# Patient Record
Sex: FEMALE | Race: WHITE | Employment: OTHER | URBAN - METROPOLITAN AREA
[De-identification: names, ages, dates, MRNs, and addresses within clinical notes are randomized per-mention and may not be internally consistent; named-entity substitution may affect disease eponyms.]

---

## 2022-05-25 ENCOUNTER — OFFICE VISIT (OUTPATIENT)
Dept: ORTHOPEDIC SURGERY | Age: 72
End: 2022-05-25
Payer: MEDICARE

## 2022-05-25 VITALS — BODY MASS INDEX: 39.37 KG/M2 | WEIGHT: 222.2 LBS | HEIGHT: 63 IN

## 2022-05-25 DIAGNOSIS — M25.551 CHRONIC RIGHT HIP PAIN: ICD-10-CM

## 2022-05-25 DIAGNOSIS — M16.11 PRIMARY OSTEOARTHRITIS OF RIGHT HIP: Primary | ICD-10-CM

## 2022-05-25 DIAGNOSIS — G89.29 CHRONIC RIGHT HIP PAIN: ICD-10-CM

## 2022-05-25 DIAGNOSIS — M70.61 GREATER TROCHANTERIC BURSITIS OF RIGHT HIP: ICD-10-CM

## 2022-05-25 PROCEDURE — 1090F PRES/ABSN URINE INCON ASSESS: CPT | Performed by: ORTHOPAEDIC SURGERY

## 2022-05-25 PROCEDURE — 1123F ACP DISCUSS/DSCN MKR DOCD: CPT | Performed by: ORTHOPAEDIC SURGERY

## 2022-05-25 PROCEDURE — 20610 DRAIN/INJ JOINT/BURSA W/O US: CPT | Performed by: ORTHOPAEDIC SURGERY

## 2022-05-25 PROCEDURE — 1036F TOBACCO NON-USER: CPT | Performed by: ORTHOPAEDIC SURGERY

## 2022-05-25 PROCEDURE — G8400 PT W/DXA NO RESULTS DOC: HCPCS | Performed by: ORTHOPAEDIC SURGERY

## 2022-05-25 PROCEDURE — G8417 CALC BMI ABV UP PARAM F/U: HCPCS | Performed by: ORTHOPAEDIC SURGERY

## 2022-05-25 PROCEDURE — G8427 DOCREV CUR MEDS BY ELIG CLIN: HCPCS | Performed by: ORTHOPAEDIC SURGERY

## 2022-05-25 PROCEDURE — 3017F COLORECTAL CA SCREEN DOC REV: CPT | Performed by: ORTHOPAEDIC SURGERY

## 2022-05-25 PROCEDURE — 99203 OFFICE O/P NEW LOW 30 MIN: CPT | Performed by: ORTHOPAEDIC SURGERY

## 2022-05-25 RX ORDER — AMITRIPTYLINE HYDROCHLORIDE 25 MG/1
25 TABLET, FILM COATED ORAL NIGHTLY
COMMUNITY
Start: 2022-01-06 | End: 2022-07-08 | Stop reason: ALTCHOICE

## 2022-05-25 RX ORDER — ALBUTEROL SULFATE 90 UG/1
2 AEROSOL, METERED RESPIRATORY (INHALATION) EVERY 6 HOURS PRN
COMMUNITY

## 2022-05-25 RX ORDER — METHOCARBAMOL 500 MG/1
500 TABLET, FILM COATED ORAL 3 TIMES DAILY PRN
COMMUNITY
Start: 2016-06-30

## 2022-05-25 RX ORDER — FUROSEMIDE 20 MG/1
20 TABLET ORAL DAILY
COMMUNITY
Start: 2016-07-12 | End: 2023-04-30

## 2022-05-25 RX ORDER — BUPIVACAINE HYDROCHLORIDE 2.5 MG/ML
4 INJECTION, SOLUTION INFILTRATION; PERINEURAL ONCE
Status: DISCONTINUED | OUTPATIENT
Start: 2022-05-25 | End: 2022-07-13 | Stop reason: HOSPADM

## 2022-05-25 RX ORDER — TRAMADOL HYDROCHLORIDE 50 MG/1
50 TABLET ORAL EVERY 6 HOURS PRN
COMMUNITY
Start: 2016-04-29 | End: 2022-05-31

## 2022-05-25 RX ORDER — LIDOCAINE HYDROCHLORIDE 10 MG/ML
2 INJECTION, SOLUTION INFILTRATION; PERINEURAL ONCE
Status: COMPLETED | OUTPATIENT
Start: 2022-05-25 | End: 2022-05-25

## 2022-05-25 RX ORDER — PREGABALIN 50 MG/1
50 CAPSULE ORAL 3 TIMES DAILY
COMMUNITY
Start: 2022-04-20 | End: 2022-10-17

## 2022-05-25 RX ORDER — ALLOPURINOL 100 MG/1
100 TABLET ORAL DAILY
Status: ON HOLD | COMMUNITY
Start: 2016-06-24 | End: 2022-08-11 | Stop reason: CLARIF

## 2022-05-25 RX ORDER — TRIAMCINOLONE ACETONIDE 40 MG/ML
40 INJECTION, SUSPENSION INTRA-ARTICULAR; INTRAMUSCULAR ONCE
Status: COMPLETED | OUTPATIENT
Start: 2022-05-25 | End: 2022-05-25

## 2022-05-25 RX ORDER — HYDROCODONE BITARTRATE AND ACETAMINOPHEN 5; 325 MG/1; MG/1
1 TABLET ORAL 2 TIMES DAILY PRN
COMMUNITY
Start: 2016-07-06 | End: 2022-06-19

## 2022-05-25 RX ORDER — LISINOPRIL 5 MG/1
5 TABLET ORAL 2 TIMES DAILY
COMMUNITY
Start: 2016-06-24 | End: 2022-11-08

## 2022-05-25 RX ORDER — FLUTICASONE PROPIONATE 50 MCG
2 SPRAY, SUSPENSION (ML) NASAL DAILY PRN
COMMUNITY

## 2022-05-25 RX ORDER — MELOXICAM 15 MG/1
15 TABLET ORAL DAILY
Status: ON HOLD | COMMUNITY
Start: 2018-06-22 | End: 2022-08-11 | Stop reason: CLARIF

## 2022-05-25 RX ORDER — LIDOCAINE HYDROCHLORIDE 10 MG/ML
4 INJECTION, SOLUTION INFILTRATION; PERINEURAL ONCE
Status: DISCONTINUED | OUTPATIENT
Start: 2022-05-25 | End: 2022-07-13 | Stop reason: HOSPADM

## 2022-05-25 RX ORDER — BUPIVACAINE HYDROCHLORIDE 2.5 MG/ML
2 INJECTION, SOLUTION INFILTRATION; PERINEURAL ONCE
Status: COMPLETED | OUTPATIENT
Start: 2022-05-25 | End: 2022-05-25

## 2022-05-25 RX ADMIN — TRIAMCINOLONE ACETONIDE 40 MG: 40 INJECTION, SUSPENSION INTRA-ARTICULAR; INTRAMUSCULAR at 14:15

## 2022-05-25 RX ADMIN — BUPIVACAINE HYDROCHLORIDE 5 MG: 2.5 INJECTION, SOLUTION INFILTRATION; PERINEURAL at 14:20

## 2022-05-25 RX ADMIN — LIDOCAINE HYDROCHLORIDE 2 ML: 10 INJECTION, SOLUTION INFILTRATION; PERINEURAL at 14:20

## 2022-05-25 NOTE — PROGRESS NOTES
CHIEF COMPLAINT: Right hip pain. History:   Ирина Marroquin is a 74 y.o. female who presents today for evaluation and treatment of right hip pain / injury. Patient was self-referred. This is evaluated as a personal injury. She has had pain for greater than a year, but had significant worsening about 3 months ago when she had to use stairs due to an elevator being out of order. Patient rates the pain as a 8/10. Patient did not have a history of injury. Pain is located laterally and in her groin. Pain is worse with all activities. Patient does have a history of back pain. Patient has not had PT. The patient cannot take NSAIDs because of GI upset. The patient has not had an intra-articular hip injection. The patient's occupation is retired. Outside reports reviewed: none. No past medical history on file. No past surgical history on file. No family history on file. Social History     Socioeconomic History    Marital status: Unknown     Spouse name: None    Number of children: None    Years of education: None    Highest education level: None   Occupational History    None   Tobacco Use    Smoking status: Never Smoker    Smokeless tobacco: Never Used   Substance and Sexual Activity    Alcohol use: Never    Drug use: Never    Sexual activity: None   Other Topics Concern    None   Social History Narrative    None     Social Determinants of Health     Financial Resource Strain:     Difficulty of Paying Living Expenses: Not on file   Food Insecurity:     Worried About Running Out of Food in the Last Year: Not on file    Bautista of Food in the Last Year: Not on file   Transportation Needs:     Lack of Transportation (Medical): Not on file    Lack of Transportation (Non-Medical):  Not on file   Physical Activity:     Days of Exercise per Week: Not on file    Minutes of Exercise per Session: Not on file   Stress:     Feeling of Stress : Not on file   Social Connections:     Frequency of Communication with Friends and Family: Not on file    Frequency of Social Gatherings with Friends and Family: Not on file    Attends Islam Services: Not on file    Active Member of Clubs or Organizations: Not on file    Attends Club or Organization Meetings: Not on file    Marital Status: Not on file   Intimate Partner Violence:     Fear of Current or Ex-Partner: Not on file    Emotionally Abused: Not on file    Physically Abused: Not on file    Sexually Abused: Not on file   Housing Stability:     Unable to Pay for Housing in the Last Year: Not on file    Number of [place] in the Last Year: Not on file    Unstable Housing in the Last Year: Not on file       No current outpatient medications on file. No current facility-administered medications for this visit. Allergies   Allergen Reactions    Buspirone Hives    Gabapentin Other (See Comments)     Panic attacks  Panic attacks      Doxycycline Nausea And Vomiting and Palpitations    Statins Rash    Sulfa Antibiotics Rash        Review of Systems:  I have reviewed the clinically relevant past medical history, medications, allergies, family history, social history, and 13 point Review of Systems from the patient's recent history form & documented any details relevant to today's presenting complaints in the history above. The patient's self-reported past medical history, medications, allergies, family history, social history, and Review of Systems form from 5/25/22 have been scanned into the chart under the \"Media\" tab. Physical Examination:     Vital signs:   Ht 5' 3\" (1.6 m)   Wt 222 lb 3.2 oz (100.8 kg)   BMI 39.36 kg/m²     General:  alert, appears stated age, cooperative and no distress   Feet:  normal   Gait:  Normal. The patient can bear weight on the injured extremity.   She is using a rolling walker     Right Hip  Passive ROM:  0 degrees extension     degrees flexion   45 degrees external rotation   20 degrees internal rotation   Bilateral hips   HU:  3   Impingement test:  positive   Left hip: negative   Labral stress test:  positive   Left hip: negative   Jose Alberto test:  not tested   Greater trochanter tenderness:  positive   Pirifiormis / Gluteus medius tenderness:  negative   Iliopsoas strength:  5 / 5    Bilateral hips   Logroll:  negative   Straight-leg raise:  negative   Leg length discrepancy:  Equal      Motor exam noted and recorded, quadriceps, hamstrings, foot dorsiflexors and plantar flexors are intact 5/5 equal and symmetric. Sensation is intact grossly to light touch in the tibial, peroneal, sural, and saphenous nerve distributions bilaterally. Both feet are well perfused and sensory is intact to feet equal and symmetric. There is not  any cellulitis, skin lesions, or lymphedema noted bilaterally. Imaging:  Right hip xrays:   AP pelvis xray and AP and lateral views obtained and reviewed. They demonstrate no evidence of acute fracture, subluxation, or dislocation. She has severe right hip osteoarthritis, and moderate on the left      Assessment:     Right hip osteoarthritis  Right hip trochanteric bursitis  HTN  Gout  DISH (diffuse idiopathic skeletal hyperostosis  Chronic bronchitis      Plan:     Natural history and expected course discussed. Questions answered. We discussed that she does have severe right hip osteoarthritis. I think this is the main etiology of her symptoms. However, she also does have some moderate right trochanteric bursitis. The risks and benefits of an injection were discussed with the patient. The patient had full opportunity to ask questions and all were answered. The patient then provided verbal informed consent. The skin was then prepped with betadine solution and alcohol. Under aseptic conditions, the  right trochanteric bursa was injected with 2cc of 1% xylocaine, 2cc of 0.25% marcaine, and 1cc of Kenalog (40mg/ml).   There were no immediate complications following the injection. The patient was advised of the possibility of injection site reaction and instructed to apply ice to the area and take NSAIDs if able. Ice as needed. She cannot take NSAIDs. We will have her see Dr. Nicci Oliveros for evaluation for ultrasound-guided hip injection. We also discussed that Dr. Velvet Laguerre is one of our adult reconstruction specialist, who could perform a total hip replacement on her if needed. Sindy Turpin. Jatin Oquendo MD  Orthopaedic Surgery and Sports Medicine     Disclaimer: This note was generated with use of a verbal recognition program and an attempt was made to check for errors. It is possible that there are still dictated errors within this office note. If so, please bring any significant errors to my attention for an addendum. All efforts were made to ensure that this office note is accurate.

## 2022-06-14 ENCOUNTER — HOSPITAL ENCOUNTER (OUTPATIENT)
Age: 72
Discharge: HOME | End: 2022-06-14
Payer: MEDICARE

## 2022-06-14 ENCOUNTER — OFFICE VISIT (OUTPATIENT)
Dept: ORTHOPEDIC SURGERY | Age: 72
End: 2022-06-14
Payer: MEDICARE

## 2022-06-14 ENCOUNTER — TELEPHONE (OUTPATIENT)
Dept: ORTHOPEDIC SURGERY | Age: 72
End: 2022-06-14

## 2022-06-14 VITALS — BODY MASS INDEX: 38.45 KG/M2 | WEIGHT: 217 LBS | HEIGHT: 63 IN

## 2022-06-14 DIAGNOSIS — M16.11 PRIMARY OSTEOARTHRITIS OF RIGHT HIP: ICD-10-CM

## 2022-06-14 DIAGNOSIS — Z01.818 PREOP TESTING: ICD-10-CM

## 2022-06-14 DIAGNOSIS — E66.09 CLASS 2 OBESITY DUE TO EXCESS CALORIES WITHOUT SERIOUS COMORBIDITY WITH BODY MASS INDEX (BMI) OF 39.0 TO 39.9 IN ADULT: ICD-10-CM

## 2022-06-14 DIAGNOSIS — M16.11 PRIMARY OSTEOARTHRITIS OF RIGHT HIP: Primary | ICD-10-CM

## 2022-06-14 DIAGNOSIS — Z01.818 PREOP TESTING: Primary | ICD-10-CM

## 2022-06-14 DIAGNOSIS — M25.551 RIGHT HIP PAIN: Primary | ICD-10-CM

## 2022-06-14 LAB
APTT: 30.8 SEC (ref 23–34.3)
BILIRUBIN URINE: NEGATIVE
BLOOD, URINE: NEGATIVE
CLARITY: CLEAR
COLOR: YELLOW
EKG ATRIAL RATE: 69 BPM
EKG DIAGNOSIS: NORMAL
EKG P AXIS: 84 DEGREES
EKG P-R INTERVAL: 176 MS
EKG Q-T INTERVAL: 366 MS
EKG QRS DURATION: 92 MS
EKG QTC CALCULATION (BAZETT): 392 MS
EKG R AXIS: 29 DEGREES
EKG T AXIS: 21 DEGREES
EKG VENTRICULAR RATE: 69 BPM
EPITHELIAL CELLS, UA: NORMAL /HPF (ref 0–5)
GLUCOSE URINE: NEGATIVE MG/DL
INR BLD: 1.04 (ref 0.87–1.14)
KETONES, URINE: NEGATIVE MG/DL
LEUKOCYTE ESTERASE, URINE: ABNORMAL
MICROSCOPIC EXAMINATION: YES
NITRITE, URINE: NEGATIVE
PH UA: 6 (ref 5–8)
PROTEIN UA: NEGATIVE MG/DL
PROTHROMBIN TIME: 13.4 SEC (ref 11.7–14.5)
RBC UA: NORMAL /HPF (ref 0–4)
SPECIFIC GRAVITY UA: 1.01 (ref 1–1.03)
URINE TYPE: ABNORMAL
UROBILINOGEN, URINE: 0.2 E.U./DL
WBC UA: NORMAL /HPF (ref 0–5)

## 2022-06-14 PROCEDURE — 3017F COLORECTAL CA SCREEN DOC REV: CPT | Performed by: ORTHOPAEDIC SURGERY

## 2022-06-14 PROCEDURE — 1090F PRES/ABSN URINE INCON ASSESS: CPT | Performed by: ORTHOPAEDIC SURGERY

## 2022-06-14 PROCEDURE — 82040 ASSAY OF SERUM ALBUMIN: CPT

## 2022-06-14 PROCEDURE — 1123F ACP DISCUSS/DSCN MKR DOCD: CPT | Performed by: ORTHOPAEDIC SURGERY

## 2022-06-14 PROCEDURE — 93010 ELECTROCARDIOGRAM REPORT: CPT | Performed by: INTERNAL MEDICINE

## 2022-06-14 PROCEDURE — 1036F TOBACCO NON-USER: CPT | Performed by: ORTHOPAEDIC SURGERY

## 2022-06-14 PROCEDURE — 99215 OFFICE O/P EST HI 40 MIN: CPT | Performed by: ORTHOPAEDIC SURGERY

## 2022-06-14 PROCEDURE — G8417 CALC BMI ABV UP PARAM F/U: HCPCS | Performed by: ORTHOPAEDIC SURGERY

## 2022-06-14 PROCEDURE — 87186 SC STD MICRODIL/AGAR DIL: CPT

## 2022-06-14 PROCEDURE — 87088 URINE BACTERIA CULTURE: CPT

## 2022-06-14 PROCEDURE — 87081 CULTURE SCREEN ONLY: CPT

## 2022-06-14 PROCEDURE — 85025 COMPLETE CBC W/AUTO DIFF WBC: CPT

## 2022-06-14 PROCEDURE — 84466 ASSAY OF TRANSFERRIN: CPT

## 2022-06-14 PROCEDURE — G8400 PT W/DXA NO RESULTS DOC: HCPCS | Performed by: ORTHOPAEDIC SURGERY

## 2022-06-14 PROCEDURE — 83036 HEMOGLOBIN GLYCOSYLATED A1C: CPT

## 2022-06-14 PROCEDURE — 80048 BASIC METABOLIC PNL TOTAL CA: CPT

## 2022-06-14 PROCEDURE — 93005 ELECTROCARDIOGRAM TRACING: CPT

## 2022-06-14 PROCEDURE — G8427 DOCREV CUR MEDS BY ELIG CLIN: HCPCS | Performed by: ORTHOPAEDIC SURGERY

## 2022-06-14 PROCEDURE — 85730 THROMBOPLASTIN TIME PARTIAL: CPT

## 2022-06-14 PROCEDURE — 87086 URINE CULTURE/COLONY COUNT: CPT

## 2022-06-14 PROCEDURE — 36415 COLL VENOUS BLD VENIPUNCTURE: CPT

## 2022-06-14 PROCEDURE — 81001 URINALYSIS AUTO W/SCOPE: CPT

## 2022-06-14 PROCEDURE — 85610 PROTHROMBIN TIME: CPT

## 2022-06-14 NOTE — LETTER
Patient was evaluated today. Patient was referred by Dr. Adonay Vargas for possible total hip arthroplasty. We have discussed conservative measures of intra-articular cortisone injection but patient has decided for anterior total hip arthroplasty. We have decided against the cortisone injection due to her end-stage osteoarthritis and the limited benefit it will provide along with increased risk of postoperative infection. She will have an anterior total hip arthroplasty performed by myself on July 13, 2022 at [facility]  Was hoping you could help us get the patient cleared for surgery.   Thank you again for all your help and feel free to call me with any questions    Torrie Ram MD

## 2022-06-14 NOTE — PROGRESS NOTES
Dr Adrianne Peck      Date /Time 6/14/2022       4:54 PM EDT  Name Linette Snow             1950   Location  [facility]  MRN [id number]                Chief Complaint   Patient presents with    Follow-up     Right Hip refer Dr Shamika Cunningham         History of Present Illness    Linette Snow is a 74 y.o. female who presents with  right hip pain. Sent in consultation by Delbert Thompson. Occupation: Retired  Athletic/exercise activity: no sports. Injury Mechanism:  none. Worker's Comp. & legal issues:   none. Previous Treatments: Ice, Heat and NSAIDs    Patient presents the office today for a new problem. Patient here with a chief complaint of right hip pain. Patient's right hip is painful over the groin and lateral hip. She has had 3 previous bursal injections by Dr. Shamika Cunningham. The last injection did not work. Patient referred to us to consider total hip arthroplasty. No recent injury or trauma. She does take Ultram and Norco.    Past History  Past Medical History:   Diagnosis Date    Tuberculosis 1981     Past Surgical History:   Procedure Laterality Date    APPENDECTOMY  1957    CATARACT REMOVAL Bilateral 2015    CHOLECYSTECTOMY  2002    ECTOPIC PREGNANCY SURGERY  1970    PARTIAL HYSTERECTOMY (CERVIX NOT REMOVED)  [address] [facility] [address]     History reviewed. No pertinent family history.   Social History     Tobacco Use    Smoking status: Never Smoker    Smokeless tobacco: Never Used   Substance Use Topics    Alcohol use: Never      Current Outpatient Medications on File Prior to Visit   Medication Sig Dispense Refill    albuterol sulfate  (90 Base) MCG/ACT inhaler INHALE 2 PUFFS BY MOUTH EVERY 6 HOURS AS NEEDED FOR WHEEZING      allopurinol (ZYLOPRIM) 100 MG tablet Take 100 mg by mouth daily      amitriptyline (ELAVIL) 25 mg tablet Take 25 mg by mouth nightly      furosemide (LASIX) 20 MG tablet Take 20 mg by mouth daily      HYDROcodone-acetaminophen (NORCO) 5-325 MG per tablet Take 1 tablet by mouth 2 times daily as needed.  lisinopril (PRINIVIL;ZESTRIL) 5 MG tablet Take 1 tablet by mouth once daily      methocarbamol (ROBAXIN) 500 MG tablet Take 500 mg by mouth 3 times daily as needed      meloxicam (MOBIC) 15 MG tablet Take 15 mg by mouth daily      pregabalin (LYRICA) 50 MG capsule Take 50 mg by mouth daily.  fluticasone (FLONASE) 50 MCG/ACT nasal spray Use 2 spray(s) in each nostril once daily       Current Facility-Administered Medications on File Prior to Visit   Medication Dose Route Frequency Provider Last Rate Last Admin    lidocaine 1 % injection 4 mL  4 mL Intra-artICUlar Once Jackeline Jiménez MD        bupivacaine (MARCAINE) 0.25 % injection 10 mg  4 mL Intra-artICUlar Once Jackeline Jiménez MD            ASCVD 10-YEAR RISK SCORE  The ASCVD Risk score (Chris Caldera, et al., 2013) failed to calculate for the following reasons: The systolic blood pressure is missing    Cannot find a previous HDL lab    Cannot find a previous total cholesterol lab   . Review of Systems  10-point ROS is negative other than HPI. Physical Exam  Based off 1997 Exam Criteria  Ht 5' 2.5\" (1.588 m)   Wt 217 lb (98.4 kg)   BMI 39.06 kg/m²      Constitutional:       General: He is not in acute distress. Appearance: Normal appearance. Cardiovascular:      Rate and Rhythm: Normal rate and regular rhythm. Pulses: Normal pulses. Pulmonary:      Effort: Pulmonary effort is normal. No respiratory distress. Neurological:      Mental Status: He is alert and oriented to person, place, and time. Mental status is at baseline.      Musculoskeletal:  Gait:  antalgic    Spine / Hip Exam:      RIGHT  LEFT    Lumbar Spine Exam  [x] All Neg    [x] All Neg     Straight leg raise  []  []Not tested   []  []Not tested    Clonus  []  []Not tested   []  []Not tested    Pain with motion  []  []Not tested   []  []Not tested    Radiculopathy  []  []Not tested   [] []Not tested    Paraspinal muscle tenderness  [] Paraspinal  []Midline   [] Paraspinal  []Midline   Sensation RIGHT  LEFT    L3  [x] Normal []Decreased    [x] Normal []Decreased   L4  [x] Normal  []Decreased   [x] Normal []Decreased   L5  [x] Normal []Decreased   [x] Normal []Decreased   S1  [x] Normal  []Decreased   [x] Normal []Decreased   Pelvis       Scoliosis  [x] Nml  [] Present     Leg-length discrepency  [x] Equal  [] Right longer   [] Left longer   Range of Motion Active Passive Active Passive   Hip Flexion 100  120    Abduction 30  50    External Rotation @ 90 flex 45  65    Internal Rotation @ 90 flex 0  20           Hip Impingement / Dysplasia  [] All Neg  [] Not tested   [x] All Neg  [] Not tested    Hip impingement test  [x]  []Not tested   []  []Not tested    C-sign  [x]  []Not tested   []  []Not tested    Anterior instability apprehension  []  []Not tested   []  []Not tested    Posterior instability apprehension  []  []Not tested   []  []Not tested    Uncontained Internal rotation  []  []Not tested  []  []Not tested          Abductors  [x] All Neg  [] Not tested   [x] All Neg  [] Not tested    Medius strength  []  []Not tested   []  []Not tested    Minimum strength  []  []Not tested   []  []Not tested    IT band tendonitis  []  []Not tested   []  []Not tested    Trochanteric tenderness  []  []Not tested  []  []Not tested   Sciatic neuropathic pain  []  []Not tested   []  []Not tested           Post-arthroplasty  [] All Neg  [] Not tested   [] All Neg  [] Not tested    Rectus tendonitis  []  []Not tested   []  []Not tested    Iliopsoas tendonitis       Start-up pain  []  []Not tested   []  []Not tested          Imaging    Right Hip: [address] [facility] [facility]  Radiographs: X-rays were ordered today and viewed of the right hip.  3 views. AP pelvis, lateral, and false profile. They demonstrate end-stage osteoarthritis. No evidence of fractures or dislocations.         Procedure:  Orders Placed This Encounter   Procedures    XR HIP RIGHT (2-3 VIEWS)     Standing Status:   Future     Number of Occurrences:   1     Standing Expiration Date:   6/9/2023     Order Specific Question:   Reason for exam:     Answer:   PAIN       Assessment and [address] was seen today for follow-up. Diagnoses and all orders for this visit:    Right hip pain  -     XR HIP RIGHT (2-3 VIEWS); Future    Primary osteoarthritis of right hip    Class 2 obesity due to excess calories without serious comorbidity with body mass index (BMI) of 39.0 to 39.9 in adult        Patient will proceed with a right anterior total hip arthroplasty. Patient does have increased risk of postoperative complications. Patient has a BMI of 39.06. This does place her at increased risk of postoperative wound infection, dehiscence, and DVT. She expresses understanding and wishes to proceed. I discussed with Mahendra Moisés that her history, symptoms, signs and imaging are most consistent with hip arthritis    We reviewed the natural history of these conditions and treatment options ranging from conservative measures (rest, icing, activity modification, physical therapy, pain meds, cortisone injection)  to surgical options. We had a long discussion with the patient about their hip. We discussed surgical and non surgical options for hip arthritis. The most important thing is to work to maintain their range of motion. Next they can try medications including tylenol and NSAIDs. They can try glucosamine or chondroitin. They should also ice frequently and avoid activities that make their hip hurt. Cortisone injections and Synvisc injections are also options when medicine has failed. We finally discussed surgical options including arthroscopic debridement versus hip replacement. Often the arthritis is too far gone for an arthroscopic debridement and pain relief will be short term.  Their ultimate solution will be a hip replacement when they are ready for it. They should put it off until they can no longer stand the pain and when nothing else has worked. Conservative measures have failed. She is not interested in cortisone injections. I think she is an appropriate candidate for surgery due to her ongoing symptoms and dysfunction despite conservative measures. The procedure would be Right anterior  37069 Total Hip Arthroplasty    Perioperative considerations include: Pre-operative clearance from medical subspecialty. We reviewed the risks, benefits, alternatives of this approach. We discussed risks including, but not limited to, bleeding, pain, infection, scarring, damage to the neurovascular structures, blood clots, pulmonary embolus, stiffness, implant instability or loosening, implant failure, incomplete relief of pain, and incomplete return of function. We also reviewed the surgical details, expected recovery, and rehabilitation (6-9 months). She expressed understanding and will undergo preoperative medical evaluation and optimization. Dependently reviewed Dr. Crawford Bigger documentation and imaging. I also discussed with him personally regarding the care of this patient. I will also discussed with family physician. Electronically signed by Regina Noriega MD on 6/14/2022 at 4:54 PM  This dictation was generated by voice recognition computer software. Although all attempts are made to edit the dictation for accuracy, there may be errors in the transcription that are not intended.

## 2022-06-14 NOTE — LETTER
[facility]  Surgery Scheduling Form:    22     DEMOGRAPHICS    Patient Name:  Pastora Irwin  Patient :  1950   Patient SS#:  [id number]    Patient Phone:  [phone] (home)  Alt. Patient Phone:    [facility] [id number]    PCP:  [address] [place] [address] Road:  Payor: MEDICARE / Plan: MEDICARE PART A AND B / Product Type: *No Product type* /   Insurance ID Number:  Payor/Plan Subscr  Sex Relation Sub. Ins. ID Effective Group Num   1. MEDICARE - ME* MARIO BENITEZ* 1950 Female Self [id number]                                    [address] 1950 Female Self [id number]                                    [address]       DIAGNOSIS & PROCEDURE    Diagnosis: RIGHT  M16.11 Right hip primary osteoarthritis    Operation: RIGHT  43550 Total Hip Arthroplasty Minimally-Invasive Direct Anterior     Provider:  Elias Saeed MD    Location[facility]  [facility]      Requested Date:  2022  Requested Time:  TBD     Patient Arrival Time:  2 hours prior to planned procedure   OR Time Required:  100 Minutes  Admission:  []Outpatient   []23 hour  [x]Same Day Admit:   1-2  days  []Inpatient    Anesthesia:  [x]General  []Spinal  []MAC/Sedation  Regional Anesthesia:  []None  []OrthoMix  []Exparel  []Fascia Iliaca / PENG       EQUIPMENT    Position:  [x]Supine  []Lateral  []Beach-chair  []Prone    OR Bed:  []Regular  [x][place]  []Romain  []Hip meadows  []Beach-chair  []Spyder  Radiology:  [x]Large C-arm  []Small C-arm  []Portable X-ray    Implants:  [employer] Hip:  [x]Primary Set  []Revision Set  Medacta Hip:  []Dual-modular acetabulum (DM)    SUTURE: []#5 Ethibond  [x]#2 Ethibond  [x]#2 Quill  []#1 PDS  [x]#1 Vicryl                   [x]2-0 Vicryl  [x]3-0 Monocryl  []2-0 Nylon  []3-0 Nylon  []3-0 PDS                    []Dermabond  []Steri-strips (in half)  DRESSING:  [x][place] dermabond  []4x4 gauze  [x]ABDs [x]Tegaderm  BRACE: []Pelvic Binder  []Hip X-ACT  []Knee TROM  []Knee immobilizer                 []Shoulder Immob. (w/abd. pillow)  []Sling  []Ice Unit  []Ace-Wrap      [x][employer]:  Marla Booker [phone], [email or web address]  []Medacta: Gretchen Oro [phone], [email or web address]  []Fx Shoulder: Eulalio Figueroa [phone], [email or web address]  []Cirilo: Luca Eaton [phone], [email or web address]    Comments:        Torrie Ram MD  [address] [place] [address] Physicians  6/14/2022       5:02 PM EDT

## 2022-06-15 LAB
ALBUMIN SERPL-MCNC: 4.6 G/DL (ref 3.4–5)
ANION GAP SERPL CALCULATED.3IONS-SCNC: 15 MMOL/L (ref 3–16)
BASOPHILS ABSOLUTE: 0.1 K/UL (ref 0–0.2)
BASOPHILS RELATIVE PERCENT: 1.1 %
BUN BLDV-MCNC: 15 MG/DL (ref 7–20)
CALCIUM SERPL-MCNC: 9.8 MG/DL (ref 8.3–10.6)
CHLORIDE BLD-SCNC: 97 MMOL/L (ref 99–110)
CO2: 23 MMOL/L (ref 21–32)
CREAT SERPL-MCNC: 0.8 MG/DL (ref 0.6–1.2)
EOSINOPHILS ABSOLUTE: 0.3 K/UL (ref 0–0.6)
EOSINOPHILS RELATIVE PERCENT: 3.6 %
ESTIMATED AVERAGE GLUCOSE: 99.7 MG/DL
GFR AFRICAN AMERICAN: >60
GFR NON-AFRICAN AMERICAN: >60
GLUCOSE BLD-MCNC: 77 MG/DL (ref 70–99)
HBA1C MFR BLD: 5.1 %
HCT VFR BLD CALC: 37.3 % (ref 36–48)
HEMOGLOBIN: 12.5 G/DL (ref 12–16)
LYMPHOCYTES ABSOLUTE: 2.1 K/UL (ref 1–5.1)
LYMPHOCYTES RELATIVE PERCENT: 22.3 %
MCH RBC QN AUTO: 32.9 PG (ref 26–34)
MCHC RBC AUTO-ENTMCNC: 33.6 G/DL (ref 31–36)
MCV RBC AUTO: 98.1 FL (ref 80–100)
MONOCYTES ABSOLUTE: 0.6 K/UL (ref 0–1.3)
MONOCYTES RELATIVE PERCENT: 6.2 %
NEUTROPHILS ABSOLUTE: 6.3 K/UL (ref 1.7–7.7)
NEUTROPHILS RELATIVE PERCENT: 66.8 %
PDW BLD-RTO: 13.5 % (ref 12.4–15.4)
PLATELET # BLD: 503 K/UL (ref 135–450)
PMV BLD AUTO: 8.2 FL (ref 5–10.5)
POTASSIUM SERPL-SCNC: 4.7 MMOL/L (ref 3.5–5.1)
RBC # BLD: 3.81 M/UL (ref 4–5.2)
SODIUM BLD-SCNC: 135 MMOL/L (ref 136–145)
TRANSFERRIN: 255 MG/DL (ref 200–360)
WBC # BLD: 9.4 K/UL (ref 4–11)

## 2022-06-16 LAB
MRSA CULTURE ONLY: NORMAL
ORGANISM: ABNORMAL
URINE CULTURE, ROUTINE: ABNORMAL

## 2022-06-22 ENCOUNTER — TELEPHONE (OUTPATIENT)
Dept: ORTHOPEDIC SURGERY | Age: 72
End: 2022-06-22

## 2022-06-22 NOTE — TELEPHONE ENCOUNTER
Auth: NPR  Date: 07/13/22  Type of SX: INPATIENT  Location: [facility]  CPT: 41111   DX: M16.11  SX area: R HIP  Insurance: MEDICARE A&B

## 2022-06-30 ENCOUNTER — TELEPHONE (OUTPATIENT)
Dept: ORTHOPEDIC SURGERY | Age: 72
End: 2022-06-30

## 2022-06-30 NOTE — TELEPHONE ENCOUNTER
Orthopedic Nurse Navigator Summary    COVID:  Vaccinated     Patient Name:  Pancho Ford  Anticipated Date of Surgery:  07/13/22  Attended Pre-op Education Class:  No email  PCP: Pedro Mcgowan  Date of PCP visit for H&P: 06/16/22  Is patient in a Pain Management program: No  Review of Medical history reveals history of: HTN, Hyperlipidemia, Gout, Hypokalemia, TB in [address]  - Hemoglobin (g/dL):  Date: 06/14/22 Value 12.5  - Hematocrit(%): Date: 06/14/22  Value 37.3  - HgbA1C:  Date: 06/14/22 Value 5.1  - Albumin:  Date: 06/14/22  Value 4.6  - BUN:  Date: 06/14/22  Value 15  - Creatinine:  Date: 06/14/22  Value 0.8    MRSA swab 06/14/22- negative    UA culture showed E. Coli- teams message sent to [facility] with Dr. Jeri Gavin will review    Coronary Artery Disease/HTN/CHF history: Yes  Cardiologist: No  Cardiac clearance necessary:  No  Date of cardiac clearance appt:  Final Cardiac recommendations: On any anticoagulation: No    Diabetes History:  No  Most recent HgbA1C:   Pulmonary:  COPD/Emphysema/Use of home oxygen:  Alcohol use:     BMI greater than 40 at time of scheduling: Additional medical concerns:   Additional recommendations for above concerns:  Attended Pre-Hab program:    Anticipated Discharge Disposition:  Home with OPT  Who will be with patient at home following discharge:  Daughter- she will stay with her for a few days  Equipment patient already has:  walker  Bedroom on first or second floor:  First   Bathroom on first or second floor:  First   Weight bearing status:  wbat  Pre-op ambulatory status: painful ambulation, uses walker  Number of entry steps:  Has ramp in the garage to get into home  Caregiver assistance:  Full time    Teresa Streeter RN  Date:  06/30/22

## 2022-07-08 RX ORDER — TRAMADOL HYDROCHLORIDE 50 MG/1
50 TABLET ORAL EVERY 6 HOURS PRN
Status: ON HOLD | COMMUNITY
End: 2022-07-13 | Stop reason: HOSPADM

## 2022-07-08 RX ORDER — HYDROCODONE BITARTRATE AND ACETAMINOPHEN 5; 325 MG/1; MG/1
1 TABLET ORAL EVERY 12 HOURS PRN
Status: ON HOLD | COMMUNITY
End: 2022-07-13 | Stop reason: HOSPADM

## 2022-07-08 RX ORDER — AMLODIPINE BESYLATE 10 MG/1
10 TABLET ORAL DAILY
Status: ON HOLD | COMMUNITY
Start: 2022-06-29 | End: 2022-08-23 | Stop reason: HOSPADM

## 2022-07-08 NOTE — PROGRESS NOTES
[facility] PRE-SURGICAL TESTING INSTRUCTIONS                                  PRIOR TO PROCEDURE DATE:        1. PLEASE FOLLOW ANY  GUIDELINES/ INSTRUCTIONS PRIOR TO YOUR PROCEDURE AS ADVISED BY YOUR SURGEON. 2. Arrange for someone to drive you home and be with you for the first 24 hours after discharge for your safety after your procedure for which you received sedation. Ensure it is someone we can share information with regarding your discharge. 3. You must contact your surgeon for instructions IF:   You are taking any blood thinners, aspirin, anti-inflammatory or vitamin E.   There is a change in your physical condition such as a cold, fever, rash, cuts, sores or any other infection, especially near your surgical site. 4. Do not drink alcohol the day before or day of your procedure. 5. A Pre-op History and Physical for surgery MUST be completed by your Physician or Urgent Care within 30 days of your procedure date. Please bring a copy with you on the day of your procedure and along with any other testing performed. THE DAY OF YOUR PROCEDURE:  1. Follow instructions for ARRIVAL TIME as DIRECTED BY YOUR SURGEON. 2. Enter the MAIN entrance from [employer] and follow the signs to the free [place] or [email or web address] parking (offered free of charge 6am-5pm). 3. Enter the Main Entrance of the hospital (do not enter from the lower level of the parking garage). Upon entrance, check in with the  at the main desk on your left. If no one is available at the desk, proceed into the [facility] Waiting Room and go through the door directly into the [facility]. There is a Check-in desk ACROSS from Room 5 (marked with a sign hanging from the ceiling). The phone number for the surgery center is [phone]. 4. Please call [phone] option #2 option #2 if you have not been preregistered yet.   On the day of your procedure bring your insurance card and credit card, if you wish to pay your co-pay the day of surgery. 12. If you are to stay overnight, you may bring a bag with personal items. Please have any large items you may need brought in by your family after your arrival to your hospital room. 15. If you have a Living Will or Durable Power of , please bring a copy on the day of your procedure. 15. With your permission, one family member may accompany you while you are being prepared for surgery. Once you are ready, additional family members may join you. HOW WE KEEP YOU SAFE and WORK TO PREVENT SURGICAL SITE INFECTIONS:  1. Health care workers should always check your ID bracelet to verify your name and birth date. You will be asked many times to state your name, date of birth, and allergies. 2. Health care workers should always clean their hands with soap or alcohol gel before providing care to you. It is okay to ask anyone if they cleaned their hands before they touch you. 3. You will be actively involved in verifying the type of procedure you are having and ensuring the correct surgical site. This will be confirmed multiple times prior to your procedure. Do NOT ember your surgery site UNLESS instructed to by your surgeon. 4. Do not shave or wax for 72 hours prior to procedure near your operative site. Shaving with a razor can irritate your skin and make it easier to develop an infection. On the day of your procedure, any hair that needs to be removed near the surgical site will be clipped by a healthcare worker using a special clippers designed to avoid skin irritation. 5. When you are in the operating room, your surgical site will be cleansed with a special soap, and in most cases, you will be given an antibiotic before the surgery begins. What to expect AFTER YOUR PROCEDURE:  1. Immediately following your procedure, your will be taken to the PACU for the first phase of your recovery.   Your nurse will help you recover from any potential side effects of anesthesia, such as extreme drowsiness, changes in your vital signs or breathing patterns. Nausea, headache, muscle aches, or sore throat may also occur after anesthesia. Your nurse will help you manage these potential side effects. 2. For comfort and safety, arrange to have someone at home with you for the first 24 hours after discharge. 3. You and your family will be given written instructions about your diet, activity, dressing care, medications, and return visits. 4. Once at home, should issues with nausea, pain, or bleeding occur, or should you notice any signs of infection, you should call your surgeon. 5. Always clean your hands before and after caring for your wound. Do not let your family touch your surgery site without cleaning their hands. 6. Narcotic pain medications can cause significant constipation. You may want to add a stool softener to your postoperative medication schedule or speak to your surgeon on how best to manage this SIDE EFFECT. SPECIAL INSTRUCTIONS     Thank you for allowing us to care for you. We strive to exceed your expectations in the delivery of care and service provided to you and your family. If you need to contact the [place] [address] staff for any reason, please call us at [phone]    Instructions reviewed with patient during preadmission testing phone interview.   Lisa Colindres RN.7/8/2022 .2:34 PM      ADDITIONAL EDUCATIONAL INFORMATION REVIEWED PER PHONE WITH YOU AND/OR YOUR FAMILY:  Yes Hibiclens® Bathing Instructions   No Antibacterial Soap

## 2022-07-08 NOTE — PROGRESS NOTES
Place patient label inside box (if no patient label, complete below)  Name:  :  MR#:   Rashida Reyes / PROCEDURE  1. I (we),  Vinnie Biggs authorize DR Davi Nevarez and/or such assistants as may be selected by him/her, to perform the following operation/procedure(s): RIGHT TOTAL HIP ARTHROPLASTY/ MINIMALLY INVASIVE DIRECT ANTERIOR        Note: If unable to obtain consent prior to an emergent procedure, document the emergent reason in the medical record. This procedure has been explained to my (our) satisfaction and included in the explanation was:  A) The intended benefit, nature, and extent of the procedure to be performed;  B) The significant risks involved and the probability of success;  C) Alternative procedures and methods of treatment;  D) The dangers and probable consequences of such alternatives (including no procedure or treatment); E) The expected consequences of the procedure on my future health;  F) Whether other qualified individuals would be performing important surgical tasks and/or whether  would be present to advise or support the procedure. I (we) understand that there are other risks of infection and other serious complications in the pre-operative/procedural and postoperative/procedural stages of my (our) care. I (we) have asked all of the questions which I (we) thought were important in deciding whether or not to undergo treatment or diagnosis. These questions have been answered to my (our) satisfaction. I (we) understand that no assurance can be given that the procedure will be a success, and no guarantee or warranty of success has been given to me (us). 2. It has been explained to me (us) that during the course of the operation/procedure, unforeseen conditions may be revealed that necessitate extension of the original procedure(s) or different procedure(s) than those set forth in Paragraph 1.  I (we) authorize and request that the above-named physician, his/her assistants or his/her designees, perform procedures as necessary and desirable if deemed to be in my (our) best interest.     Revised 8/2/2021                                                                          Page 1 of 2           3. I acknowledge that health care personnel may be observing this procedure for the purpose of medical education or other specified purposes as may be necessary as requested and/or approved by my (our) physician. 4. I (we) consent to the disposal by the hospital Pathologist of the removed tissue, parts or organs in accordance with hospital policy. 5. I do ____ do not ____ consent to the use of a local infiltration pain blocking agent that will be used by my provider/surgical provider to help alleviate pain during my procedure. 6. I do ____ do not ____ consent to an emergent blood transfusion in the case of a life-threatening situation that requires blood components to be administered. This consent is valid for 24 hours from the beginning of the procedure. 7. This patient does ____ or does not ____ currently have a DNR status/order. If DNR order is in place, obtain Addendum to the Surgical Consent for ALL Patients with a DNR Order to address britany-operative status for limited intervention or DNR suspension.      8. I have read and fully understand the above Consent for Operation/Procedure and that all blanks were completed before I signed the consent.   _____________________________       _____________________      ____/____am/pm  Signature of Patient or legal representative      Printed Name / Relationship            Date / Time   ____________________________       _____________________      ____/____am/pm  Witness to Signature                                    Printed Name                    Date / Time     If patient is unable to sign or is a minor, complete the following)  Patient is a minor, ____ years of age, or unable to sign because:   ______________________________________________________________________________________________    [place] If a phone consent is obtained, consent will be documented by using two health care professionals, each affirming that the consenting party has no questions and gives consent for the procedure discussed with the physician/provider.   _____________________          ____________________       _____/_____am/pm   2nd witness to phone consent        Printed name           Date / Time    Informed Consent:  I have provided the explanation described above in section 1 to the patient and/or legal representative.  I have provided the patient and/or legal representative with an opportunity to ask any questions about the proposed operation/procedure.   ___________________________          ____________________         ____/____am/pm  Provider / Proceduralist                            Printed name            Date / Time  Revised 8/2/2021                                                                      Page 2 of 2

## 2022-07-11 ENCOUNTER — TELEPHONE (OUTPATIENT)
Dept: ORTHOPEDIC SURGERY | Age: 72
End: 2022-07-11

## 2022-07-11 RX ORDER — CIPROFLOXACIN 500 MG/1
500 TABLET, FILM COATED ORAL 2 TIMES DAILY
Qty: 20 TABLET | Refills: 0 | Status: ON HOLD
Start: 2022-07-11 | End: 2022-07-13 | Stop reason: HOSPADM

## 2022-07-11 NOTE — TELEPHONE ENCOUNTER
Surgery 07/13/2022 [facility] 7:15 am   ARRIVAL 5:30 am     Confirmed w/ patient    jm

## 2022-07-12 ENCOUNTER — ANESTHESIA EVENT (OUTPATIENT)
Dept: OPERATING ROOM | Age: 72
DRG: 470 | End: 2022-07-12
Payer: MEDICARE

## 2022-07-13 ENCOUNTER — APPOINTMENT (OUTPATIENT)
Dept: GENERAL RADIOLOGY | Age: 72
DRG: 470 | End: 2022-07-13
Attending: ORTHOPAEDIC SURGERY
Payer: MEDICARE

## 2022-07-13 ENCOUNTER — HOSPITAL ENCOUNTER (INPATIENT)
Age: 72
LOS: 1 days | Discharge: NURSING, REHAB OR LONG-TERM CARE | DRG: 470 | End: 2022-07-15
Attending: ORTHOPAEDIC SURGERY | Admitting: ORTHOPAEDIC SURGERY
Payer: MEDICARE

## 2022-07-13 ENCOUNTER — ANESTHESIA (OUTPATIENT)
Dept: OPERATING ROOM | Age: 72
DRG: 470 | End: 2022-07-13
Payer: MEDICARE

## 2022-07-13 DIAGNOSIS — M16.11 PRIMARY OSTEOARTHRITIS OF RIGHT HIP: Primary | ICD-10-CM

## 2022-07-13 DIAGNOSIS — M16.31 OSTEOARTHRITIS OF RIGHT HIP JOINT DUE TO DYSPLASIA: ICD-10-CM

## 2022-07-13 LAB
ABO/RH: NORMAL
ANTIBODY SCREEN: NORMAL
GLUCOSE BLD-MCNC: 132 MG/DL (ref 70–99)
GLUCOSE BLD-MCNC: 160 MG/DL (ref 70–99)
PERFORMED ON: ABNORMAL
PERFORMED ON: ABNORMAL

## 2022-07-13 PROCEDURE — 2580000003 HC RX 258: Performed by: ORTHOPAEDIC SURGERY

## 2022-07-13 PROCEDURE — 97530 THERAPEUTIC ACTIVITIES: CPT

## 2022-07-13 PROCEDURE — 6360000002 HC RX W HCPCS: Performed by: ORTHOPAEDIC SURGERY

## 2022-07-13 PROCEDURE — 2500000003 HC RX 250 WO HCPCS: Performed by: PHYSICIAN ASSISTANT

## 2022-07-13 PROCEDURE — 6360000002 HC RX W HCPCS: Performed by: ANESTHESIOLOGY

## 2022-07-13 PROCEDURE — 6360000002 HC RX W HCPCS: Performed by: NURSE ANESTHETIST, CERTIFIED REGISTERED

## 2022-07-13 PROCEDURE — 3209999900 FLUORO FOR SURGICAL PROCEDURES

## 2022-07-13 PROCEDURE — 6370000000 HC RX 637 (ALT 250 FOR IP): Performed by: PHYSICIAN ASSISTANT

## 2022-07-13 PROCEDURE — 6370000000 HC RX 637 (ALT 250 FOR IP): Performed by: ANESTHESIOLOGY

## 2022-07-13 PROCEDURE — 86901 BLOOD TYPING SEROLOGIC RH(D): CPT

## 2022-07-13 PROCEDURE — 6360000002 HC RX W HCPCS: Performed by: PHYSICIAN ASSISTANT

## 2022-07-13 PROCEDURE — C1776 JOINT DEVICE (IMPLANTABLE): HCPCS | Performed by: ORTHOPAEDIC SURGERY

## 2022-07-13 PROCEDURE — 2580000003 HC RX 258: Performed by: ANESTHESIOLOGY

## 2022-07-13 PROCEDURE — 3600000014 HC SURGERY LEVEL 4 ADDTL 15MIN: Performed by: ORTHOPAEDIC SURGERY

## 2022-07-13 PROCEDURE — 6370000000 HC RX 637 (ALT 250 FOR IP): Performed by: ORTHOPAEDIC SURGERY

## 2022-07-13 PROCEDURE — C9290 INJ, BUPIVACAINE LIPOSOME: HCPCS | Performed by: ORTHOPAEDIC SURGERY

## 2022-07-13 PROCEDURE — 2709999900 HC NON-CHARGEABLE SUPPLY: Performed by: ORTHOPAEDIC SURGERY

## 2022-07-13 PROCEDURE — 2580000003 HC RX 258: Performed by: PHYSICIAN ASSISTANT

## 2022-07-13 PROCEDURE — 2500000003 HC RX 250 WO HCPCS: Performed by: ANESTHESIOLOGY

## 2022-07-13 PROCEDURE — 3600000004 HC SURGERY LEVEL 4 BASE: Performed by: ORTHOPAEDIC SURGERY

## 2022-07-13 PROCEDURE — A4217 STERILE WATER/SALINE, 500 ML: HCPCS | Performed by: ORTHOPAEDIC SURGERY

## 2022-07-13 PROCEDURE — 72170 X-RAY EXAM OF PELVIS: CPT

## 2022-07-13 PROCEDURE — 27130 TOTAL HIP ARTHROPLASTY: CPT | Performed by: ORTHOPAEDIC SURGERY

## 2022-07-13 PROCEDURE — 94150 VITAL CAPACITY TEST: CPT

## 2022-07-13 PROCEDURE — G0378 HOSPITAL OBSERVATION PER HR: HCPCS

## 2022-07-13 PROCEDURE — 7100000001 HC PACU RECOVERY - ADDTL 15 MIN: Performed by: ORTHOPAEDIC SURGERY

## 2022-07-13 PROCEDURE — 97166 OT EVAL MOD COMPLEX 45 MIN: CPT

## 2022-07-13 PROCEDURE — 2500000003 HC RX 250 WO HCPCS: Performed by: ORTHOPAEDIC SURGERY

## 2022-07-13 PROCEDURE — 97161 PT EVAL LOW COMPLEX 20 MIN: CPT

## 2022-07-13 PROCEDURE — 2500000003 HC RX 250 WO HCPCS: Performed by: NURSE ANESTHETIST, CERTIFIED REGISTERED

## 2022-07-13 PROCEDURE — 86900 BLOOD TYPING SEROLOGIC ABO: CPT

## 2022-07-13 PROCEDURE — 7100000000 HC PACU RECOVERY - FIRST 15 MIN: Performed by: ORTHOPAEDIC SURGERY

## 2022-07-13 PROCEDURE — 94680 O2 UPTK RST&XERS DIR SIMPLE: CPT

## 2022-07-13 PROCEDURE — 3700000001 HC ADD 15 MINUTES (ANESTHESIA): Performed by: ORTHOPAEDIC SURGERY

## 2022-07-13 PROCEDURE — 99024 POSTOP FOLLOW-UP VISIT: CPT | Performed by: PHYSICIAN ASSISTANT

## 2022-07-13 PROCEDURE — 2720000010 HC SURG SUPPLY STERILE: Performed by: ORTHOPAEDIC SURGERY

## 2022-07-13 PROCEDURE — 86850 RBC ANTIBODY SCREEN: CPT

## 2022-07-13 PROCEDURE — 3700000000 HC ANESTHESIA ATTENDED CARE: Performed by: ORTHOPAEDIC SURGERY

## 2022-07-13 PROCEDURE — 73501 X-RAY EXAM HIP UNI 1 VIEW: CPT

## 2022-07-13 DEVICE — CABLE SURG L635MM DIA1.8MM CO CHROM CERCLAGE CBL RDY: Type: IMPLANTABLE DEVICE | Site: HIP | Status: FUNCTIONAL

## 2022-07-13 DEVICE — BEARING TIB 28X38 MM HIP VIVACIT-E: Type: IMPLANTABLE DEVICE | Site: HIP | Status: FUNCTIONAL

## 2022-07-13 DEVICE — HIP H3 ADV DUAL MOBILITY IMPL CAPPED H3: Type: IMPLANTABLE DEVICE | Site: HIP | Status: FUNCTIONAL

## 2022-07-13 DEVICE — G7 DUAL MOBILITY LINER 38MM C: Type: IMPLANTABLE DEVICE | Site: HIP | Status: FUNCTIONAL

## 2022-07-13 DEVICE — BIOLOX® DELTA, CERAMIC FEMORAL HEAD, L, Ø 28/+3.5, TAPER 12/14
Type: IMPLANTABLE DEVICE | Site: HIP | Status: FUNCTIONAL
Brand: BIOLOX® DELTA

## 2022-07-13 DEVICE — IMPLANTABLE DEVICE: Type: IMPLANTABLE DEVICE | Site: HIP | Status: FUNCTIONAL

## 2022-07-13 DEVICE — STEM FEM STD OFFSET 4 HIP HA AVENIR COMPLETE: Type: IMPLANTABLE DEVICE | Site: HIP | Status: FUNCTIONAL

## 2022-07-13 RX ORDER — INSULIN LISPRO 100 [IU]/ML
0.08 INJECTION, SOLUTION INTRAVENOUS; SUBCUTANEOUS
Status: DISCONTINUED | OUTPATIENT
Start: 2022-07-13 | End: 2022-07-15 | Stop reason: HOSPADM

## 2022-07-13 RX ORDER — SODIUM CHLORIDE 0.9 % (FLUSH) 0.9 %
5-40 SYRINGE (ML) INJECTION EVERY 12 HOURS SCHEDULED
Status: DISCONTINUED | OUTPATIENT
Start: 2022-07-13 | End: 2022-07-13 | Stop reason: HOSPADM

## 2022-07-13 RX ORDER — HYDROMORPHONE HYDROCHLORIDE 2 MG/1
4 TABLET ORAL
Status: DISCONTINUED | OUTPATIENT
Start: 2022-07-13 | End: 2022-07-13 | Stop reason: HOSPADM

## 2022-07-13 RX ORDER — PROPOFOL 10 MG/ML
INJECTION, EMULSION INTRAVENOUS PRN
Status: DISCONTINUED | OUTPATIENT
Start: 2022-07-13 | End: 2022-07-13 | Stop reason: SDUPTHER

## 2022-07-13 RX ORDER — MIDAZOLAM HYDROCHLORIDE 1 MG/ML
INJECTION INTRAMUSCULAR; INTRAVENOUS PRN
Status: DISCONTINUED | OUTPATIENT
Start: 2022-07-13 | End: 2022-07-13 | Stop reason: SDUPTHER

## 2022-07-13 RX ORDER — CEPHALEXIN 500 MG/1
500 CAPSULE ORAL 4 TIMES DAILY
Qty: 4 CAPSULE | Refills: 0 | Status: SHIPPED | OUTPATIENT
Start: 2022-07-13 | End: 2022-07-15 | Stop reason: HOSPADM

## 2022-07-13 RX ORDER — LIDOCAINE HYDROCHLORIDE 20 MG/ML
INJECTION, SOLUTION INTRAVENOUS PRN
Status: DISCONTINUED | OUTPATIENT
Start: 2022-07-13 | End: 2022-07-13 | Stop reason: SDUPTHER

## 2022-07-13 RX ORDER — ONDANSETRON 2 MG/ML
4 INJECTION INTRAMUSCULAR; INTRAVENOUS
Status: DISCONTINUED | OUTPATIENT
Start: 2022-07-13 | End: 2022-07-13 | Stop reason: HOSPADM

## 2022-07-13 RX ORDER — AMLODIPINE BESYLATE 10 MG/1
10 TABLET ORAL DAILY
Status: DISCONTINUED | OUTPATIENT
Start: 2022-07-14 | End: 2022-07-15 | Stop reason: HOSPADM

## 2022-07-13 RX ORDER — SODIUM CHLORIDE, SODIUM LACTATE, POTASSIUM CHLORIDE, CALCIUM CHLORIDE 600; 310; 30; 20 MG/100ML; MG/100ML; MG/100ML; MG/100ML
INJECTION, SOLUTION INTRAVENOUS CONTINUOUS
Status: DISCONTINUED | OUTPATIENT
Start: 2022-07-13 | End: 2022-07-13 | Stop reason: HOSPADM

## 2022-07-13 RX ORDER — SENNOSIDES 8.8 MG/5ML
5 LIQUID ORAL 2 TIMES DAILY PRN
Status: DISCONTINUED | OUTPATIENT
Start: 2022-07-13 | End: 2022-07-15 | Stop reason: HOSPADM

## 2022-07-13 RX ORDER — SODIUM CHLORIDE 0.9 % (FLUSH) 0.9 %
5-40 SYRINGE (ML) INJECTION EVERY 12 HOURS SCHEDULED
Status: DISCONTINUED | OUTPATIENT
Start: 2022-07-13 | End: 2022-07-15 | Stop reason: HOSPADM

## 2022-07-13 RX ORDER — ALLOPURINOL 100 MG/1
100 TABLET ORAL DAILY
Status: DISCONTINUED | OUTPATIENT
Start: 2022-07-13 | End: 2022-07-15 | Stop reason: HOSPADM

## 2022-07-13 RX ORDER — METHYLPREDNISOLONE 4 MG/1
TABLET ORAL
Qty: 1 KIT | Refills: 0 | Status: ON HOLD | OUTPATIENT
Start: 2022-07-13 | End: 2022-08-11 | Stop reason: CLARIF

## 2022-07-13 RX ORDER — DEXTROSE MONOHYDRATE 50 MG/ML
100 INJECTION, SOLUTION INTRAVENOUS PRN
Status: DISCONTINUED | OUTPATIENT
Start: 2022-07-13 | End: 2022-07-15 | Stop reason: HOSPADM

## 2022-07-13 RX ORDER — POLYETHYLENE GLYCOL 3350 17 G/17G
17 POWDER, FOR SOLUTION ORAL DAILY
Status: DISCONTINUED | OUTPATIENT
Start: 2022-07-13 | End: 2022-07-15 | Stop reason: HOSPADM

## 2022-07-13 RX ORDER — PREGABALIN 50 MG/1
50 CAPSULE ORAL 3 TIMES DAILY
Status: DISCONTINUED | OUTPATIENT
Start: 2022-07-13 | End: 2022-07-15 | Stop reason: HOSPADM

## 2022-07-13 RX ORDER — FLUTICASONE PROPIONATE 50 MCG
1 SPRAY, SUSPENSION (ML) NASAL DAILY
Status: DISCONTINUED | OUTPATIENT
Start: 2022-07-13 | End: 2022-07-15 | Stop reason: HOSPADM

## 2022-07-13 RX ORDER — MELOXICAM 7.5 MG/1
3.75 TABLET ORAL DAILY
Status: DISCONTINUED | OUTPATIENT
Start: 2022-07-13 | End: 2022-07-13 | Stop reason: SDUPTHER

## 2022-07-13 RX ORDER — ROCURONIUM BROMIDE 10 MG/ML
INJECTION, SOLUTION INTRAVENOUS PRN
Status: DISCONTINUED | OUTPATIENT
Start: 2022-07-13 | End: 2022-07-13 | Stop reason: SDUPTHER

## 2022-07-13 RX ORDER — FUROSEMIDE 20 MG/1
20 TABLET ORAL DAILY
Status: DISCONTINUED | OUTPATIENT
Start: 2022-07-13 | End: 2022-07-15 | Stop reason: HOSPADM

## 2022-07-13 RX ORDER — SODIUM CHLORIDE 9 MG/ML
INJECTION, SOLUTION INTRAVENOUS PRN
Status: DISCONTINUED | OUTPATIENT
Start: 2022-07-13 | End: 2022-07-13 | Stop reason: HOSPADM

## 2022-07-13 RX ORDER — ASPIRIN 81 MG/1
81 TABLET ORAL 2 TIMES DAILY
Qty: 60 TABLET | Refills: 0 | Status: SHIPPED | OUTPATIENT
Start: 2022-07-13

## 2022-07-13 RX ORDER — INSULIN LISPRO 100 [IU]/ML
0-3 INJECTION, SOLUTION INTRAVENOUS; SUBCUTANEOUS NIGHTLY
Status: DISCONTINUED | OUTPATIENT
Start: 2022-07-13 | End: 2022-07-15 | Stop reason: HOSPADM

## 2022-07-13 RX ORDER — INSULIN LISPRO 100 [IU]/ML
0-6 INJECTION, SOLUTION INTRAVENOUS; SUBCUTANEOUS
Status: DISCONTINUED | OUTPATIENT
Start: 2022-07-13 | End: 2022-07-15 | Stop reason: HOSPADM

## 2022-07-13 RX ORDER — ALBUTEROL SULFATE 2.5 MG/3ML
2.5 SOLUTION RESPIRATORY (INHALATION) EVERY 4 HOURS PRN
Status: DISCONTINUED | OUTPATIENT
Start: 2022-07-13 | End: 2022-07-15 | Stop reason: HOSPADM

## 2022-07-13 RX ORDER — SODIUM CHLORIDE 9 MG/ML
INJECTION, SOLUTION INTRAVENOUS PRN
Status: DISCONTINUED | OUTPATIENT
Start: 2022-07-13 | End: 2022-07-15 | Stop reason: HOSPADM

## 2022-07-13 RX ORDER — SODIUM CHLORIDE, SODIUM LACTATE, POTASSIUM CHLORIDE, CALCIUM CHLORIDE 600; 310; 30; 20 MG/100ML; MG/100ML; MG/100ML; MG/100ML
INJECTION, SOLUTION INTRAVENOUS CONTINUOUS
Status: DISCONTINUED | OUTPATIENT
Start: 2022-07-13 | End: 2022-07-15 | Stop reason: HOSPADM

## 2022-07-13 RX ORDER — FENTANYL CITRATE 50 UG/ML
50 INJECTION, SOLUTION INTRAMUSCULAR; INTRAVENOUS EVERY 5 MIN PRN
Status: COMPLETED | OUTPATIENT
Start: 2022-07-13 | End: 2022-07-13

## 2022-07-13 RX ORDER — DEXAMETHASONE SODIUM PHOSPHATE 4 MG/ML
INJECTION, SOLUTION INTRA-ARTICULAR; INTRALESIONAL; INTRAMUSCULAR; INTRAVENOUS; SOFT TISSUE PRN
Status: DISCONTINUED | OUTPATIENT
Start: 2022-07-13 | End: 2022-07-13 | Stop reason: SDUPTHER

## 2022-07-13 RX ORDER — MELOXICAM 15 MG/1
15 TABLET ORAL DAILY
Status: DISCONTINUED | OUTPATIENT
Start: 2022-07-13 | End: 2022-07-15 | Stop reason: HOSPADM

## 2022-07-13 RX ORDER — FENTANYL CITRATE 50 UG/ML
INJECTION, SOLUTION INTRAMUSCULAR; INTRAVENOUS PRN
Status: DISCONTINUED | OUTPATIENT
Start: 2022-07-13 | End: 2022-07-13 | Stop reason: SDUPTHER

## 2022-07-13 RX ORDER — METOCLOPRAMIDE HYDROCHLORIDE 5 MG/ML
10 INJECTION INTRAMUSCULAR; INTRAVENOUS
Status: DISCONTINUED | OUTPATIENT
Start: 2022-07-13 | End: 2022-07-13 | Stop reason: HOSPADM

## 2022-07-13 RX ORDER — OXYCODONE HYDROCHLORIDE 5 MG/1
5 TABLET ORAL EVERY 4 HOURS PRN
Status: DISCONTINUED | OUTPATIENT
Start: 2022-07-13 | End: 2022-07-15 | Stop reason: HOSPADM

## 2022-07-13 RX ORDER — MORPHINE SULFATE 2 MG/ML
2 INJECTION, SOLUTION INTRAMUSCULAR; INTRAVENOUS
Status: DISCONTINUED | OUTPATIENT
Start: 2022-07-13 | End: 2022-07-14

## 2022-07-13 RX ORDER — HYDROMORPHONE HYDROCHLORIDE 2 MG/1
2 TABLET ORAL
Status: DISCONTINUED | OUTPATIENT
Start: 2022-07-13 | End: 2022-07-13 | Stop reason: HOSPADM

## 2022-07-13 RX ORDER — SODIUM CHLORIDE 9 MG/ML
25 INJECTION, SOLUTION INTRAVENOUS PRN
Status: DISCONTINUED | OUTPATIENT
Start: 2022-07-13 | End: 2022-07-13 | Stop reason: HOSPADM

## 2022-07-13 RX ORDER — MORPHINE SULFATE 2 MG/ML
4 INJECTION, SOLUTION INTRAMUSCULAR; INTRAVENOUS
Status: DISCONTINUED | OUTPATIENT
Start: 2022-07-13 | End: 2022-07-14

## 2022-07-13 RX ORDER — METHOCARBAMOL 500 MG/1
500 TABLET, FILM COATED ORAL 3 TIMES DAILY PRN
Status: DISCONTINUED | OUTPATIENT
Start: 2022-07-13 | End: 2022-07-14

## 2022-07-13 RX ORDER — OXYCODONE HYDROCHLORIDE 5 MG/1
10 TABLET ORAL EVERY 4 HOURS PRN
Status: DISCONTINUED | OUTPATIENT
Start: 2022-07-13 | End: 2022-07-15 | Stop reason: HOSPADM

## 2022-07-13 RX ORDER — OXYCODONE HYDROCHLORIDE 5 MG/1
10 TABLET ORAL PRN
Status: DISCONTINUED | OUTPATIENT
Start: 2022-07-13 | End: 2022-07-13

## 2022-07-13 RX ORDER — LABETALOL HYDROCHLORIDE 5 MG/ML
10 INJECTION, SOLUTION INTRAVENOUS
Status: DISCONTINUED | OUTPATIENT
Start: 2022-07-13 | End: 2022-07-13 | Stop reason: HOSPADM

## 2022-07-13 RX ORDER — OXYCODONE HYDROCHLORIDE AND ACETAMINOPHEN 5; 325 MG/1; MG/1
1 TABLET ORAL EVERY 6 HOURS PRN
Qty: 28 TABLET | Refills: 0 | Status: SHIPPED | OUTPATIENT
Start: 2022-07-13 | End: 2022-07-15 | Stop reason: HOSPADM

## 2022-07-13 RX ORDER — ONDANSETRON 4 MG/1
4 TABLET, ORALLY DISINTEGRATING ORAL EVERY 8 HOURS PRN
Status: DISCONTINUED | OUTPATIENT
Start: 2022-07-13 | End: 2022-07-15 | Stop reason: HOSPADM

## 2022-07-13 RX ORDER — ONDANSETRON 2 MG/ML
4 INJECTION INTRAMUSCULAR; INTRAVENOUS EVERY 6 HOURS PRN
Status: DISCONTINUED | OUTPATIENT
Start: 2022-07-13 | End: 2022-07-15 | Stop reason: HOSPADM

## 2022-07-13 RX ORDER — GLYCOPYRROLATE 0.2 MG/ML
INJECTION INTRAMUSCULAR; INTRAVENOUS PRN
Status: DISCONTINUED | OUTPATIENT
Start: 2022-07-13 | End: 2022-07-13 | Stop reason: SDUPTHER

## 2022-07-13 RX ORDER — LISINOPRIL 5 MG/1
5 TABLET ORAL 2 TIMES DAILY
Status: DISCONTINUED | OUTPATIENT
Start: 2022-07-13 | End: 2022-07-15 | Stop reason: HOSPADM

## 2022-07-13 RX ORDER — MAGNESIUM HYDROXIDE 1200 MG/15ML
LIQUID ORAL CONTINUOUS PRN
Status: DISCONTINUED | OUTPATIENT
Start: 2022-07-13 | End: 2022-07-13 | Stop reason: HOSPADM

## 2022-07-13 RX ORDER — SODIUM CHLORIDE 0.9 % (FLUSH) 0.9 %
5-40 SYRINGE (ML) INJECTION PRN
Status: DISCONTINUED | OUTPATIENT
Start: 2022-07-13 | End: 2022-07-13 | Stop reason: HOSPADM

## 2022-07-13 RX ORDER — ONDANSETRON 2 MG/ML
INJECTION INTRAMUSCULAR; INTRAVENOUS PRN
Status: DISCONTINUED | OUTPATIENT
Start: 2022-07-13 | End: 2022-07-13 | Stop reason: SDUPTHER

## 2022-07-13 RX ORDER — OXYCODONE HYDROCHLORIDE 5 MG/1
5 TABLET ORAL PRN
Status: DISCONTINUED | OUTPATIENT
Start: 2022-07-13 | End: 2022-07-13

## 2022-07-13 RX ORDER — IPRATROPIUM BROMIDE AND ALBUTEROL SULFATE 2.5; .5 MG/3ML; MG/3ML
1 SOLUTION RESPIRATORY (INHALATION)
Status: DISCONTINUED | OUTPATIENT
Start: 2022-07-13 | End: 2022-07-13 | Stop reason: HOSPADM

## 2022-07-13 RX ORDER — MIDAZOLAM HYDROCHLORIDE 1 MG/ML
2 INJECTION INTRAMUSCULAR; INTRAVENOUS
Status: DISCONTINUED | OUTPATIENT
Start: 2022-07-13 | End: 2022-07-13 | Stop reason: HOSPADM

## 2022-07-13 RX ORDER — ACETAMINOPHEN 325 MG/1
650 TABLET ORAL EVERY 6 HOURS
Status: DISCONTINUED | OUTPATIENT
Start: 2022-07-13 | End: 2022-07-15 | Stop reason: HOSPADM

## 2022-07-13 RX ORDER — LORAZEPAM 0.5 MG/1
0.5 TABLET ORAL
Status: COMPLETED | OUTPATIENT
Start: 2022-07-13 | End: 2022-07-13

## 2022-07-13 RX ORDER — SODIUM CHLORIDE 0.9 % (FLUSH) 0.9 %
5-40 SYRINGE (ML) INJECTION PRN
Status: DISCONTINUED | OUTPATIENT
Start: 2022-07-13 | End: 2022-07-15 | Stop reason: HOSPADM

## 2022-07-13 RX ORDER — ONDANSETRON 4 MG/1
4 TABLET, FILM COATED ORAL 3 TIMES DAILY PRN
Qty: 15 TABLET | Refills: 0 | Status: SHIPPED | OUTPATIENT
Start: 2022-07-13

## 2022-07-13 RX ORDER — VANCOMYCIN HYDROCHLORIDE 1 G/20ML
INJECTION, POWDER, LYOPHILIZED, FOR SOLUTION INTRAVENOUS PRN
Status: DISCONTINUED | OUTPATIENT
Start: 2022-07-13 | End: 2022-07-13 | Stop reason: HOSPADM

## 2022-07-13 RX ADMIN — SUGAMMADEX 200 MG: 100 INJECTION, SOLUTION INTRAVENOUS at 09:50

## 2022-07-13 RX ADMIN — HYDROMORPHONE HYDROCHLORIDE 0.5 MG: 1 INJECTION, SOLUTION INTRAMUSCULAR; INTRAVENOUS; SUBCUTANEOUS at 12:28

## 2022-07-13 RX ADMIN — FENTANYL CITRATE 50 MCG: 50 INJECTION, SOLUTION INTRAMUSCULAR; INTRAVENOUS at 07:31

## 2022-07-13 RX ADMIN — LABETALOL HYDROCHLORIDE 10 MG: 5 INJECTION, SOLUTION INTRAVENOUS at 14:14

## 2022-07-13 RX ADMIN — SODIUM CHLORIDE, POTASSIUM CHLORIDE, SODIUM LACTATE AND CALCIUM CHLORIDE: 600; 310; 30; 20 INJECTION, SOLUTION INTRAVENOUS at 08:36

## 2022-07-13 RX ADMIN — SODIUM CHLORIDE, POTASSIUM CHLORIDE, SODIUM LACTATE AND CALCIUM CHLORIDE: 600; 310; 30; 20 INJECTION, SOLUTION INTRAVENOUS at 17:58

## 2022-07-13 RX ADMIN — METHOCARBAMOL 500 MG: 100 INJECTION, SOLUTION INTRAMUSCULAR; INTRAVENOUS at 11:10

## 2022-07-13 RX ADMIN — INSULIN LISPRO 1 UNITS: 100 INJECTION, SOLUTION INTRAVENOUS; SUBCUTANEOUS at 20:31

## 2022-07-13 RX ADMIN — ROCURONIUM BROMIDE 10 MG: 10 INJECTION INTRAVENOUS at 09:01

## 2022-07-13 RX ADMIN — PREGABALIN 50 MG: 50 CAPSULE ORAL at 20:30

## 2022-07-13 RX ADMIN — LISINOPRIL 5 MG: 5 TABLET ORAL at 20:30

## 2022-07-13 RX ADMIN — GLYCOPYRROLATE 0.2 MG: 0.2 INJECTION INTRAMUSCULAR; INTRAVENOUS at 07:33

## 2022-07-13 RX ADMIN — OXYCODONE 5 MG: 5 TABLET ORAL at 18:52

## 2022-07-13 RX ADMIN — MELOXICAM 15 MG: 15 TABLET ORAL at 17:44

## 2022-07-13 RX ADMIN — TRANEXAMIC ACID 1000 MG: 1 INJECTION, SOLUTION INTRAVENOUS at 07:42

## 2022-07-13 RX ADMIN — HYDROMORPHONE HYDROCHLORIDE 4 MG: 2 TABLET ORAL at 11:37

## 2022-07-13 RX ADMIN — LORAZEPAM 0.5 MG: 0.5 TABLET ORAL at 11:18

## 2022-07-13 RX ADMIN — PREGABALIN 50 MG: 50 CAPSULE ORAL at 18:51

## 2022-07-13 RX ADMIN — ALLOPURINOL 100 MG: 100 TABLET ORAL at 23:13

## 2022-07-13 RX ADMIN — ROCURONIUM BROMIDE 10 MG: 10 INJECTION INTRAVENOUS at 08:03

## 2022-07-13 RX ADMIN — CEFAZOLIN 2000 MG: 2 INJECTION, POWDER, FOR SOLUTION INTRAMUSCULAR; INTRAVENOUS at 18:56

## 2022-07-13 RX ADMIN — HYDROMORPHONE HYDROCHLORIDE 0.25 MG: 1 INJECTION, SOLUTION INTRAMUSCULAR; INTRAVENOUS; SUBCUTANEOUS at 10:49

## 2022-07-13 RX ADMIN — FENTANYL CITRATE 50 MCG: 50 INJECTION, SOLUTION INTRAMUSCULAR; INTRAVENOUS at 11:11

## 2022-07-13 RX ADMIN — PROPOFOL 150 MG: 10 INJECTION, EMULSION INTRAVENOUS at 07:33

## 2022-07-13 RX ADMIN — ONDANSETRON 4 MG: 2 INJECTION INTRAMUSCULAR; INTRAVENOUS at 07:47

## 2022-07-13 RX ADMIN — ROCURONIUM BROMIDE 50 MG: 10 INJECTION INTRAVENOUS at 07:33

## 2022-07-13 RX ADMIN — HYDROMORPHONE HYDROCHLORIDE 0.25 MG: 1 INJECTION, SOLUTION INTRAMUSCULAR; INTRAVENOUS; SUBCUTANEOUS at 10:54

## 2022-07-13 RX ADMIN — HYDROMORPHONE HYDROCHLORIDE 0.25 MG: 1 INJECTION, SOLUTION INTRAMUSCULAR; INTRAVENOUS; SUBCUTANEOUS at 11:09

## 2022-07-13 RX ADMIN — FENTANYL CITRATE 50 MCG: 50 INJECTION, SOLUTION INTRAMUSCULAR; INTRAVENOUS at 10:35

## 2022-07-13 RX ADMIN — HYDROMORPHONE HYDROCHLORIDE 0.25 MG: 1 INJECTION, SOLUTION INTRAMUSCULAR; INTRAVENOUS; SUBCUTANEOUS at 11:01

## 2022-07-13 RX ADMIN — MIDAZOLAM HYDROCHLORIDE 2 MG: 2 INJECTION, SOLUTION INTRAMUSCULAR; INTRAVENOUS at 07:28

## 2022-07-13 RX ADMIN — FENTANYL CITRATE 50 MCG: 50 INJECTION, SOLUTION INTRAMUSCULAR; INTRAVENOUS at 09:51

## 2022-07-13 RX ADMIN — ACETAMINOPHEN 650 MG: 325 TABLET ORAL at 18:51

## 2022-07-13 RX ADMIN — LIDOCAINE HYDROCHLORIDE 50 MG: 20 INJECTION, SOLUTION INTRAVENOUS at 07:32

## 2022-07-13 RX ADMIN — OXYCODONE 10 MG: 5 TABLET ORAL at 23:12

## 2022-07-13 RX ADMIN — CEFAZOLIN 2000 MG: 2 INJECTION, POWDER, FOR SOLUTION INTRAMUSCULAR; INTRAVENOUS at 07:38

## 2022-07-13 RX ADMIN — FENTANYL CITRATE 50 MCG: 50 INJECTION, SOLUTION INTRAMUSCULAR; INTRAVENOUS at 07:57

## 2022-07-13 RX ADMIN — DEXAMETHASONE SODIUM PHOSPHATE 4 MG: 4 INJECTION, SOLUTION INTRAMUSCULAR; INTRAVENOUS at 07:47

## 2022-07-13 RX ADMIN — SODIUM CHLORIDE, POTASSIUM CHLORIDE, SODIUM LACTATE AND CALCIUM CHLORIDE: 600; 310; 30; 20 INJECTION, SOLUTION INTRAVENOUS at 07:01

## 2022-07-13 RX ADMIN — ROCURONIUM BROMIDE 20 MG: 10 INJECTION INTRAVENOUS at 08:30

## 2022-07-13 RX ADMIN — FUROSEMIDE 20 MG: 20 TABLET ORAL at 18:52

## 2022-07-13 RX ADMIN — FENTANYL CITRATE 50 MCG: 50 INJECTION, SOLUTION INTRAMUSCULAR; INTRAVENOUS at 07:37

## 2022-07-13 RX ADMIN — HYDROMORPHONE HYDROCHLORIDE 0.5 MG: 1 INJECTION, SOLUTION INTRAMUSCULAR; INTRAVENOUS; SUBCUTANEOUS at 14:18

## 2022-07-13 ASSESSMENT — PAIN SCALES - GENERAL
PAINLEVEL_OUTOF10: 6
PAINLEVEL_OUTOF10: 10
PAINLEVEL_OUTOF10: 6
PAINLEVEL_OUTOF10: 10
PAINLEVEL_OUTOF10: 9
PAINLEVEL_OUTOF10: 9
PAINLEVEL_OUTOF10: 10
PAINLEVEL_OUTOF10: 8
PAINLEVEL_OUTOF10: 9
PAINLEVEL_OUTOF10: 10
PAINLEVEL_OUTOF10: 10

## 2022-07-13 ASSESSMENT — PAIN DESCRIPTION - ONSET
ONSET: ON-GOING

## 2022-07-13 ASSESSMENT — PAIN DESCRIPTION - ORIENTATION
ORIENTATION: RIGHT
ORIENTATION: RIGHT
ORIENTATION: LOWER
ORIENTATION: MID;LOWER

## 2022-07-13 ASSESSMENT — PAIN DESCRIPTION - PAIN TYPE
TYPE: ACUTE PAIN
TYPE: SURGICAL PAIN

## 2022-07-13 ASSESSMENT — PAIN DESCRIPTION - DESCRIPTORS
DESCRIPTORS: DISCOMFORT
DESCRIPTORS: ACHING
DESCRIPTORS: ACHING

## 2022-07-13 ASSESSMENT — PAIN DESCRIPTION - LOCATION
LOCATION: BACK
LOCATION: HIP
LOCATION: BACK
LOCATION: HIP
LOCATION: BACK

## 2022-07-13 ASSESSMENT — PAIN - FUNCTIONAL ASSESSMENT
PAIN_FUNCTIONAL_ASSESSMENT: ACTIVITIES ARE NOT PREVENTED
PAIN_FUNCTIONAL_ASSESSMENT: PREVENTS OR INTERFERES SOME ACTIVE ACTIVITIES AND ADLS
PAIN_FUNCTIONAL_ASSESSMENT: PREVENTS OR INTERFERES SOME ACTIVE ACTIVITIES AND ADLS
PAIN_FUNCTIONAL_ASSESSMENT: 0-10

## 2022-07-13 ASSESSMENT — PAIN DESCRIPTION - FREQUENCY
FREQUENCY: CONTINUOUS

## 2022-07-13 ASSESSMENT — LIFESTYLE VARIABLES: SMOKING_STATUS: 0

## 2022-07-13 NOTE — PROGRESS NOTES
Pt had not urinated since admission to PACU. Bladder scan resulted 478ml. One time straight cath performed per orders with success. Awaiting Lasix, etc. From pharmacy. Pt updated on POC - VU. Daughter has been kept informed of pt's status throughout the day.

## 2022-07-13 NOTE — PROGRESS NOTES
PT/OT at bedside performing evaluation. Per discussion with PT/OT, patient will need to be admitted d/t failing evaluation. 1459: Paula Ruiz paged and notified of the above.

## 2022-07-13 NOTE — PROGRESS NOTES
to assist postop. Pt may benefit from further IP OT pending progress. Follow up 7/14. Treatment Diagnosis: impaired ADLs/transfers s/p R THR  Prognosis: Fair  Decision Making: Medium Complexity  REQUIRES OT FOLLOW-UP: Yes  Activity Tolerance  Activity Tolerance: Patient limited by fatigue;Patient limited by pain        Plan   Plan  Times per Week: 7  Times per Day: Daily  Current Treatment Recommendations: Strengthening,Balance training,Functional mobility training,Endurance training,Safety education & training,Self-Care / ADL     Restrictions  Position Activity Restriction  Other position/activity restrictions: 50% WB, ant approach    Subjective   General  Chart Reviewed: Yes  Additional Pertinent Hx: 74 y.o. F to OR 7/13 for R THR (MINIMALLY INVASIVE DIRECT ANTERIOR). PMH: R Rotator Cuff Impingement, R and L knee scopes. Family / Caregiver Present: No  Referring Practitioner: Leila Cazares PA-C  Diagnosis: Primary OA R Hip    Subjective  Subjective: In bed on entry - observed to by shigraciaing. \"I do that when I'm in pain. \" Pt expressing concerns to discharge home today. Social/Functional History  Social/Functional History  Lives With: Spouse (With mild dementia, [place], and uses a walker)  Type of Home: House  Home Layout: One level  Home Access: Ramped entrance  Bathroom Shower/Tub: Walk-in shower  Bathroom Toilet: Handicap height  Bathroom Equipment: Hand-held shower,3-in-1 commode  Home Equipment: Rollator,Walker, rolling  ADL Assistance: Independent (But very difficult)  Homemaking Assistance: Independent  Ambulation Assistance: Independent (With rollator)  Active : Yes  Additional Comments: Daughter plans to stay with pt at d/c. Denies falls. Pt's  falls often. Pt was planning to have R shoulder surgery, but it was cancelled due to [place]. Objective                  Safety Devices  Type of Devices: Nurse notified; Left in bed;Bed alarm in place (No call light in PACU) Balance  Sitting: Impaired (initial Mod A w/ post lean progressing to CGA; x 5 minutes)        AROM: Within functional limits  Strength:  (RUE impaired function - reports nerve impingement and needing shoulder surgery (was put off 2* COVID))  Coordination: Within functional limits           Activity Tolerance  Activity Tolerance: Patient limited by fatigue;Patient limited by pain     Bed mobility  Supine to Sit: 2 Person assistance;Maximum assistance  Sit to Supine: 2 Person assistance;Maximum assistance             Cognition  Overall Cognitive Status: WFL  Orientation  Overall Orientation Status: Within Functional Limits                     Education Given To: Patient  Education Provided: Role of Therapy;Plan of Care;Precautions; ADL Adaptive Strategies;Transfer Training;Energy Conservation  Education Method: Verbal  Barriers to Learning: None (lethargy)  Education Outcome: Continued education needed                     Pt seen by OT for eval and treat.  Treatment included: bed mobility, unsupported sitting, pt education                                                        AM-PAC Score        AM-[facility] Inpatient Daily Activity Raw Score: 14 (07/13/22 1557)  AM-PAC Inpatient ADL T-Scale Score : 33.39 (07/13/22 1557)  ADL Inpatient CMS 0-100% Score: 59.67 (07/13/22 1557)  ADL Inpatient CMS G-Code Modifier : CK (07/13/22 1557)    Goals  Short Term Goals  Time Frame for Short term goals: Discharge  Short Term Goal 1: supine to sit w/ Mod A  Short Term Goal 2: tolerate sit-stand assessment  Short Term Goal 3: tolerate LB dressing assessment  Patient Goals   Patient goals : no goal stated       Therapy Time   Individual Concurrent Group Co-treatment   Time In 1337         Time Out 1406         Minutes 29           Timed Code Treatment Minutes:   14    Total Treatment Minutes:  1860 MIKAL Moe [facility] OTR/L #[id number]

## 2022-07-13 NOTE — PROGRESS NOTES
Spoke to Dr Parish Leroy who requested for PT/OT to come see patient sooner rather than later in order to determine the next steps for discharge/admission. PT/OT paged. PT stated pt needs to be off O2 for eval. NC removed and pt has been sating >92% on RA for 15 minutes. Now awaiting for PT/OT to come evaluate pt at bedside. Pt informed of the next steps and VU. Pt tolerating PO w/o PONV. Pt still reporting pain in lower back and right leg, but is tolerable after PRN pain medications. Pt states she will try her best to work with PT/OT.

## 2022-07-13 NOTE — PROGRESS NOTES
Pt reports being diagnosed with UTI recently prior to surgery. Per pt, Dr Juanpablo Mclean is aware. Pt is taking PO antibiotics for UTI and has them with her. Pt instructed to not take any medicine that was not provided to her directly by staff RN at this hospital. Pt VU. Pt instructed to discuss with her hospitalist and Dr Juanpablo Mclean to determine the next step.

## 2022-07-13 NOTE — PROGRESS NOTES
Patient meets criteria to transfer to floor per Rina Score. There are no available beds at this time. Placed in [place] [address]. Vitals changed to every hour and Head - to - toe assessments changed to every 2 hours. Pt states pain is \"tolerable\". Call light given to patient and demonstrated usage. Family updated.

## 2022-07-13 NOTE — OP NOTE
Orthopaedic Surgery  Operative Report      Patient Name:  Codie Gregory  Patient :  1950  MRN: [id number]    Date: 22     Pre-operative Diagnosis:   M16.11 Right hip primary osteoarthritis   Morbid obesity, BMI 40    Post-operative Diagnosis:    Same    Procedure: RIGHT  31048 Total Hip Arthroplasty, direct anterior  Modifier 22    Surgeon:  Surgeon(s) and Role:     * Winter Santos MD - Primary     * Alethea Muñoz MD - Fellow    Assistant: Circulator: Bridger Lawton RN  Surgical Assistant: Margret Green  Radiology Technologist: Iraida Mathis  Scrub Person First: Saul Garcia  Circulator Assist: Jan Toure RN; Miri Arnold    Anesthesia: General endotracheal anesthesia and Intraoperative local infiltration - Exparel/marcaine solution    Estimated blood loss: 400    Specimens: * No specimens in log *    Complications: None    Drains: None    Condition: Stable    Implants:   Implant Name Type Inv. Item Serial No.  Lot No. LRB No. Used Action   G7 DUAL MOBILITY LINER 38MM C - [id number]  G7 DUAL MOBILITY LINER 38MM C  [employer]- [id number] Right 1 Implanted   SHELL ACET SZ C [id number] HIP OSSEOTI 3 LIMIT H G7 - [id number]  SHELL ACET SZ C [id number] HIP OSSEOTI 3 LIMIT H G7  [employer] ETEX DARIO- [id number] Right 1 Implanted   STEM FEM STD OFFSET 4 HIP HA AVENIR COMPLETE - [id number]  STEM FEM STD OFFSET 4 HIP HA AVENIR COMPLETE  [employer]- [id number] Right 1 Implanted   BEARING TIB 28X38 MM HIP VIVACIT-E - [id number]  BEARING TIB 28X38 MM HIP VIVACIT-E  [employer]- [id number] Right 1 Implanted   HEAD FEM 3.5+ MM 12/14 28 MM HIP TAPR BIOLOX DELT - [id number]  HEAD FEM 3.5+ MM 12/14 28 MM HIP TAPR BIOLOX DELT  [employer]- [id number] Right 1 Implanted   CABLE SURG L635MM DIA1. 8MM CO CHROM CERCLAGE CBL RDY - [id number]  CABLE SURG L635MM DIA1. 8MM CO CHROM CERCLAGE CBL RDY  [employer] TRA[employer]MA- [id number] Right 1 Implanted HIP H3 ADV DUAL MOBILITY IMPL CAPPED H3 - [id number]  HIP H3 ADV DUAL MOBILITY IMPL CAPPED H3  [employer]-WD  Right 1 Implanted       Findings:   1. End stage OA  2. Severe pincer deformity  3. High preoperative pain medication regimen  4. Morbid obese    Indications: The patient has been battling right hip pain for months to years. Pain has gotten worse recently. Patient has failed all preoperative conservative treatment options. The activities of daily living have been affected quite a bit. Patient wanted to regain mobility and be active as possible. Patient understood the risk benefits and alternatives in detail and wanted to proceed with the above operation. Procedure Details:   I marked the surgical site of the right hip for surgery. He was taken back to the operating theater laid supine the table the bony prominences well-padded. General anesthesia was induced. We transferred the patient to the operating table, [place] bed. X-ray was acquired marking the right hip as the preoperative templating hip. Antibiotics 2 g of Ancef were given. MRSA swab testing was performed preop, and no additional antibiotics were required. Tranexamic acid 1 g was given at start. We began with a direct anterior approach of the hip. Mccartney-Cortes interval was taken. We incised the tensor fascia marian. We bluntly developed a plane between that and the rectus. Lateral edge of the rectus fascia was incised the muscle belly was retracted medially. The underlying fascia was also incised. Underlying vessels lateral circumflex were tied off on each end with silk suture. Electro Bovie cautery was then used to incise through the capsule. A femoral neck osteotomy was performed based on preoperative template. Using a corkscrew device we removed the existing head ball. We then placed retractors around the acetabulum. I cleaned out the pelvic tissue as well as the existing labrum.    Sequential reaming was then performed. We stopped at the appropriately sized reamer and impacted the real acetabular shell. X-ray confirmed that the socket was in acceptable position based off of a good AP pelvis x-ray. Dual mobility liner was used due to stiff, arthritic lower lumbar spine. We then turned our attention to the femoral exposure. The [place] table femoral elevating hook was then placed just inside the fascia but lateral to the vastus. This went around the posterior edge of the femur. Leg was then dropped to the floor and abducted. 90 degrees of external rotation was achieved. We then performed small capsulotomy posteriorly to place a 1 #1 retractor. Placed a #3 [place] retractor medially. I use the table hook to elevate the femur for exposure. I externally rotated more to deliver the femoral neck via the [place] table. The femur was prepped using a box chisel, canal finder and sequential broaching only. We are happy with the appropriately sized stem. Trial was then placed with a -3.5 head ball first.  The hip was then reduced using standard technique. X-ray confirmed the implants were in reasonable position. We then redislocated and remove the existing trial and implanted the real stem femoral component. During implantation of the real component, I felt that the femoral component sank down 1 mm lower than the trial.  At this point, I stop impacting and checked orthogonal x-rays. It is difficult to discern but I could not see a displaced fracture on fluoroscopy. Nonetheless, I removed the stem and passed a cable prophylactically around the proximal femur just over the lesser trochanter. I did not see a displaced or nondisplaced crack at the calcar. But I had high suspicion based on feel the bone but there may been a crack somewhere along the proximal femur. The wire was placed carefully since the femoral resection needed to be low based on preoperative templating.   Once secured, I implanted the stem which stopped at the appropriate level.  0 head ball was then inserted and impacted. Hip reduction was then performed. We performed anterior and posterior hip stability checks which were successful. We also performed shuck test which is very acceptable with the existing tension. We performed sequential closure. I irrigated the wound thoroughly with 3 L normal saline. I placed 2 g of vancomycin powder around the wound. I also injected a mixture of Marcaine and Exparel into the perioperative field. Adequate hemostasis was achieved. #2 Ethibond approximated the capsular leaflets. #2 Quill suture approximated the fascial layer as well as the deep subcutaneous layer to remove dead space. 2-0 Vicryl interrupted sutures closed the subcutaneous tissue layer. Monocryl subcuticular suture was then applied. Dermabond Prineo was then used with a nonadhesive dressing. Patient then was reversed in general esthesia transferred back the postoperative care unit without any complications. All instrument counts were correct x2. I was present throughout the entire to the case with the exception of skin closure. Modifier 22: Increased time and complexity  First off, her BMI is 40 and increased body habitus was responsible for increased time and positioning, instrumentation, exposure, and implantation. In addition, she had a severe pincer deformity DORA in the setting of large body habitus. Exposure was more challenging as a result. In addition, we addressed the proximal femur osteoporotic bone, possible calcar crack, with prophylactic proximal femur cable. As a result, 100% more time was spent on this hip replacement compared to standard anterior hip replacement. This, in no way, signifies that the ultimate outcome was compromised in any way.     PLAN:  - 50% WB with assist device  - aspirin 81 mg BID  - ambulate postop with PT  - resume home meds, diet  - Dilaudid 2 mg in addition to baseline narcotic regimen  - f/u scheduled with me in 2-3 weeks  - dispo: She would likely need some sort of nursing facility or rehab placement considering weightbearing limitation for the next month. I discussed with the family my findings intraoperatively and the prophylactic wiring of the proximal femur. They understood the degree of osteoporosis present in the possibility of a nondisplaced calcar fracture that we will protect with 50% weightbearing for some time.       Electronically signed by Neli Jiménez MD on 7/13/2022 at 5:52 PM

## 2022-07-13 NOTE — ANESTHESIA PRE PROCEDURE
Department of Anesthesiology  Preprocedure Note       Name:  Christin Judd   Age:  74 y.o.  :  1950                                          MRN:  [id number]         Date:  2022      Surgeon: Morgan Morales): Jace Thomson MD    Procedure: Procedure(s):  RIGHT TOTAL HIP ARTHROPLASTY/ MINIMALLY INVASIVE DIRECT ANTERIOR    Medications prior to admission:   Prior to Admission medications    Medication Sig Start Date End Date Taking? Authorizing Provider   amLODIPine (NORVASC) 10 MG tablet Take 10 mg by mouth daily 22 Yes Historical Provider, MD   traMADol (ULTRAM) 50 MG tablet Take 50 mg by mouth every 6 hours as needed for Pain. Yes Historical Provider, MD   HYDROcodone-acetaminophen (NORCO) 5-325 MG per tablet Take 1 tablet by mouth every 12 hours as needed for Pain. Yes Historical Provider, MD   ciprofloxacin (CIPRO) 500 MG tablet Take 1 tablet by mouth 2 times daily for 10 days 22  Jace Thomson MD   mupirocin (BACTROBAN) 2 % ointment Apply twice daily to each nare for the 5 days prior to surgical procedure 22   Jace Thomson MD   albuterol sulfate  (90 Base) MCG/ACT inhaler INHALE 2 PUFFS BY MOUTH EVERY 6 HOURS AS NEEDED FOR WHEEZING 22   Historical Provider, MD   allopurinol (ZYLOPRIM) 100 MG tablet Take 100 mg by mouth daily 16   Historical Provider, MD   furosemide (LASIX) 20 MG tablet Take 20 mg by mouth daily 16  Historical Provider, MD   lisinopril (PRINIVIL;ZESTRIL) 5 MG tablet Take 5 mg by mouth in the morning and at bedtime  16  Historical Provider, MD   methocarbamol (ROBAXIN) 500 MG tablet Take 500 mg by mouth 3 times daily as needed 16   Historical Provider, MD   meloxicam (MOBIC) 15 MG tablet Take 15 mg by mouth daily  Patient not taking: Reported on 2022   Historical Provider, MD   pregabalin (LYRICA) 50 MG capsule Take 50 mg by mouth 3 times daily.   4/20/22 10/17/22  Historical Provider, MD use: Never                                Counseling given: Not Answered      Vital Signs (Current):   Vitals:    07/08/22 1430 07/13/22 0558   BP:  (!) 185/56   Pulse:  85   Resp:  16   Temp:  (!) 96.7 °F (35.9 °C)   TempSrc:  Temporal   SpO2:  100%   Weight: 216 lb (98 kg) 218 lb 9.6 oz (99.2 kg)   Height: 5' 2\" (1.575 m) 5' 2\" (1.575 m)                                              BP Readings from Last 3 Encounters:   07/13/22 (!) 185/56       NPO Status: Time of last liquid consumption: 0400 (Water)                        Time of last solid consumption: 1800                        Date of last liquid consumption: 07/13/22                        Date of last solid food consumption: 07/12/22    BMI:   Wt Readings from Last 3 Encounters:   07/13/22 218 lb 9.6 oz (99.2 kg)   06/14/22 217 lb (98.4 kg)   05/25/22 222 lb 3.2 oz (100.8 kg)     Body mass index is 39.98 kg/m². CBC:   Lab Results   Component Value Date/Time    WBC 9.4 06/14/2022 03:59 PM    RBC 3.81 06/14/2022 03:59 PM    HGB 12.5 06/14/2022 03:59 PM    HCT 37.3 06/14/2022 03:59 PM    MCV 98.1 06/14/2022 03:59 PM    RDW 13.5 06/14/2022 03:59 PM     06/14/2022 03:59 PM       CMP:   Lab Results   Component Value Date/Time     06/14/2022 03:59 PM    K 4.7 06/14/2022 03:59 PM    CL 97 06/14/2022 03:59 PM    CO2 23 06/14/2022 03:59 PM    BUN 15 06/14/2022 03:59 PM    CREATININE 0.8 06/14/2022 03:59 PM    GFRAA >60 06/14/2022 03:59 PM    LABGLOM >60 06/14/2022 03:59 PM    GLUCOSE 77 06/14/2022 03:59 PM    CALCIUM 9.8 06/14/2022 03:59 PM       POC Tests: No results for input(s): POCGLU, POCNA, POCK, POCCL, POCBUN, POCHEMO, POCHCT in the last 72 hours.     Coags:   Lab Results   Component Value Date/Time    PROTIME 13.4 06/14/2022 03:59 PM    INR 1.04 06/14/2022 03:59 PM    APTT 30.8 06/14/2022 03:59 PM       HCG (If Applicable): No results found for: PREGTESTUR, PREGSERUM, HCG, HCGQUANT     ABGs: No results found for: PHART, PO2ART, VTM2GAQ, UWN6KTH, BEART, A2RYUYIB     Type & Screen (If Applicable):  No results found for: LABABO, LABRH    Drug/Infectious Status (If Applicable):  No results found for: HIV, HEPCAB    COVID-19 Screening (If Applicable): No results found for: COVID19        Anesthesia Evaluation    Airway: Mallampati: II  TM distance: >3 FB   Neck ROM: full  Mouth opening: > = 3 FB   Dental:    (+) upper dentures, lower dentures and edentulous      Pulmonary: breath sounds clear to auscultation      (-) COPD, asthma, sleep apnea and not a current smoker                           Cardiovascular:    (+) hypertension:,     (-) past MI, CAD, CABG/stent, dysrhythmias,  angina,  CHF and orthopnea    ECG reviewed  Rhythm: regular             Beta Blocker:  Not on Beta Blocker         Neuro/Psych:      (-) seizures, TIA and CVA            ROS comment: Chronic back pain GI/Hepatic/Renal:   (+) morbid obesity     (-) GERD, hepatitis and liver disease       Endo/Other:    (+) : arthritis: OA., no malignancy/cancer. (-) diabetes mellitus, hypothyroidism, hyperthyroidism, no malignancy/cancer                ROS comment: gout Abdominal:             Vascular:     - DVT and PE. Other Findings:           Anesthesia Plan      general     ASA 3       Induction: intravenous. MIPS: Postoperative opioids intended and Prophylactic antiemetics administered. Anesthetic plan and risks discussed with patient. Plan discussed with CRNA.                     Sandi Nichole MD   7/13/2022

## 2022-07-13 NOTE — PROGRESS NOTES
Procedure(s):  RIGHT TOTAL HIP ARTHROPLASTY/ MINIMALLY INVASIVE DIRECT ANTERIOR    Current Allergies: Buspirone, Duloxetine, Fenofibrate, Gabapentin, Venlafaxine, Doxycycline, Statins, and Sulfa antibiotics    No results for input(s): POCGLU in the last 72 hours. Admitted to PACU bed 5 from OR. Arrived on a hospital bed . Attached to PACU monitoring system. Alarms and parameters set. Report received from anesthesia personnel. OR staff did not report skin issues that were observed while in OR  Pt arrived with oxygen per nasal cannula with oxygen at 4 liters. Athrombic wraps in place. Pt c/o severe pain in back rating it a 10/10. Pt denies pain in hip. Per Dr Susi Murillo, pt had severe pain in back prior to surgery. Pt reports taking \"Hydrocodone and Tramadol every day all day long and Vicodin for break through pain twice/day\", however, this is not documented in Epic. PRN pain medications administered frequently per eMAR w/ minimal to no relief. Dr Susi Murillo paged three times since admission d/t need for pain medicine. Dr Ishan Frye came to bedside and changed PRN Oxycodone PO to PRN Dilaudid PO. Robaxin IVPB currently infusing. 0.5mg PO Ativan administered. Pt states she is always anxious d/t her pain in the back. VSS on 4L NC. Will continue to monitor.

## 2022-07-13 NOTE — ANESTHESIA POSTPROCEDURE EVALUATION
Department of Anesthesiology  Postprocedure Note    Patient: Shawn Lui  MRN: [id number]  YOB: 1950  Date of evaluation: 7/13/2022      Procedure Summary     Date: 07/13/22 Room / Location: [address]  / [facility]    Anesthesia Start: 1338 Anesthesia Stop: 8050    Procedure: RIGHT TOTAL HIP ARTHROPLASTY/ MINIMALLY INVASIVE DIRECT ANTERIOR (Right Hip) Diagnosis:       Primary osteoarthritis of right hip      (Primary osteoarthritis of right hip [M16.11])    Surgeons: Leroy Blair MD Responsible Provider: Mary Escamilla MD    Anesthesia Type: General ASA Status: 3          Anesthesia Type: General    Rina Phase I: Rina Score: 9    Rina Phase II:        Anesthesia Post Evaluation    Patient location during evaluation: PACU  Level of consciousness: awake and alert  Airway patency: patent  Nausea & Vomiting: no vomiting  Complications: no  Cardiovascular status: blood pressure returned to baseline  Respiratory status: acceptable  Hydration status: euvolemic  Comments: Patient with severe back pain at baseline.   Multimodal analgesia pain management approach

## 2022-07-13 NOTE — PROGRESS NOTES
Physical Therapy  Facility/Department: [address] [place] [address] GENERAL SURGERY  Physical Therapy Initial Assessment and Treatment   POD # 0    Name: Vidhya Contreras  : 1950  MRN: [id number]  Date of Service: 2022    Discharge Recommendations:    Vidhya Contreras scored a  on the AM-PAC short mobility form. Current research shows that an AM-PAC score of 17 or less is typically not associated with a discharge to the patient's home setting. Based on the patient's AM-PAC score and their current functional mobility deficits, it is recommended that the patient have 3-5 sessions per week of Physical Therapy at d/c to increase the patient's independence. Please see assessment section for further patient specific details. If patient discharges prior to next session this note will serve as a discharge summary. Please see below for the latest assessment towards goals. PT Equipment Recommendations  Equipment Needed: No      Patient Diagnosis(es): The encounter diagnosis was Primary osteoarthritis of right hip. Past Medical History:  has a past medical history of Back pain, Hyperlipidemia, Hypertension, Tuberculosis, and Wears dentures. Past Surgical History:  has a past surgical history that includes Appendectomy (); Tonsillectomy (); Ectopic pregnancy surgery (); Partial hysterectomy (); Cholecystectomy (); and Cataract removal (Bilateral, ). Assessment   Assessment: Pt is 73 yo F admitted on 22 with R hip OA, and underwent R THR. Normally pt lives at home with her  in a 1 story house. Pt ambulates with a rollator. Pt has difficulty with her ADLs and her  is in poor health. Presently pt is able to sit up to EOB with max assist of 2. Pt c/o pain and fatigue in sitting. Pt requests to lie back down. Rec continued inpt PT at d/c prior to return home to improve pt's overall function. Will follow.   Treatment Diagnosis: Decreased functional mobility  Therapy Prognosis: Good  Decision Making: Low Complexity  Requires PT Follow-Up: Yes  Activity Tolerance  Activity Tolerance: Patient limited by fatigue;Patient limited by pain     Plan   Plan  Plan: 2 times a day 7 days a week  Current Treatment Recommendations: Strengthening,Balance training,Functional mobility training,Transfer training,Gait training,Safety education & training,Therapeutic activities  Safety Devices  Type of Devices: Nurse notified,Left in bed,Bed alarm in place (No call light in PACU)     Restrictions  Position Activity Restriction  Other position/activity restrictions: 50% WB, ant approach     Subjective   Pain: 6/10 back and hip pain  General  Chart Reviewed: Yes  Family / Caregiver Present: No  [facility]  Additional Pertinent History: 74 y.o. F to OR 7/13 for R THR (MINIMALLY INVASIVE DIRECT ANTERIOR) due to R hip OA. PMH: R Rotator Cuff Impingement, R and L knee scopes. Diagnosis: Primary OA R hip  Subjective  Subjective: Pt supine in bed and agreeable to PT. Pt states she feels very weak. Social/Functional History  Social/Functional History  Lives With: Spouse (With mild dementia, [place], and uses a walker)  Type of Home: House  Home Layout: One level  Home Access: Ramped entrance  Bathroom Shower/Tub: Walk-in shower  Bathroom Toilet: Handicap height  Bathroom Equipment: Hand-held shower,3-in-1 commode  Home Equipment: Rollator,Walker, rolling  ADL Assistance: Independent (But very difficult)  Homemaking Assistance: Independent  Ambulation Assistance: Independent (With rollator)  Active : Yes  Additional Comments: Daughter plans to stay with pt at d/c. Denies falls. Pt's  falls often. Pt was planning to have R shoulder surgery, but it was cancelled due to [place].      Vision/Hearing  Vision  Vision: Within Functional Limits  Hearing  Hearing: Within functional limits      Cognition   Orientation  Overall Orientation Status: Within Functional Limits Objective   AROM RLE (degrees)  RLE General AROM: Limited due to c/o weakness  AROM LLE (degrees)  LLE AROM : WFL    Strength RLE  Comment: 3-/5 hip and knee, WFL ankle  Strength LLE  Comment: 3/5 hip and knee, WFL ankle    Bed mobility  Supine to Sit: 2 Person assistance;Maximum assistance  Sit to Supine: 2 Person assistance;Maximum assistance     Balance  Sitting - Static: Fair  Comments: Pt sits EOB x 5 mins with c/o back pain. Pt requests to lie back down.      Exercise Treatment: No THR exercises performed due to pt c/o pain    AM-PAC Score  AM-PAC Inpatient Mobility Raw Score : 11 (07/13/22 1435)  AM-PAC Inpatient T-Scale Score : 33.86 (07/13/22 1435)  Mobility Inpatient CMS 0-100% Score: 72.57 (07/13/22 1435)  Mobility Inpatient CMS G-Code Modifier : CL (07/13/22 1435)    Goals  Short Term Goals  Time Frame for Short term goals: by discharge  Short term goal 1: Pt will perform bed mobility with CGA  Short term goal 2: Pt will transfer sit to and from stand with CGA  Short term goal 3: Pt will ambulate 80 feet with wheeled walker and CGA  Short term goal 4: Pt will perform R LE THR HEP x 10 with min assist    Education  Patient Education  Education Given To: Patient  Education Provided: Role of Therapy  Education Outcome: Verbalized understanding;Continued education needed    Therapy Time   Individual Concurrent Group Co-treatment   Time In 1450         Time Out 1517         Minutes 27              Timed Code Treatment Minutes:   12    Total Treatment Minutes:  [address], PT

## 2022-07-13 NOTE — PROGRESS NOTES
Report given to Dinora Conley RN receiving patient on 5 tower in PACU at 1750 with all information provided including medications given while in PACU as well as patient's pre-op blood pressure. Octavio Rogers took up lasix and mobic which patient was prescribed. She also took up patient's belongings and wheeled walker. No further questions.

## 2022-07-13 NOTE — PROGRESS NOTES
Pt admitted to room 5526 from PACU, oriented to room and environment. Vital signs and four eyes assessment complete. Safety precautions maintained, advised pt to utilize call light for assistance.

## 2022-07-13 NOTE — PROGRESS NOTES
Patient status is changed to observation. She did not pass physical therapy or occupational therapy. Patient also desatted to 86% on room air while sleeping. This will require least 24 hours of observation.

## 2022-07-13 NOTE — DISCHARGE INSTRUCTIONS
Total Hip & Bipolar Replacement  Discharge Instructions    To prevent Clot formation, you have been placed on the following medication:  Take aspirin 81 mg twice a day starting day after surgery  Surgical Site Care:  Patient does have a negative pressure dressing applied. This is attached to a suction device with canister. Normally should last 5-7 days. You will know to remove dressing when either the canister is full or the device starts beeping. At that point please turn off suction device, remove dressing, and place new waterproof dressing over incision. Physical Therapy:  Weight Bearing Status:     50% weightbearing  Precautions  Per Physical Therapy handout  No straight leg raise  Pain Medications  You were given oxycodone (Oxycontin, Oxyir)  Wean off pain medications as you deem appropriate as long as pain is under control  Be sure to drink plenty of fluids (recommend water) while taking narcotic pain medications to prevent constipation  You may take an over the counter laxative or stool softener as needed to prevent/treat constipation as well, we recommend Senokot S OTC. We recommend that you consider taking these medications the entire time you are taking pain medication. Cold packs/Ice packs/Machine  May be used as much as necessary to reduce swelling/inflammation/soreness  Be sure to have a barrier (cloth, clothing, towel) between your skin/incision and the ice pack to prevent frostbite  Contact office if  Increased redness, swelling, drainage of any kind, and/or pain to surgery site. As well as new onset fevers and or chills. These could signify an infection. Calf or thigh tenderness to touch as well as increased swelling or redness. This could signify a clot formation. Numbness or tingling to an area around the incision site or below the incision site (toes). Any rash appears, increased  or new onset nausea/vomiting occur. This may indicate a reaction to a medication.    Phone # [phone]  Follow up with Dr. Mercy Zendejas or Maxim Alvarez PA-C at scheduled appointment time. Please continue to use your Incentive Spirometer at home every hour while awake.     Discharge Medications    Narcotic Pain Medications    ____ Hydrocodone/acetaminophen 5/325mg 1 tab every 4 hours as needed for pain  __x__ Oxycodone 5mg 1 tab every 6 hours as needed for pain  ____ Tramadol 50mg 1 tab every 4 hours as needed for pain    Anti-inflammatory/Pain Medication    ____ Meloxicam 15mg 1 tab once a day  ____ Celebrex 200mg 1 tab once a day  __x__ Medrol Dosepak 1 kit as directed (start post-op day 1)    If Medrol Dosepak and either Meloxicam or Celebrex is prescribed complete the Medrol Dosepak before starting Meloxicam or Celebrex    Anti-coagulation Medication  __x__ Aspirin 81mg 1 tab twice per day  ____ Eliquis 2.5mg 1 tab twice per day  ____ Lovenox 40mg once per day  ____ Lovenox 30mg twice per day    Start anti-coagulation medications the day after surgery    Muscle relaxer     ____ Cyclobenzaprine 10mg 1 tab up to 3 times a day as needed for muscle spasms and pain  __x__ Methocarbamol 750mg 1 tab up to 3 times a day as needed for muscle spasms and pain-Home medication          Nausea/Vomiting Medications    __x__ Ondansetron 4 mg 1 tab up to 4 times daily as needed  ____ Promethazine 25mg 1 tab up to 3 times daily as needed    Antibiotics    __x__ Cipro 500mg twice daily for 10 days  ____ Sulfamethoxazole/trimethoprim 800/160mg 1 tab twice per day  ____ Clindamycin 300mg 2 tabs twice per day

## 2022-07-13 NOTE — H&P
Gerda Palomino    [id number]    [facility] Same Day Surgery Update H & P  Department of General Surgery   Surgical Service   Pre-operative History and Physical  Last H & P within the last 30 days. DIAGNOSIS:   Primary osteoarthritis of right hip [M16.11]  Osteoarthritis of right hip, unspecified osteoarthritis type [M16.11]    Procedure(s):  RIGHT TOTAL HIP ARTHROPLASTY/ MINIMALLY INVASIVE DIRECT ANTERIOR    History obtained from: Patient interview and EHR     HISTORY OF PRESENT ILLNESS:   Patient is a 74 y.o. female with chronic right hip pain and limited ROM in the setting of arthrosis. The symptoms have been recalcitrant to conservative treatment and the patient presents today for the above procedure. Illness Screening: Patient denies fever, chills, worsening cough, or close contact with sick individuals. Past Medical History:        Diagnosis Date    Back pain     Hyperlipidemia     Hypertension     Tuberculosis 1981    Wears dentures      Past Surgical History:        Procedure Laterality Date    APPENDECTOMY  1957    CATARACT REMOVAL Bilateral 2015    CHOLECYSTECTOMY  2002    ECTOPIC PREGNANCY SURGERY  1970    PARTIAL HYSTERECTOMY (CERVIX NOT REMOVED)  [address]         Medications Prior to Admission:      Prior to Admission medications    Medication Sig Start Date End Date Taking? Authorizing Provider   amLODIPine (NORVASC) 10 MG tablet Take 10 mg by mouth daily 6/29/22 12/26/22 Yes Historical Provider, MD   traMADol (ULTRAM) 50 MG tablet Take 50 mg by mouth every 6 hours as needed for Pain. Yes Historical Provider, MD   HYDROcodone-acetaminophen (NORCO) 5-325 MG per tablet Take 1 tablet by mouth every 12 hours as needed for Pain.    Yes Historical Provider, MD   ciprofloxacin (CIPRO) 500 MG tablet Take 1 tablet by mouth 2 times daily for 10 days 7/11/22 7/21/22  Arielle Ariza MD   mupirocin (BACTROBAN) 2 % ointment Apply twice daily to each nare for the 5 days prior to surgical procedure 6/14/22   Winter Santos MD   albuterol sulfate  (90 Base) MCG/ACT inhaler INHALE 2 PUFFS BY MOUTH EVERY 6 HOURS AS NEEDED FOR WHEEZING 4/13/22   Historical Provider, MD   allopurinol (ZYLOPRIM) 100 MG tablet Take 100 mg by mouth daily 6/24/16   Historical Provider, MD   furosemide (LASIX) 20 MG tablet Take 20 mg by mouth daily 7/12/16 4/30/23  Historical Provider, MD   lisinopril (PRINIVIL;ZESTRIL) 5 MG tablet Take 5 mg by mouth in the morning and at bedtime  6/24/16 11/8/22  Historical Provider, MD   methocarbamol (ROBAXIN) 500 MG tablet Take 500 mg by mouth 3 times daily as needed 6/30/16   Historical Provider, MD   meloxicam (MOBIC) 15 MG tablet Take 15 mg by mouth daily  Patient not taking: Reported on 7/8/2022 6/22/18   Historical Provider, MD   pregabalin (LYRICA) 50 MG capsule Take 50 mg by mouth 3 times daily. 4/20/22 10/17/22  Historical Provider, MD   fluticasone Zara Barber) 50 MCG/ACT nasal spray Use 2 spray(s) in each nostril once daily 1/5/21   Historical Provider, MD         Allergies:  Buspirone, Duloxetine, Fenofibrate, Gabapentin, Venlafaxine, Doxycycline, Statins, and Sulfa antibiotics    PHYSICAL EXAM:      BP (!) 185/56   Pulse 85   Temp (!) 96.7 °F (35.9 °C) (Temporal)   Resp 16   Ht 5' 2\" (1.575 m)   Wt 218 lb 9.6 oz (99.2 kg)   SpO2 100%   Breastfeeding No   BMI 39.98 kg/m²      Airway:  Airway patent with no audible stridor    Heart:  Regular rate and rhythm, No murmur noted    Lungs:  No increased work of breathing, good air exchange, clear to auscultation bilaterally, no crackles or wheezing    Abdomen:  Soft, non-distended, non-tender, and no masses palpated    ASSESSMENT AND PLAN    Patient is a 74 y.o. female with above specified procedure planned. 1.  The patients history and physical was obtained and signed off by the pre-admission testing department.   Patient seen and focused exam done today- no new changes since last physical exam on 6/16/22    2. Access to ancillary services are available per request of the provider.     GENA Snow - ABDIRAHMAN     7/13/2022

## 2022-07-13 NOTE — PROGRESS NOTES
BP increased following IV Labetalol admin. Lasix, Lyrica, and Meloxicam released from signed and held orders. Pharmacy notified to send medications to PACU for administration.  Will reassess BP following administration of her home medications

## 2022-07-13 NOTE — PROGRESS NOTES
Bedside call button placed in patient's hand at [phone] with operating instructions given to patient with patient verbalizing full understanding.

## 2022-07-13 NOTE — PROGRESS NOTES
4 Eyes Admission Assessment     I agree as the admission nurse that 2 RN's have performed a thorough Head to Toe Skin Assessment on the patient. ALL assessment sites listed below have been assessed on admission.        Areas assessed by both nurses:   [x]   Head, Face, and Ears   [x]   Shoulders, Back, and Chest  [x]   Arms, Elbows, and Hands   [x]   Coccyx, Sacrum, and Ischium  [x]   Legs, Feet, and Heels        Does the Patient have Skin Breakdown?  scattered bruising          Leonard Prevention initiated:  NA   Wound Care Orders initiated:  NA      WOC nurse consulted for Pressure Injury (Stage 3,4, Unstageable, DTI, NWPT, and Complex wounds) or Leonard score 18 or lower:  NA      Nurse 1 eSignature: Electronically signed by Hussein Price RN on 7/13/22 at 7:17 PM EDT    **SHARE this note so that the co-signing nurse is able to place an eSignature**    Nurse 2 eSignature: Electronically signed by Joselo Bella RN on 7/13/22 at 7:17 PM EDT
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
## 2022-07-14 ENCOUNTER — APPOINTMENT (OUTPATIENT)
Dept: VASCULAR LAB | Age: 72
DRG: 470 | End: 2022-07-14
Attending: ORTHOPAEDIC SURGERY
Payer: MEDICARE

## 2022-07-14 LAB
ANION GAP SERPL CALCULATED.3IONS-SCNC: 10 MMOL/L (ref 3–16)
BASOPHILS ABSOLUTE: 0.1 K/UL (ref 0–0.2)
BASOPHILS RELATIVE PERCENT: 0.8 %
BUN BLDV-MCNC: 14 MG/DL (ref 7–20)
CALCIUM SERPL-MCNC: 8.7 MG/DL (ref 8.3–10.6)
CHLORIDE BLD-SCNC: 101 MMOL/L (ref 99–110)
CO2: 23 MMOL/L (ref 21–32)
CREAT SERPL-MCNC: 0.8 MG/DL (ref 0.6–1.2)
EOSINOPHILS ABSOLUTE: 0 K/UL (ref 0–0.6)
EOSINOPHILS RELATIVE PERCENT: 0.2 %
GFR AFRICAN AMERICAN: >60
GFR NON-AFRICAN AMERICAN: >60
GLUCOSE BLD-MCNC: 123 MG/DL (ref 70–99)
GLUCOSE BLD-MCNC: 125 MG/DL (ref 70–99)
GLUCOSE BLD-MCNC: 128 MG/DL (ref 70–99)
GLUCOSE BLD-MCNC: 129 MG/DL (ref 70–99)
GLUCOSE BLD-MCNC: 89 MG/DL (ref 70–99)
HCT VFR BLD CALC: 27 % (ref 36–48)
HEMOGLOBIN: 9.3 G/DL (ref 12–16)
LYMPHOCYTES ABSOLUTE: 1.9 K/UL (ref 1–5.1)
LYMPHOCYTES RELATIVE PERCENT: 16.7 %
MCH RBC QN AUTO: 33.1 PG (ref 26–34)
MCHC RBC AUTO-ENTMCNC: 34.5 G/DL (ref 31–36)
MCV RBC AUTO: 96 FL (ref 80–100)
MONOCYTES ABSOLUTE: 1.2 K/UL (ref 0–1.3)
MONOCYTES RELATIVE PERCENT: 10.2 %
NEUTROPHILS ABSOLUTE: 8.2 K/UL (ref 1.7–7.7)
NEUTROPHILS RELATIVE PERCENT: 72.1 %
PDW BLD-RTO: 13.6 % (ref 12.4–15.4)
PERFORMED ON: ABNORMAL
PERFORMED ON: NORMAL
PLATELET # BLD: 425 K/UL (ref 135–450)
PMV BLD AUTO: 7.8 FL (ref 5–10.5)
POTASSIUM REFLEX MAGNESIUM: 4.3 MMOL/L (ref 3.5–5.1)
RBC # BLD: 2.81 M/UL (ref 4–5.2)
SODIUM BLD-SCNC: 134 MMOL/L (ref 136–145)
WBC # BLD: 11.4 K/UL (ref 4–11)

## 2022-07-14 PROCEDURE — 85025 COMPLETE CBC W/AUTO DIFF WBC: CPT

## 2022-07-14 PROCEDURE — 51702 INSERT TEMP BLADDER CATH: CPT

## 2022-07-14 PROCEDURE — 97110 THERAPEUTIC EXERCISES: CPT

## 2022-07-14 PROCEDURE — 6360000002 HC RX W HCPCS: Performed by: PHYSICIAN ASSISTANT

## 2022-07-14 PROCEDURE — G0378 HOSPITAL OBSERVATION PER HR: HCPCS

## 2022-07-14 PROCEDURE — 97530 THERAPEUTIC ACTIVITIES: CPT

## 2022-07-14 PROCEDURE — 97116 GAIT TRAINING THERAPY: CPT

## 2022-07-14 PROCEDURE — 97535 SELF CARE MNGMENT TRAINING: CPT

## 2022-07-14 PROCEDURE — 80048 BASIC METABOLIC PNL TOTAL CA: CPT

## 2022-07-14 PROCEDURE — 36415 COLL VENOUS BLD VENIPUNCTURE: CPT

## 2022-07-14 PROCEDURE — 6370000000 HC RX 637 (ALT 250 FOR IP)

## 2022-07-14 PROCEDURE — 2580000003 HC RX 258: Performed by: PHYSICIAN ASSISTANT

## 2022-07-14 PROCEDURE — 96376 TX/PRO/DX INJ SAME DRUG ADON: CPT

## 2022-07-14 PROCEDURE — APPNB60 APP NON BILLABLE TIME 46-60 MINS

## 2022-07-14 PROCEDURE — 6370000000 HC RX 637 (ALT 250 FOR IP): Performed by: PHYSICIAN ASSISTANT

## 2022-07-14 PROCEDURE — 96374 THER/PROPH/DIAG INJ IV PUSH: CPT

## 2022-07-14 PROCEDURE — 93971 EXTREMITY STUDY: CPT

## 2022-07-14 PROCEDURE — 99024 POSTOP FOLLOW-UP VISIT: CPT

## 2022-07-14 RX ORDER — ASPIRIN 81 MG/1
81 TABLET ORAL 2 TIMES DAILY
Status: DISCONTINUED | OUTPATIENT
Start: 2022-07-14 | End: 2022-07-15 | Stop reason: HOSPADM

## 2022-07-14 RX ORDER — METHOCARBAMOL 500 MG/1
500 TABLET, FILM COATED ORAL 3 TIMES DAILY
Status: DISCONTINUED | OUTPATIENT
Start: 2022-07-14 | End: 2022-07-15 | Stop reason: HOSPADM

## 2022-07-14 RX ORDER — HYDROMORPHONE HYDROCHLORIDE 2 MG/1
1 TABLET ORAL EVERY 6 HOURS PRN
Status: DISCONTINUED | OUTPATIENT
Start: 2022-07-14 | End: 2022-07-14

## 2022-07-14 RX ORDER — HYDROMORPHONE HYDROCHLORIDE 2 MG/1
1 TABLET ORAL EVERY 6 HOURS PRN
Status: DISCONTINUED | OUTPATIENT
Start: 2022-07-14 | End: 2022-07-15 | Stop reason: HOSPADM

## 2022-07-14 RX ADMIN — HYDROMORPHONE HYDROCHLORIDE 1 MG: 2 TABLET ORAL at 13:00

## 2022-07-14 RX ADMIN — ASPIRIN 81 MG: 81 TABLET, COATED ORAL at 11:16

## 2022-07-14 RX ADMIN — POLYETHYLENE GLYCOL 3350 17 G: 17 POWDER, FOR SOLUTION ORAL at 09:31

## 2022-07-14 RX ADMIN — MORPHINE SULFATE 4 MG: 2 INJECTION, SOLUTION INTRAMUSCULAR; INTRAVENOUS at 07:18

## 2022-07-14 RX ADMIN — OXYCODONE 10 MG: 5 TABLET ORAL at 11:16

## 2022-07-14 RX ADMIN — CEFAZOLIN 2000 MG: 2 INJECTION, POWDER, FOR SOLUTION INTRAMUSCULAR; INTRAVENOUS at 02:15

## 2022-07-14 RX ADMIN — PREGABALIN 50 MG: 50 CAPSULE ORAL at 09:32

## 2022-07-14 RX ADMIN — METHOCARBAMOL 500 MG: 500 TABLET ORAL at 01:10

## 2022-07-14 RX ADMIN — FUROSEMIDE 20 MG: 20 TABLET ORAL at 09:32

## 2022-07-14 RX ADMIN — ACETAMINOPHEN 650 MG: 325 TABLET ORAL at 01:06

## 2022-07-14 RX ADMIN — ACETAMINOPHEN 650 MG: 325 TABLET ORAL at 05:43

## 2022-07-14 RX ADMIN — MORPHINE SULFATE 4 MG: 2 INJECTION, SOLUTION INTRAMUSCULAR; INTRAVENOUS at 01:00

## 2022-07-14 RX ADMIN — ALLOPURINOL 100 MG: 100 TABLET ORAL at 09:33

## 2022-07-14 RX ADMIN — PREGABALIN 50 MG: 50 CAPSULE ORAL at 13:00

## 2022-07-14 RX ADMIN — PREGABALIN 50 MG: 50 CAPSULE ORAL at 21:02

## 2022-07-14 RX ADMIN — METHOCARBAMOL 500 MG: 500 TABLET ORAL at 09:32

## 2022-07-14 RX ADMIN — METHOCARBAMOL 500 MG: 500 TABLET ORAL at 21:02

## 2022-07-14 RX ADMIN — ACETAMINOPHEN 650 MG: 325 TABLET ORAL at 14:52

## 2022-07-14 RX ADMIN — METHOCARBAMOL 500 MG: 500 TABLET ORAL at 13:00

## 2022-07-14 RX ADMIN — LISINOPRIL 5 MG: 5 TABLET ORAL at 09:32

## 2022-07-14 RX ADMIN — OXYCODONE 10 MG: 5 TABLET ORAL at 21:02

## 2022-07-14 RX ADMIN — MELOXICAM 15 MG: 15 TABLET ORAL at 09:32

## 2022-07-14 RX ADMIN — ASPIRIN 81 MG: 81 TABLET, COATED ORAL at 21:02

## 2022-07-14 RX ADMIN — FLUTICASONE PROPIONATE 1 SPRAY: 50 SPRAY, METERED NASAL at 09:35

## 2022-07-14 RX ADMIN — AMLODIPINE BESYLATE 10 MG: 10 TABLET ORAL at 09:33

## 2022-07-14 RX ADMIN — SODIUM CHLORIDE, PRESERVATIVE FREE 10 ML: 5 INJECTION INTRAVENOUS at 09:37

## 2022-07-14 RX ADMIN — LISINOPRIL 5 MG: 5 TABLET ORAL at 21:02

## 2022-07-14 RX ADMIN — OXYCODONE 10 MG: 5 TABLET ORAL at 05:43

## 2022-07-14 ASSESSMENT — PAIN SCALES - GENERAL
PAINLEVEL_OUTOF10: 2
PAINLEVEL_OUTOF10: 7
PAINLEVEL_OUTOF10: 8
PAINLEVEL_OUTOF10: 7
PAINLEVEL_OUTOF10: 8
PAINLEVEL_OUTOF10: 7
PAINLEVEL_OUTOF10: 5
PAINLEVEL_OUTOF10: 5
PAINLEVEL_OUTOF10: 7
PAINLEVEL_OUTOF10: 7
PAINLEVEL_OUTOF10: 8
PAINLEVEL_OUTOF10: 8
PAINLEVEL_OUTOF10: 0
PAINLEVEL_OUTOF10: 9
PAINLEVEL_OUTOF10: 7

## 2022-07-14 ASSESSMENT — PAIN DESCRIPTION - LOCATION
LOCATION: BACK;HIP
LOCATION: BACK
LOCATION: LEG
LOCATION: BACK
LOCATION: HIP
LOCATION: HIP;BACK
LOCATION: HIP
LOCATION: LEG

## 2022-07-14 ASSESSMENT — PAIN DESCRIPTION - DESCRIPTORS
DESCRIPTORS: ACHING
DESCRIPTORS: ACHING
DESCRIPTORS: ACHING;BURNING
DESCRIPTORS: BURNING
DESCRIPTORS: ACHING
DESCRIPTORS: BURNING
DESCRIPTORS: ACHING;BURNING

## 2022-07-14 ASSESSMENT — PAIN - FUNCTIONAL ASSESSMENT
PAIN_FUNCTIONAL_ASSESSMENT: ACTIVITIES ARE NOT PREVENTED
PAIN_FUNCTIONAL_ASSESSMENT: PREVENTS OR INTERFERES SOME ACTIVE ACTIVITIES AND ADLS
PAIN_FUNCTIONAL_ASSESSMENT: ACTIVITIES ARE NOT PREVENTED
PAIN_FUNCTIONAL_ASSESSMENT: PREVENTS OR INTERFERES SOME ACTIVE ACTIVITIES AND ADLS

## 2022-07-14 ASSESSMENT — PAIN DESCRIPTION - ONSET
ONSET: ON-GOING

## 2022-07-14 ASSESSMENT — PAIN DESCRIPTION - PAIN TYPE
TYPE: SURGICAL PAIN
TYPE: ACUTE PAIN
TYPE: CHRONIC PAIN;SURGICAL PAIN
TYPE: CHRONIC PAIN
TYPE: CHRONIC PAIN;SURGICAL PAIN

## 2022-07-14 ASSESSMENT — PAIN DESCRIPTION - FREQUENCY
FREQUENCY: CONTINUOUS

## 2022-07-14 ASSESSMENT — PAIN DESCRIPTION - ORIENTATION
ORIENTATION: RIGHT
ORIENTATION: LOWER
ORIENTATION: RIGHT
ORIENTATION: LOWER;RIGHT
ORIENTATION: LEFT
ORIENTATION: LOWER;RIGHT
ORIENTATION: RIGHT
ORIENTATION: LOWER

## 2022-07-14 NOTE — CARE COORDINATION
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 Current PCP: Jeff Patient: No    Chart Reviewed: Yes  Patient/ Family Interviewed: Yes    Initial assessment completed at bedside with: Patient    Hospitalization in the last 30 days: No    Emergency Contacts:  Extended Emergency Contact Information  Primary Emergency Contact: Sally Rodriguez  [place] Phone: [phone]  Relation: Child    Advance Directives:   Code Status: Prior    [address]: No    Financial  Payor: MEDICARE / Plan: MEDICARE PART A AND B / Product Type: *No Product type* /     Pre-cert required for SNF: No    Pharmacy    [address]  Phone: [phone] Fax: [phone]    [facility] [id number]  [phone] Sebastien Thomson [phone]  [place] [address]  Phone: [phone] Fax: [phone]      Potential assistance Purchasing Medications:    Does Patient want to participate in local refill/ meds to beds program?: Yes    Meds To Beds General Rules:  1. Can ONLY be done Monday- Friday between 8:30am-5pm  2. Prescription(s) must be in pharmacy by 3pm to be filled same day  3. Copy of patient's insurance/ prescription drug card and patient face sheet must be sent along with the prescription(s)  4. Cost of Rx cannot be added to hospital bill. If financial assistance is needed, please contact unit  or ;  or  CANNOT provide pharmacy voucher for patients co-pays  5. Patients can then  the prescription on their way out of the hospital at discharge, or pharmacy can deliver to the bedside if staff is available. (payment due at time of pick-up or delivery - cash, check, or card accepted)     Able to afford home medications/ co-pay costs: Yes    ADLS  Support Systems: Spouse/Significant Other    PT AM-PAC: 11 /24  OT AM-PAC: 14 /24    [place]: one story home with   Steps: ramp entrance    Plans to RETURN to current housing: Yes  Barriers to RETURNING to current housing: medical stability    Home Care Information  Currently ACTIVE with [address]: No  Home Care Agency: Not Applicable    Durable Medical Equipment  DME Provider: n/a  Equipment: walker and rollator    Home Oxygen and [address] [place] prior to admission: No  [address]: Not Applicable  Other Respiratory Equipment: n/a    Dialysis  Active with HD/PD prior to admission: No    DISCHARGE PLAN:  Disposition: [place] (SNF): TBD Phone: TBD Fax: TBD    Transportation PLAN for discharge: EMS transportation     Factors facilitating achievement of predicted outcomes: Cooperative, Pleasant, Has needed Durable Medical Equipment at home and Home is wheelchair accessible    Barriers to discharge: Lower extremity weakness    Additional Case Management Notes: TC met with pt at bedside to discuss discharge plan as has recs for SNF. SW and pt reviewed list, she wants referrals to [facility] and [address] [place] [address] [facility]. TC sent referrals, will follow up. No precert needed. 1430: [facility] can accept. Kayley Guevara is contact in admissions/[phone].     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  Yes    Care Transition patient: No    DANILO Albarran  [facility] [employer]  Case Management Department  Ph: [phone]   Fax: [phone]

## 2022-07-14 NOTE — PROGRESS NOTES
Occupational Therapy  Facility/Department: [facility] 5T ORTHO/NEURO  Daily Treatment Note  NAME: Gerda Palomino  : 1950  MRN: [id number]    Date of Service: 2022    Discharge Recommendations: Gerda Palomino scored a 14/24 on the AM-PAC ADL Inpatient form. Current research shows that an AM-PAC score of 17 or less is typically not associated with a discharge to the patient's home setting. Based on the patient's AM-PAC score and their current ADL deficits, it is recommended that the patient have 3-5 sessions per week of Occupational Therapy at d/c to increase the patient's independence. Please see assessment section for further patient specific details. If patient discharges prior to next session this note will serve as a discharge summary. Please see below for the latest assessment towards goals. OT Equipment Recommendations  Equipment Needed: No  Other: defer to next level of care      Patient Diagnosis(es): The encounter diagnosis was Primary osteoarthritis of right hip. Assessment    Assessment: Pt demo increased ability to participate in session this date - motivated to retn to PLOF, though somewhat anxious abotu process. Pt demo fxl mobilty at Mod/MaxA x2, and DEP for LB dressing tasks. Pt may benefit from cont'd skilled OT while inpt, and after acute d/c prior to retn home, to maximize safety and IND w/ ADL and fxlm obility. Will cont per POC  Activity Tolerance: Patient limited by fatigue;Patient limited by pain  Equipment Needed: No  Other: defer to next level of care      Plan   Plan  Times per Week: 7  Current Treatment Recommendations: Strengthening;Balance training;Functional mobility training; Endurance training; Safety education & training;Self-Care / ADL     Restrictions  Position Activity Restriction  Other position/activity restrictions: 50% WB, ant approach    Subjective   Subjective  Subjective: Pt semi-supine on OT/PT appraoch and agreed to tx.   Pain: Pt reporting 6-7/10 pain, equal in lo-back and R hip. Orientation  Overall Orientation Status: Within Functional Limits  Cognition  Overall Cognitive Status: WFL        Objective    Vitals     Bed Mobility Training  Bed Mobility Training: Yes  Overall Level of Assistance: Assist X2;Maximum assistance  Interventions: Visual cues;Manual cues; Verbal cues; Safety awareness training  Supine to Sit: Maximum assistance;Assist X2 (MaxA x2 to R side - pt initiating)  Scooting: Stand-by assistance (fwd at EOB)  Balance  Sitting: High guard (EOB, feet on floor, UE for support PRN)  Standing: With support (phys A and RW)  Transfer Training  Transfer Training:  (Pt is PWB 50% RLE - attempting t/o as able, pt reporting RLE still numb and difficulty feeling how much weight she was putting through it, as well as diff in moving RLE)  Overall Level of Assistance: Assist X2;Maximum assistance  Sit to Stand: Maximum assistance;Assist X2  Stand to Sit: Maximum assistance;Assist X2  Bed to Chair: Moderate assistance;Maximum assistance;Assist X2 (step to chair at R from EOB. Pt req phys A to advance RLE in RW, A w/ RW mgt, and physA in maintaining uprightstance in RW)     ADL  Grooming: Setup (washing face)  LE Dressing: Dependent/Total (donning footwear)        Safety Devices  Type of Devices: Nurse notified; Left in chair;Chair alarm in place; All fall risk precautions in place;Call light within reach  Restraints  Restraints Initially in Place: No     Patient Education  Education Given To: Patient  Education Provided: Role of Therapy;Plan of Care;Precautions; ADL Adaptive Strategies;Transfer Training;Energy Conservation  Education Method: Verbal  Barriers to Learning: None  Education Outcome: Continued education needed    Goals  Short Term Goals  Time Frame for Short term goals: Discharge  Short Term Goal 1: supine to sit w/ Mod A-cont  Short Term Goal 2: NEW GOAL: pt will complete BSC/toilet transfer Mercedes  Short Term Goal 3: tolerate LB dressing

## 2022-07-14 NOTE — PLAN OF CARE
Problem: Pain  Goal: Verbalizes/displays adequate comfort level or baseline comfort level  Outcome: Progressing   Numeric pain rating scale being used. Patient repositioned for comfort. Ice applied. Patient is tolerating PO pain medicine. Problem: Safety - Adult  Goal: Free from fall injury  Outcome: Progressing  Flowsheets (Taken 7/14/2022 0326)  Free From Fall Injury:   Instruct family/caregiver on patient safety   Based on caregiver fall risk screen, instruct family/caregiver to ask for assistance with transferring infant if caregiver noted to have fall risk factors   Patient has remained free of falls. 2/4 bed rails up, bed locked and in lowest position, call light within reach. Patient instructed on use of call light and uses appropriately. Bed alarm on. Non-skid footwear and fall band on.

## 2022-07-14 NOTE — PROGRESS NOTES
Physical Therapy  Facility/Department: [place] [address]  Physical Therapy Daily Treatment    Name: Silverio Parrish  : 1950  MRN: [id number]  Date of Service: 2022    Discharge Recommendations: Silverio Parrish scored a  on the AM-PAC short mobility form. Current research shows that an AM-PAC score of 17 or less is typically not associated with a discharge to the patient's home setting. Based on the patient's AM-PAC score and their current functional mobility deficits, it is recommended that the patient have 3-5 sessions per week of Physical Therapy at d/c to increase the patient's independence. Please see assessment section for further patient specific details. If patient discharges prior to next session this note will serve as a discharge summary. Please see below for the latest assessment towards goals. PT Equipment Recommendations  Equipment Needed: No      Patient Diagnosis(es): The encounter diagnosis was Primary osteoarthritis of right hip. Past Medical History:  has a past medical history of Back pain, Hyperlipidemia, Hypertension, Tuberculosis, and Wears dentures. Past Surgical History:  has a past surgical history that includes Appendectomy (56); Tonsillectomy (); Ectopic pregnancy surgery (); Partial hysterectomy (); Cholecystectomy (); Cataract removal (Bilateral, ); and Total hip arthroplasty (Right, 2022). Assessment   Assessment: Pt is 75 yo F admitted on 22 with R hip OA and underwent R THR. Normally, pt lives at home with her  in a 1 story house. Pt ambulates with a rollator. Pt has difficulty with her ADLs and her  is poor in health. Presently, pt is able to sit up to EOB with max assist of 2. As well as Sit<>stand with Max x2. During the beginning of the transfer pt was Mod A x2 then advanced to Max due to weakness and pain. When transferring to the chair with RW, pt unable to advance LLE, needing Mod Ax1. Rec continued skilled IP therapy in order to improve her independence, strength and functional mobility in order to return to home safely. Will continue to follow. Treatment Diagnosis: Decreased functional mobility  Requires PT Follow-Up: Yes  Activity Tolerance  Activity Tolerance: Patient limited by fatigue;Patient limited by pain     Plan   Plan  Plan: 2 times a day 7 days a week  Current Treatment Recommendations: Strengthening,Balance training,Functional mobility training,Transfer training,Gait training,Safety education & training,Therapeutic activities  Safety Devices  Type of Devices: Nurse notified,Left in chair,Chair alarm in place,All fall risk precautions in place,Call light within reach     Restrictions  Position Activity Restriction  Other position/activity restrictions: 50% WB, ant approach     Subjective   General  Chart Reviewed: Yes  Additional Pertinent Hx: 74 y.o. F to OR 7/13 for R THR (MINIMALLY INVASIVE DIRECT ANTERIOR) due to R hip OA. PMH: R Rotator Cuff Impingement, R and L knee scopes. Family / Caregiver Present: No  Referring Practitioner: Toshia Adler [facility]  Diagnosis: Primary OA R hip  Subjective  Subjective: Pt supine in bed and agreeable to PT. Pt states she is in 7/10 in back and hip. Social/Functional History  Social/Functional History  Lives With: Spouse (With mild dementia, [place], and uses a walker)  Type of Home: House  Home Layout: One level  Home Access: Ramped entrance  Bathroom Shower/Tub: Walk-in shower  Bathroom Toilet: Handicap height  Bathroom Equipment: Hand-held shower,3-in-1 commode  Home Equipment: Rollator,Walker, rolling  ADL Assistance: Independent (But very difficult)  Homemaking Assistance: Independent  Ambulation Assistance: Independent (With rollator)  Active : Yes  Additional Comments: Daughter plans to stay with pt at d/c. Denies falls. Pt's  falls often. Pt was planning to have R shoulder surgery, but it was cancelled due to Sumit. Vision/Hearing  Vision  Vision: Within Functional Limits  Hearing  Hearing: Within functional limits      Cognition   Orientation  Overall Orientation Status: Within Functional Limits     Objective   Heart Rate: 78  Heart Rate Source: Monitor  BP: (!) 143/75  BP Location: Left upper arm  BP Method: Automatic  Patient Position: Up in chair  MAP (Calculated): 97.67  Resp: 16  SpO2: 93 %  O2 Device: None (Room air)        Gross Assessment  AROM: Generally decreased, functional (LLE signifcantly weaker, unable to lift off ground, R shoulder pain-was supposed to have a replacement)  Strength: Generally decreased, functional (LLE signifcantly weaker, unable to lift off ground, R shoulder pain-was supposed to have a replacement)                    Bed mobility  Supine to Sit: 2 Person assistance;Maximum assistance  Scootin Person assistance;Maximal assistance  Transfers  Sit to Stand: Moderate Assistance;Maximum Assistance;2 Person Assistance (Mod Ax2 at the beginning, toward the end of the transfer Max x2)  Stand to sit: Maximum Assistance;2 Person Assistance  Bed to Chair: Maximum assistance; Moderate assistance (Mod A for LLE advancement, Max x1 for transfer)  Ambulation  WB Status: 50% on the RLE  Ambulation  Surface: level tile  Device: [address]: Moderate assistance;Maximum assistance  Quality of Gait: Unsteady, decreased step length, unable to lift LLE for advancing needing assistance  Gait Deviations: Slow Petty;Decreased step length;Decreased step height  Distance: 5 steps to chair  Comments: Once standing, pt extrememly shaky, unable to advance LLE.      Balance  Sitting - Static: Fair;+  Sitting - Dynamic: Fair  Standing - Static: Fair;+           OutComes Score    AM-PAC Score  AM-PAC Inpatient Mobility Raw Score : 11 (22)  AM-PAC Inpatient T-Scale Score : 33.86 (22)  Mobility Inpatient CMS 0-100% Score: 72.57 (22)  Mobility Inpatient CMS G-Code Modifier : CL (07/14/22 1145)          Goals  Short Term Goals  Time Frame for Short term goals: by discharge- ongoing  Short term goal 1: Pt will perform bed mobility with CGA  Short term goal 2: Pt will transfer sit to and from stand with CGA  Short term goal 3: Pt will ambulate 80 feet with wheeled walker and CGA  Short term goal 4: Pt will perform R LE THR HEP x 10 with min assist       Education  Patient Education  Education Given To: Patient  Education Provided: Role of Therapy; Equipment  Education Outcome: Verbalized understanding;Continued education needed      Therapy Time   Individual Concurrent Group Co-treatment   Time In 0928         Time Out 1008         Minutes 40           Timed Code Treatment Minutes:   40    Total Treatment Minutes:  [address] #[id number]

## 2022-07-14 NOTE — PROGRESS NOTES
Department of Orthopedic Surgery  Physician Assistant   Progress Note    Subjective:     Post-Operative Day: 1 Status Post right Total Hip Arthroplasty, Dr. Genet Amezcua    Systemic or Specific Complaints: Complaining of back and right calf pain. Hip pain is well controlled at this time. Patient does have a history of chronic back pain for which she takes Norco and Ultram outpatient. She has been unable to void on her own postoperatively and had winter placed this morning. She did work meaningfully with PT/OT this morning with some limitations due to fatigue and pain. She required max assist for help into the chair and was unable to ambulate. She denies dizziness and nausea.      Objective:     Patient Vitals for the past 24 hrs:   BP Temp Temp src Pulse Resp SpO2   07/14/22 1045 (!) 143/75 98.1 °F (36.7 °C) Oral 78 16 --   07/14/22 0845 (!) 146/78 98.4 °F (36.9 °C) Oral 93 16 93 %   07/14/22 0715 (!) 144/77 98.9 °F (37.2 °C) Oral 87 16 98 %   07/14/22 0233 (!) 151/73 98.9 °F (37.2 °C) Oral 91 16 94 %   07/13/22 2228 (!) 156/79 97.7 °F (36.5 °C) Oral 94 16 96 %   07/13/22 1826 (!) 161/70 98.4 °F (36.9 °C) Oral 79 16 95 %   07/13/22 1745 (!) 162/71 97.2 °F (36.2 °C) Temporal 82 15 97 %   07/13/22 1730 (!) 157/58 -- -- 73 10 95 %   07/13/22 1715 (!) 158/57 -- -- 72 11 92 %   07/13/22 1700 (!) 153/67 -- -- 70 10 94 %   07/13/22 1650 -- -- -- 73 -- --   07/13/22 1645 (!) 149/68 -- -- 64 13 95 %   07/13/22 1630 (!) 160/62 -- -- 65 10 96 %   07/13/22 1615 (!) 160/63 -- -- 64 15 100 %   07/13/22 1600 (!) 141/67 -- -- 64 13 100 %   07/13/22 1545 (!) 156/70 -- -- 68 25 100 %   07/13/22 1530 (!) 166/76 -- -- 71 19 100 %   07/13/22 1515 (!) 150/73 -- -- 61 10 99 %   07/13/22 1500 (!) 148/68 -- -- 60 10 100 %   07/13/22 1445 (!) 145/67 -- -- 63 13 100 %   07/13/22 1430 136/64 -- -- 58 11 97 %   07/13/22 1427 -- -- -- -- -- 97 %   07/13/22 1426 -- -- -- -- -- 97 %   07/13/22 1425 -- -- -- -- -- 96 %   07/13/22 1423 -- -- -- -- -- Jacque Gusman 87 %   07/13/22 1422 -- -- -- -- -- (!) 88 %   07/13/22 1418 (!) 135/57 -- -- 69 17 94 %   07/13/22 1415 (!) 171/68 -- -- 81 14 93 %   07/13/22 1410 (!) 179/68 -- -- 83 15 95 %   07/13/22 1400 (!) 181/73 -- -- (!) 103 20 --   07/13/22 1330 (!) 159/74 -- -- 66 14 93 %   07/13/22 1315 (!) 165/71 -- -- 76 13 93 %   07/13/22 1300 (!) 166/74 -- -- 70 13 95 %   07/13/22 1255 -- -- -- -- -- 94 %   07/13/22 1250 -- -- -- -- -- 91 %   07/13/22 1245 (!) 152/74 -- -- 82 20 99 %   07/13/22 1240 -- -- -- -- -- 99 %   07/13/22 1230 (!) 166/77 -- -- 82 25 100 %   07/13/22 1215 (!) 172/71 -- -- 81 16 100 %   07/13/22 1200 (!) 158/70 -- -- 76 16 99 %   07/13/22 1145 (!) 164/77 -- -- 74 11 100 %   07/13/22 1130 (!) 154/91 -- -- 73 21 95 %   07/13/22 1115 (!) 152/71 -- -- 70 18 100 %       General: alert, appears stated age, cooperative and no distress   Wound: Traci dressing in place with good suction seal.    Motion: Painful range of Motion   DVT Exam: Posterior calf tenderness present. No swelling noted. Negative homans sign. Patient in chair at time of exam, RN student at bedside. Thigh has mild swelling, all compartments are soft and compressible. Gross motor function in foot and ankle intact. Sensation intact to light touch. Distal pulses 2+, regular. Data Review  CBC:   Lab Results   Component Value Date/Time    WBC 11.4 07/14/2022 07:27 AM    RBC 2.81 07/14/2022 07:27 AM    HGB 9.3 07/14/2022 07:27 AM    HCT 27.0 07/14/2022 07:27 AM     07/14/2022 07:27 AM       Assessment:     Status Post right Total Hip Arthroplasty. Doing well postoperatively. Plan:      1: Continues current post-op course : Venous doppler ordered to assess for DVT due to right calf pain. Okay to resume home medications and diet. Received 3 doses of   Ancef post op. 2:  Continue Deep venous thrombosis prophylaxis- ASA 81 mg, BID.  3:  Continue physical therapy- 50% weight bearing with assistive device. No straight leg raises. 4:  Continue Pain Control- scheduled tylenol, prn Roxicodone, prn dilaudid. Addendum- Scheduled Robaxin. 5: Discharge pending- progress with PT, pain control, US results, voiding trial tomorrow AM, placement. Likely remain until tomorrow pending course.      [employer], PA-C

## 2022-07-14 NOTE — PROGRESS NOTES
Prognosis: Good  Decision Making: Low Complexity  Requires PT Follow-Up: Yes  Activity Tolerance  Activity Tolerance: Patient tolerated treatment well     Plan   Plan  Plan: 2 times a day 7 days a week  Current Treatment Recommendations: Strengthening,Balance training,Functional mobility training,Transfer training,Gait training,Safety education & training,Therapeutic activities  Safety Devices  Type of Devices: Nurse notified,All fall risk precautions in place,Call light within reach,Left in bed,Bed alarm in place  Restraints  Restraints Initially in Place: No     Restrictions  Position Activity Restriction  Other position/activity restrictions: 50% WB, ant approach     Subjective   General  Chart Reviewed: Yes  Additional Pertinent Hx: 74 y.o. F to OR 7/13 for R THR (MINIMALLY INVASIVE DIRECT ANTERIOR) due to R hip OA. PMH: R Rotator Cuff Impingement, R and L knee scopes. Family / Caregiver Present: No  Referring Practitioner: Toshia Adler [facility]  Diagnosis: Primary OA R hip  Subjective  Subjective: Pt found supine bed, agreeable to PT. Pt was accompained by family. Social/Functional History  Social/Functional History  Lives With: Spouse (With mild dementia, [place], and uses a walker)  Type of Home: House  Home Layout: One level  Home Access: Ramped entrance  Bathroom Shower/Tub: Walk-in shower  Bathroom Toilet: Handicap height  Bathroom Equipment: Hand-held shower,3-in-1 commode  Home Equipment: Rollator,Walker, rolling  ADL Assistance: Independent (But very difficult)  Homemaking Assistance: Independent  Ambulation Assistance: Independent (With rollator)  Active : Yes  Additional Comments: Daughter plans to stay with pt at d/c. Denies falls. Pt's  falls often. Pt was planning to have R shoulder surgery, but it was cancelled due to Sumit.   Vision/Hearing  Vision  Vision: Within Functional Limits  Hearing  Hearing: Within functional limits    Cognition   Orientation  Overall Orientation Status: Within Functional Limits     Objective      Gross Assessment  AROM: Generally decreased, functional (LLE signifcantly weaker, unable to lift off ground, R shoulder pain-was supposed to have a replacement)  Strength: Generally decreased, functional (LLE signifcantly weaker, unable to lift off ground, R shoulder pain-was supposed to have a replacement)                 Bed Mobility Training  Bed Mobility Training: Yes  Overall Level of Assistance: Assist X2;Maximum assistance  Interventions: Visual cues;Manual cues; Verbal cues; Safety awareness training  Supine to Sit: Maximum assistance;Assist X2 (MaxA x2 to R side - pt initiating)  Scooting: Stand-by assistance (fwd at EOB)  Balance  Sitting: High guard (EOB, feet on floor, UE for support PRN)  Standing: With support (phys A and RW)  Transfer Training  Transfer Training:  (Pt is PWB 50% RLE - attempting t/o as able, pt reporting RLE still numb and difficulty feeling how much weight she was putting through it, as well as diff in moving RLE)  Overall Level of Assistance: Assist X2;Maximum assistance  Sit to Stand: Maximum assistance;Assist X2  Stand to Sit: Maximum assistance;Assist X2  Bed to Chair: Moderate assistance;Maximum assistance;Assist X2 (step to chair at R from EOB. Pt req phys A to advance RLE in RW, A w/ RW mgt, and physA in maintaining uprightstance in RW)  Bed mobility  Supine to Sit: Moderate assistance  Sit to Supine: Moderate assistance  Scooting: Moderate assistance  Transfers  Sit to Stand: Moderate Assistance;Maximum Assistance;2 Person Assistance (Mod Ax2 at the beginning, toward the end of the transfer Max x2)  Stand to sit: Maximum Assistance;2 Person Assistance  Bed to Chair: Maximum assistance; Moderate assistance (Mod A for LLE advancement, Max x1 for transfer)  Ambulation  WB Status: 50% on the RLE  Ambulation  Surface: level tile  Device: [address]:  Moderate assistance;Maximum assistance  Quality of Gait: Unsteady, decreased step length, unable to lift LLE for advancing needing assistance  Gait Deviations: Slow Petty;Decreased step length;Decreased step height  Distance: 5 steps to chair  Comments: Once standing, pt aditi whitten, unable to advance LLE. Balance  Sitting - Static: Fair;+  Sitting - Dynamic: Fair  Standing - Static: Fair;+  Exercise Treatment: Sitting EOB, Marches (RLE assist), LAQ, ankle pumps, quad sets, gluteal sets (2x10, extra time and effort)        OutComes Score    AM-PAC Score  AM-PAC Inpatient Mobility Raw Score : 11 (07/14/22 1533)  AM-PAC Inpatient T-Scale Score : 33.86 (07/14/22 1533)  Mobility Inpatient CMS 0-100% Score: 72.57 (07/14/22 1533)  Mobility Inpatient CMS G-Code Modifier : CL (07/14/22 1533)          Goals  Short Term Goals  Time Frame for Short term goals: by discharge- ongoing  Short term goal 1: Pt will perform bed mobility with CGA  Short term goal 2: Pt will transfer sit to and from stand with CGA  Short term goal 3: Pt will ambulate 80 feet with wheeled walker and CGA  Short term goal 4: Pt will perform R LE THR HEP x 10 with min assist  Patient Goals   Patient goals : To return home       Education  Patient Education  Education Given To: Patient  Education Provided: Role of Therapy; Equipment  Education Outcome: Verbalized understanding;Continued education needed      Therapy Time   Individual Concurrent Group Co-treatment   Time In 1352         Time Out 1430         Minutes 38           Timed Code Treatment Minutes:   38    Total Treatment Minutes:  [address] , [place], [place] #[address]

## 2022-07-14 NOTE — DISCHARGE INSTR - COC
ventilator support    Rehab Therapies: Physical Therapy and Occupational Therapy  Weight Bearing Status/Restrictions: No weight bearing restrictions  Other Medical Equipment (for information only, NOT a DME order):  walker  Other Treatments: KETTY drain    Patient's personal belongings (please select all that are sent with patient):  Glasses, Dentures upper and lower, Jewelry    RN SIGNATURE:  Electronically signed by Jordan Tejeda RN on 7/15/22 at 3:51 PM EDT    CASE MANAGEMENT/SOCIAL WORK SECTION    Inpatient Status Date: 7/13/22    Readmission Risk Assessment Score:  Readmission Risk              Risk of Unplanned Readmission:  0           Discharging to Facility/ Agency   [facility] Details  FAX          [address], [address] [place] [address]       Phone: [phone]       Fax: [phone]          Dialysis Facility (if applicable)   Name:  Address:  Dialysis Schedule:  Phone:  Fax:    / signature: Electronically signed by DANILO Infante on 7/14/22 at 3:33 PM EDT    PHYSICIAN SECTION    Prognosis: {Prognosis:2934089498}    Condition at Discharge: [address] [place] [address] Patient Condition:[id number]}    Rehab Potential (if transferring to Rehab): Good    Recommended Labs or Other Treatments After Discharge:   -Continue PT/OT with 50% weight bearing. No straight leg raises  -Continue ASA 81 mg BID for DVT ppx  - Roxicodone + Robaxin PRN for pain. -Cipro 500mg 2 times daily for 10 days for UTI  -Call [phone] to schedule follow up with Dr. Scott Kuo in 2 weeks. Physician Certification: I certify the above information and transfer of Christin Judd  is necessary for the continuing treatment of the diagnosis listed and that she requires [facility] for less 30 days.      Update Admission H&P: No change in H&P    PHYSICIAN SIGNATURE:  Electronically signed by BRIAN Rodgers on 7/15/22 at 12:14 PM EDT

## 2022-07-15 VITALS
WEIGHT: 218.6 LBS | SYSTOLIC BLOOD PRESSURE: 111 MMHG | HEART RATE: 78 BPM | OXYGEN SATURATION: 95 % | TEMPERATURE: 97.9 F | HEIGHT: 62 IN | DIASTOLIC BLOOD PRESSURE: 60 MMHG | RESPIRATION RATE: 16 BRPM | BODY MASS INDEX: 40.23 KG/M2

## 2022-07-15 PROBLEM — Z96.642 STATUS POST LEFT HIP REPLACEMENT: Status: ACTIVE | Noted: 2022-07-15

## 2022-07-15 LAB
BASOPHILS ABSOLUTE: 0.1 K/UL (ref 0–0.2)
BASOPHILS RELATIVE PERCENT: 0.8 %
EOSINOPHILS ABSOLUTE: 0.4 K/UL (ref 0–0.6)
EOSINOPHILS RELATIVE PERCENT: 3.3 %
GLUCOSE BLD-MCNC: 104 MG/DL (ref 70–99)
GLUCOSE BLD-MCNC: 108 MG/DL (ref 70–99)
GLUCOSE BLD-MCNC: 121 MG/DL (ref 70–99)
HCT VFR BLD CALC: 25 % (ref 36–48)
HEMOGLOBIN: 8.7 G/DL (ref 12–16)
IRON SATURATION: 9 % (ref 15–50)
IRON: 16 UG/DL (ref 37–145)
LYMPHOCYTES ABSOLUTE: 2.1 K/UL (ref 1–5.1)
LYMPHOCYTES RELATIVE PERCENT: 19.1 %
MCH RBC QN AUTO: 33.5 PG (ref 26–34)
MCHC RBC AUTO-ENTMCNC: 34.7 G/DL (ref 31–36)
MCV RBC AUTO: 96.3 FL (ref 80–100)
MONOCYTES ABSOLUTE: 1 K/UL (ref 0–1.3)
MONOCYTES RELATIVE PERCENT: 9.5 %
NEUTROPHILS ABSOLUTE: 7.3 K/UL (ref 1.7–7.7)
NEUTROPHILS RELATIVE PERCENT: 67.3 %
PDW BLD-RTO: 13.3 % (ref 12.4–15.4)
PERFORMED ON: ABNORMAL
PLATELET # BLD: 355 K/UL (ref 135–450)
PMV BLD AUTO: 8.1 FL (ref 5–10.5)
RBC # BLD: 2.59 M/UL (ref 4–5.2)
SARS-COV-2, NAAT: NOT DETECTED
TOTAL IRON BINDING CAPACITY: 176 UG/DL (ref 260–445)
VITAMIN B-12: 823 PG/ML (ref 211–911)
WBC # BLD: 10.8 K/UL (ref 4–11)

## 2022-07-15 PROCEDURE — 83550 IRON BINDING TEST: CPT

## 2022-07-15 PROCEDURE — 85025 COMPLETE CBC W/AUTO DIFF WBC: CPT

## 2022-07-15 PROCEDURE — 36415 COLL VENOUS BLD VENIPUNCTURE: CPT

## 2022-07-15 PROCEDURE — 97116 GAIT TRAINING THERAPY: CPT

## 2022-07-15 PROCEDURE — 97530 THERAPEUTIC ACTIVITIES: CPT

## 2022-07-15 PROCEDURE — G0378 HOSPITAL OBSERVATION PER HR: HCPCS

## 2022-07-15 PROCEDURE — 97110 THERAPEUTIC EXERCISES: CPT

## 2022-07-15 PROCEDURE — 83540 ASSAY OF IRON: CPT

## 2022-07-15 PROCEDURE — 82607 VITAMIN B-12: CPT

## 2022-07-15 PROCEDURE — 99024 POSTOP FOLLOW-UP VISIT: CPT

## 2022-07-15 PROCEDURE — 1200000000 HC SEMI PRIVATE

## 2022-07-15 PROCEDURE — 87635 SARS-COV-2 COVID-19 AMP PRB: CPT

## 2022-07-15 PROCEDURE — 6360000002 HC RX W HCPCS: Performed by: PHYSICIAN ASSISTANT

## 2022-07-15 PROCEDURE — 2580000003 HC RX 258: Performed by: PHYSICIAN ASSISTANT

## 2022-07-15 PROCEDURE — 6370000000 HC RX 637 (ALT 250 FOR IP)

## 2022-07-15 PROCEDURE — 0SR90JA REPLACEMENT OF RIGHT HIP JOINT WITH SYNTHETIC SUBSTITUTE, UNCEMENTED, OPEN APPROACH: ICD-10-PCS | Performed by: ORTHOPAEDIC SURGERY

## 2022-07-15 PROCEDURE — APPNB60 APP NON BILLABLE TIME 46-60 MINS

## 2022-07-15 PROCEDURE — 6370000000 HC RX 637 (ALT 250 FOR IP): Performed by: PHYSICIAN ASSISTANT

## 2022-07-15 PROCEDURE — 96375 TX/PRO/DX INJ NEW DRUG ADDON: CPT

## 2022-07-15 PROCEDURE — 97535 SELF CARE MNGMENT TRAINING: CPT

## 2022-07-15 RX ORDER — OXYCODONE HYDROCHLORIDE 5 MG/1
5-10 TABLET ORAL EVERY 4 HOURS PRN
Qty: 18 TABLET | Refills: 0 | Status: SHIPPED | OUTPATIENT
Start: 2022-07-15 | End: 2022-07-18

## 2022-07-15 RX ORDER — OXYCODONE HYDROCHLORIDE 5 MG/1
5 TABLET ORAL EVERY 4 HOURS PRN
Qty: 18 TABLET | Refills: 0 | Status: SHIPPED | OUTPATIENT
Start: 2022-07-15 | End: 2022-07-15 | Stop reason: SDUPTHER

## 2022-07-15 RX ORDER — FERROUS SULFATE 325(65) MG
325 TABLET ORAL
Status: DISCONTINUED | OUTPATIENT
Start: 2022-07-16 | End: 2022-07-15 | Stop reason: HOSPADM

## 2022-07-15 RX ORDER — CIPROFLOXACIN 500 MG/1
500 TABLET, FILM COATED ORAL 2 TIMES DAILY
Qty: 20 TABLET | Refills: 0 | Status: SHIPPED | OUTPATIENT
Start: 2022-07-15 | End: 2022-07-25

## 2022-07-15 RX ORDER — NALOXONE HYDROCHLORIDE 4 MG/.1ML
1 SPRAY NASAL PRN
Qty: 1 EACH | Refills: 5 | Status: SHIPPED | OUTPATIENT
Start: 2022-07-15

## 2022-07-15 RX ADMIN — AMLODIPINE BESYLATE 10 MG: 10 TABLET ORAL at 08:01

## 2022-07-15 RX ADMIN — ACETAMINOPHEN 650 MG: 325 TABLET ORAL at 12:13

## 2022-07-15 RX ADMIN — MELOXICAM 15 MG: 15 TABLET ORAL at 08:01

## 2022-07-15 RX ADMIN — ACETAMINOPHEN 650 MG: 325 TABLET ORAL at 19:03

## 2022-07-15 RX ADMIN — LISINOPRIL 5 MG: 5 TABLET ORAL at 08:01

## 2022-07-15 RX ADMIN — PREGABALIN 50 MG: 50 CAPSULE ORAL at 08:00

## 2022-07-15 RX ADMIN — OXYCODONE 10 MG: 5 TABLET ORAL at 12:13

## 2022-07-15 RX ADMIN — SODIUM CHLORIDE, PRESERVATIVE FREE 10 ML: 5 INJECTION INTRAVENOUS at 08:03

## 2022-07-15 RX ADMIN — ALLOPURINOL 100 MG: 100 TABLET ORAL at 08:01

## 2022-07-15 RX ADMIN — METHOCARBAMOL 500 MG: 500 TABLET ORAL at 14:14

## 2022-07-15 RX ADMIN — METHOCARBAMOL 500 MG: 500 TABLET ORAL at 08:01

## 2022-07-15 RX ADMIN — FUROSEMIDE 20 MG: 20 TABLET ORAL at 08:01

## 2022-07-15 RX ADMIN — OXYCODONE 10 MG: 5 TABLET ORAL at 16:42

## 2022-07-15 RX ADMIN — ACETAMINOPHEN 650 MG: 325 TABLET ORAL at 07:17

## 2022-07-15 RX ADMIN — POLYETHYLENE GLYCOL 3350 17 G: 17 POWDER, FOR SOLUTION ORAL at 08:01

## 2022-07-15 RX ADMIN — ONDANSETRON 4 MG: 2 INJECTION INTRAMUSCULAR; INTRAVENOUS at 09:10

## 2022-07-15 RX ADMIN — ASPIRIN 81 MG: 81 TABLET, COATED ORAL at 08:01

## 2022-07-15 RX ADMIN — PREGABALIN 50 MG: 50 CAPSULE ORAL at 14:14

## 2022-07-15 RX ADMIN — OXYCODONE 10 MG: 5 TABLET ORAL at 02:59

## 2022-07-15 RX ADMIN — OXYCODONE 10 MG: 5 TABLET ORAL at 08:00

## 2022-07-15 ASSESSMENT — PAIN DESCRIPTION - ORIENTATION
ORIENTATION: LOWER
ORIENTATION: RIGHT

## 2022-07-15 ASSESSMENT — PAIN SCALES - GENERAL
PAINLEVEL_OUTOF10: 8
PAINLEVEL_OUTOF10: 8
PAINLEVEL_OUTOF10: 7
PAINLEVEL_OUTOF10: 7

## 2022-07-15 ASSESSMENT — PAIN DESCRIPTION - DESCRIPTORS
DESCRIPTORS: TIGHTNESS
DESCRIPTORS: ACHING;TIGHTNESS;BURNING

## 2022-07-15 ASSESSMENT — PAIN DESCRIPTION - LOCATION
LOCATION: BACK;LEG
LOCATION: HIP

## 2022-07-15 ASSESSMENT — PAIN DESCRIPTION - FREQUENCY: FREQUENCY: CONTINUOUS

## 2022-07-15 ASSESSMENT — PAIN - FUNCTIONAL ASSESSMENT
PAIN_FUNCTIONAL_ASSESSMENT: PREVENTS OR INTERFERES SOME ACTIVE ACTIVITIES AND ADLS
PAIN_FUNCTIONAL_ASSESSMENT: ACTIVITIES ARE NOT PREVENTED

## 2022-07-15 ASSESSMENT — PAIN DESCRIPTION - PAIN TYPE: TYPE: SURGICAL PAIN

## 2022-07-15 ASSESSMENT — PAIN DESCRIPTION - ONSET: ONSET: ON-GOING

## 2022-07-15 NOTE — PROGRESS NOTES
Bedside report done with [facility]. DANIELS. Pt is A/O x4. Pt denies pain at this time. Pt has a Iqbal catheter with clear yellow urine in the collection bag. Neurovascular checks WNL. Dressing to the right hip CDI. Bed alarm on, wheels locked, bed in lowest position, side rails up 2/4, nonskid socks on, call light and bedside table in reach. Will continue to monitor.

## 2022-07-15 NOTE — CARE COORDINATION
Social Work: Discussed in Interdisciplinary Rounds:    Discharge order noted. DC to [facility]. Transport scheduled for 7pm with [facility]. Report: [phone]  Fax: [phone]  HENS completed #[id number]  IMM letter signed as pt was admitted from obs. Hard copy in chart.     DANILO Infante  [phone]

## 2022-07-15 NOTE — PLAN OF CARE
Problem: Pain  Goal: Verbalizes/displays adequate comfort level or baseline comfort level  7/15/2022 1109 by Tino Ibrahim RN  Outcome: Progressing   Pt endorsing pain to R hip. Being treated with PRN pain medications, rest, repositioning with some relief. Problem: Safety - Adult  Goal: Free from fall injury  7/15/2022 1109 by Tino Ibrahim RN  Outcome: Progressing   All fall precautions in place. Bed locked and in lowest position with alarm on. Overbed table and personal belonings within reach. Call light within reach and patient instructed to use call light for assistance. Non-skid socks on.

## 2022-07-15 NOTE — PROGRESS NOTES
VSS on room air, temperature is WDL. AOx4. Pt is ambulating assist x1 with rw and gb, tolerating well, denies dizziness, endorsed nausea with therapy, Zofran given, nausea resolved. Tolerating oral diet and PO fluids. Pt voiding via BRP post winter removal. Skin C/D/I with exception to incision, intact, no drainage noted. All fall precautions in place.

## 2022-07-15 NOTE — PROGRESS NOTES
HOSPITAL MEDICINE  - PROGRESS NOTE    Admit Date: 7/13/2022         Interval History:   74 y.o. female  With Hypertension,hyperlipidemia,Osteoarthritis who presents with  Chronic right hip osteoarthritis for elective CASS    Subjective: Low grade fever overnight,no chills  No dysuria-had been on cipro prior to admission-but did not have urinary symptoms and UA with no pyuria    -hip pain uncontrolled currently    Objective: afebrile    Diet: ADULT DIET; Regular       Data:   Scheduled Meds: Reviewed  Continuous Infusions:   lactated ringers 125 mL/hr at 07/13/22 1758    sodium chloride      dextrose         Intake/Output Summary (Last 24 hours) at 7/15/2022 1602  Last data filed at 7/15/2022 1118  Gross per 24 hour   Intake 640 ml   Output 1500 ml   Net -860 ml     CBC:   Recent Labs     07/15/22  0715   WBC 10.8   HGB 8.7*        BMP:  Recent Labs     07/14/22  0727   *   K 4.3      CO2 23   BUN 14   CREATININE 0.8   GLUCOSE 129*          Objective:   Vitals: /60   Pulse 78   Temp 97.9 °F (36.6 °C) (Oral)   Resp 16   Ht 5' 2\" (1.575 m)   Wt 218 lb 9.6 oz (99.2 kg)   SpO2 95%   Breastfeeding No   BMI 39.98 kg/m²   General appearance: alert, appears stated age and cooperative  Skin: Skin color, texture, turgor normal.   HEENT: Head: Normocephalic, no lesions, without obvious abnormality.   Neck: supple  Lungs: clear to auscultation bilaterally  Heart: regular rate and rhythm, S1, S2 normal, no murmur, click, rub or gallop  Abdomen: soft, non-tender; bowel sounds normal; no masses,  no organomegaly  Extremities: no edema  Neurologic: Mental status: Alert, oriented, thought content appropriate      Assessment & Plan:      Hypertension,benign and controlled  -on norvasc and lisinopril as well as lasix  Hold lasix for now as BP low normal to allow for PT/OT      Primary osteoarthritis of right hip  S/P CASS  Pain control  Incentive spirometry    Hyponatremia-mild  Add nutritional supplement for high solute diet  Recheck in am    Low grade temp  -urinalysis  -encourage incentive spirometry    dvt prophy-heparin    Disposition:DC per Primary    Hanh Bhakta MD

## 2022-07-15 NOTE — PROGRESS NOTES
Department of Orthopedic Surgery  Physician Assistant   Progress Note    Subjective:     Post-Operative Day: 2 Status Post right Total Hip Arthroplasty, Dr. Mukesh Kaur    Systemic or Specific Complaints: No complaints. Pain well controlled on current regimen. She has been voiding adequately on her own. Denies nausea and dizziness. She did have an episode of elevated temperature this morning which resolved with incentive spirometry. Patient also states she was being treated for a urinary tract infection as an outpatient and has not been receiving her antibiotics while inpatient. Objective:     Patient Vitals for the past 24 hrs:   BP Temp Temp src Pulse Resp SpO2   07/15/22 1455 111/60 97.9 °F (36.6 °C) Oral 78 16 95 %   07/15/22 1118 105/68 98.2 °F (36.8 °C) Oral 83 18 --   07/15/22 1023 -- 98 °F (36.7 °C) -- -- -- --   07/15/22 0909 122/74 97.8 °F (36.6 °C) -- -- -- --   07/15/22 0758 (!) 119/58 -- -- 91 -- --   07/15/22 0700 (!) 172/86 (!) 100.5 °F (38.1 °C) Oral 97 16 92 %   07/15/22 0259 -- -- -- -- 16 --   07/15/22 0255 (!) 162/82 99.5 °F (37.5 °C) Oral 94 16 96 %   07/14/22 2241 (!) 149/68 98.5 °F (36.9 °C) Oral 86 16 92 %   07/14/22 1845 101/61 98.6 °F (37 °C) Oral 81 16 96 %       General: alert, appears stated age, cooperative and no distress   Wound: Traci dressing in place with good suction seal.    Motion: Painful range of Motion   DVT Exam: Posterior calf tenderness present. No swelling noted. Negative homans sign. Patient in bed at time of exam, no family at bedside. Thigh has mild swelling, all compartments are soft and compressible. Gross motor function in foot and ankle intact. Sensation intact to light touch. Distal pulses 2+, regular.        Data Review  CBC:   Lab Results   Component Value Date/Time    WBC 10.8 07/15/2022 07:15 AM    RBC 2.59 07/15/2022 07:15 AM    HGB 8.7 07/15/2022 07:15 AM    HCT 25.0 07/15/2022 07:15 AM     07/15/2022 07:15 AM       Assessment: Status Post right Total Hip Arthroplasty. Doing well postoperatively. Plan:      1: Continues current post-op course : Venous doppler ordered to assess for DVT due to right calf pain. Negative. Okay to resume home medications and diet. Received 3 doses of   Ancef post op. UTI- Cipro ordered for patient to take upon discharge. Elevated Temp-  encourage continued use of incentive spirometry. 2:  Continue Deep venous thrombosis prophylaxis- ASA 81 mg, BID.  3:  Continue physical therapy- 50% weight bearing with assistive device. No straight leg raises. 4:  Continue Pain Control- scheduled tylenol, prn Roxicodone, Scheduled Robaxin. 5: Discharge to SNF today. Follow up with Dr. Karina Humphrey in 2 weeks.      [employer], GIUSEPPE

## 2022-07-15 NOTE — PLAN OF CARE
Problem: Pain  Goal: Verbalizes/displays adequate comfort level or baseline comfort level  Outcome: Progressing  Note: Pt taking PO pain medication for pain     Problem: Safety - Adult  Goal: Free from fall injury  Outcome: Progressing  Note: Pt will remain free of falls for the duration of the shift. Pt is A/O x4  and uses the call light appropriately for needs. Pt is assist x2 with RW and GB stand and pivot     Problem: Genitourinary - Adult  Goal: Absence of urinary retention  Outcome: Not Progressing  Note: Pt currently has a winter catheter. Plan to pull at 0600  Goal: Urinary catheter remains patent  Outcome: Progressing  Note: Catheter is patent.

## 2022-07-15 NOTE — DISCHARGE SUMMARY
Department of Orthopedic Surgery  Physician Assistant   Discharge Summary      Ollie North is a 74 y.o. female who underwent total hip replacement procedure without complication. Ollie North was admitted to the floor following their recovery in the PACU.      Discharge Diagnosis  right Hip Replacement    Current Inpatient Medications    Current Facility-Administered Medications: [START ON 7/16/2022] ferrous sulfate (IRON 325) tablet 325 mg, 325 mg, Oral, Daily with breakfast  aspirin EC tablet 81 mg, 81 mg, Oral, BID  methocarbamol (ROBAXIN) tablet 500 mg, 500 mg, Oral, TID  HYDROmorphone (DILAUDID) tablet 1 mg, 1 mg, Oral, Q6H PRN  lactated ringers infusion, , IntraVENous, Continuous  sodium chloride flush 0.9 % injection 5-40 mL, 5-40 mL, IntraVENous, 2 times per day  sodium chloride flush 0.9 % injection 5-40 mL, 5-40 mL, IntraVENous, PRN  0.9 % sodium chloride infusion, , IntraVENous, PRN  acetaminophen (TYLENOL) tablet 650 mg, 650 mg, Oral, Q6H  oxyCODONE (ROXICODONE) immediate release tablet 5 mg, 5 mg, Oral, Q4H PRN **OR** oxyCODONE (ROXICODONE) immediate release tablet 10 mg, 10 mg, Oral, Q4H PRN  polyethylene glycol (GLYCOLAX) packet 17 g, 17 g, Oral, Daily  senna (SENOKOT) 8.8 MG/5ML syrup 8.8 mg, 5 mL, Oral, BID PRN  ondansetron (ZOFRAN-ODT) disintegrating tablet 4 mg, 4 mg, Oral, Q8H PRN **OR** ondansetron (ZOFRAN) injection 4 mg, 4 mg, IntraVENous, Q6H PRN  HYDROmorphone (DILAUDID) injection 0.5 mg, 0.5 mg, IntraVENous, Q5 Min PRN  albuterol (PROVENTIL) nebulizer solution 2.5 mg, 2.5 mg, Nebulization, Q4H PRN  allopurinol (ZYLOPRIM) tablet 100 mg, 100 mg, Oral, Daily  amLODIPine (NORVASC) tablet 10 mg, 10 mg, Oral, Daily  fluticasone (FLONASE) 50 MCG/ACT nasal spray 1 spray, 1 spray, Each Nostril, Daily  furosemide (LASIX) tablet 20 mg, 20 mg, Oral, Daily  lisinopril (PRINIVIL;ZESTRIL) tablet 5 mg, 5 mg, Oral, BID  meloxicam (MOBIC) tablet 15 mg, 15 mg, Oral, Daily  pregabalin (LYRICA) capsule 50 mg, 50 mg, Oral, TID  insulin glargine (LANTUS;BASAGLAR) injection pen 25 Units, 0.25 Units/kg, SubCUTAneous, Nightly PRN  insulin lispro (1 Unit Dial) 8 Units, 0.08 Units/kg, SubCUTAneous, TID WC  insulin lispro (1 Unit Dial) 0-6 Units, 0-6 Units, SubCUTAneous, TID WC  insulin lispro (1 Unit Dial) 0-3 Units, 0-3 Units, SubCUTAneous, Nightly  glucose chewable tablet 16 g, 4 tablet, Oral, PRN  dextrose bolus 10% 125 mL, 125 mL, IntraVENous, PRN **OR** dextrose bolus 10% 250 mL, 250 mL, IntraVENous, PRN  glucagon (rDNA) injection 1 mg, 1 mg, IntraMUSCular, PRN  dextrose 5 % solution, 100 mL/hr, IntraVENous, PRN    Post-operatively the patients diet was advanced as tolerated and their incision was checked on POD #1. The incision is dressing in place, clean, dry, and intact with no signs of infection. The patient remained neurovascularly intact in the lower extremity and had intact pulses distally. Patients calf remained soft and showed no evidence of DVT. The patient was able to move their leg and ankle/foot without any problems post-operatively. Physical therapy and occupational therapy were consulted and began working with the patient post-operatively. The patient progressed with PT/OT as would be expected and continued to improve through their stay. The patients pain was initially controlled with IV medications but we were able to transition to oral pain medications soon after arrival to the floor and their pain remained under good control through their hospital stay. From a medical standpoint the patient remained stable and continued to have the medicine team follow throughout their stay. The patients dressing was changed/incision was checked on day of d/c. The patient will be discharged at this time to [address] [place] [address]  with their current diet restrictions and will continue to follow the hip precautions outlined to them by us and PT/OT.      Condition on Discharge: Stable    Plan  Return visit in 2 weeks. .  Patient was instructed on the use of pain medications, the signs and symptoms of infection, and was given our number to call should they have any questions or concerns following discharge. For opioid prescriptions given at discharge the following statement is provided for compliance with [employer] rules. Patient being given increased dosage/quantity of opoid pain medication in excess of [employer] guidelines which noted a 30 MED daily of opioids due to the fact that he/she has undergone major orthopaedic surgery as outlined in rule 4731-11-13. Dosages and further duration of the pain medication will be re-evaluated at her post op visit in 2 weeks. Patient was educated on dosing expectations and limits of prescribing as a result of the new [facility] Board rules enacted August 31, 2017. Please also note that this is not the initial opoid prescription issued to this patient but a continuation of medication utilized during the hospital admission as noted in the medical record. [place]RS report has also been utilized to screen for any abuse history or suspicious activity as outlined in [place]. All efforts have been taken to prevent abuse potential and misuse of opioid medications including education, screening, and close clinical follow up.             [employer] [facility]

## 2022-07-15 NOTE — PROGRESS NOTES
Occupational Therapy  Daily Treatment Note  Patient Name: Vidhya Contreras  MRN: [id number]    Discharge Recommendation  Vidhya Contreras scored a 15/24 on the AM-PAC ADL Inpatient form. Current research shows that an AM-PAC score of 17 or less is typically not associated with a discharge to the patient's home setting. Based on the patient's AM-PAC score and their current ADL deficits, it is recommended that the patient have 3-5 sessions per week of Occupational Therapy at d/c to increase the patient's independence. Please see assessment section for further patient specific details. If patient discharges prior to next session this note will serve as a discharge summary. Please see below for the latest assessment towards goals. Assessment: Pt demo improve transfer and mobility status this date, required assist of 1 for transfers and able to ambulate 5' to [facility]. Pt limited by R hip pain and gross weakness/deconditioning, fatigues quickly during mobility and ADLs and requires significant assist for LB ADLs and clothing management during toileting. Pt limited by 50% WB status of R LE. Recommend ongoing OT tx at d/c prior to pt return home. Continue acute OT follow      Discharge Recommendations:   Equipment Needs:  defer to d/c setting    Chart Reviewed: Yes       Other position/activity restrictions: 50% WB, ant approach     Additional Pertinent Hx: 74 y.o. F to OR 7/13 for R THR (MINIMALLY INVASIVE DIRECT ANTERIOR) due to R hip OA. PMH: R Rotator Cuff Impingement, R and L knee scopes. WB Status: 50% on the RLE    Diagnosis: Primary OA R Hip  Treatment Diagnosis: impaired ADLs/transfers s/p R THR    Subjective: Pt seated in chair, agreeable to therapy. Pain: 4/10 R hip at rest, recently medicated.  Pain increased with mobility, ice pack provided end of session    Social/Functional History  Lives With: Spouse (With mild dementia, [place], and uses a walker)  Type of Home: House  Home Layout: One level  Home Access: Ramped entrance  Bathroom Shower/Tub: Walk-in shower  Bathroom Toilet: Handicap height  Bathroom Equipment: Hand-held shower, 3-in-1 commode  Home Equipment: Rollator, Walker, rolling  ADL Assistance: Independent (But very difficult)  Homemaking Assistance: Independent  Ambulation Assistance: Independent (With rollator)  Active : Yes  Additional Comments: Daughter plans to stay with pt at d/c. Denies falls. Pt's  falls often. Pt was planning to have R shoulder surgery, but it was cancelled due to [place]. Prior Function  ADL Assistance: Independent (But very difficult)  Homemaking Assistance: Independent  Ambulation Assistance: Independent (With rollator)  Additional Comments: Daughter plans to stay with pt at d/c. Denies falls. Pt's  falls often. Pt was planning to have R shoulder surgery, but it was cancelled due to [place]. Objective:      Functional Transfers  Sit to Stand: min A (from chair w/ armrests, VC for hand placement)  Stand to Sit: cga (to chair, VC for hand placement)  BSC Transfer: min A   Other: ambulated 5'  recliner <> BSC using RW, with slow pace due to pain, required VC for walker sequencing and to maintain 50% WB R LE.      ADLs   Grooming: set up (brushed teeth, washed face seated in chair)  LB dressing: total assist (adjust socks- pt limited by pain, decreased ROM hip/knees, edema)  Toileting: CGA (for hygiene only in stance; would require assist clothing mgmt)  Note: decreased endurance for ADLs, requires rest breaks, grooming tasks seated      Patient Education: anterior hip precautions, safe transfers, mobility progression    Safety Devices in Place: Pt seated in recliner, needs in reach, alarm engaged, PCA present     Plan:  Times per Week: 7   Times per Day: Daily    AM-PAC Score  AM-PAC Inpatient Daily Activity Raw Score: 15 (07/15/22 1613)  AM-PAC Inpatient ADL T-Scale Score : 34.69 (07/15/22 1613)  ADL Inpatient CMS 0-100% Score: 56.46 (07/15/22 1613)  ADL Inpatient CMS G-Code Modifier : CK (07/15/22 1613)    Goals  Short Term Goals  Time Frame for Short term goals: Discharge  Short Term Goal 1: supine to sit w/ Mod A-not addressed  Short Term Goal 2: NEW GOAL: pt will complete BSC/toilet transfer Mercedes- goal met 7/15; upgrade to SBA transfer from bathroom toilet w/ riser as needed- not met  Short Term Goal 3: tolerate LB dressing assessment- met.  upgrade to min A LB dressing using AE prn  Patient Goals   Patient goals : no goal stated       Therapy Time   Individual Concurrent Group Co-treatment   Time In 1040         Time Out 1119         Minutes 39         Timed Code Treatment Minutes: [address] [facility], [place],

## 2022-07-15 NOTE — PROGRESS NOTES
Physical Therapy  Facility/Department: [facility] 5T ORTHO/NEURO  Daily Treatment Note  NAME: Lilian Cox  : 1950  MRN: [id number]     Date of Service: 7/15/2022     Discharge Recommendations: Lilian Cox scored a  on the AM-PAC short mobility form. Current research shows that an AM-PAC score of 17 or less is typically not associated with a discharge to the patient's home setting. Based on the patient's AM-PAC score and their current functional mobility deficits, it is recommended that the patient have 3-5 sessions per week of Physical Therapy at d/c to increase the patient's independence. Please see assessment section for further patient specific details. PT Equipment Recommendations  Equipment Needed:  (RW if discharge home)     Patient Diagnosis(es): The encounter diagnosis was Primary osteoarthritis of right hip. Assessment   Pt was limited by severe pain and weakness requiring extra time to perform all activities. Presented with poor trunk and LE control during bed mobility; requiring assistance and sequencing cues throughout. Transfers and gait training were unsteady and unsafe due to pain; gait mechanics are improving with cues. Equipment Needed:  (RW if discharge home)      Plan    Plan  Plan: 2 times a day 7 days a week  Current Treatment Recommendations: Strengthening;Balance training;Functional mobility training;Transfer training;Gait training; Safety education & training; Therapeutic activities      Restrictions  Position Activity Restriction  Other position/activity restrictions: 50% WB, ant approach      Subjective    Subjective  Subjective: Pt was in bed willing to participate  Pain:  10/10 with activity.    Orientation  Overall Orientation Status: Within Normal Limits      Objective   Vitals   BP sitting /82    Bed Mobility Training  Supine to Sit: Maximum assistance Assist     Transfers  Sit to Stand: CGA  Stand to sit: SBA     Gait Training  WB: 50% RLE  Assistance: CGA  Device: RW  Distance: 5ft + 40ft   Quality: very slow antalgic discontinued step with minimal stance time on RLE. Comments: cues to lean to the left to assist with RLE advancement. Pt tends to scoot RLE. Cues to keep LLE extended during stance phase; pt tends to bend LLE to \"assist with LBP. \" Gait mechanics improved with cues. Balance  Sitting: MI (good)  Standing: CGA (fair with a RW)    PT Exercises  Exercise Treatment: (RLE assist), ankle pumps, quad sets, gluteal sets, HS (min rom) (x15, extra time and effort). Sitting EOB for approx 5 mins. Sitting EOC for approx 5 mins to address wound vac    Safety Devices  Type of Devices: Nurse notified; All fall risk precautions in place;Call light within reach; Left in chair;Chair alarm in place      Goals  Short Term Goals  Time Frame for Short term goals: by discharge- ongoing  Short term goal 1: Pt will perform bed mobility with CGA  Short term goal 2: Pt will transfer sit to and from stand with CGA  Short term goal 3: Pt will ambulate 80 feet with wheeled walker and CGA  Short term goal 4: Pt will perform R LE THR HEP x 10 with min assist  Patient Goals  Patient goals : To return home     Education  Patient Education  Education Given To: Patient  Education Provided: Role of Therapy; Equipment  Education Outcome: Verbalized understanding;Continued education needed     Therapy Time    Individual Concurrent Group Co-treatment   Time In 1300      Time Out 1410      Minutes  Sara Camara, [facility]

## 2022-07-15 NOTE — PROGRESS NOTES
Physical Therapy  Facility/Department: [facility] 5T ORTHO/NEURO  Daily Treatment Note  NAME: Greer Turner  : 1950  MRN: [id number]    Date of Service: 7/15/2022    Discharge Recommendations: Greer Turner scored a  on the AM-PAC short mobility form. Current research shows that an AM-PAC score of 17 or less is typically not associated with a discharge to the patient's home setting. Based on the patient's AM-PAC score and their current functional mobility deficits, it is recommended that the patient have 3-5 sessions per week of Physical Therapy at d/c to increase the patient's independence. Please see assessment section for further patient specific details. PT Equipment Recommendations  Equipment Needed:  (RW if discharge home)    Patient Diagnosis(es): The encounter diagnosis was Primary osteoarthritis of right hip. Assessment   Pt was limited by severe pain, weakness, nausea and lightheadedness when up requiring extra time to perform all activities. Presented with poor trunk and LE control during bed mobility; requiring assistance and sequencing cues throughout. Transfers and gait training were unsteady and unsafe due to pain; required increased cues for gait mechanics. BP was stable throughout session. Equipment Needed:  (RW if discharge home)     Plan    Plan  Plan: 2 times a day 7 days a week  Current Treatment Recommendations: Strengthening;Balance training;Functional mobility training;Transfer training;Gait training; Safety education & training; Therapeutic activities     Restrictions  Position Activity Restriction  Other position/activity restrictions: 50% WB, ant approach     Subjective    Subjective  Subjective: Pt was in bed willing to participate; eating breakfast  Pain: 4/10 RLE (whole leg at rest). 10/10 with activity.    Orientation  Overall Orientation Status: Within Normal Limits     Objective   Vitals   BP sitting /82  BP post ambulation 120/72  Bed Mobility Training  Supine to Sit: Maximum assistance Assist    Transfers  Sit to Stand: Min assist  Stand to sit: SBA    Gait Training  WB: 50% RLE  Assistance: CGA  Device: RW  Distance: 2x5ft  Quality: very slow antalgic discontinued step with minimal stance time on RLE. Comments: cues to lean to the left to assist with RLE advancement. Pt tends to scoot RLE. Cues to keep LLE extended during stance phase; pt tends to bend LLE to \"assist with LBP. \" Gait mechanics improved with cues. Balance  Sitting: MI (good)  Standing: CGA (fair with a RW)     PT Exercises  Exercise Treatment: (RLE assist), ankle pumps, quad sets, gluteal sets, HS (min rom) (x15, extra time and effort). Sitting EOB for approx 5 mins. Sitting EOC for approx 5 mins to address nausea. Safety Devices  Type of Devices: Nurse notified; All fall risk precautions in place;Call light within reach; Left in chair;Chair alarm in place     Goals  Short Term Goals  Time Frame for Short term goals: by discharge- ongoing  Short term goal 1: Pt will perform bed mobility with CGA  Short term goal 2: Pt will transfer sit to and from stand with CGA  Short term goal 3: Pt will ambulate 80 feet with wheeled walker and CGA  Short term goal 4: Pt will perform R LE THR HEP x 10 with min assist  Patient Goals   Patient goals : To return home    Education  Patient Education  Education Given To: Patient  Education Provided: Role of Therapy; Equipment  Education Outcome: Verbalized understanding;Continued education needed    Therapy Time   Individual Concurrent Group Co-treatment   Time In 0218         Time Out  0915         Minutes  [address] [facility] Mitch Camara PTA

## 2022-07-15 NOTE — CONSULTS
[address] [place] [address]    H&P        Reason for consult:HTN    Requesting Physician:Dr Stewart    History Obtained From:  patient    HISTORY OF PRESENT ILLNESS:    The patient is a 74 y.o. female  With Hypertension,hyperlipidemia,Osteoarthritis who presents with  Chronic right hip osteoarthritis for elective CASS    Pt  Out for Venous doppler and not seen today*    Past Medical History:        Diagnosis Date    Back pain     Hyperlipidemia     Hypertension     Tuberculosis 1981    Wears dentures        Past Surgical History:        Procedure Laterality Date    APPENDECTOMY  1957    CATARACT REMOVAL Bilateral 2015    CHOLECYSTECTOMY  2002    ECTOPIC PREGNANCY SURGERY  1970    PARTIAL HYSTERECTOMY (CERVIX NOT REMOVED)  [address] Right 7/13/2022    RIGHT TOTAL HIP ARTHROPLASTY/ MINIMALLY INVASIVE DIRECT ANTERIOR performed by Cristobal Valdez MD at [address]       Medications Prior to Admission:    Medications Prior to Admission: amLODIPine (NORVASC) 10 MG tablet, Take 10 mg by mouth daily  [DISCONTINUED] ciprofloxacin (CIPRO) 500 MG tablet, Take 1 tablet by mouth 2 times daily for 10 days  [DISCONTINUED] traMADol (ULTRAM) 50 MG tablet, Take 50 mg by mouth every 6 hours as needed for Pain. [DISCONTINUED] HYDROcodone-acetaminophen (NORCO) 5-325 MG per tablet, Take 1 tablet by mouth every 12 hours as needed for Pain.   [DISCONTINUED] mupirocin (BACTROBAN) 2 % ointment, Apply twice daily to each nare for the 5 days prior to surgical procedure  albuterol sulfate  (90 Base) MCG/ACT inhaler, INHALE 2 PUFFS BY MOUTH EVERY 6 HOURS AS NEEDED FOR WHEEZING  allopurinol (ZYLOPRIM) 100 MG tablet, Take 100 mg by mouth daily  furosemide (LASIX) 20 MG tablet, Take 20 mg by mouth daily  lisinopril (PRINIVIL;ZESTRIL) 5 MG tablet, Take 5 mg by mouth in the morning and at bedtime   methocarbamol (ROBAXIN) 500 MG tablet, Take 500 mg by mouth 3 times daily as needed  meloxicam (MOBIC) 15 MG tablet, Take 15 mg by mouth daily (Patient not taking: Reported on 7/8/2022)  pregabalin (LYRICA) 50 MG capsule, Take 50 mg by mouth 3 times daily. fluticasone (FLONASE) 50 MCG/ACT nasal spray, Use 2 spray(s) in each nostril once daily    Allergies:  Buspirone, Duloxetine, Fenofibrate, Gabapentin, Venlafaxine, Doxycycline, Statins, and Sulfa antibiotics    Social History:   TOBACCO:   reports that she has never smoked. She has never used smokeless tobacco.  ETOH:   reports no history of alcohol use. Patient currently lives with family     Family History:   History reviewed. No pertinent family history. REVIEW OF SYSTEMS:  10 point ROS obtained, as noted in  HPI , otherwise NEG    PHYSICAL EXAM:  /61   Pulse 81   Temp 98.6 °F (37 °C) (Oral)   Resp 16   Ht 5' 2\" (1.575 m)   Wt 218 lb 9.6 oz (99.2 kg)   SpO2 96%   Breastfeeding No   BMI 39.98 kg/m²         CBC with Differential:    Lab Results   Component Value Date/Time    WBC 11.4 07/14/2022 07:27 AM    RBC 2.81 07/14/2022 07:27 AM    HGB 9.3 07/14/2022 07:27 AM    HCT 27.0 07/14/2022 07:27 AM     07/14/2022 07:27 AM    MCV 96.0 07/14/2022 07:27 AM    LYMPHOPCT 16.7 07/14/2022 07:27 AM     BMP:    Lab Results   Component Value Date/Time     07/14/2022 07:27 AM    K 4.3 07/14/2022 07:27 AM     07/14/2022 07:27 AM    CO2 23 07/14/2022 07:27 AM    BUN 14 07/14/2022 07:27 AM    CREATININE 0.8 07/14/2022 07:27 AM    CALCIUM 8.7 07/14/2022 07:27 AM    GFRAA >60 07/14/2022 07:27 AM    LABGLOM >60 07/14/2022 07:27 AM    GLUCOSE 129 07/14/2022 07:27 AM     PT/INR:  No results found for: PTINR      ASSESSMENT:      Hypertension,benign and controlled  -on norvasc and lisinopril as well as lasix  Hold lasix for now as BP low normal to allow for PT/OT     Primary osteoarthritis of right hip  S/P CASS  Pain control  Incentive spirometry    dvt prophy-heparin    Many thanks for the consult. Will follow.     MD Daya

## 2022-07-19 ENCOUNTER — CARE COORDINATION (OUTPATIENT)
Dept: CASE MANAGEMENT | Age: 72
End: 2022-07-19

## 2022-07-19 NOTE — CARE COORDINATION
Spoke with :       Frances Gunn                             @ : Michael Elmore    Incision status: States free from redness or drainage. Pain control: Well controlled. Therapies involved: Continues with therapy. Tolerating:Well    Barriers to being discharged home: Will need assistance at discharge. Projected discharge date: No discharge date set. Discharge needs: Will need assistance at discharge.     Jose Hebert BSN  Care Transition Nurse for ortho bundle  [phone]

## 2022-07-25 ENCOUNTER — TELEPHONE (OUTPATIENT)
Dept: ORTHOPEDIC SURGERY | Age: 72
End: 2022-07-25

## 2022-07-25 NOTE — TELEPHONE ENCOUNTER
I did speak with the patient's physical therapist.  She has home therapy coming to her house. I have instructed her that I needed no occupational or physical therapy to the operative site at this time.

## 2022-07-26 ENCOUNTER — CARE COORDINATION (OUTPATIENT)
Dept: CASE MANAGEMENT | Age: 72
End: 2022-07-26

## 2022-07-26 NOTE — CARE COORDINATION
Called patient for updates. Left message for return call.   Monica Chavez BSN  Care Transition Nurse for ortho bundle  [phone]

## 2022-07-30 ENCOUNTER — HOSPITAL ENCOUNTER (INPATIENT)
Age: 72
LOS: 24 days | Discharge: NURSING, REHAB OR LONG-TERM CARE | DRG: 853 | End: 2022-08-23
Attending: INTERNAL MEDICINE | Admitting: INTERNAL MEDICINE
Payer: MEDICARE

## 2022-07-30 DIAGNOSIS — K92.1 MELENA: ICD-10-CM

## 2022-07-30 DIAGNOSIS — R10.31 RIGHT LOWER QUADRANT ABDOMINAL PAIN: Primary | ICD-10-CM

## 2022-07-30 PROBLEM — R10.9 ABDOMINAL PAIN: Status: ACTIVE | Noted: 2022-07-30

## 2022-07-30 PROCEDURE — 6360000002 HC RX W HCPCS: Performed by: INTERNAL MEDICINE

## 2022-07-30 PROCEDURE — 2580000003 HC RX 258: Performed by: INTERNAL MEDICINE

## 2022-07-30 PROCEDURE — 1200000000 HC SEMI PRIVATE

## 2022-07-30 RX ORDER — SODIUM CHLORIDE 9 MG/ML
INJECTION, SOLUTION INTRAVENOUS PRN
Status: DISCONTINUED | OUTPATIENT
Start: 2022-07-30 | End: 2022-08-23 | Stop reason: HOSPADM

## 2022-07-30 RX ORDER — ALLOPURINOL 100 MG/1
100 TABLET ORAL DAILY
Status: DISCONTINUED | OUTPATIENT
Start: 2022-07-31 | End: 2022-07-31

## 2022-07-30 RX ORDER — ONDANSETRON 4 MG/1
4 TABLET, ORALLY DISINTEGRATING ORAL EVERY 8 HOURS PRN
Status: DISCONTINUED | OUTPATIENT
Start: 2022-07-30 | End: 2022-08-23 | Stop reason: HOSPADM

## 2022-07-30 RX ORDER — AMLODIPINE BESYLATE 10 MG/1
10 TABLET ORAL DAILY
Status: DISCONTINUED | OUTPATIENT
Start: 2022-07-31 | End: 2022-08-02

## 2022-07-30 RX ORDER — FLUTICASONE PROPIONATE 50 MCG
2 SPRAY, SUSPENSION (ML) NASAL DAILY
Status: DISCONTINUED | OUTPATIENT
Start: 2022-07-31 | End: 2022-08-23 | Stop reason: HOSPADM

## 2022-07-30 RX ORDER — SODIUM CHLORIDE 0.9 % (FLUSH) 0.9 %
5-40 SYRINGE (ML) INJECTION PRN
Status: DISCONTINUED | OUTPATIENT
Start: 2022-07-30 | End: 2022-08-23 | Stop reason: HOSPADM

## 2022-07-30 RX ORDER — METHOCARBAMOL 500 MG/1
500 TABLET, FILM COATED ORAL 3 TIMES DAILY PRN
Status: DISCONTINUED | OUTPATIENT
Start: 2022-07-30 | End: 2022-08-23 | Stop reason: HOSPADM

## 2022-07-30 RX ORDER — PREGABALIN 50 MG/1
50 CAPSULE ORAL 3 TIMES DAILY
Status: DISCONTINUED | OUTPATIENT
Start: 2022-07-30 | End: 2022-08-23 | Stop reason: HOSPADM

## 2022-07-30 RX ORDER — POLYETHYLENE GLYCOL 3350 17 G/17G
17 POWDER, FOR SOLUTION ORAL DAILY PRN
Status: DISCONTINUED | OUTPATIENT
Start: 2022-07-30 | End: 2022-07-31

## 2022-07-30 RX ORDER — ACETAMINOPHEN 325 MG/1
650 TABLET ORAL EVERY 6 HOURS PRN
Status: DISCONTINUED | OUTPATIENT
Start: 2022-07-30 | End: 2022-08-10

## 2022-07-30 RX ORDER — ACETAMINOPHEN 650 MG/1
650 SUPPOSITORY RECTAL EVERY 6 HOURS PRN
Status: DISCONTINUED | OUTPATIENT
Start: 2022-07-30 | End: 2022-08-10

## 2022-07-30 RX ORDER — SODIUM CHLORIDE 9 MG/ML
INJECTION, SOLUTION INTRAVENOUS CONTINUOUS
Status: DISCONTINUED | OUTPATIENT
Start: 2022-07-31 | End: 2022-07-31

## 2022-07-30 RX ORDER — POLYETHYLENE GLYCOL 3350 17 G/17G
34 POWDER, FOR SOLUTION ORAL ONCE
Status: DISCONTINUED | OUTPATIENT
Start: 2022-07-31 | End: 2022-07-31

## 2022-07-30 RX ORDER — SODIUM CHLORIDE 0.9 % (FLUSH) 0.9 %
5-40 SYRINGE (ML) INJECTION EVERY 12 HOURS SCHEDULED
Status: DISCONTINUED | OUTPATIENT
Start: 2022-07-30 | End: 2022-08-23 | Stop reason: HOSPADM

## 2022-07-30 RX ORDER — MORPHINE SULFATE 2 MG/ML
2 INJECTION, SOLUTION INTRAMUSCULAR; INTRAVENOUS
Status: DISCONTINUED | OUTPATIENT
Start: 2022-07-30 | End: 2022-08-01

## 2022-07-30 RX ORDER — LISINOPRIL 5 MG/1
5 TABLET ORAL 2 TIMES DAILY
Status: DISCONTINUED | OUTPATIENT
Start: 2022-07-30 | End: 2022-08-03

## 2022-07-30 RX ORDER — MORPHINE SULFATE 2 MG/ML
4 INJECTION, SOLUTION INTRAMUSCULAR; INTRAVENOUS
Status: DISCONTINUED | OUTPATIENT
Start: 2022-07-30 | End: 2022-08-01

## 2022-07-30 RX ORDER — ONDANSETRON 2 MG/ML
4 INJECTION INTRAMUSCULAR; INTRAVENOUS EVERY 6 HOURS PRN
Status: DISCONTINUED | OUTPATIENT
Start: 2022-07-30 | End: 2022-08-23 | Stop reason: HOSPADM

## 2022-07-30 RX ADMIN — ONDANSETRON 4 MG: 2 INJECTION INTRAMUSCULAR; INTRAVENOUS at 23:10

## 2022-07-30 RX ADMIN — SODIUM CHLORIDE: 9 INJECTION, SOLUTION INTRAVENOUS at 23:52

## 2022-07-30 ASSESSMENT — LIFESTYLE VARIABLES: HOW OFTEN DO YOU HAVE A DRINK CONTAINING ALCOHOL: NEVER

## 2022-07-31 ENCOUNTER — APPOINTMENT (OUTPATIENT)
Dept: GENERAL RADIOLOGY | Age: 72
DRG: 853 | End: 2022-07-31
Attending: INTERNAL MEDICINE
Payer: MEDICARE

## 2022-07-31 LAB
ALBUMIN SERPL-MCNC: 2.6 G/DL (ref 3.4–5)
AMORPHOUS: ABNORMAL /HPF
ANION GAP SERPL CALCULATED.3IONS-SCNC: 17 MMOL/L (ref 3–16)
ANION GAP SERPL CALCULATED.3IONS-SCNC: 22 MMOL/L (ref 3–16)
BACTERIA: ABNORMAL /HPF
BANDED NEUTROPHILS RELATIVE PERCENT: 10 % (ref 0–7)
BANDED NEUTROPHILS RELATIVE PERCENT: 11 % (ref 0–7)
BASOPHILS ABSOLUTE: 0 K/UL (ref 0–0.2)
BASOPHILS ABSOLUTE: 0 K/UL (ref 0–0.2)
BASOPHILS RELATIVE PERCENT: 0 %
BASOPHILS RELATIVE PERCENT: 0 %
BILIRUBIN URINE: ABNORMAL
BLOOD, URINE: NEGATIVE
BUN BLDV-MCNC: 42 MG/DL (ref 7–20)
BUN BLDV-MCNC: 46 MG/DL (ref 7–20)
C-REACTIVE PROTEIN: 63 MG/L (ref 0–5.1)
CALCIUM SERPL-MCNC: 9 MG/DL (ref 8.3–10.6)
CALCIUM SERPL-MCNC: 9.1 MG/DL (ref 8.3–10.6)
CHLORIDE BLD-SCNC: 95 MMOL/L (ref 99–110)
CHLORIDE BLD-SCNC: 97 MMOL/L (ref 99–110)
CLARITY: CLEAR
CO2: 12 MMOL/L (ref 21–32)
CO2: 14 MMOL/L (ref 21–32)
COLOR: YELLOW
CREAT SERPL-MCNC: 2.2 MG/DL (ref 0.6–1.2)
CREAT SERPL-MCNC: 2.3 MG/DL (ref 0.6–1.2)
EOSINOPHILS ABSOLUTE: 0 K/UL (ref 0–0.6)
EOSINOPHILS ABSOLUTE: 0 K/UL (ref 0–0.6)
EOSINOPHILS RELATIVE PERCENT: 0 %
EOSINOPHILS RELATIVE PERCENT: 0 %
EPITHELIAL CELLS, UA: ABNORMAL /HPF (ref 0–5)
GFR AFRICAN AMERICAN: 25
GFR AFRICAN AMERICAN: 26
GFR NON-AFRICAN AMERICAN: 21
GFR NON-AFRICAN AMERICAN: 22
GLUCOSE BLD-MCNC: 106 MG/DL (ref 70–99)
GLUCOSE BLD-MCNC: 134 MG/DL (ref 70–99)
GLUCOSE URINE: NEGATIVE MG/DL
HCT VFR BLD CALC: 32.6 % (ref 36–48)
HCT VFR BLD CALC: 33 % (ref 36–48)
HEMOGLOBIN: 11.2 G/DL (ref 12–16)
HEMOGLOBIN: 11.5 G/DL (ref 12–16)
KETONES, URINE: ABNORMAL MG/DL
LACTIC ACID: 3.8 MMOL/L (ref 0.4–2)
LACTIC ACID: 4.5 MMOL/L (ref 0.4–2)
LACTIC ACID: 6.5 MMOL/L (ref 0.4–2)
LEUKOCYTE ESTERASE, URINE: NEGATIVE
LYMPHOCYTES ABSOLUTE: 0.3 K/UL (ref 1–5.1)
LYMPHOCYTES ABSOLUTE: 1.2 K/UL (ref 1–5.1)
LYMPHOCYTES RELATIVE PERCENT: 1 %
LYMPHOCYTES RELATIVE PERCENT: 3 %
MAGNESIUM: 2.9 MG/DL (ref 1.8–2.4)
MAGNESIUM: 3.1 MG/DL (ref 1.8–2.4)
MCH RBC QN AUTO: 32.9 PG (ref 26–34)
MCH RBC QN AUTO: 33.4 PG (ref 26–34)
MCHC RBC AUTO-ENTMCNC: 34.3 G/DL (ref 31–36)
MCHC RBC AUTO-ENTMCNC: 34.8 G/DL (ref 31–36)
MCV RBC AUTO: 96 FL (ref 80–100)
MCV RBC AUTO: 96 FL (ref 80–100)
METAMYELOCYTES RELATIVE PERCENT: 1 %
METAMYELOCYTES RELATIVE PERCENT: 4 %
MICROSCOPIC EXAMINATION: YES
MONOCYTES ABSOLUTE: 1.2 K/UL (ref 0–1.3)
MONOCYTES ABSOLUTE: 1.8 K/UL (ref 0–1.3)
MONOCYTES RELATIVE PERCENT: 3 %
MONOCYTES RELATIVE PERCENT: 7 %
MYELOCYTE PERCENT: 1 %
NEUTROPHILS ABSOLUTE: 24.1 K/UL (ref 1.7–7.7)
NEUTROPHILS ABSOLUTE: 38 K/UL (ref 1.7–7.7)
NEUTROPHILS RELATIVE PERCENT: 76 %
NEUTROPHILS RELATIVE PERCENT: 83 %
NITRITE, URINE: POSITIVE
PDW BLD-RTO: 14 % (ref 12.4–15.4)
PDW BLD-RTO: 14.3 % (ref 12.4–15.4)
PH UA: 5 (ref 5–8)
PHOSPHORUS: 7.6 MG/DL (ref 2.5–4.9)
PHOSPHORUS: 7.7 MG/DL (ref 2.5–4.9)
PLATELET # BLD: 822 K/UL (ref 135–450)
PLATELET # BLD: ABNORMAL K/UL (ref 135–450)
PLATELET SLIDE REVIEW: ABNORMAL
PMV BLD AUTO: 7.5 FL (ref 5–10.5)
PMV BLD AUTO: ABNORMAL FL (ref 5–10.5)
POTASSIUM SERPL-SCNC: 6.4 MMOL/L (ref 3.5–5.1)
POTASSIUM SERPL-SCNC: 6.4 MMOL/L (ref 3.5–5.1)
POTASSIUM SERPL-SCNC: 6.5 MMOL/L (ref 3.5–5.1)
PROCALCITONIN: 10.91 NG/ML (ref 0–0.15)
PROTEIN UA: ABNORMAL MG/DL
RBC # BLD: 3.39 M/UL (ref 4–5.2)
RBC # BLD: 3.44 M/UL (ref 4–5.2)
RBC UA: ABNORMAL /HPF (ref 0–4)
REASON FOR REJECTION: NORMAL
REJECTED TEST: NORMAL
RENAL EPITHELIAL, UA: ABNORMAL /HPF (ref 0–1)
SEDIMENTATION RATE, ERYTHROCYTE: 52 MM/HR (ref 0–30)
SLIDE REVIEW: ABNORMAL
SLIDE REVIEW: ABNORMAL
SODIUM BLD-SCNC: 128 MMOL/L (ref 136–145)
SODIUM BLD-SCNC: 129 MMOL/L (ref 136–145)
SPECIFIC GRAVITY UA: 1.01 (ref 1–1.03)
URINE REFLEX TO CULTURE: ABNORMAL
URINE TYPE: ABNORMAL
UROBILINOGEN, URINE: 2 E.U./DL
WBC # BLD: 26.2 K/UL (ref 4–11)
WBC # BLD: 40.4 K/UL (ref 4–11)
WBC UA: ABNORMAL /HPF (ref 0–5)

## 2022-07-31 PROCEDURE — 87040 BLOOD CULTURE FOR BACTERIA: CPT

## 2022-07-31 PROCEDURE — 2580000003 HC RX 258: Performed by: INTERNAL MEDICINE

## 2022-07-31 PROCEDURE — 6360000002 HC RX W HCPCS: Performed by: INTERNAL MEDICINE

## 2022-07-31 PROCEDURE — 84132 ASSAY OF SERUM POTASSIUM: CPT

## 2022-07-31 PROCEDURE — 83605 ASSAY OF LACTIC ACID: CPT

## 2022-07-31 PROCEDURE — 2500000003 HC RX 250 WO HCPCS: Performed by: INTERNAL MEDICINE

## 2022-07-31 PROCEDURE — 74018 RADEX ABDOMEN 1 VIEW: CPT

## 2022-07-31 PROCEDURE — 81001 URINALYSIS AUTO W/SCOPE: CPT

## 2022-07-31 PROCEDURE — 6370000000 HC RX 637 (ALT 250 FOR IP): Performed by: INTERNAL MEDICINE

## 2022-07-31 PROCEDURE — 87086 URINE CULTURE/COLONY COUNT: CPT

## 2022-07-31 PROCEDURE — 86140 C-REACTIVE PROTEIN: CPT

## 2022-07-31 PROCEDURE — 2060000000 HC ICU INTERMEDIATE R&B

## 2022-07-31 PROCEDURE — 84145 PROCALCITONIN (PCT): CPT

## 2022-07-31 PROCEDURE — 80069 RENAL FUNCTION PANEL: CPT

## 2022-07-31 PROCEDURE — 85652 RBC SED RATE AUTOMATED: CPT

## 2022-07-31 PROCEDURE — 85025 COMPLETE CBC W/AUTO DIFF WBC: CPT

## 2022-07-31 PROCEDURE — 36415 COLL VENOUS BLD VENIPUNCTURE: CPT

## 2022-07-31 PROCEDURE — 83735 ASSAY OF MAGNESIUM: CPT

## 2022-07-31 PROCEDURE — 84100 ASSAY OF PHOSPHORUS: CPT

## 2022-07-31 PROCEDURE — 99223 1ST HOSP IP/OBS HIGH 75: CPT | Performed by: SURGERY

## 2022-07-31 RX ORDER — OXYMETAZOLINE HYDROCHLORIDE 0.05 G/100ML
2 SPRAY NASAL ONCE
Status: DISPENSED | OUTPATIENT
Start: 2022-07-31 | End: 2022-08-03

## 2022-07-31 RX ORDER — METRONIDAZOLE 500 MG/100ML
500 INJECTION, SOLUTION INTRAVENOUS EVERY 8 HOURS
Status: DISCONTINUED | OUTPATIENT
Start: 2022-07-31 | End: 2022-08-01

## 2022-07-31 RX ORDER — POLYETHYLENE GLYCOL 3350 17 G/17G
17 POWDER, FOR SOLUTION ORAL 3 TIMES DAILY
Status: DISCONTINUED | OUTPATIENT
Start: 2022-07-31 | End: 2022-08-01

## 2022-07-31 RX ORDER — SODIUM CHLORIDE, SODIUM LACTATE, POTASSIUM CHLORIDE, CALCIUM CHLORIDE 600; 310; 30; 20 MG/100ML; MG/100ML; MG/100ML; MG/100ML
INJECTION, SOLUTION INTRAVENOUS CONTINUOUS
Status: DISCONTINUED | OUTPATIENT
Start: 2022-07-31 | End: 2022-08-01

## 2022-07-31 RX ORDER — LIDOCAINE HYDROCHLORIDE 20 MG/ML
JELLY TOPICAL ONCE
Status: DISCONTINUED | OUTPATIENT
Start: 2022-07-31 | End: 2022-07-31

## 2022-07-31 RX ORDER — METRONIDAZOLE 500 MG/100ML
500 INJECTION, SOLUTION INTRAVENOUS EVERY 8 HOURS
Status: DISCONTINUED | OUTPATIENT
Start: 2022-07-31 | End: 2022-07-31

## 2022-07-31 RX ORDER — SODIUM CHLORIDE, SODIUM LACTATE, POTASSIUM CHLORIDE, AND CALCIUM CHLORIDE .6; .31; .03; .02 G/100ML; G/100ML; G/100ML; G/100ML
1000 INJECTION, SOLUTION INTRAVENOUS ONCE
Status: COMPLETED | OUTPATIENT
Start: 2022-07-31 | End: 2022-07-31

## 2022-07-31 RX ORDER — LIDOCAINE HYDROCHLORIDE 20 MG/ML
JELLY TOPICAL ONCE
Status: DISCONTINUED | OUTPATIENT
Start: 2022-07-31 | End: 2022-08-16

## 2022-07-31 RX ORDER — BISACODYL 10 MG
10 SUPPOSITORY, RECTAL RECTAL ONCE
Status: DISCONTINUED | OUTPATIENT
Start: 2022-07-31 | End: 2022-08-07

## 2022-07-31 RX ADMIN — SODIUM CHLORIDE, POTASSIUM CHLORIDE, SODIUM LACTATE AND CALCIUM CHLORIDE: 600; 310; 30; 20 INJECTION, SOLUTION INTRAVENOUS at 14:36

## 2022-07-31 RX ADMIN — MEROPENEM 1000 MG: 1 INJECTION, POWDER, FOR SOLUTION INTRAVENOUS at 14:33

## 2022-07-31 RX ADMIN — ALLOPURINOL 100 MG: 100 TABLET ORAL at 09:00

## 2022-07-31 RX ADMIN — SODIUM CHLORIDE, POTASSIUM CHLORIDE, SODIUM LACTATE AND CALCIUM CHLORIDE 1000 ML: 600; 310; 30; 20 INJECTION, SOLUTION INTRAVENOUS at 11:55

## 2022-07-31 RX ADMIN — SODIUM CHLORIDE, PRESERVATIVE FREE 10 ML: 5 INJECTION INTRAVENOUS at 00:43

## 2022-07-31 RX ADMIN — METRONIDAZOLE 500 MG: 500 INJECTION, SOLUTION INTRAVENOUS at 16:38

## 2022-07-31 RX ADMIN — MINERAL OIL 330 ML: 1000 LIQUID ORAL at 00:17

## 2022-07-31 RX ADMIN — PREGABALIN 50 MG: 50 CAPSULE ORAL at 09:00

## 2022-07-31 RX ADMIN — METHYLNALTREXONE BROMIDE 12 MG: 12 INJECTION, SOLUTION SUBCUTANEOUS at 00:42

## 2022-07-31 RX ADMIN — MORPHINE SULFATE 4 MG: 2 INJECTION, SOLUTION INTRAMUSCULAR; INTRAVENOUS at 04:40

## 2022-07-31 RX ADMIN — CEFEPIME 2000 MG: 2 INJECTION, POWDER, FOR SOLUTION INTRAVENOUS at 03:14

## 2022-07-31 RX ADMIN — Medication 125 MG: at 18:35

## 2022-07-31 RX ADMIN — SODIUM CHLORIDE, PRESERVATIVE FREE 10 ML: 5 INJECTION INTRAVENOUS at 09:01

## 2022-07-31 RX ADMIN — ONDANSETRON 4 MG: 2 INJECTION INTRAMUSCULAR; INTRAVENOUS at 09:00

## 2022-07-31 RX ADMIN — CEFEPIME 2000 MG: 2 INJECTION, POWDER, FOR SOLUTION INTRAVENOUS at 08:56

## 2022-07-31 RX ADMIN — ONDANSETRON 4 MG: 2 INJECTION INTRAMUSCULAR; INTRAVENOUS at 17:12

## 2022-07-31 RX ADMIN — VANCOMYCIN HYDROCHLORIDE 2000 MG: 10 INJECTION, POWDER, LYOPHILIZED, FOR SOLUTION INTRAVENOUS at 00:41

## 2022-07-31 RX ADMIN — MORPHINE SULFATE 2 MG: 2 INJECTION, SOLUTION INTRAMUSCULAR; INTRAVENOUS at 10:34

## 2022-07-31 RX ADMIN — MORPHINE SULFATE 2 MG: 2 INJECTION, SOLUTION INTRAMUSCULAR; INTRAVENOUS at 15:48

## 2022-07-31 ASSESSMENT — PAIN SCALES - GENERAL
PAINLEVEL_OUTOF10: 10
PAINLEVEL_OUTOF10: 8

## 2022-07-31 ASSESSMENT — PAIN - FUNCTIONAL ASSESSMENT
PAIN_FUNCTIONAL_ASSESSMENT: PREVENTS OR INTERFERES SOME ACTIVE ACTIVITIES AND ADLS
PAIN_FUNCTIONAL_ASSESSMENT: PREVENTS OR INTERFERES WITH MANY ACTIVE NOT PASSIVE ACTIVITIES

## 2022-07-31 ASSESSMENT — PAIN DESCRIPTION - FREQUENCY: FREQUENCY: CONTINUOUS

## 2022-07-31 ASSESSMENT — PAIN DESCRIPTION - PAIN TYPE: TYPE: ACUTE PAIN

## 2022-07-31 ASSESSMENT — PAIN DESCRIPTION - ORIENTATION
ORIENTATION: MID
ORIENTATION: LOWER;RIGHT

## 2022-07-31 ASSESSMENT — PAIN DESCRIPTION - DESCRIPTORS
DESCRIPTORS: ACHING;DISCOMFORT
DESCRIPTORS: BURNING;ACHING

## 2022-07-31 ASSESSMENT — PAIN DESCRIPTION - LOCATION
LOCATION: ABDOMEN;BACK;HIP
LOCATION: ABDOMEN

## 2022-07-31 ASSESSMENT — PAIN DESCRIPTION - DIRECTION: RADIATING_TOWARDS: LEFT BACK

## 2022-07-31 ASSESSMENT — PAIN DESCRIPTION - ONSET: ONSET: ON-GOING

## 2022-07-31 NOTE — CONSULTS
Gastroenterology Consult Note      Patient: Gina Salmeron  : 1950  Acct#:      Date:  2022    Subjective:       History of Present Illness  Patient is a 74 y.o.  female who is seen in consult for her constipation. The patient has a past medical history for hypertension, hyperlipidemia, gout, osteoarthritis, morbid obesity, with recent right total hip arthroplasty on July 15. She reportedly was diagnosed with a UTI during that hospitalization and was initially treated with Cipro. She developed some diarrhea and was transitioned over to Keflex prior to her discharge. She had also been on a Medrol Dosepak. he had ongoing diarrhea on her antibiotics and the daughter reports that she did take some Imodium for this on Monday. She then had some persistent constipation lasting through Tuesday and into Thursday. She had started taking some MiraLAX but developed some worsening abdominal pain. She also had some nausea and vomiting reported. She denies any worsening hip pain. She denies any productive cough. She presented to the emergency department at [facility] and had a CT scan performed. CT did show large stool burden from the cecum down to the sigmoid colon. There was a transition to normal colon at the sigmoid, but there was no comment of volvulus. She had no mass lesions identified. Her last colonoscopy was in . There was some fluid around her surgical site and it was thought that she should be transferred back to [facility] for her orthopedic surgery was performed. She has been given a smog enema and other laxatives including Relastor. The patient is still not had any bowel movements. We are consulted for her severe constipation. She has had worsening leukocytosis. e amount of stool from the cecum through the sigmoid colon.   There was a transition point in the rectosigmoid colon but no obvious mass was noted there is no bowel wall thickening or pericolonic meloxicam (MOBIC) 15 MG tablet Take 15 mg by mouth daily (Patient not taking: No sig reported)      pregabalin (LYRICA) 50 MG capsule Take 50 mg by mouth 3 times daily. fluticasone (FLONASE) 50 MCG/ACT nasal spray Use 2 spray(s) in each nostril once daily           Allergies  Allergies   Allergen Reactions    Buspirone Hives    Duloxetine Nausea Only    Fenofibrate      Other reaction(s): Myalgias (muscle pain)  Other reaction(s): Myalgias (muscle pain)      Gabapentin Other (See Comments)     Panic attacks  Panic attacks      Venlafaxine      Other reaction(s): Tachycardia  Other reaction(s): Tachycardia      Doxycycline Nausea And Vomiting and Palpitations    Statins Rash    Sulfa Antibiotics Rash        Social history:  Social History     Tobacco Use    Smoking status: Never    Smokeless tobacco: Never   Substance Use Topics    Alcohol use: Never        Family History:   No family history on file. Review of Systems  Pertinent items are noted in HPI. Physical Exam:  Blood pressure 118/74, pulse 94, temperature 98.2 °F (36.8 °C), temperature source Oral, resp. rate 17, height 5' 2\" (1.575 m), weight 224 lb 3.3 oz (101.7 kg), SpO2 93 %, not currently breastfeeding. General appearance: ill appearing. alert, cooperative, no distress, appears stated age  Eyes: Anicteric  Head: Normocephalic, without obvious abnormality  Lungs: clear to auscultation bilaterally, Normal Effort  Heart: regular rate and rhythm, normal S1 and S2, no murmurs or rubs  Abdomen: soft,mild generalized TTP and mild to moderate distension. Bowel sounds normal. No masses,  no organomegaly.    Extremities: right hip incision from recent CASS, atraumatic, no cyanosis or edema  Skin: warm and dry, no jaundice  Neuro: Grossly intact, A&OX3      Data Review:    Recent Labs     07/31/22  0730   WBC 40.4*   HGB 11.2*   HCT 32.6*   MCV 96.0   PLT see below     No results for input(s): NA, K, CL, CO2, PHOS, BUN, CREATININE, CA in the last 72 hours. No results for input(s): AST, ALT, ALB, BILIDIR, BILITOT, ALKPHOS in the last 72 hours. No results for input(s): LIPASE, AMYLASE in the last 72 hours. No results for input(s): PROTIME, INR in the last 72 hours. No results for input(s): PTT in the last 72 hours. No results for input(s): OCCULTBLD in the last 72 hours. Imaging Studies:    CT 7/30/22    T ABDOMEN AND PELVIS WITH CONTRAST: 7/30/2022 3:17 PM EDT   Clinical Data: LLQ pain   Comparison: Unenhanced study 5/5/2021   Contrast-enhanced helically acquired data per standard protocol. The lack of   oral contrast medium to some extent hampers evaluation of the bowel. All CT scans at this facility use dose modulation, iterative reconstruction,   and/or weight based dosing when appropriate to reduce radiation dose to as low   as reasonably achievable. FINDINGS:   LOWER THORAX: Areas of slight peripheral atelectasis or scar   LIVER: No acute findings. SPLEEN:No acute findings. GB/BILIARY: Again, status post cholecystectomy. Minimal prominence intrahepatic   biliary tree. Common duct tapers unremarkably in the pancreatic head   PANCREAS: No acute findings. ADRENALS: No acute findings. KIDNEYS/URETERS: Kidneys are symmetric in size and overall density. Multiple   low density foci both kidneys are relatively well-defined. Many are quite   small. One at the mid pole right kidney extends laterally and measures 35 mm. It was present on the prior exam. Very likely, these represent cysts. There is   no hydronephrosis or hydroureter. VESSELS: No AAA   ABDOMINAL NODES: Thin elongated portacaval node is without change. PELVIC NODES: No distinct evidence of adenopathy. BLADDER: Bladder is distended to 13 cm with fluid. Primarily the bladder is   somewhat obscured by dense streak artifact from interval right THR   REPRODUCTIVE: Uterus is again absent. What are probably the ovaries are   unchanged.  No focal adnexal mass is apparent PERITONEUM: No free air. No free fluid. EXTRAPERITONEUM: No acute findings. BOWEL: Large amount of stool cecum through sigmoid. Mild amount of stool   rectosigmoid region. No focal mass or peribowel stranding in the rectosigmoid   transition area. Stomach contains a mild amount of fluid. Small bowel contains   a mild amount of fluid. It. Distal small bowel loops are slightly patulous. On this study performed without oral contrast medium, no focally thickened loop   of bowel is readily apparent. BODY WALL: Diffusely thin but grossly intact   BONES: Interval right THR. Multilevel degenerative changes thoracolumbar spinal column. OTHER: There is partially organized fluid lateral to the right inguinal region   with some extension into the musculature overlying the right THR. Margins do   not enhance. This is bilobed and measures greater than 7 cm. The entire   inferior extent is not covered on this dataset. IMPRESSION:   1. There is a large amount of stool cecum through the sigmoid colon. Transition   point is rectosigmoid but there is no obvious mass in the rectosigmoid region. No bowel thickening or peribowel stranding in the rectosigmoid region. 2. Likely, multiple renal cysts. 3. Partially organized fluid without obvious rim enhancement anterior to the   THR. This may simply represent postoperative seroma but clinical correlation   needed. The entire inferior extent of this is not covered on this exam.   4. Urinary bladder is distended with fluid    No orders to display                                              Assessment:   75 yo WF with PMH hypertension, hyperlipidemia, gout, osteoarthritis, morbid obesity, with recent right total hip arthroplasty on July 15, UTI s/p 10 days antibiotics, here with     1) Severe constipation s/p THR 7/16. Has been on opioids. Last colonoscopy in 2012. No prodrome. CT with large stool burden with transition in rectosigmoid colon.   No obvious megacolon reported    2) Recent right THR 7/16 with perioperative fluid collection    3) Worsening leukocytosis-patient has a white count of 40,000. She previously had been treated for a UTI but her repeat urinalysis did not look like a persistent UTI. She did have a prodrome of diarrhea, and now constipation. C. difficile is a consideration. She also had received a steroid Dosepak, but this degree of elevation of her white blood cell count would be more than expected from steroids. KUB reportedly with no dilation of colon. Ortho surgery does not feel that the is fluid around her joint is an abscess. 4) Hx of UTI- repeat culture sent    Recommendations:   1) Relistor for OIC. 2) Emperic coverage for possible Cdiff until stool can be obtained (prodrome of abx and steroids and diarrhea). PO Vanco and IV flagyl emperic for now  3) On Meropenem  4) May need a repeat CT vs sigmoidoscopy tomorrow if no better. 4) Low threshold for repeat imaging if clinically worsens  5) Would consider an ID consult for severe leukocytosis  6) Repeat KUB in am   7) Trend lactic acid, exam, and WBC      Thank you for allowing me to participate in the care of your patient. Please feel free to contact me with any questions or concerns.      Ihsan Truong, DO  [address]

## 2022-07-31 NOTE — PROGRESS NOTES
Pt's STAT labs have not been drawn. This RN called lab. Lab informed they only have 1 tech and multiple Pt's with STAT labs. Will continue to monitor situation.

## 2022-07-31 NOTE — PROGRESS NOTES
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 Per [facility] Renal Dose Adjustment Policy and Extended Infusion Beta-Lactam Policy, dosing will be changed to cefepime 2000 mg IV once followd by cefepime 2000 mg IV Q 88 hrs- EI. Estimated Creatinine Clearance: Estimated Creatinine Clearance: 69 mL/min (based on SCr of 0.8 mg/dL). Dialysis Status, ARIANE, CKD: NA  BMI: Body mass index is 41.01 kg/m². Rationale for Adjustment: Agent is renally eliminated and demonstrates time-dependent effect on bacterial eradication. Extended-infusion dosing strategy aims to enhance microbiologic and clinical efficacy. Pharmacy will continue to monitor renal function, cultures and sensitivities (where available) and adjust dose as necessary. Please call with any questions.     Morris Biswas, [facility], PharmD, [address]  7/31/2022 12:34 AM

## 2022-07-31 NOTE — CONSULTS
General Surgery   Resident Consult Note    Reason for Consult: Abdominal pain with bowel obstruction. History of Present Illness:   Gina Salmeron is a 74 y.o. female with Hx of HTN, HLD, Tuberculosis (1981), gout, and OA s/p right CASS on 7/13/22 who was admitted to [facility] on 7/30 due to fluid associated with her right CASS on 7/13 and for abdominal pain. Pt recently underwent a right CASS here at [facility] where she was admitted from 7/13 to 7/15 and then transferred to a SNF until 7/19. Pt was started on a course of Medrol on 7/13 and completed it on 7/22. Pt indicates she developed diarrhea on 7/23 for which she started taking imodium. She then indicated her last BM to be on either 7/27 or 7/28. On the evening of 7/29, she developed severe abdominal pain, had nausea w/ emesis, and felt weak. She then sought tx at [facility] where a CT A&P showed a large amount of stool throughout the colon with a transition point at the rectosigmoid but no obvious mass. The pt was then transferred to [facility] on 7/30 due to her recent CASS here and for her abdominal pain, the latter of which being why the general surgery department has been consulted. Past Medical History:        Diagnosis Date    Back pain     Hyperlipidemia     Hypertension     Tuberculosis 1981    Wears dentures        Past Surgical History:        Procedure Laterality Date    APPENDECTOMY  1957    CATARACT REMOVAL Bilateral 2015    CHOLECYSTECTOMY  2002    ECTOPIC PREGNANCY SURGERY  1970    PARTIAL HYSTERECTOMY (CERVIX NOT REMOVED)  [address]    TOTAL HIP ARTHROPLASTY Right 7/13/2022    RIGHT TOTAL HIP ARTHROPLASTY/ MINIMALLY INVASIVE DIRECT ANTERIOR performed by Amara Marx MD at [address] OR       Allergies:  Buspirone, Duloxetine, Fenofibrate, Gabapentin, Venlafaxine, Doxycycline, Statins, and Sulfa antibiotics    Medications:   Home Meds  No current facility-administered medications on file prior to encounter. Current Outpatient Medications on File Prior to Encounter   Medication Sig Dispense Refill    naloxone 4 MG/0.1ML LIQD nasal spray 1 spray by Nasal route as needed for Opioid Reversal 1 each 5    aspirin EC 81 MG EC tablet Take 1 tablet by mouth 2 times daily 60 tablet 0    methylPREDNISolone (MEDROL, ATA,) 4 MG tablet Take by mouth. (Patient not taking: Reported on 7/30/2022) 1 kit 0    ondansetron (ZOFRAN) 4 MG tablet Take 1 tablet by mouth 3 times daily as needed for Nausea or Vomiting 15 tablet 0    amLODIPine (NORVASC) 10 MG tablet Take 10 mg by mouth daily      albuterol sulfate  (90 Base) MCG/ACT inhaler INHALE 2 PUFFS BY MOUTH EVERY 6 HOURS AS NEEDED FOR WHEEZING      allopurinol (ZYLOPRIM) 100 MG tablet Take 100 mg by mouth daily      furosemide (LASIX) 20 MG tablet Take 20 mg by mouth daily      lisinopril (PRINIVIL;ZESTRIL) 5 MG tablet Take 5 mg by mouth in the morning and at bedtime       methocarbamol (ROBAXIN) 500 MG tablet Take 500 mg by mouth 3 times daily as needed      meloxicam (MOBIC) 15 MG tablet Take 15 mg by mouth daily (Patient not taking: No sig reported)      pregabalin (LYRICA) 50 MG capsule Take 50 mg by mouth 3 times daily.        fluticasone (FLONASE) 50 MCG/ACT nasal spray Use 2 spray(s) in each nostril once daily         Current Meds  [START ON 8/1/2022] vancomycin (VANCOCIN) 1,250 mg in dextrose 5 % 250 mL IVPB, Q24H  lactated ringers infusion, Continuous  meropenem (MERREM) 1,000 mg in sodium chloride 0.9 % 100 mL IVPB (mini-bag), Once   Followed by  meropenem (MERREM) 1,000 mg in sodium chloride 0.9 % 100 mL IVPB (mini-bag), Q8H  sodium chloride flush 0.9 % injection 5-40 mL, 2 times per day  sodium chloride flush 0.9 % injection 5-40 mL, PRN  0.9 % sodium chloride infusion, PRN  ondansetron (ZOFRAN-ODT) disintegrating tablet 4 mg, Q8H PRN   Or  ondansetron (ZOFRAN) injection 4 mg, Q6H PRN  polyethylene glycol (GLYCOLAX) packet 17 g, Daily PRN  acetaminophen (TYLENOL) tablet 650 mg, Q6H PRN   Or  acetaminophen (TYLENOL) suppository 650 mg, Q6H PRN  [Held by provider] amLODIPine (NORVASC) tablet 10 mg, Daily  fluticasone (FLONASE) 50 MCG/ACT nasal spray 2 spray, Daily  [Held by provider] lisinopril (PRINIVIL;ZESTRIL) tablet 5 mg, BID  methocarbamol (ROBAXIN) tablet 500 mg, TID PRN  [Held by provider] pregabalin (LYRICA) capsule 50 mg, TID  morphine (PF) injection 2 mg, Q2H PRN   Or  morphine (PF) injection 4 mg, Q2H PRN        Family History:   No family history on file. Social History:   TOBACCO:   reports that she has never smoked. She has never used smokeless tobacco.  ETOH:   reports no history of alcohol use. DRUGS:   reports no history of drug use. Review of Systems:   A 14 point review of systems was conducted, significant findings as noted in HPI. All other systems negative. Physical exam:    Vitals:    07/31/22 0655 07/31/22 0914 07/31/22 1023 07/31/22 1440   BP: (!) 91/56 128/70 118/74 134/79   Pulse: 99 99 94 92   Resp: 16  17 16   Temp: 98.4 °F (36.9 °C)  98.2 °F (36.8 °C) 97.9 °F (36.6 °C)   TempSrc: Axillary  Oral Oral   SpO2: 92%  93% 96%   Weight:       Height:           General appearance: Mildly uncomfortable. Drowsy, soft-spoken. Neuro: A&Ox3, no focal deficits, sensation intact  Chest/Lungs: Normal effort with no accessory muscle use on 2L NC  Cardiovascular: RRR  Abdomen: Obese, moderate tenderness in the RLQ, no rebound, guarding, or rigidity. A small bulge is felt in the RLQ with no fascial defect, indicating that this is not a ventral hernia. Skin: warm and dry, no rashes  Extremities: non pitting edema of the b/l LE, no cyanosis  Genitourinary: Iqbal in place with clear yellow urine  Anorectal: No stool or masses appreciated on MONAE. Minimal clear thin mucus with slight blood tinge from the anus.       Labs:    CBC:   Recent Labs     07/31/22  0730   WBC 40.4*   HGB 11.2*   HCT 32.6*   MCV 96.0   PLT see below     BMP: No results for input(s): NA, K, CL, CO2, PHOS, BUN, CREATININE, CA in the last 72 hours. PT/INR: No results for input(s): PROTIME, INR in the last 72 hours. APTT: No results for input(s): APTT in the last 72 hours. Liver Profile: No results found for: AST, ALT, ALB, BILIDIR, BILITOT, ALKPHOS, GGT, 5NUCNo results found for: CHOL, HDL, TRIG  UA:   Lab Results   Component Value Date/Time    COLORU Yellow 07/31/2022 05:15 AM    PHUR 5.0 07/31/2022 05:15 AM    WBCUA 3-5 07/31/2022 05:15 AM    RBCUA None seen 07/31/2022 05:15 AM    BACTERIA 1+ 07/31/2022 05:15 AM    CLARITYU Clear 07/31/2022 05:15 AM    SPECGRAV 1.015 07/31/2022 05:15 AM    LEUKOCYTESUR Negative 07/31/2022 05:15 AM    UROBILINOGEN 2.0 07/31/2022 05:15 AM    BILIRUBINUR MODERATE 07/31/2022 05:15 AM    BLOODU Negative 07/31/2022 05:15 AM    GLUCOSEU Negative 07/31/2022 05:15 AM    AMORPHOUS 1+ 07/31/2022 05:15 AM       Imaging:   XR ABDOMEN (KUB) (SINGLE AP VIEW)   Final Result      No acute radiographic abnormality of the abdomen. Status post right hip arthroplasty with mildly displaced greater trochanteric fracture fragment, new or more pronounced compared to the prior study. Assessment/Plan:  Hx of HTN, HLD, Tuberculosis (1981), gout, and OA s/p right CASS on 7/13/22 who was transferred to [facility] on 7/30 due to fluid associated with her right CASS on 7/13 and for abdominal pain.     -Ddx comprised of ileus vs large bowel obstruction vs constipation vs etiology unrelated to abdominal process. Rising leukocytosis is unlikely due to steroid since the last dose of Medrol was 7/22   -Will f/u with CT scan from [facility]. Low threshold in repeating CT if pt clinical status or abdominal pain is worsening.   -Trend lactate and WBC. Patient HDS and afebrile otherwise. -F/u repeat labs  -Continue winter for strict I&O  -Cont NPO with IVF   -GI has been consulted and is tx empirically for C. Diff infection.  Follow up c.diff result  -Serial abdominal exams  -F/u urine culture as pt had recent UT  -Rest of management per primary    Nahomy Porter DO   PGY1, General Surgery  07/31/22  3:22 PM  Pager # [phone]    I saw and independently examined the patient today. I agree with the history of present illness, past medical/surgical histories, family history, social history, medication list and allergies as listed. The review of systems is as noted above. My physical exam confirms the findings listed above. Review of labs, pathology reports, radiology reports and medical records confirm the findings noted above. I edited the note where appropriate.       Ilya Orr MD  Surgery Attending

## 2022-07-31 NOTE — PROGRESS NOTES
VSS on room air. Aox4. Pt alert to voice. Pt has had multiple episodes of nausea, vomited once this shift, IV zofran given, nausea improved. No BM this shift, no bowel sounds heard, abd distended and tender. This RN listened to bowels for >5 minutes, MD made aware, no new orders. 1 winter removed and 1 winter placed for urine culture, Pt tolerated well. Pt R hip is obviously swollen and warm to touch, no redness, ice therapy applied, dressing in place. Pt is not ambulating at this time. Endorsing generalized weakness, pain to lower back, abd, and R hip, being treated with PRN pain medication. MD ordered miralax, Pt NPO, this RN asked MD to advise, no response, will hold until clarification. All all precautions in place.

## 2022-07-31 NOTE — PROGRESS NOTES
Pt arrived to [id number], DA from outside hospital. VSS w/exception to HR low 100s. Pt had emesis as soon as she was transferred, yellow and frothy in color. Pt was cleaned up of old emesis and gown changed. Per transport pt vomited the entire way here. Pt lethargic but answers questions. Pt has an old surgical incision covered with mepilex that is CDI. There is a swollen area over the incision. No redness/oozing or odor. BLE +1 edema. Pt states her legs are always swollen but not this severe. Pedal pulses +1. Skin assessed. Pt has scattered bruising. Pt has a winter catheter that was placed today at the ED she was transferred from. MD notified of pt arrival. He is in route.

## 2022-07-31 NOTE — PROGRESS NOTES
4 Eyes Skin Assessment     The patient is being assess for   Admission    I agree that 2 RN's have performed a thorough Head to Toe Skin Assessment on the patient. ALL assessment sites listed below have been assessed. Areas assessed for pressure by both nurses:   [x]   Head, Face, and Ears   [x]   Shoulders, Back, and Chest, Abdomen  [x]   Arms, Elbows, and Hands   [x]   Coccyx, Sacrum, and Ischium  [x]   Legs, Feet, and Heels        Skin Assessed Under all Medical Devices by both nurses:  winter and securement devices              All Mepilex Borders were peeled back and area peeked at by both nurses:  Yes  Please list where Mepilex Borders are located:  r hip             **SHARE this note so that the co-signing nurse is able to place an eSignature**    Co-signer eSignature: Electronically signed by Felix Dc RN on 7/30/22 at 8:47 PM EDT    Does the Patient have Skin Breakdown related to pressure? No   Redness to coccyx, bilat heels, scattered bruising and abrasions.           Leonard Prevention initiated:  NA   Wound Care Orders initiated:  NA      [facility] nurse consulted for Pressure Injury (Stage 3,4, Unstageable, DTI, NWPT, Complex wounds)and New or Established Ostomies:  NA      Primary Nurse eSignature: Electronically signed by Tiffani Garcia RN on 7/30/22 at 9:18 PM EDT

## 2022-07-31 NOTE — PROGRESS NOTES
This RN was in room when  was drawing blood. Lab called this RN and said a green top had hemolyzed and to ask tech to draw another green top before she was done. This RN went to room, tech had just finished and said she would come back later since she  \"just stuck her\". Shortly after someone from lab called this RN and claimed Pt was refusing labs since \"she has been stuck 4 times this morning\". This RN told person from lab this was not the case but would check with Pt. This RN went to room and Pt and daughter said Pt never refused labs. This RN called lab to let them know.

## 2022-07-31 NOTE — H&P
Hospital Medicine History & Physical      PCP: Kevin Sahu    Date of Admission: 7/30/2022    Date of Service: Pt seen/examined on 07/30/2022 and Admitted to Inpatient with expected LOS greater than two midnights due to medical therapy. Chief Complaint:  abdominal pain, transferred for ortho eval with Rt CASS site with fluid collection    History Of Present Illness:   74 y.o. female Tonya Romero  - Hx of HTN, HLD, Gout, OA, recent Rt CASS, morbid obesity  -Presents with generalized weakness nausea vomiting and abdominal pain, she woke up with the symptoms last night. Her last bowel movement was Tuesday or Wednesday. She was at [facility] (now [facility] where she underwent CT imaging of the abdomen and pelvis which showed:    IMPRESSION:   1. There is a large amount of stool cecum through the sigmoid colon. Transition point is rectosigmoid but there is no obvious mass in the rectosigmoid region. No bowel thickening or peribowel stranding in the rectosigmoid region. 2. Likely, multiple renal cysts. 3. Partially organized fluid without obvious rim enhancement anterior to the   THR. This may simply represent postoperative seroma but clinical correlation needed. The entire inferior extent of this is not covered on this exam.   4. Urinary bladder is distended with fluid    Labs were significant for WBC 22.8k, with elevated ANC, hgb 10.1, na131, k 5.0 BUN of 19 cr 1.1,Alk phos 139. With noted fluid around [facility] she was transferred to [place] [address] for orthopedic surgeon Dr. Richard Osullivan evaluation. On my evaluation when she was transferred, patient says she is exhausted, she is continues to have abdominal pain in the lower abdomen does not complain of anything else. Denies passing gas. Rates it as 9 out of 10 intensity.   Daughter present at bedside, states after the CASS she was transferred to [facility] rehab where she did not get enough physical therapy, she got out after 5 days and has been work with home physical therapy, family is there to support her, someone is there at home all the time. She additionally tells me that patient had a complicated procedure, there was a possible femoral crack during procedure that was treated with a wire. Patient was also sent home on a Medrol Dosepak. Patient is also being treated with Keflex for UTI. For pain she was taking tramadol 4 times a day as well as Norco for breakthrough pain and took last dose yesterday. Review of records, anterior approach CASS done 7/16,  mL, complicated procedure in the setting of patient's morbid obesity and proximal femur osteoporotic bone lesions and possibility of calcified crack which was treated with proximal femur cable. Past Medical History:          Diagnosis Date    Back pain     Hyperlipidemia     Hypertension     Tuberculosis 1981    Wears dentures        Past Surgical History:          Procedure Laterality Date    APPENDECTOMY  1957    CATARACT REMOVAL Bilateral 2015    CHOLECYSTECTOMY  2002    ECTOPIC PREGNANCY SURGERY  1970    PARTIAL HYSTERECTOMY (CERVIX NOT REMOVED)  Shellie 1822    TOTAL HIP ARTHROPLASTY Right 7/13/2022    RIGHT TOTAL HIP ARTHROPLASTY/ MINIMALLY INVASIVE DIRECT ANTERIOR performed by Jace Thomson MD at [facility] OR       Medications Prior to Admission:      Prior to Admission medications    Medication Sig Start Date End Date Taking? Authorizing Provider   naloxone 4 MG/0.1ML LIQD nasal spray 1 spray by Nasal route as needed for Opioid Reversal 7/15/22   BRIAN Monique   aspirin EC 81 MG EC tablet Take 1 tablet by mouth 2 times daily 7/13/22   Letitia Tian PA-C   methylPREDNISolone (MEDROL, ATA,) 4 MG tablet Take by mouth.   Patient not taking: Reported on 7/30/2022 7/13/22   Letitia Tian PA-C   ondansetron [facility]) 4 MG tablet Take 1 tablet by mouth 3 times daily as needed for Nausea or Vomiting 7/13/22   Letitia Tian PA-C amLODIPine (NORVASC) 10 MG tablet Take 10 mg by mouth daily 6/29/22 12/26/22  Historical Provider, MD   albuterol sulfate  (90 Base) MCG/ACT inhaler INHALE 2 PUFFS BY MOUTH EVERY 6 HOURS AS NEEDED FOR WHEEZING 4/13/22   Historical Provider, MD   allopurinol (ZYLOPRIM) 100 MG tablet Take 100 mg by mouth daily 6/24/16   Historical Provider, MD   furosemide (LASIX) 20 MG tablet Take 20 mg by mouth daily 7/12/16 4/30/23  Historical Provider, MD   lisinopril (PRINIVIL;ZESTRIL) 5 MG tablet Take 5 mg by mouth in the morning and at bedtime  6/24/16 11/8/22  Historical Provider, MD   methocarbamol (ROBAXIN) 500 MG tablet Take 500 mg by mouth 3 times daily as needed 6/30/16   Historical Provider, MD   meloxicam (MOBIC) 15 MG tablet Take 15 mg by mouth daily  Patient not taking: No sig reported 6/22/18   Historical Provider, MD   pregabalin (LYRICA) 50 MG capsule Take 50 mg by mouth 3 times daily. 4/20/22 10/17/22  Historical Provider, MD   fluticasone [place] Jennifer) 50 MCG/ACT nasal spray Use 2 spray(s) in each nostril once daily 1/5/21   Historical Provider, MD       Allergies:  Buspirone, Duloxetine, Fenofibrate, Gabapentin, Venlafaxine, Doxycycline, Statins, and Sulfa antibiotics    Social History:      The patient currently lives at home with family    TOBACCO:   reports that she has never smoked. She has never used smokeless tobacco.  ETOH:   reports no history of alcohol use. Family History:    Reviewed chart    No family history on file. REVIEW OF SYSTEMS:   Pertinent positives as noted in the HPI. All other systems reviewed and negative. PHYSICAL EXAM PERFORMED:    BP (!) 153/76   Pulse (!) 102   Temp 99.1 °F (37.3 °C) (Oral)   Resp 16   SpO2 95%     General appearance: Moderate to severe distress due to abdominal pain  HEENT:  Normal cephalic, atraumatic without obvious deformity. Pupils equal, round, and reactive to light. Extra ocular muscles intact. Conjunctivae/corneas clear.   Neck: Supple, with full range of motion. No jugular venous distention. Trachea midline. Respiratory:  Normal respiratory effort. Clear to auscultation, bilaterally without Rales/Wheezes/Rhonchi. Cardiovascular:  Regular rate and rhythm with normal S1/S2 without murmurs, rubs or gallops. Abdomen: Soft, diffuse tenderness without guarding absent bowel sounds  Musculoskeletal: Right hip swelling but no erythema warmth  Skin: Skin color, texture, turgor normal.  No rashes or lesions. Neurologic:  Neurovascularly intact without any focal sensory/motor deficits. Cranial nerves: II-XII intact, grossly non-focal.  Psychiatric:  Alert and oriented, thought content appropriate, normal insight  Capillary Refill: Brisk,< 3 seconds   Peripheral Pulses: +2 palpable, equal bilaterally     Labs:     No results for input(s): WBC, HGB, HCT, PLT in the last 72 hours. No results for input(s): NA, K, CL, CO2, BUN, CREATININE, CALCIUM, PHOS in the last 72 hours. Invalid input(s): MAGNES  No results for input(s): AST, ALT, BILIDIR, BILITOT, ALKPHOS in the last 72 hours. No results for input(s): INR in the last 72 hours. No results for input(s): Elliot Pears in the last 72 hours.     Urinalysis:      Lab Results   Component Value Date/Time    NITRU Negative 06/14/2022 03:58 PM    WBCUA 0-2 06/14/2022 03:58 PM    RBCUA None seen 06/14/2022 03:58 PM    BLOODU Negative 06/14/2022 03:58 PM    SPECGRAV 1.010 06/14/2022 03:58 PM    GLUCOSEU Negative 06/14/2022 03:58 PM       Radiology:     CXR: I have reviewed the CXR with the following interpretation: n/a  EKG:  I have reviewed the EKG with the following interpretation: n/a    No orders to display       ASSESSMENT/PLAN:    Active Hospital Problems    Diagnosis Date Noted    Abdominal pain [R10.9] 07/30/2022     Priority: Medium     Abdominal pain, CT of the [facility] system did not show evidence of bowel wall thickening but showed significant stool from cecum to the sigmoid colon and transition point at the rectosigmoid. Did a rectal exam with no stool in the rectal vault. Significantly ministers absence of bowel sounds on my exam, I suspect she could have opioid-induced constipation as she was taking tramadol as well as Norco at home for pain control  I will give her a smog enema  We will give her a dose of Relistor    Urinary tension, s/p Iqbal catheter placed at [facility]  Anemia, 10.2 better than hgb on dc from hospital 2 weeks back  Hyponatremia, sodium 131, s/p Iqbal catheter, continue IV fluids, monitor renal function daily    Leukocytosis, she has fluid collection at the site of [facility] recently done on July 16, broad-spectrum antibiotics have been ordered. I will check a CRP procalcitonin. Orthopedic has been consulted for evaluation  Continue IV fluids 100 mL/h    History of hypertension, borderline blood pressure on admission, will hold blood pressure medications for now  Hx of Gout - continue allopurinol    Morbid obesity Body mass index is 41.01 kg/m². - Complicating assessment and treatment. Placing patient at risk for multiple co-morbidities as well as early death and contributing to the patient's presentation.  on weight loss when appropriate. DVT Prophylaxis: SCDs  Diet: Diet NPO  Code Status: Full Code    PT/OT Eval Status: order once acute issues resolved  When therapy is ordered she should be 50% weight bearing on the RLE    Dispo - Admit as inpatient. I anticipate hospitalization spanning more than two midnights for investigation and treatment of the above medically necessary diagnoses. Pt has a high probability of imminent or life-threatening deterioration requiring close monitoring, and highly complex decision-making and/or interventions of high intensity to assess, manipulate, and support his critical organ systems to prevent the his inevitable decline which would occur if left untreated.    A total critical care time 38 minutes spent, this includes but not limited to examining patient, collaborating with other physicians, monitoring vital signs, telemetry, and clinical response to IV medications; documentation time, review and interpretation of laboratory and radiological data, review of nursing notes and old record review. This time excludes any time that may have been spent performing procedures. Harinder Kendrick MD    Thank you [address] for the opportunity to be involved in this patient's care. If you have any questions or concerns please feel free to contact me at [phone].

## 2022-07-31 NOTE — CONSULTS
Clinical Pharmacy Progress Note    Vancomycin & Cefepime - Management by Pharmacy    Consult Date(s): 7/30/22  Consulting Provider(s): Dr. Nava Mckeon / Plan    Possible R CASS infection - Vancomycin  Concurrent Antimicrobials:   Cefepime - Day #1  Day of Vanc Therapy / Ordered Duration: #1  Current Dosing Method: Bayesian-Guided AUC Dosing  Therapeutic Goal: 400-600 mg/L*hr  Current Dose / Frequency: 1250mg IV q24h  Plan / Rationale:   Received 2000mg IV x1 loading dose overnight  Will continue 1250mg IV q24h  Based on Bayesian kinetics, estimated AUC is ~478 mg/L*h with steady-state trough of 12.2 mg/L. Vancomycin level ordered for tomorrow AM, Mon 8/1 to confirm kinetic estimates  Will continue to monitor clinical condition and make adjustments to regimen as appropriate. Thank you for consulting Pharmacy! Sarah Baker, PharmD, BCPS  Wireless: [id number]   7/31/2022 9:46 AM        Interval update: Difficulty obtaining labs this AM. GI consulted for constipation. Subjective/Objective: Ms. Maria Guadalupe Davila is a 74 y.o. female with a PMHx significant for HTN, gout, OA, recent R CASS, morbid obesity who presented to OSH with abdominal pain. CT of abdomen/pelvis showed fluid collection at R CASS site. Pt was transferred to [facility] for ortho evaluation. Admitted with adominal pain, urinary retention and leukocytosis w/ concern for R CASS infection. Pharmacy has been consulted to dose Vancomycin.     Ht Readings from Last 1 Encounters:   07/30/22 5' 2\" (1.575 m)     Wt Readings from Last 1 Encounters:   07/30/22 224 lb 3.3 oz (101.7 kg)     Current & Prior Antimicrobial Regimen(s):  Cefepime (7/31-current)  Metronidazole (7/31-current)  Vancomycin - Pharmacy to dose  7/31  1250mg IV q24h (8/1-current)    Level(s) / Doses:  Date Time Dose Level / Type of Level Interpretation                 Note: Serum levels collected for AUC-based dosing may be high if collected in close proximity to the dose administered. This is not necessarily indicative of toxicity. Cultures & Sensitivities:    Date Site Micro Susceptibility / Result    Blood              Labs / Ancillary Data:    Estimated Creatinine Clearance: 69 mL/min (based on SCr of 0.8 mg/dL).     Recent Labs     07/31/22  0730   WBC 40.4*       Additional Lab Values / Findings of Note:    Procalcitonin:   Recent Labs     07/31/22  0012   PROCAL 10.91*

## 2022-07-31 NOTE — PROGRESS NOTES
5:40 AM 07/31/22    Reviewed elevated CRP and procal  Blood Cx have already been done  Will check CMP/CBC/LA stat  Will consult GI for evaluation  May involve surgery for evaluation if LA elevated

## 2022-07-31 NOTE — PROGRESS NOTES
SMOG enema attempted. Pt able to hold a minimal amount of fluid. No BM. Will try again in a few hours.

## 2022-07-31 NOTE — PROGRESS NOTES
Attempted the SMOG enema again. Pt was again not able to retain any of the medication. Pt did have a very small amount of pink tinged jelly like consistency output. Made MD aware.

## 2022-07-31 NOTE — PLAN OF CARE
Problem: Safety - Adult  Goal: Free from fall injury  7/31/2022 1538 by Cuate Michaud RN  Outcome: Progressing   All fall precautions in place. Bed locked and in lowest position with alarm on. Overbed table and personal belonings within reach. Call light within reach and patient instructed to use call light for assistance. Non-skid socks on. Problem: Pain  Goal: Verbalizes/displays adequate comfort level or baseline comfort level  Outcome: Progressing  Pt endorsing pain to abd, lower back, and R hip. Being treated with Ice therapy, Prn pain medication, rest, repositioning with little relief.

## 2022-07-31 NOTE — PROGRESS NOTES
Hospital Medicine Progress Note    PCP: Kennth Shape    Date of Admission: 7/30/2022    Chief Complaint:  abdominal pain, transferred for ortho eval with Rt CASS site with fluid collection    History Of Present Illness:   74 y.o. female Joseph Gamez  - Hx of HTN, HLD, Gout, OA, recent Rt CASS, morbid obesity  -Presents with generalized weakness nausea vomiting and abdominal pain, she woke up with the symptoms night prior to presentation. Her last bowel movement was about 3 days prior. She was seen at [facility] (now [facility] where she underwent CT imaging of the abdomen and pelvis which showed:    IMPRESSION:   1. There is a large amount of stool cecum through the sigmoid colon. Transition point is rectosigmoid but there is no obvious mass in the rectosigmoid region. No bowel thickening or peribowel stranding in the rectosigmoid region. 2. Likely, multiple renal cysts. 3. Partially organized fluid without obvious rim enhancement anterior to the THR. This may simply represent postoperative seroma but clinical correlation needed. The entire inferior extent of this is not covered on this exam.   4. Urinary bladder is distended with fluid    Labs were significant for WBC 22.8k, with elevated ANC, hgb 10.1, na131, k 5.0 BUN of 19 cr 1.1,Alk phos 139. With noted fluid around [facility] she was transferred to [facility]. for orthopedic surgeon Dr. Rayo Durham evaluation. Interval HPI  Continues to have generalized abdominal pain mainly in the  left lower abdomen. No BM. Appears she was given Relistor overnight. SMOG enema also tried. No BM. Denies passing gas. Still nausea, but no vomiting today. Last vomiting overnight. Per RN, not making much urine.      Review of records, anterior approach CASS done 7/16,  mL, complicated procedure in the setting of patient's morbid obesity and proximal femur osteoporotic bone lesions and possibility of calcified crack which was treated with proximal femur cable. Daughter present at bedside. States completed  Medrol Dosepak about a week ago. Patient also just being treated with Keflex for UTI. For pain she was taking tramadol 4 times a day as well as Norco for breakthrough pain and took last dose yesterday. Medications : Reviewed      Allergies:  Buspirone, Duloxetine, Fenofibrate, Gabapentin, Venlafaxine, Doxycycline, Statins, and Sulfa antibiotics      REVIEW OF SYSTEMS:   Pertinent positives as noted in the HPI. All other systems reviewed and negative. PHYSICAL EXAM PERFORMED:    BP (!) 91/56   Pulse 99   Temp 98.4 °F (36.9 °C) (Axillary)   Resp 16   Ht 5' 2\" (1.575 m)   Wt 224 lb 3.3 oz (101.7 kg)   SpO2 92%   BMI 41.01 kg/m²     General appearance: Moderate to severe distress due to abdominal pain, mildly drowsy (? Due to just got morphine)  HEENT:  Normal cephalic, atraumatic without obvious deformity. Neck: Supple, with full range of motion. No jugular venous distention. Respiratory:  Normal respiratory effort. Clear to auscultation, bilaterally without Rales/Wheezes/Rhonchi. Cardiovascular:  Regular rate and rhythm with normal S1/S2 without murmurs, rubs or gallops. Abdomen: Distended with subumbilical incision scar. O/w Soft, diffuse tenderness, worse in the LLQ. No guarding; Minimal- absent bowel sounds  Musculoskeletal: Right hip swelling but no erythema warmth. Incision dressing C/D/I  Skin: Skin color, texture, turgor normal.  No rashes or lesions. Neurologic:  grossly non-focal.  Psychiatric:  Alert and oriented, thought content appropriate, normal insight  Capillary Refill: Brisk,< 3 seconds   Peripheral Pulses: +2 palpable, equal bilaterally       Labs:     Recent Labs     07/31/22  0730   WBC 40.4*   HGB 11.2*   HCT 32.6*   PLT see below     No results for input(s): NA, K, CL, CO2, BUN, CREATININE, CALCIUM, PHOS in the last 72 hours.     Invalid input(s): MAGNES  No results for input(s): AST, ALT, BILIDIR, BILITOT, ALKPHOS in the last 72 hours. No results for input(s): INR in the last 72 hours. No results for input(s): Coleman Pears in the last 72 hours. Urinalysis:      Lab Results   Component Value Date/Time    NITRU POSITIVE 07/31/2022 05:15 AM    WBCUA 3-5 07/31/2022 05:15 AM    BACTERIA 1+ 07/31/2022 05:15 AM    RBCUA None seen 07/31/2022 05:15 AM    BLOODU Negative 07/31/2022 05:15 AM    SPECGRAV 1.015 07/31/2022 05:15 AM    GLUCOSEU Negative 07/31/2022 05:15 AM       Radiology: No imaging here. Reviewed imaging from OSH          ASSESSMENT/PLAN:      Severe sepsis: POA  - Leukocytosis with tachycardia; Probable intra abdominal source  - Other differentials: SSI: she has fluid collection on CT at the site of THR recently done on July 16  - Her leucocytosis unlikely due to steroids, last dose many days ago, and rising.   - Blood cultures x 2 done  - Was started on broad-spectrum antibiotics on admission. Will include anaerobic coverage, pending further eval and progress. Will switch cefepime to Merrem or add flagyl for now. - Monitor inflam markers: CRP, sed rate  - Continue IV fluids   - Monitor lactate  - Monitor vitals and labs as well as UOP      Bowel obstruction: POA  - Pxt has severe abdominal pain with severe constipation; distended bowel with minimal to absent BS  - She has a midline subumbil incision  - CT of the [facility] showed significant stool from cecum to the sigmoid colon and transition point at the rectosigmoid. - Repeat imaging: she had contrast yesterday, so will obtain abd X ray now or NCCT, hydrate, serial abd exam, and consider repeat CT a/P later, pending progress.   - Lactate elevated   - Keep NPO. No exceptions  - IV hydration  - BSA: Start meropenem  - Surgery consult      Acute Urinary retention  Possible partially treated Complicated UTI: POA  - Appears recently had E coli in urine, although CC was less than 100k.  Was treated with cipro. Also appears on Keflex PTA.   - Urine on admission has some bacteria, and nitrite positive, but neg leuc esterase and no signif pyuria  - Iqbal catheter placed at [facility] for retention likely sec to severe constipation      Recent Rt CASS: POA  Possible SSI:POA  - Rt CASS at [facility] 2 weeks PTA  - CT A/P at OSH partially imaged the hips: Partially organized fluid without obvious rim enhancement anterior to the THR.   - May need dedicated hip images: defer to ortho  - Check inflam markers  - BSA   - Ortho consulted on admission      Chronic Anemia  - 10.2 better than hgb on recent DC from hospital 2 weeks back, possible hemoconcentration  - Monitor Hgb      Essential Hypertension- Hold blood pressure medications for now with sepsis    Mild hyponatremia: monitor electrolytes    Gout - stable    Morbid obesity Body mass index is 41.01 kg/m². - Complicating assessment and treatment. Placing patient at risk for multiple co-morbidities as well as early death and contributing to the patient's presentation.  on weight loss when appropriate. DVT Prophylaxis: High risk: SQ heparin  Diet: Diet NPO  Code Status: Full Code    PT/OT Eval Status: order once acute issues resolved      Disposition: - Remains in pxt; Pt is very ill with high probability for transfer to higher level of care. Transfer to PCU for now.           Anil Guido MD

## 2022-07-31 NOTE — PROGRESS NOTES
Charge Rn not successful on IV. Access RN called to try. Attempt failed. This RS asked surgery resident to try with the doppler.

## 2022-07-31 NOTE — PLAN OF CARE
Problem: Safety - Adult  Goal: Free from fall injury  Outcome: Progressing  Note: Pt will remain free of falls for the duration of the shift. Pt is A/O x 4 and uses the call light appropriately for needs. Pt has yet to ambulate. Pt very lethargic and weak. Bed alarm on, wheels locked, bed in lowest position, side rails up 2/4, nonskid socks on, call light and bedside table in reach. Will continue to monitor. Problem: Respiratory - Adult  Goal: Achieves optimal ventilation and oxygenation  Outcome: Not Progressing  Note: Pt has new O2 requirement. Pt on 2 L Nc     Problem: Skin/Tissue Integrity - Adult  Goal: Incisions, wounds, or drain sites healing without S/S of infection  Outcome: Not Progressing  Note: Pt has an old surgical incision to the right hip. The area is swollen and tight, however, there is no redness,heat, or output. Edges are approximated. Problem: Gastrointestinal - Adult  Goal: Minimal or absence of nausea and vomiting  Outcome: Not Progressing  Note: Pt has constant nausea. Emesis has eased. Goal: Maintains adequate nutritional intake  Outcome: Not Progressing  Note: Pt is currently NPO. Pt was vomiting everything she took in prior to NPO status. Problem: Genitourinary - Adult  Goal: Urinary catheter remains patent  Outcome: Progressing  Note: Catheter is patent. Problem: Respiratory - Adult  Goal: Achieves optimal ventilation and oxygenation  Outcome: Not Progressing  Note: Pt has new O2 requirement. Pt on 2 L Nc     Problem: Skin/Tissue Integrity - Adult  Goal: Incisions, wounds, or drain sites healing without S/S of infection  Outcome: Not Progressing  Note: Pt has an old surgical incision to the right hip. The area is swollen and tight, however, there is no redness,heat, or output. Edges are approximated. Problem: Gastrointestinal - Adult  Goal: Minimal or absence of nausea and vomiting  Outcome: Not Progressing  Note: Pt has constant nausea. Emesis has eased. Goal: Maintains adequate nutritional intake  Outcome: Not Progressing  Note: Pt is currently NPO. Pt was vomiting everything she took in prior to NPO status.

## 2022-08-01 ENCOUNTER — CARE COORDINATION (OUTPATIENT)
Dept: CASE MANAGEMENT | Age: 72
End: 2022-08-01

## 2022-08-01 ENCOUNTER — APPOINTMENT (OUTPATIENT)
Dept: CT IMAGING | Age: 72
DRG: 853 | End: 2022-08-01
Attending: INTERNAL MEDICINE
Payer: MEDICARE

## 2022-08-01 ENCOUNTER — APPOINTMENT (OUTPATIENT)
Dept: GENERAL RADIOLOGY | Age: 72
DRG: 853 | End: 2022-08-01
Attending: INTERNAL MEDICINE
Payer: MEDICARE

## 2022-08-01 LAB
ALBUMIN SERPL-MCNC: 2.2 G/DL (ref 3.4–5)
ALBUMIN SERPL-MCNC: 2.6 G/DL (ref 3.4–5)
ALBUMIN SERPL-MCNC: 2.8 G/DL (ref 3.4–5)
ALP BLD-CCNC: 456 U/L (ref 40–129)
ALP BLD-CCNC: 519 U/L (ref 40–129)
ALT SERPL-CCNC: 27 U/L (ref 10–40)
ALT SERPL-CCNC: 39 U/L (ref 10–40)
ANION GAP SERPL CALCULATED.3IONS-SCNC: 17 MMOL/L (ref 3–16)
ANION GAP SERPL CALCULATED.3IONS-SCNC: 27 MMOL/L (ref 3–16)
AST SERPL-CCNC: 33 U/L (ref 15–37)
AST SERPL-CCNC: 51 U/L (ref 15–37)
BASE EXCESS ARTERIAL: -7.8 MMOL/L (ref -3–3)
BASOPHILS ABSOLUTE: 0 K/UL (ref 0–0.2)
BASOPHILS ABSOLUTE: 0 K/UL (ref 0–0.2)
BASOPHILS RELATIVE PERCENT: 0 %
BASOPHILS RELATIVE PERCENT: 0.2 %
BILIRUB SERPL-MCNC: 0.5 MG/DL (ref 0–1)
BILIRUB SERPL-MCNC: 0.5 MG/DL (ref 0–1)
BILIRUBIN DIRECT: 0.3 MG/DL (ref 0–0.3)
BILIRUBIN DIRECT: <0.2 MG/DL (ref 0–0.3)
BILIRUBIN, INDIRECT: 0.2 MG/DL (ref 0–1)
BILIRUBIN, INDIRECT: ABNORMAL MG/DL (ref 0–1)
BUN BLDV-MCNC: 49 MG/DL (ref 7–20)
BUN BLDV-MCNC: 53 MG/DL (ref 7–20)
CALCIUM SERPL-MCNC: 8.2 MG/DL (ref 8.3–10.6)
CALCIUM SERPL-MCNC: 8.3 MG/DL (ref 8.3–10.6)
CARBOXYHEMOGLOBIN ARTERIAL: 1.1 % (ref 0–1.5)
CHLORIDE BLD-SCNC: 95 MMOL/L (ref 99–110)
CHLORIDE BLD-SCNC: 98 MMOL/L (ref 99–110)
CO2: 17 MMOL/L (ref 21–32)
CO2: 8 MMOL/L (ref 21–32)
CREAT SERPL-MCNC: 2.3 MG/DL (ref 0.6–1.2)
CREAT SERPL-MCNC: 2.4 MG/DL (ref 0.6–1.2)
EOSINOPHILS ABSOLUTE: 0 K/UL (ref 0–0.6)
EOSINOPHILS ABSOLUTE: 0 K/UL (ref 0–0.6)
EOSINOPHILS RELATIVE PERCENT: 0 %
EOSINOPHILS RELATIVE PERCENT: 0 %
GFR AFRICAN AMERICAN: 24
GFR AFRICAN AMERICAN: 25
GFR NON-AFRICAN AMERICAN: 20
GFR NON-AFRICAN AMERICAN: 21
GLUCOSE BLD-MCNC: 108 MG/DL (ref 70–99)
GLUCOSE BLD-MCNC: 125 MG/DL (ref 70–99)
HCO3 ARTERIAL: 18 MMOL/L (ref 21–29)
HCT VFR BLD CALC: 27.3 % (ref 36–48)
HCT VFR BLD CALC: 32 % (ref 36–48)
HEMOGLOBIN, ART, EXTENDED: 10.3 G/DL
HEMOGLOBIN: 11.2 G/DL (ref 12–16)
HEMOGLOBIN: 9.1 G/DL (ref 12–16)
INR BLD: 1.27 (ref 0.87–1.14)
LACTIC ACID: 2.3 MMOL/L (ref 0.4–2)
LACTIC ACID: 2.5 MMOL/L (ref 0.4–2)
LYMPHOCYTES ABSOLUTE: 0.7 K/UL (ref 1–5.1)
LYMPHOCYTES ABSOLUTE: 3 K/UL (ref 1–5.1)
LYMPHOCYTES RELATIVE PERCENT: 17.7 %
LYMPHOCYTES RELATIVE PERCENT: 5 %
MAGNESIUM: 2.7 MG/DL (ref 1.8–2.4)
MCH RBC QN AUTO: 32.1 PG (ref 26–34)
MCH RBC QN AUTO: 34.4 PG (ref 26–34)
MCHC RBC AUTO-ENTMCNC: 33.5 G/DL (ref 31–36)
MCHC RBC AUTO-ENTMCNC: 34.9 G/DL (ref 31–36)
MCV RBC AUTO: 96.1 FL (ref 80–100)
MCV RBC AUTO: 98.7 FL (ref 80–100)
METHEMOGLOBIN ARTERIAL: 0.3 % (ref 0–1.4)
MONOCYTES ABSOLUTE: 1.2 K/UL (ref 0–1.3)
MONOCYTES ABSOLUTE: 1.3 K/UL (ref 0–1.3)
MONOCYTES RELATIVE PERCENT: 7.4 %
MONOCYTES RELATIVE PERCENT: 9 %
NEUTROPHILS ABSOLUTE: 11.6 K/UL (ref 1.7–7.7)
NEUTROPHILS ABSOLUTE: 12.6 K/UL (ref 1.7–7.7)
NEUTROPHILS RELATIVE PERCENT: 74.7 %
NEUTROPHILS RELATIVE PERCENT: 86 %
O2 SAT, ARTERIAL: 63 % (ref 93–100)
PCO2 ARTERIAL: 34.1 MMHG (ref 35–45)
PDW BLD-RTO: 14.3 % (ref 12.4–15.4)
PDW BLD-RTO: 14.3 % (ref 12.4–15.4)
PH ARTERIAL: 7.32 (ref 7.35–7.45)
PHOSPHORUS: 7.6 MG/DL (ref 2.5–4.9)
PHOSPHORUS: 7.8 MG/DL (ref 2.5–4.9)
PLATELET # BLD: 711 K/UL (ref 135–450)
PLATELET # BLD: ABNORMAL K/UL (ref 135–450)
PLATELET SLIDE REVIEW: ABNORMAL
PMV BLD AUTO: 7.3 FL (ref 5–10.5)
PMV BLD AUTO: ABNORMAL FL (ref 5–10.5)
PO2 ARTERIAL: 35.8 MMHG (ref 75–108)
POTASSIUM SERPL-SCNC: 5.3 MMOL/L (ref 3.5–5.1)
POTASSIUM SERPL-SCNC: 6 MMOL/L (ref 3.5–5.1)
POTASSIUM SERPL-SCNC: 6.6 MMOL/L (ref 3.5–5.1)
POTASSIUM SERPL-SCNC: 6.6 MMOL/L (ref 3.5–5.1)
POTASSIUM SERPL-SCNC: 6.7 MMOL/L (ref 3.5–5.1)
PROTHROMBIN TIME: 15.8 SEC (ref 11.7–14.5)
RBC # BLD: 2.84 M/UL (ref 4–5.2)
RBC # BLD: 3.24 M/UL (ref 4–5.2)
RBC # BLD: NORMAL 10*6/UL
SODIUM BLD-SCNC: 129 MMOL/L (ref 136–145)
SODIUM BLD-SCNC: 133 MMOL/L (ref 136–145)
TCO2 ARTERIAL: 19 MMOL/L
TOTAL PROTEIN: 4.8 G/DL (ref 6.4–8.2)
TOTAL PROTEIN: 5.3 G/DL (ref 6.4–8.2)
URINE CULTURE, ROUTINE: NORMAL
VANCOMYCIN RANDOM: 22.5 UG/ML
WBC # BLD: 13.5 K/UL (ref 4–11)
WBC # BLD: 16.8 K/UL (ref 4–11)

## 2022-08-01 PROCEDURE — 83735 ASSAY OF MAGNESIUM: CPT

## 2022-08-01 PROCEDURE — 2580000003 HC RX 258: Performed by: INTERNAL MEDICINE

## 2022-08-01 PROCEDURE — 6370000000 HC RX 637 (ALT 250 FOR IP): Performed by: NURSE PRACTITIONER

## 2022-08-01 PROCEDURE — 80202 ASSAY OF VANCOMYCIN: CPT

## 2022-08-01 PROCEDURE — 6360000002 HC RX W HCPCS: Performed by: INTERNAL MEDICINE

## 2022-08-01 PROCEDURE — 99223 1ST HOSP IP/OBS HIGH 75: CPT | Performed by: INTERNAL MEDICINE

## 2022-08-01 PROCEDURE — 99232 SBSQ HOSP IP/OBS MODERATE 35: CPT | Performed by: SURGERY

## 2022-08-01 PROCEDURE — 36556 INSERT NON-TUNNEL CV CATH: CPT

## 2022-08-01 PROCEDURE — 83605 ASSAY OF LACTIC ACID: CPT

## 2022-08-01 PROCEDURE — 2500000003 HC RX 250 WO HCPCS: Performed by: INTERNAL MEDICINE

## 2022-08-01 PROCEDURE — 80069 RENAL FUNCTION PANEL: CPT

## 2022-08-01 PROCEDURE — 6370000000 HC RX 637 (ALT 250 FOR IP): Performed by: INTERNAL MEDICINE

## 2022-08-01 PROCEDURE — 73700 CT LOWER EXTREMITY W/O DYE: CPT

## 2022-08-01 PROCEDURE — 6360000002 HC RX W HCPCS: Performed by: SURGERY

## 2022-08-01 PROCEDURE — 84132 ASSAY OF SERUM POTASSIUM: CPT

## 2022-08-01 PROCEDURE — 02HV33Z INSERTION OF INFUSION DEVICE INTO SUPERIOR VENA CAVA, PERCUTANEOUS APPROACH: ICD-10-PCS

## 2022-08-01 PROCEDURE — 71045 X-RAY EXAM CHEST 1 VIEW: CPT

## 2022-08-01 PROCEDURE — 36415 COLL VENOUS BLD VENIPUNCTURE: CPT

## 2022-08-01 PROCEDURE — 80076 HEPATIC FUNCTION PANEL: CPT

## 2022-08-01 PROCEDURE — 82803 BLOOD GASES ANY COMBINATION: CPT

## 2022-08-01 PROCEDURE — 2060000000 HC ICU INTERMEDIATE R&B

## 2022-08-01 PROCEDURE — 85025 COMPLETE CBC W/AUTO DIFF WBC: CPT

## 2022-08-01 PROCEDURE — 85610 PROTHROMBIN TIME: CPT

## 2022-08-01 PROCEDURE — 74176 CT ABD & PELVIS W/O CONTRAST: CPT

## 2022-08-01 RX ORDER — 0.9 % SODIUM CHLORIDE 0.9 %
10 INTRAVENOUS SOLUTION INTRAVENOUS ONCE
Status: DISCONTINUED | OUTPATIENT
Start: 2022-08-01 | End: 2022-08-01

## 2022-08-01 RX ORDER — LINEZOLID 2 MG/ML
600 INJECTION, SOLUTION INTRAVENOUS EVERY 12 HOURS
Status: DISCONTINUED | OUTPATIENT
Start: 2022-08-01 | End: 2022-08-02 | Stop reason: ALTCHOICE

## 2022-08-01 RX ORDER — METRONIDAZOLE 500 MG/100ML
500 INJECTION, SOLUTION INTRAVENOUS EVERY 8 HOURS
Status: DISCONTINUED | OUTPATIENT
Start: 2022-08-01 | End: 2022-08-02 | Stop reason: ALTCHOICE

## 2022-08-01 RX ORDER — MORPHINE SULFATE 2 MG/ML
1 INJECTION, SOLUTION INTRAMUSCULAR; INTRAVENOUS EVERY 4 HOURS PRN
Status: DISCONTINUED | OUTPATIENT
Start: 2022-08-01 | End: 2022-08-23 | Stop reason: HOSPADM

## 2022-08-01 RX ORDER — POLYETHYLENE GLYCOL 3350 17 G/17G
17 POWDER, FOR SOLUTION ORAL 3 TIMES DAILY
Status: DISCONTINUED | OUTPATIENT
Start: 2022-08-01 | End: 2022-08-13

## 2022-08-01 RX ORDER — THIAMINE HYDROCHLORIDE 100 MG/ML
200 INJECTION, SOLUTION INTRAMUSCULAR; INTRAVENOUS ONCE
Status: COMPLETED | OUTPATIENT
Start: 2022-08-01 | End: 2022-08-01

## 2022-08-01 RX ORDER — MORPHINE SULFATE 2 MG/ML
2 INJECTION, SOLUTION INTRAMUSCULAR; INTRAVENOUS EVERY 4 HOURS PRN
Status: DISCONTINUED | OUTPATIENT
Start: 2022-08-01 | End: 2022-08-23 | Stop reason: HOSPADM

## 2022-08-01 RX ORDER — CALCIUM GLUCONATE 94 MG/ML
1000 INJECTION, SOLUTION INTRAVENOUS ONCE
Status: COMPLETED | OUTPATIENT
Start: 2022-08-01 | End: 2022-08-01

## 2022-08-01 RX ORDER — 0.9 % SODIUM CHLORIDE 0.9 %
20 INTRAVENOUS SOLUTION INTRAVENOUS ONCE
Status: COMPLETED | OUTPATIENT
Start: 2022-08-01 | End: 2022-08-01

## 2022-08-01 RX ADMIN — METHOCARBAMOL 500 MG: 500 TABLET ORAL at 11:51

## 2022-08-01 RX ADMIN — THIAMINE HYDROCHLORIDE 200 MG: 100 INJECTION, SOLUTION INTRAMUSCULAR; INTRAVENOUS at 19:00

## 2022-08-01 RX ADMIN — METRONIDAZOLE 500 MG: 500 INJECTION, SOLUTION INTRAVENOUS at 08:17

## 2022-08-01 RX ADMIN — MEROPENEM 1000 MG: 1 INJECTION, POWDER, FOR SOLUTION INTRAVENOUS at 03:46

## 2022-08-01 RX ADMIN — ONDANSETRON 4 MG: 2 INJECTION INTRAMUSCULAR; INTRAVENOUS at 03:03

## 2022-08-01 RX ADMIN — MORPHINE SULFATE 1 MG: 2 INJECTION, SOLUTION INTRAMUSCULAR; INTRAVENOUS at 23:38

## 2022-08-01 RX ADMIN — SODIUM BICARBONATE: 84 INJECTION, SOLUTION INTRAVENOUS at 02:29

## 2022-08-01 RX ADMIN — SODIUM BICARBONATE: 84 INJECTION, SOLUTION INTRAVENOUS at 15:05

## 2022-08-01 RX ADMIN — MORPHINE SULFATE 4 MG: 2 INJECTION, SOLUTION INTRAMUSCULAR; INTRAVENOUS at 05:14

## 2022-08-01 RX ADMIN — CALCIUM GLUCONATE 1000 MG: 94 INJECTION, SOLUTION INTRAVENOUS at 05:08

## 2022-08-01 RX ADMIN — LINEZOLID 600 MG: 600 INJECTION, SOLUTION INTRAVENOUS at 03:10

## 2022-08-01 RX ADMIN — MEROPENEM 500 MG: 500 INJECTION, POWDER, FOR SOLUTION INTRAVENOUS at 16:10

## 2022-08-01 RX ADMIN — SODIUM CHLORIDE 1000 ML: 9 INJECTION, SOLUTION INTRAVENOUS at 02:45

## 2022-08-01 RX ADMIN — METRONIDAZOLE 500 MG: 500 INJECTION, SOLUTION INTRAVENOUS at 02:39

## 2022-08-01 RX ADMIN — MORPHINE SULFATE 4 MG: 2 INJECTION, SOLUTION INTRAMUSCULAR; INTRAVENOUS at 13:56

## 2022-08-01 RX ADMIN — SODIUM BICARBONATE 50 MEQ: 84 INJECTION, SOLUTION INTRAVENOUS at 05:08

## 2022-08-01 RX ADMIN — METRONIDAZOLE 500 MG: 500 INJECTION, SOLUTION INTRAVENOUS at 18:57

## 2022-08-01 RX ADMIN — LINEZOLID 600 MG: 600 INJECTION, SOLUTION INTRAVENOUS at 13:17

## 2022-08-01 RX ADMIN — MORPHINE SULFATE 4 MG: 2 INJECTION, SOLUTION INTRAMUSCULAR; INTRAVENOUS at 02:34

## 2022-08-01 RX ADMIN — POLYETHYLENE GLYCOL (3350) 17 G: 17 POWDER, FOR SOLUTION ORAL at 13:56

## 2022-08-01 RX ADMIN — MORPHINE SULFATE 4 MG: 2 INJECTION, SOLUTION INTRAMUSCULAR; INTRAVENOUS at 08:38

## 2022-08-01 ASSESSMENT — ENCOUNTER SYMPTOMS
ABDOMINAL DISTENTION: 1
DIARRHEA: 0
NAUSEA: 0
VOMITING: 0
SHORTNESS OF BREATH: 0
ABDOMINAL PAIN: 1
BLOOD IN STOOL: 0
CONSTIPATION: 1

## 2022-08-01 ASSESSMENT — PAIN DESCRIPTION - LOCATION
LOCATION: ABDOMEN
LOCATION: ABDOMEN
LOCATION: ABDOMEN;BACK
LOCATION: ABDOMEN
LOCATION: HIP;ABDOMEN
LOCATION: HIP;ABDOMEN

## 2022-08-01 ASSESSMENT — PAIN DESCRIPTION - DIRECTION: RADIATING_TOWARDS: LEFT BACK

## 2022-08-01 ASSESSMENT — PAIN DESCRIPTION - ORIENTATION
ORIENTATION: LEFT
ORIENTATION: ANTERIOR
ORIENTATION: RIGHT;ANTERIOR
ORIENTATION: LEFT
ORIENTATION: LOWER

## 2022-08-01 ASSESSMENT — PAIN SCALES - GENERAL
PAINLEVEL_OUTOF10: 8
PAINLEVEL_OUTOF10: 7
PAINLEVEL_OUTOF10: 8
PAINLEVEL_OUTOF10: 7
PAINLEVEL_OUTOF10: 8
PAINLEVEL_OUTOF10: 10

## 2022-08-01 ASSESSMENT — PAIN DESCRIPTION - DESCRIPTORS
DESCRIPTORS: ACHING
DESCRIPTORS: ACHING
DESCRIPTORS: ACHING;DISCOMFORT
DESCRIPTORS: ACHING;PRESSURE
DESCRIPTORS: ACHING
DESCRIPTORS: ACHING;PRESSURE

## 2022-08-01 ASSESSMENT — PAIN - FUNCTIONAL ASSESSMENT
PAIN_FUNCTIONAL_ASSESSMENT: ACTIVITIES ARE NOT PREVENTED
PAIN_FUNCTIONAL_ASSESSMENT: PREVENTS OR INTERFERES SOME ACTIVE ACTIVITIES AND ADLS

## 2022-08-01 ASSESSMENT — PAIN DESCRIPTION - PAIN TYPE
TYPE: ACUTE PAIN
TYPE: ACUTE PAIN

## 2022-08-01 ASSESSMENT — PAIN DESCRIPTION - ONSET
ONSET: ON-GOING
ONSET: ON-GOING

## 2022-08-01 ASSESSMENT — PAIN DESCRIPTION - FREQUENCY
FREQUENCY: CONTINUOUS
FREQUENCY: CONTINUOUS

## 2022-08-01 NOTE — PLAN OF CARE
Problem: Discharge Planning  Goal: Discharge to home or other facility with appropriate resources  Outcome: Progressing     Problem: Safety - Adult  Goal: Free from fall injury  8/1/2022 1933 by Radha Bashir, RN  Outcome: Progressing    Problem: ABCDS Injury Assessment  Goal: Absence of physical injury  Outcome: Progressing     Problem: Skin/Tissue Integrity  Goal: Absence of new skin breakdown  Description: 1. Monitor for areas of redness and/or skin breakdown  2. Assess vascular access sites hourly  3. Every 4-6 hours minimum:  Change oxygen saturation probe site  4. Every 4-6 hours:  If on nasal continuous positive airway pressure, respiratory therapy assess nares and determine need for appliance change or resting period.   Outcome: Progressing     Problem: Respiratory - Adult  Goal: Achieves optimal ventilation and oxygenation  8/1/2022 1933 by Radha Bashir, RN  Outcome: Progressing       Problem: Skin/Tissue Integrity - Adult  Goal: Incisions, wounds, or drain sites healing without S/S of infection  Outcome: Progressing  Goal: Oral mucous membranes remain intact  Outcome: Progressing     Problem: Musculoskeletal - Adult  Goal: Return mobility to safest level of function  Outcome: Progressing  Goal: Maintain proper alignment of affected body part  Outcome: Progressing  Goal: Return ADL status to a safe level of function  Outcome: Progressing     Problem: Gastrointestinal - Adult  Goal: Minimal or absence of nausea and vomiting  8/1/2022 1933 by Radha Bashir, RN  Outcome: Progressing    Goal: Maintains adequate nutritional intake  Outcome: Progressing     Problem: Genitourinary - Adult  Goal: Absence of urinary retention  Outcome: Progressing  Goal: Urinary catheter remains patent  8/1/2022 1933 by Radha Bashir, RN  Outcome: Progressing       Problem: Infection - Adult  Goal: Absence of infection at discharge  Outcome: Progressing  Goal: Absence of infection during hospitalization  Outcome: Progressing Problem: Metabolic/Fluid and Electrolytes - Adult  Goal: Electrolytes maintained within normal limits  Outcome: Progressing  Goal: Hemodynamic stability and optimal renal function maintained  Outcome: Progressing     Problem: Pain  Goal: Verbalizes/displays adequate comfort level or baseline comfort level  Outcome: Progressing     Problem: Respiratory - Adult  Goal: Achieves optimal ventilation and oxygenation  8/1/2022 1933 by Stella Alexander RN  Outcome: Progressing

## 2022-08-01 NOTE — PLAN OF CARE
Problem: Safety - Adult  Goal: Free from fall injury  Outcome: Progressing  Note: Pt will remain free of falls for the duration of the shift. Pt is A/O x4  and uses the call light appropriately for needs. Bed alarm on, wheels locked, bed in lowest position, side rails up 2/4, nonskid socks on, call light and bedside table in reach. Will continue to monitor. Problem: Respiratory - Adult  Goal: Achieves optimal ventilation and oxygenation  Outcome: Not Progressing  Note: Pt on 3L NC. O2 per ABG 35.8     Problem: Gastrointestinal - Adult  Goal: Minimal or absence of nausea and vomiting  Outcome: Progressing  Note: NG to low wall suction      Problem: Genitourinary - Adult  Goal: Urinary catheter remains patent  Outcome: Progressing  Note: Catheter patent     Problem: Respiratory - Adult  Goal: Achieves optimal ventilation and oxygenation  Outcome: Not Progressing  Note: Pt on 3L NC.  O2 per ABG 35.8

## 2022-08-01 NOTE — CARE COORDINATION
Case Management Assessment           Initial Evaluation                Date / Time of Evaluation: 8/1/2022 11:12 AM                 Assessment Completed by: DANILO Regan    Patient Name: Mak Sanchez     YOB: 1950  Diagnosis: Abdominal pain [R10.9]     Date / Time: 7/30/2022  8:43 PM    Patient Admission Status: Inpatient    If patient is discharged prior to next notation, then this note serves as note for discharge by case management. Current PCP: Jeff Patient: No    Chart Reviewed: Yes  Patient/ Family Interviewed: Yes    Initial assessment completed at bedside with: patient and daughter    Hospitalization in the last 30 days: Yes    Emergency Contacts:  Extended Emergency Contact Information  Primary Emergency Contact: Karen Mora  [place] Phone: [phone]  Relation: Child    Advance Directives:   Code Status: Full [address]: No  Agent:   Contact Number:     Copy present: No     In paper Chart: No    Scanned into EMR No    Financial  Payor: MEDICARE / Plan: MEDICARE PART A AND B / Product Type: *No Product type* /     Pre-cert required for SNF: No    Pharmacy    [address]  Phone: [phone] Fax: [phone]    [facility] [id number]  [phone] Lashonda Vera [phone]  [place] [address]  Phone: [phone] Fax: [phone]      Potential assistance Purchasing Medications: Potential Assistance Purchasing Medications: No  Does Patient want to participate in local refill/ meds to beds program?: No    Meds To Beds General Rules:  1. Can ONLY be done Monday- Friday between 8:30am-5pm  2. Prescription(s) must be in pharmacy by 3pm to be filled same day  3. Copy of patient's insurance/ prescription drug card and patient face sheet must be sent along with the prescription(s)  4. Cost of Rx cannot be added to hospital bill. If financial assistance is needed, please contact unit  or ;  or  CANNOT provide pharmacy voucher for patients co-pays  5. Patients can then  the prescription on their way out of the hospital at discharge, or pharmacy can deliver to the bedside if staff is available. (payment due at time of pick-up or delivery - cash, check, or card accepted)     Able to afford home medications/ co-pay costs: Yes    ADLS  Support Systems: Children    PT AM-PAC:   /24  OT AM-PAC:   /24    [place]: single story home  Steps: no steps - ramp to enter through garage    Plans to RETURN to current housing: Yes  Barriers to RETURNING to current housing: none    Tiffany Savage  Currently ACTIVE with [address]: Yes  [address]: Patient believes she gets weekly nursing visit through [facility]. Currently ACTIVE with [place] on Aging: No  Passport/ Waiver: No  Passport/ Waiver Services: Not Applicable    :  Phone:       Durable Medical Equipment  DME Provider: unknown  Equipment: walker and rollator    Home Oxygen and Respiratory Equipment  Has HOME OXYGEN prior to admission: No  Oxygen Company: Not Applicable  Other Respiratory Equipment: n/a    Dialysis  Active with HD/PD prior to admission: No  Nephrologist: n/a    HD Center:  Not Applicable    DISCHARGE PLAN:  Disposition: tbd Home w/ HHC vs. SNF    Transportation PLAN for discharge: tbd     Factors facilitating achievement of predicted outcomes: Family support, Cooperative, and Pleasant    Barriers to discharge: GI consult pending, ortho surgery consult pending, CT abd pending, Ur studies, [employer] placed today. Additional Case Management Notes: Patient lives in a ranch home with a ramp to enter from the garage with her .  Patient has a walker and rollator at home for mobility assistance. Patient receives weekly nursing visits at home, she believes from [facility]. CM met with patient and daughter at bedside this AM. Daughter reports that if SNF placement is needed theh top two options would be [facility] or [facility]. Patient does not want to be referred to [facility] or [facility] for SNF services. Transportation needs at discharge will be determined by finalized discharge plan. The Plan for Transition of Care is related to the following treatment goals of Abdominal pain [R10.9]    The Patient and/or patient representative Tavares Villafana and her family were provided with a choice of provider and agrees with the discharge plan Yes    Freedom of choice list was provided with basic dialogue that supports the patient's individualized plan of care/goals and shares the quality data associated with the providers.  Yes    Care Transition patient: No    DANILO Mullen  [facility]  Case Management Department  Ph: [phone]   Fax: [phone]

## 2022-08-01 NOTE — PROGRESS NOTES
POC:     Serial abdominal exam was done. Pt complains of LUQ and RLQ abdominal pain that is increased from prior. Pt is more nauseas. Daughter stated this is due to lack of IV pain meds. Pt lost her IV access this PM. HDS, afebrile. Abdominal exam with slight increase in tenderness in the LLQ, LUQ, mildly distended. Non-peritoneal. KUB from earlier showed gastric bubble and some distended small bowel. Will insert NG tube for decompression in the setting of dilated small bowel and worsening nausea. Pt may need repeat CT with PO contrast via NGT if her pain is worsens with NGT decompression. Will continue serial abdominal exam. Repeat lactate and renal at midnight.      Michelle Medina MD  General Surgery Resident  07/31/22 9:25 PM  [phone]

## 2022-08-01 NOTE — PROGRESS NOTES
Attempted to give SMOG enema to pt but she did not tolerate. About half of the dose was administered but was immediately returned with no stool like colors. This RN met resistance when trying to advance tube and was unable to get around the blockage. Pt endorsed a lot of pain when trying to advance tube. MD notified.

## 2022-08-01 NOTE — PROGRESS NOTES
Office : [phone]     Fax :[phone]         Renal consult Note    Subjective:   Admit Date: 7/30/2022     Interval History: This AM patient was seen resting in bed. She was Aox3. Patient reported headache, dizziness, subjective diplopia, and abdominal pain. YESSENIA Chatman is a 74 y.o. female w/PMH HTN, HLD, gout, OA, and recent right CASS who presented w/generalized weakness, N/V, constipation, and abdominal pain.    Pt is in septic shock   Lactate elevated   K elevated   NG tube in place       DIET Diet NPO  Medications:   Scheduled Meds:   linezolid  600 mg IntraVENous Q12H    meropenem  500 mg IntraVENous Q12H    polyethylene glycol  17 g Oral TID    oxymetazoline  2 spray Each Nostril Once    bisacodyl  10 mg Rectal Once    lidocaine   Topical Once    sodium chloride flush  5-40 mL IntraVENous 2 times per day    [Held by provider] amLODIPine  10 mg Oral Daily    fluticasone  2 spray Each Nostril Daily    [Held by provider] lisinopril  5 mg Oral BID    [Held by provider] pregabalin  50 mg Oral TID     Continuous Infusions:   IV infusion builder 125 mL/hr at 08/01/22 0229    sodium chloride       Past Medical History:   Diagnosis Date    Back pain     Hyperlipidemia     Hypertension     Tuberculosis 1981    Wears dentures       Past Surgical History:   Procedure Laterality Date    APPENDECTOMY  1957    CATARACT REMOVAL Bilateral 2015    CHOLECYSTECTOMY  2002    ECTOPIC PREGNANCY SURGERY  1970    PARTIAL HYSTERECTOMY (CERVIX NOT REMOVED)  Shellie 1822    TOTAL HIP ARTHROPLASTY Right 7/13/2022    RIGHT TOTAL HIP ARTHROPLASTY/ MINIMALLY INVASIVE DIRECT ANTERIOR performed by Feliberto Busby MD at [address]:  CBC:   Recent Labs     07/31/22  1550 08/01/22 0023 08/01/22  0541   WBC 26.2* 16.8* 13.5*   HGB 11.5* 11.2* 9.1*   * 711* see below     BMP:    Recent Labs     07/31/22  2302 07/31/22 2303 08/01/22 0023 08/01/22 0224 08/01/22  0541   * 133*  --   --  129*   K 6.5* 6.6* 6.7*  6.6* 6.0* 5.3*   CL 95* 98*  --   --  95*   CO2 12* 8*  --   --  17*   BUN 46* 49*  --   --  53*   CREATININE 2.3* 2.3*  --   --  2.4*   GLUCOSE 106* 108*  --   --  125*     Ca/Mg/Phos:   Recent Labs     07/31/22  1550 07/31/22 2302 07/31/22 2303 08/01/22  0541   CALCIUM 9.1 9.0 8.3 8.2*   MG 2.90* 3.10*  --  2.70*   PHOS 7.6* 7.7* 7.6* 7.8*     Hepatic:   Recent Labs     08/01/22 0023 08/01/22  0541   AST 51* 33   ALT 39 27   BILITOT 0.5 0.5   ALKPHOS 519* 456*     Troponin: No results for input(s): TROPONINI in the last 72 hours. BNP: No results for input(s): BNP in the last 72 hours. Lipids: No results for input(s): CHOL, TRIG, HDL, LDLCALC, LABVLDL in the last 72 hours. ABGs:   Recent Labs     08/01/22 0124   PHART 7.319*   PO2ART 35.8*   FSM7TLI 34.1*     INR:   Recent Labs     08/01/22 0224   INR 1.27*     UA:  Recent Labs     07/31/22  0515   COLORU Yellow   CLARITYU Clear   GLUCOSEU Negative   BILIRUBINUR MODERATE*   KETUA TRACE*   SPECGRAV 1.015   BLOODU Negative   PHUR 5.0   PROTEINU TRACE*   UROBILINOGEN 2.0*   NITRU POSITIVE*   LEUKOCYTESUR Negative   LABMICR YES   URINETYPE NotGiven      Urine Microscopic:   Recent Labs     07/31/22  0515   BACTERIA 1+*   WBCUA 3-5   RBCUA None seen   EPIU 0-1     Urine Culture: No results for input(s): LABURIN in the last 72 hours. Urine Chemistry: No results for input(s): Ally Cheers, PROTEINUR, NAUR in the last 72 hours.     Objective:   Vitals: /75   Pulse 91   Temp 97.7 °F (36.5 °C) (Oral)   Resp 16   Ht 5' 2\" (1.575 m)   Wt 224 lb 3.3 oz (101.7 kg)   SpO2 94%   BMI 41.01 kg/m²    Wt Readings from Last 3 Encounters:   07/30/22 224 lb 3.3 oz (101.7 kg)   07/13/22 218 lb 9.6 oz (99.2 kg)   06/14/22 217 lb (98.4 kg)      24HR INTAKE/OUTPUT:    Intake/Output Summary (Last 24 hours) at 8/1/2022 0931  Last data filed at 8/1/2022 5193  Gross per 24 hour   Intake 1786.96 ml   Output 1175 ml   Net 611.96 ml     Physical Exam  Constitutional:       Appearance: She is ill-appearing. She is not diaphoretic. Cardiovascular:      Rate and Rhythm: Normal rate. Pulmonary:      Effort: Pulmonary effort is normal.      Breath sounds: No wheezing, rhonchi or rales. Abdominal:      Palpations: Abdomen is soft. Tenderness: There is abdominal tenderness. There is guarding. Skin:     General: Skin is warm and dry. Assessment and Plan:       IMAGING:  CT ABDOMEN PELVIS WO CONTRAST Additional Contrast? Oral and Rectal   Final Result   1. Moderate colonic distention. Correlate for constipation. Fecal content within the ileum which may suggest stasis. There is no small bowel obstruction. 2. Subcutaneous fluid collection overlying the right total hip arthroplasty. CT HIP RIGHT WO CONTRAST   Final Result   1. Moderate colonic distention. Correlate for constipation. Fecal content within the ileum which may suggest stasis. There is no small bowel obstruction. 2. Subcutaneous fluid collection overlying the right total hip arthroplasty. XR CHEST PORTABLE   Final Result      1. Right IJ CVC tip in the cavoatrial junction. 2.  Bibasilar airspace disease suggesting atelectasis. Pneumonia is not excluded. XR ABDOMEN (KUB) (SINGLE AP VIEW)   Final Result      Dilated colonic and small bowel loops may suggest ileus but obstruction is not excluded. XR ABDOMEN (KUB) (SINGLE AP VIEW)   Final Result      No acute radiographic abnormality of the abdomen.       Status post right hip arthroplasty with mildly displaced greater trochanteric fracture fragment, new or more pronounced compared to the prior study. Assessment/Plan     ARIANE secondary to Hypoperfusion  2. Sepsis  3. AGMA  4. Lactic Acidosis  4. Hyperkalemia  5. Hyponatremia  6. Bowel Obstruction  -CT abdomen:   -revealed moderate colonic distension,ileum fecal content suggesting possible stasis, no SBO. Subcutaneous fluid collection overlying right total hip arthroplasty. Plan:     -Saline IV boluses  -on Merrem, linezolid  -lactic acid improving, continue to monitor  -Sodium Maria R Elias MD  PGY-1       I have seen the patient independently from the resident . I discussed the care with the resident. I personally reviewed the HPI, PH, FH, SH, ROS and medications. I repeated pertinent portions of the examination and reviewed the relevant imaging and laboratory data.  I agree with the findings, assessment and plan as documented, with the following addendum:      Edgard Braden MD

## 2022-08-01 NOTE — PROGRESS NOTES
Hospital Medicine Progress Note    PCP: Rajesh Lancaster    Date of Admission: 7/30/2022    Chief Complaint:  Abdominal pain, transferred for ortho eval with Rt CASS site with fluid collection    Subjective:  Was on oral vanc but appears was stopped overnight due to nausea/vomiting. Less abdominal pain but still present and no BM as yet. Miralax attempted however had n/v. She has felt some hip pain since she hd hip surgery but per patient and her daughter felt like she was getting around pretty well. She does not think she has had recent worsening of the hip pain or worsening swelling or leg swelling. History Of Present Illness:   \"  74 y.o. female Shelia Marie  - Hx of HTN, HLD, Gout, OA, recent Rt CASS, morbid obesity  - Presents with generalized weakness nausea vomiting and abdominal pain, she woke up with the symptoms night prior to presentation. Her last bowel movement was about 3 days prior. She was seen at [facility] (now [facility] where she underwent CT imaging of the abdomen and pelvis which showed:    IMPRESSION:   1. There is a large amount of stool cecum through the sigmoid colon. Transition point is rectosigmoid but there is no obvious mass in the rectosigmoid region. No bowel thickening or peribowel stranding in the rectosigmoid region. 2. Likely, multiple renal cysts. 3. Partially organized fluid without obvious rim enhancement anterior to the THR. This may simply represent postoperative seroma but clinical correlation needed. The entire inferior extent of this is not covered on this exam.   4. Urinary bladder is distended with fluid    Labs were significant for WBC 22.8k, with elevated ANC, hgb 10.1, na131, k 5.0 BUN of 19 cr 1.1, Alk phos 139. With noted fluid around [facility] she was transferred to [facility]. for orthopedic surgeon Dr. Destinee Valentine evaluation. \"    Medications : Reviewed      Allergies:  Buspirone, Duloxetine, Fenofibrate, Gabapentin, Venlafaxine, Doxycycline, Statins, and Sulfa antibiotics      REVIEW OF SYSTEMS:   Pertinent positives as noted in the HPI. All other systems reviewed and negative. PHYSICAL EXAM PERFORMED:    /75   Pulse 91   Temp 97.7 °F (36.5 °C) (Oral)   Resp 16   Ht 5' 2\" (1.575 m)   Wt 224 lb 3.3 oz (101.7 kg)   SpO2 94%   BMI 41.01 kg/m²     General appearance: Moderate to severe distress due to abdominal pain, mildly drowsy (? Due to just got morphine)  HEENT:  Normal cephalic, atraumatic without obvious deformity. Neck: Supple, with full range of motion. No jugular venous distention. Respiratory:  Normal respiratory effort. Clear to auscultation, bilaterally without Rales/Wheezes/Rhonchi. Cardiovascular:  Regular rate and rhythm with normal S1/S2 without murmurs, rubs or gallops. Abdomen: Distended with subumbilical incision scar. O/w Soft, diffuse tenderness, worse in the LLQ. No guarding; Minimal- absent bowel sounds  Musculoskeletal: Right hip swelling but no erythema warmth. Incision dressing C/D/I  Skin: Skin color, texture, turgor normal.  No rashes or lesions.   Neurologic:  grossly non-focal.  Psychiatric:  Alert and oriented, thought content appropriate, normal insight  Capillary Refill: Brisk,< 3 seconds   Peripheral Pulses: +2 palpable, equal bilaterally       Labs:     Recent Labs     07/31/22  1550 08/01/22  0023 08/01/22  0541   WBC 26.2* 16.8* 13.5*   HGB 11.5* 11.2* 9.1*   HCT 33.0* 32.0* 27.3*   * 711* see below       Recent Labs     07/31/22  2302 07/31/22  2303 08/01/22  0023 08/01/22  0224 08/01/22  0541   * 133*  --   --  129*   K 6.5* 6.6* 6.7*  6.6* 6.0* 5.3*   CL 95* 98*  --   --  95*   CO2 12* 8*  --   --  17*   BUN 46* 49*  --   --  53*   CREATININE 2.3* 2.3*  --   --  2.4*   CALCIUM 9.0 8.3  --   --  8.2*   PHOS 7.7* 7.6*  --   --  7.8*     Recent Labs     08/01/22  0023 08/01/22  0541   AST 51* 33   ALT 39 27   BILIDIR 0.3 <0.2   BILITOT 0.5 0.5 ALKPHOS 519* 456*     Recent Labs     08/01/22  0224   INR 1.27*     No results for input(s): Rhae Childes in the last 72 hours. Urinalysis:      Lab Results   Component Value Date/Time    NITRU POSITIVE 07/31/2022 05:15 AM    WBCUA 3-5 07/31/2022 05:15 AM    BACTERIA 1+ 07/31/2022 05:15 AM    RBCUA None seen 07/31/2022 05:15 AM    BLOODU Negative 07/31/2022 05:15 AM    SPECGRAV 1.015 07/31/2022 05:15 AM    GLUCOSEU Negative 07/31/2022 05:15 AM       Radiology: No imaging here. Reviewed imaging from OSH    ASSESSMENT/PLAN:    Severe sepsis: POA  - Leukocytosis with tachycardia; Probable intra abdominal source  - Other differentials: SSI: she has fluid collection on CT at the site of THR recently done on July 16  - Her leucocytosis unlikely due to steroids, last dose many days ago, and rising.   - Blood cultures x 2 done  - Was started on broad-spectrum antibiotics on admission  - Monitor inflam markers: CRP, sed rate  - Continue IV fluids   - Monitor lactate, 2.5 now, continue to follow closely    - CXR: PNA not excluded. She did have CTA chest prior to transfer. NO PE. Persistent mosaic pattern in the lungs which could represent pulmonary edema or hypersensitivity pneumonitis which has increased since her CT scan in 2021.   - Leukocytosis 13.1, down from 26.2. Lactic acid is also improving. Currently most likely infection would seem to be from a possible C. Diff vs infection associated with recent hip surgery. She has not been able to tolerate PO vancomycin as yet. She has received flagyl.   - Will follow-up Ortho recs     Bowel obstruction: POA  - Pxt has severe abdominal pain with severe constipation; distended bowel with minimal to absent BS  - She has a midline subumbil incision  - CT of the [facility] system showed significant stool from cecum to the sigmoid colon and transition point at the rectosigmoid.    - IV hydration  - GI, Surgery consulted, appreciate recs  - Miralax unsuccessful, continuation or relistor advised (renal dosing)  - Plan is for repeat attempt enema this evening  - Continue NG to suction  - Try to reduce narcotics    Possible severe C. Diff with ileus. Patient had diarrhea outpatient but then had immodium. Possibly also complicated by narcotics. Oral vanc started, appears stopped as not tolerating during the night? Ordered Vancomycin enemas until tolerating PO  C. Diff testing when possible  Resume IV flagyl for C. Diff  GI, general surgery following, appreciate recs    Acute Urinary retention  Possible partially treated Complicated UTI: POA  - Appears recently had E coli in urine, although CC was less than 100k. Was treated with cipro. Also appears on Keflex PTA.   - Urine on admission has some bacteria, and nitrite positive, but neg leuc esterase and no signif pyuria  - Winter catheter placed at [facility] for retention likely sec to severe constipation  - continue winter for now, ongoing reassessment    Recent Rt CASS: POA  Possible SSI:POA  - Rt CASS at [facility] 2 weeks PTA, transferred to [facility] for further assessment  - CT A/P at OSH partially imaged the hips: Partially organized fluid without obvious rim enhancement anterior to the THR.   - May need dedicated hip images: defer to ortho  - Check inflam markers  - BSA   - Ortho consulted     Chronic Anemia  - 10.2 better than hgb on recent DC from hospital 2 weeks back, possible hemoconcentration  - Monitor Hgb  Essential Hypertension- Hold blood pressure medications for now with sepsis  Mild hyponatremia: monitor electrolytes  Gout - stable    Morbid obesity Body mass index is 41.01 kg/m². - Complicating assessment and treatment. Placing patient at risk for multiple co-morbidities as well as early death and contributing to the patient's presentation.  on weight loss when appropriate.        DVT Prophylaxis: High risk: SQ heparin  Diet: Diet NPO  Code Status: Full Code    PT/OT Eval Status: order once acute issues resolved      Disposition: - Remains in pxt; Pt is very ill with high probability for transfer to higher level of care. Continue PCU status for now. Continue to follow lactic acid levels.         Feliz Gutierrez, DO

## 2022-08-01 NOTE — PROCEDURES
Seb Smith is a 74 y.o. female patient. No diagnosis found. Past Medical History:   Diagnosis Date    Back pain     Hyperlipidemia     Hypertension     Tuberculosis 1981    Wears dentures      Blood pressure 103/63, pulse 99, temperature 98.2 °F (36.8 °C), temperature source Oral, resp. rate 16, height 5' 2\" (1.575 m), weight 224 lb 3.3 oz (101.7 kg), SpO2 94 %, not currently breastfeeding. Central Line    Date/Time: 8/1/2022 1:00 AM  Performed by: Tavia Santillan DO  Authorized by: Diana Luciano MD   Consent: Verbal consent obtained. Written consent obtained.   Risks and benefits: risks, benefits and alternatives were discussed  Consent given by: patient  Patient understanding: patient states understanding of the procedure being performed  Patient consent: the patient's understanding of the procedure matches consent given  Procedure consent: procedure consent matches procedure scheduled  Relevant documents: relevant documents present and verified  Imaging studies: imaging studies available  Required items: required blood products, implants, devices, and special equipment available  Patient identity confirmed: verbally with patient and arm band  Indications: vascular access    Anesthesia:  Local Anesthetic: lidocaine 1% with epinephrine    Sedation:  Patient sedated: no    Preparation: skin prepped with ChloraPrep  Skin prep agent dried: skin prep agent completely dried prior to procedure  Sterile barriers: all five maximum sterile barriers used - cap, mask, sterile gown, sterile gloves, and large sterile sheet  Hand hygiene: hand hygiene performed prior to central venous catheter insertion  Location details: right internal jugular  Site selection rationale: Accessible with U/S  Patient position: Trendelenburg  Catheter size: 7 Fr  Pre-procedure: landmarks identified  Ultrasound guidance: yes  Number of attempts: 1  Successful placement: yes  Post-procedure: line sutured and dressing applied  Assessment: blood return through all ports, free fluid flow, placement verified by x-ray and no pneumothorax on x-ray  Patient tolerance: patient tolerated the procedure well with no immediate complications      Kasey Blackmon DO   PGY1, General Surgery  08/01/22  3:56 AM  Pager # [phone]

## 2022-08-01 NOTE — PROGRESS NOTES
Patient seen and examined for decreased urine output, abnormal labs  Labs reviewed  Urine dark colored in the bag likely ATN  Patient complaining of having abdominal pain  Surgery team assisting in care of this patient  Dicussed with resident to please assist with central line placement  LA with end organ decreased perfusion, and need aggressive volume resuscitation  Nephrology consulted for acute kidney injury and fluid management  Trend lactic acid levels  Plan to repeat CT abdomen pelvis with oral and rectal contrast  Plan to get right hip CT imaging for further evaluation  Hyperkalemia, given an amp of bicarb & calcium gluconate  Unable to give Lokelma/Kayexalate as patient is on NG with low intermittent suction  Continue strict input output    Hospitalist on-call  Available till 7 AM

## 2022-08-01 NOTE — CONSULTS
Orders placed   D/w Primary team       ARIANE sec to Hypoperfusion   Ur studies  IV saline bolus   CT abd - pending       César Elizabeth MD

## 2022-08-01 NOTE — PROGRESS NOTES
Lab called with the following critical results. Lactic acid 6.5 and co2  of 12. Mirtha Drew on the unit and was informed face to face. ABG ordered.    Potassium was re-ordered d/t specimen being hemolyzed

## 2022-08-01 NOTE — PROGRESS NOTES
Pt removed from LIWS after miralax administration. About 45 minutes after pt called RN c/o nausea and stomach upset. Placed back onto wall suction and pt returned 200 ml of diluted brown fluid. Pt endorsed feeling better and no longer a \"bubbling\" sensation in stomach. Surgical NP notified. Will continue to monitor.

## 2022-08-01 NOTE — PROGRESS NOTES
Surgery Daily Progress Note  Gerda Palomino  CC:  Abdominal pain with bowel obstruction. Subjective : overnight events include repeat CT abdomen due to increased abdominal pain. Patient lost IV access requiring central line placement, IVF  resuscitation was started for increasing lactic acidosis. Objective    Infusions:   IV infusion builder 125 mL/hr at 08/01/22 0229    sodium chloride          I/O:I/O last 3 completed shifts: In: 47.3 [IV Piggyback:47.3]  Out: 375 [Urine:375]           Wt Readings from Last 1 Encounters:   07/30/22 224 lb 3.3 oz (101.7 kg)                 LABS:    Recent Labs     07/31/22  1550 08/01/22  0023 08/01/22  0541   WBC 26.2* 16.8*  --    HGB 11.5* 11.2* 9.1*   HCT 33.0* 32.0* 27.3*   MCV 96.0 98.7 96.1   * 711* 739*        Recent Labs     07/31/22  2302 07/31/22  2303 08/01/22 0023 08/01/22 0224   * 133*  --   --    K 6.5* 6.6* 6.7*  6.6* 6.0*   CL 95* 98*  --   --    CO2 12* 8*  --   --    PHOS 7.7* 7.6*  --   --    BUN 46* 49*  --   --    CREATININE 2.3* 2.3*  --   --         Recent Labs     08/01/22 0023   AST 51*   ALT 39   BILIDIR 0.3   BILITOT 0.5   ALKPHOS 519*      No results for input(s): LIPASE, AMYLASE in the last 72 hours. Recent Labs     08/01/22 0023 08/01/22 0224   PROT 5.3*  --    INR  --  1.27*      No results for input(s): CKTOTAL, CKMB, CKMBINDEX, TROPONINI in the last 72 hours.             Exam:/74   Pulse 93   Temp 97.9 °F (36.6 °C) (Oral)   Resp 16   Ht 5' 2\" (1.575 m)   Wt 224 lb 3.3 oz (101.7 kg)   SpO2 95%   BMI 41.01 kg/m²     General appearance: alert, appears stated age and cooperative  Neck: trachea midline  Heart: regular rate and rhythm, R IJ CVC in place   Lungs: clear to auscultation bilaterally, unlabored on RA   Skin: warm and dry, no rashes  Extremities: no edema, no cyanosis  Abdomen: soft, moderately tender in lower abd, mildly distended; NGT with gastric output   : winter in place      ASSESSMENT/PLAN: Hx of HTN, HLD, Tuberculosis (1981), gout, and OA s/p right CASS on 7/13/22 who was transferred to [facility] on 7/30 due to fluid associated with her right CASS on 7/13 and for abdominal pain. - Will upload images from outside facility   - Continue NPO with NGT to WS   - CT scan shows colonic distention with fecal burbeon throughout colon and terminal ilium. No pneumoperitoneum   - Cont winter   - Continue fluid resuscitation per nephrology   - Continue Abd serial exams   - Attempt enema later today   - Will consider miralx per NGT       Santa Brizuela, APRN - CNP 8/1/2022 6:53 AM   Available via [employer] [phone]    I have personally performed the medical history, physical exam and medical decision making and agree with all pertinent clinical information unless otherwise noted.      Josefa Ortega MD  Surgery Attending

## 2022-08-01 NOTE — PROGRESS NOTES
Gastroenterology Progress Note        [facility]  GI Progress Note        Lisa Sanchez is a 74 y.o. female patient. 1. Right lower quadrant abdominal pain        Admit Date: 7/30/2022    Subjective:       Resting comfortably. Daughter at bedside and nursing report no BM overnight or this AM. Pt c/o diffuse abdominal pain. Denies any nausea, vomiting. ROS:  Review of Systems   Constitutional:  Negative for fever. Respiratory:  Negative for shortness of breath. Cardiovascular:  Negative for chest pain. Gastrointestinal:  Positive for abdominal distention, abdominal pain and constipation. Negative for blood in stool, diarrhea, nausea and vomiting. All other systems reviewed and are negative.       Scheduled Meds:   linezolid  600 mg IntraVENous Q12H    meropenem  500 mg IntraVENous Q12H    polyethylene glycol  17 g Per NG tube TID    oxymetazoline  2 spray Each Nostril Once    bisacodyl  10 mg Rectal Once    lidocaine   Topical Once    sodium chloride flush  5-40 mL IntraVENous 2 times per day    [Held by provider] amLODIPine  10 mg Oral Daily    fluticasone  2 spray Each Nostril Daily    [Held by provider] lisinopril  5 mg Oral BID    [Held by provider] pregabalin  50 mg Oral TID       Continuous Infusions:   IV infusion builder 125 mL/hr at 08/01/22 0229    sodium chloride         PRN Meds:  sodium chloride flush, sodium chloride, ondansetron **OR** ondansetron, acetaminophen **OR** acetaminophen, methocarbamol, morphine **OR** morphine      Objective:       Patient Vitals for the past 24 hrs:   BP Temp Temp src Pulse Resp SpO2   08/01/22 1130 119/68 97.6 °F (36.4 °C) Oral 87 16 98 %   08/01/22 0700 122/75 97.7 °F (36.5 °C) Oral 91 16 94 %   08/01/22 0406 115/74 97.9 °F (36.6 °C) Oral 93 16 95 %   08/01/22 0234 -- -- -- -- 16 --   07/31/22 2230 103/63 98.2 °F (36.8 °C) Oral 99 16 --   07/31/22 1836 134/75 99 °F (37.2 °C) Oral 98 16 94 %   07/31/22 1440 134/79 97.9 °F (36.6 °C) Oral 92 16 96 %       Exam:  VITALS:  /68   Pulse 87   Temp 97.6 °F (36.4 °C) (Oral)   Resp 16   Ht 5' 2\" (1.575 m)   Wt 224 lb 3.3 oz (101.7 kg)   SpO2 98%   BMI 41.01 kg/m²   Physical Exam  Vitals and nursing note reviewed. Constitutional:       General: She is not in acute distress. Appearance: Normal appearance. She is not ill-appearing or diaphoretic. Cardiovascular:      Rate and Rhythm: Normal rate and regular rhythm. Pulmonary:      Comments: Mild dyspnea at rest  Abdominal:      General: There is distension. Tenderness: There is abdominal tenderness (diffuse). Comments: Sluggish bowel sounds   Skin:     General: Skin is warm and dry. Coloration: Skin is not pale. Neurological:      Mental Status: Mental status is at baseline. Recent Labs     07/31/22  1550 08/01/22 0023 08/01/22  0541   WBC 26.2* 16.8* 13.5*   HGB 11.5* 11.2* 9.1*   HCT 33.0* 32.0* 27.3*   MCV 96.0 98.7 96.1   * 711* see below     Recent Labs     07/31/22  2302 07/31/22  2303 08/01/22 0023 08/01/22 0224 08/01/22  0541   * 133*  --   --  129*   K 6.5* 6.6* 6.7*  6.6* 6.0* 5.3*   CL 95* 98*  --   --  95*   CO2 12* 8*  --   --  17*   PHOS 7.7* 7.6*  --   --  7.8*   BUN 46* 49*  --   --  53*   CREATININE 2.3* 2.3*  --   --  2.4*     Recent Labs     08/01/22  0023 08/01/22  0541   AST 51* 33   ALT 39 27   BILIDIR 0.3 <0.2   BILITOT 0.5 0.5   ALKPHOS 519* 456*     No results for input(s): LIPASE, AMYLASE in the last 72 hours. Recent Labs     08/01/22  0023 08/01/22 0224 08/01/22  0541   PROT 5.3*  --  4.8*   INR  --  1.27*  --      No results for input(s): PTT in the last 72 hours. No results for input(s): OCCULTBLD in the last 72 hours. Radiology review:   CT Abd w/ oral and rectal contrast. 8/1:  Impression:        1. Moderate colonic distention. Correlate for constipation. Fecal content within the ileum which may suggest stasis. There is no small bowel obstruction.    2. Subcutaneous fluid collection overlying the right total hip arthroplasty. Assessment:       Principal Problem:    Abdominal pain  Resolved Problems:    * No resolved hospital problems. *  75 yo WF with PMH hypertension, hyperlipidemia, gout, osteoarthritis, morbid obesity, with recent right total hip arthroplasty on July 15, UTI s/p 10 days antibiotics, here with      1) Severe constipation s/p THR 7/16. Has been on opioids. Last colonoscopy in 2012. No prodrome. CT with large stool burden with transition in rectosigmoid colon. No obvious megacolon reported     2) Recent right THR 7/16 with perioperative fluid collection     3) Worsening leukocytosis-patient has a white count of 40,000. She previously had been treated for a UTI but her repeat urinalysis did not look like a persistent UTI. She did have a prodrome of diarrhea, and now constipation. C. difficile is a consideration. She also had received a steroid Dosepak, but this degree of elevation of her white blood cell count would be more than expected from steroids. KUB reportedly with no dilation of colon. Ortho surgery does not feel that the is fluid around her joint is an abscess. 4) Hx of UTI- repeat culture sent    Recommendations: -. Relistor for OIC.   - Cont empiric coverage with PO Vanco and IV flagyl for now  - On Meropenem  - Repeat CT 8/1 showing moderate colonic distention  - Low threshold for repeat imaging if clinically worsens  - Consider ID consult for severe leukocytosis  - F/u C diff stool antigen; no sample obtained as of yet  - Trend lactic acid, exam, and WBC    Nathalie Soares MD  Internal Medicine Resident  PGY-1

## 2022-08-01 NOTE — PROGRESS NOTES
Pharmacy Note - Extended Infusion Beta-Lactam Adjustment    Meropenem ordered for treatment of intra-abdominal infection. Per [facility] Extended Infusion Beta-Lactam Policy, Meropenem [id number] IV EI q8h will be changed to 500 mg IV EI q12h. Estimated Creatinine Clearance: Estimated Creatinine Clearance: 23 mL/min (A) (based on SCr of 2.4 mg/dL (H)). Dialysis Status, ARIANE, CKD: ARIANE  BMI: Body mass index is 41.01 kg/m². Rationale for Adjustment: Agent is renally eliminated and demonstrates time-dependent effect on bacterial eradication. Extended-infusion dosing strategy aims to enhance microbiologic and clinical efficacy. Adjusting dose as above based on renal function and indication. Pharmacy will continue to monitor renal function and adjust dose as necessary. Please call with any questions.     Iraida Gonzalez PharmD, BCPS  Wireless: [id number]   8/1/2022 8:40 AM

## 2022-08-01 NOTE — PROGRESS NOTES
NG tube placed in there right nare. Tube measures 45 cm. Pt tolerated well. 425 mL of dark green fluid immediately emptied. Suction changed to intermittent low wall suction per order.

## 2022-08-02 ENCOUNTER — APPOINTMENT (OUTPATIENT)
Dept: GENERAL RADIOLOGY | Age: 72
DRG: 853 | End: 2022-08-02
Attending: INTERNAL MEDICINE
Payer: MEDICARE

## 2022-08-02 ENCOUNTER — APPOINTMENT (OUTPATIENT)
Dept: CT IMAGING | Age: 72
DRG: 853 | End: 2022-08-02
Attending: INTERNAL MEDICINE
Payer: MEDICARE

## 2022-08-02 PROBLEM — E87.20 LACTIC ACID ACIDOSIS: Status: ACTIVE | Noted: 2022-08-02

## 2022-08-02 PROBLEM — E87.8 ELECTROLYTE IMBALANCE: Status: ACTIVE | Noted: 2022-08-02

## 2022-08-02 PROBLEM — N17.9 AKI (ACUTE KIDNEY INJURY) (HCC): Status: ACTIVE | Noted: 2022-08-02

## 2022-08-02 PROBLEM — Z96.641 STATUS POST RIGHT HIP REPLACEMENT: Status: ACTIVE | Noted: 2022-08-02

## 2022-08-02 PROBLEM — K56.7 ILEUS (HCC): Status: ACTIVE | Noted: 2022-08-02

## 2022-08-02 PROBLEM — D72.829 LEUKOCYTOSIS: Status: ACTIVE | Noted: 2022-08-02

## 2022-08-02 LAB
ALBUMIN SERPL-MCNC: 2.3 G/DL (ref 3.4–5)
ALBUMIN SERPL-MCNC: 2.3 G/DL (ref 3.4–5)
ANION GAP SERPL CALCULATED.3IONS-SCNC: 14 MMOL/L (ref 3–16)
ANION GAP SERPL CALCULATED.3IONS-SCNC: 18 MMOL/L (ref 3–16)
APTT: 37 SEC (ref 23–34.3)
BASE EXCESS VENOUS: -5.1 MMOL/L (ref -2–3)
BASOPHILS ABSOLUTE: 0 K/UL (ref 0–0.2)
BASOPHILS RELATIVE PERCENT: 0.5 %
BUN BLDV-MCNC: 63 MG/DL (ref 7–20)
BUN BLDV-MCNC: 68 MG/DL (ref 7–20)
CALCIUM SERPL-MCNC: 7.7 MG/DL (ref 8.3–10.6)
CALCIUM SERPL-MCNC: 7.9 MG/DL (ref 8.3–10.6)
CARBOXYHEMOGLOBIN: 0.9 % (ref 0–1.5)
CHLORIDE BLD-SCNC: 94 MMOL/L (ref 99–110)
CHLORIDE BLD-SCNC: 94 MMOL/L (ref 99–110)
CO2: 19 MMOL/L (ref 21–32)
CO2: 20 MMOL/L (ref 21–32)
CREAT SERPL-MCNC: 2.5 MG/DL (ref 0.6–1.2)
CREAT SERPL-MCNC: 2.7 MG/DL (ref 0.6–1.2)
EKG ATRIAL RATE: 129 BPM
EKG DIAGNOSIS: NORMAL
EKG Q-T INTERVAL: 312 MS
EKG QRS DURATION: 100 MS
EKG QTC CALCULATION (BAZETT): 425 MS
EKG R AXIS: 15 DEGREES
EKG T AXIS: -26 DEGREES
EKG VENTRICULAR RATE: 112 BPM
EOSINOPHILS ABSOLUTE: 0 K/UL (ref 0–0.6)
EOSINOPHILS RELATIVE PERCENT: 0.1 %
GFR AFRICAN AMERICAN: 21
GFR AFRICAN AMERICAN: 23
GFR NON-AFRICAN AMERICAN: 17
GFR NON-AFRICAN AMERICAN: 19
GLUCOSE BLD-MCNC: 82 MG/DL (ref 70–99)
GLUCOSE BLD-MCNC: 96 MG/DL (ref 70–99)
HCO3 VENOUS: 21.4 MMOL/L (ref 24–28)
HCT VFR BLD CALC: 27.5 % (ref 36–48)
HEMOGLOBIN, VEN, REDUCED: 18.2 %
HEMOGLOBIN: 9.2 G/DL (ref 12–16)
INR BLD: 1.18 (ref 0.87–1.14)
LACTIC ACID: 1.9 MMOL/L (ref 0.4–2)
LACTIC ACID: 2 MMOL/L (ref 0.4–2)
LACTIC ACID: 2 MMOL/L (ref 0.4–2)
LYMPHOCYTES ABSOLUTE: 0.2 K/UL (ref 1–5.1)
LYMPHOCYTES RELATIVE PERCENT: 1.6 %
MAGNESIUM: 2.6 MG/DL (ref 1.8–2.4)
MAGNESIUM: 3 MG/DL (ref 1.8–2.4)
MCH RBC QN AUTO: 31.8 PG (ref 26–34)
MCHC RBC AUTO-ENTMCNC: 33.5 G/DL (ref 31–36)
MCV RBC AUTO: 95.1 FL (ref 80–100)
METHEMOGLOBIN VENOUS: 0.6 % (ref 0–1.5)
MONOCYTES ABSOLUTE: 2 K/UL (ref 0–1.3)
MONOCYTES RELATIVE PERCENT: 18.5 %
NEUTROPHILS ABSOLUTE: 8.5 K/UL (ref 1.7–7.7)
NEUTROPHILS RELATIVE PERCENT: 79.3 %
O2 SAT, VEN: 82 %
PCO2, VEN: 45.5 MMHG (ref 41–51)
PDW BLD-RTO: 14.6 % (ref 12.4–15.4)
PH VENOUS: 7.28 (ref 7.35–7.45)
PHOSPHORUS: 8.1 MG/DL (ref 2.5–4.9)
PHOSPHORUS: 8.6 MG/DL (ref 2.5–4.9)
PLATELET # BLD: 614 K/UL (ref 135–450)
PMV BLD AUTO: 7.5 FL (ref 5–10.5)
PO2, VEN: 51 MMHG (ref 25–40)
POTASSIUM SERPL-SCNC: 5.1 MMOL/L (ref 3.5–5.1)
POTASSIUM SERPL-SCNC: 5.7 MMOL/L (ref 3.5–5.1)
PRO-BNP: 3165 PG/ML (ref 0–449)
PROCALCITONIN: 20.38 NG/ML (ref 0–0.15)
PROTHROMBIN TIME: 14.9 SEC (ref 11.7–14.5)
RBC # BLD: 2.9 M/UL (ref 4–5.2)
SODIUM BLD-SCNC: 128 MMOL/L (ref 136–145)
SODIUM BLD-SCNC: 131 MMOL/L (ref 136–145)
T4 FREE: 0.9 NG/DL (ref 0.9–1.8)
TCO2 CALC VENOUS: 23 MMOL/L
TSH REFLEX: 5.32 UIU/ML (ref 0.27–4.2)
WBC # BLD: 10.7 K/UL (ref 4–11)

## 2022-08-02 PROCEDURE — 2500000003 HC RX 250 WO HCPCS: Performed by: INTERNAL MEDICINE

## 2022-08-02 PROCEDURE — 2000000000 HC ICU R&B

## 2022-08-02 PROCEDURE — 2580000003 HC RX 258: Performed by: SURGERY

## 2022-08-02 PROCEDURE — 99223 1ST HOSP IP/OBS HIGH 75: CPT | Performed by: INTERNAL MEDICINE

## 2022-08-02 PROCEDURE — 93010 ELECTROCARDIOGRAM REPORT: CPT | Performed by: INTERNAL MEDICINE

## 2022-08-02 PROCEDURE — 71045 X-RAY EXAM CHEST 1 VIEW: CPT

## 2022-08-02 PROCEDURE — 6370000000 HC RX 637 (ALT 250 FOR IP): Performed by: INTERNAL MEDICINE

## 2022-08-02 PROCEDURE — 2580000003 HC RX 258

## 2022-08-02 PROCEDURE — 99232 SBSQ HOSP IP/OBS MODERATE 35: CPT | Performed by: SURGERY

## 2022-08-02 PROCEDURE — 99233 SBSQ HOSP IP/OBS HIGH 50: CPT | Performed by: INTERNAL MEDICINE

## 2022-08-02 PROCEDURE — 2580000003 HC RX 258: Performed by: INTERNAL MEDICINE

## 2022-08-02 PROCEDURE — 83605 ASSAY OF LACTIC ACID: CPT

## 2022-08-02 PROCEDURE — 6360000002 HC RX W HCPCS: Performed by: INTERNAL MEDICINE

## 2022-08-02 PROCEDURE — 94669 MECHANICAL CHEST WALL OSCILL: CPT

## 2022-08-02 PROCEDURE — 6360000002 HC RX W HCPCS

## 2022-08-02 PROCEDURE — 94640 AIRWAY INHALATION TREATMENT: CPT

## 2022-08-02 PROCEDURE — 85610 PROTHROMBIN TIME: CPT

## 2022-08-02 PROCEDURE — 2700000000 HC OXYGEN THERAPY PER DAY

## 2022-08-02 PROCEDURE — 6360000002 HC RX W HCPCS: Performed by: SURGERY

## 2022-08-02 PROCEDURE — 85025 COMPLETE CBC W/AUTO DIFF WBC: CPT

## 2022-08-02 PROCEDURE — 74018 RADEX ABDOMEN 1 VIEW: CPT

## 2022-08-02 PROCEDURE — 6370000000 HC RX 637 (ALT 250 FOR IP)

## 2022-08-02 PROCEDURE — 82803 BLOOD GASES ANY COMBINATION: CPT

## 2022-08-02 PROCEDURE — APPNB60 APP NON BILLABLE TIME 46-60 MINS

## 2022-08-02 PROCEDURE — 83880 ASSAY OF NATRIURETIC PEPTIDE: CPT

## 2022-08-02 PROCEDURE — 80069 RENAL FUNCTION PANEL: CPT

## 2022-08-02 PROCEDURE — 94761 N-INVAS EAR/PLS OXIMETRY MLT: CPT

## 2022-08-02 PROCEDURE — 83735 ASSAY OF MAGNESIUM: CPT

## 2022-08-02 PROCEDURE — 84439 ASSAY OF FREE THYROXINE: CPT

## 2022-08-02 PROCEDURE — 84145 PROCALCITONIN (PCT): CPT

## 2022-08-02 PROCEDURE — 36415 COLL VENOUS BLD VENIPUNCTURE: CPT

## 2022-08-02 PROCEDURE — 84443 ASSAY THYROID STIM HORMONE: CPT

## 2022-08-02 PROCEDURE — 85730 THROMBOPLASTIN TIME PARTIAL: CPT

## 2022-08-02 PROCEDURE — 71250 CT THORAX DX C-: CPT

## 2022-08-02 PROCEDURE — 93005 ELECTROCARDIOGRAM TRACING: CPT | Performed by: INTERNAL MEDICINE

## 2022-08-02 RX ORDER — SODIUM CHLORIDE FOR INHALATION 3 %
VIAL, NEBULIZER (ML) INHALATION
Status: DISPENSED
Start: 2022-08-02 | End: 2022-08-03

## 2022-08-02 RX ORDER — HEPARIN SODIUM 1000 [USP'U]/ML
80 INJECTION, SOLUTION INTRAVENOUS; SUBCUTANEOUS ONCE
Status: COMPLETED | OUTPATIENT
Start: 2022-08-02 | End: 2022-08-02

## 2022-08-02 RX ORDER — HEPARIN SODIUM 1000 [USP'U]/ML
40 INJECTION, SOLUTION INTRAVENOUS; SUBCUTANEOUS PRN
Status: DISCONTINUED | OUTPATIENT
Start: 2022-08-02 | End: 2022-08-04 | Stop reason: ALTCHOICE

## 2022-08-02 RX ORDER — ALBUTEROL SULFATE 2.5 MG/3ML
2.5 SOLUTION RESPIRATORY (INHALATION) 2 TIMES DAILY
Status: DISCONTINUED | OUTPATIENT
Start: 2022-08-02 | End: 2022-08-02

## 2022-08-02 RX ORDER — HEPARIN SODIUM 5000 [USP'U]/ML
5000 INJECTION, SOLUTION INTRAVENOUS; SUBCUTANEOUS EVERY 8 HOURS SCHEDULED
Status: DISCONTINUED | OUTPATIENT
Start: 2022-08-02 | End: 2022-08-02

## 2022-08-02 RX ORDER — HEPARIN SODIUM 1000 [USP'U]/ML
80 INJECTION, SOLUTION INTRAVENOUS; SUBCUTANEOUS PRN
Status: DISCONTINUED | OUTPATIENT
Start: 2022-08-02 | End: 2022-08-04 | Stop reason: ALTCHOICE

## 2022-08-02 RX ORDER — SODIUM CHLORIDE FOR INHALATION 3 %
4 VIAL, NEBULIZER (ML) INHALATION 2 TIMES DAILY
Status: DISCONTINUED | OUTPATIENT
Start: 2022-08-02 | End: 2022-08-23 | Stop reason: HOSPADM

## 2022-08-02 RX ORDER — ALBUTEROL SULFATE 2.5 MG/3ML
2.5 SOLUTION RESPIRATORY (INHALATION) EVERY 4 HOURS PRN
Status: DISCONTINUED | OUTPATIENT
Start: 2022-08-02 | End: 2022-08-23 | Stop reason: HOSPADM

## 2022-08-02 RX ORDER — METOPROLOL TARTRATE 5 MG/5ML
5 INJECTION INTRAVENOUS
Status: DISCONTINUED | OUTPATIENT
Start: 2022-08-02 | End: 2022-08-08

## 2022-08-02 RX ORDER — ALBUTEROL SULFATE 2.5 MG/3ML
2.5 SOLUTION RESPIRATORY (INHALATION) 3 TIMES DAILY
Status: DISCONTINUED | OUTPATIENT
Start: 2022-08-02 | End: 2022-08-09

## 2022-08-02 RX ORDER — HEPARIN SODIUM 10000 [USP'U]/100ML
5-30 INJECTION, SOLUTION INTRAVENOUS CONTINUOUS
Status: DISCONTINUED | OUTPATIENT
Start: 2022-08-02 | End: 2022-08-04

## 2022-08-02 RX ORDER — MORPHINE SULFATE 2 MG/ML
2 INJECTION, SOLUTION INTRAMUSCULAR; INTRAVENOUS ONCE
Status: COMPLETED | OUTPATIENT
Start: 2022-08-02 | End: 2022-08-02

## 2022-08-02 RX ORDER — FUROSEMIDE 10 MG/ML
40 INJECTION INTRAMUSCULAR; INTRAVENOUS ONCE
Status: COMPLETED | OUTPATIENT
Start: 2022-08-02 | End: 2022-08-02

## 2022-08-02 RX ORDER — DEXTROSE MONOHYDRATE 100 MG/ML
INJECTION, SOLUTION INTRAVENOUS CONTINUOUS PRN
Status: DISCONTINUED | OUTPATIENT
Start: 2022-08-02 | End: 2022-08-23 | Stop reason: HOSPADM

## 2022-08-02 RX ADMIN — SODIUM CHLORIDE 30 MG/ML INHALATION SOLUTION 4 ML: 30 SOLUTION INHALANT at 21:31

## 2022-08-02 RX ADMIN — MORPHINE SULFATE 2 MG: 2 INJECTION, SOLUTION INTRAMUSCULAR; INTRAVENOUS at 01:32

## 2022-08-02 RX ADMIN — DILTIAZEM HYDROCHLORIDE 5 MG/HR: 5 INJECTION, SOLUTION INTRAVENOUS at 15:53

## 2022-08-02 RX ADMIN — METRONIDAZOLE 500 MG: 500 INJECTION, SOLUTION INTRAVENOUS at 12:01

## 2022-08-02 RX ADMIN — HEPARIN SODIUM 8140 UNITS: 1000 INJECTION INTRAVENOUS; SUBCUTANEOUS at 18:32

## 2022-08-02 RX ADMIN — ALBUTEROL SULFATE 2.5 MG: 2.5 SOLUTION RESPIRATORY (INHALATION) at 09:41

## 2022-08-02 RX ADMIN — ALBUTEROL SULFATE 2.5 MG: 2.5 SOLUTION RESPIRATORY (INHALATION) at 14:39

## 2022-08-02 RX ADMIN — LINEZOLID 600 MG: 600 INJECTION, SOLUTION INTRAVENOUS at 00:38

## 2022-08-02 RX ADMIN — METRONIDAZOLE 500 MG: 500 INJECTION, SOLUTION INTRAVENOUS at 02:46

## 2022-08-02 RX ADMIN — LINEZOLID 600 MG: 600 INJECTION, SOLUTION INTRAVENOUS at 13:13

## 2022-08-02 RX ADMIN — FUROSEMIDE 40 MG: 10 INJECTION, SOLUTION INTRAMUSCULAR; INTRAVENOUS at 13:13

## 2022-08-02 RX ADMIN — SODIUM CHLORIDE, PRESERVATIVE FREE 10 ML: 5 INJECTION INTRAVENOUS at 21:46

## 2022-08-02 RX ADMIN — FLUTICASONE PROPIONATE 2 SPRAY: 50 SPRAY, METERED NASAL at 11:58

## 2022-08-02 RX ADMIN — ALBUTEROL SULFATE 2.5 MG: 2.5 SOLUTION RESPIRATORY (INHALATION) at 21:28

## 2022-08-02 RX ADMIN — POLYETHYLENE GLYCOL (3350) 17 G: 17 POWDER, FOR SOLUTION ORAL at 21:46

## 2022-08-02 RX ADMIN — METOPROLOL TARTRATE 5 MG: 5 INJECTION INTRAVENOUS at 05:22

## 2022-08-02 RX ADMIN — METHYLNALTREXONE BROMIDE 6 MG: 12 INJECTION, SOLUTION SUBCUTANEOUS at 00:30

## 2022-08-02 RX ADMIN — MEROPENEM 500 MG: 500 INJECTION, POWDER, FOR SOLUTION INTRAVENOUS at 05:25

## 2022-08-02 RX ADMIN — SODIUM CHLORIDE 30 MG/ML INHALATION SOLUTION 4 ML: 30 SOLUTION INHALANT at 09:44

## 2022-08-02 RX ADMIN — MORPHINE SULFATE 2 MG: 2 INJECTION, SOLUTION INTRAMUSCULAR; INTRAVENOUS at 22:27

## 2022-08-02 RX ADMIN — HEPARIN SODIUM 18 UNITS/KG/HR: 10000 INJECTION, SOLUTION INTRAVENOUS at 18:33

## 2022-08-02 RX ADMIN — HEPARIN SODIUM 5000 UNITS: 5000 INJECTION INTRAVENOUS; SUBCUTANEOUS at 16:36

## 2022-08-02 ASSESSMENT — ENCOUNTER SYMPTOMS
SINUS PAIN: 0
COLOR CHANGE: 0
BACK PAIN: 1
DIARRHEA: 0
NAUSEA: 0
VOMITING: 1
ABDOMINAL PAIN: 1
BLOOD IN STOOL: 0
ABDOMINAL DISTENTION: 1
NAUSEA: 1
VOMITING: 0
CONSTIPATION: 1
RHINORRHEA: 0
CHEST TIGHTNESS: 0
EYE DISCHARGE: 0
PHOTOPHOBIA: 0
COUGH: 1
SHORTNESS OF BREATH: 1
CHOKING: 0
WHEEZING: 0

## 2022-08-02 ASSESSMENT — PAIN DESCRIPTION - ORIENTATION
ORIENTATION: MID
ORIENTATION: MID

## 2022-08-02 ASSESSMENT — PAIN DESCRIPTION - LOCATION
LOCATION: ABDOMEN
LOCATION: ABDOMEN;BACK

## 2022-08-02 ASSESSMENT — PAIN SCALES - GENERAL
PAINLEVEL_OUTOF10: 8
PAINLEVEL_OUTOF10: 8
PAINLEVEL_OUTOF10: 0

## 2022-08-02 ASSESSMENT — PAIN DESCRIPTION - PAIN TYPE: TYPE: ACUTE PAIN;CHRONIC PAIN

## 2022-08-02 ASSESSMENT — PAIN DESCRIPTION - DESCRIPTORS
DESCRIPTORS: ACHING;CRAMPING;DISCOMFORT
DESCRIPTORS: CRAMPING;ACHING

## 2022-08-02 ASSESSMENT — PAIN - FUNCTIONAL ASSESSMENT: PAIN_FUNCTIONAL_ASSESSMENT: ACTIVITIES ARE NOT PREVENTED

## 2022-08-02 NOTE — CONSULTS
[address]   Cardiac Electrophysiology Consultation   Date: 8/2/2022  Admit Date:  7/30/2022  Reason for Consultation: A. fib with suboptimal ventricular rate control  Consult Requesting Physician: Marlene Rubi DO     No chief complaint on file. HPI: Seb Smith is a 74 y.o. female with a past medical history significant for morbid obesity, hypertension, hyperlipidemia, recent total hip arthroplasty was admitted to the hospital secondary to ongoing abdominal pain and vomiting. She is being treated for possible sepsis. Her abdomen showed large stool burden and she had multiple SMOG and continues to have abdominal distention and pain. In the setting, she went into atrial fibrillation with poorly controlled ventricular rate. She is not anticoagulated as she does not have any previous history of atrial fibrillation. EP was consulted for further evaluation management of A. fib with RVR. Her oxygen requirement is also increasing in the setting. Family at the bedside and denies any known history of atrial fibrillation. Her telemetry showed A. fib with RVR. Clinically, she is volume overloaded. Her chest x-ray is consistent with volume overload. She is being diuresed.     Impression:  Atrial fibrillation with rapid ventricular rate  Heart failure with preserved LV ejection fraction -echo pending  Acute kidney injury  Sepsis    Recommendations:  Started on Cardizem infusion for better rate control for atrial fibrillation  Agree with diuresis  Consult Dr. Gera Sahu for heart failure management  Not on anticoagulation secondary to acute abdominal process    Lisbeth Villagran MD   Cardiac Electrophysiology  [address] [place] [address]  Office [phone]          Past Medical History:   Diagnosis Date    Back pain     Hyperlipidemia     Hypertension     Tuberculosis 1981    Wears dentures         Past Surgical History:   Procedure Laterality Date    APPENDECTOMY  1957    CATARACT REMOVAL Bilateral 2015    CHOLECYSTECTOMY  2002    ECTOPIC PREGNANCY SURGERY  1970    PARTIAL HYSTERECTOMY (CERVIX NOT REMOVED)  [address]    TOTAL HIP ARTHROPLASTY Right 7/13/2022    RIGHT TOTAL HIP ARTHROPLASTY/ MINIMALLY INVASIVE DIRECT ANTERIOR performed by Quinten Montero MD at [address]   Allergen Reactions    Buspirone Hives    Duloxetine Nausea Only    Fenofibrate      Other reaction(s): Myalgias (muscle pain)  Other reaction(s): Myalgias (muscle pain)      Gabapentin Other (See Comments)     Panic attacks  Panic attacks      Venlafaxine      Other reaction(s): Tachycardia  Other reaction(s): Tachycardia      Doxycycline Nausea And Vomiting and Palpitations    Statins Rash    Sulfa Antibiotics Rash       Social History:  Reviewed. reports that she has never smoked. She has never used smokeless tobacco. She reports that she does not drink alcohol and does not use drugs. Family History:  Reviewed. family history is not on file. No premature CAD. Review of System:  All other systems reviewed except for that noted above. Pertinent negatives and positives are:     Objective      General: negative for fever, chills   Ophthalmic ROS: negative for - eye pain or loss of vision  ENT ROS: negative for - headaches, sore throat   Respiratory: negative for - cough, sputum  Cardiovascular: Reviewed in HPI  Gastrointestinal: negative for - abdominal pain, diarrhea, N/V  Hematology: negative for - bleeding, blood clots, bruising or jaundice  Genito-Urinary:  negative for - Dysuria or incontinence  Musculoskeletal: negative for - Joint swelling, muscle pain  Neurological: negative for - confusion, dizziness, headaches   Psychiatric: No anxiety, no depression.   Dermatological: negative for - rash    Physical Examination:  Vitals:    08/02/22 1056   BP: 123/79   Pulse: (!) 119   Resp: 18   Temp: 97.7 °F (36.5 °C)   SpO2: 91%        Intake/Output Summary (Last 24 hours) at 8/2/2022 1453  Last data filed at 2022 1213  Gross per 24 hour   Intake 50 ml   Output 1825 ml   Net -1775 ml     In: 50 [NG/GT:50]  Out: 2425    Wt Readings from Last 3 Encounters:   22 224 lb 3.3 oz (101.7 kg)   22 218 lb 9.6 oz (99.2 kg)   22 217 lb (98.4 kg)     Temp  Av.7 °F (36.5 °C)  Min: 97.2 °F (36.2 °C)  Max: 98.2 °F (36.8 °C)  Pulse  Av.9  Min: 87  Max: 120  BP  Min: 106/63  Max: 142/69  SpO2  Av.7 %  Min: 90 %  Max: 95 %    Telemetry: A. fib with RVR  Constitutional: Alert. Oriented to person, place, and time. Head: Normocephalic and atraumatic. Mouth/Throat: Lips appear moist. Oropharynx is clear and moist.  Eyes: Conjunctivae normal. EOM are normal.   Neck: Neck supple. No lymphadenopathy. No rigidity. No JVD present. Cardiovascular: Tachycardia, irregular rhythm. Normal S1&S2. Carotid pulse 2+ bilaterally. Pulmonary/Chest: Bilateral respiratory sounds present. Reduced air entry, scattered rhonchi and wheezes  Abdominal: Soft. Normal bowel sounds present. No distension, No tenderness. No splenomegaly. No hernia. Musculoskeletal: No tenderness. 2+ edema    Lymphadenopathy: Has no cervical adenopathy. Neurological: Alert and oriented. Cranial nerve II-XII grossly intact, No gross deficit to touch. Skin: Skin is warm and dry. No rash, lesions, ulcerations noted. Psychiatric: No anxiety nor agitation. Labs:  Reviewed. Recent Labs     22  2303 22  0023 22  0224 22  0541 22  0531   *  --   --  129* 128*   K 6.6*   < > 6.0* 5.3* 5.7*   CL 98*  --   --  95* 94*   CO2 8*  --   --  17* 20*   PHOS 7.6*  --   --  7.8* 8.6*   BUN 49*  --   --  53* 63*   CREATININE 2.3*  --   --  2.4* 2.7*    < > = values in this interval not displayed.      Recent Labs     22  0023 22  0541 22  0531   WBC 16.8* 13.5* 10.7   HGB 11.2* 9.1* 9.2*   HCT 32.0* 27.3* 27.5*   MCV 98.7 96.1 95.1   * see below 614*     No results found for: CKTOTAL, CKMB, CKMBINDEX, TROPONINI  No results found for: BNP  Lab Results   Component Value Date/Time    PROTIME 15.8 08/01/2022 02:24 AM    PROTIME 13.4 06/14/2022 03:59 PM    INR 1.27 08/01/2022 02:24 AM    INR 1.04 06/14/2022 03:59 PM     No results found for: CHOL, HDL, TRIG    Diagnostic and imaging results reviewed. I independently reviewed the ECG and telemetry.      Scheduled Meds:   sodium chloride (Inhalant)  4 mL Nebulization BID    albuterol  2.5 mg Nebulization TID    heparin (porcine)  5,000 Units SubCUTAneous 3 times per day    polyethylene glycol  17 g Per NG tube TID    SMOG Enema   Rectal Once    oxymetazoline  2 spray Each Nostril Once    bisacodyl  10 mg Rectal Once    lidocaine   Topical Once    sodium chloride flush  5-40 mL IntraVENous 2 times per day    fluticasone  2 spray Each Nostril Daily    [Held by provider] lisinopril  5 mg Oral BID    [Held by provider] pregabalin  50 mg Oral TID     Continuous Infusions:   dextrose      dilTIAZem      sodium chloride       PRN Meds:.metoprolol, perflutren lipid microspheres, glucose, dextrose bolus **OR** dextrose bolus, glucagon (rDNA), dextrose, albuterol, morphine **OR** morphine, sodium chloride flush, sodium chloride, ondansetron **OR** ondansetron, acetaminophen **OR** acetaminophen, methocarbamol     Assessment:   Patient Active Problem List    Diagnosis Date Noted    ARIANE (acute kidney injury) (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 [address] [N17.9] 08/02/2022     Priority: Medium Electrolyte imbalance [E87.8] 08/02/2022     Priority: Medium    Lactic acid acidosis [E87.2] 08/02/2022     Priority: Medium    Leukocytosis [D72.829] 08/02/2022     Priority: Medium    Ileus ([address] [K56.7] 08/02/2022     Priority: Medium    Status post right hip replacement [Z96.641] 08/02/2022     Priority: Medium    Abdominal pain [R10.9] 07/30/2022     Priority: Medium     Due to the high probability of clinically significant life threating deterioration of the patient's condition that required my urgent intervention, a total critical care time 35 minutes was used. This includes but not limited to examining patient, collaborating with other physicians, monitoring vital signs, telemetry, continuous pulse oximety, and clinical response to IV medications; documentation time, review and interpretation of laboratory and radiological data, review of nursing notes and old record review. This time excludes any time that may have been spent performing procedures for life threatening organ failure. Thank you for allowing me to participate in the care of Shawn Lui . If you have any questions/comments, please do not hesitate to contact us. Kim Rios MD   Cardiac Electrophysiology  [address] [facility]    For any EP related issues after 5 PM, contact [facility] on call cardiology through .

## 2022-08-02 NOTE — PROGRESS NOTES
Surgery Daily Progress Note  Shawn Agent  CC:  Abdominal pain with bowel obstruction. Subjective :   Patient did not tolerate SMOG enema last night. At 0430, was noted to be tachypneic and tachycardic, was placed on 5 L O2 NC then up to 10 L HFNC. Patient also noted to be in A fib on telemetry. HR currently in the high 90s. Pain is somewhat better this AM, per patient. Objective    Infusions:   IV infusion builder 125 mL/hr at 08/01/22 1505    sodium chloride          I/O:I/O last 3 completed shifts: In: 8867 [P.O.:500; I.V.:1287]  Out: 1900 [Urine:575; Emesis/NG output:1325]           Wt Readings from Last 1 Encounters:   07/30/22 224 lb 3.3 oz (101.7 kg)                 LABS:    Recent Labs     08/01/22  0541 08/02/22  0531   WBC 13.5* 10.7   HGB 9.1* 9.2*   HCT 27.3* 27.5*   MCV 96.1 95.1   PLT see below 614*          Recent Labs     08/01/22  0541 08/02/22  0531   * 128*   K 5.3* 5.7*   CL 95* 94*   CO2 17* 20*   PHOS 7.8* 8.6*   BUN 53* 63*   CREATININE 2.4* 2.7*          Recent Labs     08/01/22  0023 08/01/22  0541   AST 51* 33   ALT 39 27   BILIDIR 0.3 <0.2   BILITOT 0.5 0.5   ALKPHOS 519* 456*        No results for input(s): LIPASE, AMYLASE in the last 72 hours. Recent Labs     08/01/22  0023 08/01/22  0224 08/01/22  0541   PROT 5.3*  --  4.8*   INR  --  1.27*  --         No results for input(s): CKTOTAL, CKMB, CKMBINDEX, TROPONINI in the last 72 hours.             Exam:/63   Pulse 98   Temp 97.8 °F (36.6 °C) (Axillary)   Resp 20   Ht 5' 2\" (1.575 m)   Wt 224 lb 3.3 oz (101.7 kg)   SpO2 95%   BMI 41.01 kg/m²     General appearance: alert, appears stated age and cooperative  Neck: trachea midline  Heart: regular rate and rhythm, R IJ CVC in place   Lungs: increased work of breathing, rhonchi, on 5L HFNC  Skin: warm and dry, no rashes  Extremities: no edema, no cyanosis  Abdomen: soft, moderately tender in lower abd, mildly distended; NGT with gastric output   : winter in place      ASSESSMENT/PLAN: Hx of HTN, HLD, Tuberculosis (1981), gout, and OA s/p right CASS on 7/13/22 who was transferred to [facility] on 7/30 due to fluid associated with her right CASS on 7/13 and for abdominal pain. - Continue NPO with NGT to LIWS   - CT scan shows colonic distention with fecal burbeon throughout colon and terminal ilium. No pneumoperitoneum   - Cont winter   - Continue fluid resuscitation per nephrology   - Continue abd serial exams   - Did not tolerate enema yesterday. Continue bowel regimen  - Aggressive pulmonary toilet    Yaneli Odell DO, MSMEd  PGY1, General Surgery  08/02/22  7:03 AM  [employer]  [phone]    I have personally performed the medical history, physical exam and medical decision making and agree with all pertinent clinical information unless otherwise noted.      Anna Benavidez MD  Surgery Attending

## 2022-08-02 NOTE — PROGRESS NOTES
Gastroenterology Progress Note        [facility]  GI Progress Note        Vidhya Contreras is a 74 y.o. female patient. 1. Right lower quadrant abdominal pain        Admit Date: 7/30/2022    Subjective:       Resting and appears uncomfortable. Per daughter at bedside, pt had severe pain with enema yesterday and poorly tolerated Miralax with large amount of output when NGT reconnected. Pt currently c/o abdominal pain and dyspnea. Denies any BM or blood from rectum. ROS:  Review of Systems   Constitutional:  Negative for fever. Respiratory:  Positive for shortness of breath. Cardiovascular:  Negative for chest pain. Gastrointestinal:  Positive for abdominal distention, abdominal pain and constipation. Negative for blood in stool, diarrhea, nausea and vomiting. All other systems reviewed and are negative.       Scheduled Meds:   sodium chloride (Inhalant)  4 mL Nebulization BID    albuterol  2.5 mg Nebulization BID    linezolid  600 mg IntraVENous Q12H    meropenem  500 mg IntraVENous Q12H    polyethylene glycol  17 g Per NG tube TID    SMOG Enema   Rectal Once    vancomycin 500 mg in 0.9% sodium chloride 250 mL  500 mg Rectal Q6H    metroNIDAZOLE  500 mg IntraVENous Q8H    oxymetazoline  2 spray Each Nostril Once    bisacodyl  10 mg Rectal Once    lidocaine   Topical Once    sodium chloride flush  5-40 mL IntraVENous 2 times per day    [Held by provider] amLODIPine  10 mg Oral Daily    fluticasone  2 spray Each Nostril Daily    [Held by provider] lisinopril  5 mg Oral BID    [Held by provider] pregabalin  50 mg Oral TID       Continuous Infusions:   sodium chloride         PRN Meds:  metoprolol, perflutren lipid microspheres, morphine **OR** morphine, sodium chloride flush, sodium chloride, ondansetron **OR** ondansetron, acetaminophen **OR** acetaminophen, methocarbamol      Objective:       Patient Vitals for the past 24 hrs:   BP Temp Temp src Pulse Resp SpO2   08/02/22 1056 123/79 97.7 °F (36.5 °C) Oral (!) 119 18 91 %   08/02/22 0945 -- -- -- -- -- 95 %   08/02/22 0630 106/63 97.8 °F (36.6 °C) Axillary 98 20 95 %   08/02/22 0540 118/71 -- -- 88 -- --   08/02/22 0530 132/78 -- -- (!) 120 -- --   08/02/22 0247 (!) 142/69 97.6 °F (36.4 °C) Axillary 96 20 90 %   08/01/22 2300 120/71 98.2 °F (36.8 °C) Oral 95 20 94 %   08/01/22 1830 124/68 97.5 °F (36.4 °C) Oral 87 18 92 %   08/01/22 1500 123/84 97.2 °F (36.2 °C) Oral 88 18 92 %   08/01/22 1130 119/68 97.6 °F (36.4 °C) Oral 87 16 98 %         Exam:  VITALS:  /79   Pulse (!) 119   Temp 97.7 °F (36.5 °C) (Oral)   Resp 18   Ht 5' 2\" (1.575 m)   Wt 224 lb 3.3 oz (101.7 kg)   SpO2 91%   BMI 41.01 kg/m²   Physical Exam  Vitals and nursing note reviewed. Constitutional:       Appearance: Normal appearance. She is not ill-appearing or diaphoretic. Cardiovascular:      Rate and Rhythm: Normal rate and regular rhythm. Pulmonary:      Effort: Respiratory distress present. Abdominal:      General: There is distension. Tenderness: There is abdominal tenderness (diffuse). Comments: Sluggish bowel sounds   Skin:     General: Skin is warm and dry. Coloration: Skin is not pale. Neurological:      Mental Status: Mental status is at baseline. Recent Labs     08/01/22  0023 08/01/22  0541 08/02/22  0531   WBC 16.8* 13.5* 10.7   HGB 11.2* 9.1* 9.2*   HCT 32.0* 27.3* 27.5*   MCV 98.7 96.1 95.1   * see below 614*       Recent Labs     07/31/22  2303 08/01/22  0023 08/01/22  0224 08/01/22  0541 08/02/22  0531   *  --   --  129* 128*   K 6.6*   < > 6.0* 5.3* 5.7*   CL 98*  --   --  95* 94*   CO2 8*  --   --  17* 20*   PHOS 7.6*  --   --  7.8* 8.6*   BUN 49*  --   --  53* 63*   CREATININE 2.3*  --   --  2.4* 2.7*    < > = values in this interval not displayed.        Recent Labs     08/01/22  0023 08/01/22  0541   AST 51* 33   ALT 39 27   BILIDIR 0.3 <0.2   BILITOT 0.5 0.5   ALKPHOS 519* 456*       No results for input(s): LIPASE, AMYLASE in the last 72 hours. Recent Labs     08/01/22  0023 08/01/22  0224 08/01/22  0541   PROT 5.3*  --  4.8*   INR  --  1.27*  --        No results for input(s): PTT in the last 72 hours. No results for input(s): OCCULTBLD in the last 72 hours. Radiology review:   CT Abd w/ oral and rectal contrast. 8/1:  Impression:        1. Moderate colonic distention. Correlate for constipation. Fecal content within the ileum which may suggest stasis. There is no small bowel obstruction. 2. Subcutaneous fluid collection overlying the right total hip arthroplasty. Assessment:       Principal Problem:    Abdominal pain  Resolved Problems:    * No resolved hospital problems. *  73 yo WF with PMH hypertension, hyperlipidemia, gout, osteoarthritis, morbid obesity, with recent right total hip arthroplasty on July 15, UTI s/p 10 days antibiotics. 1) Severe constipation s/p THR 7/16. Has been on opioids. Last colonoscopy in 2012. No prodrome. CT with large stool burden with transition in rectosigmoid colon. No obvious megacolon reported     2) Recent right THR 7/16 with perioperative fluid collection     3) Worsening leukocytosis-patient p/w white count of 40,000. Prev treated for a UTI but repeat UA not consistent w/ UTI. She did have a prodrome of diarrhea, and now constipation. C. difficile is a consideration. She also had received a steroid Dosepak, but WBC more than expected from steroids. KUB reportedly with no dilation of colon. Ortho surgery does not feel that the is fluid around her joint is an abscess. Recommendations: -. Cont Relistor for OIC.   - Cont empiric coverage with PO Vanco and IV flagyl for now  - On Meropenem  - Repeat CT 8/1 showing moderate colonic distention  - Low threshold for repeat imaging if clinically worsens  - F/u C diff stool antigen; no sample obtained as of yet    Bentley Prakash MD  Internal Medicine

## 2022-08-02 NOTE — PROGRESS NOTES
Office : [phone]     Fax :[phone]         Renal Progress Note    Subjective:   Admit Date: 7/30/2022     Interval History:   Reporting SOB this am. Patient with increased O2 requirements overnight. Now on 5L HFNC. YESSENIA Gongora is a 74 y.o. female w/PMH HTN, HLD, gout, OA, and recent right CASS who presented w/generalized weakness, N/V, constipation, and abdominal pain.        DIET Diet NPO  Medications:   Scheduled Meds:   sodium chloride (Inhalant)  4 mL Nebulization BID    albuterol  2.5 mg Nebulization BID    linezolid  600 mg IntraVENous Q12H    meropenem  500 mg IntraVENous Q12H    polyethylene glycol  17 g Per NG tube TID    SMOG Enema   Rectal Once    vancomycin 500 mg in 0.9% sodium chloride 250 mL  500 mg Rectal Q6H    metroNIDAZOLE  500 mg IntraVENous Q8H    oxymetazoline  2 spray Each Nostril Once    bisacodyl  10 mg Rectal Once    lidocaine   Topical Once    sodium chloride flush  5-40 mL IntraVENous 2 times per day    [Held by provider] amLODIPine  10 mg Oral Daily    fluticasone  2 spray Each Nostril Daily    [Held by provider] lisinopril  5 mg Oral BID    [Held by provider] pregabalin  50 mg Oral TID     Continuous Infusions:   IV infusion builder 125 mL/hr at 08/01/22 1505    sodium chloride         Labs:  CBC:   Recent Labs     08/01/22  0023 08/01/22  0541 08/02/22  0531   WBC 16.8* 13.5* 10.7   HGB 11.2* 9.1* 9.2*   * see below 614*       BMP:    Recent Labs     07/31/22  2303 08/01/22  0023 08/01/22  0224 08/01/22  0541 08/02/22  0531   *  --   --  129* 128*   K 6.6*   < > 6.0* 5.3* 5.7*   CL 98*  --   --  95* 94*   CO2 8*  --   --  17* 20*   BUN 49*  --   --  53* 63*   CREATININE 2.3*  --   --  2.4* 2.7*   GLUCOSE 108*  --   --  125* 96    < > = values in this interval not displayed. Ca/Mg/Phos:   Recent Labs     07/31/22  2302 07/31/22  2303 08/01/22  0541 08/02/22  0531   CALCIUM 9.0 8.3 8.2* 7.7*   MG 3.10*  --  2.70* 2.60*   PHOS 7.7* 7.6* 7.8* 8.6*       Hepatic:   Recent Labs     08/01/22  0023 08/01/22  0541   AST 51* 33   ALT 39 27   BILITOT 0.5 0.5   ALKPHOS 519* 456*       Troponin: No results for input(s): TROPONINI in the last 72 hours. BNP: No results for input(s): BNP in the last 72 hours. Lipids: No results for input(s): CHOL, TRIG, HDL, LDLCALC, LABVLDL in the last 72 hours. ABGs:   Recent Labs     08/01/22  0124   PHART 7.319*   PO2ART 35.8*   OCZ4QCK 34.1*       INR:   Recent Labs     08/01/22  0224   INR 1.27*       UA:  Recent Labs     07/31/22  0515   COLORU Yellow   CLARITYU Clear   GLUCOSEU Negative   BILIRUBINUR MODERATE*   KETUA TRACE*   SPECGRAV 1.015   BLOODU Negative   PHUR 5.0   PROTEINU TRACE*   UROBILINOGEN 2.0*   NITRU POSITIVE*   LEUKOCYTESUR Negative   LABMICR YES   URINETYPE NotGiven        Urine Microscopic:   Recent Labs     07/31/22  0515   BACTERIA 1+*   WBCUA 3-5   RBCUA None seen   EPIU 0-1       Urine Culture:   Recent Labs     07/31/22  1721   LABURIN No growth at 18 to 36 hours     Urine Chemistry: No results for input(s): CLUR, LABCREA, PROTEINUR, NAUR in the last 72 hours.     Objective:   Vitals: /63   Pulse 98   Temp 97.8 °F (36.6 °C) (Axillary)   Resp 20   Ht 5' 2\" (1.575 m)   Wt 224 lb 3.3 oz (101.7 kg)   SpO2 95%   BMI 41.01 kg/m²    Wt Readings from Last 3 Encounters:   07/30/22 224 lb 3.3 oz (101.7 kg)   07/13/22 218 lb 9.6 oz (99.2 kg)   06/14/22 217 lb (98.4 kg)      24HR INTAKE/OUTPUT:    Intake/Output Summary (Last 24 hours) at 8/2/2022 0814  Last data filed at 8/2/2022 0650  Gross per 24 hour   Intake 0 ml   Output 1775 ml   Net -1775 ml junction. 2.  Bibasilar airspace disease suggesting atelectasis. Pneumonia is not excluded. XR ABDOMEN (KUB) (SINGLE AP VIEW)   Final Result      Dilated colonic and small bowel loops may suggest ileus but obstruction is not excluded. XR ABDOMEN (KUB) (SINGLE AP VIEW)   Final Result      No acute radiographic abnormality of the abdomen. Status post right hip arthroplasty with mildly displaced greater trochanteric fracture fragment, new or more pronounced compared to the prior study. Assessment/Plan     ARIANE   2. Sepsis  3. AGMA   4. Lactic Acidosis - resolved  4. Hyperkalemia  5. Hyponatremia  6. Bowel Obstruction      Plan:  - Stop fluids  - move to ICU   - ARIANE sec to ATN   - Monitor K   - replace Bicarb   - Monitor I/Os  - Avoid nephrotoxic agents      Ramona Delgado MD  PGY-2    Will discuss this patient with Dr. Jossue Black      I have seen the patient independently from the resident . I discussed the care with the resident. I personally reviewed the HPI, PH, FH, SH, ROS and medications. I repeated pertinent portions of the examination and reviewed the relevant imaging and laboratory data.  I agree with the findings, assessment and plan as documented, with the following addendum:      Chana Sagastume MD

## 2022-08-02 NOTE — RT PROTOCOL NOTE
RT Inhaler-Nebulizer Bronchodilator Protocol Note    There is a bronchodilator order in the chart from a provider indicating to follow the RT Bronchodilator Protocol and there is an Initiate RT Inhaler-Nebulizer Bronchodilator Protocol order as well (see protocol at bottom of note). CXR Findings:  XR CHEST PORTABLE    Result Date: 8/2/2022  Prominence of the perihilar interstitial markings with mild peribronchial cuffing is favored to represent mild pulmonary edema. Low lung volumes bronchovascular crowding. Stable cardiomediastinal silhouette. Lines and tubes in standard position. Cholecystectomy clips are noted. XR CHEST PORTABLE    Result Date: 8/1/2022  1. Right IJ CVC tip in the cavoatrial junction. 2.  Bibasilar airspace disease suggesting atelectasis. Pneumonia is not excluded. The findings from the last RT Protocol Assessment were as follows:   History Pulmonary Disease: None or smoker <15 pack years  Respiratory Pattern: Dyspnea on exertion or RR 21-25 bpm  Breath Sounds: Inspiratory and expiratory or bilateral wheezing and/or rhonchi  Cough: Weak, productive  Indication for Bronchodilator Therapy:    Bronchodilator Assessment Score: 10    Aerosolized bronchodilator medication orders have been revised according to the RT Inhaler-Nebulizer Bronchodilator Protocol below. Respiratory Therapist to perform RT Therapy Protocol Assessment initially then follow the protocol. Repeat RT Therapy Protocol Assessment PRN for score 0-3 or on second treatment, BID, and PRN for scores above 3. No Indications - adjust the frequency to every 6 hours PRN wheezing or bronchospasm, if no treatments needed after 48 hours then discontinue using Per Protocol order mode. If indication present, adjust the RT bronchodilator orders based on the Bronchodilator Assessment Score as indicated below.   Use Inhaler orders unless patient has one or more of the following: on home nebulizer, not able to hold breath for Protocol order as well (see protocol at bottom of note). CXR Findings:  XR CHEST PORTABLE    Result Date: 8/2/2022  Prominence of the perihilar interstitial markings with mild peribronchial cuffing is favored to represent mild pulmonary edema. Low lung volumes bronchovascular crowding. Stable cardiomediastinal silhouette. Lines and tubes in standard position. Cholecystectomy clips are noted. XR CHEST PORTABLE    Result Date: 8/1/2022  1. Right IJ CVC tip in the cavoatrial junction. 2.  Bibasilar airspace disease suggesting atelectasis. Pneumonia is not excluded. The findings from the last RT Protocol Assessment were as follows:   History Pulmonary Disease: None or smoker <15 pack years  Respiratory Pattern: Dyspnea on exertion or RR 21-25 bpm  Breath Sounds: Inspiratory and expiratory or bilateral wheezing and/or rhonchi  Cough: Weak, productive  Indication for Bronchodilator Therapy:    Bronchodilator Assessment Score: 10    Aerosolized bronchodilator medication orders have been revised according to the RT Inhaler-Nebulizer Bronchodilator Protocol below. Respiratory Therapist to perform RT Therapy Protocol Assessment initially then follow the protocol. Repeat RT Therapy Protocol Assessment PRN for score 0-3 or on second treatment, BID, and PRN for scores above 3. No Indications - adjust the frequency to every 6 hours PRN wheezing or bronchospasm, if no treatments needed after 48 hours then discontinue using Per Protocol order mode. If indication present, adjust the RT bronchodilator orders based on the Bronchodilator Assessment Score as indicated below. Use Inhaler orders unless patient has one or more of the following: on home nebulizer, not able to hold breath for 10 seconds, is not alert and oriented, cannot activate and use MDI correctly, or respiratory rate 25 breaths per minute or more, then use the equivalent nebulizer order(s) with same Frequency and PRN reasons based on the score.

## 2022-08-02 NOTE — RT PROTOCOL NOTE
following: on home nebulizer, not able to hold breath for 10 seconds, is not alert and oriented, cannot activate and use MDI correctly, or respiratory rate 25 breaths per minute or more, then use the equivalent nebulizer order(s) with same Frequency and PRN reasons based on the score. If a patient is on this medication at home then do not decrease Frequency below that used at home. 0-3 - enter or revise RT bronchodilator order(s) to equivalent RT Bronchodilator order with Frequency of every 4 hours PRN for wheezing or increased work of breathing using Per Protocol order mode. 4-6 - enter or revise RT Bronchodilator order(s) to two equivalent RT bronchodilator orders with one order with BID Frequency and one order with Frequency of every 4 hours PRN wheezing or increased work of breathing using Per Protocol order mode. 7-10 - enter or revise RT Bronchodilator order(s) to two equivalent RT bronchodilator orders with one order with TID Frequency and one order with Frequency of every 4 hours PRN wheezing or increased work of breathing using Per Protocol order mode. 11-13 - enter or revise RT Bronchodilator order(s) to one equivalent RT bronchodilator order with QID Frequency and an Albuterol order with Frequency of every 4 hours PRN wheezing or increased work of breathing using Per Protocol order mode. Greater than 13 - enter or revise RT Bronchodilator order(s) to one equivalent RT bronchodilator order with every 4 hours Frequency and an Albuterol order with Frequency of every 2 hours PRN wheezing or increased work of breathing using Per Protocol order mode. RT to enter RT Home Evaluation for COPD & MDI Assessment order using Per Protocol order mode.     Electronically signed by Brittany Haley RCP on 8/2/2022 at 12:39 PM

## 2022-08-02 NOTE — PLAN OF CARE
Problem: Safety - Adult  Goal: Free from fall injury  8/2/2022 0656 by Stacey Garcia RN  Outcome: Progressing  Note: No falls occurred this shift; fall precautions maintained. Problem: Pain  Goal: Verbalizes/displays adequate comfort level or baseline comfort level  8/2/2022 0656 by Stacey Garcia RN  Outcome: Not Progressing  Note: Pt with severe pain throughout shift, utilizing Morphine per [facility] for management as appropriate. Problem: Respiratory - Adult  Goal: Achieves optimal ventilation and oxygenation  8/2/2022 0656 by Stacey Garcia RN  Outcome: Progressing  Note: Pt requiring increased O2 support this shift; encouraging cough and deep breathing as tolerated. Problem: Pain  Goal: Verbalizes/displays adequate comfort level or baseline comfort level  8/2/2022 0656 by Stacey Garcia RN  Outcome: Not Progressing  Note: Pt with severe pain throughout shift, utilizing Morphine per [facility] for management as appropriate.

## 2022-08-02 NOTE — PROGRESS NOTES
Pt a/o x4, NG to R nare to low wall suction, winter intact for retention, R sided triple lumen IJ CVC intact and wnl. Abdomen distended, and pt c/o severe pain and discomfort- managed with morphine per MAR.     0430: Pt calling out in pain, noted to be tachypneic and tachycardic. Placed on 5L O2 NC then transitioned to 10L High flow NC, MD notified, to bedside to assess. Pt noted to be in a-fib on telemetry w HR sustaining 105-120 bpm.   Labs, ECG, Echo, CXR, and KUB ordered per MD.     0530: PRN Metoprolol administered per MAR parameters.

## 2022-08-02 NOTE — PROGRESS NOTES
PERFORMED:    /63   Pulse 98   Temp 97.8 °F (36.6 °C) (Axillary)   Resp 20   Ht 5' 2\" (1.575 m)   Wt 224 lb 3.3 oz (101.7 kg)   SpO2 95%   BMI 41.01 kg/m²     General appearance: Moderate to severe distress due to abdominal pain, mildly drowsy (? Due to just got morphine)  HEENT:  Normal cephalic, atraumatic without obvious deformity. Neck: Supple, with full range of motion. No jugular venous distention. Respiratory:  Normal respiratory effort. Clear to auscultation, bilaterally without Rales/Wheezes/Rhonchi. Cardiovascular:  Regular rate and rhythm with normal S1/S2 without murmurs, rubs or gallops. Abdomen: Distended with subumbilical incision scar. O/w Soft, diffuse tenderness, worse in the LLQ. No guarding; Minimal- absent bowel sounds  Musculoskeletal: Right hip swelling but no erythema warmth. Incision dressing C/D/I  Skin: Skin color, texture, turgor normal.  No rashes or lesions. Neurologic:  grossly non-focal.  Psychiatric:  Alert and oriented, thought content appropriate, normal insight  Capillary Refill: Brisk,< 3 seconds   Peripheral Pulses: +2 palpable, equal bilaterally       Labs:     Recent Labs     08/01/22  0023 08/01/22  0541 08/02/22  0531   WBC 16.8* 13.5* 10.7   HGB 11.2* 9.1* 9.2*   HCT 32.0* 27.3* 27.5*   * see below 614*       Recent Labs     07/31/22  2303 08/01/22  0023 08/01/22  0224 08/01/22  0541 08/02/22  0531   *  --   --  129* 128*   K 6.6*   < > 6.0* 5.3* 5.7*   CL 98*  --   --  95* 94*   CO2 8*  --   --  17* 20*   BUN 49*  --   --  53* 63*   CREATININE 2.3*  --   --  2.4* 2.7*   CALCIUM 8.3  --   --  8.2* 7.7*   PHOS 7.6*  --   --  7.8* 8.6*    < > = values in this interval not displayed.        Recent Labs     08/01/22  0023 08/01/22  0541   AST 51* 33   ALT 39 27   BILIDIR 0.3 <0.2   BILITOT 0.5 0.5   ALKPHOS 519* 456*       Recent Labs     08/01/22  0224   INR 1.27*       No results for input(s): Rossy Revels in the last 72 hours.    Urinalysis:      Lab Results   Component Value Date/Time    NITRU POSITIVE 07/31/2022 05:15 AM    WBCUA 3-5 07/31/2022 05:15 AM    BACTERIA 1+ 07/31/2022 05:15 AM    RBCUA None seen 07/31/2022 05:15 AM    BLOODU Negative 07/31/2022 05:15 AM    SPECGRAV 1.015 07/31/2022 05:15 AM    GLUCOSEU Negative 07/31/2022 05:15 AM       Radiology: No imaging here. Reviewed imaging from OSH    ASSESSMENT/PLAN:    Severe sepsis: POA - etiology still unclear  - Leukocytosis with tachycardia; Transferred for evaluation of post-op hip fluid collection/possible infection however may have intra abdominal source, possible C. Diff  - Blood cultures x 2 NGTD  - Was started on broad-spectrum antibiotics on admission  - CXR: PNA not excluded. She did have CTA chest prior to transfer. NO PE. Persistent mosaic pattern in the lungs which could represent pulmonary edema or hypersensitivity pneumonitis which has increased since her CT scan in 2021.   - Procal increased however leukocytosis steadily down-trending although have had difficulty getting enteric vancomycin - but she did get flagyl  - Will follow-up Ortho recs   - Repeat CT chest for worsening hypoxia  - ID consulted    Atrial Fibrillation  Progressive hypoxemic respiratory failure  -Tx with IV lopressor overnight  -Echocardiogram  -BNP  -Cardiology consult  -Repeat CT chest wo contrast as above    Bowel obstruction: POA  - Pxt has severe abdominal pain with severe constipation; distended bowel with minimal to absent BS  - She has a midline subumbil incision  - CT of the [facility] showed significant stool from cecum to the sigmoid colon and transition point at the rectosigmoid.   - GI, Surgery consulted, appreciate recs  - Miralax unsuccessful, continuation of relistor advised (renal dosing)  - Enema attempted x 2  - Continue NG to suction  - Reduced narcotics somewhat    Possible severe C. Diff with ileus. Patient had diarrhea outpatient but then had immodium.

## 2022-08-02 NOTE — CONSULTS
Consultant: Hang Fierro PA-C   From: Dr. Debra Taveras  Reason: Post op fluid collection    No chief complaint on file. History of Present Illness      Luzma Whitman is a 74 y.o. female who presented to [facility] for generalized weakness and abdominal pain SP right total hip arthroplasty on 7/13/22. On CT, she was found to have a subcutaneous fluid collection overlying her her right hip. Patient denies having any pain in her right hip. She denies having any drainage, fevers and chills. She denies falls or any setback. Prior to when her abdominal pain began, she was ambulating around her home with a walker and able to maintain 50% weight bearing restriction without difficulty or discomfort. Past History       Past Medical History:   Diagnosis Date    Back pain     Hyperlipidemia     Hypertension     Tuberculosis 1981    Wears dentures      Past Surgical History:   Procedure Laterality Date    APPENDECTOMY  1957    CATARACT REMOVAL Bilateral 2015    CHOLECYSTECTOMY  2002    ECTOPIC PREGNANCY SURGERY  1970    PARTIAL HYSTERECTOMY (CERVIX NOT REMOVED)  [place] 1822    TOTAL HIP ARTHROPLASTY Right 7/13/2022    RIGHT TOTAL HIP ARTHROPLASTY/ MINIMALLY INVASIVE DIRECT ANTERIOR performed by Hallie Lam MD at [facility] OR     No family history on file. Social History     Tobacco Use    Smoking status: Never    Smokeless tobacco: Never   Substance Use Topics    Alcohol use: Never      No current facility-administered medications on file prior to encounter. Current Outpatient Medications on File Prior to Encounter   Medication Sig Dispense Refill    naloxone 4 MG/0.1ML LIQD nasal spray 1 spray by Nasal route as needed for Opioid Reversal 1 each 5    aspirin EC 81 MG EC tablet Take 1 tablet by mouth 2 times daily 60 tablet 0    methylPREDNISolone (MEDROL, ATA,) 4 MG tablet Take by mouth.  (Patient not taking: Reported on 7/30/2022) 1 kit 0    ondansetron (ZOFRAN) 4 MG tablet Take 1 tablet by mouth 3 times daily as needed for Nausea or Vomiting 15 tablet 0    amLODIPine (NORVASC) 10 MG tablet Take 10 mg by mouth daily      albuterol sulfate  (90 Base) MCG/ACT inhaler INHALE 2 PUFFS BY MOUTH EVERY 6 HOURS AS NEEDED FOR WHEEZING      allopurinol (ZYLOPRIM) 100 MG tablet Take 100 mg by mouth daily      furosemide (LASIX) 20 MG tablet Take 20 mg by mouth daily      lisinopril (PRINIVIL;ZESTRIL) 5 MG tablet Take 5 mg by mouth in the morning and at bedtime       methocarbamol (ROBAXIN) 500 MG tablet Take 500 mg by mouth 3 times daily as needed      meloxicam (MOBIC) 15 MG tablet Take 15 mg by mouth daily (Patient not taking: No sig reported)      pregabalin (LYRICA) 50 MG capsule Take 50 mg by mouth 3 times daily. fluticasone (FLONASE) 50 MCG/ACT nasal spray Use 2 spray(s) in each nostril once daily        Buspirone, Duloxetine, Fenofibrate, Gabapentin, Venlafaxine, Doxycycline, Statins, and Sulfa antibiotics    Review of Systems      10-point ROS is negative other than what's mentioned in HPI. Physical Exam    Based off 1997 Exam Criteria    /79   Pulse (!) 119   Temp 97.7 °F (36.5 °C) (Oral)   Resp 18   Ht 5' 2\" (1.575 m)   Wt 224 lb 3.3 oz (101.7 kg)   SpO2 91%   BMI 41.01 kg/m²      Constitutional:       General: She is not in acute distress. Appearance: Ill. Musculoskeletal:  Right hip- incision healing well with no erythema or drainage. Distal NVI. Sensation intact to light touch. Gross motor function in foot and ankle intact. Dorsalis pedis pulse 1+. Imaging       Right Hip CT scan obtained 8/1/22 demonstrates a 4.9 x 2.9 cm fluid collection over the right total hip arthroplasty. THR hardware is in good alignment with no evidence of failure or loosening. Assessment and Plan      Assessment- likely seroma, normal post operative state sp R THR 7/13/22    Plan- no need for surgical intervention at this time. Patient should remain 50% weight bearing. Continue DVT ppx for 30 days post op. Would recommend continuing antibiotics as a prophylactic measure. Compression shorts could help decompress seroma. I have reached out to [facility] orthopedic products to help manage this. Ortho will follow peripherally. Please contact me with any questions. [facility] [place]     Electronically signed by [facility] [place], PA on 8/2/2022 at 2:17 PM  This dictation was generated by voice recognition computer software. Although all attempts are made to edit the dictation for accuracy, there may be errors in the transcription that are not intended.

## 2022-08-02 NOTE — CONSULTS
Infectious Diseases Inpatient Consult Note    RESIDENT NOTE - reviewed / edited, attending note at bottom    Reason for Consult:   Ileus concerning for C.diff, UTI  Requesting Physician:   Dr. Natalie Carrasco  Primary Care Physician: Annabelle Davis  History Obtained From:   Pt, EPIC    Admit Date: 7/30/2022  Hospital Day: 4    CHIEF COMPLAINT:       Sepsis, Ileus concerning for C.diff, UTI    HISTORY OF PRESENT ILLNESS:      Patient is a 75 y/o female that has a PMHx of obesity (BMI 41.01), HTN, HLD, Gout, and OA. Patient is s/p CASS (DOS 07/16/2022). Patient was additionally diagnosed with a UTI during that hospitalization and was initially treated with Cipro. She was transitioned over to Keflex prior to her discharge but states that she was having multiple bouts of diarrhea with the Keflex. Patient took an Imodium on 7/25 to help her diarrhea but she soon became constipated. Patient was doing well from the operation upon her discharge but said that she had a possible femoral crack during procedure that complicated the CASS. Since the operation the patient was able to do her rehab appropriately. However, starting 07/29/2022-07/30/2022 patient started to develop some generalized weakness, nausea, vomiting and abdominal pain that only got worse throughout the day. As a result, patient decided to present to [facility] for further evaluation. In the ED at [facility] patient had labs which were significant for WBC 22.8k, with elevated ANC, hgb 10.1, na131, k 5.0 BUN of 19 cr 1.1,Alk phos 139. Furthermore, patient got CT Abdomen & Pelvis which showed large amount of stool cecum through the sigmoid colon along with  a partially organized fluid without obvious rim enhancement anterior to the CASS (possible seroma). Patient was then transferred to [facility], [facility]. for further evaluation with Dr. Sanya Skelton.      Once patient was transferred to [facility] patient was noted to have an elevated ESR (52), CRP (63), Pro-robin (10.91), and Lactic Acid (4.5); and was subsequently started on vancomycin, cefepime, and flagyl but was then transitioned to Linezolid and Meropenem. Today at bedside with patients daughter, patient feels like she is doing a lot better than when she came in. Patient is currently on 6L of HFNC. Patients WBC and Lactic Acid has downtrended to WNL 10.7 and 2.0 respectively but patients pro-robin has almost doubled 20.38 along with an elevated pro-BNP 3165. Patient denies any nausea, vomiting, chills, or abdominal pain. Patient says that she only feels the pain in her abdomen when people touch the stomach. Patient denies any acute overnight events.        Past Medical History:    Past Medical History:   Diagnosis Date    Back pain     Hyperlipidemia     Hypertension     Tuberculosis 1981    Wears dentures      Past Surgical History:    Past Surgical History:   Procedure Laterality Date    APPENDECTOMY  1957    CATARACT REMOVAL Bilateral 2015    CHOLECYSTECTOMY  2002    ECTOPIC PREGNANCY SURGERY  1970    PARTIAL HYSTERECTOMY (CERVIX NOT REMOVED)  [place] 1822    TOTAL HIP ARTHROPLASTY Right 7/13/2022    RIGHT TOTAL HIP ARTHROPLASTY/ MINIMALLY INVASIVE DIRECT ANTERIOR performed by Hallie Lam MD at [address]     Current Medications:     sodium chloride (Inhalant)  4 mL Nebulization BID    albuterol  2.5 mg Nebulization BID    linezolid  600 mg IntraVENous Q12H    meropenem  500 mg IntraVENous Q12H    polyethylene glycol  17 g Per NG tube TID    SMOG Enema   Rectal Once    vancomycin 500 mg in 0.9% sodium chloride 250 mL  500 mg Rectal Q6H    metroNIDAZOLE  500 mg IntraVENous Q8H    oxymetazoline  2 spray Each Nostril Once    bisacodyl  10 mg Rectal Once    lidocaine   Topical Once    sodium chloride flush  5-40 mL IntraVENous 2 times per day    [Held by provider] amLODIPine  10 mg Oral Daily    fluticasone  2 spray Each Nostril Daily    [Held by provider] lisinopril  5 mg Oral BID [Held by provider] pregabalin  50 mg Oral TID     Allergies:  Buspirone, Duloxetine, Fenofibrate, Gabapentin, Venlafaxine, Doxycycline, Statins, and Sulfa antibiotics    Social History:    TOBACCO:    Patient reports no history of tobacco use. ETOH:    Patient reports no history of alcohol use. DRUGS:   Patient reports no history of drug use. MARITAL STATUS:   , [place]  OCCUPATION:   Retired    Patient lives in community     Family History:   No immunodeficiency    REVIEW OF SYSTEMS:    No fever / chills / sweats. No weight loss. No visual change, eye pain, eye discharge. No oral lesion, sore throat, dysphagia. Denies cough / sputum. Denies chest pain, palpitations. Denies n / v / abd pain. No diarrhea. Denies dysuria or change in urinary function. Denies joint swelling or pain. No myalgia, arthralgia. Denies skin changes, itching  Denies focal weakness, sensory change or other neurologic symptom    Denies new / worse depression, psychiatric symptoms    PHYSICAL EXAM:    Vitals:    /63   Pulse 98   Temp 97.8 °F (36.6 °C) (Axillary)   Resp 20   Ht 5' 2\" (1.575 m)   Wt 224 lb 3.3 oz (101.7 kg)   SpO2 95% Comment: 91-95  BMI 41.01 kg/m²     GENERAL: Patient is obese  HEENT: Membranes moist, no oral lesion, PERRL. NG Tube placed on 7/31/2022 with less than 350 mL of dark brown fluid. NECK:  Supple, no lymphadenopathy  LUNGS: Respiratory distress present  CARDIAC: RRR, no murmur appreciated  ABD:  Mild abdominal distension along with tenderness noted along LUQ and LMQ with sluggish bowel sounds. Iqbal cathter in place with 300 mL of tea colored urine present. EXT:  No rash, no edema, no lesions. Right CASS incision site appears intact with no acute signs on infection noted. No pain to palpation or tenderness noted.    NEURO: No focal neurologic findings  PSYCH: Orientation, sensorium, mood normal  LINES:  Central line (R IJ)    DATA:    Lab Results   Component Value Date    WBC 10.7 08/02/2022    HGB 9.2 (L) 08/02/2022    HCT 27.5 (L) 08/02/2022    MCV 95.1 08/02/2022     (H) 08/02/2022     Lab Results   Component Value Date    CREATININE 2.7 (H) 08/02/2022    BUN 63 (H) 08/02/2022     (L) 08/02/2022    K 5.7 (H) 08/02/2022    CL 94 (L) 08/02/2022    CO2 20 (L) 08/02/2022     Hepatic Function Panel:   Lab Results   Component Value Date/Time    ALKPHOS 456 08/01/2022 05:41 AM    ALT 27 08/01/2022 05:41 AM    AST 33 08/01/2022 05:41 AM    PROT 4.8 08/01/2022 05:41 AM    BILITOT 0.5 08/01/2022 05:41 AM    BILIDIR <0.2 08/01/2022 05:41 AM    IBILI see below 08/01/2022 05:41 AM    LABALBU 2.3 08/02/2022 05:31 AM     Micro:    07/30 Urine culture (Farnaz Griffiths): E. Coli     07/31 Blood culture #1: NGTD   Blood culture #2: NGTD   Urine culture: NGTD    08/02 C diff toxin/antigen: Pending    Imaging:     CT Abdomen Pelvis w Contrast (07/30/2022) [facility]   IMPRESSION:   1. There is a large amount of stool cecum through the sigmoid colon. Transition   point is rectosigmoid but there is no obvious mass in the rectosigmoid region. No bowel thickening or peribowel stranding in the rectosigmoid region. 2. Likely, multiple renal cysts. 3. Partially organized fluid without obvious rim enhancement anterior to the   THR. This may simply represent postoperative seroma but clinical correlation   needed. The entire inferior extent of this is not covered on this exam.   4. Urinary bladder is distended with fluid    XR Abdomen (07/31/2022)  Impression   Dilated colonic and small bowel loops may suggest ileus but obstruction is not excluded. XR Chest (08/01/2022)  Impression   1. Right IJ CVC tip in the cavoatrial junction. 2.  Bibasilar airspace disease suggesting atelectasis. Pneumonia is not excluded. CT Abdomen Pelvis and Right Hip w/o Contrast (08/01/2022)  Impression   1. Moderate colonic distention. Correlate for constipation.  Fecal content within the ileum a week (took imodium 1 week prior with loose stool with cephalexin). Also, confused. In ED ([facility]) 7/30, afeb, WBC 22.8. CT with fluid collect anterior to R hip / CASS. Transfer / admit to [facility] on 7/30 - UA neg   Started on Vanco / cefepime. NGT placed. CT chest - R>L pleural effusion, illeus    Today, afeb, O2 6L. WBC 10.7, Cr 2.7, LA 2. (4.5 on admit), procalcitonin 20.38  Pt awake, alert, conversant. On exam, lethargic, able to converse  Lungs dull at bases. Abd - tender, lashawn LLQ  LE edema. R hip - no redness / tenderness. Anterior dressing in place. Now on Linezolid, Meropenem, PO Vancomcyin, IV Metronidazole    IMP/  R CASS     Encephalopathy, improved  Ileus - loss of stool of CT / constipated  R hip - fluid collection around prior surg site, unclear if infected  Leukocytosis - WBC down  Elevated Procalcitonin    ARIANE    No diagnosis of a specific infectious process at this time   - no pneumonia, no UTI, unclear regarding periop fluid collection    REC/  D/c antibiotics  Will ask Radiology to 'push over' CT from [facility]  Aspiration fluid collection assoc with R CASS  Ileus - NGT, enemas      Medical Decision Making: The following items were considered in medical decision making:  Discussion of patient care with other providers  Reviewed clinical lab tests  Reviewed radiology tests  Reviewed other diagnostic tests/interventions  Independent review of radiologic images  Microbiology cultures and other micro tests reviewed      Risk of Complications/Morbidity: High   Illness(es)/ Infection present that pose threat to bodily function. There is potential for severe exacerbation of infection/side effects of treatment.   Therapy requires intensive monitoring for antimicrobial agent toxicity    Discussed with pt, family (, daughter, sister)  Agueda Moore MD

## 2022-08-03 ENCOUNTER — ANESTHESIA (OUTPATIENT)
Dept: ENDOSCOPY | Age: 72
DRG: 853 | End: 2022-08-03
Payer: MEDICARE

## 2022-08-03 ENCOUNTER — APPOINTMENT (OUTPATIENT)
Dept: GENERAL RADIOLOGY | Age: 72
DRG: 853 | End: 2022-08-03
Attending: INTERNAL MEDICINE
Payer: MEDICARE

## 2022-08-03 ENCOUNTER — ANESTHESIA EVENT (OUTPATIENT)
Dept: ENDOSCOPY | Age: 72
DRG: 853 | End: 2022-08-03
Payer: MEDICARE

## 2022-08-03 ENCOUNTER — APPOINTMENT (OUTPATIENT)
Dept: NUCLEAR MEDICINE | Age: 72
DRG: 853 | End: 2022-08-03
Attending: INTERNAL MEDICINE
Payer: MEDICARE

## 2022-08-03 ENCOUNTER — APPOINTMENT (OUTPATIENT)
Dept: VASCULAR LAB | Age: 72
DRG: 853 | End: 2022-08-03
Attending: INTERNAL MEDICINE
Payer: MEDICARE

## 2022-08-03 PROBLEM — Z79.01 ON CONTINUOUS HEPARIN INFUSION: Status: ACTIVE | Noted: 2022-08-03

## 2022-08-03 PROBLEM — I10 BENIGN ESSENTIAL HTN: Status: ACTIVE | Noted: 2022-08-03

## 2022-08-03 PROBLEM — Z96.641 STATUS POST RIGHT HIP REPLACEMENT: Status: ACTIVE | Noted: 2022-08-03

## 2022-08-03 PROBLEM — I48.91 ATRIAL FIBRILLATION WITH RVR (HCC): Status: ACTIVE | Noted: 2022-08-03

## 2022-08-03 LAB
ALBUMIN SERPL-MCNC: 2.1 G/DL (ref 3.4–5)
ALBUMIN SERPL-MCNC: 2.4 G/DL (ref 3.4–5)
ANION GAP SERPL CALCULATED.3IONS-SCNC: 15 MMOL/L (ref 3–16)
ANION GAP SERPL CALCULATED.3IONS-SCNC: 17 MMOL/L (ref 3–16)
ANTI-XA UNFRAC HEPARIN: 0.86 IU/ML (ref 0.3–0.7)
ANTI-XA UNFRAC HEPARIN: 0.95 IU/ML (ref 0.3–0.7)
BASOPHILS ABSOLUTE: 0 K/UL (ref 0–0.2)
BASOPHILS RELATIVE PERCENT: 0 %
BUN BLDV-MCNC: 71 MG/DL (ref 7–20)
BUN BLDV-MCNC: 79 MG/DL (ref 7–20)
CALCIUM SERPL-MCNC: 7.7 MG/DL (ref 8.3–10.6)
CALCIUM SERPL-MCNC: 8.2 MG/DL (ref 8.3–10.6)
CHLORIDE BLD-SCNC: 91 MMOL/L (ref 99–110)
CHLORIDE BLD-SCNC: 95 MMOL/L (ref 99–110)
CO2: 19 MMOL/L (ref 21–32)
CO2: 21 MMOL/L (ref 21–32)
CREAT SERPL-MCNC: 2.7 MG/DL (ref 0.6–1.2)
CREAT SERPL-MCNC: 2.8 MG/DL (ref 0.6–1.2)
EOSINOPHILS ABSOLUTE: 0 K/UL (ref 0–0.6)
EOSINOPHILS RELATIVE PERCENT: 0 %
GFR AFRICAN AMERICAN: 20
GFR AFRICAN AMERICAN: 21
GFR NON-AFRICAN AMERICAN: 17
GFR NON-AFRICAN AMERICAN: 17
GLUCOSE BLD-MCNC: 90 MG/DL (ref 70–99)
GLUCOSE BLD-MCNC: 92 MG/DL (ref 70–99)
HCT VFR BLD CALC: 25.4 % (ref 36–48)
HEMOGLOBIN: 8.5 G/DL (ref 12–16)
LV EF: 65 %
LVEF MODALITY: NORMAL
LYMPHOCYTES ABSOLUTE: 0.5 K/UL (ref 1–5.1)
LYMPHOCYTES RELATIVE PERCENT: 5 %
MAGNESIUM: 3 MG/DL (ref 1.8–2.4)
MAGNESIUM: 3.1 MG/DL (ref 1.8–2.4)
MCH RBC QN AUTO: 31.8 PG (ref 26–34)
MCHC RBC AUTO-ENTMCNC: 33.3 G/DL (ref 31–36)
MCV RBC AUTO: 95.5 FL (ref 80–100)
MONOCYTES ABSOLUTE: 0.4 K/UL (ref 0–1.3)
MONOCYTES RELATIVE PERCENT: 4 %
NEUTROPHILS ABSOLUTE: 9.6 K/UL (ref 1.7–7.7)
NEUTROPHILS RELATIVE PERCENT: 91 %
PDW BLD-RTO: 14.4 % (ref 12.4–15.4)
PHOSPHORUS: 8.3 MG/DL (ref 2.5–4.9)
PHOSPHORUS: 8.6 MG/DL (ref 2.5–4.9)
PLATELET # BLD: ABNORMAL K/UL (ref 135–450)
PLATELET SLIDE REVIEW: ABNORMAL
PMV BLD AUTO: ABNORMAL FL (ref 5–10.5)
POTASSIUM SERPL-SCNC: 5.2 MMOL/L (ref 3.5–5.1)
POTASSIUM SERPL-SCNC: 5.3 MMOL/L (ref 3.5–5.1)
RBC # BLD: 2.66 M/UL (ref 4–5.2)
SODIUM BLD-SCNC: 127 MMOL/L (ref 136–145)
SODIUM BLD-SCNC: 131 MMOL/L (ref 136–145)
TARGET CELLS: ABNORMAL
TEAR DROP CELLS: ABNORMAL
WBC # BLD: 10.5 K/UL (ref 4–11)

## 2022-08-03 PROCEDURE — 6360000002 HC RX W HCPCS

## 2022-08-03 PROCEDURE — 2580000003 HC RX 258: Performed by: INTERNAL MEDICINE

## 2022-08-03 PROCEDURE — 6370000000 HC RX 637 (ALT 250 FOR IP): Performed by: INTERNAL MEDICINE

## 2022-08-03 PROCEDURE — 36415 COLL VENOUS BLD VENIPUNCTURE: CPT

## 2022-08-03 PROCEDURE — 85025 COMPLETE CBC W/AUTO DIFF WBC: CPT

## 2022-08-03 PROCEDURE — C9113 INJ PANTOPRAZOLE SODIUM, VIA: HCPCS

## 2022-08-03 PROCEDURE — 74018 RADEX ABDOMEN 1 VIEW: CPT

## 2022-08-03 PROCEDURE — 80069 RENAL FUNCTION PANEL: CPT

## 2022-08-03 PROCEDURE — 6360000002 HC RX W HCPCS: Performed by: NURSE ANESTHETIST, CERTIFIED REGISTERED

## 2022-08-03 PROCEDURE — 94761 N-INVAS EAR/PLS OXIMETRY MLT: CPT

## 2022-08-03 PROCEDURE — 99291 CRITICAL CARE FIRST HOUR: CPT | Performed by: INTERNAL MEDICINE

## 2022-08-03 PROCEDURE — 0DB68ZX EXCISION OF STOMACH, VIA NATURAL OR ARTIFICIAL OPENING ENDOSCOPIC, DIAGNOSTIC: ICD-10-PCS | Performed by: INTERNAL MEDICINE

## 2022-08-03 PROCEDURE — 3700000001 HC ADD 15 MINUTES (ANESTHESIA): Performed by: INTERNAL MEDICINE

## 2022-08-03 PROCEDURE — 99233 SBSQ HOSP IP/OBS HIGH 50: CPT | Performed by: NURSE PRACTITIONER

## 2022-08-03 PROCEDURE — 93970 EXTREMITY STUDY: CPT

## 2022-08-03 PROCEDURE — 94669 MECHANICAL CHEST WALL OSCILL: CPT

## 2022-08-03 PROCEDURE — 2000000000 HC ICU R&B

## 2022-08-03 PROCEDURE — C8929 TTE W OR WO FOL WCON,DOPPLER: HCPCS

## 2022-08-03 PROCEDURE — 99223 1ST HOSP IP/OBS HIGH 75: CPT | Performed by: INTERNAL MEDICINE

## 2022-08-03 PROCEDURE — 3609027000 HC COLONOSCOPY: Performed by: INTERNAL MEDICINE

## 2022-08-03 PROCEDURE — 94640 AIRWAY INHALATION TREATMENT: CPT

## 2022-08-03 PROCEDURE — 85520 HEPARIN ASSAY: CPT

## 2022-08-03 PROCEDURE — 6360000002 HC RX W HCPCS: Performed by: STUDENT IN AN ORGANIZED HEALTH CARE EDUCATION/TRAINING PROGRAM

## 2022-08-03 PROCEDURE — 94664 DEMO&/EVAL PT USE INHALER: CPT

## 2022-08-03 PROCEDURE — 99233 SBSQ HOSP IP/OBS HIGH 50: CPT | Performed by: INTERNAL MEDICINE

## 2022-08-03 PROCEDURE — 3700000000 HC ANESTHESIA ATTENDED CARE: Performed by: INTERNAL MEDICINE

## 2022-08-03 PROCEDURE — 2580000003 HC RX 258

## 2022-08-03 PROCEDURE — 99232 SBSQ HOSP IP/OBS MODERATE 35: CPT | Performed by: INTERNAL MEDICINE

## 2022-08-03 PROCEDURE — 2500000003 HC RX 250 WO HCPCS: Performed by: NURSE ANESTHETIST, CERTIFIED REGISTERED

## 2022-08-03 PROCEDURE — 99232 SBSQ HOSP IP/OBS MODERATE 35: CPT | Performed by: SURGERY

## 2022-08-03 PROCEDURE — 6360000002 HC RX W HCPCS: Performed by: INTERNAL MEDICINE

## 2022-08-03 PROCEDURE — 2700000000 HC OXYGEN THERAPY PER DAY

## 2022-08-03 PROCEDURE — 6360000004 HC RX CONTRAST MEDICATION

## 2022-08-03 PROCEDURE — 2500000003 HC RX 250 WO HCPCS: Performed by: INTERNAL MEDICINE

## 2022-08-03 PROCEDURE — 83735 ASSAY OF MAGNESIUM: CPT

## 2022-08-03 PROCEDURE — 0D9E8ZZ DRAINAGE OF LARGE INTESTINE, VIA NATURAL OR ARTIFICIAL OPENING ENDOSCOPIC: ICD-10-PCS | Performed by: FAMILY MEDICINE

## 2022-08-03 RX ORDER — PANTOPRAZOLE SODIUM 40 MG/10ML
40 INJECTION, POWDER, LYOPHILIZED, FOR SOLUTION INTRAVENOUS 2 TIMES DAILY
Status: DISCONTINUED | OUTPATIENT
Start: 2022-08-03 | End: 2022-08-12

## 2022-08-03 RX ORDER — LIDOCAINE HYDROCHLORIDE 20 MG/ML
INJECTION, SOLUTION INFILTRATION; PERINEURAL PRN
Status: DISCONTINUED | OUTPATIENT
Start: 2022-08-03 | End: 2022-08-03 | Stop reason: SDUPTHER

## 2022-08-03 RX ORDER — ESMOLOL HYDROCHLORIDE 10 MG/ML
INJECTION INTRAVENOUS PRN
Status: DISCONTINUED | OUTPATIENT
Start: 2022-08-03 | End: 2022-08-03 | Stop reason: SDUPTHER

## 2022-08-03 RX ORDER — PROPOFOL 10 MG/ML
INJECTION, EMULSION INTRAVENOUS PRN
Status: DISCONTINUED | OUTPATIENT
Start: 2022-08-03 | End: 2022-08-03 | Stop reason: SDUPTHER

## 2022-08-03 RX ORDER — FUROSEMIDE 10 MG/ML
40 INJECTION INTRAMUSCULAR; INTRAVENOUS 3 TIMES DAILY
Status: DISCONTINUED | OUTPATIENT
Start: 2022-08-03 | End: 2022-08-03

## 2022-08-03 RX ORDER — FUROSEMIDE 10 MG/ML
40 INJECTION INTRAMUSCULAR; INTRAVENOUS ONCE
Status: DISCONTINUED | OUTPATIENT
Start: 2022-08-03 | End: 2022-08-03

## 2022-08-03 RX ORDER — FUROSEMIDE 10 MG/ML
40 INJECTION INTRAMUSCULAR; INTRAVENOUS 2 TIMES DAILY
Status: DISCONTINUED | OUTPATIENT
Start: 2022-08-03 | End: 2022-08-03

## 2022-08-03 RX ORDER — PROCHLORPERAZINE EDISYLATE 5 MG/ML
10 INJECTION INTRAMUSCULAR; INTRAVENOUS EVERY 6 HOURS PRN
Status: DISCONTINUED | OUTPATIENT
Start: 2022-08-03 | End: 2022-08-23 | Stop reason: HOSPADM

## 2022-08-03 RX ORDER — PANTOPRAZOLE SODIUM 40 MG/10ML
40 INJECTION, POWDER, LYOPHILIZED, FOR SOLUTION INTRAVENOUS DAILY
Status: DISCONTINUED | OUTPATIENT
Start: 2022-08-03 | End: 2022-08-03

## 2022-08-03 RX ORDER — SUCCINYLCHOLINE CHLORIDE 20 MG/ML
INJECTION INTRAMUSCULAR; INTRAVENOUS PRN
Status: DISCONTINUED | OUTPATIENT
Start: 2022-08-03 | End: 2022-08-03 | Stop reason: SDUPTHER

## 2022-08-03 RX ORDER — FUROSEMIDE 10 MG/ML
40 INJECTION INTRAMUSCULAR; INTRAVENOUS 2 TIMES DAILY
Status: DISCONTINUED | OUTPATIENT
Start: 2022-08-04 | End: 2022-08-04

## 2022-08-03 RX ORDER — EPINEPHRINE 1 MG/ML
INJECTION, SOLUTION, CONCENTRATE INTRAVENOUS PRN
Status: DISCONTINUED | OUTPATIENT
Start: 2022-08-03 | End: 2022-08-03 | Stop reason: SDUPTHER

## 2022-08-03 RX ADMIN — SODIUM CHLORIDE, PRESERVATIVE FREE 10 ML: 5 INJECTION INTRAVENOUS at 09:04

## 2022-08-03 RX ADMIN — PROPOFOL 80 MG: 10 INJECTION, EMULSION INTRAVENOUS at 12:35

## 2022-08-03 RX ADMIN — PHENYLEPHRINE HYDROCHLORIDE 100 MCG: 10 INJECTION, SOLUTION INTRAMUSCULAR; INTRAVENOUS; SUBCUTANEOUS at 12:40

## 2022-08-03 RX ADMIN — SODIUM CHLORIDE: 9 INJECTION, SOLUTION INTRAVENOUS at 12:30

## 2022-08-03 RX ADMIN — HYDROMORPHONE HYDROCHLORIDE 0.5 MG: 1 INJECTION, SOLUTION INTRAMUSCULAR; INTRAVENOUS; SUBCUTANEOUS at 01:54

## 2022-08-03 RX ADMIN — PHENYLEPHRINE HYDROCHLORIDE 200 MCG: 10 INJECTION, SOLUTION INTRAMUSCULAR; INTRAVENOUS; SUBCUTANEOUS at 12:53

## 2022-08-03 RX ADMIN — ALBUTEROL SULFATE 2.5 MG: 2.5 SOLUTION RESPIRATORY (INHALATION) at 20:01

## 2022-08-03 RX ADMIN — SODIUM CHLORIDE, PRESERVATIVE FREE 10 ML: 5 INJECTION INTRAVENOUS at 20:40

## 2022-08-03 RX ADMIN — SODIUM CHLORIDE 30 MG/ML INHALATION SOLUTION 4 ML: 30 SOLUTION INHALANT at 09:21

## 2022-08-03 RX ADMIN — MINERAL OIL 330 ML: 1000 LIQUID ORAL at 15:38

## 2022-08-03 RX ADMIN — ALBUTEROL SULFATE 2.5 MG: 2.5 SOLUTION RESPIRATORY (INHALATION) at 09:21

## 2022-08-03 RX ADMIN — PANTOPRAZOLE SODIUM 40 MG: 40 INJECTION, POWDER, FOR SOLUTION INTRAVENOUS at 20:40

## 2022-08-03 RX ADMIN — SUCCINYLCHOLINE CHLORIDE 120 MG: 20 INJECTION, SOLUTION INTRAMUSCULAR; INTRAVENOUS; PARENTERAL at 12:35

## 2022-08-03 RX ADMIN — MORPHINE SULFATE 2 MG: 2 INJECTION, SOLUTION INTRAMUSCULAR; INTRAVENOUS at 19:29

## 2022-08-03 RX ADMIN — ESMOLOL HYDROCHLORIDE 30 MG: 10 INJECTION, SOLUTION INTRAVENOUS at 13:04

## 2022-08-03 RX ADMIN — POLYETHYLENE GLYCOL-3350 AND ELECTROLYTES 500 ML: 236; 6.74; 5.86; 2.97; 22.74 POWDER, FOR SOLUTION ORAL at 16:00

## 2022-08-03 RX ADMIN — FUROSEMIDE 40 MG: 10 INJECTION, SOLUTION INTRAMUSCULAR; INTRAVENOUS at 11:30

## 2022-08-03 RX ADMIN — PANTOPRAZOLE SODIUM 40 MG: 40 INJECTION, POWDER, FOR SOLUTION INTRAVENOUS at 10:00

## 2022-08-03 RX ADMIN — LIDOCAINE HYDROCHLORIDE 80 MG: 20 INJECTION, SOLUTION INFILTRATION; PERINEURAL at 12:35

## 2022-08-03 RX ADMIN — PHENYLEPHRINE HYDROCHLORIDE 200 MCG: 10 INJECTION, SOLUTION INTRAMUSCULAR; INTRAVENOUS; SUBCUTANEOUS at 12:43

## 2022-08-03 RX ADMIN — PHENYLEPHRINE HYDROCHLORIDE 200 MCG: 10 INJECTION, SOLUTION INTRAMUSCULAR; INTRAVENOUS; SUBCUTANEOUS at 12:46

## 2022-08-03 RX ADMIN — MORPHINE SULFATE 2 MG: 2 INJECTION, SOLUTION INTRAMUSCULAR; INTRAVENOUS at 05:11

## 2022-08-03 RX ADMIN — MORPHINE SULFATE 2 MG: 2 INJECTION, SOLUTION INTRAMUSCULAR; INTRAVENOUS at 14:00

## 2022-08-03 RX ADMIN — EPINEPHRINE 10 MCG: 1 INJECTION, SOLUTION, CONCENTRATE INTRAVENOUS at 12:46

## 2022-08-03 RX ADMIN — ONDANSETRON 4 MG: 2 INJECTION INTRAMUSCULAR; INTRAVENOUS at 18:02

## 2022-08-03 RX ADMIN — HYDROMORPHONE HYDROCHLORIDE 1 MG: 1 INJECTION, SOLUTION INTRAMUSCULAR; INTRAVENOUS; SUBCUTANEOUS at 15:37

## 2022-08-03 RX ADMIN — PERFLUTREN 1.65 MG: 6.52 INJECTION, SUSPENSION INTRAVENOUS at 09:04

## 2022-08-03 RX ADMIN — DILTIAZEM HYDROCHLORIDE 5 MG/HR: 5 INJECTION, SOLUTION INTRAVENOUS at 06:38

## 2022-08-03 RX ADMIN — HEPARIN SODIUM 16 UNITS/KG/HR: 10000 INJECTION, SOLUTION INTRAVENOUS at 07:44

## 2022-08-03 RX ADMIN — SODIUM CHLORIDE 30 MG/ML INHALATION SOLUTION 4 ML: 30 SOLUTION INHALANT at 20:00

## 2022-08-03 RX ADMIN — MORPHINE SULFATE 2 MG: 2 INJECTION, SOLUTION INTRAMUSCULAR; INTRAVENOUS at 09:27

## 2022-08-03 ASSESSMENT — PAIN SCALES - GENERAL
PAINLEVEL_OUTOF10: 8
PAINLEVEL_OUTOF10: 7
PAINLEVEL_OUTOF10: 0
PAINLEVEL_OUTOF10: 7
PAINLEVEL_OUTOF10: 9
PAINLEVEL_OUTOF10: 0
PAINLEVEL_OUTOF10: 8

## 2022-08-03 ASSESSMENT — ENCOUNTER SYMPTOMS
DIARRHEA: 0
NAUSEA: 0
BLOOD IN STOOL: 0
ABDOMINAL DISTENTION: 1
SHORTNESS OF BREATH: 1
ABDOMINAL PAIN: 1
CONSTIPATION: 1
VOMITING: 0

## 2022-08-03 ASSESSMENT — PAIN DESCRIPTION - LOCATION
LOCATION: ABDOMEN

## 2022-08-03 ASSESSMENT — PAIN DESCRIPTION - PAIN TYPE
TYPE: ACUTE PAIN
TYPE: ACUTE PAIN

## 2022-08-03 ASSESSMENT — PAIN DESCRIPTION - ONSET
ONSET: PROGRESSIVE
ONSET: ON-GOING

## 2022-08-03 ASSESSMENT — PAIN DESCRIPTION - ORIENTATION
ORIENTATION: MID
ORIENTATION: MID

## 2022-08-03 ASSESSMENT — PAIN DESCRIPTION - DESCRIPTORS
DESCRIPTORS: DISCOMFORT

## 2022-08-03 ASSESSMENT — PAIN - FUNCTIONAL ASSESSMENT
PAIN_FUNCTIONAL_ASSESSMENT: PREVENTS OR INTERFERES SOME ACTIVE ACTIVITIES AND ADLS
PAIN_FUNCTIONAL_ASSESSMENT: PREVENTS OR INTERFERES SOME ACTIVE ACTIVITIES AND ADLS

## 2022-08-03 ASSESSMENT — PAIN DESCRIPTION - FREQUENCY
FREQUENCY: CONTINUOUS
FREQUENCY: CONTINUOUS

## 2022-08-03 NOTE — PROGRESS NOTES
Gastroenterology Progress Note        [facility]  GI Progress Note        Codie Gregory is a 74 y.o. female patient. 1. Right lower quadrant abdominal pain        Admit Date: 7/30/2022    Subjective:       Resting and appears uncomfortable. Per daughter at bedside, pt had severe pain with enema yesterday and poorly tolerated Miralax with large amount of output when NGT reconnected. Pt currently c/o abdominal pain and dyspnea. Denies any BM or blood from rectum. ROS:  Review of Systems   Constitutional:  Negative for fever. Respiratory:  Positive for shortness of breath. Cardiovascular:  Negative for chest pain. Gastrointestinal:  Positive for abdominal distention, abdominal pain and constipation. Negative for blood in stool, diarrhea, nausea and vomiting. All other systems reviewed and are negative.       Scheduled Meds:   furosemide  40 mg IntraVENous TID    pantoprazole  40 mg IntraVENous BID    sodium chloride (Inhalant)  4 mL Nebulization BID    albuterol  2.5 mg Nebulization TID    polyethylene glycol  17 g Per NG tube TID    SMOG Enema   Rectal Once    oxymetazoline  2 spray Each Nostril Once    bisacodyl  10 mg Rectal Once    lidocaine   Topical Once    sodium chloride flush  5-40 mL IntraVENous 2 times per day    fluticasone  2 spray Each Nostril Daily    [Held by provider] pregabalin  50 mg Oral TID       Continuous Infusions:   dextrose      dilTIAZem 2.5 mg/hr (08/03/22 0953)    [Held by provider] heparin (PORCINE) Infusion Stopped (08/03/22 0945)    sodium chloride         PRN Meds:  metoprolol, glucose, dextrose bolus **OR** dextrose bolus, glucagon (rDNA), dextrose, albuterol, phenol, heparin (porcine), heparin (porcine), morphine **OR** morphine, sodium chloride flush, sodium chloride, ondansetron **OR** ondansetron, acetaminophen **OR** acetaminophen, methocarbamol      Objective:       Patient Vitals for the past 24 hrs:   BP Temp Temp src Pulse Resp SpO2 Weight   08/03/22 1000 (!) 147/49 -- -- 74 14 -- --   08/03/22 0930 -- -- -- 81 20 97 % --   08/03/22 0920 -- -- -- 78 19 96 % --   08/03/22 0900 (!) 139/55 -- -- 75 16 -- --   08/03/22 0800 (!) 163/62 98 °F (36.7 °C) Oral 80 18 91 % --   08/03/22 0700 135/77 -- -- 74 13 94 % --   08/03/22 0645 131/77 -- -- -- -- -- --   08/03/22 0630 (!) 128/49 -- -- -- -- -- --   08/03/22 0615 (!) 156/60 -- -- -- -- -- --   08/03/22 0600 (!) 137/50 -- -- 72 13 92 % --   08/03/22 0545 118/61 -- -- -- -- -- --   08/03/22 0530 (!) 130/53 -- -- -- -- -- --   08/03/22 0515 (!) 150/50 -- -- -- -- -- --   08/03/22 0500 (!) 141/49 -- -- 73 14 94 % --   08/03/22 0445 139/69 -- -- -- -- -- --   08/03/22 0430 (!) 137/54 -- -- -- -- -- --   08/03/22 0415 (!) 130/56 -- -- -- -- -- --   08/03/22 0400 (!) 136/52 -- Oral 72 14 95 % --   08/03/22 0345 (!) 137/51 -- -- -- -- -- --   08/03/22 0330 (!) 149/50 -- -- -- -- -- --   08/03/22 0315 (!) 145/58 -- -- -- -- -- --   08/03/22 0300 (!) 141/58 -- -- 73 13 94 % --   08/03/22 0245 (!) 134/56 -- -- -- -- -- --   08/03/22 0230 (!) 154/61 -- -- -- -- -- --   08/03/22 0215 (!) 136/50 -- -- -- -- -- 233 lb 11 oz (106 kg)   08/03/22 0200 (!) 124/44 -- -- (!) 104 17 -- --   08/03/22 0145 131/73 -- -- -- -- -- --   08/03/22 0130 (!) 138/56 -- -- -- -- -- --   08/03/22 0115 138/79 -- -- -- -- -- --   08/03/22 0100 (!) 144/52 -- -- (!) 124 22 94 % --   08/03/22 0045 (!) 131/49 -- -- -- -- -- --   08/03/22 0030 133/68 -- -- -- -- -- --   08/03/22 0015 128/68 -- -- -- -- -- --   08/03/22 0007 -- -- -- 97 20 95 % --   08/03/22 0000 108/74 97.7 °F (36.5 °C) Oral 98 18 92 % --   08/02/22 2300 (!) 120/58 -- -- -- -- -- --   08/02/22 2245 122/60 -- -- -- -- -- --   08/02/22 2230 128/60 -- -- -- -- -- --   08/02/22 2215 (!) 142/58 -- -- -- -- -- --   08/02/22 2200 126/61 -- -- 87 22 96 % --   08/02/22 2145 134/62 -- -- -- -- -- --   08/02/22 2130 (!) 148/79 -- -- (!) 115 21 96 % --   08/02/22 2115 139/63 -- -- -- -- -- --   08/02/22 2100 135/70 -- -- (!) 101 19 99 % --   08/02/22 2045 (!) 133/56 -- -- -- -- -- --   08/02/22 2030 (!) 138/98 -- -- -- -- -- --   08/02/22 2015 (!) 127/58 -- -- -- -- -- --   08/02/22 2000 136/60 97.4 °F (36.3 °C) Oral (!) 104 18 95 % --   08/02/22 1845 (!) 85/48 -- -- (!) 105 19 -- --   08/02/22 1830 112/61 -- -- 90 18 -- --   08/02/22 1815 (!) 94/54 -- -- 98 18 -- --   08/02/22 1800 109/67 -- -- (!) 104 17 -- --   08/02/22 1745 (!) 103/51 -- -- (!) 112 19 -- --   08/02/22 1730 (!) 104/53 -- -- (!) 105 20 -- --   08/02/22 1715 (!) 112/58 97.6 °F (36.4 °C) Oral (!) 109 19 -- --   08/02/22 1550 126/60 97.5 °F (36.4 °C) Axillary (!) 111 16 -- --         Exam:  VITALS:  BP (!) 147/49   Pulse 74   Temp 98 °F (36.7 °C) (Oral)   Resp 14   Ht 5' 2\" (1.575 m)   Wt 233 lb 11 oz (106 kg)   SpO2 97%   BMI 42.74 kg/m²   Physical Exam  Vitals and nursing note reviewed. Constitutional:       Appearance: Normal appearance. She is not ill-appearing or diaphoretic. Cardiovascular:      Rate and Rhythm: Normal rate and regular rhythm. Pulmonary:      Effort: Respiratory distress present. Abdominal:      General: There is distension. Tenderness: There is abdominal tenderness (diffuse). Comments: Sluggish bowel sounds   Skin:     General: Skin is warm and dry. Coloration: Skin is not pale. Neurological:      Mental Status: Mental status is at baseline.            Recent Labs     08/01/22  0541 08/02/22  0531 08/03/22  0523   WBC 13.5* 10.7 10.5   HGB 9.1* 9.2* 8.5*   HCT 27.3* 27.5* 25.4*   MCV 96.1 95.1 95.5   PLT see below 614* see below       Recent Labs     08/02/22  0531 08/02/22  1959 08/03/22  0459   * 131* 127*   K 5.7* 5.1 5.2*   CL 94* 94* 91*   CO2 20* 19* 21   PHOS 8.6* 8.1* 8.3*   BUN 63* 68* 71*   CREATININE 2.7* 2.5* 2.7*       Recent Labs     08/01/22  0023 08/01/22  0541   AST 51* 33   ALT 39 27   BILIDIR 0.3 <0.2   BILITOT 0.5 0.5   ALKPHOS 519* 456*       No results for input(s): LIPASE, AMYLASE in the last 72 hours. Recent Labs     08/01/22  0023 08/01/22  0224 08/01/22  0541 08/02/22  1830   PROT 5.3*  --  4.8*  --    INR  --  1.27*  --  1.18*       No results for input(s): PTT in the last 72 hours. No results for input(s): OCCULTBLD in the last 72 hours. Radiology review:   CT Abd w/ oral and rectal contrast. 8/1:  Impression:        1. Moderate colonic distention. Correlate for constipation. Fecal content within the ileum which may suggest stasis. There is no small bowel obstruction. 2. Subcutaneous fluid collection overlying the right total hip arthroplasty. Assessment:       Principal Problem:    Abdominal pain  Active Problems:    ARIANE (acute kidney injury) (HCC)    Electrolyte imbalance    Lactic acid acidosis    Leukocytosis    Ileus (HCC)    Status post total hip replacement, right  Resolved Problems:    * No resolved hospital problems. *  73 yo WF with PMH hypertension, hyperlipidemia, gout, osteoarthritis, morbid obesity, with recent right total hip arthroplasty on July 15, UTI s/p 10 days antibiotics. 1) Severe constipation s/p THR 7/16. Has been on opioids. Last colonoscopy in 2012. No prodrome. CT with large stool burden with transition in rectosigmoid colon. No obvious megacolon reported     2) Recent right THR 7/16 with perioperative fluid collection     3) Leukocytosis-patient p/w white count of 40,000. Prev treated for a UTI but repeat UA not consistent w/ UTI. She did have a prodrome of diarrhea, and now constipation. She also had received a steroid Dosepak, but WBC more than expected from steroids. KUB reportedly with no dilation of colon. Ortho surgery does not feel that the is fluid around her joint is an abscess. Recommendations:        -.Agree with aggressive bowel regimen  - Repeat CT 8/1 showing moderate colonic distention  - Low threshold for repeat imaging if clinically worsens  - Plan for colonic decompression; today if able    Gabino Awan MD  Internal Medicine Resident  PGY-1

## 2022-08-03 NOTE — PROCEDURES
Colonoscopy procedure Note      Patient: Maria Guadalupe Davila  : 1950  Acct#:     Procedure: Colonoscopy with decompression    Date:  8/3/2022    Surgeon:  Randall Garcia MD,     Referring Physician: [facility]      Preoperative Diagnosis:    Patient admitted with abdominal pain and distention with imaging findings concerning for severe constipation/fecal burden with dilated colon. Consent:  The patient or their legal guardian has signed a consent, and is aware of the potential risks, benefits, alternatives, and potential complications of this procedure. These include, but are not limited to hemorrhage, bleeding, post procedural pain, perforation, phlebitis, aspiration, hypotension, hypoxia, cardiovascular events such as arryhthmia, and possibly death. Additionally, the possibility of missed colonic polyps and interval colon cancer was discussed in the consent. Anesthesia: General anesthesia        Procedure description:   An informed consent was obtained from the patient after explanation of indications, benefits, possible risks and complications of the procedure. The patient was then taken to the endoscopy suite, placed in the left lateral decubitus position, and the above IV anesthesia was administered. A digital rectal examination was performed and revealed negative without mass, lesions or tenderness. The Olympus video colonoscope was placed in the patient's rectum under digital direction and advanced to the sigmoid colon. The prep was poor. The patient tolerated the procedure well and was taken to the recovery room in good condition. Complications: none  EBL: None    Findings:  Large amount of solid stool seen extending from proximal rectum to sigmoid colon, up to the extent of exam.  Colon was decompressed and suctioned. The solid stool could not be removed.     Impression:    Large amount of solid stool seen extending from proximal rectum to sigmoid colon, up to the extent of exam.  Colon was decompressed and suctioned.   Findings suggestive of colon obstruction secondary to severe constipation      Recommendations:    Recommend rectal enemas  GoLytely through NG tube, slowly as tolerated  Continue to monitor closely  Serial abdominal imaging  Continue with antibiotics/supportive and symptomatic treatment        Nilay Peñaloza MD  [address]  [phone]    8/3/2022

## 2022-08-03 NOTE — PROGRESS NOTES
Electrophysiology - PROGRESS NOTE    Admit Date: 7/30/2022     Chief Complaint: AF     Interval History:   Patient seen and examined and notes reviewed. Patient is being followed for AF. Pt presented to the hospital with abdominal pain. Found to have bowel obstruction along with sepsis. She went into AF/RVR, placed on dilt gtt yesterday and converted to NSR overnight. Somnolent at bedside, not answering questions.      In: 630.2 [I.V.:289.7; NG/GT:150]  Out: 2260    Wt Readings from Last 2 Encounters:   08/03/22 233 lb 11 oz (106 kg)   07/13/22 218 lb 9.6 oz (99.2 kg)         Data:   Scheduled Meds:   Scheduled Meds:   furosemide  40 mg IntraVENous TID    pantoprazole  40 mg IntraVENous BID    sodium chloride (Inhalant)  4 mL Nebulization BID    albuterol  2.5 mg Nebulization TID    polyethylene glycol  17 g Per NG tube TID    SMOG Enema   Rectal Once    oxymetazoline  2 spray Each Nostril Once    bisacodyl  10 mg Rectal Once    lidocaine   Topical Once    sodium chloride flush  5-40 mL IntraVENous 2 times per day    fluticasone  2 spray Each Nostril Daily    [Held by provider] pregabalin  50 mg Oral TID     Continuous Infusions:   dextrose      dilTIAZem 2.5 mg/hr (08/03/22 0953)    [Held by provider] heparin (PORCINE) Infusion Stopped (08/03/22 0945)    sodium chloride       PRN Meds:.metoprolol, glucose, dextrose bolus **OR** dextrose bolus, glucagon (rDNA), dextrose, albuterol, phenol, heparin (porcine), heparin (porcine), morphine **OR** morphine, sodium chloride flush, sodium chloride, ondansetron **OR** ondansetron, acetaminophen **OR** acetaminophen, methocarbamol  Continuous Infusions:   dextrose      dilTIAZem 2.5 mg/hr (08/03/22 0953)    [Held by provider] heparin (PORCINE) Infusion Stopped (08/03/22 0945)    sodium chloride         Intake/Output Summary (Last 24 hours) at 8/3/2022 1232  Last data filed at 8/3/2022 1000  Gross per 24 hour   Intake 580.2 ml   Output 1610 ml   Net -1029.8 ml       CBC:   Lab Results   Component Value Date/Time    WBC 10.5 08/03/2022 05:23 AM    HGB 8.5 08/03/2022 05:23 AM    PLT see below 08/03/2022 05:23 AM     BMP:  Lab Results   Component Value Date/Time     08/03/2022 04:59 AM    K 5.2 08/03/2022 04:59 AM    K 4.3 07/14/2022 07:27 AM    CL 91 08/03/2022 04:59 AM    CO2 21 08/03/2022 04:59 AM    BUN 71 08/03/2022 04:59 AM    CREATININE 2.7 08/03/2022 04:59 AM    GLUCOSE 92 08/03/2022 04:59 AM     INR:   Lab Results   Component Value Date/Time    INR 1.18 08/02/2022 06:30 PM    INR 1.27 08/01/2022 02:24 AM    INR 1.04 06/14/2022 03:59 PM        CARDIAC LABS  ENZYMES:No results for input(s): CKMB, CKMBINDEX, TROPONINI in the last 72 hours. Invalid input(s): CKTOTAL;3  FASTING LIPID PANEL:No results found for: HDL, LDLDIRECT, LDLCALC, TRIG, TSH  LIVER PROFILE:  Lab Results   Component Value Date/Time    AST 33 08/01/2022 05:41 AM    AST 51 08/01/2022 12:23 AM    ALT 27 08/01/2022 05:41 AM    ALT 39 08/01/2022 12:23 AM       -----------------------------------------------------------------  Telemetry: Personally reviewed  NSR    Objective:   Vitals: BP (!) 140/60   Pulse 73   Temp 98 °F (36.7 °C) (Oral)   Resp 12   Ht 5' 2\" (1.575 m)   Wt 233 lb 11 oz (106 kg)   SpO2 94%   BMI 42.74 kg/m²   General appearance: appears ill  Skin: Skin color, texture, turgor normal. No rashes or ecchymosis.   Neck: no JVD, supple, symmetrical, trachea midline   Lungs: , no accessory muscle use, no respiratory distress  Heart: RRR  Extremities: ++ edema, DP +  Psychiatric: abnormal insight and affect    Patient Active Problem List:     Primary osteoarthritis of right hip     Osteoarthritis of right hip     Osteoarthritis of right hip joint due to dysplasia     Status post left hip replacement     Abdominal pain     ARIANE (acute kidney injury) ([facility] [address]     Electrolyte imbalance     Lactic acid acidosis     Leukocytosis     Ileus (HCC) Status post total hip replacement, right        Assessment & Plan:    AF/RVR  On hep gtt  Sepsis  Bowel obstruction  Obesity     Taina Fagan Is a 74 y. o. woman w/ PMH HTN, HLD, morbid obesity recent R CASS who admitted with abd pain, vomiting, w/u showed sepsis, bowel obstruction, showed found to be in AF/RVR    AF/RVR  - Now in NSR, converted around 2 am, no AF seen since, HR controlled  - CHADSVASc at least 3  - On hep gtt- transition to [facility] when ok with primary team  - On dilt gtt- will DC  - IV metoprolol as needed, will change to oral once pt no longer npo  - Reviewed risk factor modification for AF with patient including HTN/ DM control, STUART management, stress reduction, minimal alcohol intake, tobacco cessation, regular exercise, diet  - D/w pt daughter at bedside      Electronically signed by GENA Christina CNP on 8/3/22 at 12:32 PM T    [facility]

## 2022-08-03 NOTE — PROGRESS NOTES
ID Follow-up NOTE  RESIDENT NOTE - reviewed / edited, attending note at bottom    CC:   Ileus concerning for C.diff, UTI  Antibiotics: None    Admit Date: 7/30/2022  Hospital Day: 5    Subjective:     POD 0: Colonoscopy with decompression    Patient was seen with her daughter at beside this pm. Patient says she feels slightly better than yesterday. She says that her abdomen feels less full. Patient says the pain has decreased slightly and feels slightly better overall. Patient says that she did go into some A-fib last night but was started on a Cardizem drip which was able to convert her back; otherwise, patient denies any acute overnight events. Objective:     Patient Vitals for the past 8 hrs:   BP Temp src Pulse Resp SpO2 Weight   08/03/22 0800 (!) 163/62 -- 80 18 91 % --   08/03/22 0700 135/77 -- 74 13 94 % --   08/03/22 0645 131/77 -- -- -- -- --   08/03/22 0630 (!) 128/49 -- -- -- -- --   08/03/22 0615 (!) 156/60 -- -- -- -- --   08/03/22 0600 (!) 137/50 -- 72 13 92 % --   08/03/22 0545 118/61 -- -- -- -- --   08/03/22 0530 (!) 130/53 -- -- -- -- --   08/03/22 0515 (!) 150/50 -- -- -- -- --   08/03/22 0500 (!) 141/49 -- 73 14 94 % --   08/03/22 0445 139/69 -- -- -- -- --   08/03/22 0430 (!) 137/54 -- -- -- -- --   08/03/22 0415 (!) 130/56 -- -- -- -- --   08/03/22 0400 (!) 136/52 Oral 72 14 95 % --   08/03/22 0345 (!) 137/51 -- -- -- -- --   08/03/22 0330 (!) 149/50 -- -- -- -- --   08/03/22 0315 (!) 145/58 -- -- -- -- --   08/03/22 0300 (!) 141/58 -- 73 13 94 % --   08/03/22 0245 (!) 134/56 -- -- -- -- --   08/03/22 0230 (!) 154/61 -- -- -- -- --   08/03/22 0215 (!) 136/50 -- -- -- -- 233 lb 11 oz (106 kg)   08/03/22 0200 (!) 124/44 -- (!) 104 17 -- --   08/03/22 0145 131/73 -- -- -- -- --   08/03/22 0130 (!) 138/56 -- -- -- -- --     I/O last 3 completed shifts:   In: 630.2 [I.V.:289.7; NG/GT:150; IV Piggyback:190.5]  Out: 0541 [Urine:1070; Emesis/NG output:2000]  No intake/output data recorded. EXAM:  GENERAL: Patient is obese  HEENT: Membranes moist, no oral lesion. NG Tube placed on 7/31/2022 with dark brown fluid noted. NECK:  Supple, no lymphadenopathy  LUNGS: Diminished breath sounds at bases. CARDIAC: RRR, no murmur appreciated  ABD:  Mild abdominal distension along LLQ but decreased. No tenderness or pain to palpation noted. luggish bowel sounds. Iqbal cathter in place with less than 100 mL of tea colored urine present. EXT:  Right CASS incision site appears intact with no acute signs on infection noted. No pain to palpation or tenderness noted. NEURO: No focal neurologic findings  PSYCH: Orientation, sensorium, mood normal  LINES:  Central line (R IJ)     Data Review:  Lab Results   Component Value Date    WBC 10.5 08/03/2022    HGB 8.5 (L) 08/03/2022    HCT 25.4 (L) 08/03/2022    MCV 95.5 08/03/2022    PLT see below 08/03/2022     Lab Results   Component Value Date    CREATININE 2.7 (H) 08/03/2022    BUN 71 (H) 08/03/2022     (L) 08/03/2022    K 5.2 (H) 08/03/2022    CL 91 (L) 08/03/2022    CO2 21 08/03/2022     Hepatic Function Panel:   Lab Results   Component Value Date/Time    ALKPHOS 456 08/01/2022 05:41 AM    ALT 27 08/01/2022 05:41 AM    AST 33 08/01/2022 05:41 AM    PROT 4.8 08/01/2022 05:41 AM    BILITOT 0.5 08/01/2022 05:41 AM    BILIDIR <0.2 08/01/2022 05:41 AM    IBILI see below 08/01/2022 05:41 AM    LABALBU 2.1 08/03/2022 04:59 AM       Micro:  07/31   Blood culture #1: NGTD              Blood culture #2: NGTD              Urine culture: NGTD    07/30   Urine culture (Alan): E. Coli      Imaging:   ECHO (08/03/2022)  Summary  Left ventricular cavity size is normal. Normal left ventricular wall thickness. Overall left ventricular systolic function appears  hyperdynamic with an ejection fraction of >65%. No regional wall motion abnormalities are noted. Normal diastolic function. Normal LVEDP.   Tricuspid valve leaflets are structurally normal. Mild tricuspid regurgitation. CT Chest w/o Contrast (08/02/2022)  Impression   1. Moderate right pleural effusion and small left effusion with basilar airspace opacity most consistent with atelectasis. Superimposed pneumonia be difficult to exclude. 2. Narrowing of the trachea with expiration compatible with treated bronchial malacia. 3. Nasogastric tube tip in the lower esophagus. 4. Persistent colonic distention. 5. Mild coronary artery calcification. CT Abdomen Pelvis and Right Hip w/o Contrast (08/01/2022)  Impression   1. Moderate colonic distention. Correlate for constipation. Fecal content within the ileum which may suggest stasis. There is no small bowel obstruction. 2. Subcutaneous fluid collection overlying the right total hip arthroplasty. XR Chest (08/01/2022)  Impression   1. Right IJ CVC tip in the cavoatrial junction. 2.  Bibasilar airspace disease suggesting atelectasis. Pneumonia is not excluded. XR Abdomen (07/31/2022)  Impression   Dilated colonic and small bowel loops may suggest ileus but obstruction is not excluded. CT Abdomen Pelvis w Contrast (07/30/2022) - [address]   IMPRESSION:   1. There is a large amount of stool cecum through the sigmoid colon. Transition   point is rectosigmoid but there is no obvious mass in the rectosigmoid region. No bowel thickening or peribowel stranding in the rectosigmoid region. 2. Likely, multiple renal cysts. 3. Partially organized fluid without obvious rim enhancement anterior to the   THR. This may simply represent postoperative seroma but clinical correlation   needed.  The entire inferior extent of this is not covered on this exam.   4. Urinary bladder is distended with fluid     Scheduled Meds:   pantoprazole  40 mg IntraVENous Daily    sodium chloride (Inhalant)  4 mL Nebulization BID    albuterol  2.5 mg Nebulization TID    sodium chloride (Inhalant)        polyethylene glycol  17 g Per NG tube TID    SMOG collect anterior to R hip / CASS. Transfer / admit to [facility] on 7/30 - UA neg  Started on Vanco / cefepime. NGT placed. CT chest - R>L pleural effusion, illeus     Today, afeb, O2 6L. WBC 10.7, Cr 2.7, LA 2. (4.5 on admit), procalcitonin 20.38  Pt awake, alert, conversant. On exam, lethargic, able to converse  Lungs dull at bases. Abd - tender, lashawn LLQ  LE edema. R hip - no redness / tenderness. Anterior dressing in place. Linezolid, Meropenem, PO Vancomcyin, IV Metronidazole - stopped on 8/2    Today, afeb, EBC 10.5  Colonoscopy reveals colonic obstruction secondary to constipation, 'decompressed'  Less abd pain after GI procedure     IMP/  R CASS     Encephalopathy, improved  Ileus - loss of stool of CT / constipated  R hip - fluid collection around prior surg site, unclear if infected  Leukocytosis - WBC down  Elevated Procalcitonin     ARIANE     No diagnosis of a specific infectious process at this time   - no pneumonia, no UTI, unclear regarding periop fluid collection     REC/  Cont off antibiotics  Will ask Radiology to 'push over' CT from [facility]  Aspiration fluid collection assoc with R CASS  Ileus - NGT, enemas        Medical Decision Making: The following items were considered in medical decision making:  Discussion of patient care with other providers  Reviewed clinical lab tests  Reviewed radiology tests  Reviewed other diagnostic tests/interventions  Independent review of radiologic images - reviewed CTs 8/2  Microbiology cultures and other micro tests reviewed       Risk of Complications/Morbidity: High   Illness(es)/ Infection present that pose threat to bodily function. There is potential for severe exacerbation of infection/side effects of treatment.   Therapy requires intensive monitoring for antimicrobial agent toxicity     Discussed with pt, family   Shasha Farris MD

## 2022-08-03 NOTE — ANESTHESIA POSTPROCEDURE EVALUATION
Department of Anesthesiology  Postprocedure Note    Patient: Paloma Rolle  MRN: [id number]  YOB: 1950  Date of evaluation: 8/3/2022      Procedure Summary     Date: 08/03/22 Room / Location: [facility]    Anesthesia Start: 8170 Anesthesia Stop: 1330    Procedure: COLONOSCOPY DIAGNOSTIC Diagnosis:       Ileus ([address]      (ileus)    Surgeons: Rashaad Pickens MD Responsible Provider: Preet Morales DO    Anesthesia Type: General ASA Status: 3 - Emergent          Anesthesia Type: General    Rina Phase I: Rina Score: 9    Rina Phase II:        Anesthesia Post Evaluation    Patient location during evaluation: ICU  Patient participation: complete - patient participated  Level of consciousness: responsive to painful stimuli and sleepy but conscious  Pain score: 0  Airway patency: patent  Nausea & Vomiting: no nausea and no vomiting  Complications: no  Cardiovascular status: blood pressure returned to baseline  Respiratory status: face mask  Hydration status: euvolemic  Comments: Patient returning to ICU called ahead for CPAP. Patient arrived to GI suite with rapid shallow breathing but conscious and answering questions. Poor SpO2 waveform ranging from 84-92%. Patient awake and responsive to verbal stimuli at conclusion of the case. Vt-200mL [id number]. We are extubating the patient with goal of starting cpap/bipap to assist with developing atelectasis.   Multimodal analgesia pain management approach

## 2022-08-03 NOTE — PROGRESS NOTES
No meds through ng, per surgery. Surgery to bedside to advance ng to 55 cm. Ng to continuous low wall suction.

## 2022-08-03 NOTE — PROGRESS NOTES
moderately tender in lower abd, mildly distended; NGT with gastric output   : winter in place      ASSESSMENT/PLAN: Hx of HTN, HLD, Tuberculosis (1981), gout, and OA s/p right CASS on 7/13/22 who was transferred to [facility] on 7/30 due to fluid associated with her right CASS on 7/13 and for abdominal pain. - Cardizem was started per cards for A fib  - Patient had diuresis with Lasix 40 mg, continues to be oliguric  - No surgical intervention from Ortho  - GI recommending colonic decompression   ID dc'd all abx due to no source of infection  - Fluid management per nephro  - Will continue to monitor closely  - Continue NPO with NGT to LIWS. NGT was advanced to 55 cm from 45 cm. Star Caban, DO, [address] [facility]  PGY1, General Surgery  08/03/22  6:35 AM  [employer]  [phone]    I have personally performed the medical history, physical exam and medical decision making and agree with all pertinent clinical information unless otherwise noted.      Serafin Garduno MD  Surgery Attending

## 2022-08-03 NOTE — PROGRESS NOTES
Hospital Medicine Progress Note    PCP: Raulito Johnston    Date of Admission: 7/30/2022    Chief Complaint:  Abdominal pain, transferred for ortho eval with Rt CASS site with fluid collection    Subjective:  Appears lethargic. Family at bedside. History Of Present Illness:   \"  74 y.o. female Guerrero Tolentino  - Hx of HTN, HLD, Gout, OA, recent Rt CASS, morbid obesity  - Presents with generalized weakness nausea vomiting and abdominal pain, she woke up with the symptoms night prior to presentation. Her last bowel movement was about 3 days prior. She was seen at [facility] where she underwent CT imaging of the abdomen and pelvis which showed:    IMPRESSION:   1. There is a large amount of stool cecum through the sigmoid colon. Transition point is rectosigmoid but there is no obvious mass in the rectosigmoid region. No bowel thickening or peribowel stranding in the rectosigmoid region. 2. Likely, multiple renal cysts. 3. Partially organized fluid without obvious rim enhancement anterior to the THR. This may simply represent postoperative seroma but clinical correlation needed. The entire inferior extent of this is not covered on this exam.   4. Urinary bladder is distended with fluid    Labs were significant for WBC 22.8k, with elevated ANC, hgb 10.1, na131, k 5.0 BUN of 19 cr 1.1, Alk phos 139. With noted fluid around [facility] she was transferred to [facility]. for orthopedic surgeon Dr. Candelaria Camarillo evaluation. \"    Medications : Reviewed      Allergies:  Buspirone, Duloxetine, Fenofibrate, Gabapentin, Venlafaxine, Doxycycline, Statins, and Sulfa antibiotics      REVIEW OF SYSTEMS:   Pertinent positives as noted in the HPI. All other systems reviewed and negative.       PHYSICAL EXAM PERFORMED:    BP (!) 163/62   Pulse 80   Temp 97.7 °F (36.5 °C) (Oral)   Resp 18   Ht 5' 2\" (1.575 m)   Wt 233 lb 11 oz (106 kg)   SpO2 91%   BMI 42.74 kg/m² Leukocytosis with tachycardia; Transferred for evaluation of post-op hip fluid collection/possible infection (s/p THR 7/13) however concern for intra abdominal source, possible C. Diff?  -Started on broad spectrum abx  -Started on vanc enema, flagyl  - Blood cultures x 2 NGTD  - Ortho c/s - likely seroma, normal post operative finding  - ID consulted, Abx stopped    Atrial Fibrillation with RVR  Diltiazem drip - changing to IV lopressor as needed  Echo normal EF, normal diastolic  Cardiology consult  Continue heparin drip    Progressive hypoxemic respiratory failure - worsening  Unclear cause, on heparin drip for Afib and possible also new PE  8/3 CT chest wo contrast b/l pleural effusions, small left and moderate right  VQ pending  Prelim doppler report no DVT  Heparin drip  IV lasix    Bowel obstruction: POA Secondary to severe constipation  - Pxt has severe abdominal pain with severe constipation; distended bowel with minimal to absent BS  - She has a midline subumbil incision  - CT of the [facility] showed significant stool from cecum to the sigmoid colon and transition point at the rectosigmoid.   - GI, Surgery consulted, appreciate recs  - Miralax and enema unsuccessful  - [place] decompression 8/3, recommendation continue rectal enemas, GoLytely through NG tube, and serial abdominal imaging    Acute Urinary retention  Possible partially treated Complicated UTI: POA  - Appears recently had E coli in urine, although CC was less than 100k. Was treated with cipro.  Also appears on Keflex PTA.   - Urine on admission has some bacteria, and nitrite positive, but neg leuc esterase and no signif pyuria  - Winter catheter placed at [facility] for retention likely sec to severe constipation  - continue winter for now, ongoing reassessment    Chronic Anemia  - 10.2 better than hgb on recent DC from hospital 2 weeks back, possible hemoconcentration  - Monitor Hgb  Essential Hypertension- Hold blood pressure medications for now with sepsis  Mild hyponatremia: monitor electrolytes  Gout - stable    Morbid obesity Body mass index is 41.01 kg/m². - Complicating assessment and treatment. Placing patient at risk for multiple co-morbidities as well as early death and contributing to the patient's presentation.  on weight loss when appropriate. DVT Prophylaxis: High risk: Heparin drip  Diet: Diet NPO  Code Status: Full Code    PT/OT Eval Status: order once acute issues resolved      Disposition: - Remains in pxt; Multi-organ system involvement unfortunately not making improvement. Patient transferred to ICU.      [facility],

## 2022-08-03 NOTE — ANESTHESIA PRE PROCEDURE
Department of Anesthesiology  Preprocedure Note       Name:  Christin Judd   Age:  74 y.o.  :  1950                                          MRN:  [id number]         Date:  8/3/2022      Surgeon: Morgan Morales):  Fanny Estrada MD    Procedure: Procedure(s):  COLONOSCOPY DIAGNOSTIC    Medications prior to admission:   Prior to Admission medications    Medication Sig Start Date End Date Taking? Authorizing Provider   naloxone 4 MG/0.1ML LIQD nasal spray 1 spray by Nasal route as needed for Opioid Reversal 7/15/22   BRIAN Monique   aspirin EC 81 MG EC tablet Take 1 tablet by mouth 2 times daily 22   Letitia Tian PA-C   methylPREDNISolone (MEDROL, ATA,) 4 MG tablet Take by mouth. Patient not taking: Reported on 2022   Letitia Tian PA-C   [facility]ron [facility] 4 MG tablet Take 1 tablet by mouth 3 times daily as needed for Nausea or Vomiting 22   Letitia Tian PA-C   amLODIPine (NORVASC) 10 MG tablet Take 10 mg by mouth daily 22  Historical Provider, MD   albuterol sulfate  (90 Base) MCG/ACT inhaler INHALE 2 PUFFS BY MOUTH EVERY 6 HOURS AS NEEDED FOR WHEEZING 22   Historical Provider, MD   allopurinol (ZYLOPRIM) 100 MG tablet Take 100 mg by mouth daily 16   Historical Provider, MD   furosemide (LASIX) 20 MG tablet Take 20 mg by mouth daily 16  Historical Provider, MD   lisinopril (PRINIVIL;ZESTRIL) 5 MG tablet Take 5 mg by mouth in the morning and at bedtime  16  Historical Provider, MD   methocarbamol (ROBAXIN) 500 MG tablet Take 500 mg by mouth 3 times daily as needed 16   Historical Provider, MD   meloxicam (MOBIC) 15 MG tablet Take 15 mg by mouth daily  Patient not taking: No sig reported 18   Historical Provider, MD   pregabalin (LYRICA) 50 MG capsule Take 50 mg by mouth 3 times daily.   4/20/22 10/17/22  Historical Provider, MD   fluticasone (FLONASE) 50 MCG/ACT nasal spray Use 2 spray(s) in each nostril once daily 1/5/21   Historical Provider, MD       Current medications:    Current Facility-Administered Medications   Medication Dose Route Frequency Provider Last Rate Last Admin    pantoprazole (PROTONIX) injection 40 mg  40 mg IntraVENous BID Ana M Braden MD        Luther Blank ON 8/4/2022] furosemide (LASIX) injection 40 mg  40 mg IntraVENous BID Kalee Alejo MD        metoprolol (LOPRESSOR) injection 5 mg  5 mg IntraVENous Q1H PRN Saul Marti MD   5 mg at 08/02/22 0522    sodium chloride (Inhalant) 3 % nebulizer solution 4 mL  4 mL Nebulization BID Saul Marti MD   4 mL at 08/03/22 2985    glucose chewable tablet 16 g  4 tablet Oral PRN Saul Marti MD        dextrose bolus 10% 125 mL  125 mL IntraVENous PRN Saul Marti MD        Or    dextrose bolus 10% 250 mL  250 mL IntraVENous PRN Saul Marti MD        glucagon (rDNA) injection 1 mg  1 mg SubCUTAneous PRN Saul Marti MD        dextrose 10 % infusion   IntraVENous Continuous PRN Saul Marti MD        albuterol (PROVENTIL) nebulizer solution 2.5 mg  2.5 mg Nebulization Q4H PRN Eulogio Potter DO        albuterol (PROVENTIL) nebulizer solution 2.5 mg  2.5 mg Nebulization TID Eulogio Potter DO   2.5 mg at 08/03/22 8686    dilTIAZem 125 mg in dextrose 5 % 125 mL infusion  2.5-15 mg/hr IntraVENous Continuous La Nena Malik MD 2.5 mL/hr at 08/03/22 1230 2.5 mg/hr at 08/03/22 1230    phenol 1.4 % mouth spray 1 spray  1 spray Mouth/Throat Q2H PRN August Cast DO        heparin (porcine) injection 8,140 Units  80 Units/kg IntraVENous PRN Emir Cameron MD        heparin (porcine) injection 4,070 Units  40 Units/kg IntraVENous PRN Emir Cameron MD        [Held by provider] heparin 25,000 units in dextrose 5% 250 mL (premix) infusion  5-30 Units/kg/hr IntraVENous Continuous Emir Cameron MD   Stopped at 08/03/22 0945    polyethylene glycol (GLYCOLAX) packet 17 g  17 g Per NG tube TID Saul Marti MD   17 g at 08/02/22 2146    SMOG Enema   Rectal Once Palomo Morgan MD        morphine (PF) injection 1 mg  1 mg IntraVENous Q4H PRN Palomo Morgan MD   1 mg at 08/01/22 2338    Or    morphine (PF) injection 2 mg  2 mg IntraVENous Q4H PRN Palomo Morgan MD   2 mg at 08/03/22 7263    oxymetazoline (AFRIN) 0.05 % nasal spray 2 spray  2 spray Each Nostril Once Palomo Mrogan MD        bisacodyl (DULCOLAX) suppository 10 mg  10 mg Rectal Once Palomo Morgan MD        lidocaine (XYLOCAINE) 2 % uro-jet   Topical Once Palomo Morgan MD        sodium chloride flush 0.9 % injection 5-40 mL  5-40 mL IntraVENous 2 times per day Palomo Morgan MD   10 mL at 08/03/22 0904    sodium chloride flush 0.9 % injection 5-40 mL  5-40 mL IntraVENous PRN Palomo Morgan MD        0.9 % sodium chloride infusion   IntraVENous PRN Palomo Morgan MD   New Bag at 08/03/22 1230    ondansetron (ZOFRAN-ODT) disintegrating tablet 4 mg  4 mg Oral Q8H PRN Palomo Morgan MD        Or    ondansetron [facility]) injection 4 mg  4 mg IntraVENous Q6H PRN Palomo Morgan MD   4 mg at 08/01/22 0303    acetaminophen (TYLENOL) tablet 650 mg  650 mg Oral Q6H PRN Palomo Morgan MD        Or    acetaminophen (TYLENOL) suppository 650 mg  650 mg Rectal Q6H PRN Palomo Morgan MD        fluticasone (FLONASE) 50 MCG/ACT nasal spray 2 spray  2 spray Each Nostril Daily Palomo Morgan MD   2 spray at 08/02/22 1158    methocarbamol (ROBAXIN) tablet 500 mg  500 mg Oral TID PRN Palomo oMrgan MD   500 mg at 08/01/22 1151    [Held by provider] pregabalin (LYRICA) capsule 50 mg  50 mg Oral TID Palomo Morgan MD   50 mg at 07/31/22 0900     Facility-Administered Medications Ordered in Other Encounters   Medication Dose Route Frequency Provider Last Rate Last Admin    propofol injection   IntraVENous PRN Daril Rule, APRN - CRNA   80 mg at 08/03/22 1235    lidocaine 2 % injection   IntraVENous PRN Daril Rule, APRN - CRNA   80 mg at 08/03/22 1235    phenylephrine Substance Use Topics    Alcohol use: Never                                Counseling given: Not Answered      Vital Signs (Current):   Vitals:    08/03/22 0920 08/03/22 0930 08/03/22 1000 08/03/22 1100   BP:   (!) 147/49 (!) 140/60   Pulse: 78 81 74 73   Resp: 19 20 14 12   Temp:       TempSrc:       SpO2: 96% 97%  94%   Weight:       Height:                                                  BP Readings from Last 3 Encounters:   08/03/22 (!) 140/60   07/15/22 111/60       NPO Status: Time of last liquid consumption: 0100 (patient does not remember, she thinks yesterday)                        Time of last solid consumption: 0100 (patient is unaware of date of last solid food consumption)                        Date of last liquid consumption: 08/02/22                        Date of last solid food consumption: 09/02/22 (patient is unsure excatly)    BMI:   Wt Readings from Last 3 Encounters:   08/03/22 233 lb 11 oz (106 kg)   07/13/22 218 lb 9.6 oz (99.2 kg)   06/14/22 217 lb (98.4 kg)     Body mass index is 42.74 kg/m².     CBC:   Lab Results   Component Value Date/Time    WBC 10.5 08/03/2022 05:23 AM    RBC 2.66 08/03/2022 05:23 AM    HGB 8.5 08/03/2022 05:23 AM    HCT 25.4 08/03/2022 05:23 AM    MCV 95.5 08/03/2022 05:23 AM    RDW 14.4 08/03/2022 05:23 AM    PLT see below 08/03/2022 05:23 AM       CMP:   Lab Results   Component Value Date/Time     08/03/2022 04:59 AM    K 5.2 08/03/2022 04:59 AM    K 4.3 07/14/2022 07:27 AM    CL 91 08/03/2022 04:59 AM    CO2 21 08/03/2022 04:59 AM    BUN 71 08/03/2022 04:59 AM    CREATININE 2.7 08/03/2022 04:59 AM    GFRAA 21 08/03/2022 04:59 AM    LABGLOM 17 08/03/2022 04:59 AM    GLUCOSE 92 08/03/2022 04:59 AM    PROT 4.8 08/01/2022 05:41 AM    CALCIUM 7.7 08/03/2022 04:59 AM    BILITOT 0.5 08/01/2022 05:41 AM    ALKPHOS 456 08/01/2022 05:41 AM    AST 33 08/01/2022 05:41 AM    ALT 27 08/01/2022 05:41 AM       POC Tests: No results for input(s): POCGLU, POCNA, POCK, POCCL, POCBUN, POCHEMO, POCHCT in the last 72 hours. Coags:   Lab Results   Component Value Date/Time    PROTIME 14.9 08/02/2022 06:30 PM    INR 1.18 08/02/2022 06:30 PM    APTT 37.0 08/02/2022 06:30 PM       HCG (If Applicable): No results found for: PREGTESTUR, PREGSERUM, HCG, HCGQUANT     ABGs:   Lab Results   Component Value Date/Time    PHART 7.319 08/01/2022 01:24 AM    PO2ART 35.8 08/01/2022 01:24 AM    XNF3WKZ 34.1 08/01/2022 01:24 AM    NQG2BQA 18 08/01/2022 01:24 AM    BEART -7.8 08/01/2022 01:24 AM    R9LEQURK 63 08/01/2022 01:24 AM        Type & Screen (If Applicable):  No results found for: LABABO, LABRH    Drug/Infectious Status (If Applicable):  No results found for: HIV, HEPCAB    COVID-19 Screening (If Applicable):   Lab Results   Component Value Date/Time    COVID19 Not Detected 07/15/2022 01:43 PM           Anesthesia Evaluation  Patient summary reviewed and Nursing notes reviewed no history of anesthetic complications:   Airway: Mallampati: III  TM distance: >3 FB   Neck ROM: full     Dental: normal exam         Pulmonary:Negative Pulmonary ROS and normal exam                               Cardiovascular:  Exercise tolerance: good (>4 METS),   (+) hypertension:,     (-)  angina, orthopnea and PND    ECG reviewed  Rhythm: regular  Rate: normal           Beta Blocker:  Not on Beta Blocker         Neuro/Psych:   Negative Neuro/Psych ROS              GI/Hepatic/Renal: Neg GI/Hepatic/Renal ROS            Endo/Other: Negative Endo/Other ROS                    Abdominal:   (+) obese,     Abdomen: soft. Vascular: negative vascular ROS. Other Findings:           Anesthesia Plan      general     ASA 3 - emergent       Induction: intravenous. MIPS: Postoperative opioids intended and Prophylactic antiemetics administered. Anesthetic plan and risks discussed with patient. Plan discussed with CRNA and attending.                     Odell Moore DO   8/3/2022

## 2022-08-03 NOTE — PLAN OF CARE
Problem: Discharge Planning  Goal: Discharge to home or other facility with appropriate resources  Outcome: Progressing     Problem: Safety - Adult  Goal: Free from fall injury  Outcome: Progressing     Problem: ABCDS Injury Assessment  Goal: Absence of physical injury  Outcome: Progressing     Problem: Skin/Tissue Integrity  Goal: Absence of new skin breakdown  Description: 1. Monitor for areas of redness and/or skin breakdown  2. Assess vascular access sites hourly  3. Every 4-6 hours minimum:  Change oxygen saturation probe site  4. Every 4-6 hours:  If on nasal continuous positive airway pressure, respiratory therapy assess nares and determine need for appliance change or resting period.   Outcome: Progressing     Problem: Respiratory - Adult  Goal: Achieves optimal ventilation and oxygenation  Outcome: Progressing     Problem: Skin/Tissue Integrity - Adult  Goal: Incisions, wounds, or drain sites healing without S/S of infection  Outcome: Progressing     Problem: Skin/Tissue Integrity - Adult  Goal: Oral mucous membranes remain intact  Outcome: Progressing     Problem: Musculoskeletal - Adult  Goal: Return mobility to safest level of function  Outcome: Progressing     Problem: Musculoskeletal - Adult  Goal: Maintain proper alignment of affected body part  Outcome: Progressing     Problem: Musculoskeletal - Adult  Goal: Return ADL status to a safe level of function  Outcome: Progressing     Problem: Gastrointestinal - Adult  Goal: Minimal or absence of nausea and vomiting  Outcome: Progressing     Problem: Gastrointestinal - Adult  Goal: Maintains adequate nutritional intake  Outcome: Progressing     Problem: Genitourinary - Adult  Goal: Absence of urinary retention  Outcome: Progressing     Problem: Genitourinary - Adult  Goal: Urinary catheter remains patent  Outcome: Progressing     Problem: Infection - Adult  Goal: Absence of infection at discharge  Outcome: Progressing     Problem: Infection - Adult  Goal: Absence of infection during hospitalization  Outcome: Progressing     Problem: Metabolic/Fluid and Electrolytes - Adult  Goal: Electrolytes maintained within normal limits  Outcome: Progressing     Problem: Metabolic/Fluid and Electrolytes - Adult  Goal: Hemodynamic stability and optimal renal function maintained  Outcome: Progressing     Problem: Pain  Goal: Verbalizes/displays adequate comfort level or baseline comfort level  Outcome: Progressing

## 2022-08-03 NOTE — PROGRESS NOTES
SMOG enema administered per order. Pt not able to retain any of enema. A small amount of liquid stool returned. Glycolax bowel prep started thru NG tube.

## 2022-08-03 NOTE — PROGRESS NOTES
This RN called into pt room d/t pt feeling full and feeling like she is going to vomit. NG re-connected to [facility] and 375 ml returned of bile green output. Pt expressed relief after. ICU residents aware.

## 2022-08-03 NOTE — PLAN OF CARE
Care plan reviewed and implemented during shift. Problem: Discharge Planning  Goal: Discharge to home or other facility with appropriate resources  Outcome: Progressing  Flowsheets (Taken 8/3/2022 1600)  Discharge to home or other facility with appropriate resources:   Identify barriers to discharge with patient and caregiver   Identify discharge learning needs (meds, wound care, etc)     Problem: Safety - Adult  Goal: Free from fall injury  Outcome: Progressing  Flowsheets (Taken 8/3/2022 1729)  Free From Fall Injury:   Instruct family/caregiver on patient safety   Based on caregiver fall risk screen, instruct family/caregiver to ask for assistance with transferring infant if caregiver noted to have fall risk factors     Problem: ABCDS Injury Assessment  Goal: Absence of physical injury  Outcome: Progressing  Flowsheets (Taken 8/3/2022 1729)  Absence of Physical Injury: Implement safety measures based on patient assessment     Problem: Skin/Tissue Integrity  Goal: Absence of new skin breakdown  Description: 1. Monitor for areas of redness and/or skin breakdown  2. Assess vascular access sites hourly  3. Every 4-6 hours minimum:  Change oxygen saturation probe site  4. Every 4-6 hours:  If on nasal continuous positive airway pressure, respiratory therapy assess nares and determine need for appliance change or resting period.   Outcome: Progressing     Problem: Respiratory - Adult  Goal: Achieves optimal ventilation and oxygenation  Outcome: Progressing  Flowsheets  Taken 8/3/2022 1600  Achieves optimal ventilation and oxygenation:   Assess for changes in respiratory status   Assess for changes in mentation and behavior   Position to facilitate oxygenation and minimize respiratory effort  Taken 8/3/2022 0800  Achieves optimal ventilation and oxygenation:   Assess for changes in respiratory status   Position to facilitate oxygenation and minimize respiratory effort   Oxygen supplementation based on oxygen saturation or arterial blood gases   Assess for changes in mentation and behavior     Problem: Skin/Tissue Integrity - Adult  Goal: Incisions, wounds, or drain sites healing without S/S of infection  Outcome: Progressing  Flowsheets (Taken 8/3/2022 1730)  Incisions, Wounds, or Drain Sites Healing Without Sign and Symptoms of Infection:   ADMISSION and DAILY: Assess and document risk factors for pressure ulcer development   TWICE DAILY: Assess and document skin integrity   TWICE DAILY: Assess and document dressing/incision, wound bed, drain sites and surrounding tissue   Implement wound care per orders  Goal: Oral mucous membranes remain intact  Outcome: Progressing  Flowsheets (Taken 8/3/2022 1730)  Oral Mucous Membranes Remain Intact:   Assess oral mucosa and hygiene practices   Implement preventative oral hygiene regimen     Problem: Musculoskeletal - Adult  Goal: Return mobility to safest level of function  Outcome: Progressing  Goal: Maintain proper alignment of affected body part  Outcome: Progressing  Goal: Return ADL status to a safe level of function  Outcome: Progressing     Problem: Gastrointestinal - Adult  Goal: Minimal or absence of nausea and vomiting  Outcome: Progressing  Flowsheets (Taken 8/3/2022 1600)  Minimal or absence of nausea and vomiting:   Administer IV fluids as ordered to ensure adequate hydration   Maintain NPO status until nausea and vomiting are resolved   Nasogastric tube to low intermittent suction as ordered  Goal: Maintains adequate nutritional intake  Outcome: Progressing  Flowsheets (Taken 8/3/2022 1600)  Maintains adequate nutritional intake:   Monitor percentage of each meal consumed   Identify factors contributing to decreased intake, treat as appropriate  Goal: Maintains or returns to baseline bowel function  Outcome: Progressing  Flowsheets (Taken 8/3/2022 1600)  Maintains or returns to baseline bowel function:   Assess bowel function   Encourage oral fluids to ensure monitor pain and request assistance

## 2022-08-03 NOTE — CONSULTS
Cardiology Consultation                                                                    Pt Name: Gina Salmeron  Age: 74 y.o. Sex: female  : 1950  Location: Endo Pool/NONE    Referring Physician: Feliz Gutierrez DO  Primary cardiologist: Dr. Nate Cote      Reason for Consult:     Reason for Consultation/Chief Complaint: AHF    HPI:      Gina Salmeron is a 74 y.o. female with a past medical history of obesity, HTN, HLP, recent CASS 2022. Patient presented to the emergency room on  with nausea, vomiting, abdominal pain. Patient was found to be obstipated due to ileus from recent opioid use for CASS. She was admitted and was given IV fluids and IV antibiotics. Subsequently patient developed AF RVR and EP was consulted. She was started on diltiazem and heparin drips. Today I was consulted to assess patient in view of likely acute heart failure. Apparently on admission patient's oxygen requirements were 2 L, received IV fluids, oxygen requirements increased to 8 L, received Lasix 40 mg IV x1 on , oxygen requirements are now 6.5 L. Creatinine on admission was 2.2, increased to a peak of 2.7 today. ECG consistent with AF  bpm.  There were no ischemic changes. Histories     Past Medical History:   has a past medical history of Back pain, Hyperlipidemia, Hypertension, Tuberculosis, and Wears dentures. Surgical History:   has a past surgical history that includes Appendectomy (); Tonsillectomy (); Ectopic pregnancy surgery (); Partial hysterectomy (); Cholecystectomy (); Cataract removal (Bilateral, ); and Total hip arthroplasty (Right, 2022). Social History:   reports that she has never smoked. She has never used smokeless tobacco. She reports that she does not drink alcohol and does not use drugs.      Family History:  No evidence for sudden cardiac death or premature CAD      Medications:       Home Medications  Were reviewed and are listed in nursing record. and/or listed below  Prior to Admission medications    Medication Sig Start Date End Date Taking? Authorizing Provider   naloxone 4 MG/0.1ML LIQD nasal spray 1 spray by Nasal route as needed for Opioid Reversal 7/15/22   BRIAN Monique   aspirin EC 81 MG EC tablet Take 1 tablet by mouth 2 times daily 7/13/22   Jay Fine PA-C   methylPREDNISolone (MEDROL, ATA,) 4 MG tablet Take by mouth. Patient not taking: Reported on 7/30/2022 7/13/22   Jay Fine PA-C   ondansetron [facility]) 4 MG tablet Take 1 tablet by mouth 3 times daily as needed for Nausea or Vomiting 7/13/22   Jay Fine PA-C   amLODIPine (NORVASC) 10 MG tablet Take 10 mg by mouth daily 6/29/22 12/26/22  Historical Provider, MD   albuterol sulfate  (90 Base) MCG/ACT inhaler INHALE 2 PUFFS BY MOUTH EVERY 6 HOURS AS NEEDED FOR WHEEZING 4/13/22   Historical Provider, MD   allopurinol (ZYLOPRIM) 100 MG tablet Take 100 mg by mouth daily 6/24/16   Historical Provider, MD   furosemide (LASIX) 20 MG tablet Take 20 mg by mouth daily 7/12/16 4/30/23  Historical Provider, MD   lisinopril (PRINIVIL;ZESTRIL) 5 MG tablet Take 5 mg by mouth in the morning and at bedtime  6/24/16 11/8/22  Historical Provider, MD   methocarbamol (ROBAXIN) 500 MG tablet Take 500 mg by mouth 3 times daily as needed 6/30/16   Historical Provider, MD   meloxicam (MOBIC) 15 MG tablet Take 15 mg by mouth daily  Patient not taking: No sig reported 6/22/18   Historical Provider, MD   pregabalin (LYRICA) 50 MG capsule Take 50 mg by mouth 3 times daily.   4/20/22 10/17/22  Historical Provider, MD   fluticasone Vesta Dudley) 50 MCG/ACT nasal spray Use 2 spray(s) in each nostril once daily 1/5/21   Historical Provider, MD          Inpatient Medications:   pantoprazole  40 mg IntraVENous BID    [START ON 8/4/2022] furosemide  40 mg IntraVENous BID    sodium chloride (Inhalant)  4 mL Nebulization BID    albuterol  2.5 mg Nebulization TID    polyethylene glycol  17 Heart:  Regular rhythm, rate is controlled, S1, S2 normal, there is no murmur, there is no rub or gallop, cannot assess jvd, 2+ bilateral lower extremity edema   Abdomen:   Soft, non-tender, bowel sounds active all four quadrants,  no masses, no organomegaly       Extremities: Extremities normal, atraumatic, no cyanosis. Pulses: 2+ and symmetric   Skin: Skin color, texture, turgor normal, no rashes or lesions   Pysch: Normal mood and affect   Neurologic: Normal gross motor and sensory exam.  Cranial nerves intact        Labs:     Recent Labs     07/31/22  2302 07/31/22  2303 08/01/22  0023 08/01/22  0224 08/01/22  0541 08/02/22  0531 08/02/22 1959 08/03/22  0459   * 133*  --   --  129* 128* 131* 127*   K 6.5* 6.6*   < > 6.0* 5.3* 5.7* 5.1 5.2*   BUN 46* 49*  --   --  53* 63* 68* 71*   CREATININE 2.3* 2.3*  --   --  2.4* 2.7* 2.5* 2.7*   CL 95* 98*  --   --  95* 94* 94* 91*   CO2 12* 8*  --   --  17* 20* 19* 21   GLUCOSE 106* 108*  --   --  125* 96 82 92   CALCIUM 9.0 8.3  --   --  8.2* 7.7* 7.9* 7.7*   MG 3.10*  --   --   --  2.70* 2.60* 3.00* 3.00*    < > = values in this interval not displayed. Recent Labs     07/31/22  1550 08/01/22  0023 08/01/22  0541 08/02/22  0531 08/03/22  0523   WBC 26.2* 16.8* 13.5* 10.7 10.5   HGB 11.5* 11.2* 9.1* 9.2* 8.5*   HCT 33.0* 32.0* 27.3* 27.5* 25.4*   * 711* see below 614* see below   MCV 96.0 98.7 96.1 95.1 95.5     No results for input(s): CHOLTOT, TRIG, HDL, CHOLHDL, LDL in the last 72 hours. Invalid input(s): [facility],[address], [address]  Recent Labs     08/01/22 0224 08/02/22  1830   INR 1.27* 1.18*     No results for input(s): CKTOTAL, CKMB, CKMBINDEX, TROPONINI in the last 72 hours. No results for input(s): BNP in the last 72 hours. No results for input(s): TSH in the last 72 hours.   No results for input(s): CHOL, HDL, LDLCALC, TRIG in the last 72 hours.]    No results found for: CKTOTAL, CKMB, CKMBINDEX, TROPONINI      Telemetry:     Telemetry personally reviewed:  NSR    Assessment / Plan:     1. R/o sepsis:   Obstipation due to ileus from recent opioid use for CASS, cannot r/o sepsis.     -On iv days per primary team  -To endoscopy suite today for colonic decompression. 2. PAFRVR:   Patient is now in sinus.    - On diltiazem and heparin drip per EP    3. Rule out acute heart failure. Patient appears volume overloaded. Echocardiogram is pending.    - We will start Lasix 40 mg IV twice daily  - Bilateral lower extremity venous ultrasound is pending  - VQ scan is pending  - Strict I's and O's every shift and standing weights if possible, low-salt diet, daily BMP with reflex to Mg (correct lytes for goals K >4.0 and Mg > 2.0) and wean supplemental oxygen to off (or down to baseline supplemental oxygen requirements) for sats greater than 92-94%. I have personally reviewed the reports and images of labs, radiological studies, cardiac studies including ECG's and telemetry, current and old medical records. The note was completed using EMR and Dragon dictation system. Every effort was made to ensure accuracy; however, inadvertent computerized transcription errors may be present. All questions and concerns were addressed to the patient/family. Alternatives to my treatment were discussed. I would like to thank you for providing me the opportunity to participate in the care of your patient. If you have any questions, please do not hesitate to contact me.      Mariah Hernández MD, [facility], [address]  The [address]  [facility] [place] [address] [place] [address]  Ph: [phone]  Fax: [phone]

## 2022-08-03 NOTE — PROGRESS NOTES
Office : [phone]     Fax :[phone]         Renal Progress Note    Subjective:   Admit Date: 7/30/2022     Interval History:   NAONE. Patient complaining of abdominal pain this am. Reports feeling bloated. Still SOB. On 8L HFNC. HPI   Vidhya Contreras is a 74 y.o. female w/PMH HTN, HLD, gout, OA, and recent right CASS who presented w/generalized weakness, N/V, constipation, and abdominal pain.        DIET Diet NPO  Medications:   Scheduled Meds:   pantoprazole  40 mg IntraVENous Daily    sodium chloride (Inhalant)  4 mL Nebulization BID    albuterol  2.5 mg Nebulization TID    sodium chloride (Inhalant)        polyethylene glycol  17 g Per NG tube TID    SMOG Enema   Rectal Once    oxymetazoline  2 spray Each Nostril Once    bisacodyl  10 mg Rectal Once    lidocaine   Topical Once    sodium chloride flush  5-40 mL IntraVENous 2 times per day    fluticasone  2 spray Each Nostril Daily    [Held by provider] lisinopril  5 mg Oral BID    [Held by provider] pregabalin  50 mg Oral TID     Continuous Infusions:   dextrose      dilTIAZem 5 mg/hr (08/03/22 9684)    heparin (PORCINE) Infusion 16 Units/kg/hr (08/03/22 0744)    sodium chloride         Labs:  CBC:   Recent Labs     08/01/22  0541 08/02/22  0531 08/03/22  0523   WBC 13.5* 10.7 10.5   HGB 9.1* 9.2* 8.5*   PLT see below 614* see below       BMP:    Recent Labs     08/02/22  0531 08/02/22 1959 08/03/22  0459   * 131* 127*   K 5.7* 5.1 5.2*   CL 94* 94* 91*   CO2 20* 19* 21   BUN 63* 68* 71*   CREATININE 2.7* 2.5* 2.7*   GLUCOSE 96 82 92       Ca/Mg/Phos:   Recent Labs     08/02/22  0531 08/02/22 1959 08/03/22  0459   CALCIUM 7.7* 7.9* 7.7*   MG 2.60* 3.00* 3.00*   PHOS 8.6* 8.1* 8.3*       Hepatic:   Recent Labs     08/01/22  0023 08/01/22  0541   AST 51* 33   ALT 39 27   BILITOT 0.5 0.5   ALKPHOS 519* 456*       Troponin: No results for input(s): TROPONINI in the last 72 hours. BNP: No results for input(s): BNP in the last 72 hours. Lipids: No results for input(s): CHOL, TRIG, HDL, LDLCALC, LABVLDL in the last 72 hours. ABGs:   Recent Labs     08/01/22  0124   PHART 7.319*   PO2ART 35.8*   DUJ8TER 34.1*       INR:   Recent Labs     08/01/22  0224 08/02/22  1830   INR 1.27* 1.18*       UA:  No results for input(s): [place], GLUCOSEU, BILIRUBINUR, KETUA, SPECGRAV, BLOODU, PHUR, PROTEINU, UROBILINOGEN, NITRU, LEUKOCYTESUR, [place] in the last 72 hours. Urine Microscopic:   No results for input(s): LABCAST, BACTERIA, COMU, HYALCAST, WBCUA, RBCUA, EPIU in the last 72 hours. Urine Culture:   Recent Labs     07/31/22  1721   LABURIN No growth at 18 to 36 hours       Urine Chemistry: No results for input(s): CLUR, LABCREA, PROTEINUR, NAUR in the last 72 hours. Objective:   Vitals: BP (!) 163/62   Pulse 80   Temp 97.7 °F (36.5 °C) (Oral)   Resp 18   Ht 5' 2\" (1.575 m)   Wt 233 lb 11 oz (106 kg)   SpO2 91%   BMI 42.74 kg/m²    Wt Readings from Last 3 Encounters:   08/03/22 233 lb 11 oz (106 kg)   07/13/22 218 lb 9.6 oz (99.2 kg)   06/14/22 217 lb (98.4 kg)      24HR INTAKE/OUTPUT:    Intake/Output Summary (Last 24 hours) at 8/3/2022 0831  Last data filed at 8/3/2022 0601  Gross per 24 hour   Intake 630.2 ml   Output 2020 ml   Net -1389.8 ml       Physical Exam  Constitutional:       Appearance: She is ill-appearing. She is not diaphoretic. Cardiovascular:      Rate and Rhythm: Normal rate. Pulmonary:      Effort: Pulmonary effort is normal.      Breath sounds: Rhonchi and rales present. No wheezing. Abdominal:      General: There is distension.       Palpations: Abdomen is soft. Tenderness: There is abdominal tenderness. There is no guarding. Musculoskeletal:      Right lower leg: Edema present. Left lower leg: Edema present. Skin:     General: Skin is warm and dry. Neurological:      General: No focal deficit present. Mental Status: She is oriented to person, place, and time. Assessment and Plan:       IMAGING:  XR ABDOMEN (KUB) (SINGLE AP VIEW)   Final Result   Impression: Stable appearance of the abdomen with gas distended loops of central small bowel again noted. CT CHEST WO CONTRAST   Final Result      1. Moderate right pleural effusion and small left effusion with basilar airspace opacity most consistent with atelectasis. Superimposed pneumonia be difficult to exclude. 2. Narrowing of the trachea with expiration compatible with treated bronchial malacia. 3. Nasogastric tube tip in the lower esophagus. 4. Persistent colonic distention. 5. Mild coronary artery calcification. XR ABDOMEN (KUB) (SINGLE AP VIEW)   Final Result      Frontal supine views demonstrate an unremarkable bowel gas pattern. No significant colonic stool is seen. Right hip arthroplasty noted. XR CHEST PORTABLE   Final Result      Prominence of the perihilar interstitial markings with mild peribronchial cuffing is favored to represent mild pulmonary edema. Low lung volumes bronchovascular crowding. Stable cardiomediastinal silhouette. Lines and tubes in standard position. Cholecystectomy clips are noted. CT ABDOMEN PELVIS WO CONTRAST Additional Contrast? Oral and Rectal   Final Result   1. Moderate colonic distention. Correlate for constipation. Fecal content within the ileum which may suggest stasis. There is no small bowel obstruction. 2. Subcutaneous fluid collection overlying the right total hip arthroplasty. CT HIP RIGHT WO CONTRAST   Final Result   1. Moderate colonic distention.  Correlate for constipation. Fecal content within the ileum which may suggest stasis. There is no small bowel obstruction. 2. Subcutaneous fluid collection overlying the right total hip arthroplasty. XR CHEST PORTABLE   Final Result      1. Right IJ CVC tip in the cavoatrial junction. 2.  Bibasilar airspace disease suggesting atelectasis. Pneumonia is not excluded. XR ABDOMEN (KUB) (SINGLE AP VIEW)   Final Result      Dilated colonic and small bowel loops may suggest ileus but obstruction is not excluded. XR ABDOMEN (KUB) (SINGLE AP VIEW)   Final Result      No acute radiographic abnormality of the abdomen. Status post right hip arthroplasty with mildly displaced greater trochanteric fracture fragment, new or more pronounced compared to the prior study. NM LUNG VENT/PERFUSION (VQ)    (Results Pending)   VL Extremity Venous Bilateral    (Results Pending)       Assessment/Plan     ARIANE   2. Sepsis  3. AGMA   4. Lactic Acidosis - resolved  4. Hyperkalemia  5. Hyponatremia  6. Bowel Obstruction  7. Hypertension      Plan:  - No IVF, hold lasix for now  - ARIANE sec to ATN from sepsis  - Monitor K  - Monitor I/Os  - Avoid nephrotoxic agents      Ileana Garcia MD  PGY-2    I have seen the patient independently from the resident . I discussed the care with the resident. I personally reviewed the HPI, PH, FH, SH, ROS and medications. I repeated pertinent portions of the examination and reviewed the relevant imaging and laboratory data.  I agree with the findings, assessment and plan as documented, with the following addendum:      Drew Luciano MD

## 2022-08-03 NOTE — PROGRESS NOTES
Pt came back to room @ 1340 on NRB. O2 sats in 70s-80s. Pt responsive to name. Pt placed on 15 L high flow and NRB. O2 sats recovered to low 90s. RT at bedside. Pt HR 70-90s, in A fib. BP stable.

## 2022-08-03 NOTE — PROGRESS NOTES
ICU Progress Note    Admit Date: 7/30/2022  ICU Day: 2  Vent Day: None  IV Access:Peripheral  IV Fluids:None  Vasopressors:None                Antibiotics: None  Diet: Diet NPO    CC: SOB    Interval history:   Surgery advanced NG tube to 55 cm  Red tinged output from NG tube with clots  Dark urine in winter bag  Awake and conversational, continues to complain of abdominal pain and distension      Medications:     Scheduled Meds:   sodium chloride (Inhalant)  4 mL Nebulization BID    albuterol  2.5 mg Nebulization TID    sodium chloride (Inhalant)        polyethylene glycol  17 g Per NG tube TID    SMOG Enema   Rectal Once    oxymetazoline  2 spray Each Nostril Once    bisacodyl  10 mg Rectal Once    lidocaine   Topical Once    sodium chloride flush  5-40 mL IntraVENous 2 times per day    fluticasone  2 spray Each Nostril Daily    [Held by provider] lisinopril  5 mg Oral BID    [Held by provider] pregabalin  50 mg Oral TID     Continuous Infusions:   dextrose      dilTIAZem 5 mg/hr (08/03/22 8205)    heparin (PORCINE) Infusion 16 Units/kg/hr (08/03/22 0601)    sodium chloride       PRN Meds:metoprolol, perflutren lipid microspheres, glucose, dextrose bolus **OR** dextrose bolus, glucagon (rDNA), dextrose, albuterol, phenol, heparin (porcine), heparin (porcine), morphine **OR** morphine, sodium chloride flush, sodium chloride, ondansetron **OR** ondansetron, acetaminophen **OR** acetaminophen, methocarbamol    Objective:   Vitals:   T-max:  Patient Vitals for the past 8 hrs:   BP Temp Temp src Pulse Resp SpO2 Weight   08/03/22 0645 131/77 -- -- -- -- -- --   08/03/22 0630 (!) 128/49 -- -- -- -- -- --   08/03/22 0615 (!) 156/60 -- -- -- -- -- --   08/03/22 0600 (!) 137/50 -- -- 72 13 92 % --   08/03/22 0545 118/61 -- -- -- -- -- --   08/03/22 0530 (!) 130/53 -- -- -- -- -- --   08/03/22 0515 (!) 150/50 -- -- -- -- -- --   08/03/22 0500 (!) 141/49 -- -- 73 14 94 % --   08/03/22 0445 139/69 -- -- -- -- -- --   08/03/22 hours. Lipids: No results for input(s): CHOL, HDL in the last 72 hours. Invalid input(s): LDLCALCU, TRIGLYCERIDE  ABGs:    Recent Labs     08/01/22  0124   PHART 7.319*   [id number] 34.1*   PO2ART 35.8*   [id number] 18*   BEART -7.8*   [id number] 63*   [id number] 19       INR:   Recent Labs     08/01/22  0224 08/02/22  1830   INR 1.27* 1.18*     Lactate: No results for input(s): LACTATE in the last 72 hours. Cultures:  -----------------------------------------------------------------  RAD:   XR ABDOMEN (KUB) (SINGLE AP VIEW)   Final Result   Impression: Stable appearance of the abdomen with gas distended loops of central small bowel again noted. CT CHEST WO CONTRAST   Final Result      1. Moderate right pleural effusion and small left effusion with basilar airspace opacity most consistent with atelectasis. Superimposed pneumonia be difficult to exclude. 2. Narrowing of the trachea with expiration compatible with treated bronchial malacia. 3. Nasogastric tube tip in the lower esophagus. 4. Persistent colonic distention. 5. Mild coronary artery calcification. XR ABDOMEN (KUB) (SINGLE AP VIEW)   Final Result      Frontal supine views demonstrate an unremarkable bowel gas pattern. No significant colonic stool is seen. Right hip arthroplasty noted. XR CHEST PORTABLE   Final Result      Prominence of the perihilar interstitial markings with mild peribronchial cuffing is favored to represent mild pulmonary edema. Low lung volumes bronchovascular crowding. Stable cardiomediastinal silhouette. Lines and tubes in standard position. Cholecystectomy clips are noted. CT ABDOMEN PELVIS WO CONTRAST Additional Contrast? Oral and Rectal   Final Result   1. Moderate colonic distention. Correlate for constipation. Fecal content within the ileum which may suggest stasis. There is no small bowel obstruction. 2. Subcutaneous fluid collection overlying the right total hip arthroplasty. CT HIP RIGHT WO CONTRAST   Final Result   1. Moderate colonic distention. Correlate for constipation. Fecal content within the ileum which may suggest stasis. There is no small bowel obstruction. 2. Subcutaneous fluid collection overlying the right total hip arthroplasty. XR CHEST PORTABLE   Final Result      1. Right IJ CVC tip in the cavoatrial junction. 2.  Bibasilar airspace disease suggesting atelectasis. Pneumonia is not excluded. XR ABDOMEN (KUB) (SINGLE AP VIEW)   Final Result      Dilated colonic and small bowel loops may suggest ileus but obstruction is not excluded. XR ABDOMEN (KUB) (SINGLE AP VIEW)   Final Result      No acute radiographic abnormality of the abdomen. Status post right hip arthroplasty with mildly displaced greater trochanteric fracture fragment, new or more pronounced compared to the prior study. NM LUNG VENT/PERFUSION (VQ)    (Results Pending)   VL Extremity Venous Bilateral    (Results Pending)         Assessment/Plan:     Respiratory  Worsening O2 requirement initially 2L NC now on 6L HFNC in setting of s/p fluid boluses, broad spectrum abx, downtrending WBC and downtrending lactic acid. Likely component of volume overload given BLE edema however no pulmonary edema or pneumonia on CT chest, only moderate R pleural effusion and small L effusion with basilar atelectasis. - continue albuterol, hypertonic nacl neb, acapella  - V/Q scan and BLE duplex pending, on heparin gtt given moderate risk of DVT     Renal  Uptrending creatinine consistent with ARIANE likely 2/2 to sepsis from apparent baseline cr ~1. Hyponatremia possibly 2/2 to decreased PO intake in setting of nausea, vomiting and constipation.   - Off fluids  - s/p lasix trial  - Nephrology following   - winter in place     Cardiac  Found to be in atrial fibrillation with poorly controlled ventricular rate on telemetry, new diagnosis.  Unclear contribution of possible HF  - cardiology consulted, started on Cardizem infusion for rate control  - Echo pending  - holding BP meds     GI  Likely 2/2 to constipation/stool burden in setting of opioid use from recent CASS and ileus. Last bowel movement 1 week ago, she is s/p smog enema with minimal output x2. No SBO on 8/1 CT abd pelvis  - Continue NG tube decompression  - continue bowel regimen with dulcolax and glycolax  - NPO with bowel rest, continue serial abdominal exams  - general surgery following   - added IV Protonix 40 daily     MSK  S/p complicated R CASS 6/44, now with R CASS fluid collection on CT  - Per orthopedics, unlikely abscess      Code Status: Full code  FEN: NPO  PPX: Heparin gtt  DISPO: pending    Domo Almazan MD, PGY-1  08/03/22  7:19 AM    This patient has been staffed and discussed with Dr. Sulema Chang        Patient was seen, examined and discussed with Dr. Rosey Pro. I agree with the history of present illness, past medical/surgical histories, family history, social history, medication list and allergies as listed. The review of systems is as noted above. My physical exam confirms the findings listed  Chart was reviewed including labs, CXR, CT scan, EKG, and medical records confirm the findings noted     Ileus   Hypoxia, CT scan with small bilateral pleural effusion, possible mild edema, atelectasis. R/o DVT  Hypervolemia. Echo with normal systolic function. IVC with normal size and collapsibility   Paroxysmal A.fib  No definite focus of infection. Currently off ABX. Lactic acidosis is resolved. Since cause of hypoxia is not fully explained we started her on heparin drip to r/o PE (high risk with recent hip surgery), get VQ scan and LE US. Start lasix   Had colonic decompression, she came to ICU with hypoxia requiring non rebreathing, however she is waking up, sats in upper 80s with non rebreather. Will monitor closely, no BiPAP given she has ileus and decreased mentation.     Consider ABG if no improvement in the next 30 minutes. Pt has a high probability of imminent or life-threatening deterioration requiring close monitoring, and highly complex decision-making and/or interventions of high intensity to assess, manipulate, and support his critical organ systems to prevent a likely inevitable decline which could occur if left untreated. A total critical care time 45 minutes was used. This includes but not limited to examining patient, collaborating with other physicians, monitoring vital signs, telemetry, continuous pulse oximetry, and clinical response to IV medications, documentation time, review and interpretation of laboratory and radiological data, review of nursing notes and old record review. This time excludes any time that may have been spent performing procedures for life threatening organ failure.

## 2022-08-04 ENCOUNTER — APPOINTMENT (OUTPATIENT)
Dept: GENERAL RADIOLOGY | Age: 72
DRG: 853 | End: 2022-08-04
Attending: INTERNAL MEDICINE
Payer: MEDICARE

## 2022-08-04 ENCOUNTER — APPOINTMENT (OUTPATIENT)
Dept: NUCLEAR MEDICINE | Age: 72
DRG: 853 | End: 2022-08-04
Attending: INTERNAL MEDICINE
Payer: MEDICARE

## 2022-08-04 LAB
ALBUMIN SERPL-MCNC: 2.3 G/DL (ref 3.4–5)
ALBUMIN SERPL-MCNC: 2.4 G/DL (ref 3.4–5)
ANION GAP SERPL CALCULATED.3IONS-SCNC: 18 MMOL/L (ref 3–16)
ANION GAP SERPL CALCULATED.3IONS-SCNC: 18 MMOL/L (ref 3–16)
ANISOCYTOSIS: ABNORMAL
APTT: >180 SEC (ref 23–34.3)
BASE EXCESS ARTERIAL: -5 (ref -3–3)
BASOPHILS ABSOLUTE: 0 K/UL (ref 0–0.2)
BASOPHILS ABSOLUTE: 0 K/UL (ref 0–0.2)
BASOPHILS RELATIVE PERCENT: 0 %
BASOPHILS RELATIVE PERCENT: 0.1 %
BLOOD CULTURE, ROUTINE: NORMAL
BUN BLDV-MCNC: 83 MG/DL (ref 7–20)
BUN BLDV-MCNC: 87 MG/DL (ref 7–20)
BURR CELLS: ABNORMAL
CALCIUM SERPL-MCNC: 8.3 MG/DL (ref 8.3–10.6)
CALCIUM SERPL-MCNC: 8.5 MG/DL (ref 8.3–10.6)
CHLORIDE BLD-SCNC: 91 MMOL/L (ref 99–110)
CHLORIDE BLD-SCNC: 96 MMOL/L (ref 99–110)
CO2: 18 MMOL/L (ref 21–32)
CO2: 19 MMOL/L (ref 21–32)
CREAT SERPL-MCNC: 2.8 MG/DL (ref 0.6–1.2)
CREAT SERPL-MCNC: 3 MG/DL (ref 0.6–1.2)
CULTURE, BLOOD 2: NORMAL
EOSINOPHILS ABSOLUTE: 0 K/UL (ref 0–0.6)
EOSINOPHILS ABSOLUTE: 0 K/UL (ref 0–0.6)
EOSINOPHILS RELATIVE PERCENT: 0 %
EOSINOPHILS RELATIVE PERCENT: 0.1 %
GFR AFRICAN AMERICAN: 18
GFR AFRICAN AMERICAN: 20
GFR NON-AFRICAN AMERICAN: 15
GFR NON-AFRICAN AMERICAN: 17
GLUCOSE BLD-MCNC: 107 MG/DL (ref 70–99)
GLUCOSE BLD-MCNC: 107 MG/DL (ref 70–99)
GLUCOSE BLD-MCNC: 145 MG/DL (ref 70–99)
GLUCOSE BLD-MCNC: 52 MG/DL (ref 70–99)
GLUCOSE BLD-MCNC: 71 MG/DL (ref 70–99)
GLUCOSE BLD-MCNC: 78 MG/DL (ref 70–99)
GLUCOSE BLD-MCNC: 91 MG/DL (ref 70–99)
GLUCOSE BLD-MCNC: 93 MG/DL (ref 70–99)
HCO3 ARTERIAL: 19.9 MMOL/L (ref 21–29)
HCT VFR BLD CALC: 23.6 % (ref 36–48)
HCT VFR BLD CALC: 24.9 % (ref 36–48)
HEMOGLOBIN: 7.9 G/DL (ref 12–16)
HEMOGLOBIN: 8 G/DL (ref 12–16)
INR BLD: 1.15 (ref 0.87–1.14)
LACTATE: 0.75 MMOL/L (ref 0.4–2)
LYMPHOCYTES ABSOLUTE: 0.6 K/UL (ref 1–5.1)
LYMPHOCYTES ABSOLUTE: 1 K/UL (ref 1–5.1)
LYMPHOCYTES RELATIVE PERCENT: 4 %
LYMPHOCYTES RELATIVE PERCENT: 8 %
MAGNESIUM: 3.1 MG/DL (ref 1.8–2.4)
MAGNESIUM: 3.2 MG/DL (ref 1.8–2.4)
MCH RBC QN AUTO: 30.5 PG (ref 26–34)
MCH RBC QN AUTO: 31.1 PG (ref 26–34)
MCHC RBC AUTO-ENTMCNC: 32.2 G/DL (ref 31–36)
MCHC RBC AUTO-ENTMCNC: 33.3 G/DL (ref 31–36)
MCV RBC AUTO: 93.5 FL (ref 80–100)
MCV RBC AUTO: 94.7 FL (ref 80–100)
METAMYELOCYTES RELATIVE PERCENT: 5 %
MONOCYTES ABSOLUTE: 0.7 K/UL (ref 0–1.3)
MONOCYTES ABSOLUTE: 0.8 K/UL (ref 0–1.3)
MONOCYTES RELATIVE PERCENT: 4.6 %
MONOCYTES RELATIVE PERCENT: 6 %
MYELOCYTE PERCENT: 1 %
NEUTROPHILS ABSOLUTE: 11.3 K/UL (ref 1.7–7.7)
NEUTROPHILS ABSOLUTE: 12.8 K/UL (ref 1.7–7.7)
NEUTROPHILS RELATIVE PERCENT: 80 %
NEUTROPHILS RELATIVE PERCENT: 91.2 %
O2 SAT, ARTERIAL: 96 % (ref 93–100)
OVALOCYTES: ABNORMAL
PCO2 ARTERIAL: 31.9 MM HG (ref 35–45)
PDW BLD-RTO: 14.2 % (ref 12.4–15.4)
PDW BLD-RTO: 14.4 % (ref 12.4–15.4)
PERFORMED ON: ABNORMAL
PERFORMED ON: NORMAL
PH ARTERIAL: 7.41 (ref 7.35–7.45)
PHOSPHORUS: 8.5 MG/DL (ref 2.5–4.9)
PHOSPHORUS: 9 MG/DL (ref 2.5–4.9)
PLATELET # BLD: 222 K/UL (ref 135–450)
PLATELET # BLD: 306 K/UL (ref 135–450)
PLATELET SLIDE REVIEW: ADEQUATE
PMV BLD AUTO: 8.1 FL (ref 5–10.5)
PMV BLD AUTO: 8.7 FL (ref 5–10.5)
PO2 ARTERIAL: 77.7 MM HG (ref 75–108)
POC SAMPLE TYPE: ABNORMAL
POTASSIUM SERPL-SCNC: 5 MMOL/L (ref 3.5–5.1)
POTASSIUM SERPL-SCNC: 5.6 MMOL/L (ref 3.5–5.1)
PROTHROMBIN TIME: 14.6 SEC (ref 11.7–14.5)
RBC # BLD: 2.53 M/UL (ref 4–5.2)
RBC # BLD: 2.63 M/UL (ref 4–5.2)
REASON FOR REJECTION: NORMAL
REJECTED TEST: NORMAL
SLIDE REVIEW: ABNORMAL
SODIUM BLD-SCNC: 128 MMOL/L (ref 136–145)
SODIUM BLD-SCNC: 132 MMOL/L (ref 136–145)
TARGET CELLS: ABNORMAL
TCO2 ARTERIAL: 21 MMOL/L
WBC # BLD: 13.1 K/UL (ref 4–11)
WBC # BLD: 14.1 K/UL (ref 4–11)

## 2022-08-04 PROCEDURE — 2580000003 HC RX 258

## 2022-08-04 PROCEDURE — 6360000002 HC RX W HCPCS

## 2022-08-04 PROCEDURE — 85730 THROMBOPLASTIN TIME PARTIAL: CPT

## 2022-08-04 PROCEDURE — 85520 HEPARIN ASSAY: CPT

## 2022-08-04 PROCEDURE — 99233 SBSQ HOSP IP/OBS HIGH 50: CPT | Performed by: INTERNAL MEDICINE

## 2022-08-04 PROCEDURE — 2000000000 HC ICU R&B

## 2022-08-04 PROCEDURE — 99232 SBSQ HOSP IP/OBS MODERATE 35: CPT | Performed by: INTERNAL MEDICINE

## 2022-08-04 PROCEDURE — 99233 SBSQ HOSP IP/OBS HIGH 50: CPT | Performed by: NURSE PRACTITIONER

## 2022-08-04 PROCEDURE — 6360000002 HC RX W HCPCS: Performed by: INTERNAL MEDICINE

## 2022-08-04 PROCEDURE — 94640 AIRWAY INHALATION TREATMENT: CPT

## 2022-08-04 PROCEDURE — C9113 INJ PANTOPRAZOLE SODIUM, VIA: HCPCS

## 2022-08-04 PROCEDURE — 6370000000 HC RX 637 (ALT 250 FOR IP)

## 2022-08-04 PROCEDURE — 2700000000 HC OXYGEN THERAPY PER DAY

## 2022-08-04 PROCEDURE — 82803 BLOOD GASES ANY COMBINATION: CPT

## 2022-08-04 PROCEDURE — 99291 CRITICAL CARE FIRST HOUR: CPT | Performed by: INTERNAL MEDICINE

## 2022-08-04 PROCEDURE — 99232 SBSQ HOSP IP/OBS MODERATE 35: CPT | Performed by: SURGERY

## 2022-08-04 PROCEDURE — 94761 N-INVAS EAR/PLS OXIMETRY MLT: CPT

## 2022-08-04 PROCEDURE — 71045 X-RAY EXAM CHEST 1 VIEW: CPT

## 2022-08-04 PROCEDURE — 94669 MECHANICAL CHEST WALL OSCILL: CPT

## 2022-08-04 PROCEDURE — 36415 COLL VENOUS BLD VENIPUNCTURE: CPT

## 2022-08-04 PROCEDURE — 36592 COLLECT BLOOD FROM PICC: CPT

## 2022-08-04 PROCEDURE — 2580000003 HC RX 258: Performed by: INTERNAL MEDICINE

## 2022-08-04 PROCEDURE — 85610 PROTHROMBIN TIME: CPT

## 2022-08-04 PROCEDURE — 83735 ASSAY OF MAGNESIUM: CPT

## 2022-08-04 PROCEDURE — 85025 COMPLETE CBC W/AUTO DIFF WBC: CPT

## 2022-08-04 PROCEDURE — 83605 ASSAY OF LACTIC ACID: CPT

## 2022-08-04 PROCEDURE — 82947 ASSAY GLUCOSE BLOOD QUANT: CPT

## 2022-08-04 PROCEDURE — 74018 RADEX ABDOMEN 1 VIEW: CPT

## 2022-08-04 PROCEDURE — 80069 RENAL FUNCTION PANEL: CPT

## 2022-08-04 RX ORDER — HEPARIN SODIUM 1000 [USP'U]/ML
80 INJECTION, SOLUTION INTRAVENOUS; SUBCUTANEOUS PRN
Status: DISCONTINUED | OUTPATIENT
Start: 2022-08-04 | End: 2022-08-09 | Stop reason: SDUPTHER

## 2022-08-04 RX ORDER — DEXTROSE MONOHYDRATE 100 MG/ML
INJECTION, SOLUTION INTRAVENOUS CONTINUOUS
Status: DISCONTINUED | OUTPATIENT
Start: 2022-08-04 | End: 2022-08-08

## 2022-08-04 RX ORDER — METOCLOPRAMIDE HYDROCHLORIDE 5 MG/ML
5 INJECTION INTRAMUSCULAR; INTRAVENOUS EVERY 6 HOURS
Status: DISCONTINUED | OUTPATIENT
Start: 2022-08-04 | End: 2022-08-13

## 2022-08-04 RX ORDER — HEPARIN SODIUM 10000 [USP'U]/100ML
5-30 INJECTION, SOLUTION INTRAVENOUS CONTINUOUS
Status: DISCONTINUED | OUTPATIENT
Start: 2022-08-04 | End: 2022-08-09 | Stop reason: SDUPTHER

## 2022-08-04 RX ORDER — HEPARIN SODIUM 1000 [USP'U]/ML
40 INJECTION, SOLUTION INTRAVENOUS; SUBCUTANEOUS PRN
Status: DISCONTINUED | OUTPATIENT
Start: 2022-08-04 | End: 2022-08-09 | Stop reason: SDUPTHER

## 2022-08-04 RX ORDER — HEPARIN SODIUM 5000 [USP'U]/ML
5000 INJECTION, SOLUTION INTRAVENOUS; SUBCUTANEOUS EVERY 8 HOURS SCHEDULED
Status: DISCONTINUED | OUTPATIENT
Start: 2022-08-04 | End: 2022-08-04

## 2022-08-04 RX ORDER — HEPARIN SODIUM 1000 [USP'U]/ML
80 INJECTION, SOLUTION INTRAVENOUS; SUBCUTANEOUS ONCE
Status: COMPLETED | OUTPATIENT
Start: 2022-08-04 | End: 2022-08-04

## 2022-08-04 RX ADMIN — ALBUTEROL SULFATE 2.5 MG: 2.5 SOLUTION RESPIRATORY (INHALATION) at 15:41

## 2022-08-04 RX ADMIN — POLYETHYLENE GLYCOL (3350) 17 G: 17 POWDER, FOR SOLUTION ORAL at 20:01

## 2022-08-04 RX ADMIN — SODIUM CHLORIDE 30 MG/ML INHALATION SOLUTION 4 ML: 30 SOLUTION INHALANT at 19:42

## 2022-08-04 RX ADMIN — ALBUTEROL SULFATE 2.5 MG: 2.5 SOLUTION RESPIRATORY (INHALATION) at 08:51

## 2022-08-04 RX ADMIN — FUROSEMIDE 10 MG/HR: 100 INJECTION, SOLUTION INTRAMUSCULAR; INTRAVENOUS at 17:59

## 2022-08-04 RX ADMIN — METOCLOPRAMIDE HYDROCHLORIDE 5 MG: 5 INJECTION INTRAMUSCULAR; INTRAVENOUS at 16:40

## 2022-08-04 RX ADMIN — SODIUM CHLORIDE 30 MG/ML INHALATION SOLUTION 4 ML: 30 SOLUTION INHALANT at 08:51

## 2022-08-04 RX ADMIN — METHYLNALTREXONE BROMIDE 8 MG: 12 INJECTION, SOLUTION SUBCUTANEOUS at 15:42

## 2022-08-04 RX ADMIN — MINERAL OIL 330 ML: 1000 LIQUID ORAL at 11:11

## 2022-08-04 RX ADMIN — POLYETHYLENE GLYCOL (3350) 17 G: 17 POWDER, FOR SOLUTION ORAL at 09:12

## 2022-08-04 RX ADMIN — PANTOPRAZOLE SODIUM 40 MG: 40 INJECTION, POWDER, FOR SOLUTION INTRAVENOUS at 09:04

## 2022-08-04 RX ADMIN — DEXTROSE MONOHYDRATE: 100 INJECTION, SOLUTION INTRAVENOUS at 13:08

## 2022-08-04 RX ADMIN — MORPHINE SULFATE 1 MG: 2 INJECTION, SOLUTION INTRAMUSCULAR; INTRAVENOUS at 00:17

## 2022-08-04 RX ADMIN — MORPHINE SULFATE 2 MG: 2 INJECTION, SOLUTION INTRAMUSCULAR; INTRAVENOUS at 06:21

## 2022-08-04 RX ADMIN — ALBUTEROL SULFATE 2.5 MG: 2.5 SOLUTION RESPIRATORY (INHALATION) at 19:42

## 2022-08-04 RX ADMIN — FUROSEMIDE 40 MG: 10 INJECTION, SOLUTION INTRAMUSCULAR; INTRAVENOUS at 09:03

## 2022-08-04 RX ADMIN — HEPARIN SODIUM 5000 UNITS: 5000 INJECTION, SOLUTION INTRAVENOUS; SUBCUTANEOUS at 14:33

## 2022-08-04 RX ADMIN — DEXTROSE MONOHYDRATE 150 MG: 50 INJECTION, SOLUTION INTRAVENOUS at 18:03

## 2022-08-04 RX ADMIN — MORPHINE SULFATE 2 MG: 2 INJECTION, SOLUTION INTRAMUSCULAR; INTRAVENOUS at 09:52

## 2022-08-04 RX ADMIN — METOCLOPRAMIDE HYDROCHLORIDE 5 MG: 5 INJECTION INTRAMUSCULAR; INTRAVENOUS at 10:10

## 2022-08-04 RX ADMIN — HEPARIN SODIUM 8960 UNITS: 1000 INJECTION INTRAVENOUS; SUBCUTANEOUS at 18:38

## 2022-08-04 RX ADMIN — DEXTROSE MONOHYDRATE 250 ML: 100 INJECTION, SOLUTION INTRAVENOUS at 08:13

## 2022-08-04 RX ADMIN — AMIODARONE HYDROCHLORIDE 1 MG/MIN: 50 INJECTION, SOLUTION INTRAVENOUS at 18:19

## 2022-08-04 RX ADMIN — SODIUM CHLORIDE, PRESERVATIVE FREE 10 ML: 5 INJECTION INTRAVENOUS at 20:02

## 2022-08-04 RX ADMIN — MORPHINE SULFATE 2 MG: 2 INJECTION, SOLUTION INTRAMUSCULAR; INTRAVENOUS at 16:40

## 2022-08-04 RX ADMIN — PANTOPRAZOLE SODIUM 40 MG: 40 INJECTION, POWDER, FOR SOLUTION INTRAVENOUS at 20:01

## 2022-08-04 RX ADMIN — SODIUM CHLORIDE, PRESERVATIVE FREE 10 ML: 5 INJECTION INTRAVENOUS at 09:13

## 2022-08-04 RX ADMIN — METOCLOPRAMIDE HYDROCHLORIDE 5 MG: 5 INJECTION INTRAMUSCULAR; INTRAVENOUS at 20:02

## 2022-08-04 RX ADMIN — HEPARIN SODIUM 5000 UNITS: 5000 INJECTION, SOLUTION INTRAVENOUS; SUBCUTANEOUS at 09:50

## 2022-08-04 RX ADMIN — AMIODARONE HYDROCHLORIDE 0.5 MG/MIN: 50 INJECTION, SOLUTION INTRAVENOUS at 23:13

## 2022-08-04 RX ADMIN — HEPARIN SODIUM 18 UNITS/KG/HR: 10000 INJECTION, SOLUTION INTRAVENOUS at 18:08

## 2022-08-04 RX ADMIN — MORPHINE SULFATE 2 MG: 2 INJECTION, SOLUTION INTRAMUSCULAR; INTRAVENOUS at 20:49

## 2022-08-04 RX ADMIN — POLYETHYLENE GLYCOL (3350) 17 G: 17 POWDER, FOR SOLUTION ORAL at 14:34

## 2022-08-04 ASSESSMENT — ENCOUNTER SYMPTOMS
CONSTIPATION: 1
ABDOMINAL PAIN: 1
BLOOD IN STOOL: 0
SHORTNESS OF BREATH: 1
NAUSEA: 0
VOMITING: 0
DIARRHEA: 0

## 2022-08-04 ASSESSMENT — PAIN DESCRIPTION - FREQUENCY
FREQUENCY: CONTINUOUS

## 2022-08-04 ASSESSMENT — PAIN SCALES - GENERAL
PAINLEVEL_OUTOF10: 0
PAINLEVEL_OUTOF10: 7
PAINLEVEL_OUTOF10: 9
PAINLEVEL_OUTOF10: 9
PAINLEVEL_OUTOF10: 5
PAINLEVEL_OUTOF10: 2
PAINLEVEL_OUTOF10: 5
PAINLEVEL_OUTOF10: 10
PAINLEVEL_OUTOF10: 5
PAINLEVEL_OUTOF10: 5
PAINLEVEL_OUTOF10: 0
PAINLEVEL_OUTOF10: 9
PAINLEVEL_OUTOF10: 10

## 2022-08-04 ASSESSMENT — PAIN DESCRIPTION - ORIENTATION
ORIENTATION: MID

## 2022-08-04 ASSESSMENT — PAIN DESCRIPTION - DESCRIPTORS
DESCRIPTORS: DISCOMFORT
DESCRIPTORS: POUNDING
DESCRIPTORS: POUNDING
DESCRIPTORS: DISCOMFORT
DESCRIPTORS: DISCOMFORT

## 2022-08-04 ASSESSMENT — PAIN DESCRIPTION - LOCATION
LOCATION: HEAD
LOCATION: BACK
LOCATION: HEAD
LOCATION: BACK
LOCATION: BACK

## 2022-08-04 ASSESSMENT — PAIN DESCRIPTION - PAIN TYPE
TYPE: CHRONIC PAIN

## 2022-08-04 ASSESSMENT — PAIN DESCRIPTION - ONSET
ONSET: ON-GOING

## 2022-08-04 ASSESSMENT — PAIN SCALES - WONG BAKER: WONGBAKER_NUMERICALRESPONSE: 4

## 2022-08-04 NOTE — PROGRESS NOTES
Surgery Daily Progress Note  Gregg Barefoot  CC:  Abdominal pain with bowel obstruction. Subjective :   Patient was unable to retain any of the SMOG enema last night. Only 500 cc of Glycolax bowel prep via NGT was given last night, which made her feel full and nauseous. She was subsequently hooked up to suction soon after administration of prep. 375 cc of output was immediately suctioned, and patient felt better. Per nurse, she had a small amount of soft-loose stool. Objective    Infusions:   dextrose      [Held by provider] heparin (PORCINE) Infusion Stopped (08/03/22 0945)    sodium chloride          I/O:I/O last 3 completed shifts: In: 1342.2 [I.V.:551.7; NG/GT:600; IV Piggyback:190.5]  Out: 2000 [Urine:525; Emesis/NG BOXMOP:0568]           Wt Readings from Last 1 Encounters:   08/04/22 246 lb 14.6 oz (112 kg)               LABS:    Recent Labs     08/03/22  0523 08/04/22  0338   WBC 10.5 13.1*   HGB 8.5* 8.0*   HCT 25.4* 24.9*   MCV 95.5 94.7   PLT see below 306          Recent Labs     08/03/22  1751 08/04/22  0338   * 132*   K 5.3* 5.6*   CL 95* 96*   CO2 19* 18*   PHOS 8.6* 9.0*   BUN 79* 83*   CREATININE 2.8* 2.8*          No results for input(s): AST, ALT, ALB, BILIDIR, BILITOT, ALKPHOS in the last 72 hours. No results for input(s): LIPASE, AMYLASE in the last 72 hours. Recent Labs     08/02/22  1830   INR 1.18*   APTT 37.0*        No results for input(s): CKTOTAL, CKMB, CKMBINDEX, TROPONINI in the last 72 hours.        Exam:/63   Pulse (!) 102   Temp 98.1 °F (36.7 °C) (Axillary)   Resp 11   Ht 5' 2\" (1.575 m)   Wt 246 lb 14.6 oz (112 kg)   SpO2 97%   BMI 45.16 kg/m²     General appearance: alert, appears stated age and cooperative  Neck: trachea midline  Heart: regular rate and rhythm, R IJ CVC in place   Lungs: increased work of breathing, rhonchi, on 12L HFNC  Skin: warm and dry, no rashes  Extremities: no edema, no cyanosis  Abdomen: soft, moderately tender in

## 2022-08-04 NOTE — PROGRESS NOTES
Cardiology Consult Service  Daily Progress Note        Admit Date:  7/30/2022  Primary cardiologist: Dr. Phyliss Gottron    Reason for Consultation/Chief Complaint: AHF    Subjective:      Vidhya Contreras is a 74 y.o. female with a past medical history of obesity, HTN, HLP, recent CASS 7/16/2022. Patient presented to the emergency room on 7/30 with nausea, vomiting, abdominal pain. Patient was found to be obstipated due to ileus from recent opioid use for CASS. She was admitted and was given IV fluids and IV antibiotics. Subsequently patient developed AF RVR and EP was consulted. She was started on diltiazem and heparin drips. Today I was consulted to assess patient in view of likely acute heart failure. Apparently on admission patient's oxygen requirements were 2 L, received IV fluids, oxygen requirements increased to 8 L, received Lasix 40 mg IV x1 on 8/2, oxygen requirements are now 6.5 L. Creatinine on admission was 2.2, increased to a peak of 2.7 today. ECG consistent with AF  bpm.  There were no ischemic changes. Echo 8/3/2022: Normal LV, EF greater than 31%, normal diastolic function, normal LVEDP, normal RV and valves. Interval history:  Patient is currently on higher oxygen requirements of 15 L. He had incomplete colonic decompression with colonoscopy yesterday. Her rhythm flipped from sinus rhythm to AF controlled ventricular response after colonoscopy. Creatinine remains elevated 2.8, I's and O's even, weight up 13 pounds. He is currently receiving D5W at 50 mill per hour.     Objective:     Medications:   heparin (porcine)  5,000 Units SubCUTAneous 3 times per day    metoclopramide  5 mg IntraVENous Q6H    pantoprazole  40 mg IntraVENous BID    sodium chloride (Inhalant)  4 mL Nebulization BID    albuterol  2.5 mg Nebulization TID    polyethylene glycol  17 g Per NG tube TID    bisacodyl  10 mg Rectal Once    lidocaine   Topical Once    sodium chloride flush  5-40 mL IntraVENous 2 times per day    fluticasone  2 spray Each Nostril Daily    [Held by provider] pregabalin  50 mg Oral TID       IV drips:   dextrose 50 mL/hr at 08/04/22 1308    furosemide (LASIX) 1mg/ml infusion      dextrose      sodium chloride         PRN:  prochlorperazine, metoprolol, glucose, dextrose bolus **OR** dextrose bolus, glucagon (rDNA), dextrose, albuterol, phenol, morphine **OR** morphine, sodium chloride flush, sodium chloride, ondansetron **OR** ondansetron, acetaminophen **OR** acetaminophen, methocarbamol    Vitals:    08/04/22 1500 08/04/22 1551 08/04/22 1556 08/04/22 1600   BP: (!) 111/56   113/69   Pulse: 92 97 99 (!) 109   Resp: 16 18 18 18   Temp:       TempSrc:       SpO2: (!) 88%  (!) 82% (!) 88%   Weight:       Height:           Intake/Output Summary (Last 24 hours) at 8/4/2022 1635  Last data filed at 8/4/2022 1434  Gross per 24 hour   Intake 1051.67 ml   Output 1190 ml   Net -138.33 ml     I/O last 3 completed shifts: In: 1342.2 [I.V.:551.7; NG/GT:600; IV Piggyback:190.5]  Out: 2000 [Urine:525; Emesis/NG output:1475]  Wt Readings from Last 3 Encounters:   08/04/22 246 lb 14.6 oz (112 kg)   07/13/22 218 lb 9.6 oz (99.2 kg)   06/14/22 217 lb (98.4 kg)       Admit Wt: Weight: 224 lb 3.3 oz (101.7 kg)   Todays Wt: Weight: 246 lb 14.6 oz (112 kg)    TELEMETRY personally reviewed: Atrial fibrillation with RVR    Physical Exam:         General Appearance:  Alert, cooperative, no distress, appears stated age Appropriate weight   Head:  Normocephalic, without obvious abnormality, atraumatic   Eyes:  PERRL, conjunctiva/corneas clear EOM intact  Ears normal   Throat no lesions       Nose: Nares normal, no drainage or sinus tenderness   Throat: Lips, mucosa, and tongue normal   Neck: Supple, symmetrical, trachea midline, no adenopathy, thyroid: not enlarged, symmetric, no tenderness/mass/nodules, no carotid bruit.         Lungs:   Normal respiratory rate, poor inspiratory effort, cannot exclude ronchi bilaterally. Chest Wall:  No tenderness or deformity   Heart:  Irregular rhythm, rate is tachy, S1, S2 normal, there is no murmur, there is no rub or gallop, cannot assess jvd, 2-3+ bilateral lower extremity edema   Abdomen:   Soft, non-tender, bowel sounds active all four quadrants,  no masses, no organomegaly       Extremities: Extremities normal, atraumatic, no cyanosis. Pulses: 2+ and symmetric   Skin: Skin color, texture, turgor normal, no rashes or lesions   Pysch: Normal mood and affect   Neurologic: Normal gross motor and sensory exam.  Cranial nerves intact       Labs:   Recent Labs     08/03/22  0459 08/03/22  1751 08/04/22  0338   * 131* 132*   K 5.2* 5.3* 5.6*   BUN 71* 79* 83*   CREATININE 2.7* 2.8* 2.8*   CL 91* 95* 96*   CO2 21 19* 18*   GLUCOSE 92 90 78   CALCIUM 7.7* 8.2* 8.3   MG 3.00* 3.10* 3.10*     Recent Labs     08/02/22  0531 08/03/22  0523 08/04/22  0338   WBC 10.7 10.5 13.1*   HGB 9.2* 8.5* 8.0*   HCT 27.5* 25.4* 24.9*   * see below 306   MCV 95.1 95.5 94.7     No results for input(s): CHOLTOT, TRIG, HDL, CHOLHDL, LDL in the last 72 hours. Invalid input(s): [employer], [id number]  Recent Labs     08/02/22  1830   INR 1.18*     No results for input(s): CKTOTAL, CKMB, CKMBINDEX, TROPONINI in the last 72 hours. No results for input(s): BNP in the last 72 hours. No results for input(s): NTPROBNP in the last 72 hours. No results for input(s): TSH in the last 72 hours. Imaging:       Assessment & Plan:     1. R/o sepsis:  Obstipation due to ileus from recent opioid use for CASS, cannot r/o sepsis. Patient had a colonoscopy for decompression on 8/3/2022.     -On iv days per primary team     2. PAFRVR:  Patient was in sinus, converted into afib with RVR after incomplete colonic decompression. Atrial fibrillation will compromise our efforts for safe diuresis. - Will start amiodarone and heparin drips. - EP following. 3.  Rule out acute heart failure.   Patient appears volume overloaded with worse respiratory status and now AFRVR. Echocardiogram is overall normal, was done during sinus.     - We will start Lasix 10 mg/hr  - Strict I's and O's every shift and standing weights if possible, low-salt diet, daily BMP with reflex to Mg (correct lytes for goals K >4.0 and Mg > 2.0) and wean supplemental oxygen to off (or down to baseline supplemental oxygen requirements) for sats greater than 92-94%. Due to the high probability of clinically significant life threating deterioration of the patient's condition that required my urgent intervention, a total critical care time 35 minutes was used. This time excludes any time that may have been spent performing procedures. This includes but not limited to vital sign monitoring, telemetry monitoring, continuous pulse oximety, IV medication, clinical response to the IV medications, documentation time, consultation time, interpretation of lab data, review of nursing notes and old record review. I have personally reviewed the reports and images of labs, radiological studies, cardiac studies including ECG's and telemetry, current and old medical records. The note was completed using EMR and Dragon dictation system. Every effort was made to ensure accuracy; however, inadvertent computerized transcription errors may be present. All questions and concerns were addressed to the patient/family. Alternatives to my treatment were discussed. Thank you for allowing to us to participate in the care or Shelia Marie. Please call our service with questions.     Chio Cabrera MD, [place] Qui[facility]ones, [address]  The [address]  [facility] [address] [place] [address]  Ph: [phone]  Fax: [phone]

## 2022-08-04 NOTE — PROGRESS NOTES
Electrophysiology - PROGRESS NOTE    Admit Date: 7/30/2022     Chief Complaint: AF     Interval History:   Patient seen and examined and notes reviewed. Patient is being followed for AF. Pt presented to the hospital with abdominal pain. Found to have bowel obstruction along with sepsis. She went into AF/RVR, placed on dilt gtt  and converted to NSR 8/3/22 @ 2 am. She underwent colonoscopy with decompression yesterday and was found to be back in AF afterwards, rates fairly well controlled. More alert this morning.     In: 762 [I.V.:262; NG/GT:500]  Out: 1030    Wt Readings from Last 2 Encounters:   08/04/22 246 lb 14.6 oz (112 kg)   07/13/22 218 lb 9.6 oz (99.2 kg)         Data:   Scheduled Meds:   Scheduled Meds:   heparin (porcine)  5,000 Units SubCUTAneous 3 times per day    metoclopramide  5 mg IntraVENous Q6H    pantoprazole  40 mg IntraVENous BID    [Held by provider] furosemide  40 mg IntraVENous BID    sodium chloride (Inhalant)  4 mL Nebulization BID    albuterol  2.5 mg Nebulization TID    polyethylene glycol  17 g Per NG tube TID    bisacodyl  10 mg Rectal Once    lidocaine   Topical Once    sodium chloride flush  5-40 mL IntraVENous 2 times per day    fluticasone  2 spray Each Nostril Daily    [Held by provider] pregabalin  50 mg Oral TID     Continuous Infusions:   dextrose      sodium chloride       PRN Meds:.prochlorperazine, metoprolol, glucose, dextrose bolus **OR** dextrose bolus, glucagon (rDNA), dextrose, albuterol, phenol, morphine **OR** morphine, sodium chloride flush, sodium chloride, ondansetron **OR** ondansetron, acetaminophen **OR** acetaminophen, methocarbamol  Continuous Infusions:   dextrose      sodium chloride         Intake/Output Summary (Last 24 hours) at 8/4/2022 1147  Last data filed at 8/4/2022 0600  Gross per 24 hour   Intake 762 ml   Output 790 ml   Net -28 ml       CBC:   Lab Results   Component Value Date/Time    WBC 13.1 08/04/2022 03:38 AM    HGB 8.0 08/04/2022 03:38 AM     08/04/2022 03:38 AM     BMP:  Lab Results   Component Value Date/Time     08/04/2022 03:38 AM    K 5.6 08/04/2022 03:38 AM    K 4.3 07/14/2022 07:27 AM    CL 96 08/04/2022 03:38 AM    CO2 18 08/04/2022 03:38 AM    BUN 83 08/04/2022 03:38 AM    CREATININE 2.8 08/04/2022 03:38 AM    GLUCOSE 78 08/04/2022 03:38 AM     INR:   Lab Results   Component Value Date/Time    INR 1.18 08/02/2022 06:30 PM    INR 1.27 08/01/2022 02:24 AM    INR 1.04 06/14/2022 03:59 PM        CARDIAC LABS  ENZYMES:No results for input(s): CKMB, CKMBINDEX, TROPONINI in the last 72 hours. Invalid input(s): CKTOTAL;3  FASTING LIPID PANEL:No results found for: HDL, LDLDIRECT, LDLCALC, TRIG, TSH  LIVER PROFILE:  Lab Results   Component Value Date/Time    AST 33 08/01/2022 05:41 AM    AST 51 08/01/2022 12:23 AM    ALT 27 08/01/2022 05:41 AM    ALT 39 08/01/2022 12:23 AM       -----------------------------------------------------------------  Telemetry: Personally reviewed  AF    Objective:   Vitals: BP (!) 147/109   Pulse (!) 101   Temp 97.7 °F (36.5 °C) (Oral)   Resp 20   Ht 5' 2\" (1.575 m)   Wt 246 lb 14.6 oz (112 kg)   SpO2 95%   BMI 45.16 kg/m²   General appearance: appears ill  Skin: Skin color, texture, turgor normal. No rashes or ecchymosis.   Neck: no JVD, supple, symmetrical, trachea midline   Lungs: , no accessory muscle use, no respiratory distress  Heart: irregularly irregular  Extremities: ++ edema, DP +  Psychiatric: abnormal insight and affect    Patient Active Problem List:     Primary osteoarthritis of right hip     Osteoarthritis of right hip     Osteoarthritis of right hip joint due to dysplasia     Status post left hip replacement     Abdominal pain     ARIANE (acute kidney injury) ([facility] [address]     Electrolyte imbalance     Lactic acid acidosis     Leukocytosis     Ileus (HCC)     Status post total hip replacement, right        Assessment & Plan:    AF/RVR  On hep gtt  Sepsis  Bowel obstruction  Obesity     Tonya Romero Is a 74 y. o. woman w/ PMH HTN, HLD, morbid obesity recent R CASS who admitted with abd pain, vomiting, w/u showed sepsis, bowel obstruction, showed found to be in AF/RVR    AF/RVR  - Back in AF after decompression yesterday, rates fairly well controlled, intermittent RVR- nonsustained  - CHADSVASc at least 3  - On heparin- transition to [address] [place] [address] Road when able  - IV metoprolol as needed, will change to oral once pt no longer npo  - If HR becomes elevated again could consider IV lopressor 2.5 mg q6H or restart dilt gtt  - Reviewed risk factor modification for AF with patient including HTN/ DM control, STUART management, stress reduction, minimal alcohol intake, tobacco cessation, regular exercise, diet  - D/w pt daughter at bedside    Electronically signed by Shellie Wilkinson 1822    I spent a total of 35 minutes and greater than 50% of the time was spent counseling with Tonya Romero and coordinating care regarding her diagnosis, treatments and plan of care.

## 2022-08-04 NOTE — PROGRESS NOTES
Gastroenterology Progress Note        [facility]  GI Progress Note        Mac Connelly is a 74 y.o. female patient. 1. Right lower quadrant abdominal pain        Admit Date: 7/30/2022    Subjective:       S/p colonoscopy w/ decompression. Large amount solid stool from prox rectum to sigmoid colon. Resting and appears uncomfortable. Per daughter at bedside, pt nauseous and full with Golytely yesterday; majority removed with NGT back to suction. Pt currently denies any pain, nausea. No BM yet. ROS:  Review of Systems   Constitutional:  Negative for fever. Respiratory:  Positive for shortness of breath. Cardiovascular:  Negative for chest pain. Gastrointestinal:  Positive for abdominal pain and constipation. Negative for blood in stool, diarrhea, nausea and vomiting. All other systems reviewed and are negative.       Scheduled Meds:   heparin (porcine)  5,000 Units SubCUTAneous 3 times per day    metoclopramide  5 mg IntraVENous Q6H    SMOG Enema  330 mL Rectal Once    pantoprazole  40 mg IntraVENous BID    [Held by provider] furosemide  40 mg IntraVENous BID    sodium chloride (Inhalant)  4 mL Nebulization BID    albuterol  2.5 mg Nebulization TID    polyethylene glycol  17 g Per NG tube TID    bisacodyl  10 mg Rectal Once    lidocaine   Topical Once    sodium chloride flush  5-40 mL IntraVENous 2 times per day    fluticasone  2 spray Each Nostril Daily    [Held by provider] pregabalin  50 mg Oral TID       Continuous Infusions:   dextrose      sodium chloride         PRN Meds:  prochlorperazine, metoprolol, glucose, dextrose bolus **OR** dextrose bolus, glucagon (rDNA), dextrose, albuterol, phenol, morphine **OR** morphine, sodium chloride flush, sodium chloride, ondansetron **OR** ondansetron, acetaminophen **OR** acetaminophen, methocarbamol      Objective:       Patient Vitals for the past 24 hrs:   BP Temp Temp src Pulse Resp SpO2 Weight   08/04/22 1000 (!) 129/51 -- -- (!) 103 20 96 % --   08/04/22 0900 (!) 119/57 97.7 °F (36.5 °C) Oral (!) 101 19 98 % --   08/04/22 0856 -- -- -- 98 19 -- --   08/04/22 0852 -- -- -- (!) 106 20 97 % --   08/04/22 0800 (!) 111/52 -- -- (!) 103 11 -- --   08/04/22 0700 111/63 -- -- (!) 102 11 -- --   08/04/22 0651 -- -- -- -- 17 -- --   08/04/22 0600 (!) 147/86 -- -- 100 17 -- --   08/04/22 0500 (!) 133/57 -- -- (!) 103 11 -- --   08/04/22 0407 -- -- -- -- -- -- 246 lb 14.6 oz (112 kg)   08/04/22 0400 (!) 146/57 98.1 °F (36.7 °C) Axillary (!) 107 18 97 % --   08/04/22 0300 (!) 106/58 -- -- (!) 102 11 -- --   08/04/22 0200 111/72 -- -- 94 11 -- --   08/04/22 0100 104/73 -- -- 95 11 -- --   08/04/22 0047 -- -- -- -- 11 -- --   08/04/22 0000 (!) 129/55 98.4 °F (36.9 °C) Oral 96 11 95 % --   08/03/22 2300 103/81 -- -- (!) 109 12 93 % --   08/03/22 2200 98/75 -- -- (!) 108 11 94 % --   08/03/22 2100 110/66 -- -- 91 11 -- --   08/03/22 2001 -- -- -- -- 18 91 % --   08/03/22 2000 (!) 141/72 97.8 °F (36.6 °C) Oral 100 14 100 % --   08/03/22 1959 -- -- -- -- 12 -- --   08/03/22 1800 118/73 -- -- 96 17 (!) 89 % --   08/03/22 1700 135/64 -- -- 92 14 95 % --   08/03/22 1607 -- -- -- -- 16 -- --   08/03/22 1600 (!) 144/50 97.6 °F (36.4 °C) Oral 98 13 92 % --   08/03/22 1500 (!) 130/54 -- -- (!) 103 21 (!) 88 % --   08/03/22 1430 -- -- -- -- 16 -- --   08/03/22 1400 116/72 -- -- 85 24 92 % --   08/03/22 1345 (!) 166/153 -- -- 74 24 (!) 87 % --   08/03/22 1200 (!) 154/70 -- -- 80 18 92 % --   08/03/22 1100 (!) 140/60 -- -- 73 12 94 % --         Exam:  VITALS:  BP (!) 129/51   Pulse (!) 103   Temp 97.7 °F (36.5 °C) (Oral)   Resp 20   Ht 5' 2\" (1.575 m)   Wt 246 lb 14.6 oz (112 kg)   SpO2 96%   BMI 45.16 kg/m²   Physical Exam  Vitals and nursing note reviewed. Constitutional:       Appearance: Normal appearance. She is not ill-appearing or diaphoretic. Cardiovascular:      Rate and Rhythm: Normal rate and regular rhythm.    Pulmonary:      Effort: Respiratory distress present. Abdominal:      General: There is distension. Tenderness: There is abdominal tenderness (diffuse). Comments: Sluggish bowel sounds   Skin:     General: Skin is warm and dry. Coloration: Skin is not pale. Neurological:      Mental Status: Mental status is at baseline. Recent Labs     08/02/22  0531 08/03/22  0523 08/04/22  0338   WBC 10.7 10.5 13.1*   HGB 9.2* 8.5* 8.0*   HCT 27.5* 25.4* 24.9*   MCV 95.1 95.5 94.7   * see below 306       Recent Labs     08/03/22  0459 08/03/22  1751 08/04/22  0338   * 131* 132*   K 5.2* 5.3* 5.6*   CL 91* 95* 96*   CO2 21 19* 18*   PHOS 8.3* 8.6* 9.0*   BUN 71* 79* 83*   CREATININE 2.7* 2.8* 2.8*       No results for input(s): AST, ALT, ALB, BILIDIR, BILITOT, ALKPHOS in the last 72 hours. No results for input(s): LIPASE, AMYLASE in the last 72 hours. Recent Labs     08/02/22  1830   INR 1.18*       No results for input(s): PTT in the last 72 hours. No results for input(s): OCCULTBLD in the last 72 hours. Radiology review:   CT Abd w/ oral and rectal contrast. 8/1:  Impression:        1. Moderate colonic distention. Correlate for constipation. Fecal content within the ileum which may suggest stasis. There is no small bowel obstruction. 2. Subcutaneous fluid collection overlying the right total hip arthroplasty. Assessment:       Principal Problem:    Abdominal pain  Active Problems:    ARIANE (acute kidney injury) (HCC)    Electrolyte imbalance    Lactic acid acidosis    Leukocytosis    Ileus (HCC)    Status post total hip replacement, right    Atrial fibrillation with RVR (HCC)    On continuous heparin infusion    Benign essential HTN    Status post right hip replacement  Resolved Problems:    * No resolved hospital problems. *  73 yo WF with PMH hypertension, hyperlipidemia, gout, osteoarthritis, morbid obesity, with recent right total hip arthroplasty on July 15, UTI s/p 10 days antibiotics. 1) Severe constipation s/p THR 7/16. Has been on opioids. Last colonoscopy in 2012. No prodrome. CT with large stool burden with transition in rectosigmoid colon. No obvious megacolon reported     2) Recent right THR 7/16 with perioperative fluid collection     3) Leukocytosis-patient p/w white count of 40,000. Prev treated for a UTI but repeat UA not consistent w/ UTI. She did have a prodrome of diarrhea, and now constipation. She also had received a steroid Dosepak, but WBC more than expected from steroids. KUB reportedly with no dilation of colon. Ortho surgery does not feel that the is fluid around her joint is an abscess. Recommendations:        -.Agree with aggressive bowel regimen    - Attempt enema again today    - Cont reglan    - Attempt golytely via NGT again today  - Repeat CT 8/1 showing moderate colonic distention  - Low threshold for repeat imaging if clinically worsens    Sejal Sloan MD  Internal Medicine Resident  PGY-1

## 2022-08-04 NOTE — PLAN OF CARE
Problem: Discharge Planning  Goal: Discharge to home or other facility with appropriate resources  8/4/2022 1015 by Bismark Adkins RN  Outcome: Progressing    Problem: Safety - Adult  Goal: Free from fall injury  8/4/2022 1015 by Bismark Adkins RN  Outcome: Progressing

## 2022-08-04 NOTE — PROGRESS NOTES
ID Follow-up NOTE  RESIDENT NOTE - reviewed / edited, attending note at bottom    CC:   Ileus concerning for C.diff, UTI  Antibiotics: None    Admit Date: 7/30/2022  Hospital Day: 6    Subjective:     POD #1: Colonoscopy with decompression    Patient states that she is not feeling much difference from yesterday compared to today but patient is denying abdominal pain at the moment. Patient denies any acute overnight events. Objective:     Patient Vitals for the past 8 hrs:   BP Temp Temp src Pulse Resp SpO2 Weight   08/04/22 0700 111/63 -- -- (!) 102 11 -- --   08/04/22 0651 -- -- -- -- 17 -- --   08/04/22 0600 (!) 147/86 -- -- 100 17 -- --   08/04/22 0500 (!) 133/57 -- -- (!) 103 11 -- --   08/04/22 0407 -- -- -- -- -- -- 246 lb 14.6 oz (112 kg)   08/04/22 0400 (!) 146/57 98.1 °F (36.7 °C) Axillary (!) 107 18 97 % --   08/04/22 0300 (!) 106/58 -- -- (!) 102 11 -- --   08/04/22 0200 111/72 -- -- 94 11 -- --   08/04/22 0100 104/73 -- -- 95 11 -- --   08/04/22 0047 -- -- -- -- 11 -- --     I/O last 3 completed shifts: In: 1342.2 [I.V.:551.7; NG/GT:600; IV Piggyback:190.5]  Out: 2000 [Urine:525; Emesis/NG XLSBGF:3280]  No intake/output data recorded. EXAM:  GENERAL: Patient is obese  HEENT: Membranes moist, no oral lesion. NG Tube placed on 7/31/2022 with dark brown fluid noted. NECK:  Supple, no lymphadenopathy  LUNGS: Diminished breath sounds at bases. CARDIAC: RRR, no murmur appreciated  ABD:  Mild abdominal distension along LLQ but decreased. No tenderness or pain to palpation noted. luggish bowel sounds. Iqbal cathter in place with tea colored urine present. EXT:  Right CASS incision site appears intact with no acute signs on infection noted. No pain to palpation or tenderness noted.    NEURO: No focal neurologic findings  PSYCH: Orientation, sensorium, mood normal  LINES:  Central line (R IJ)     Data Review:  Lab Results   Component Value Date    WBC 13.1 (H) 08/04/2022    HGB 8.0 (L) 08/04/2022    HCT 24.9 (L) 08/04/2022    MCV 94.7 08/04/2022     08/04/2022     Lab Results   Component Value Date    CREATININE 2.8 (H) 08/04/2022    BUN 83 (HH) 08/04/2022     (L) 08/04/2022    K 5.6 (H) 08/04/2022    CL 96 (L) 08/04/2022    CO2 18 (L) 08/04/2022     Hepatic Function Panel:   Lab Results   Component Value Date/Time    ALKPHOS 456 08/01/2022 05:41 AM    ALT 27 08/01/2022 05:41 AM    AST 33 08/01/2022 05:41 AM    PROT 4.8 08/01/2022 05:41 AM    BILITOT 0.5 08/01/2022 05:41 AM    BILIDIR <0.2 08/01/2022 05:41 AM    IBILI see below 08/01/2022 05:41 AM    LABALBU 2.4 08/04/2022 03:38 AM       Micro:  07/31   Blood culture #1: NGTD              Blood culture #2: NGTD              Urine culture: NGTD    07/30   Urine culture ([facility]): E. Coli      Imaging:   ECHO (08/03/2022)  Summary  Left ventricular cavity size is normal. Normal left ventricular wall thickness. Overall left ventricular systolic function appears  hyperdynamic with an ejection fraction of >65%. No regional wall motion abnormalities are noted. Normal diastolic function. Normal LVEDP. Tricuspid valve leaflets are structurally normal. Mild tricuspid regurgitation. CT Chest w/o Contrast (08/02/2022)  Impression   1. Moderate right pleural effusion and small left effusion with basilar airspace opacity most consistent with atelectasis. Superimposed pneumonia be difficult to exclude. 2. Narrowing of the trachea with expiration compatible with treated bronchial malacia. 3. Nasogastric tube tip in the lower esophagus. 4. Persistent colonic distention. 5. Mild coronary artery calcification. CT Abdomen Pelvis and Right Hip w/o Contrast (08/01/2022)  Impression   1. Moderate colonic distention. Correlate for constipation. Fecal content within the ileum which may suggest stasis. There is no small bowel obstruction. 2. Subcutaneous fluid collection overlying the right total hip arthroplasty. XR Chest (08/01/2022)  Impression   1. Right IJ CVC tip in the cavoatrial junction. 2.  Bibasilar airspace disease suggesting atelectasis. Pneumonia is not excluded. XR Abdomen (07/31/2022)  Impression   Dilated colonic and small bowel loops may suggest ileus but obstruction is not excluded. CT Abdomen Pelvis w Contrast (07/30/2022) - Sheae 26   IMPRESSION:   1. There is a large amount of stool cecum through the sigmoid colon. Transition   point is rectosigmoid but there is no obvious mass in the rectosigmoid region. No bowel thickening or peribowel stranding in the rectosigmoid region. 2. Likely, multiple renal cysts. 3. Partially organized fluid without obvious rim enhancement anterior to the   THR. This may simply represent postoperative seroma but clinical correlation   needed.  The entire inferior extent of this is not covered on this exam.   4. Urinary bladder is distended with fluid     Scheduled Meds:   heparin (porcine)  5,000 Units SubCUTAneous 3 times per day    pantoprazole  40 mg IntraVENous BID    furosemide  40 mg IntraVENous BID    sodium chloride (Inhalant)  4 mL Nebulization BID    albuterol  2.5 mg Nebulization TID    polyethylene glycol  17 g Per NG tube TID    SMOG Enema   Rectal Once    bisacodyl  10 mg Rectal Once    lidocaine   Topical Once    sodium chloride flush  5-40 mL IntraVENous 2 times per day    fluticasone  2 spray Each Nostril Daily    [Held by provider] pregabalin  50 mg Oral TID     Continuous Infusions:   dextrose      [Held by provider] heparin (PORCINE) Infusion Stopped (08/03/22 0945)    sodium chloride       PRN Meds:  prochlorperazine, metoprolol, glucose, dextrose bolus **OR** dextrose bolus, glucagon (rDNA), dextrose, albuterol, phenol, heparin (porcine), heparin (porcine), morphine **OR** morphine, sodium chloride flush, sodium chloride, ondansetron **OR** ondansetron, acetaminophen **OR** acetaminophen, methocarbamol    Assessment:     Sepsis 2/2 possible C.diff w/ illeus  - No leukocytosis noted (WBC 13.1)  - 1+ Target cell, 1+Anisocytosis, 1+ Ovalocytes, 1+ Myra Cells, 5 Metamyelocyte  - s/p Colonoscopy with decompression   - Op note reviewed:  Colon was decompressed and suctioned. Findings suggestive of colon obstruction secondary to severe constipation    Plan:     - No antibiotics necessary as no diagnosis of a specific infectious process was made at the time. - Continue enemas per GI    Discussed with pt family, Dr Rob Long, DPM    Addendum to Resident Consult note:  Pt seen, examined and evaluated. I have reviewed the current history, physical findings, labs and assessment and plan and agree with note as documented by resident (Dr. Jose Salcido). 73 yo obesity (BMI 41), HTN, gout, HL, OA, chronic LBP (chronic narcotics)     S/p R CASS 7/16. Pt dx with UTI during this hosp, rx Cipro, discharged on cephalexin. [facility] to [facility] with weakness, nausea / vomiting / abd pain  Weakness over 2 days, has been constipated for over a week (took imodium 1 week prior with loose stool with cephalexin). Also, confused. In ED ([facility]) 7/30, afeb, WBC 22.8. CT with fluid collect anterior to R hip / CASS. Transfer / admit to [facility] on 7/30 - UA neg  Started on Vanco / cefepime. NGT placed. CT chest - R>L pleural effusion, illeus     Today, afeb, O2 6L. WBC 10.7, Cr 2.7, LA 2. (4.5 on admit), procalcitonin 20.38  Pt awake, alert, conversant. On exam, lethargic, able to converse  Lungs dull at bases. Abd - tender, lashawn LLQ  LE edema. R hip - no redness / tenderness. Anterior dressing in place.      Linezolid, Meropenem, PO Vancomcyin, IV Metronidazole - stopped on 8/2     Today, afeb, EBC 10.5  Colonoscopy reveals colonic obstruction secondary to constipation, 'decompressed'  Less abd pain after GI procedure     IMP/  R CASS     Encephalopathy, improved  Ileus - loss of stool of CT / constipated, s/p colonoscopic decompression 8/3  R hip - fluid collection around prior surg site, unclear if infected  Leukocytosis - WBC down  Elevated Procalcitonin     ARIANE  Leukocytosis     No diagnosis of a specific infectious process   - no pneumonia, no UTI, unclear regarding periop fluid collection     REC/  Cont off antibiotics  Ileus / constipation - NGT, enemas        Medical Decision Making:   The following items were considered in medical decision making:  Discussion of patient care with other providers  Reviewed clinical lab tests  Reviewed radiology tests  Reviewed other diagnostic tests/interventions  Independent review of radiologic images - reviewed CTs 8/2  Microbiology cultures and other micro tests reviewed       Discussed with pt, family   Mick Salguero MD

## 2022-08-04 NOTE — PROGRESS NOTES
SMOG enema given, pt tolerated well, however could not hold fluid in rectum, enema ran back out. No stool at this time.

## 2022-08-04 NOTE — PROGRESS NOTES
Office : [phone]     Fax :[phone]         Renal Progress Note    Subjective:   Admit Date: 7/30/2022     Interval History:   NAONE. Alert but more confused today. Reporting SOB and abdominal pain. On 12L HFNC. HPI   Koko Chatman is a 74 y.o. female w/PMH HTN, HLD, gout, OA, and recent right CASS who presented w/generalized weakness, N/V, constipation, and abdominal pain.        DIET Diet NPO  Medications:   Scheduled Meds:   heparin (porcine)  5,000 Units SubCUTAneous 3 times per day    pantoprazole  40 mg IntraVENous BID    furosemide  40 mg IntraVENous BID    sodium chloride (Inhalant)  4 mL Nebulization BID    albuterol  2.5 mg Nebulization TID    polyethylene glycol  17 g Per NG tube TID    SMOG Enema   Rectal Once    bisacodyl  10 mg Rectal Once    lidocaine   Topical Once    sodium chloride flush  5-40 mL IntraVENous 2 times per day    fluticasone  2 spray Each Nostril Daily    [Held by provider] pregabalin  50 mg Oral TID     Continuous Infusions:   dextrose      [Held by provider] heparin (PORCINE) Infusion Stopped (08/03/22 0945)    sodium chloride         Labs:  CBC:   Recent Labs     08/02/22  0531 08/03/22  0523 08/04/22  0338   WBC 10.7 10.5 13.1*   HGB 9.2* 8.5* 8.0*   * see below 306       BMP:    Recent Labs     08/03/22  0459 08/03/22  1751 08/04/22  0338   * 131* 132*   K 5.2* 5.3* 5.6*   CL 91* 95* 96*   CO2 21 19* 18*   BUN 71* 79* 83*   CREATININE 2.7* 2.8* 2.8*   GLUCOSE 92 90 78       Ca/Mg/Phos:   Recent Labs     08/03/22  0459 08/03/22  1751 08/04/22  0338   CALCIUM 7.7* 8.2* 8.3   MG 3.00* 3.10* 3.10*   PHOS 8. 3* 8.6* 9.0*       Hepatic:   No results for input(s): AST, ALT, ALB, BILITOT, ALKPHOS in the last 72 hours. Troponin: No results for input(s): TROPONINI in the last 72 hours. BNP: No results for input(s): BNP in the last 72 hours. Lipids: No results for input(s): CHOL, TRIG, HDL, LDLCALC, LABVLDL in the last 72 hours. ABGs:   No results for input(s): PHART, PO2ART, [id number] in the last 72 hours. INR:   Recent Labs     08/02/22  1830   INR 1.18*       UA:  No results for input(s): Amber Forte, GLUCOSEU, BILIRUBINUR, KETUA, SPECGRAV, BLOODU, PHUR, PROTEINU, UROBILINOGEN, NITRU, LEUKOCYTESUR, Liddie Cruise in the last 72 hours. Urine Microscopic:   No results for input(s): LABCAST, BACTERIA, COMU, HYALCAST, WBCUA, RBCUA, EPIU in the last 72 hours. Urine Culture: No results for input(s): LABURIN in the last 72 hours. Urine Chemistry: No results for input(s): [employer], PROTEINUR, NAUR in the last 72 hours. Objective:   Vitals: BP (!) 111/52   Pulse 98   Temp 98.1 °F (36.7 °C) (Axillary)   Resp 19   Ht 5' 2\" (1.575 m)   Wt 246 lb 14.6 oz (112 kg)   SpO2 97%   BMI 45.16 kg/m²    Wt Readings from Last 3 Encounters:   08/04/22 246 lb 14.6 oz (112 kg)   07/13/22 218 lb 9.6 oz (99.2 kg)   06/14/22 217 lb (98.4 kg)      24HR INTAKE/OUTPUT:    Intake/Output Summary (Last 24 hours) at 8/4/2022 0902  Last data filed at 8/4/2022 0600  Gross per 24 hour   Intake 762 ml   Output 1030 ml   Net -268 ml       Physical Exam  Constitutional:       Appearance: She is ill-appearing. She is not diaphoretic. Cardiovascular:      Rate and Rhythm: Normal rate. Pulmonary:      Effort: Pulmonary effort is normal.      Breath sounds: Rhonchi present. No wheezing. Abdominal:      General: There is distension. Palpations: Abdomen is soft. Tenderness: There is abdominal tenderness. There is no guarding. Musculoskeletal:      Right lower leg: Edema present.       Left lower leg: Edema present. Skin:     General: Skin is warm and dry. Neurological:      General: No focal deficit present. Mental Status: She is oriented to person, place, and time. Assessment and Plan:       IMAGING:  VL Extremity Venous Bilateral         XR ABDOMEN (KUB) (SINGLE AP VIEW)   Final Result   Impression: Stable appearance of the abdomen with gas distended loops of central small bowel again noted. CT CHEST WO CONTRAST   Final Result      1. Moderate right pleural effusion and small left effusion with basilar airspace opacity most consistent with atelectasis. Superimposed pneumonia be difficult to exclude. 2. Narrowing of the trachea with expiration compatible with treated bronchial malacia. 3. Nasogastric tube tip in the lower esophagus. 4. Persistent colonic distention. 5. Mild coronary artery calcification. XR ABDOMEN (KUB) (SINGLE AP VIEW)   Final Result      Frontal supine views demonstrate an unremarkable bowel gas pattern. No significant colonic stool is seen. Right hip arthroplasty noted. XR CHEST PORTABLE   Final Result      Prominence of the perihilar interstitial markings with mild peribronchial cuffing is favored to represent mild pulmonary edema. Low lung volumes bronchovascular crowding. Stable cardiomediastinal silhouette. Lines and tubes in standard position. Cholecystectomy clips are noted. CT ABDOMEN PELVIS WO CONTRAST Additional Contrast? Oral and Rectal   Final Result   1. Moderate colonic distention. Correlate for constipation. Fecal content within the ileum which may suggest stasis. There is no small bowel obstruction. 2. Subcutaneous fluid collection overlying the right total hip arthroplasty. CT HIP RIGHT WO CONTRAST   Final Result   1. Moderate colonic distention. Correlate for constipation. Fecal content within the ileum which may suggest stasis. There is no small bowel obstruction.    2. Subcutaneous fluid collection overlying the right total hip arthroplasty. XR CHEST PORTABLE   Final Result      1. Right IJ CVC tip in the cavoatrial junction. 2.  Bibasilar airspace disease suggesting atelectasis. Pneumonia is not excluded. XR ABDOMEN (KUB) (SINGLE AP VIEW)   Final Result      Dilated colonic and small bowel loops may suggest ileus but obstruction is not excluded. XR ABDOMEN (KUB) (SINGLE AP VIEW)   Final Result      No acute radiographic abnormality of the abdomen. Status post right hip arthroplasty with mildly displaced greater trochanteric fracture fragment, new or more pronounced compared to the prior study. NM LUNG SCAN PERFUSION ONLY    (Results Pending)       Assessment/Plan     ARIANE   2. Sepsis  3. AGMA   4. Lactic Acidosis - resolved  4. Hyperkalemia  5. Hyponatremia  6. Bowel Obstruction  7. Hypertension      Plan:  - No IVF  - ECHO normnal EF - IVC compressible   - Hypoxia likely sec to Aspiration pneumonitis   - ARIANE sec to ATN   - Monitor I/Os  - Avoid nephrotoxic agents      Recommend to dose adjust all medications  based on renal functions  Maintain SBP> 90 mmHg   Daily weights   AVOID NSAIDs  Avoid Nephrotoxins  Monitor Intake/Output  Call if significant decrease in urine output        Christiane Segura MD  PGY-2      I have seen the patient independently from the resident . I discussed the care with the resident. I personally reviewed the HPI, PH, FH, SH, ROS and medications. I repeated pertinent portions of the examination and reviewed the relevant imaging and laboratory data.  I agree with the findings, assessment and plan as documented, with the following addendum:      Christel Thornton MD

## 2022-08-04 NOTE — PROGRESS NOTES
Hospital Medicine Progress Note    PCP: Madelyn Romo    Date of Admission: 7/30/2022    Chief Complaint:  Abdominal pain, transferred for ortho eval with Rt CASS site with fluid collection. Subjective:  More alert, says she has cramping in her thighs. Abdominal pain is better. History Of Present Illness:   \"  74 y.o. female Gina Salmeron  - Hx of HTN, HLD, Gout, OA, recent Rt CASS, morbid obesity  - Presents with generalized weakness nausea vomiting and abdominal pain, she woke up with the symptoms night prior to presentation. Her last bowel movement was about 3 days prior. She was seen at [facility] where she underwent CT imaging of the abdomen and pelvis which showed:    IMPRESSION:   1. There is a large amount of stool cecum through the sigmoid colon. Transition point is rectosigmoid but there is no obvious mass in the rectosigmoid region. No bowel thickening or peribowel stranding in the rectosigmoid region. 2. Likely, multiple renal cysts. 3. Partially organized fluid without obvious rim enhancement anterior to the THR. This may simply represent postoperative seroma but clinical correlation needed. The entire inferior extent of this is not covered on this exam.   4. Urinary bladder is distended with fluid    Labs were significant for WBC 22.8k, with elevated ANC, hgb 10.1, na131, k 5.0 BUN of 19 cr 1.1, Alk phos 139. With noted fluid around [facility] Dr. Reginaldo Stauffer evaluation. \"    Medications : Reviewed      Allergies:  Buspirone, Duloxetine, Fenofibrate, Gabapentin, Venlafaxine, Doxycycline, Statins, and Sulfa antibiotics      REVIEW OF SYSTEMS:   Pertinent positives as noted in the HPI. All other systems reviewed and negative.       PHYSICAL EXAM PERFORMED:    BP (!) 111/52   Pulse (!) 103   Temp 98.1 °F (36.7 °C) (Axillary)   Resp 11   Ht 5' 2\" (1.575 m)   Wt 246 lb 14.6 oz (112 kg)   SpO2 97%   BMI 45.16 kg/m²     General appearance: Lethargic  HEENT:  Normal cephalic, atraumatic without obvious deformity. Neck: Supple, with full range of motion. No jugular venous distention. Respiratory:  Normal respiratory effort. Clear to auscultation, bilaterally without Rales/Wheezes/Rhonchi. Cardiovascular:  Regular rate and rhythm with normal S1/S2 without murmurs, rubs or gallops. Abdomen: Soft, no guarding  Musculoskeletal: Right hip swelling but no erythema or warmth. Incision dressing C/D/I  Skin: Skin color, texture, turgor normal.  No rashes or lesions. Neurologic:  grossly non-focal.  Capillary Refill: Brisk,< 3 seconds   Peripheral Pulses: +2 palpable, equal bilaterally       Labs:     Recent Labs     08/02/22  0531 08/03/22  0523 08/04/22  0338   WBC 10.7 10.5 13.1*   HGB 9.2* 8.5* 8.0*   HCT 27.5* 25.4* 24.9*   * see below 306       Recent Labs     08/03/22  0459 08/03/22  1751 08/04/22  0338   * 131* 132*   K 5.2* 5.3* 5.6*   CL 91* 95* 96*   CO2 21 19* 18*   BUN 71* 79* 83*   CREATININE 2.7* 2.8* 2.8*   CALCIUM 7.7* 8.2* 8.3   PHOS 8.3* 8.6* 9.0*       No results for input(s): AST, ALT, BILIDIR, BILITOT, ALKPHOS in the last 72 hours. Recent Labs     08/02/22  1830   INR 1.18*       No results for input(s): Hedy Kras in the last 72 hours. Urinalysis:      Lab Results   Component Value Date/Time    NITRU POSITIVE 07/31/2022 05:15 AM    WBCUA 3-5 07/31/2022 05:15 AM    BACTERIA 1+ 07/31/2022 05:15 AM    RBCUA None seen 07/31/2022 05:15 AM    BLOODU Negative 07/31/2022 05:15 AM    SPECGRAV 1.015 07/31/2022 05:15 AM    GLUCOSEU Negative 07/31/2022 05:15 AM       Radiology: No imaging here. Reviewed imaging from OSH    ASSESSMENT/PLAN:    Severe sepsis: POA - etiology still unclear. No clear source.   - Leukocytosis with tachycardia; Transferred for evaluation of post-op hip fluid collection/possible infection (s/p THR 7/13) however concern for intra abdominal source, possible C. Diff?  -Started on broad spectrum abx  -Started on vanc enema, flagyl  - Blood cultures x 2 NGTD  - Ortho c/s - likely seroma, normal post operative finding  - ID consulted, Abx stopped    Atrial Fibrillation with RVR - converted to NSR  Diltiazem drip - changing to IV lopressor as needed  Echo normal EF, normal diastolic  Cardiology consulted    Progressive hypoxemic respiratory failure  Unclear cause, started on heparin drip for Afib and possible also new PE  8/3 CT chest wo contrast b/l pleural effusions, small left and moderate right  VQ pending  Prelim doppler report no DVT    Bowel obstruction: POA Secondary to severe constipation  - CT of the [facility] system showed significant stool from cecum to the sigmoid colon and transition point at the rectosigmoid.   - GI, Surgery consulted, appreciate recs  - Miralax and enema unsuccessful  - [place] decompression 8/3, recommendation continue rectal enemas BID, Relistor, GoLytely through NG tube (slowly, as tolerated), and serial abdominal imaging    Acute Urinary retention  Possible partially treated Complicated UTI: POA  - Appears recently had E coli in urine, although CC was less than 100k. Was treated with cipro. Also appears on Keflex PTA.   - Urine on admission has some bacteria, and nitrite positive, but neg leuc esterase and no signif pyuria  - Winter catheter placed at [facility] for retention likely sec to severe constipation  - continue winter for now, ongoing reassessment    Chronic Anemia  - 10.2 better than hgb on recent DC from hospital 2 weeks back, possible hemoconcentration  - Monitor Hgb  Essential Hypertension- Hold blood pressure medications for now with sepsis  Mild hyponatremia: monitor electrolytes  Gout - stable    Morbid obesity Body mass index is 41.01 kg/m². - Complicating assessment and treatment.  Placing patient at risk for multiple co-morbidities as well as early death and contributing to the

## 2022-08-04 NOTE — RT PROTOCOL NOTE
based on the score. If a patient is on this medication at home then do not decrease Frequency below that used at home. 0-3 - enter or revise RT bronchodilator order(s) to equivalent RT Bronchodilator order with Frequency of every 4 hours PRN for wheezing or increased work of breathing using Per Protocol order mode. 4-6 - enter or revise RT Bronchodilator order(s) to two equivalent RT bronchodilator orders with one order with BID Frequency and one order with Frequency of every 4 hours PRN wheezing or increased work of breathing using Per Protocol order mode. 7-10 - enter or revise RT Bronchodilator order(s) to two equivalent RT bronchodilator orders with one order with TID Frequency and one order with Frequency of every 4 hours PRN wheezing or increased work of breathing using Per Protocol order mode. 11-13 - enter or revise RT Bronchodilator order(s) to one equivalent RT bronchodilator order with QID Frequency and an Albuterol order with Frequency of every 4 hours PRN wheezing or increased work of breathing using Per Protocol order mode. Greater than 13 - enter or revise RT Bronchodilator order(s) to one equivalent RT bronchodilator order with every 4 hours Frequency and an Albuterol order with Frequency of every 2 hours PRN wheezing or increased work of breathing using Per Protocol order mode. RT to enter RT Home Evaluation for COPD & MDI Assessment order using Per Protocol order mode.     Electronically signed by Lynne Purvis RCP on 8/3/2022 at 8:03 PM

## 2022-08-04 NOTE — PROGRESS NOTES
ICU Progress Note    Admit Date: 7/30/2022  ICU Day: 3  Vent Day: None  IV Access:Peripheral  IV Fluids:None  Vasopressors:None                Antibiotics: None  Diet: Diet NPO    CC: SOB    Interval history:   S/p colonoscopy decompression with GI  2x episodes of nausea with NG output   Having small BM per nursing   Somnolent but interactive this AM    Medications:     Scheduled Meds:   pantoprazole  40 mg IntraVENous BID    furosemide  40 mg IntraVENous BID    sodium chloride (Inhalant)  4 mL Nebulization BID    albuterol  2.5 mg Nebulization TID    polyethylene glycol  17 g Per NG tube TID    SMOG Enema   Rectal Once    bisacodyl  10 mg Rectal Once    lidocaine   Topical Once    sodium chloride flush  5-40 mL IntraVENous 2 times per day    fluticasone  2 spray Each Nostril Daily    [Held by provider] pregabalin  50 mg Oral TID     Continuous Infusions:   dextrose      [Held by provider] heparin (PORCINE) Infusion Stopped (08/03/22 0945)    sodium chloride       PRN Meds:prochlorperazine, metoprolol, glucose, dextrose bolus **OR** dextrose bolus, glucagon (rDNA), dextrose, albuterol, phenol, heparin (porcine), heparin (porcine), morphine **OR** morphine, sodium chloride flush, sodium chloride, ondansetron **OR** ondansetron, acetaminophen **OR** acetaminophen, methocarbamol    Objective:   Vitals:   T-max:  Patient Vitals for the past 8 hrs:   BP Temp Temp src Pulse Resp SpO2 Weight   08/04/22 0651 -- -- -- -- 17 -- --   08/04/22 0600 (!) 147/86 -- -- 100 17 -- --   08/04/22 0500 (!) 133/57 -- -- (!) 103 11 -- --   08/04/22 0407 -- -- -- -- -- -- 246 lb 14.6 oz (112 kg)   08/04/22 0400 (!) 146/57 98.1 °F (36.7 °C) Axillary (!) 107 18 97 % --   08/04/22 0300 (!) 106/58 -- -- (!) 102 11 -- --   08/04/22 0200 111/72 -- -- 94 11 -- --   08/04/22 0100 104/73 -- -- 95 11 -- --   08/04/22 0047 -- -- -- -- 11 -- --   08/04/22 0000 (!) 129/55 98.4 °F (36.9 °C) Oral 96 11 95 % --   08/03/22 2300 103/81 -- -- (!) 109 12 93 % --         Intake/Output Summary (Last 24 hours) at 8/4/2022 0654  Last data filed at 8/4/2022 0600  Gross per 24 hour   Intake 762 ml   Output 1030 ml   Net -268 ml         Review of Systems  Per HPI    Physical Exam  Constitutional:       General: She is not in acute distress. Appearance: She is obese. She is ill-appearing. HENT:      Head: Normocephalic and atraumatic. Nose: Nose normal.      Mouth/Throat:      Mouth: Mucous membranes are dry. Pharynx: Oropharynx is clear. Eyes:      Extraocular Movements: Extraocular movements intact. Conjunctiva/sclera: Conjunctivae normal.      Pupils: Pupils are equal, round, and reactive to light. Neck:      Comments: Central line in place  Cardiovascular:      Rate and Rhythm: Normal rate and regular rhythm. Pulses: Normal pulses. Heart sounds: Normal heart sounds. Pulmonary:      Effort: Pulmonary effort is normal. No respiratory distress. Breath sounds: No wheezing or rhonchi. Comments: Diminished breath sounds at bases bilaterally   Abdominal:      General: Abdomen is flat. There is distension. Tenderness: There is abdominal tenderness. There is no guarding or rebound. Comments: Reduced bowel sounds   Musculoskeletal:      Cervical back: Normal range of motion and neck supple. Right lower leg: Edema present. Left lower leg: Edema present. Comments: +Sacral edema. R Hip CASS site dressing present   Skin:     General: Skin is warm and dry. Capillary Refill: Capillary refill takes less than 2 seconds. Findings: Bruising present. Neurological:      General: No focal deficit present. Mental Status: She is alert and oriented to person, place, and time. Mental status is at baseline. Cranial Nerves: No cranial nerve deficit. Sensory: No sensory deficit. Motor: No weakness.    Psychiatric:         Mood and Affect: Mood normal.         Behavior: Behavior normal.         Thought Content: Thought content normal.         Judgment: Judgment normal.       LABS:    CBC:   Recent Labs     08/02/22  0531 08/03/22  0523 08/04/22  0338   WBC 10.7 10.5 13.1*   HGB 9.2* 8.5* 8.0*   HCT 27.5* 25.4* 24.9*   * see below 306   MCV 95.1 95.5 94.7       Renal:    Recent Labs     08/03/22 0459 08/03/22 1751 08/04/22 0338   * 131* 132*   K 5.2* 5.3* 5.6*   CL 91* 95* 96*   CO2 21 19* 18*   BUN 71* 79* 83*   CREATININE 2.7* 2.8* 2.8*   GLUCOSE 92 90 78   CALCIUM 7.7* 8.2* 8.3   MG 3.00* 3.10* 3.10*   PHOS 8.3* 8.6* 9.0*   ANIONGAP 15 17* 18*       Hepatic:   Recent Labs     08/03/22 0459 08/03/22 1751 08/04/22 0338   LABALBU 2.1* 2.4* 2.4*       Troponin: No results for input(s): TROPONINI in the last 72 hours. BNP: No results for input(s): BNP in the last 72 hours. Lipids: No results for input(s): CHOL, HDL in the last 72 hours. Invalid input(s): LDLCALCU, TRIGLYCERIDE  ABGs:    No results for input(s): PHART, ISI2ZOZ, PO2ART, VXP8BWH, BEART, THGBART, U3GKUJHQ, EVR2YVN in the last 72 hours. INR:   Recent Labs     08/02/22  1830   INR 1.18*       Lactate: No results for input(s): LACTATE in the last 72 hours. Cultures:  -----------------------------------------------------------------  RAD:   VL Extremity Venous Bilateral         XR ABDOMEN (KUB) (SINGLE AP VIEW)   Final Result   Impression: Stable appearance of the abdomen with gas distended loops of central small bowel again noted. CT CHEST WO CONTRAST   Final Result      1. Moderate right pleural effusion and small left effusion with basilar airspace opacity most consistent with atelectasis. Superimposed pneumonia be difficult to exclude. 2. Narrowing of the trachea with expiration compatible with treated bronchial malacia. 3. Nasogastric tube tip in the lower esophagus. 4. Persistent colonic distention. 5. Mild coronary artery calcification.          XR ABDOMEN (KUB) (SINGLE AP VIEW)   Final Result      Frontal supine views demonstrate an unremarkable bowel gas pattern. No significant colonic stool is seen. Right hip arthroplasty noted. XR CHEST PORTABLE   Final Result      Prominence of the perihilar interstitial markings with mild peribronchial cuffing is favored to represent mild pulmonary edema. Low lung volumes bronchovascular crowding. Stable cardiomediastinal silhouette. Lines and tubes in standard position. Cholecystectomy clips are noted. CT ABDOMEN PELVIS WO CONTRAST Additional Contrast? Oral and Rectal   Final Result   1. Moderate colonic distention. Correlate for constipation. Fecal content within the ileum which may suggest stasis. There is no small bowel obstruction. 2. Subcutaneous fluid collection overlying the right total hip arthroplasty. CT HIP RIGHT WO CONTRAST   Final Result   1. Moderate colonic distention. Correlate for constipation. Fecal content within the ileum which may suggest stasis. There is no small bowel obstruction. 2. Subcutaneous fluid collection overlying the right total hip arthroplasty. XR CHEST PORTABLE   Final Result      1. Right IJ CVC tip in the cavoatrial junction. 2.  Bibasilar airspace disease suggesting atelectasis. Pneumonia is not excluded. XR ABDOMEN (KUB) (SINGLE AP VIEW)   Final Result      Dilated colonic and small bowel loops may suggest ileus but obstruction is not excluded. XR ABDOMEN (KUB) (SINGLE AP VIEW)   Final Result      No acute radiographic abnormality of the abdomen. Status post right hip arthroplasty with mildly displaced greater trochanteric fracture fragment, new or more pronounced compared to the prior study. NM LUNG SCAN PERFUSION ONLY    (Results Pending)         Assessment/Plan:     Respiratory  Worsening O2 requirement initially 2L NC now on 6L HFNC in setting of s/p fluid boluses, broad spectrum abx, downtrending WBC and downtrending lactic acid.  Likely component of volume overload given BLE edema however no pulmonary edema or pneumonia on CT chest, only moderate R pleural effusion and small L effusion with basilar atelectasis. - continue albuterol, hypertonic nacl neb, acapella  - V/Q scan pending     Renal  Uptrending creatinine consistent with ARIANE likely 2/2 to sepsis from apparent baseline cr ~1. Hyponatremia possibly 2/2 to decreased PO intake in setting of nausea, vomiting and constipation.   - Off fluids  - lasix 40 BID today   - Nephrology following   - winter in place     Cardiac  Found to be in atrial fibrillation with poorly controlled ventricular rate on telemetry, new diagnosis. Unclear contribution of possible HF however echo 8/3 with EF >65%  - cardiology consulted, transitioned off cardizem gtt now on prn metoprolol  - holding BP meds     GI  Likely 2/2 to constipation/stool burden in setting of opioid use from recent CASS and ileus. Last bowel movement 1 week ago, she is s/p smog enema with minimal output x2. No SBO on 8/1 CT abd pelvis  - Continue NG tube decompression  - continue bowel regimen with dulcolax and glycolax   - may consider GoLytely per GI  - NPO with bowel rest, continue serial abdominal exams  - general surgery following   - added IV Protonix 40 daily  - s/p colonoscopy decompression 8/3 but hard stool unable to be evacuated     MSK  S/p complicated R CASS 6/00, now with R CASS fluid collection on CT  - Per orthopedics, unlikely abscess        Code Status: Full code  FEN: NPO  PPX: SubQ Heparin  DISPO: pending    Wiley Rinne, MD, PGY-1  08/04/22  6:54 AM    This patient has been staffed and discussed with Dr. Christiano Phillips     Patient was seen, examined and discussed with Dr. Abdullahi Villagomez. I agree with the interval history. My physical exam confirms the findings listed below  Chart was reviewed including labs and medical records confirm the findings noted    Ileus  Hypoxia, now on 12 liters.   CT scan with small bilateral pleural effusion, possible mild edema, atelectasis. Hypervolemia. Echo with normal systolic function. IVC with normal size and collapsibility  Paroxysmal A.fib  No definite focus of infection. Currently off ABX. Lactic acidosis is resolved.      Recheck CXR  IS, flutter valve  Reglan  Enema   D/c heparin drip  Hold lasix

## 2022-08-04 NOTE — PLAN OF CARE
Problem: Discharge Planning  Goal: Discharge to home or other facility with appropriate resources  8/3/2022 2108 by Veronika Hand RN  Outcome: Progressing  8/3/2022 1732 by Christel Wagner RN  Outcome: Progressing  Flowsheets (Taken 8/3/2022 1600)  Discharge to home or other facility with appropriate resources:   Identify barriers to discharge with patient and caregiver   Identify discharge learning needs (meds, wound care, etc)     Problem: Safety - Adult  Goal: Free from fall injury  8/3/2022 2108 by Veronika Hand RN  Outcome: Progressing  8/3/2022 1732 by Christel Wagner RN  Outcome: Progressing  Flowsheets (Taken 8/3/2022 1729)  Free From Fall Injury:   Instruct family/caregiver on patient safety   Based on caregiver fall risk screen, instruct family/caregiver to ask for assistance with transferring infant if caregiver noted to have fall risk factors     Problem: ABCDS Injury Assessment  Goal: Absence of physical injury  8/3/2022 2108 by Veronika Hand RN  Outcome: Progressing  8/3/2022 1732 by Christel Wagner RN  Outcome: Progressing  Flowsheets (Taken 8/3/2022 1729)  Absence of Physical Injury: Implement safety measures based on patient assessment     Problem: Skin/Tissue Integrity  Goal: Absence of new skin breakdown  Description: 1. Monitor for areas of redness and/or skin breakdown  2. Assess vascular access sites hourly  3. Every 4-6 hours minimum:  Change oxygen saturation probe site  4. Every 4-6 hours:  If on nasal continuous positive airway pressure, respiratory therapy assess nares and determine need for appliance change or resting period.   8/3/2022 2108 by Veronika Hand RN  Outcome: Progressing  8/3/2022 1732 by Christel Wagner RN  Outcome: Progressing     Problem: Respiratory - Adult  Goal: Achieves optimal ventilation and oxygenation  8/3/2022 2108 by Veronika Hand RN  Outcome: Progressing  8/3/2022 1732 by Christel Wagner RN  Outcome: Progressing  Flowsheets  Taken 8/3/2022 1600  Achieves optimal ventilation and oxygenation:   Assess for changes in respiratory status   Assess for changes in mentation and behavior   Position to facilitate oxygenation and minimize respiratory effort  Taken 8/3/2022 0800  Achieves optimal ventilation and oxygenation:   Assess for changes in respiratory status   Position to facilitate oxygenation and minimize respiratory effort   Oxygen supplementation based on oxygen saturation or arterial blood gases   Assess for changes in mentation and behavior     Problem: Skin/Tissue Integrity - Adult  Goal: Incisions, wounds, or drain sites healing without S/S of infection  8/3/2022 2108 by Chaparro Stewart RN  Outcome: Progressing  8/3/2022 1732 by Elyssa Sutton RN  Outcome: Progressing  Flowsheets (Taken 8/3/2022 1730)  Incisions, Wounds, or Drain Sites Healing Without Sign and Symptoms of Infection:   ADMISSION and DAILY: Assess and document risk factors for pressure ulcer development   TWICE DAILY: Assess and document skin integrity   TWICE DAILY: Assess and document dressing/incision, wound bed, drain sites and surrounding tissue   Implement wound care per orders  Goal: Oral mucous membranes remain intact  8/3/2022 2108 by Chaparro Stewart RN  Outcome: Progressing  8/3/2022 1732 by Elyssa Sutton RN  Outcome: Progressing  Flowsheets (Taken 8/3/2022 1730)  Oral Mucous Membranes Remain Intact:   Assess oral mucosa and hygiene practices   Implement preventative oral hygiene regimen     Problem: Musculoskeletal - Adult  Goal: Return mobility to safest level of function  8/3/2022 2108 by Chaparro Stewart RN  Outcome: Progressing  8/3/2022 1732 by Elyssa Sutton RN  Outcome: Progressing  Goal: Maintain proper alignment of affected body part  8/3/2022 2108 by Chaparro Stewart RN  Outcome: Progressing  8/3/2022 1732 by Elyssa Sutton RN  Outcome: Progressing  Goal: Return ADL status to a safe level of function  8/3/2022 2108 by Chaparro Stewart RN  Outcome: Progressing  8/3/2022 1732 by Brie Martinez RN  Outcome: Progressing     Problem: Gastrointestinal - Adult  Goal: Minimal or absence of nausea and vomiting  8/3/2022 2108 by Penny Saravia RN  Outcome: Progressing  8/3/2022 1732 by Brie Martinez RN  Outcome: Progressing  Flowsheets (Taken 8/3/2022 1600)  Minimal or absence of nausea and vomiting:   Administer IV fluids as ordered to ensure adequate hydration   Maintain NPO status until nausea and vomiting are resolved   Nasogastric tube to low intermittent suction as ordered  Goal: Maintains adequate nutritional intake  8/3/2022 2108 by Penny Saravia RN  Outcome: Progressing  8/3/2022 1732 by Brie Martinez RN  Outcome: Progressing  Flowsheets (Taken 8/3/2022 1600)  Maintains adequate nutritional intake:   Monitor percentage of each meal consumed   Identify factors contributing to decreased intake, treat as appropriate  Goal: Maintains or returns to baseline bowel function  8/3/2022 2108 by Penny Saravia RN  Outcome: Progressing  8/3/2022 1732 by Brie Martinez RN  Outcome: Progressing  Flowsheets (Taken 8/3/2022 1600)  Maintains or returns to baseline bowel function:   Assess bowel function   Encourage oral fluids to ensure adequate hydration     Problem: Genitourinary - Adult  Goal: Absence of urinary retention  8/3/2022 2108 by Penny Saravia RN  Outcome: Progressing  8/3/2022 1732 by Brie Martinez RN  Outcome: Progressing  Flowsheets  Taken 8/3/2022 1600  Absence of urinary retention:   Monitor intake/output and perform bladder scan as needed   Assess patients ability to void and empty bladder   Place urinary catheter per Licensed Independent Practitioner order if needed  Taken 8/3/2022 0800  Absence of urinary retention:   Monitor intake/output and perform bladder scan as needed   Assess patients ability to void and empty bladder  Goal: Urinary catheter remains patent  8/3/2022 2108 by Penny Saravia RN  Outcome: Progressing  8/3/2022 1732 by Ernesto Osei RN  Outcome: Progressing  Flowsheets  Taken 8/3/2022 1600  Urinary catheter remains patent:   Assess patency of urinary catheter   Irrigate catheter per Licensed Independent Practitioner order if indicated and notify Licensed Independent Practitioner if unable to irrigate  Taken 8/3/2022 0800  Urinary catheter remains patent: Assess patency of urinary catheter     Problem: Infection - Adult  Goal: Absence of infection at discharge  8/3/2022 2108 by Vicki Cortez RN  Outcome: Progressing  8/3/2022 1732 by Ernesto Osei RN  Outcome: Progressing  Flowsheets (Taken 8/3/2022 1600)  Absence of infection at discharge:   Assess and monitor for signs and symptoms of infection   Monitor lab/diagnostic results   Monitor all insertion sites i.e., indwelling lines, tubes and drains  Goal: Absence of infection during hospitalization  8/3/2022 2108 by Vicki Cortez RN  Outcome: Progressing  8/3/2022 1732 by Ernesto Osei RN  Outcome: Progressing     Problem: Metabolic/Fluid and Electrolytes - Adult  Goal: Electrolytes maintained within normal limits  8/3/2022 2108 by Vicki Cortez RN  Outcome: Progressing  8/3/2022 1732 by Ernesto Osei RN  Outcome: Progressing  Flowsheets (Taken 8/3/2022 1600)  Electrolytes maintained within normal limits:   Monitor labs and assess patient for signs and symptoms of electrolyte imbalances   Administer electrolyte replacement as ordered  Goal: Hemodynamic stability and optimal renal function maintained  8/3/2022 2108 by Vicki Cortez RN  Outcome: Progressing  8/3/2022 1732 by Ernesto Osei RN  Outcome: Progressing  Flowsheets (Taken 8/3/2022 1600)  Hemodynamic stability and optimal renal function maintained:   Monitor labs and assess for signs and symptoms of volume excess or deficit   Monitor intake, output and patient weight     Problem: Pain  Goal: Verbalizes/displays adequate comfort level or baseline comfort level  8/3/2022 2108 by Melita Ricks RN  Outcome: Progressing  8/3/2022 1732 by Deniece Olszewski, RN  Outcome: Progressing  Flowsheets (Taken 8/3/2022 1600)  Verbalizes/displays adequate comfort level or baseline comfort level:   Assess pain using appropriate pain scale   Administer analgesics based on type and severity of pain and evaluate response   Encourage patient to monitor pain and request assistance     Problem: Cardiovascular - Adult  Goal: Maintains optimal cardiac output and hemodynamic stability  8/3/2022 2108 by Melita Ricks RN  Outcome: Progressing  8/3/2022 1732 by Deniece Olszewski, RN  Outcome: Progressing  Flowsheets (Taken 8/3/2022 1600)  Maintains optimal cardiac output and hemodynamic stability:   Assess for signs of decreased cardiac output   Administer fluid and/or volume expanders as ordered   Monitor urine output and notify Licensed Independent Practitioner for values outside of normal range   Monitor blood pressure and heart rate  Goal: Absence of cardiac dysrhythmias or at baseline  8/3/2022 2108 by Melita Ricks RN  Outcome: Progressing  8/3/2022 1732 by Deniece Olszewski, RN  Outcome: Progressing

## 2022-08-05 LAB
ALBUMIN SERPL-MCNC: 2.2 G/DL (ref 3.4–5)
ANION GAP SERPL CALCULATED.3IONS-SCNC: 15 MMOL/L (ref 3–16)
ANTI-XA UNFRAC HEPARIN: 0.51 IU/ML (ref 0.3–0.7)
ANTI-XA UNFRAC HEPARIN: 0.63 IU/ML (ref 0.3–0.7)
ANTI-XA UNFRAC HEPARIN: 1.07 IU/ML (ref 0.3–0.7)
APTT: >180 SEC (ref 23–34.3)
BASE EXCESS ARTERIAL: -4 (ref -3–3)
BASE EXCESS VENOUS: -3.3 MMOL/L (ref -2–3)
BASOPHILS ABSOLUTE: 0 K/UL (ref 0–0.2)
BASOPHILS RELATIVE PERCENT: 0 %
BUN BLDV-MCNC: 90 MG/DL (ref 7–20)
CALCIUM SERPL-MCNC: 7.6 MG/DL (ref 8.3–10.6)
CARBOXYHEMOGLOBIN: 1.1 % (ref 0–1.5)
CHLORIDE BLD-SCNC: 93 MMOL/L (ref 99–110)
CO2: 21 MMOL/L (ref 21–32)
CREAT SERPL-MCNC: 3.2 MG/DL (ref 0.6–1.2)
EOSINOPHILS ABSOLUTE: 0.2 K/UL (ref 0–0.6)
EOSINOPHILS RELATIVE PERCENT: 1 %
GFR AFRICAN AMERICAN: 17
GFR NON-AFRICAN AMERICAN: 14
GLUCOSE BLD-MCNC: 117 MG/DL (ref 70–99)
GLUCOSE BLD-MCNC: 121 MG/DL (ref 70–99)
GLUCOSE BLD-MCNC: 141 MG/DL (ref 70–99)
HCO3 ARTERIAL: 21.5 MMOL/L (ref 21–29)
HCO3 VENOUS: 22.9 MMOL/L (ref 24–28)
HCT VFR BLD CALC: 21.9 % (ref 36–48)
HEMOGLOBIN, VEN, REDUCED: 37.4 %
HEMOGLOBIN: 7.3 G/DL (ref 12–16)
HYPOCHROMIA: ABNORMAL
LYMPHOCYTES ABSOLUTE: 0.9 K/UL (ref 1–5.1)
LYMPHOCYTES RELATIVE PERCENT: 6 %
MAGNESIUM: 2.9 MG/DL (ref 1.8–2.4)
MAGNESIUM: 3 MG/DL (ref 1.8–2.4)
MCH RBC QN AUTO: 31.1 PG (ref 26–34)
MCHC RBC AUTO-ENTMCNC: 33.6 G/DL (ref 31–36)
MCV RBC AUTO: 92.6 FL (ref 80–100)
METHEMOGLOBIN VENOUS: 0.8 % (ref 0–1.5)
MONOCYTES ABSOLUTE: 1.4 K/UL (ref 0–1.3)
MONOCYTES RELATIVE PERCENT: 9 %
NEUTROPHILS ABSOLUTE: 13.2 K/UL (ref 1.7–7.7)
NEUTROPHILS RELATIVE PERCENT: 84 %
O2 SAT, ARTERIAL: 59 % (ref 93–100)
O2 SAT, VEN: 62 %
PCO2 ARTERIAL: 40.7 MM HG (ref 35–45)
PCO2, VEN: 45.4 MMHG (ref 41–51)
PDW BLD-RTO: 14.3 % (ref 12.4–15.4)
PERFORMED ON: ABNORMAL
PH ARTERIAL: 7.33 (ref 7.35–7.45)
PH VENOUS: 7.31 (ref 7.35–7.45)
PHOSPHORUS: 8.1 MG/DL (ref 2.5–4.9)
PLATELET # BLD: 162 K/UL (ref 135–450)
PLATELET SLIDE REVIEW: ADEQUATE
PMV BLD AUTO: 8.5 FL (ref 5–10.5)
PO2 ARTERIAL: 32.7 MM HG (ref 75–108)
PO2, VEN: 37.4 MMHG (ref 25–40)
POC SAMPLE TYPE: ABNORMAL
POLYCHROMASIA: ABNORMAL
POTASSIUM SERPL-SCNC: 4.9 MMOL/L (ref 3.5–5.1)
RBC # BLD: 2.36 M/UL (ref 4–5.2)
SLIDE REVIEW: ABNORMAL
SODIUM BLD-SCNC: 129 MMOL/L (ref 136–145)
TARGET CELLS: ABNORMAL
TCO2 ARTERIAL: 23 MMOL/L
TCO2 CALC VENOUS: 24 MMOL/L
WBC # BLD: 15.7 K/UL (ref 4–11)

## 2022-08-05 PROCEDURE — 94640 AIRWAY INHALATION TREATMENT: CPT

## 2022-08-05 PROCEDURE — 6370000000 HC RX 637 (ALT 250 FOR IP)

## 2022-08-05 PROCEDURE — 99232 SBSQ HOSP IP/OBS MODERATE 35: CPT | Performed by: INTERNAL MEDICINE

## 2022-08-05 PROCEDURE — 6360000002 HC RX W HCPCS

## 2022-08-05 PROCEDURE — 85025 COMPLETE CBC W/AUTO DIFF WBC: CPT

## 2022-08-05 PROCEDURE — 85520 HEPARIN ASSAY: CPT

## 2022-08-05 PROCEDURE — 2580000003 HC RX 258

## 2022-08-05 PROCEDURE — C9113 INJ PANTOPRAZOLE SODIUM, VIA: HCPCS

## 2022-08-05 PROCEDURE — 6360000002 HC RX W HCPCS: Performed by: INTERNAL MEDICINE

## 2022-08-05 PROCEDURE — 2000000000 HC ICU R&B

## 2022-08-05 PROCEDURE — 99233 SBSQ HOSP IP/OBS HIGH 50: CPT | Performed by: NURSE PRACTITIONER

## 2022-08-05 PROCEDURE — 99233 SBSQ HOSP IP/OBS HIGH 50: CPT | Performed by: INTERNAL MEDICINE

## 2022-08-05 PROCEDURE — 36415 COLL VENOUS BLD VENIPUNCTURE: CPT

## 2022-08-05 PROCEDURE — 82803 BLOOD GASES ANY COMBINATION: CPT

## 2022-08-05 PROCEDURE — 2580000003 HC RX 258: Performed by: INTERNAL MEDICINE

## 2022-08-05 PROCEDURE — 6360000002 HC RX W HCPCS: Performed by: STUDENT IN AN ORGANIZED HEALTH CARE EDUCATION/TRAINING PROGRAM

## 2022-08-05 PROCEDURE — 99232 SBSQ HOSP IP/OBS MODERATE 35: CPT | Performed by: SURGERY

## 2022-08-05 PROCEDURE — 99291 CRITICAL CARE FIRST HOUR: CPT | Performed by: INTERNAL MEDICINE

## 2022-08-05 PROCEDURE — 2580000003 HC RX 258: Performed by: STUDENT IN AN ORGANIZED HEALTH CARE EDUCATION/TRAINING PROGRAM

## 2022-08-05 PROCEDURE — 83735 ASSAY OF MAGNESIUM: CPT

## 2022-08-05 PROCEDURE — 94669 MECHANICAL CHEST WALL OSCILL: CPT

## 2022-08-05 PROCEDURE — 6370000000 HC RX 637 (ALT 250 FOR IP): Performed by: SURGERY

## 2022-08-05 PROCEDURE — 80069 RENAL FUNCTION PANEL: CPT

## 2022-08-05 PROCEDURE — 94761 N-INVAS EAR/PLS OXIMETRY MLT: CPT

## 2022-08-05 PROCEDURE — 2700000000 HC OXYGEN THERAPY PER DAY

## 2022-08-05 RX ADMIN — HEPARIN SODIUM 15 UNITS/KG/HR: 10000 INJECTION, SOLUTION INTRAVENOUS at 10:44

## 2022-08-05 RX ADMIN — HEPARIN SODIUM 15 UNITS/KG/HR: 10000 INJECTION, SOLUTION INTRAVENOUS at 01:03

## 2022-08-05 RX ADMIN — METOCLOPRAMIDE HYDROCHLORIDE 5 MG: 5 INJECTION INTRAMUSCULAR; INTRAVENOUS at 10:04

## 2022-08-05 RX ADMIN — METOCLOPRAMIDE HYDROCHLORIDE 5 MG: 5 INJECTION INTRAMUSCULAR; INTRAVENOUS at 21:12

## 2022-08-05 RX ADMIN — SODIUM CHLORIDE 30 MG/ML INHALATION SOLUTION 4 ML: 30 SOLUTION INHALANT at 20:06

## 2022-08-05 RX ADMIN — POLYETHYLENE GLYCOL (3350) 17 G: 17 POWDER, FOR SOLUTION ORAL at 10:05

## 2022-08-05 RX ADMIN — ALBUTEROL SULFATE 2.5 MG: 2.5 SOLUTION RESPIRATORY (INHALATION) at 20:06

## 2022-08-05 RX ADMIN — MINERAL OIL 330 ML: 1000 SOLUTION ORAL at 22:18

## 2022-08-05 RX ADMIN — MORPHINE SULFATE 2 MG: 2 INJECTION, SOLUTION INTRAMUSCULAR; INTRAVENOUS at 05:27

## 2022-08-05 RX ADMIN — ALBUTEROL SULFATE 2.5 MG: 2.5 SOLUTION RESPIRATORY (INHALATION) at 15:58

## 2022-08-05 RX ADMIN — AMIODARONE HYDROCHLORIDE 0.5 MG/MIN: 50 INJECTION, SOLUTION INTRAVENOUS at 19:40

## 2022-08-05 RX ADMIN — ALBUTEROL SULFATE 2.5 MG: 2.5 SOLUTION RESPIRATORY (INHALATION) at 08:41

## 2022-08-05 RX ADMIN — POLYETHYLENE GLYCOL 3350, SODIUM SULFATE ANHYDROUS, SODIUM BICARBONATE, SODIUM CHLORIDE, POTASSIUM CHLORIDE 4000 ML: 236; 22.74; 6.74; 5.86; 2.97 POWDER, FOR SOLUTION ORAL at 10:06

## 2022-08-05 RX ADMIN — AMIODARONE HYDROCHLORIDE 0.5 MG/MIN: 50 INJECTION, SOLUTION INTRAVENOUS at 05:08

## 2022-08-05 RX ADMIN — METOCLOPRAMIDE HYDROCHLORIDE 5 MG: 5 INJECTION INTRAMUSCULAR; INTRAVENOUS at 05:27

## 2022-08-05 RX ADMIN — SODIUM CHLORIDE 30 MG/ML INHALATION SOLUTION 4 ML: 30 SOLUTION INHALANT at 08:41

## 2022-08-05 RX ADMIN — SODIUM CHLORIDE, PRESERVATIVE FREE 10 ML: 5 INJECTION INTRAVENOUS at 21:13

## 2022-08-05 RX ADMIN — MINERAL OIL 330 ML: 1000 SOLUTION ORAL at 10:04

## 2022-08-05 RX ADMIN — MORPHINE SULFATE 2 MG: 2 INJECTION, SOLUTION INTRAMUSCULAR; INTRAVENOUS at 00:44

## 2022-08-05 RX ADMIN — PANTOPRAZOLE SODIUM 40 MG: 40 INJECTION, POWDER, FOR SOLUTION INTRAVENOUS at 21:12

## 2022-08-05 RX ADMIN — PANTOPRAZOLE SODIUM 40 MG: 40 INJECTION, POWDER, FOR SOLUTION INTRAVENOUS at 10:04

## 2022-08-05 RX ADMIN — FUROSEMIDE 10 MG/HR: 100 INJECTION, SOLUTION INTRAMUSCULAR; INTRAVENOUS at 02:10

## 2022-08-05 RX ADMIN — METHOCARBAMOL 250 MG: 100 INJECTION INTRAMUSCULAR; INTRAVENOUS at 16:43

## 2022-08-05 RX ADMIN — ONDANSETRON 4 MG: 2 INJECTION INTRAMUSCULAR; INTRAVENOUS at 19:46

## 2022-08-05 RX ADMIN — METOCLOPRAMIDE HYDROCHLORIDE 5 MG: 5 INJECTION INTRAMUSCULAR; INTRAVENOUS at 17:01

## 2022-08-05 RX ADMIN — SODIUM CHLORIDE, PRESERVATIVE FREE 10 ML: 5 INJECTION INTRAVENOUS at 10:04

## 2022-08-05 RX ADMIN — DEXTROSE MONOHYDRATE: 100 INJECTION, SOLUTION INTRAVENOUS at 10:42

## 2022-08-05 ASSESSMENT — PAIN SCALES - WONG BAKER
WONGBAKER_NUMERICALRESPONSE: 4

## 2022-08-05 ASSESSMENT — ENCOUNTER SYMPTOMS
NAUSEA: 0
SHORTNESS OF BREATH: 1
BLOOD IN STOOL: 0
VOMITING: 0
DIARRHEA: 0
ABDOMINAL PAIN: 1
CONSTIPATION: 1

## 2022-08-05 ASSESSMENT — PAIN DESCRIPTION - ONSET: ONSET: ON-GOING

## 2022-08-05 ASSESSMENT — PAIN SCALES - GENERAL
PAINLEVEL_OUTOF10: 5
PAINLEVEL_OUTOF10: 4
PAINLEVEL_OUTOF10: 5
PAINLEVEL_OUTOF10: 4
PAINLEVEL_OUTOF10: 9
PAINLEVEL_OUTOF10: 4

## 2022-08-05 ASSESSMENT — PAIN DESCRIPTION - PAIN TYPE
TYPE: CHRONIC PAIN

## 2022-08-05 ASSESSMENT — PAIN DESCRIPTION - LOCATION
LOCATION: HEAD
LOCATION: BACK

## 2022-08-05 ASSESSMENT — PAIN DESCRIPTION - ORIENTATION: ORIENTATION: MID

## 2022-08-05 NOTE — PROGRESS NOTES
Cardiology Consult Service  Daily Progress Note        Admit Date:  7/30/2022  Primary cardiologist: Dr. Jayde Vaca    Reason for Consultation/Chief Complaint: AHF    Subjective:      Tres Newman is a 74 y.o. female with a past medical history of obesity, HTN, HLP, recent CASS 7/16/2022. Patient presented to the emergency room on 7/30 with nausea, vomiting, abdominal pain. Patient was found to be obstipated due to ileus from recent opioid use for CASS. She was admitted and was given IV fluids and IV antibiotics. Subsequently patient developed AF RVR and EP was consulted. She was started on diltiazem and heparin drips. Today I was consulted to assess patient in view of likely acute heart failure. Apparently on admission patient's oxygen requirements were 2 L, received IV fluids, oxygen requirements increased to 8 L, received Lasix 40 mg IV x1 on 8/2, oxygen requirements are now 6.5 L. Creatinine on admission was 2.2, increased to a peak of 2.7 today. ECG consistent with AF  bpm.  There were no ischemic changes. Echo 8/3/2022: Normal LV, EF greater than 39%, normal diastolic function, normal filling pressures, normal RV and valves. Interval history:  Patient remains on 15 L of supplemental oxygen. Hours even, weight unchanged, creatinine increased to 3.2 from 2.8, albumin 2.2, hemoglobin 7.3. Currently now in A. fib with controlled ventricular response (was RVR yesterday).     Objective:     Medications:   SMOG Enema  330 mL Rectal BID    metoclopramide  5 mg IntraVENous Q6H    pantoprazole  40 mg IntraVENous BID    sodium chloride (Inhalant)  4 mL Nebulization BID    albuterol  2.5 mg Nebulization TID    polyethylene glycol  17 g Per NG tube TID    bisacodyl  10 mg Rectal Once    lidocaine   Topical Once    sodium chloride flush  5-40 mL IntraVENous 2 times per day    fluticasone  2 spray Each Nostril Daily    [Held by provider] pregabalin  50 mg Oral TID       IV drips:   dextrose 30 mL/hr at 08/05/22 1237    amiodarone 0.5 mg/min (08/05/22 0623)    heparin (PORCINE) Infusion 15 Units/kg/hr (08/05/22 1044)    dextrose      sodium chloride         PRN:  heparin (porcine), heparin (porcine), prochlorperazine, metoprolol, glucose, dextrose bolus **OR** dextrose bolus, glucagon (rDNA), dextrose, albuterol, phenol, morphine **OR** morphine, sodium chloride flush, sodium chloride, ondansetron **OR** ondansetron, acetaminophen **OR** acetaminophen, methocarbamol    Vitals:    08/05/22 0900 08/05/22 1000 08/05/22 1100 08/05/22 1200   BP: (!) 98/56 (!) 114/38 (!) 112/52 (!) 113/91   Pulse: 93 89 82 85   Resp: 15 16 16 13   Temp:       TempSrc:       SpO2:  93%     Weight:       Height:           Intake/Output Summary (Last 24 hours) at 8/5/2022 1242  Last data filed at 8/5/2022 1124  Gross per 24 hour   Intake 942.68 ml   Output 1050 ml   Net -107.32 ml       I/O last 3 completed shifts: In: 1512.7 [I.V.:1182.7; IV Piggyback:330]  Out: 1400 [Urine:700; Emesis/NG output:700]  Wt Readings from Last 3 Encounters:   08/04/22 246 lb 14.6 oz (112 kg)   07/13/22 218 lb 9.6 oz (99.2 kg)   06/14/22 217 lb (98.4 kg)       Admit Wt: Weight: 224 lb 3.3 oz (101.7 kg)   Todays Wt: Weight: 246 lb 14.6 oz (112 kg)    TELEMETRY personally reviewed: Atrial fibrillation with RVR    Physical Exam:         General Appearance:  Alert, cooperative, no distress, appears stated age Appropriate weight   Head:  Normocephalic, without obvious abnormality, atraumatic   Eyes:  PERRL, conjunctiva/corneas clear EOM intact  Ears normal   Throat no lesions       Nose: Nares normal, no drainage or sinus tenderness   Throat: Lips, mucosa, and tongue normal   Neck: Supple, symmetrical, trachea midline, no adenopathy, thyroid: not enlarged, symmetric, no tenderness/mass/nodules, no carotid bruit. Lungs:   Normal respiratory rate, poor inspiratory effort, cannot exclude ronchi bilaterally.     Chest Wall:  No tenderness or deformity Heart:  Irregular rhythm, rate is tachy, S1, S2 normal, there is no murmur, there is no rub or gallop, cannot assess jvd, 2-3+ bilateral lower extremity edema   Abdomen:   Soft, non-tender, bowel sounds active all four quadrants,  no masses, no organomegaly       Extremities: Extremities normal, atraumatic, no cyanosis. Pulses: 2+ and symmetric   Skin: Skin color, texture, turgor normal, no rashes or lesions   Pysch: Normal mood and affect   Neurologic: Normal gross motor and sensory exam.  Cranial nerves intact       Labs:   Recent Labs     08/04/22 0338 08/04/22  1539 08/05/22  0534   * 128* 129*   K 5.6* 5.0 4.9   BUN 83* 87* 90*   CREATININE 2.8* 3.0* 3.2*   CL 96* 91* 93*   CO2 18* 19* 21   GLUCOSE 78 107* 141*   CALCIUM 8.3 8.5 7.6*   MG 3.10* 3.20* 2.90*       Recent Labs     08/04/22 0338 08/04/22  1539 08/05/22  0534   WBC 13.1* 14.1* 15.7*   HGB 8.0* 7.9* 7.3*   HCT 24.9* 23.6* 21.9*    222 162   MCV 94.7 93.5 92.6       No results for input(s): CHOLTOT, TRIG, HDL, CHOLHDL, LDL in the last 72 hours. Invalid input(s): [address] [facility]  Recent Labs     08/02/22  1830 08/04/22  1855   INR 1.18* 1.15*       No results for input(s): CKTOTAL, CKMB, CKMBINDEX, TROPONINI in the last 72 hours. No results for input(s): BNP in the last 72 hours. No results for input(s): NTPROBNP in the last 72 hours. No results for input(s): TSH in the last 72 hours. Imaging:       Assessment & Plan:     1. R/o sepsis:  Obstipation due to ileus from recent opioid use for CASS, cannot r/o sepsis. Patient had a colonoscopy for decompression on 8/3/2022.     -On iv antibiotics per primary team     2. PAFRVR:  Patient was in sinus, converted into afib with RVR after incomplete colonic decompression. Atrial fibrillation will compromise our efforts for safe diuresis. - Started miodarone and heparin drips. - EP following. 3.  Rule out acute heart failure.   Patient appears volume overloaded with worse respiratory status and now AFRVR. However, echocardiogram is overall normal with normal filling pressures and was done during sinus (images personally reviewed). Discussed case with Dr Marcia Luke, he witnessed aspiration, hence respiratory failure could be from aspiration pneumonitis rather than acute heart failure. Albumin and Hb low could explain peripheral edema. IV diuretics with no meaningful response, likely due to ATN and sepsis. - Will stop diuretics; Dr Marcia Luke may elect to give albumin, may consider blood transfusion if Hb < 7.   - Strict I's and O's every shift and standing weights if possible, low-salt diet, daily BMP with reflex to Mg (correct lytes for goals K >4.0 and Mg > 2.0) and wean supplemental oxygen to off (or down to baseline supplemental oxygen requirements) for sats greater than 92-94%. Due to the high probability of clinically significant life threating deterioration of the patient's condition that required my urgent intervention, a total critical care time 35 minutes was used. This time excludes any time that may have been spent performing procedures. This includes but not limited to vital sign monitoring, telemetry monitoring, continuous pulse oximety, IV medication, clinical response to the IV medications, documentation time, consultation time, interpretation of lab data, review of nursing notes and old record review. I have personally reviewed the reports and images of labs, radiological studies, cardiac studies including ECG's and telemetry, current and old medical records. The note was completed using EMR and Dragon dictation system. Every effort was made to ensure accuracy; however, inadvertent computerized transcription errors may be present. All questions and concerns were addressed to the patient/family. Alternatives to my treatment were discussed. Thank you for allowing to us to participate in the care or [facility].  Please call our service with questions.     Nahid Johnson MD, [facility] [place], [address]  The [address]  [place] [address] [place] [address]  Ph: [phone]  Fax: [phone]

## 2022-08-05 NOTE — PROGRESS NOTES
Pt did have small amount of stool in rectum this am with smog enema/ no further stool/ cont giving [id number] to 150mls every1-2 hours as pt tolerates/ 2nd therapeutic AntiXa achieved for heparin gtt/ see labs/ cont AntiXa Q daily/ see notes/ flow sheet/ orders  1600 pt continues making minimal UOP/ ICU Residents aware/ pt did have a small BM/ cont with the go lyte bowel prep / see flow sheet/ notes

## 2022-08-05 NOTE — PROGRESS NOTES
ID Follow-up NOTE  RESIDENT NOTE - reviewed / edited, attending note at bottom    CC:   Ileus concerning for C.diff, UTI  Antibiotics: None    Admit Date: 7/30/2022  Hospital Day: 7    Subjective:     POD #2: Colonoscopy with decompression    Patient denies any change in symptoms from yesterday to today. Patient denies any acute overnight events. Objective:     Patient Vitals for the past 8 hrs:   BP Temp Temp src Pulse Resp SpO2   08/05/22 0900 (!) 98/56 -- -- 93 15 --   08/05/22 0843 -- -- -- 92 12 --   08/05/22 0841 -- -- -- 88 16 92 %   08/05/22 0800 (!) 87/43 97.8 °F (36.6 °C) Oral 92 15 92 %   08/05/22 0700 (!) 89/59 -- -- 95 11 --   08/05/22 0600 94/69 -- -- 83 12 --   08/05/22 0557 -- -- -- -- 12 --   08/05/22 0525 -- -- -- -- -- 96 %   08/05/22 0500 (!) 106/55 -- -- 96 12 --   08/05/22 0400 101/61 99.4 °F (37.4 °C) Axillary 97 14 97 %   08/05/22 0300 (!) 111/53 -- -- (!) 101 15 97 %   08/05/22 0234 -- -- -- -- -- 90 %   08/05/22 0227 -- -- -- -- -- 92 %     I/O last 3 completed shifts: In: 1512.7 [I.V.:1182.7; IV Piggyback:330]  Out: 1400 [Urine:700; Emesis/NG output:700]  I/O this shift:  In: 10 [I.V.:10]  Out: -     EXAM:  GENERAL: Patient is obese  HEENT: Membranes moist, no oral lesion. NG Tube placed on 7/31/2022 with dark brown fluid noted. NECK:  Supple, no lymphadenopathy  LUNGS: Diminished breath sounds at bases. CARDIAC: RRR, no murmur appreciated  ABD:  Mild abdominal distension along LLQ but decreased. No tenderness or pain to palpation noted. luggish bowel sounds. Iqbal cathter in place with tea colored urine present. EXT:  Right CASS incision site appears intact with no acute signs on infection noted. No pain to palpation or tenderness noted.    NEURO: No focal neurologic findings  PSYCH: Orientation, sensorium, mood normal  LINES:  Central line (R IJ)     Data Review:  Lab Results   Component Value Date    WBC 15.7 (H) 08/05/2022    HGB 7.3 (L) 08/05/2022    HCT 21.9 (L) 08/05/2022 MCV 92.6 08/05/2022     08/05/2022     Lab Results   Component Value Date    CREATININE 3.2 (H) 08/05/2022    BUN 90 (HH) 08/05/2022     (L) 08/05/2022    K 4.9 08/05/2022    CL 93 (L) 08/05/2022    CO2 21 08/05/2022     Hepatic Function Panel:   Lab Results   Component Value Date/Time    ALKPHOS 456 08/01/2022 05:41 AM    ALT 27 08/01/2022 05:41 AM    AST 33 08/01/2022 05:41 AM    PROT 4.8 08/01/2022 05:41 AM    BILITOT 0.5 08/01/2022 05:41 AM    BILIDIR <0.2 08/01/2022 05:41 AM    IBILI see below 08/01/2022 05:41 AM    LABALBU 2.2 08/05/2022 05:34 AM       Micro:  07/31   Blood culture #1: NGTD              Blood culture #2: NGTD              Urine culture: NGTD    07/30   Urine culture ([facility]): E. Coli      Imaging:   ECHO (08/03/2022)  Summary  Left ventricular cavity size is normal. Normal left ventricular wall thickness. Overall left ventricular systolic function appears  hyperdynamic with an ejection fraction of >65%. No regional wall motion abnormalities are noted. Normal diastolic function. Normal LVEDP. Tricuspid valve leaflets are structurally normal. Mild tricuspid regurgitation. CT Chest w/o Contrast (08/02/2022)  Impression   1. Moderate right pleural effusion and small left effusion with basilar airspace opacity most consistent with atelectasis. Superimposed pneumonia be difficult to exclude. 2. Narrowing of the trachea with expiration compatible with treated bronchial malacia. 3. Nasogastric tube tip in the lower esophagus. 4. Persistent colonic distention. 5. Mild coronary artery calcification. CT Abdomen Pelvis and Right Hip w/o Contrast (08/01/2022)  Impression   1. Moderate colonic distention. Correlate for constipation. Fecal content within the ileum which may suggest stasis. There is no small bowel obstruction. 2. Subcutaneous fluid collection overlying the right total hip arthroplasty. XR Chest (08/01/2022)  Impression   1.   Right IJ CVC tip in the possible C.diff w/ illeus  - No leukocytosis noted (WBC 15.7)  - 1+ Target cell, 1+Anisocytosis, 1+ Ovalocytes, 1+ Myra Cells, 5 Metamyelocyte  - s/p Colonoscopy with decompression   - Op note reviewed:  Colon was decompressed and suctioned. Findings suggestive of colon obstruction secondary to severe constipation    Plan:     - No antibiotics necessary as no diagnosis of a specific infectious process was made at the time.    - Continue enemas per GI    Discussed with pt family, Dr Shelbie Barrett, DPM

## 2022-08-05 NOTE — PROGRESS NOTES
Surgery Daily Progress Note  Lilian Cox  CC:  Abdominal pain with bowel obstruction. Subjective :   Afebrile and HDS. Oxygen requirements continue at 15. Abdominal pain resolved. No BM. No N/V. Objective    Infusions:   dextrose 50 mL/hr at 08/04/22 1308    furosemide (LASIX) 1mg/ml infusion 10 mg/hr (08/05/22 0210)    amiodarone 0.5 mg/min (08/05/22 0623)    heparin (PORCINE) Infusion 15 Units/kg/hr (08/05/22 4338)    dextrose      sodium chloride          I/O:I/O last 3 completed shifts: In: 1512.7 [I.V.:1182.7; IV Piggyback:330]  Out: 1400 [Urine:700; Emesis/NG output:700]           Wt Readings from Last 1 Encounters:   08/04/22 246 lb 14.6 oz (112 kg)               LABS:    Recent Labs     08/04/22  1539 08/05/22  0534   WBC 14.1* 15.7*   HGB 7.9* 7.3*   HCT 23.6* 21.9*   MCV 93.5 92.6    162          Recent Labs     08/04/22  1539 08/05/22  0534   * 129*   K 5.0 4.9   CL 91* 93*   CO2 19* 21   PHOS 8.5* 8.1*   BUN 87* 90*   CREATININE 3.0* 3.2*          No results for input(s): AST, ALT, ALB, BILIDIR, BILITOT, ALKPHOS in the last 72 hours. No results for input(s): LIPASE, AMYLASE in the last 72 hours. Recent Labs     08/02/22  1830 08/04/22  1855 08/04/22  2038 08/04/22  2348   INR 1.18* 1.15*  --   --    APTT 37.0*  --  >180.0* >180.0*        No results for input(s): CKTOTAL, CKMB, CKMBINDEX, TROPONINI in the last 72 hours.        Exam:BP 94/69   Pulse 83   Temp 99.4 °F (37.4 °C) (Axillary)   Resp 12   Ht 5' 2\" (1.575 m)   Wt 246 lb 14.6 oz (112 kg)   SpO2 96%   BMI 45.16 kg/m²     General appearance: alert, appears stated age and cooperative  Neck: trachea midline  Heart: well perfused  Lungs:  symmetrical chest rise, use of accessory muscles, on 15L HFNC  Skin: warm and dry, no rashes  Extremities: no edema, no cyanosis  Abdomen: soft, non-tender, non distended; NGT with gastric output   : winter in place draining yellow urine  Skin: warm and pale pink      ASSESSMENT/PLAN: Hx of HTN, HLD, Tuberculosis (1981), gout, and OA s/p right CASS on 7/13/22 who was transferred to [facility] on 7/30 due to fluid associated with her right CASS on 7/13 and for abdominal pain. - Started on Lasix drip  - GI attempted colonic decompression on 8/3-large stool burden  GI recommended to continue to try Golytely and enemas-enemas now ordered for twice daily  - No surgical intervention from Ortho   ID dc'd all abx due to no source of infection  - Fluid management per nephro  - Will continue to monitor closely  - Continue NPO with NGT to LIWS. I have personally performed the medical history, physical exam and medical decision making and agree with all pertinent clinical information unless otherwise noted.      Alma Delia Costa MD  Surgery Attending

## 2022-08-05 NOTE — PROGRESS NOTES
ID Follow-up NOTE  RESIDENT NOTE - reviewed / edited, attending note at bottom    CC:   Ileus concerning for C.diff, UTI  Antibiotics: None    Admit Date: 7/30/2022  Hospital Day: 7    Subjective:     POD #2: Colonoscopy with decompression    Patient denies any change in symptoms from yesterday to today. Patient denies any acute overnight events. Objective:     Patient Vitals for the past 8 hrs:   BP Temp Temp src Pulse Resp SpO2   08/05/22 0900 (!) 98/56 -- -- 93 15 --   08/05/22 0843 -- -- -- 92 12 --   08/05/22 0841 -- -- -- 88 16 92 %   08/05/22 0800 (!) 87/43 97.8 °F (36.6 °C) Oral 92 15 92 %   08/05/22 0700 (!) 89/59 -- -- 95 11 --   08/05/22 0600 94/69 -- -- 83 12 --   08/05/22 0557 -- -- -- -- 12 --   08/05/22 0525 -- -- -- -- -- 96 %   08/05/22 0500 (!) 106/55 -- -- 96 12 --   08/05/22 0400 101/61 99.4 °F (37.4 °C) Axillary 97 14 97 %       I/O last 3 completed shifts: In: 1512.7 [I.V.:1182.7; IV Piggyback:330]  Out: 1400 [Urine:700; Emesis/NG output:700]  I/O this shift:  In: 10 [I.V.:10]  Out: -     EXAM:  GENERAL: Patient is obese  HEENT: Membranes moist, no oral lesion. NG Tube placed on 7/31/2022 with dark brown fluid noted. NECK:  Supple, no lymphadenopathy  LUNGS: Diminished breath sounds at bases. CARDIAC: RRR, no murmur appreciated  ABD:  Mild abdominal distension along LLQ but decreased. No tenderness or pain to palpation noted. luggish bowel sounds. Iqbal cathter in place with tea colored urine present. EXT:  Right CASS incision site appears intact with no acute signs on infection noted. No pain to palpation or tenderness noted.    NEURO: No focal neurologic findings  PSYCH: Orientation, sensorium, mood normal  LINES:  Central line (R IJ)     Data Review:  Lab Results   Component Value Date    WBC 15.7 (H) 08/05/2022    HGB 7.3 (L) 08/05/2022    HCT 21.9 (L) 08/05/2022    MCV 92.6 08/05/2022     08/05/2022     Lab Results   Component Value Date    CREATININE 3.2 (H) 08/05/2022 BUN 90 (HH) 08/05/2022     (L) 08/05/2022    K 4.9 08/05/2022    CL 93 (L) 08/05/2022    CO2 21 08/05/2022     Hepatic Function Panel:   Lab Results   Component Value Date/Time    ALKPHOS 456 08/01/2022 05:41 AM    ALT 27 08/01/2022 05:41 AM    AST 33 08/01/2022 05:41 AM    PROT 4.8 08/01/2022 05:41 AM    BILITOT 0.5 08/01/2022 05:41 AM    BILIDIR <0.2 08/01/2022 05:41 AM    IBILI see below 08/01/2022 05:41 AM    LABALBU 2.2 08/05/2022 05:34 AM       Micro:  07/31   Blood culture #1: NGTD              Blood culture #2: NGTD              Urine culture: NGTD    07/30   Urine culture ([facility]): E. Coli      Imaging:   ECHO (08/03/2022)  Summary  Left ventricular cavity size is normal. Normal left ventricular wall thickness. Overall left ventricular systolic function appears  hyperdynamic with an ejection fraction of >65%. No regional wall motion abnormalities are noted. Normal diastolic function. Normal LVEDP. Tricuspid valve leaflets are structurally normal. Mild tricuspid regurgitation. CT Chest w/o Contrast (08/02/2022)  Impression   1. Moderate right pleural effusion and small left effusion with basilar airspace opacity most consistent with atelectasis. Superimposed pneumonia be difficult to exclude. 2. Narrowing of the trachea with expiration compatible with treated bronchial malacia. 3. Nasogastric tube tip in the lower esophagus. 4. Persistent colonic distention. 5. Mild coronary artery calcification. CT Abdomen Pelvis and Right Hip w/o Contrast (08/01/2022)  Impression   1. Moderate colonic distention. Correlate for constipation. Fecal content within the ileum which may suggest stasis. There is no small bowel obstruction. 2. Subcutaneous fluid collection overlying the right total hip arthroplasty. XR Chest (08/01/2022)  Impression   1. Right IJ CVC tip in the cavoatrial junction. 2.  Bibasilar airspace disease suggesting atelectasis. Pneumonia is not excluded.       XR Abdomen (07/31/2022)  Impression   Dilated colonic and small bowel loops may suggest ileus but obstruction is not excluded. CT Abdomen Pelvis w Contrast (07/30/2022) - [facility] [address]   IMPRESSION:   1. There is a large amount of stool cecum through the sigmoid colon. Transition   point is rectosigmoid but there is no obvious mass in the rectosigmoid region. No bowel thickening or peribowel stranding in the rectosigmoid region. 2. Likely, multiple renal cysts. 3. Partially organized fluid without obvious rim enhancement anterior to the   THR. This may simply represent postoperative seroma but clinical correlation   needed.  The entire inferior extent of this is not covered on this exam.   4. Urinary bladder is distended with fluid     Scheduled Meds:   SMOG Enema  330 mL Rectal BID    metoclopramide  5 mg IntraVENous Q6H    pantoprazole  40 mg IntraVENous BID    sodium chloride (Inhalant)  4 mL Nebulization BID    albuterol  2.5 mg Nebulization TID    polyethylene glycol  17 g Per NG tube TID    bisacodyl  10 mg Rectal Once    lidocaine   Topical Once    sodium chloride flush  5-40 mL IntraVENous 2 times per day    fluticasone  2 spray Each Nostril Daily    [Held by provider] pregabalin  50 mg Oral TID     Continuous Infusions:   bumetanide 0.1 mg/mL infusion      dextrose 50 mL/hr at 08/05/22 1042    amiodarone 0.5 mg/min (08/05/22 0623)    heparin (PORCINE) Infusion 15 Units/kg/hr (08/05/22 1044)    dextrose      sodium chloride       PRN Meds:  heparin (porcine), heparin (porcine), prochlorperazine, metoprolol, glucose, dextrose bolus **OR** dextrose bolus, glucagon (rDNA), dextrose, albuterol, phenol, morphine **OR** morphine, sodium chloride flush, sodium chloride, ondansetron **OR** ondansetron, acetaminophen **OR** acetaminophen, methocarbamol    Assessment:     Sepsis 2/2 possible C.diff w/ illeus  - No leukocytosis noted (WBC 15.7)  - 1+ Target cell, 1+Anisocytosis, 1+ Ovalocytes, 1+ Candiss Loupe Cells, 5 Metamyelocyte  - s/p Colonoscopy with decompression   - Op note reviewed:  Colon was decompressed and suctioned. Findings suggestive of colon obstruction secondary to severe constipation    Plan:     - No antibiotics necessary as no diagnosis of a specific infectious process was made at the time. - Continue enemas per GI    Discussed with pt family, Dr Oscar Mabry MD    Addendum to Resident Consult note:  Pt seen, examined and evaluated. I have reviewed the current history, physical findings, labs and assessment and plan and agree with note as documented by resident (Dr. Jaja Sandoval). 73 yo obesity (BMI 41), HTN, gout, HL, OA, chronic LBP (chronic narcotics)     S/p R CASS 7/16. Pt dx with UTI during this hosp, rx Cipro, discharged on cephalexin. Presented on 7/30 to [facility] with weakness, nausea / vomiting / abd pain  Weakness over 2 days, has been constipated for over a week (took imodium 1 week prior with loose stool with cephalexin). Also, confused. In ED ([facility]) 7/30, afeb, WBC 22.8. CT with fluid collect anterior to R hip / CASS. Transfer / admit to [facility] on 7/30 - UA neg  Started on Vanco / cefepime. NGT placed. CT chest - R>L pleural effusion, illeus     Today, afeb, O2 6L. WBC 10.7, Cr 2.7, LA 2. (4.5 on admit), procalcitonin 20.38  Pt awake, alert, conversant. On exam, lethargic, able to converse  Lungs dull at bases. Abd - tender, lashawn LLQ  LE edema. R hip - no redness / tenderness. Anterior dressing in place. Linezolid, Meropenem, PO Vancomcyin, IV Metronidazole - stopped on 8/2     Colonoscopy 8/3 reveals colonic obstruction secondary to constipation, 'decompressed'  Less abd pain after GI procedure    Today 8/5, afeb, WBC 15.7.   Abd soft / NT     IMP/  R CASS     Encephalopathy, improved  Ileus - loss of stool of CT / constipated, s/p colonoscopic decompression 8/3  R hip - fluid collection around prior surg site, unclear if infected  Leukocytosis - WBC down  Elevated Procalcitonin     ARIANE  Leukocytosis - likely reactive, no infectious process identified     No diagnosis of a specific infectious process   - no pneumonia, no UTI, unclear regarding periop fluid collection     REC/  Cont off antibiotics  Ileus / constipation - NGT, enemas  Monitor       Medical Decision Making:   The following items were considered in medical decision making:  Discussion of patient care with other providers  Reviewed clinical lab tests  Reviewed radiology tests  Reviewed other diagnostic tests/interventions  Independent review of radiologic images - reviewed CTs 8/2  Microbiology cultures and other micro tests reviewed       Discussed with pt, family   Call with ID issues over weekend   Yee Chapman MD

## 2022-08-05 NOTE — PROGRESS NOTES
Gastroenterology Progress Note        [facility]  GI Progress Note        Cristina Palumbo is a 74 y.o. female patient. 1. Right lower quadrant abdominal pain        Admit Date: 7/30/2022    Subjective: Interval Hx:  No BM at this time. Pt states she \"feels like things are moving. \" States nausea, abdominal pain are improved. ROS:  Review of Systems   Constitutional:  Negative for fever. Respiratory:  Positive for shortness of breath. Cardiovascular:  Negative for chest pain. Gastrointestinal:  Positive for abdominal pain and constipation. Negative for blood in stool, diarrhea, nausea and vomiting. All other systems reviewed and are negative.       Scheduled Meds:   SMOG Enema  330 mL Rectal BID    metoclopramide  5 mg IntraVENous Q6H    pantoprazole  40 mg IntraVENous BID    sodium chloride (Inhalant)  4 mL Nebulization BID    albuterol  2.5 mg Nebulization TID    polyethylene glycol  17 g Per NG tube TID    bisacodyl  10 mg Rectal Once    lidocaine   Topical Once    sodium chloride flush  5-40 mL IntraVENous 2 times per day    fluticasone  2 spray Each Nostril Daily    [Held by provider] pregabalin  50 mg Oral TID       Continuous Infusions:   bumetanide 0.1 mg/mL infusion      dextrose 50 mL/hr at 08/04/22 1308    amiodarone 0.5 mg/min (08/05/22 0623)    heparin (PORCINE) Infusion 15 Units/kg/hr (08/05/22 0623)    dextrose      sodium chloride         PRN Meds:  heparin (porcine), heparin (porcine), prochlorperazine, metoprolol, glucose, dextrose bolus **OR** dextrose bolus, glucagon (rDNA), dextrose, albuterol, phenol, morphine **OR** morphine, sodium chloride flush, sodium chloride, ondansetron **OR** ondansetron, acetaminophen **OR** acetaminophen, methocarbamol      Objective:       Patient Vitals for the past 24 hrs:   BP Temp Temp src Pulse Resp SpO2   08/05/22 0900 (!) 98/56 -- -- 93 15 --   08/05/22 0843 -- -- -- 92 12 --   08/05/22 0841 -- -- -- 88 16 92 % Comments: Sluggish bowel sounds. Pain improved v prior   Skin:     General: Skin is warm and dry. Coloration: Skin is not pale. Neurological:      Mental Status: Mental status is at baseline. Recent Labs     08/04/22 0338 08/04/22  1539 08/05/22  0534   WBC 13.1* 14.1* 15.7*   HGB 8.0* 7.9* 7.3*   HCT 24.9* 23.6* 21.9*   MCV 94.7 93.5 92.6    222 162       Recent Labs     08/04/22  0338 08/04/22  1539 08/05/22  0534   * 128* 129*   K 5.6* 5.0 4.9   CL 96* 91* 93*   CO2 18* 19* 21   PHOS 9.0* 8.5* 8.1*   BUN 83* 87* 90*   CREATININE 2.8* 3.0* 3.2*       No results for input(s): AST, ALT, ALB, BILIDIR, BILITOT, ALKPHOS in the last 72 hours. No results for input(s): LIPASE, AMYLASE in the last 72 hours. Recent Labs     08/02/22  1830 08/04/22  1855   INR 1.18* 1.15*       No results for input(s): PTT in the last 72 hours. No results for input(s): OCCULTBLD in the last 72 hours. Radiology review:   CT Abd w/ oral and rectal contrast. 8/1:  Impression:        1. Moderate colonic distention. Correlate for constipation. Fecal content within the ileum which may suggest stasis. There is no small bowel obstruction. 2. Subcutaneous fluid collection overlying the right total hip arthroplasty. Assessment:       Principal Problem:    Abdominal pain  Active Problems:    ARIANE (acute kidney injury) (HCC)    Electrolyte imbalance    Lactic acid acidosis    Leukocytosis    Ileus (HCC)    Status post total hip replacement, right    Atrial fibrillation with RVR (HCC)    On continuous heparin infusion    Benign essential HTN    Status post right hip replacement  Resolved Problems:    * No resolved hospital problems. *  73 yo WF with PMH hypertension, hyperlipidemia, gout, osteoarthritis, morbid obesity, with recent right total hip arthroplasty on July 15, UTI s/p 10 days antibiotics. 1) Severe constipation s/p THR 7/16. Has been on opioids. Last colonoscopy in 2012. No prodrome. CT with large stool burden with transition in rectosigmoid colon. No obvious megacolon reported     2) Recent right THR 7/16 with perioperative fluid collection     3) Leukocytosis-patient p/w white count of 40,000. Prev treated for a UTI but repeat UA not consistent w/ UTI. She did have a prodrome of diarrhea, and now constipation. She also had received a steroid Dosepak, but WBC more than expected from steroids. KUB reportedly with no dilation of colon. Ortho surgery does not feel that the is fluid around her joint is an abscess. Recommendations:        -.Agree with aggressive bowel regimen    - Cont reglan, miralax    - Cont attempt at enema, golytely via NGT  - Repeat CT 8/1 showing moderate colonic distention  - Low threshold for repeat imaging if continued lack of improvement    Gabino Awan MD  Internal Medicine Resident  PGY-1

## 2022-08-05 NOTE — PROGRESS NOTES
Electrophysiology - PROGRESS NOTE    Admit Date: 7/30/2022     Chief Complaint: AF     Interval History:   Patient seen and examined and notes reviewed. Patient is being followed for AF. Pt presented to the hospital with abdominal pain. Found to have bowel obstruction along with sepsis. She went into AF/RVR, placed on dilt gtt  and converted to NSR 8/3/22 @ 2 am. She underwent colonoscopy with decompression 8/3/22 and was found to be back in AF afterwards, rates fairly well controlled. Dr. Sandra Elliott started patient on lasix and amio gtts yesterday afternoon. Remains in AF, rates continued to be controlled. More alert this morning.     In: 1512.7 [I.V.:1182.7]  Out: 1150    Wt Readings from Last 2 Encounters:   08/04/22 246 lb 14.6 oz (112 kg)   07/13/22 218 lb 9.6 oz (99.2 kg)         Data:   Scheduled Meds:   Scheduled Meds:   SMOG Enema  330 mL Rectal BID    metoclopramide  5 mg IntraVENous Q6H    pantoprazole  40 mg IntraVENous BID    sodium chloride (Inhalant)  4 mL Nebulization BID    albuterol  2.5 mg Nebulization TID    polyethylene glycol  17 g Per NG tube TID    bisacodyl  10 mg Rectal Once    lidocaine   Topical Once    sodium chloride flush  5-40 mL IntraVENous 2 times per day    fluticasone  2 spray Each Nostril Daily    [Held by provider] pregabalin  50 mg Oral TID     Continuous Infusions:   bumetanide 0.1 mg/mL infusion      dextrose 50 mL/hr at 08/04/22 1308    amiodarone 0.5 mg/min (08/05/22 0623)    heparin (PORCINE) Infusion 15 Units/kg/hr (08/05/22 0623)    dextrose      sodium chloride       PRN Meds:.heparin (porcine), heparin (porcine), prochlorperazine, metoprolol, glucose, dextrose bolus **OR** dextrose bolus, glucagon (rDNA), dextrose, albuterol, phenol, morphine **OR** morphine, sodium chloride flush, sodium chloride, ondansetron **OR** ondansetron, acetaminophen **OR** acetaminophen, methocarbamol  Continuous Infusions:   bumetanide 0.1 mg/mL infusion      dextrose 50 mL/hr at 08/04/22 1308    amiodarone 0.5 mg/min (08/05/22 0623)    heparin (PORCINE) Infusion 15 Units/kg/hr (08/05/22 0675)    dextrose      sodium chloride         Intake/Output Summary (Last 24 hours) at 8/5/2022 0923  Last data filed at 8/5/2022 2933  Gross per 24 hour   Intake 1512.68 ml   Output 1150 ml   Net 362.68 ml       CBC:   Lab Results   Component Value Date/Time    WBC 15.7 08/05/2022 05:34 AM    HGB 7.3 08/05/2022 05:34 AM     08/05/2022 05:34 AM     BMP:  Lab Results   Component Value Date/Time     08/05/2022 05:34 AM    K 4.9 08/05/2022 05:34 AM    K 4.3 07/14/2022 07:27 AM    CL 93 08/05/2022 05:34 AM    CO2 21 08/05/2022 05:34 AM    BUN 90 08/05/2022 05:34 AM    CREATININE 3.2 08/05/2022 05:34 AM    GLUCOSE 141 08/05/2022 05:34 AM     INR:   Lab Results   Component Value Date/Time    INR 1.15 08/04/2022 06:55 PM    INR 1.18 08/02/2022 06:30 PM    INR 1.27 08/01/2022 02:24 AM        CARDIAC LABS  ENZYMES:No results for input(s): CKMB, CKMBINDEX, TROPONINI in the last 72 hours. Invalid input(s): CKTOTAL;3  FASTING LIPID PANEL:No results found for: HDL, LDLDIRECT, LDLCALC, TRIG, TSH  LIVER PROFILE:  Lab Results   Component Value Date/Time    AST 33 08/01/2022 05:41 AM    AST 51 08/01/2022 12:23 AM    ALT 27 08/01/2022 05:41 AM    ALT 39 08/01/2022 12:23 AM       -----------------------------------------------------------------  Telemetry: Personally reviewed  AF, rates controlled    Objective:   Vitals: BP (!) 98/56   Pulse 93   Temp 97.8 °F (36.6 °C) (Oral)   Resp 15   Ht 5' 2\" (1.575 m)   Wt 246 lb 14.6 oz (112 kg)   SpO2 92%   BMI 45.16 kg/m²   General appearance: appears ill  Skin: Skin color, texture, turgor normal. No rashes or ecchymosis.   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 o. woman w/ PMH HTN, HLD, morbid obesity recent R CASS who admitted with abd pain, vomiting, w/u showed sepsis, bowel obstruction, showed found to be in AF/RVR    AF/RVR  - AF, rates controlled  - CHADSVASc at least 3  - On heparin gtt- transition to [address] [place] [address] when able  - On amio gtt  - TSH & LFT's 8/2022  - Reviewed risk factor modification for AF with patient including HTN/ DM control, STUART management, stress reduction, minimal alcohol intake, tobacco cessation, regular exercise, diet  - D/w pt daughter at bedside    Volume overload  - Dr. Alvino Willams following  - On bumex gtt  - Neg 2.635 L since admission    Electronically signed by Shellie Valenzuela 1822

## 2022-08-05 NOTE — PLAN OF CARE
Problem: Discharge Planning  Goal: Discharge to home or other facility with appropriate resources  Outcome: Progressing     Problem: Safety - Adult  Goal: Free from fall injury  Outcome: Progressing     Problem: ABCDS Injury Assessment  Goal: Absence of physical injury  Outcome: Progressing  Flowsheets (Taken 8/5/2022 0304 by Arlyn Smith RN)  Absence of Physical Injury: Implement safety measures based on patient assessment     Problem: Skin/Tissue Integrity  Goal: Absence of new skin breakdown  Description: 1. Monitor for areas of redness and/or skin breakdown  2. Assess vascular access sites hourly  3. Every 4-6 hours minimum:  Change oxygen saturation probe site  4. Every 4-6 hours:  If on nasal continuous positive airway pressure, respiratory therapy assess nares and determine need for appliance change or resting period.   Outcome: Progressing     Problem: Respiratory - Adult  Goal: Achieves optimal ventilation and oxygenation  Outcome: Progressing     Problem: Skin/Tissue Integrity - Adult  Goal: Incisions, wounds, or drain sites healing without S/S of infection  Outcome: Progressing  Flowsheets (Taken 8/5/2022 0800)  Incisions, Wounds, or Drain Sites Healing Without Sign and Symptoms of Infection: ADMISSION and DAILY: Assess and document risk factors for pressure ulcer development  Goal: Oral mucous membranes remain intact  Outcome: Progressing  Flowsheets (Taken 8/5/2022 0800)  Oral Mucous Membranes Remain Intact: Assess oral mucosa and hygiene practices     Problem: Musculoskeletal - Adult  Goal: Return mobility to safest level of function  Outcome: Progressing  Flowsheets (Taken 8/5/2022 0800)  Return Mobility to Safest Level of Function: Assess patient stability and activity tolerance for standing, transferring and ambulating with or without assistive devices  Goal: Maintain proper alignment of affected body part  Outcome: Progressing  Flowsheets (Taken 8/5/2022 0800)  Maintain proper alignment of affected body part: Support and protect limb and body alignment per provider's orders  Goal: Return ADL status to a safe level of function  Outcome: Progressing     Problem: Gastrointestinal - Adult  Goal: Minimal or absence of nausea and vomiting  Outcome: Progressing  Goal: Maintains adequate nutritional intake  Outcome: Progressing  Goal: Maintains or returns to baseline bowel function  Outcome: Progressing     Problem: Genitourinary - Adult  Goal: Absence of urinary retention  Outcome: Progressing  Goal: Urinary catheter remains patent  Outcome: Progressing     Problem: Infection - Adult  Goal: Absence of infection at discharge  Outcome: Progressing  Flowsheets (Taken 8/5/2022 0800)  Absence of infection at discharge: Assess and monitor for signs and symptoms of infection  Goal: Absence of infection during hospitalization  Outcome: Progressing  Flowsheets (Taken 8/5/2022 0800)  Absence of infection during hospitalization: Assess and monitor for signs and symptoms of infection     Problem: Metabolic/Fluid and Electrolytes - Adult  Goal: Electrolytes maintained within normal limits  Outcome: Progressing  Flowsheets (Taken 8/5/2022 0800)  Electrolytes maintained within normal limits: Monitor labs and assess patient for signs and symptoms of electrolyte imbalances  Goal: Hemodynamic stability and optimal renal function maintained  Outcome: Progressing  Flowsheets (Taken 8/5/2022 0800)  Hemodynamic stability and optimal renal function maintained: Monitor labs and assess for signs and symptoms of volume excess or deficit     Problem: Pain  Goal: Verbalizes/displays adequate comfort level or baseline comfort level  Outcome: Progressing  Flowsheets (Taken 8/5/2022 0800)  Verbalizes/displays adequate comfort level or baseline comfort level:   Encourage patient to monitor pain and request assistance   Assess pain using appropriate pain scale   Administer analgesics based on type and severity of pain and evaluate response   Implement non-pharmacological measures as appropriate and evaluate response     Problem: Cardiovascular - Adult  Goal: Maintains optimal cardiac output and hemodynamic stability  Outcome: Progressing  Goal: Absence of cardiac dysrhythmias or at baseline  Outcome: Progressing

## 2022-08-05 NOTE — PROGRESS NOTES
ICU Progress Note    Admit Date: 7/30/2022  ICU Day: 3  Vent Day: None  IV Access:Peripheral  IV Fluids:None  Vasopressors:None                Antibiotics: None  Diet: Diet NPO    CC: SOB    Interval history:   NAEO  No bowel movements  Pt now with sore throat       Medications:     Scheduled Meds:   SMOG Enema  330 mL Rectal BID    metoclopramide  5 mg IntraVENous Q6H    pantoprazole  40 mg IntraVENous BID    sodium chloride (Inhalant)  4 mL Nebulization BID    albuterol  2.5 mg Nebulization TID    polyethylene glycol  17 g Per NG tube TID    bisacodyl  10 mg Rectal Once    lidocaine   Topical Once    sodium chloride flush  5-40 mL IntraVENous 2 times per day    fluticasone  2 spray Each Nostril Daily    [Held by provider] pregabalin  50 mg Oral TID     Continuous Infusions:   dextrose 50 mL/hr at 08/04/22 1308    furosemide (LASIX) 1mg/ml infusion 10 mg/hr (08/05/22 0210)    amiodarone 0.5 mg/min (08/05/22 0623)    heparin (PORCINE) Infusion 15 Units/kg/hr (08/05/22 0623)    dextrose      sodium chloride       PRN Meds:heparin (porcine), heparin (porcine), prochlorperazine, metoprolol, glucose, dextrose bolus **OR** dextrose bolus, glucagon (rDNA), dextrose, albuterol, phenol, morphine **OR** morphine, sodium chloride flush, sodium chloride, ondansetron **OR** ondansetron, acetaminophen **OR** acetaminophen, methocarbamol    Objective:   Vitals:   T-max:  Patient Vitals for the past 8 hrs:   BP Temp Temp src Pulse Resp SpO2   08/05/22 0600 94/69 -- -- 83 12 --   08/05/22 0557 -- -- -- -- 12 --   08/05/22 0525 -- -- -- -- -- 96 %   08/05/22 0500 (!) 106/55 -- -- 96 12 --   08/05/22 0400 101/61 99.4 °F (37.4 °C) Axillary 97 14 97 %   08/05/22 0300 (!) 111/53 -- -- (!) 101 15 97 %   08/05/22 0234 -- -- -- -- -- 90 %   08/05/22 0227 -- -- -- -- -- 92 %   08/05/22 0200 (!) 99/54 -- -- 90 14 --   08/05/22 0114 -- -- -- -- 16 --   08/05/22 0100 (!) 108/52 -- -- 89 12 --   08/05/22 0000 (!) 99/50 99.7 °F (37.6 °C) Axillary 91 14 --         Intake/Output Summary (Last 24 hours) at 8/5/2022 4019  Last data filed at 8/5/2022 2449  Gross per 24 hour   Intake 1512.68 ml   Output 1150 ml   Net 362.68 ml         Review of Systems  Per HPI    Physical Exam  Constitutional:       General: She is not in acute distress. Appearance: She is obese. She is ill-appearing. HENT:      Head: Normocephalic and atraumatic. Nose: Nose normal.      Mouth/Throat:      Mouth: Mucous membranes are dry. Pharynx: Oropharynx is clear. Eyes:      Extraocular Movements: Extraocular movements intact. Conjunctiva/sclera: Conjunctivae normal.      Pupils: Pupils are equal, round, and reactive to light. Neck:      Comments: Central line in place  Cardiovascular:      Rate and Rhythm: Normal rate and regular rhythm. Pulses: Normal pulses. Heart sounds: Normal heart sounds. Pulmonary:      Effort: Pulmonary effort is normal. No respiratory distress. Breath sounds: No wheezing or rhonchi. Comments: Diminished breath sounds at bases bilaterally   Abdominal:      General: Abdomen is flat. There is distension. Tenderness: There is abdominal tenderness. There is no guarding or rebound. Comments: Reduced bowel sounds   Musculoskeletal:      Cervical back: Normal range of motion and neck supple. Right lower leg: Edema present. Left lower leg: Edema present. Comments: +Sacral edema. R Hip CASS site dressing present   Skin:     General: Skin is warm and dry. Capillary Refill: Capillary refill takes less than 2 seconds. Findings: Bruising present. Neurological:      General: No focal deficit present. Mental Status: She is alert and oriented to person, place, and time. Mental status is at baseline. Cranial Nerves: No cranial nerve deficit. Sensory: No sensory deficit. Motor: No weakness.    Psychiatric:         Mood and Affect: Mood normal.         Behavior: Behavior normal.         Thought Content: Thought content normal.         Judgment: Judgment normal.       LABS:    CBC:   Recent Labs     08/04/22 0338 08/04/22 1539 08/05/22  0534   WBC 13.1* 14.1* 15.7*   HGB 8.0* 7.9* 7.3*   HCT 24.9* 23.6* 21.9*    222 162   MCV 94.7 93.5 92.6       Renal:    Recent Labs     08/04/22 0338 08/04/22 1539 08/05/22  0534   * 128* 129*   K 5.6* 5.0 4.9   CL 96* 91* 93*   CO2 18* 19* 21   BUN 83* 87* 90*   CREATININE 2.8* 3.0* 3.2*   GLUCOSE 78 107* 141*   CALCIUM 8.3 8.5 7.6*   MG 3.10* 3.20* 2.90*   PHOS 9.0* 8.5* 8.1*   ANIONGAP 18* 18* 15       Hepatic:   Recent Labs     08/04/22 0338 08/04/22 1539 08/05/22  0534   LABALBU 2.4* 2.3* 2.2*       Troponin: No results for input(s): TROPONINI in the last 72 hours. BNP: No results for input(s): BNP in the last 72 hours. Lipids: No results for input(s): CHOL, HDL in the last 72 hours. Invalid input(s): LDLCALCU, TRIGLYCERIDE  ABGs:    Recent Labs     08/04/22 1839 08/05/22  0250   PHART 7.405 7.330*   KSX9DZP 31.9* 40.7   PO2ART 77.7 32.7*   QUM9ZVP 19.9* 21.5   BEART -5* -4*   X2CMXBKR 96 59*   PQS6MAR 21 23         INR:   Recent Labs     08/02/22 1830 08/04/22  1855   INR 1.18* 1.15*       Lactate:   Recent Labs     08/04/22 1839   LACTATE 0.75     Cultures:  -----------------------------------------------------------------  RAD:   XR ABDOMEN (KUB) (SINGLE AP VIEW)   Final Result   Impression: Stable appearance of the abdomen. XR CHEST PORTABLE   Final Result      Low lung volumes with central bronchovascular crowding. Atelectasis in the left lung base. Normal cardiomediastinal silhouette. Lines and tubes in standard position. VL Extremity Venous Bilateral         XR ABDOMEN (KUB) (SINGLE AP VIEW)   Final Result   Impression: Stable appearance of the abdomen with gas distended loops of central small bowel again noted. CT CHEST WO CONTRAST   Final Result      1.  Moderate right pleural effusion and small left effusion with basilar airspace opacity most consistent with atelectasis. Superimposed pneumonia be difficult to exclude. 2. Narrowing of the trachea with expiration compatible with treated bronchial malacia. 3. Nasogastric tube tip in the lower esophagus. 4. Persistent colonic distention. 5. Mild coronary artery calcification. XR ABDOMEN (KUB) (SINGLE AP VIEW)   Final Result      Frontal supine views demonstrate an unremarkable bowel gas pattern. No significant colonic stool is seen. Right hip arthroplasty noted. XR CHEST PORTABLE   Final Result      Prominence of the perihilar interstitial markings with mild peribronchial cuffing is favored to represent mild pulmonary edema. Low lung volumes bronchovascular crowding. Stable cardiomediastinal silhouette. Lines and tubes in standard position. Cholecystectomy clips are noted. CT ABDOMEN PELVIS WO CONTRAST Additional Contrast? Oral and Rectal   Final Result   1. Moderate colonic distention. Correlate for constipation. Fecal content within the ileum which may suggest stasis. There is no small bowel obstruction. 2. Subcutaneous fluid collection overlying the right total hip arthroplasty. CT HIP RIGHT WO CONTRAST   Final Result   1. Moderate colonic distention. Correlate for constipation. Fecal content within the ileum which may suggest stasis. There is no small bowel obstruction. 2. Subcutaneous fluid collection overlying the right total hip arthroplasty. XR CHEST PORTABLE   Final Result      1. Right IJ CVC tip in the cavoatrial junction. 2.  Bibasilar airspace disease suggesting atelectasis. Pneumonia is not excluded. XR ABDOMEN (KUB) (SINGLE AP VIEW)   Final Result      Dilated colonic and small bowel loops may suggest ileus but obstruction is not excluded. XR ABDOMEN (KUB) (SINGLE AP VIEW)   Final Result      No acute radiographic abnormality of the abdomen. Status post right hip arthroplasty with mildly displaced greater trochanteric fracture fragment, new or more pronounced compared to the prior study. Assessment/Plan:     Respiratory  Worsening O2 requirement initially 2L NC now on 6L HFNC in setting of s/p fluid boluses, broad spectrum abx, downtrending WBC and downtrending lactic acid. Likely component of volume overload given BLE edema however no pulmonary edema or pneumonia on CT chest, only moderate R pleural effusion and small L effusion with basilar atelectasis. - continue albuterol, hypertonic nacl neb, acapella  - CXR 8/4/22 with L lung base atelectasis     Renal  Uptrending creatinine consistent with ARIANE likely 2/2 to sepsis from apparent baseline cr ~1. Hyponatremia possibly 2/2 to decreased PO intake in setting of nausea, vomiting and constipation.   - Off fluids  - Transitioned to lasix gtt  - Nephrology following   - winter in place     Cardiac  Found to be in atrial fibrillation with poorly controlled ventricular rate on telemetry, new diagnosis. Unclear contribution of possible HF however echo 8/3 with EF >65%  - cardiology consulted, transitioned off cardizem gtt now on amiodarone gtt  - holding BP meds     GI  Likely 2/2 to constipation/stool burden in setting of opioid use from recent CASS and ileus. Last bowel movement 1 week ago, she is s/p smog enema with minimal output x2.  No SBO on 8/1 CT abd pelvis  - Continue NG tube decompression  - continue bowel regimen with dulcolax and glycolax   - may consider GoLytely per GI  - NPO with bowel rest, continue serial abdominal exams  - general surgery and GI following   - added IV Protonix 40 daily  - s/p colonoscopy decompression 8/3 but hard stool unable to be evacuated  - continue reglan q6h     MSK  S/p complicated R CASS 1/08, now with R CASS fluid collection on CT  - Per orthopedics, unlikely abscess        Code Status: Full code  FEN: NPO  PPX: Heparin  DISPO: pending    Kenneth EVANGELISTA

## 2022-08-05 NOTE — PROGRESS NOTES
Hospital Medicine Progress Note    PCP: Vivian Ram    Date of Admission: 7/30/2022    Chief Complaint:  Abdominal pain, transferred for ortho eval with Rt CASS site with fluid collection. Subjective:  Appears fatigued. Drowsy, had recent morphine. Is oriented. History Of Present Illness:   \"  74 y.o. female Agatha Malik  - Hx of HTN, HLD, Gout, OA, recent Rt CASS, morbid obesity  - Presents with generalized weakness nausea vomiting and abdominal pain, she woke up with the symptoms night prior to presentation. Her last bowel movement was about 3 days prior. She was [facility] at [facility] where she underwent CT imaging of the abdomen and pelvis which showed:    IMPRESSION:   1. There is a large amount of stool cecum through the sigmoid colon. Transition point is rectosigmoid but there is no obvious mass in the rectosigmoid region. No bowel thickening or peribowel stranding in the rectosigmoid region. 2. Likely, multiple renal cysts. 3. Partially organized fluid without obvious rim enhancement anterior to the THR. This may simply represent postoperative seroma but clinical correlation needed. The entire inferior extent of this is not covered on this exam.   4. Urinary bladder is distended with fluid    Labs were significant for WBC 22.8k, with elevated ANC, hgb 10.1, na131, k 5.0 BUN of 19 cr 1.1, Alk phos 139. With noted fluid around [facility] she was transferred to [facility] for orthopedic surgeon Dr. Chopra Six evaluation. \"    Medications : Reviewed      Allergies:  Buspirone, Duloxetine, Fenofibrate, Gabapentin, Venlafaxine, Doxycycline, Statins, and Sulfa antibiotics      REVIEW OF SYSTEMS:   Pertinent positives as noted in the HPI. All other systems reviewed and negative.       PHYSICAL EXAM PERFORMED:    BP 94/69   Pulse 83   Temp 99.4 °F (37.4 °C) (Axillary)   Resp 12   Ht 5' 2\" (1.575 m)   Wt 246 lb 14.6 oz (112 kg)   SpO2 96% BMI 45.16 kg/m²     General appearance: Lethargic  HEENT:  Normal cephalic, atraumatic without obvious deformity. Neck: Supple, with full range of motion. No jugular venous distention. Respiratory:  Normal respiratory effort. Clear to auscultation, bilaterally without Rales/Wheezes/Rhonchi. Cardiovascular:  Regular rate and rhythm with normal S1/S2 without murmurs, rubs or gallops. Abdomen: Not tender, softer on left side of abdomen  Musculoskeletal: Right hip swelling but no erythema or warmth. Incision dressing C/D/I  Skin: Skin color, texture, turgor normal.  No rashes or lesions. Neurologic:  grossly non-focal.  Capillary Refill: Brisk,< 3 seconds   Peripheral Pulses: +2 palpable, equal bilaterally       Labs:     Recent Labs     08/04/22 0338 08/04/22 1539 08/05/22  0534   WBC 13.1* 14.1* 15.7*   HGB 8.0* 7.9* 7.3*   HCT 24.9* 23.6* 21.9*    222 162       Recent Labs     08/04/22 0338 08/04/22  1539 08/05/22  0534   * 128* 129*   K 5.6* 5.0 4.9   CL 96* 91* 93*   CO2 18* 19* 21   BUN 83* 87* 90*   CREATININE 2.8* 3.0* 3.2*   CALCIUM 8.3 8.5 7.6*   PHOS 9.0* 8.5* 8.1*       No results for input(s): AST, ALT, BILIDIR, BILITOT, ALKPHOS in the last 72 hours. Recent Labs     08/02/22  1830 08/04/22  1855   INR 1.18* 1.15*       No results for input(s): [place] in the last 72 hours. Urinalysis:      Lab Results   Component Value Date/Time    NITRU POSITIVE 07/31/2022 05:15 AM    WBCUA 3-5 07/31/2022 05:15 AM    BACTERIA 1+ 07/31/2022 05:15 AM    RBCUA None seen 07/31/2022 05:15 AM    BLOODU Negative 07/31/2022 05:15 AM    SPECGRAV 1.015 07/31/2022 05:15 AM    GLUCOSEU Negative 07/31/2022 05:15 AM       Radiology: No imaging here. Reviewed imaging from OSH    ASSESSMENT/PLAN:    Severe sepsis: POA - etiology still unclear. No clear source.   - Leukocytosis with tachycardia; Transferred for evaluation of post-op hip fluid collection/possible infection (s/p THR 7/13) however concern for intra abdominal source, possible C. Diff?  -Started on broad spectrum abx  -Started on vanc enema, flagyl  - Blood cultures x 2 NGTD  - Ortho c/s - likely seroma, normal post operative finding  - ID consulted, Abx stopped    Atrial Fibrillation with RVR - converted to NSR  Diltiazem drip - changing to IV lopressor as needed  Echo normal EF, normal diastolic  Cardiology consulted    Progressive hypoxemic respiratory failure  Unclear cause, started on heparin drip for Afib and possible also new PE  8/3 CT chest wo contrast b/l pleural effusions, small left and moderate right  VQ pending  Prelim doppler report no DVT    Bowel obstruction: POA Secondary to severe constipation  - CT of the [facility] system showed significant stool from cecum to the sigmoid colon and transition point at the rectosigmoid.   - GI, Surgery consulted, appreciate recs  - Miralax and enema unsuccessful  - [place] decompression 8/3, recommendation continue rectal enemas BID, Relistor,   - Did not tolerate GoLytely  - May need repeat colonoscopy    Acute Urinary retention  Possible partially treated Complicated UTI: POA  - Appears recently had E coli in urine, although CC was less than 100k. Was treated with cipro. Also appears on Keflex PTA.   - Urine on admission has some bacteria, and nitrite positive, but neg leuc esterase and no signif pyuria  - Winter catheter placed at [facility] for retention likely sec to severe constipation  - continue winter for now, ongoing reassessment    Chronic Anemia  - 10.2 better than hgb on recent DC from hospital 2 weeks back, possible hemoconcentration  - Monitor Hgb  Essential Hypertension- Hold blood pressure medications for now with sepsis  Mild hyponatremia: monitor electrolytes  Gout - stable    Morbid obesity Body mass index is 41.01 kg/m². - Complicating assessment and treatment.  Placing patient at risk for multiple co-morbidities as well as early death and contributing to the patient's presentation.  on weight loss when appropriate. DVT Prophylaxis: High risk: Heparin drip  Diet: Diet NPO  Code Status: Full Code    PT/OT Eval Status: order once acute issues resolved      Disposition: - Remains in pxt; ICU.      Sonia Rucker DO

## 2022-08-05 NOTE — CARE COORDINATION
Case Management Assessment           Daily Note                 Date/ Time of Note: 8/5/2022 9:10 AM         Note completed by: Natalie Wade RN    Patient Name: Mak Sanchez  YOB: 1950    Diagnosis:Abdominal pain [R10.9]  Patient Admission Status: Inpatient    Date of Admission:7/30/2022  8:43 PM Length of Stay: 6 GLOS: GMLOS: 4.8    Current Plan of Care: NPO NGT-LWS. GI rec to continue to try Golytely and enemas BID. General surgery following    Discharge Plan: Home w [facility] or SNF. Pt's preferred skilled nursing facilities are [facility] [address] and [facility]    Case Management Notes: Pt is from home with her spouse. The home setting is a ranch-style house with a ramped entrance. She was relatively independent with self care and functional mobility with family support prior to admission. She was active with [facility] for a SN 3x week. She has a rollator and a rolling walker. [facility] Spoke with her team this morning. Faxed an H&P to [phone]. Provided a status update    Amy Hanna and her family were provided with choice of provider; she and her family are in agreement with the discharge plan.       Natalie Wade RN  [facility]  Case Management Department  [phone]

## 2022-08-06 ENCOUNTER — APPOINTMENT (OUTPATIENT)
Dept: GENERAL RADIOLOGY | Age: 72
DRG: 853 | End: 2022-08-06
Attending: INTERNAL MEDICINE
Payer: MEDICARE

## 2022-08-06 LAB
ABO/RH: NORMAL
ALBUMIN SERPL-MCNC: 2.3 G/DL (ref 3.4–5)
ANION GAP SERPL CALCULATED.3IONS-SCNC: 20 MMOL/L (ref 3–16)
ANTI-XA UNFRAC HEPARIN: 0.68 IU/ML (ref 0.3–0.7)
ANTIBODY SCREEN: NORMAL
BASE EXCESS ARTERIAL: -5 (ref -3–3)
BASOPHILS ABSOLUTE: 0 K/UL (ref 0–0.2)
BASOPHILS RELATIVE PERCENT: 0 %
BUN BLDV-MCNC: 96 MG/DL (ref 7–20)
CALCIUM SERPL-MCNC: 7.3 MG/DL (ref 8.3–10.6)
CHLORIDE BLD-SCNC: 90 MMOL/L (ref 99–110)
CO2: 18 MMOL/L (ref 21–32)
CREAT SERPL-MCNC: 3.5 MG/DL (ref 0.6–1.2)
EOSINOPHILS ABSOLUTE: 0.2 K/UL (ref 0–0.6)
EOSINOPHILS RELATIVE PERCENT: 1 %
GFR AFRICAN AMERICAN: 15
GFR NON-AFRICAN AMERICAN: 13
GLUCOSE BLD-MCNC: 107 MG/DL (ref 70–99)
GLUCOSE BLD-MCNC: 113 MG/DL (ref 70–99)
GLUCOSE BLD-MCNC: 120 MG/DL (ref 70–99)
GLUCOSE BLD-MCNC: 123 MG/DL (ref 70–99)
GLUCOSE BLD-MCNC: 124 MG/DL (ref 70–99)
HCO3 ARTERIAL: 21.2 MMOL/L (ref 21–29)
HCT VFR BLD CALC: 20.3 % (ref 36–48)
HCT VFR BLD CALC: 20.9 % (ref 36–48)
HCT VFR BLD CALC: 22.8 % (ref 36–48)
HEMOGLOBIN: 6.7 G/DL (ref 12–16)
HEMOGLOBIN: 7 G/DL (ref 12–16)
HEMOGLOBIN: 7.7 G/DL (ref 12–16)
LACTIC ACID: 1.1 MMOL/L (ref 0.4–2)
LYMPHOCYTES ABSOLUTE: 1.4 K/UL (ref 1–5.1)
LYMPHOCYTES RELATIVE PERCENT: 8 %
MAGNESIUM: 3 MG/DL (ref 1.8–2.4)
MCH RBC QN AUTO: 30.9 PG (ref 26–34)
MCHC RBC AUTO-ENTMCNC: 33.3 G/DL (ref 31–36)
MCV RBC AUTO: 92.7 FL (ref 80–100)
MONOCYTES ABSOLUTE: 2 K/UL (ref 0–1.3)
MONOCYTES RELATIVE PERCENT: 11 %
NEUTROPHILS ABSOLUTE: 14.2 K/UL (ref 1.7–7.7)
NEUTROPHILS RELATIVE PERCENT: 80 %
O2 SAT, ARTERIAL: 66 % (ref 93–100)
PCO2 ARTERIAL: 40.1 MM HG (ref 35–45)
PDW BLD-RTO: 14.1 % (ref 12.4–15.4)
PERFORMED ON: ABNORMAL
PH ARTERIAL: 7.33 (ref 7.35–7.45)
PHOSPHORUS: 8.1 MG/DL (ref 2.5–4.9)
PLATELET # BLD: 109 K/UL (ref 135–450)
PMV BLD AUTO: 8.9 FL (ref 5–10.5)
PO2 ARTERIAL: 36.5 MM HG (ref 75–108)
POC SAMPLE TYPE: ABNORMAL
POLYCHROMASIA: ABNORMAL
POTASSIUM SERPL-SCNC: 5.3 MMOL/L (ref 3.5–5.1)
RBC # BLD: 2.26 M/UL (ref 4–5.2)
SODIUM BLD-SCNC: 128 MMOL/L (ref 136–145)
TCO2 ARTERIAL: 22 MMOL/L
WBC # BLD: 17.8 K/UL (ref 4–11)

## 2022-08-06 PROCEDURE — 6360000002 HC RX W HCPCS

## 2022-08-06 PROCEDURE — 99233 SBSQ HOSP IP/OBS HIGH 50: CPT | Performed by: INTERNAL MEDICINE

## 2022-08-06 PROCEDURE — 86900 BLOOD TYPING SEROLOGIC ABO: CPT

## 2022-08-06 PROCEDURE — 83735 ASSAY OF MAGNESIUM: CPT

## 2022-08-06 PROCEDURE — 85520 HEPARIN ASSAY: CPT

## 2022-08-06 PROCEDURE — 80069 RENAL FUNCTION PANEL: CPT

## 2022-08-06 PROCEDURE — 94761 N-INVAS EAR/PLS OXIMETRY MLT: CPT

## 2022-08-06 PROCEDURE — 99233 SBSQ HOSP IP/OBS HIGH 50: CPT | Performed by: SURGERY

## 2022-08-06 PROCEDURE — 36430 TRANSFUSION BLD/BLD COMPNT: CPT

## 2022-08-06 PROCEDURE — P9016 RBC LEUKOCYTES REDUCED: HCPCS

## 2022-08-06 PROCEDURE — 86923 COMPATIBILITY TEST ELECTRIC: CPT

## 2022-08-06 PROCEDURE — 85025 COMPLETE CBC W/AUTO DIFF WBC: CPT

## 2022-08-06 PROCEDURE — 83605 ASSAY OF LACTIC ACID: CPT

## 2022-08-06 PROCEDURE — 74018 RADEX ABDOMEN 1 VIEW: CPT

## 2022-08-06 PROCEDURE — 71045 X-RAY EXAM CHEST 1 VIEW: CPT

## 2022-08-06 PROCEDURE — 94640 AIRWAY INHALATION TREATMENT: CPT

## 2022-08-06 PROCEDURE — 36415 COLL VENOUS BLD VENIPUNCTURE: CPT

## 2022-08-06 PROCEDURE — 6360000002 HC RX W HCPCS: Performed by: INTERNAL MEDICINE

## 2022-08-06 PROCEDURE — 94669 MECHANICAL CHEST WALL OSCILL: CPT

## 2022-08-06 PROCEDURE — 86901 BLOOD TYPING SEROLOGIC RH(D): CPT

## 2022-08-06 PROCEDURE — C9113 INJ PANTOPRAZOLE SODIUM, VIA: HCPCS

## 2022-08-06 PROCEDURE — 82803 BLOOD GASES ANY COMBINATION: CPT

## 2022-08-06 PROCEDURE — 2700000000 HC OXYGEN THERAPY PER DAY

## 2022-08-06 PROCEDURE — 6370000000 HC RX 637 (ALT 250 FOR IP): Performed by: SURGERY

## 2022-08-06 PROCEDURE — 2580000003 HC RX 258: Performed by: INTERNAL MEDICINE

## 2022-08-06 PROCEDURE — 2580000003 HC RX 258

## 2022-08-06 PROCEDURE — 85014 HEMATOCRIT: CPT

## 2022-08-06 PROCEDURE — 2000000000 HC ICU R&B

## 2022-08-06 PROCEDURE — 6370000000 HC RX 637 (ALT 250 FOR IP): Performed by: STUDENT IN AN ORGANIZED HEALTH CARE EDUCATION/TRAINING PROGRAM

## 2022-08-06 PROCEDURE — 86850 RBC ANTIBODY SCREEN: CPT

## 2022-08-06 PROCEDURE — 6360000002 HC RX W HCPCS: Performed by: STUDENT IN AN ORGANIZED HEALTH CARE EDUCATION/TRAINING PROGRAM

## 2022-08-06 PROCEDURE — 85018 HEMOGLOBIN: CPT

## 2022-08-06 RX ORDER — SODIUM CHLORIDE 9 MG/ML
INJECTION, SOLUTION INTRAVENOUS PRN
Status: DISCONTINUED | OUTPATIENT
Start: 2022-08-06 | End: 2022-08-23 | Stop reason: HOSPADM

## 2022-08-06 RX ORDER — LIDOCAINE HYDROCHLORIDE 20 MG/ML
JELLY TOPICAL
Status: COMPLETED | OUTPATIENT
Start: 2022-08-06 | End: 2022-08-06

## 2022-08-06 RX ADMIN — SODIUM CHLORIDE, PRESERVATIVE FREE 10 ML: 5 INJECTION INTRAVENOUS at 21:23

## 2022-08-06 RX ADMIN — METOCLOPRAMIDE HYDROCHLORIDE 5 MG: 5 INJECTION INTRAMUSCULAR; INTRAVENOUS at 16:06

## 2022-08-06 RX ADMIN — METOCLOPRAMIDE HYDROCHLORIDE 5 MG: 5 INJECTION INTRAMUSCULAR; INTRAVENOUS at 20:16

## 2022-08-06 RX ADMIN — MINERAL OIL 330 ML: 1000 SOLUTION ORAL at 09:12

## 2022-08-06 RX ADMIN — MORPHINE SULFATE 2 MG: 2 INJECTION, SOLUTION INTRAMUSCULAR; INTRAVENOUS at 22:33

## 2022-08-06 RX ADMIN — HEPARIN SODIUM 15 UNITS/KG/HR: 10000 INJECTION, SOLUTION INTRAVENOUS at 18:54

## 2022-08-06 RX ADMIN — SODIUM CHLORIDE, PRESERVATIVE FREE 10 ML: 5 INJECTION INTRAVENOUS at 10:27

## 2022-08-06 RX ADMIN — METOCLOPRAMIDE HYDROCHLORIDE 5 MG: 5 INJECTION INTRAMUSCULAR; INTRAVENOUS at 03:56

## 2022-08-06 RX ADMIN — HEPARIN SODIUM 15 UNITS/KG/HR: 10000 INJECTION, SOLUTION INTRAVENOUS at 02:15

## 2022-08-06 RX ADMIN — METOCLOPRAMIDE HYDROCHLORIDE 5 MG: 5 INJECTION INTRAMUSCULAR; INTRAVENOUS at 09:11

## 2022-08-06 RX ADMIN — AMIODARONE HYDROCHLORIDE 0.5 MG/MIN: 50 INJECTION, SOLUTION INTRAVENOUS at 10:46

## 2022-08-06 RX ADMIN — MORPHINE SULFATE 2 MG: 2 INJECTION, SOLUTION INTRAMUSCULAR; INTRAVENOUS at 18:50

## 2022-08-06 RX ADMIN — PROCHLORPERAZINE EDISYLATE 10 MG: 5 INJECTION INTRAMUSCULAR; INTRAVENOUS at 00:09

## 2022-08-06 RX ADMIN — LIDOCAINE HYDROCHLORIDE: 20 JELLY TOPICAL at 21:23

## 2022-08-06 RX ADMIN — SODIUM CHLORIDE 30 MG/ML INHALATION SOLUTION 4 ML: 30 SOLUTION INHALANT at 20:21

## 2022-08-06 RX ADMIN — ALBUTEROL SULFATE 2.5 MG: 2.5 SOLUTION RESPIRATORY (INHALATION) at 14:42

## 2022-08-06 RX ADMIN — ALBUTEROL SULFATE 2.5 MG: 2.5 SOLUTION RESPIRATORY (INHALATION) at 08:05

## 2022-08-06 RX ADMIN — MINERAL OIL 330 ML: 1000 SOLUTION ORAL at 21:23

## 2022-08-06 RX ADMIN — MORPHINE SULFATE 1 MG: 2 INJECTION, SOLUTION INTRAMUSCULAR; INTRAVENOUS at 09:11

## 2022-08-06 RX ADMIN — SODIUM CHLORIDE 30 MG/ML INHALATION SOLUTION 4 ML: 30 SOLUTION INHALANT at 08:05

## 2022-08-06 RX ADMIN — PANTOPRAZOLE SODIUM 40 MG: 40 INJECTION, POWDER, FOR SOLUTION INTRAVENOUS at 20:16

## 2022-08-06 RX ADMIN — PANTOPRAZOLE SODIUM 40 MG: 40 INJECTION, POWDER, FOR SOLUTION INTRAVENOUS at 09:11

## 2022-08-06 RX ADMIN — ALBUTEROL SULFATE 2.5 MG: 2.5 SOLUTION RESPIRATORY (INHALATION) at 20:21

## 2022-08-06 RX ADMIN — MORPHINE SULFATE 2 MG: 2 INJECTION, SOLUTION INTRAMUSCULAR; INTRAVENOUS at 13:25

## 2022-08-06 ASSESSMENT — PAIN DESCRIPTION - FREQUENCY
FREQUENCY: CONTINUOUS
FREQUENCY: CONTINUOUS

## 2022-08-06 ASSESSMENT — PAIN DESCRIPTION - LOCATION
LOCATION: BACK
LOCATION: FOOT
LOCATION: LEG
LOCATION: BACK;FOOT
LOCATION: BACK

## 2022-08-06 ASSESSMENT — PAIN DESCRIPTION - ORIENTATION
ORIENTATION: RIGHT;LEFT

## 2022-08-06 ASSESSMENT — PAIN DESCRIPTION - ONSET
ONSET: ON-GOING
ONSET: ON-GOING

## 2022-08-06 ASSESSMENT — PAIN SCALES - GENERAL
PAINLEVEL_OUTOF10: 8
PAINLEVEL_OUTOF10: 3
PAINLEVEL_OUTOF10: 9
PAINLEVEL_OUTOF10: 3
PAINLEVEL_OUTOF10: 3
PAINLEVEL_OUTOF10: 10
PAINLEVEL_OUTOF10: 8
PAINLEVEL_OUTOF10: 7
PAINLEVEL_OUTOF10: 3

## 2022-08-06 ASSESSMENT — PAIN DESCRIPTION - DESCRIPTORS
DESCRIPTORS: ACHING
DESCRIPTORS: TIGHTNESS

## 2022-08-06 ASSESSMENT — PAIN - FUNCTIONAL ASSESSMENT
PAIN_FUNCTIONAL_ASSESSMENT: ACTIVITIES ARE NOT PREVENTED
PAIN_FUNCTIONAL_ASSESSMENT: PREVENTS OR INTERFERES WITH MANY ACTIVE NOT PASSIVE ACTIVITIES
PAIN_FUNCTIONAL_ASSESSMENT: PREVENTS OR INTERFERES SOME ACTIVE ACTIVITIES AND ADLS
PAIN_FUNCTIONAL_ASSESSMENT: PREVENTS OR INTERFERES SOME ACTIVE ACTIVITIES AND ADLS

## 2022-08-06 ASSESSMENT — PAIN SCALES - WONG BAKER
WONGBAKER_NUMERICALRESPONSE: 4

## 2022-08-06 ASSESSMENT — PAIN DESCRIPTION - PAIN TYPE
TYPE: CHRONIC PAIN
TYPE: CHRONIC PAIN

## 2022-08-06 NOTE — PLAN OF CARE
Problem: Safety - Adult  Goal: Free from fall injury  8/6/2022 0447 by Srinivasan Real RN  Outcome: Progressing    Problem: ABCDS Injury Assessment  Goal: Absence of physical injury  8/6/2022 0447 by Srinivasan Real RN  Outcome: Progressing     Problem: Skin/Tissue Integrity  Goal: Absence of new skin breakdown  Description: 1. Monitor for areas of redness and/or skin breakdown  2. Assess vascular access sites hourly  3. Every 4-6 hours minimum:  Change oxygen saturation probe site  4. Every 4-6 hours:  If on nasal continuous positive airway pressure, respiratory therapy assess nares and determine need for appliance change or resting period.   8/6/2022 0447 by Srinivasan Real RN  Outcome: Progressing     Problem: Respiratory - Adult  Goal: Achieves optimal ventilation and oxygenation  8/6/2022 0447 by Srinivasan Real RN  Outcome: Progressing

## 2022-08-06 NOTE — PROGRESS NOTES
Patient is more difficult to awaken. Stopped following commands as much/ answering questions. When you ask her to follow a command she falls right back to sleep. ICU residents made aware. Will continue to monitor.

## 2022-08-06 NOTE — PROGRESS NOTES
[facility]  Cardiology Inpatient Consult Service  Daily Progress Note        Admit Date:  7/30/2022    Referring Physician: Natalie Carrasco DO    Reason for Consultation/Chief Complaint: A. fib with RVR/hypertension    Subjective: Interval history:  Worsening renal function. Heart rate control at the moment  . Medications:   SMOG Enema  330 mL Rectal BID    metoclopramide  5 mg IntraVENous Q6H    pantoprazole  40 mg IntraVENous BID    sodium chloride (Inhalant)  4 mL Nebulization BID    albuterol  2.5 mg Nebulization TID    polyethylene glycol  17 g Per NG tube TID    bisacodyl  10 mg Rectal Once    lidocaine   Topical Once    sodium chloride flush  5-40 mL IntraVENous 2 times per day    fluticasone  2 spray Each Nostril Daily    [Held by provider] pregabalin  50 mg Oral TID       IV drips:   dextrose 30 mL/hr at 08/05/22 1302    amiodarone 0.5 mg/min (08/06/22 0623)    heparin (PORCINE) Infusion 15 Units/kg/hr (08/06/22 0623)    dextrose      sodium chloride         PRN:  heparin (porcine), heparin (porcine), prochlorperazine, metoprolol, glucose, dextrose bolus **OR** dextrose bolus, glucagon (rDNA), dextrose, albuterol, phenol, morphine **OR** morphine, sodium chloride flush, sodium chloride, ondansetron **OR** ondansetron, acetaminophen **OR** acetaminophen, methocarbamol      Objective:     Vitals:    08/06/22 0700 08/06/22 0800 08/06/22 0807 08/06/22 0812   BP: (!) 94/55 (!) 104/54     Pulse: 79 82 84 79   Resp: 13 15 13 12   Temp:  97.7 °F (36.5 °C)     TempSrc:  Axillary     SpO2:  90% 91% 94%   Weight:       Height:           Intake/Output Summary (Last 24 hours) at 8/6/2022 0852  Last data filed at 8/6/2022 9409  Gross per 24 hour   Intake 2061.61 ml   Output 2715 ml   Net -653.39 ml     I/O last 3 completed shifts:   In: 2818.5 [I.V.:2718.5; IV Piggyback:100]  Out: 3165 [Urine:515; Emesis/NG output:2650]  Wt Readings from Last 3 Encounters:   08/06/22 262 lb 12.6 oz (119.2 kg)   07/13/22 218 lb 9.6 oz (99.2 kg)   06/14/22 217 lb (98.4 kg)       Admit Wt: Weight: 224 lb 3.3 oz (101.7 kg)   Todays Wt: Weight: 262 lb 12.6 oz (119.2 kg)    TELEMETRY: Personally interpreted Atrial fibrillation     Physical Exam:     Skin:  Warm and dry  Neck:  -JVP  Chest:  Clear to auscultation, respiration normal  Cardiovascular:  RRR S1S2  Abdomen:  Soft -  Extremities:  - edema      Objective:  Vital signs: (most recent): Blood pressure (!) 104/54, pulse 79, temperature 97.7 °F (36.5 °C), temperature source Axillary, resp. rate 12, height 5' 2\" (1.575 m), weight 262 lb 12.6 oz (119.2 kg), SpO2 94 %, not currently breastfeeding. Labs:   Recent Labs     08/04/22  1539 08/05/22  0534 08/05/22  1438 08/06/22  0345   * 129*  --  128*   K 5.0 4.9  --  5.3*   BUN 87* 90*  --  96*   CREATININE 3.0* 3.2*  --  3.5*   CL 91* 93*  --  90*   CO2 19* 21  --  18*   GLUCOSE 107* 141*  --  124*   CALCIUM 8.5 7.6*  --  7.3*   MG 3.20* 2.90* 3.00* 3.00*     Recent Labs     08/04/22  1539 08/05/22  0534 08/06/22  0345   WBC 14.1* 15.7* 17.8*   HGB 7.9* 7.3* 7.0*   HCT 23.6* 21.9* 20.9*    162 109*   MCV 93.5 92.6 92.7     No results for input(s): CHOLTOT, TRIG, HDL, CHOLHDL, LDL in the last 72 hours. Invalid input(s): [employer], [address]  Recent Labs     08/04/22  1855   INR 1.15*     No results for input(s): CKTOTAL, CKMB, CKMBINDEX, TROPONINI in the last 72 hours. No results for input(s): BNP in the last 72 hours. No results for input(s): NTPROBNP in the last 72 hours. No results for input(s): TSH in the last 72 hours. Imaging:   I personally reviewed imaging studies including CXR, Stress test, TTE/JER. Assessment & Plan:     A. fib with RVR/hypertension  -Hold on diuretics at this point given worsening creatinine  -Heart rate is reasonable, continue amiodarone  -Continue anticoagulation with heparin      Thank you for allowing to us to participate in the care of Olesya Arzola.  Please call our service with questions. All questions and concerns were addressed to the patient/family. Alternatives to my treatment were discussed. The note was completed using EMR. Every effort was made to ensure accuracy; however, inadvertent computerized transcription errors may be present. I have personally reviewed the reports and images of labs, radiological studies, cardiac studies including ECG's and telemetry, current and old medical records. Jacinto Talbert MD, [facility] [address] 67    8/6/2022 8:52 AM

## 2022-08-06 NOTE — PROGRESS NOTES
Progress Note        Patient Mak Sanchez  MRN: [id number]  YOB: 1950 Age: 74 y.o. Sex: female  Room: [address] [place] [address]       Admitting Physician: Marga Deutsch MD   Date of Admission: 7/30/2022  8:43 PM   Primary Care Physician: Tessa Davis     Subjective:  Mak Sanchez was seen and examined. Patient had episode of emesis overnight with some hypotension. Indeterminate bowel gas pattern seen on abdominal x-ray with no evidence of any free intraperitoneal air. Continues with hypoxia requiring 15 L of oxygen. As per the nursing staff she did not have any bowel movements/only smears. She could not tolerate GoLytely given yesterday, now on hold with NG tube suction      Objective:  Vital Signs:   Vitals:    08/06/22 1000   BP: (!) 100/47   Pulse: 79   Resp: 19   Temp:    SpO2: 95%         Physical Exam:  Constitutional: Patient is slightly drowsy, obeys simple verbal commands  Respiratory: On 15 L oxygen   GI: Abdomen distended, mild tenderness, bowel sounds are sluggishNeurological: No focal deficits noted. No asterixis.     Intake/Output:    Intake/Output Summary (Last 24 hours) at 8/6/2022 1236  Last data filed at 8/6/2022 0800  Gross per 24 hour   Intake 2051.61 ml   Output 2615 ml   Net -563.39 ml        Current Medications:  Current Facility-Administered Medications   Medication Dose Route Frequency Provider Last Rate Last Admin    SMOG Enema  330 mL Rectal BID Mayra Schafer MD   330 mL at 08/06/22 0912    metoclopramide (REGLAN) injection 5 mg  5 mg IntraVENous Q6H Norma Bella MD   5 mg at 08/06/22 0911    dextrose 10 % infusion   IntraVENous Continuous Marga Yanes MD 20 mL/hr at 08/06/22 1144 Rate Change at 08/06/22 1144    amiodarone (CORDARONE) 450 mg in dextrose 5 % 250 mL infusion  0.5 mg/min IntraVENous Continuous Nirmal Thompson MD 16.7 mL/hr at 08/06/22 1046 0.5 mg/min at 08/06/22 1046    heparin (porcine) injection 8,960 Units  80 Units/kg sodium chloride flush 0.9 % injection 5-40 mL  5-40 mL IntraVENous 2 times per day Yolanda Lewis MD   10 mL at 08/06/22 1027    sodium chloride flush 0.9 % injection 5-40 mL  5-40 mL IntraVENous PRN Yolanda Lewis MD        0.9 % sodium chloride infusion   IntraVENous PRN Yolanda Lewis MD   New Bag at 08/03/22 1230    ondansetron (ZOFRAN-ODT) disintegrating tablet 4 mg  4 mg Oral Q8H PRN Yolanda Lewis MD        Or    [facility] injection 4 mg  4 mg IntraVENous Q6H PRN Yolanda Lewis MD   4 mg at 08/05/22 1946    acetaminophen (TYLENOL) tablet 650 mg  650 mg Oral Q6H PRN Yolanda Lewis MD        Or    acetaminophen (TYLENOL) suppository 650 mg  650 mg Rectal Q6H PRN Yolanda Lewis MD        fluticasone (FLONASE) 50 MCG/ACT nasal spray 2 spray  2 spray Each Nostril Daily Yolanda Lewis MD   2 spray at 08/02/22 1158    methocarbamol (ROBAXIN) tablet 500 mg  500 mg Oral TID PRN Yolanda Lewis MD   500 mg at 08/01/22 1151    [Held by provider] pregabalin (LYRICA) capsule 50 mg  50 mg Oral TID Yolanda Lewis MD   50 mg at 07/31/22 0900         Recent Imaging:   XR CHEST PORTABLE  Narrative: Portable chest    HISTORY: Hypoxia. COMPARISON: August 4, 2022. FINDINGS:    Cardiac mediastinal contours are stable. Enteric tube into the abdomen, tip in the left upper quadrant. No pneumothorax. Mild ill-defined airspace disease is present. Right IJ central venous catheter unchanged. Impression: Mild bilateral airspace disease. XR ABDOMEN (KUB) (SINGLE AP VIEW)  Narrative: 6 views of the abdomen    HISTORY: Pain. Obstipation. COMPARISON: August 4, 2022. FINDINGS:    Enteric tube is present, tip is in the left upper quadrant. There is an overall paucity of small bowel gas. No evidence of free intraperitoneal air. Impression: 1. No evidence of free intraperitoneal air. 2. Indeterminate bowel gas pattern due to a paucity of small bowel gas.        Labs:   Recent Labs     08/03/22  1751 08/04/22  2148

## 2022-08-06 NOTE — PROGRESS NOTES
ICU Progress Note    Admit Date: 7/30/2022  ICU Day: 3  Vent Day: None  IV Access:Peripheral  IV Fluids:None  Vasopressors:None                Antibiotics: None  Diet: Diet NPO    CC: SOB    Interval history:   Patient had an episode of emesis overnight with some hypotension. Abdomen Xray showed  no evidence of free intraperitoneal air. Indeterminate bowel gas pattern due to a paucity of small bowel gas. Patient seen and examined at been side. Patient denies shortness of breath, chest pain, chills, nausea, vomiting She denies urinary frequency, dysuria. Patient is hemodynamically stable and afebrile She remains on SCDs Patient is hemodynamically stable and afebrile. BP between  100-90 mmHg in the last 24 hours. HPI: Patient is a 75 y/o female that has a PMHx of obesity (BMI 41.01), HTN, HLD, Gout, and OA. Patient is s/p CASS (DOS 07/16/2022). Patient was additionally diagnosed with a UTI during that hospitalization and was initially treated with Cipro. She was transitioned over to Keflex prior to her discharge but states that she was having multiple bouts of diarrhea with the Keflex. Patient took an Imodium on 7/25 to help her diarrhea but she soon became constipated. Patient was doing well from the operation upon her discharge but said that she had a possible femoral crack during procedure that complicated the CASS. Since the operation the patient was able to do her rehab appropriately. However, starting 07/29/2022-07/30/2022 patient started to develop some generalized weakness, nausea, vomiting and abdominal pain that only got worse throughout the day. As a result, patient decided to present to [facility] for further evaluation. In the ED at [facility] patient had labs which were significant for WBC 22.8k, with elevated ANC, hgb 10.1, na131, k 5.0 BUN of 19 cr 1.1,Alk phos 139.  Furthermore, patient got CT Abdomen & Pelvis which showed large amount of stool cecum through the sigmoid colon along with  a partially organized fluid without obvious rim enhancement anterior to the CASS (possible seroma). Patient was then transferred to [facility]. for further evaluation with Dr. Vilma Rosales. Once patient was transferred to [facility] patient was noted to have an elevated ESR (52), CRP (63), Pro-robin (10.91), and Lactic Acid (4.5); and was subsequently started on vancomycin, cefepime, and flagyl but was then transitioned to Linezolid and Meropenem. Today at bedside with patients daughter, patient feels like she is doing a lot better than when she came in. Patient is currently on 6L of HFNC. Patients WBC and Lactic Acid has downtrended to WNL 10.7 and 2.0 respectively but patients pro-robin has almost doubled 20.38 along with an elevated pro-BNP 3165. Patient denies any nausea, vomiting, chills, or abdominal pain. Patient says that she only feels the pain in her abdomen when people touch the stomach. Patient denies any acute overnight events.       Medications:     Scheduled Meds:   SMOG Enema  330 mL Rectal BID    metoclopramide  5 mg IntraVENous Q6H    pantoprazole  40 mg IntraVENous BID    sodium chloride (Inhalant)  4 mL Nebulization BID    albuterol  2.5 mg Nebulization TID    polyethylene glycol  17 g Per NG tube TID    bisacodyl  10 mg Rectal Once    lidocaine   Topical Once    sodium chloride flush  5-40 mL IntraVENous 2 times per day    fluticasone  2 spray Each Nostril Daily    [Held by provider] pregabalin  50 mg Oral TID     Continuous Infusions:   dextrose 30 mL/hr at 08/05/22 1302    amiodarone 0.5 mg/min (08/06/22 0623)    heparin (PORCINE) Infusion 15 Units/kg/hr (08/06/22 0623)    dextrose      sodium chloride       PRN Meds:heparin (porcine), heparin (porcine), prochlorperazine, metoprolol, glucose, dextrose bolus **OR** dextrose bolus, glucagon (rDNA), dextrose, albuterol, phenol, morphine **OR** morphine, sodium chloride flush, sodium chloride, ondansetron **OR** ondansetron, acetaminophen **OR** acetaminophen, methocarbamol    Objective:   Vitals:   T-max:  Patient Vitals for the past 8 hrs:   BP Temp Temp src Pulse Resp SpO2 Weight   08/06/22 0812 -- -- -- 79 12 94 % --   08/06/22 0807 -- -- -- 84 13 91 % --   08/06/22 0800 (!) 104/54 97.7 °F (36.5 °C) Axillary 82 15 90 % --   08/06/22 0700 (!) 94/55 -- -- 79 13 -- --   08/06/22 0600 (!) 94/58 -- -- 90 12 -- 262 lb 12.6 oz (119.2 kg)   08/06/22 0500 (!) 88/54 -- -- 86 12 -- --   08/06/22 0400 106/61 -- -- 91 15 (!) 89 % --   08/06/22 0335 99/60 -- -- 91 17 92 % --   08/06/22 0305 (!) 92/51 -- -- 90 12 97 % --   08/06/22 0300 (!) 80/36 -- -- 85 13 97 % --   08/06/22 0230 (!) 104/55 -- -- 89 13 94 % --   08/06/22 0215 -- -- -- 85 13 92 % --   08/06/22 0205 89/72 -- -- 87 14 90 % --   08/06/22 0200 (!) 81/55 -- -- 90 15 92 % --   08/06/22 0100 (!) 99/51 -- -- 86 22 -- --         Intake/Output Summary (Last 24 hours) at 8/6/2022 0850  Last data filed at 8/6/2022 6882  Gross per 24 hour   Intake 2061.61 ml   Output 2715 ml   Net -653.39 ml         Review of Systems  Per HPI    Physical Exam  Constitutional:       General: She is not in acute distress. Appearance: She is obese. She is ill-appearing. HENT:      Head: Normocephalic and atraumatic. Nose: Nose normal.      Mouth/Throat:      Mouth: Mucous membranes are dry. Pharynx: Oropharynx is clear. Eyes:      Extraocular Movements: Extraocular movements intact. Conjunctiva/sclera: Conjunctivae normal.      Pupils: Pupils are equal, round, and reactive to light. Neck:      Comments: Central line in place  Cardiovascular:      Rate and Rhythm: Normal rate and regular rhythm. Pulses: Normal pulses. Heart sounds: Normal heart sounds. Pulmonary:      Effort: Pulmonary effort is normal. No respiratory distress. Breath sounds: No wheezing or rhonchi.       Comments: Diminished breath sounds at bases bilaterally   Abdominal:      General: Abdomen is flat. There is distension. Tenderness: There is abdominal tenderness. There is no guarding or rebound. Comments: Reduced bowel sounds   Musculoskeletal:      Cervical back: Normal range of motion and neck supple. Right lower leg: Edema present. Left lower leg: Edema present. Comments: +Sacral edema. R Hip CASS site dressing present   Skin:     General: Skin is warm and dry. Capillary Refill: Capillary refill takes less than 2 seconds. Findings: Bruising present. Neurological:      General: No focal deficit present. Mental Status: She is alert. Cranial Nerves: No cranial nerve deficit. Sensory: No sensory deficit. Motor: No weakness. Comments: Somnolent, able to follow commands   Psychiatric:         Mood and Affect: Mood normal.         Behavior: Behavior normal.         Thought Content: Thought content normal.         Judgment: Judgment normal.       LABS:    CBC:   Recent Labs     08/04/22 1539 08/05/22 0534 08/06/22  0345   WBC 14.1* 15.7* 17.8*   HGB 7.9* 7.3* 7.0*   HCT 23.6* 21.9* 20.9*    162 109*   MCV 93.5 92.6 92.7       Renal:    Recent Labs     08/04/22 1539 08/05/22 0534 08/05/22  1438 08/06/22  0345   * 129*  --  128*   K 5.0 4.9  --  5.3*   CL 91* 93*  --  90*   CO2 19* 21  --  18*   BUN 87* 90*  --  96*   CREATININE 3.0* 3.2*  --  3.5*   GLUCOSE 107* 141*  --  124*   CALCIUM 8.5 7.6*  --  7.3*   MG 3.20* 2.90* 3.00* 3.00*   PHOS 8.5* 8.1*  --  8.1*   ANIONGAP 18* 15  --  20*       Hepatic:   Recent Labs     08/04/22 1539 08/05/22  0534 08/06/22  0345   LABALBU 2.3* 2.2* 2.3*       Troponin: No results for input(s): TROPONINI in the last 72 hours. BNP: No results for input(s): BNP in the last 72 hours. Lipids: No results for input(s): CHOL, HDL in the last 72 hours.     Invalid input(s): LDLCALCU, TRIGLYCERIDE  ABGs:    Recent Labs     08/04/22  1839 08/05/22  0250 08/06/22  0331   PHART 7.405 7.330* 7.332* and tubes in standard position. Cholecystectomy clips are noted. CT ABDOMEN PELVIS WO CONTRAST Additional Contrast? Oral and Rectal   Final Result   1. Moderate colonic distention. Correlate for constipation. Fecal content within the ileum which may suggest stasis. There is no small bowel obstruction. 2. Subcutaneous fluid collection overlying the right total hip arthroplasty. CT HIP RIGHT WO CONTRAST   Final Result   1. Moderate colonic distention. Correlate for constipation. Fecal content within the ileum which may suggest stasis. There is no small bowel obstruction. 2. Subcutaneous fluid collection overlying the right total hip arthroplasty. XR CHEST PORTABLE   Final Result      1. Right IJ CVC tip in the cavoatrial junction. 2.  Bibasilar airspace disease suggesting atelectasis. Pneumonia is not excluded. XR ABDOMEN (KUB) (SINGLE AP VIEW)   Final Result      Dilated colonic and small bowel loops may suggest ileus but obstruction is not excluded. XR ABDOMEN (KUB) (SINGLE AP VIEW)   Final Result      No acute radiographic abnormality of the abdomen. Status post right hip arthroplasty with mildly displaced greater trochanteric fracture fragment, new or more pronounced compared to the prior study. Assessment/Plan:     Respiratory  Hypoxia   On 14 L now. CT chest showed moderate R pleural effusion and small L effusion with basilar atelectasis. - Continue albuterol,   -Hypertonic nacl neb,  - Acapella  - Follow up CXR     Renal  ARIANE  UO 2.7 L, -600s/24h. net negative since admission 3.2 L  Cr 3.5,3.2, Baseline 0.8.    Diuretics stopped yesterday per cardiology  -f/u BMP  -monitor lytes replace as needed  -avoid Nephrotoxic meds  -Nephrology following  -monitor I/Os strictly    Hyponatremia  Hyponatremia possibly 2/2 to decreased PO intake in setting of nausea, vomiting and constipation.   -Monitor BMP  - Off Lasix  - Nephrology following   - winter in place     Cardiac  Afib with RVR  Found to be in atrial fibrillation with poorly controlled ventricular rate on telemetry, new diagnosis. Unclear contribution of possible HF however echo 8/3 with EF >65%  -Continue Amiodarone  -Continue heparin drip   - cardiology consulted  - holding BP meds    Rule out acute heart failure  Patient with worse respiratory status and Afib RVR. Echo normal  -Diuresis stopped     GI  Ileus Likely 2/2 to constipation/stool burden in setting of opioid use from recent CASS. Last bowel movement 1 week ago, she is s/p smog enema with minimal output x2. No SBO on 8/1 CT abd pelvis  - Continue NG tube decompression  - continue bowel regimen with dulcolax and glycolax  - NPO with bowel rest, continue serial abdominal exams  - general surgery and GI following   - added IV Protonix 40 daily  - s/p colonoscopy decompression 8/3 but hard stool unable to be evacuated  - continue reglan q6h  - On smog enema BID     MSK  CASS  S/p complicated R CASS 8/63, now with R CASS fluid collection on CT  - No intervention indicated per orthopedic surgery  -Pain control with morphine prn   - Orthopedic surgery following    ID   Leukocytosis  No focus of infections abx stopped by ID    Code Status: Full code  FEN: NPO  PPX: Heparin  DISPO: pending    Fer Roldan MD, PGY-2  08/06/22  8:50 AM    This patient has been staffed and discussed with Dr. Reese Gaitan     Patient was seen, examined and discussed with Dr. Marna Severin. I agree with the interval history. My physical exam confirms the findings listed below  Chart was reviewed including labs, CXR and medical records confirm the findings noted    Ileus  Hypoxia, requiring 15 liters. Low lung volume on CXR. CT scan with small bilateral pleural effusion, possible mild edema, atelectasis. Bilateral leg edema with small pleural effusions however echo with normal systolic function.   IVC with normal size and collapsibility  Paroxysmal A.fib  No definite focus of infection. Currently off ABX. Lactic acidosis is resolved. ARIANE:  creatinine is up to 3.5     Had vomiting with golytely, now on hold. NG tube to suction    She has chronic back pain. She is inactive. She is not tolerating any intervention. She may benefit from disimpaction however she did not tolerate it at the bedside. Discussed with surgery.   Consider sedation and intubation for disimpaction and/or repeat colonoscopy

## 2022-08-06 NOTE — PROGRESS NOTES
Surgery Daily Progress Note  Silverio Parrish  CC:  Abdominal pain with bowel obstruction. Subjective :   Pt had an episode of emesis overnight along with some hypotensive episode. BM x2 reported, but RN staff says they are smears. Objective    Infusions:   dextrose 30 mL/hr at 08/05/22 1302    amiodarone 0.5 mg/min (08/06/22 0623)    heparin (PORCINE) Infusion 15 Units/kg/hr (08/06/22 0623)    dextrose      sodium chloride          I/O:I/O last 3 completed shifts: In: 2818.5 [I.V.:2718.5; IV Piggyback:100]  Out: 3165 [Urine:515; Emesis/NG output:2650]           Wt Readings from Last 1 Encounters:   08/06/22 262 lb 12.6 oz (119.2 kg)               LABS:    Recent Labs     08/05/22  0534 08/06/22  0345   WBC 15.7* 17.8*   HGB 7.3* 7.0*   HCT 21.9* 20.9*   MCV 92.6 92.7    109*          Recent Labs     08/05/22  0534 08/06/22  0345   * 128*   K 4.9 5.3*   CL 93* 90*   CO2 21 18*   PHOS 8.1* 8.1*   BUN 90* 96*   CREATININE 3.2* 3.5*          No results for input(s): AST, ALT, ALB, BILIDIR, BILITOT, ALKPHOS in the last 72 hours. No results for input(s): LIPASE, AMYLASE in the last 72 hours. Recent Labs     08/04/22  1855 08/04/22 2038 08/04/22  2348   INR 1.15*  --   --    APTT  --  >180.0* >180.0*        No results for input(s): CKTOTAL, CKMB, CKMBINDEX, TROPONINI in the last 72 hours. Exam:BP (!) 104/54   Pulse 84   Temp 97.7 °F (36.5 °C) (Axillary)   Resp 13   Ht 5' 2\" (1.575 m)   Wt 262 lb 12.6 oz (119.2 kg)   SpO2 90%   BMI 48.06 kg/m²     General appearance: ill appearing   HEENT:  trachea midline. NGT in place with bilious output.    Heart: Afib rate controlled  Lungs:  symmetrical chest rise, use of accessory muscles, on 15L HFNC  Skin: warm and dry, no rashes  Extremities: edema, no cyanosis  Abdomen: soft, obese, non-tender, mild distended  : winter in place draining yellow urine  Skin: warm and pale pink      ASSESSMENT/PLAN: Hx of HTN, HLD, Tuberculosis (1981), gout, and OA s/p right CASS on 7/13/22 who was transferred to [facility] on 7/30 due to fluid associated with her right CASS on 7/13 and for abdominal pain.    -Lasix drip and fluid management per nephro  - GI attempted colonic decompression on 8/3-large stool burden  GI recommended to continue to try Golytely and enemas-enemas now ordered for twice daily  -Continue NPO with NGT to LIWS. - No surgical intervention from Ortho  - Will continue to monitor closely    Yinka Cameron DO  PGY1, General Surgery  08/06/22  8:24 AM    I am post-call today and will not be on campus. Please contact Dr. Ariane Vilchis at [phone] or Dr. Rex Carlos at [phone] for questions or concerns regarding this patient. I have personally performed the medical history, physical exam and medical decision making and agree with all pertinent clinical information unless otherwise noted. I personally did disimpaction and hard stool evacuated from high rectum.     Stephany Becker MD  Surgery Attending

## 2022-08-06 NOTE — PROGRESS NOTES
A&Ox4 this AM. Pt c/o bilateral leg/foot pain rated 10/10. On assessment legs are edematous and +4 pitting. Legs elevated to improve BP. ICU resident contacted to see if pt could receive PRN morphine since SBP improved.

## 2022-08-06 NOTE — PLAN OF CARE
Problem: Discharge Planning  Goal: Discharge to home or other facility with appropriate resources  Outcome: Progressing     Problem: Safety - Adult  Goal: Free from fall injury  8/6/2022 1841 by Cece Hernandez RN  Outcome: Progressing  8/6/2022 0447 by Carri Fields RN  Outcome: Progressing     Problem: ABCDS Injury Assessment  Goal: Absence of physical injury  8/6/2022 1841 by Cece Hernandez RN  Outcome: Progressing  Flowsheets (Taken 8/6/2022 0800)  Absence of Physical Injury: Implement safety measures based on patient assessment  8/6/2022 0447 by Carri Fields RN  Outcome: Progressing     Problem: Skin/Tissue Integrity  Goal: Absence of new skin breakdown  Description: 1. Monitor for areas of redness and/or skin breakdown  2. Assess vascular access sites hourly  3. Every 4-6 hours minimum:  Change oxygen saturation probe site  4. Every 4-6 hours:  If on nasal continuous positive airway pressure, respiratory therapy assess nares and determine need for appliance change or resting period.   8/6/2022 1841 by Cece Hernandez RN  Outcome: Progressing  8/6/2022 0447 by Carri Fields RN  Outcome: Progressing     Problem: Respiratory - Adult  Goal: Achieves optimal ventilation and oxygenation  8/6/2022 1841 by Cece Hernandez RN  Outcome: Progressing  8/6/2022 0447 by Carri Fields RN  Outcome: Progressing     Problem: Skin/Tissue Integrity - Adult  Goal: Incisions, wounds, or drain sites healing without S/S of infection  Outcome: Progressing  Goal: Oral mucous membranes remain intact  Outcome: Progressing     Problem: Musculoskeletal - Adult  Goal: Return mobility to safest level of function  Outcome: Progressing  Goal: Maintain proper alignment of affected body part  Outcome: Progressing  Goal: Return ADL status to a safe level of function  Outcome: Progressing     Problem: Gastrointestinal - Adult  Goal: Minimal or absence of nausea and vomiting  Outcome: Progressing  Goal: Maintains adequate nutritional intake  Outcome: Progressing  Goal: Maintains or returns to baseline bowel function  Outcome: Progressing     Problem: Genitourinary - Adult  Goal: Absence of urinary retention  Outcome: Progressing  Goal: Urinary catheter remains patent  Outcome: Progressing     Problem: Infection - Adult  Goal: Absence of infection at discharge  Outcome: Progressing  Goal: Absence of infection during hospitalization  Outcome: Progressing     Problem: Metabolic/Fluid and Electrolytes - Adult  Goal: Electrolytes maintained within normal limits  Outcome: Progressing  Goal: Hemodynamic stability and optimal renal function maintained  Outcome: Progressing     Problem: Cardiovascular - Adult  Goal: Maintains optimal cardiac output and hemodynamic stability  Outcome: Progressing  Goal: Absence of cardiac dysrhythmias or at baseline  Outcome: Progressing     Problem: Pain  Goal: Verbalizes/displays adequate comfort level or baseline comfort level  Outcome: Progressing  Flowsheets (Taken 8/6/2022 0800)  Verbalizes/displays adequate comfort level or baseline comfort level:   Encourage patient to monitor pain and request assistance   Assess pain using appropriate pain scale   Administer analgesics based on type and severity of pain and evaluate response   Implement non-pharmacological measures as appropriate and evaluate response   Consider cultural and social influences on pain and pain management   Notify Licensed Independent Practitioner if interventions unsuccessful or patient reports new pain

## 2022-08-06 NOTE — PROGRESS NOTES
After Xray of stomach patient is a lot more alert and following commands again will continue to monitor.

## 2022-08-06 NOTE — PROGRESS NOTES
Patient vomited a a huge amount brown vomit. Hooked patient to low wall suction 900 ml of brown emesis came out. ICU residents made aware. Will continue to monitor.

## 2022-08-07 LAB
ALBUMIN SERPL-MCNC: 2.2 G/DL (ref 3.4–5)
ANION GAP SERPL CALCULATED.3IONS-SCNC: 10 MMOL/L (ref 3–16)
ANION GAP SERPL CALCULATED.3IONS-SCNC: 8 MMOL/L (ref 3–16)
ANISOCYTOSIS: ABNORMAL
ANTI-XA UNFRAC HEPARIN: 0.4 IU/ML (ref 0.3–0.7)
ANTI-XA UNFRAC HEPARIN: 0.51 IU/ML (ref 0.3–0.7)
ANTI-XA UNFRAC HEPARIN: 0.71 IU/ML (ref 0.3–0.7)
BASOPHILS ABSOLUTE: 0 K/UL (ref 0–0.2)
BASOPHILS RELATIVE PERCENT: 0 %
BUN BLDV-MCNC: 100 MG/DL (ref 7–20)
BUN BLDV-MCNC: 98 MG/DL (ref 7–20)
CALCIUM SERPL-MCNC: 6.7 MG/DL (ref 8.3–10.6)
CALCIUM SERPL-MCNC: 7.3 MG/DL (ref 8.3–10.6)
CHLORIDE BLD-SCNC: 90 MMOL/L (ref 99–110)
CHLORIDE BLD-SCNC: 92 MMOL/L (ref 99–110)
CO2: 26 MMOL/L (ref 21–32)
CO2: 30 MMOL/L (ref 21–32)
CREAT SERPL-MCNC: 3.3 MG/DL (ref 0.6–1.2)
CREAT SERPL-MCNC: 3.5 MG/DL (ref 0.6–1.2)
EOSINOPHILS ABSOLUTE: 0.1 K/UL (ref 0–0.6)
EOSINOPHILS RELATIVE PERCENT: 1 %
GFR AFRICAN AMERICAN: 15
GFR AFRICAN AMERICAN: 17
GFR NON-AFRICAN AMERICAN: 13
GFR NON-AFRICAN AMERICAN: 14
GLUCOSE BLD-MCNC: 102 MG/DL (ref 70–99)
GLUCOSE BLD-MCNC: 104 MG/DL (ref 70–99)
GLUCOSE BLD-MCNC: 92 MG/DL (ref 70–99)
HCT VFR BLD CALC: 22.1 % (ref 36–48)
HEMOGLOBIN: 7.4 G/DL (ref 12–16)
HYPOCHROMIA: ABNORMAL
LYMPHOCYTES ABSOLUTE: 0.7 K/UL (ref 1–5.1)
LYMPHOCYTES RELATIVE PERCENT: 5 %
MACROCYTES: ABNORMAL
MAGNESIUM: 2.8 MG/DL (ref 1.8–2.4)
MCH RBC QN AUTO: 30.9 PG (ref 26–34)
MCHC RBC AUTO-ENTMCNC: 33.6 G/DL (ref 31–36)
MCV RBC AUTO: 92 FL (ref 80–100)
MICROCYTES: ABNORMAL
MONOCYTES ABSOLUTE: 0.5 K/UL (ref 0–1.3)
MONOCYTES RELATIVE PERCENT: 4 %
NEUTROPHILS ABSOLUTE: 12 K/UL (ref 1.7–7.7)
NEUTROPHILS RELATIVE PERCENT: 90 %
OVALOCYTES: ABNORMAL
PDW BLD-RTO: 14.5 % (ref 12.4–15.4)
PERFORMED ON: NORMAL
PHOSPHORUS: 8.2 MG/DL (ref 2.5–4.9)
PLATELET # BLD: ABNORMAL K/UL (ref 135–450)
PLATELET SLIDE REVIEW: ABNORMAL
PMV BLD AUTO: ABNORMAL FL (ref 5–10.5)
POLYCHROMASIA: ABNORMAL
POTASSIUM SERPL-SCNC: 5.1 MMOL/L (ref 3.5–5.1)
POTASSIUM SERPL-SCNC: 5.2 MMOL/L (ref 3.5–5.1)
RBC # BLD: 2.41 M/UL (ref 4–5.2)
SCHISTOCYTES: ABNORMAL
SLIDE REVIEW: ABNORMAL
SODIUM BLD-SCNC: 126 MMOL/L (ref 136–145)
SODIUM BLD-SCNC: 130 MMOL/L (ref 136–145)
TARGET CELLS: ABNORMAL
TEAR DROP CELLS: ABNORMAL
WBC # BLD: 13.3 K/UL (ref 4–11)

## 2022-08-07 PROCEDURE — 6360000002 HC RX W HCPCS

## 2022-08-07 PROCEDURE — 2000000000 HC ICU R&B

## 2022-08-07 PROCEDURE — 6370000000 HC RX 637 (ALT 250 FOR IP)

## 2022-08-07 PROCEDURE — 85025 COMPLETE CBC W/AUTO DIFF WBC: CPT

## 2022-08-07 PROCEDURE — 2580000003 HC RX 258

## 2022-08-07 PROCEDURE — 94761 N-INVAS EAR/PLS OXIMETRY MLT: CPT

## 2022-08-07 PROCEDURE — 94640 AIRWAY INHALATION TREATMENT: CPT

## 2022-08-07 PROCEDURE — 36592 COLLECT BLOOD FROM PICC: CPT

## 2022-08-07 PROCEDURE — 36415 COLL VENOUS BLD VENIPUNCTURE: CPT

## 2022-08-07 PROCEDURE — 2580000003 HC RX 258: Performed by: INTERNAL MEDICINE

## 2022-08-07 PROCEDURE — 94669 MECHANICAL CHEST WALL OSCILL: CPT

## 2022-08-07 PROCEDURE — 80069 RENAL FUNCTION PANEL: CPT

## 2022-08-07 PROCEDURE — 99233 SBSQ HOSP IP/OBS HIGH 50: CPT | Performed by: INTERNAL MEDICINE

## 2022-08-07 PROCEDURE — C9113 INJ PANTOPRAZOLE SODIUM, VIA: HCPCS

## 2022-08-07 PROCEDURE — 99232 SBSQ HOSP IP/OBS MODERATE 35: CPT | Performed by: SURGERY

## 2022-08-07 PROCEDURE — 6370000000 HC RX 637 (ALT 250 FOR IP): Performed by: STUDENT IN AN ORGANIZED HEALTH CARE EDUCATION/TRAINING PROGRAM

## 2022-08-07 PROCEDURE — 6360000002 HC RX W HCPCS: Performed by: INTERNAL MEDICINE

## 2022-08-07 PROCEDURE — 2700000000 HC OXYGEN THERAPY PER DAY

## 2022-08-07 PROCEDURE — 6370000000 HC RX 637 (ALT 250 FOR IP): Performed by: SURGERY

## 2022-08-07 PROCEDURE — 85520 HEPARIN ASSAY: CPT

## 2022-08-07 PROCEDURE — 83735 ASSAY OF MAGNESIUM: CPT

## 2022-08-07 RX ORDER — LIDOCAINE HYDROCHLORIDE 20 MG/ML
JELLY TOPICAL PRN
Status: DISCONTINUED | OUTPATIENT
Start: 2022-08-07 | End: 2022-08-23 | Stop reason: HOSPADM

## 2022-08-07 RX ORDER — LIDOCAINE 4 G/G
1 PATCH TOPICAL DAILY
Status: COMPLETED | OUTPATIENT
Start: 2022-08-07 | End: 2022-08-08

## 2022-08-07 RX ADMIN — METOCLOPRAMIDE HYDROCHLORIDE 5 MG: 5 INJECTION INTRAMUSCULAR; INTRAVENOUS at 20:11

## 2022-08-07 RX ADMIN — ALBUTEROL SULFATE 2.5 MG: 2.5 SOLUTION RESPIRATORY (INHALATION) at 08:18

## 2022-08-07 RX ADMIN — PANTOPRAZOLE SODIUM 40 MG: 40 INJECTION, POWDER, FOR SOLUTION INTRAVENOUS at 20:10

## 2022-08-07 RX ADMIN — MORPHINE SULFATE 2 MG: 2 INJECTION, SOLUTION INTRAMUSCULAR; INTRAVENOUS at 17:08

## 2022-08-07 RX ADMIN — SODIUM CHLORIDE, PRESERVATIVE FREE 10 ML: 5 INJECTION INTRAVENOUS at 10:21

## 2022-08-07 RX ADMIN — SODIUM CHLORIDE 30 MG/ML INHALATION SOLUTION 4 ML: 30 SOLUTION INHALANT at 08:18

## 2022-08-07 RX ADMIN — METOCLOPRAMIDE HYDROCHLORIDE 5 MG: 5 INJECTION INTRAMUSCULAR; INTRAVENOUS at 04:03

## 2022-08-07 RX ADMIN — MORPHINE SULFATE 2 MG: 2 INJECTION, SOLUTION INTRAMUSCULAR; INTRAVENOUS at 12:55

## 2022-08-07 RX ADMIN — SALINE NASAL SPRAY 1 SPRAY: 1.5 SOLUTION NASAL at 13:19

## 2022-08-07 RX ADMIN — DEXTROSE MONOHYDRATE: 100 INJECTION, SOLUTION INTRAVENOUS at 01:16

## 2022-08-07 RX ADMIN — ALBUTEROL SULFATE 2.5 MG: 2.5 SOLUTION RESPIRATORY (INHALATION) at 19:20

## 2022-08-07 RX ADMIN — SODIUM CHLORIDE, PRESERVATIVE FREE 10 ML: 5 INJECTION INTRAVENOUS at 20:10

## 2022-08-07 RX ADMIN — AMIODARONE HYDROCHLORIDE 0.5 MG/MIN: 50 INJECTION, SOLUTION INTRAVENOUS at 01:18

## 2022-08-07 RX ADMIN — MORPHINE SULFATE 2 MG: 2 INJECTION, SOLUTION INTRAMUSCULAR; INTRAVENOUS at 08:45

## 2022-08-07 RX ADMIN — METOCLOPRAMIDE HYDROCHLORIDE 5 MG: 5 INJECTION INTRAMUSCULAR; INTRAVENOUS at 08:46

## 2022-08-07 RX ADMIN — NALOXEGOL OXALATE 12.5 MG: 12.5 TABLET, FILM COATED ORAL at 14:52

## 2022-08-07 RX ADMIN — ALBUTEROL SULFATE 2.5 MG: 2.5 SOLUTION RESPIRATORY (INHALATION) at 14:46

## 2022-08-07 RX ADMIN — MINERAL OIL 330 ML: 1000 SOLUTION ORAL at 09:22

## 2022-08-07 RX ADMIN — PANTOPRAZOLE SODIUM 40 MG: 40 INJECTION, POWDER, FOR SOLUTION INTRAVENOUS at 08:45

## 2022-08-07 RX ADMIN — METOCLOPRAMIDE HYDROCHLORIDE 5 MG: 5 INJECTION INTRAMUSCULAR; INTRAVENOUS at 16:36

## 2022-08-07 RX ADMIN — PHENOL 1 SPRAY: 1.5 LIQUID ORAL at 15:02

## 2022-08-07 RX ADMIN — SODIUM CHLORIDE 30 MG/ML INHALATION SOLUTION 4 ML: 30 SOLUTION INHALANT at 19:20

## 2022-08-07 RX ADMIN — MORPHINE SULFATE 2 MG: 2 INJECTION, SOLUTION INTRAMUSCULAR; INTRAVENOUS at 04:07

## 2022-08-07 RX ADMIN — LIDOCAINE HYDROCHLORIDE: 20 JELLY TOPICAL at 09:22

## 2022-08-07 RX ADMIN — HEPARIN SODIUM 14 UNITS/KG/HR: 10000 INJECTION, SOLUTION INTRAVENOUS at 12:21

## 2022-08-07 RX ADMIN — AMIODARONE HYDROCHLORIDE 0.5 MG/MIN: 50 INJECTION, SOLUTION INTRAVENOUS at 17:31

## 2022-08-07 ASSESSMENT — PAIN DESCRIPTION - ONSET
ONSET: ON-GOING
ONSET: ON-GOING

## 2022-08-07 ASSESSMENT — PAIN SCALES - GENERAL
PAINLEVEL_OUTOF10: 8
PAINLEVEL_OUTOF10: 8
PAINLEVEL_OUTOF10: 7
PAINLEVEL_OUTOF10: 6
PAINLEVEL_OUTOF10: 9
PAINLEVEL_OUTOF10: 8
PAINLEVEL_OUTOF10: 9

## 2022-08-07 ASSESSMENT — PAIN DESCRIPTION - FREQUENCY
FREQUENCY: CONTINUOUS
FREQUENCY: CONTINUOUS

## 2022-08-07 ASSESSMENT — PAIN DESCRIPTION - PAIN TYPE
TYPE: CHRONIC PAIN
TYPE: CHRONIC PAIN

## 2022-08-07 ASSESSMENT — PAIN SCALES - WONG BAKER
WONGBAKER_NUMERICALRESPONSE: 4

## 2022-08-07 ASSESSMENT — PAIN DESCRIPTION - LOCATION
LOCATION: BACK
LOCATION: BACK
LOCATION: GENERALIZED

## 2022-08-07 ASSESSMENT — PAIN DESCRIPTION - DESCRIPTORS
DESCRIPTORS: ACHING

## 2022-08-07 NOTE — PROGRESS NOTES
[facility]  Cardiology Inpatient Consult Service  Daily Progress Note        Admit Date:  7/30/2022    Referring Physician: Arden Helms DO    Reason for Consultation/Chief Complaint: A. fib with RVR/hypertension    Subjective: Interval history:  Heart rate remains stable  Now normal sinus rhythm  . Medications:   lidocaine  1 patch TransDERmal Daily    SMOG Enema  330 mL Rectal BID    metoclopramide  5 mg IntraVENous Q6H    pantoprazole  40 mg IntraVENous BID    sodium chloride (Inhalant)  4 mL Nebulization BID    albuterol  2.5 mg Nebulization TID    polyethylene glycol  17 g Per NG tube TID    bisacodyl  10 mg Rectal Once    lidocaine   Topical Once    sodium chloride flush  5-40 mL IntraVENous 2 times per day    fluticasone  2 spray Each Nostril Daily    [Held by provider] pregabalin  50 mg Oral TID       IV drips:   sodium chloride      dextrose 20 mL/hr at 08/07/22 3357    amiodarone 0.5 mg/min (08/07/22 0633)    heparin (PORCINE) Infusion 14 Units/kg/hr (08/07/22 0633)    dextrose      sodium chloride         PRN:  lidocaine, sodium chloride, heparin (porcine), heparin (porcine), prochlorperazine, metoprolol, glucose, dextrose bolus **OR** dextrose bolus, glucagon (rDNA), dextrose, albuterol, phenol, morphine **OR** morphine, sodium chloride flush, sodium chloride, ondansetron **OR** ondansetron, acetaminophen **OR** acetaminophen, methocarbamol      Objective:     Vitals:    08/07/22 0700 08/07/22 0800 08/07/22 0819 08/07/22 0822   BP: (!) 115/51 (!) 131/50     Pulse: 75 80 78 77   Resp: 12 17 15 14   Temp:  98.1 °F (36.7 °C)     TempSrc:  Axillary     SpO2: (!) 89% (!) 89% 92% 92%   Weight:       Height:           Intake/Output Summary (Last 24 hours) at 8/7/2022 0930  Last data filed at 8/7/2022 0800  Gross per 24 hour   Intake 1790.21 ml   Output 945 ml   Net 845.21 ml       I/O last 3 completed shifts: In: 3433.8 [I.V.:2979.7;  Blood:454.2]  Out: 7246 [Urine:645; Emesis/NG output:2000]  Wt Readings from Last 3 Encounters:   08/07/22 268 lb 15.4 oz (122 kg)   07/13/22 218 lb 9.6 oz (99.2 kg)   06/14/22 217 lb (98.4 kg)       Admit Wt: Weight: 224 lb 3.3 oz (101.7 kg)   Todays Wt: Weight: 268 lb 15.4 oz (122 kg)    TELEMETRY: Personally interpreted Atrial fibrillation     Physical Exam:     Skin:  Warm and dry  Neck:  -JVP  Chest:  Clear to auscultation, respiration normal  Cardiovascular:  RRR S1S2  Abdomen:  Soft -  Extremities:  - edema      Objective:  Vital signs: (most recent): Blood pressure (!) 131/50, pulse 77, temperature 98.1 °F (36.7 °C), temperature source Axillary, resp. rate 14, height 5' 2\" (1.575 m), weight 268 lb 15.4 oz (122 kg), SpO2 92 %, not currently breastfeeding. Labs:   Recent Labs     08/05/22  0534 08/05/22  1438 08/06/22  0345 08/07/22  0403   *  --  128* 130*  126*   K 4.9  --  5.3* 5.2*  5.1   BUN 90*  --  96* 100*  98*   CREATININE 3.2*  --  3.5* 3.5*  3.3*   CL 93*  --  90* 92*  90*   CO2 21  --  18* 30  26   GLUCOSE 141*  --  124* 104*  102*   CALCIUM 7.6*  --  7.3* 6.7*  7.3*   MG 2.90* 3.00* 3.00* 2.80*       Recent Labs     08/05/22  0534 08/06/22  0345 08/06/22  1216 08/06/22  1828 08/07/22  0404   WBC 15.7* 17.8*  --   --  13.3*   HGB 7.3* 7.0* 6.7* 7.7* 7.4*   HCT 21.9* 20.9* 20.3* 22.8* 22.1*    109*  --   --  see below   MCV 92.6 92.7  --   --  92.0       No results for input(s): CHOLTOT, TRIG, HDL, CHOLHDL, LDL in the last 72 hours. Invalid input(s): [employer], [id number]  Recent Labs     08/04/22  1855   INR 1.15*       No results for input(s): CKTOTAL, CKMB, CKMBINDEX, TROPONINI in the last 72 hours. No results for input(s): BNP in the last 72 hours. No results for input(s): NTPROBNP in the last 72 hours. No results for input(s): TSH in the last 72 hours. Imaging:   I personally reviewed imaging studies including CXR, Stress test, TTE/JER.       Assessment & Plan:     DHRUV ellington with RVR/hypertension  -Creatinine has peaked around 3.5 so far. No longer trending up. Okay to continue to hold Lasix for now  -Heart rate is controlled so far. Continue amiodarone and heparin. Now normal sinus rhythm    Thank you for allowing to us to participate in the care of Gina Salmeron. Please call our service with questions. All questions and concerns were addressed to the patient/family. Alternatives to my treatment were discussed. The note was completed using EMR. Every effort was made to ensure accuracy; however, inadvertent computerized transcription errors may be present. I have personally reviewed the reports and images of labs, radiological studies, cardiac studies including ECG's and telemetry, current and old medical records. Jacinto Paris MD, [facility] Jesus Manuel 67    8/7/2022 9:30 AM

## 2022-08-07 NOTE — PROGRESS NOTES
Pt had 3 large brown watery bowel movements with some small pieces of formed stool. Pt states her abdomen feels less full. Linens changed and winter care completed with soap and water. ICU and surgical teams notified of BM's.

## 2022-08-07 NOTE — PROGRESS NOTES
Progress Note    Patient Anisha Gongora  MRN: [id number]  YOB: 1950 Age: 74 y.o. Sex: female  Room: [address] [place] [address]       Admitting Physician: Ben Kirkpatrick MD   Date of Admission: 7/30/2022  8:43 PM   Primary Care Physician: Breann Davis     Subjective:  Anisha Gongora was seen and examined. Patient had manual fecal disimpaction done yesterday by surgery team and is planned again today. She continues to complain of having abdominal fullness and shortness of breath. Abdominal x-ray from yesterday showed indeterminate bowel gas pattern due to paucity of small bowel gas  Patient also followed by cardiology for A. fib and nephrology for renal failure      ROS:  Constitutional: Denies fever, no change in appetite  Respiratory: Denies cough or shortness of breath  Cardiovascular: Denies chest pain or edema    Objective:  Vital Signs:   Vitals:    08/07/22 1100   BP: (!) 129/55   Pulse: 83   Resp: 17   Temp:    SpO2: 92%         Physical Exam:  Constitutional: appears more alert today , mild resp distress . Respiratory: mild resp distress/ on O2  GI: distended, BS sluggish  Neurological: No focal deficits noted.      Intake/Output:    Intake/Output Summary (Last 24 hours) at 8/7/2022 1150  Last data filed at 8/7/2022 0800  Gross per 24 hour   Intake 1790.21 ml   Output 945 ml   Net 845.21 ml        Current Medications:  Current Facility-Administered Medications   Medication Dose Route Frequency Provider Last Rate Last Admin    lidocaine 4 % external patch 1 patch  1 patch TransDERmal Daily Devon Little MD   1 patch at 08/07/22 0114    lidocaine (XYLOCAINE) 2 % uro-jet   Topical PRN Christa Church MD   Given at 08/07/22 4332    sodium chloride (OCEAN, BABY AYR) 0.65 % nasal spray 1 spray  1 spray Each Nostril PRN Christa Church MD        0.9 % sodium chloride infusion   IntraVENous PRN Jessica Andrews MD        SMOG Enema  330 mL Rectal BID Elias Best MD   330 mL at 08/07/22 0922    metoclopramide (REGLAN) injection 5 mg  5 mg IntraVENous Q6H Koko Christianson MD   5 mg at 08/07/22 0846    dextrose 10 % infusion   IntraVENous Continuous Erik Fu MD 20 mL/hr at 08/07/22 0633 Rate Verify at 08/07/22 8026    amiodarone (CORDARONE) 450 mg in dextrose 5 % 250 mL infusion  0.5 mg/min IntraVENous Continuous Cynthia Chamberlain MD 16.7 mL/hr at 08/07/22 0633 0.5 mg/min at 08/07/22 9104    heparin (porcine) injection 8,960 Units  80 Units/kg IntraVENous PRN Cynthia Chamberlain MD        heparin (porcine) injection 4,480 Units  40 Units/kg IntraVENous PRN Cynthia Chamberlain MD        heparin 25,000 units in dextrose 5% 250 mL (premix) infusion  5-30 Units/kg/hr IntraVENous Continuous Cynthia Chamberlain MD 15.7 mL/hr at 08/07/22 0633 14 Units/kg/hr at 08/07/22 0633    pantoprazole (PROTONIX) injection 40 mg  40 mg IntraVENous BID Inessa Sellers MD   40 mg at 08/07/22 0845    prochlorperazine (COMPAZINE) injection 10 mg  10 mg IntraVENous Q6H PRN Jorge Coon MD   10 mg at 08/06/22 0009    metoprolol (LOPRESSOR) injection 5 mg  5 mg IntraVENous Q1H PRN Koko Christianson MD   5 mg at 08/02/22 0522    sodium chloride (Inhalant) 3 % nebulizer solution 4 mL  4 mL Nebulization BID Koko Christianson MD   4 mL at 08/07/22 0818    glucose chewable tablet 16 g  4 tablet Oral PRN Koko Christianson MD        dextrose bolus 10% 125 mL  125 mL IntraVENous PRN Koko Christianson MD        Or    dextrose bolus 10% 250 mL  250 mL IntraVENous PRN Koko Christianson MD   Stopped at 08/04/22 0912    glucagon (rDNA) injection 1 mg  1 mg SubCUTAneous PRN Koko Christianson MD        dextrose 10 % infusion   IntraVENous Continuous PRN Koko Christianson MD        albuterol (PROVENTIL) nebulizer solution 2.5 mg  2.5 mg Nebulization Q4H PRN Marcy Black, DO        albuterol (PROVENTIL) nebulizer solution 2.5 mg  2.5 mg Nebulization TID Marcy Black, DO   2.5 mg at 08/07/22 0818    phenol 1.4 % mouth spray 1 spray  1 spray Mouth/Throat Q2H PRN Micheal Fisher DO [place]        polyethylene glycol (GLYCOLAX) packet 17 g  17 g Per NG tube TID Ike Wells MD   17 g at 08/05/22 1005    morphine (PF) injection 1 mg  1 mg IntraVENous Q4H PRN Ike Wells MD   1 mg at 08/06/22 4311    Or    morphine (PF) injection 2 mg  2 mg IntraVENous Q4H PRN Ike Wells MD   2 mg at 08/07/22 0845    bisacodyl (DULCOLAX) suppository 10 mg  10 mg Rectal Once Ike Wells MD        lidocaine (XYLOCAINE) 2 % uro-jet   Topical Once Ike Wells MD        sodium chloride flush 0.9 % injection 5-40 mL  5-40 mL IntraVENous 2 times per day Ike Wells MD   10 mL at 08/07/22 1021    sodium chloride flush 0.9 % injection 5-40 mL  5-40 mL IntraVENous PRN Ike Wells MD        0.9 % sodium chloride infusion   IntraVENous PRN Ike Wells MD   New Bag at 08/03/22 1230    ondansetron (ZOFRAN-ODT) disintegrating tablet 4 mg  4 mg Oral Q8H PRN Ike Wells MD        Or    ondansetron [facility]) injection 4 mg  4 mg IntraVENous Q6H PRN Ike Wells MD   4 mg at 08/05/22 1946    acetaminophen (TYLENOL) tablet 650 mg  650 mg Oral Q6H PRN Ike Wells MD        Or    acetaminophen (TYLENOL) suppository 650 mg  650 mg Rectal Q6H PRN Ike Wells MD        fluticasone (FLONASE) 50 MCG/ACT nasal spray 2 spray  2 spray Each Nostril Daily Ike Wells MD   2 spray at 08/02/22 1158    methocarbamol (ROBAXIN) tablet 500 mg  500 mg Oral TID PRN Ike Wells MD   500 mg at 08/01/22 1151    [Held by provider] pregabalin (LYRICA) capsule 50 mg  50 mg Oral TID Ike Wells MD   50 mg at 07/31/22 0900         Recent Imaging:   XR CHEST PORTABLE  Narrative: Portable chest    HISTORY: Hypoxia. COMPARISON: August 4, 2022. FINDINGS:    Cardiac mediastinal contours are stable. Enteric tube into the abdomen, tip in the left upper quadrant. No pneumothorax. Mild ill-defined airspace disease is present. Right IJ central venous catheter unchanged. Impression: Mild bilateral airspace disease.   XR ABDOMEN (KUB) (SINGLE AP VIEW)  Narrative: 6 views of the abdomen    HISTORY: Pain. Obstipation. COMPARISON: August 4, 2022. FINDINGS:    Enteric tube is present, tip is in the left upper quadrant. There is an overall paucity of small bowel gas. No evidence of free intraperitoneal air. Impression: 1. No evidence of free intraperitoneal air. 2. Indeterminate bowel gas pattern due to a paucity of small bowel gas. Labs:   Recent Labs     08/04/22  1539 08/05/22  0534 08/06/22  0345 08/06/22  1216 08/06/22  1828 08/07/22  0403 08/07/22  0404   HGB 7.9* 7.3* 7.0* 6.7* 7.7*  --  7.4*   WBC 14.1* 15.7* 17.8*  --   --   --  13.3*   LABALBU 2.3* 2.2* 2.3*  --   --  2.2*  --           Assessment/Plan:    26-year-old female with hypertension, dyslipidemia, gout, arthritis, morbid obesity, status post recent right total hip arthroplasty on July 15 with some perioperative fluid collection. Leukocytosis on admission, white count of 40,000 was started on antibiotics but stopped recently as no clear source of infection, followed by ID. Lactic acidosis on admission which has since resolved. CT abdomen 7/30, showed severe constipation with extensive stool burden, extending from cecum through the rectosigmoid area. Flexible sigmoidoscopy done 8/3 showed solid stool and extending proximally from rectum up to the extent of exam/sigmoid colon. Flex Sig 8/3, solid stool extending from proximal rectum upto the extent of exam/ sigmoid colo. Decompression done. Since CT scan and sigmoidoscopy showed solid stool extending from cecum up to rectosigmoid junction so doubt repeating colonoscopy may be of any benefit except for decompression. She had manual fecal disimpaction done by surgery team and is planned again today. Patient followed by cardiology for A. fib and nephrology for her renal failure    Plan  She had manual fecal disimpaction done yesterday and is planned again today.    May attempt at [facility] again after manual disimpaction  Continue with rectal enema , Reglan, Relistor  Follow-up serial abdominal imaging  Continue with symptomatic/supportive care      MD Roberto Roe    [phone].  Also available via [employer]

## 2022-08-07 NOTE — PROGRESS NOTES
Surgery Daily Progress Note  Gerda Palomino  CC:  Abdominal pain with bowel obstruction. Subjective :   Pt had manual disimpaction of multiple stool balls yesterday. Patient did not have any bowel function after disimpaction. Feels full      Objective    Infusions:   sodium chloride      dextrose 20 mL/hr at 08/07/22 7143    amiodarone 0.5 mg/min (08/07/22 8843)    heparin (PORCINE) Infusion 14 Units/kg/hr (08/07/22 5110)    dextrose      sodium chloride          I/O:I/O last 3 completed shifts: In: 3433.8 [I.V.:2979.7; Blood:454.2]  Out: 2645 [Urine:645; Emesis/NG output:2000]           Wt Readings from Last 1 Encounters:   08/07/22 268 lb 15.4 oz (122 kg)               LABS:    Recent Labs     08/06/22  0345 08/06/22  1216 08/06/22  1828 08/07/22  0404   WBC 17.8*  --   --  13.3*   HGB 7.0*   < > 7.7* 7.4*   HCT 20.9*   < > 22.8* 22.1*   MCV 92.7  --   --  92.0   *  --   --  see below    < > = values in this interval not displayed. Recent Labs     08/06/22  0345 08/07/22  0403   * 130*  126*   K 5.3* 5.2*  5.1   CL 90* 92*  90*   CO2 18* 30  26   PHOS 8.1* 8.2*   BUN 96* 100*  98*   CREATININE 3.5* 3.5*  3.3*          No results for input(s): AST, ALT, ALB, BILIDIR, BILITOT, ALKPHOS in the last 72 hours. No results for input(s): LIPASE, AMYLASE in the last 72 hours. Recent Labs     08/04/22  1855 08/04/22  2038 08/04/22  2348   INR 1.15*  --   --    APTT  --  >180.0* >180.0*        No results for input(s): CKTOTAL, CKMB, CKMBINDEX, TROPONINI in the last 72 hours. Exam:BP (!) 115/51   Pulse 75   Temp 98 °F (36.7 °C) (Axillary)   Resp 12   Ht 5' 2\" (1.575 m)   Wt 268 lb 15.4 oz (122 kg)   SpO2 (!) 89%   BMI 49.19 kg/m²     General appearance: ill appearing   HEENT:  trachea midline. NGT in place with bilious output.    Heart: Afib rate controlled  Lungs:  symmetrical chest rise, use of accessory muscles, on 15L HFNC  Skin: warm and dry, no rashes  Extremities: edema, no cyanosis  Abdomen: soft, obese, non-tender, moderately distended  : winter in place draining yellow urine  Skin: warm and pale pink      ASSESSMENT/PLAN: Hx of HTN, HLD, Tuberculosis (1981), gout, and OA s/p right CASS on 7/13/22 who was transferred to [facility] on 7/30 due to fluid associated with her right CASS on 7/13 and for abdominal pain.    -Lasix drip and fluid management per nephro  -Continue BID SMOG enemas   -Manual disimpaction 8/6 with some stool ballls removed, will re-attempt this morning  -Continue NPO with NGT to LIWS. - No surgical intervention from Ortho  - Will continue to monitor closely    Kailee Ann MD  PGY2, General Surgery  08/07/22  7:29 AM  [employer]  [phone]    I have personally performed the medical history, physical exam and medical decision making and agree with all pertinent clinical information unless otherwise noted.      Serafin Garduno MD  Surgery Attending

## 2022-08-07 NOTE — PROGRESS NOTES
Office : [phone]     Fax :[phone]         Renal Progress Note    Subjective:   Admit Date: 7/30/2022     Interval History:   NAONE. Patient lethargic   On  HFNC.    UO low            DIET Diet NPO  Medications:   Scheduled Meds:   lidocaine  1 patch TransDERmal Daily    SMOG Enema  330 mL Rectal BID    metoclopramide  5 mg IntraVENous Q6H    pantoprazole  40 mg IntraVENous BID    sodium chloride (Inhalant)  4 mL Nebulization BID    albuterol  2.5 mg Nebulization TID    polyethylene glycol  17 g Per NG tube TID    bisacodyl  10 mg Rectal Once    lidocaine   Topical Once    sodium chloride flush  5-40 mL IntraVENous 2 times per day    fluticasone  2 spray Each Nostril Daily    [Held by provider] pregabalin  50 mg Oral TID     Continuous Infusions:   sodium chloride      dextrose 20 mL/hr at 08/07/22 3650    amiodarone 0.5 mg/min (08/07/22 0633)    heparin (PORCINE) Infusion 14 Units/kg/hr (08/07/22 1368)    dextrose      sodium chloride         Labs:  CBC:   Recent Labs     08/05/22  0534 08/06/22  0345 08/06/22  1216 08/06/22  1828 08/07/22  0404   WBC 15.7* 17.8*  --   --  13.3*   HGB 7.3* 7.0* 6.7* 7.7* 7.4*    109*  --   --  see below       BMP:    Recent Labs     08/05/22  0534 08/06/22  0345 08/07/22  0403   * 128* 130*  126*   K 4.9 5.3* 5.2*  5.1   CL 93* 90* 92*  90*   CO2 21 18* 30  26   BUN 90* 96* 100*  98*   CREATININE 3.2* 3.5* 3.5*  3.3*   GLUCOSE 141* 124* 104*  102*       Ca/Mg/Phos:   Recent Labs     08/05/22  0534 08/05/22  1438 08/06/22  0345 08/07/22  0403   CALCIUM 7.6*  --  7.3* 6.7*  7.3*   MG 2.90* 3.00* 3.00* 2.80* PHOS 8.1*  --  8.1* 8.2*       Hepatic:   No results for input(s): AST, ALT, ALB, BILITOT, ALKPHOS in the last 72 hours. Troponin: No results for input(s): TROPONINI in the last 72 hours. BNP: No results for input(s): BNP in the last 72 hours. Lipids: No results for input(s): CHOL, TRIG, HDL, LDLCALC, LABVLDL in the last 72 hours. ABGs:   Recent Labs     08/06/22  0331   PHART 7.332*   PO2ART 36.5*   [id number] 40.1       INR:   Recent Labs     08/04/22  1855   INR 1.15*       UA:  No results for input(s): Nolia [place], GLUCOSEU, BILIRUBINUR, KETUA, SPECGRAV, BLOODU, PHUR, PROTEINU, UROBILINOGEN, NITRU, LEUKOCYTESUR, Shelvia Mews in the last 72 hours. Urine Microscopic:   No results for input(s): LABCAST, BACTERIA, COMU, HYALCAST, WBCUA, RBCUA, EPIU in the last 72 hours. Urine Culture: No results for input(s): LABURIN in the last 72 hours. Urine Chemistry: No results for input(s): Sharene Saba, PROTEINUR, NAUR in the last 72 hours. Objective:   Vitals: BP (!) 131/50   Pulse 80   Temp 98.1 °F (36.7 °C) (Axillary)   Resp 17   Ht 5' 2\" (1.575 m)   Wt 268 lb 15.4 oz (122 kg)   SpO2 (!) 89%   BMI 49.19 kg/m²    Wt Readings from Last 3 Encounters:   08/07/22 268 lb 15.4 oz (122 kg)   07/13/22 218 lb 9.6 oz (99.2 kg)   06/14/22 217 lb (98.4 kg)      24HR INTAKE/OUTPUT:    Intake/Output Summary (Last 24 hours) at 8/7/2022 0819  Last data filed at 8/7/2022 7561  Gross per 24 hour   Intake 1790.21 ml   Output 895 ml   Net 895.21 ml       Physical Exam  Constitutional:       Appearance: She is ill-appearing. She is not diaphoretic. Cardiovascular:      Rate and Rhythm: Normal rate. Pulmonary:      Effort: Pulmonary effort is normal.      Breath sounds: Rhonchi present. No wheezing. Abdominal:      General: There is distension. Palpations: Abdomen is soft. Tenderness: There is abdominal tenderness. There is no guarding. Musculoskeletal:      Right lower leg: Edema present. Left lower leg: Edema present. Skin:     General: Skin is warm and dry. Neurological:      General: No focal deficit present. Mental Status: She is oriented to person, place, and time. Assessment and Plan:       IMAGING:  XR CHEST PORTABLE   Final Result      Mild bilateral airspace disease. XR ABDOMEN (KUB) (SINGLE AP VIEW)   Final Result   1. No evidence of free intraperitoneal air. 2. Indeterminate bowel gas pattern due to a paucity of small bowel gas. XR ABDOMEN (KUB) (SINGLE AP VIEW)   Final Result   Impression: Stable appearance of the abdomen. XR CHEST PORTABLE   Final Result      Low lung volumes with central bronchovascular crowding. Atelectasis in the left lung base. Normal cardiomediastinal silhouette. Lines and tubes in standard position. VL Extremity Venous Bilateral         XR ABDOMEN (KUB) (SINGLE AP VIEW)   Final Result   Impression: Stable appearance of the abdomen with gas distended loops of central small bowel again noted. CT CHEST WO CONTRAST   Final Result      1. Moderate right pleural effusion and small left effusion with basilar airspace opacity most consistent with atelectasis. Superimposed pneumonia be difficult to exclude. 2. Narrowing of the trachea with expiration compatible with treated bronchial malacia. 3. Nasogastric tube tip in the lower esophagus. 4. Persistent colonic distention. 5. Mild coronary artery calcification. XR ABDOMEN (KUB) (SINGLE AP VIEW)   Final Result      Frontal supine views demonstrate an unremarkable bowel gas pattern. No significant colonic stool is seen. Right hip arthroplasty noted. XR CHEST PORTABLE   Final Result      Prominence of the perihilar interstitial markings with mild peribronchial cuffing is favored to represent mild pulmonary edema. Low lung volumes bronchovascular crowding. Stable cardiomediastinal silhouette. Lines and tubes in standard position. Cholecystectomy clips are noted. CT ABDOMEN PELVIS WO CONTRAST Additional Contrast? Oral and Rectal   Final Result   1. Moderate colonic distention. Correlate for constipation. Fecal content within the ileum which may suggest stasis. There is no small bowel obstruction. 2. Subcutaneous fluid collection overlying the right total hip arthroplasty. CT HIP RIGHT WO CONTRAST   Final Result   1. Moderate colonic distention. Correlate for constipation. Fecal content within the ileum which may suggest stasis. There is no small bowel obstruction. 2. Subcutaneous fluid collection overlying the right total hip arthroplasty. XR CHEST PORTABLE   Final Result      1. Right IJ CVC tip in the cavoatrial junction. 2.  Bibasilar airspace disease suggesting atelectasis. Pneumonia is not excluded. XR ABDOMEN (KUB) (SINGLE AP VIEW)   Final Result      Dilated colonic and small bowel loops may suggest ileus but obstruction is not excluded. XR ABDOMEN (KUB) (SINGLE AP VIEW)   Final Result      No acute radiographic abnormality of the abdomen. Status post right hip arthroplasty with mildly displaced greater trochanteric fracture fragment, new or more pronounced compared to the prior study. Assessment/Plan     ARIANE   2. Sepsis  3. AGMA   4. Lactic Acidosis - resolved  4. Hyperkalemia  5. Hyponatremia  6. Bowel Obstruction  7.  Hypertension      Plan:  - Diuresis on hold per cardiology team  - ECHO normnal EF - IVC compressible   - Hypoxia likely sec to Aspiration pneumonitis   - ARIANE sec to ATN   - Monitor I/Os  - Avoid nephrotoxic agents  - monitor need for RRT       Recommend to dose adjust all medications  based on renal functions  Maintain SBP> 90 mmHg   Daily weights   AVOID NSAIDs  Avoid Nephrotoxins  Monitor Intake/Output  Call if significant decrease in urine output              Krista Diop MD

## 2022-08-07 NOTE — PROGRESS NOTES
Bedside Fecal Disimpaction     Witnessed verbal consent was obtained for bedside fecal disimpaction. The patient was placed in the right lateral decubitus position. Lidocaine lubrication (Urojet) was used for manual disimpaction in an attempt to provide some analgesia. The patient was also provided with a dose of PRN morphine prior to disimpaction. There was no stool in the rectal vault; though a hard medium-sized stool ball was felt more proximally. This stool ball was able to be disimpacted. Additional hard stool was palpated in an area more proximal to the disimpacted stool ball; however, after multiple attempts, complete disimpaction of this was large stool column was not possible as it was too proximal to maneuver out. Nevertheless, some of the bulk was reduced with partial disimpaction. The patient did have additional spontaneous evacuation of semi-formed stool immediately after disimpaction.     - Continue BID SMOG  - Plan for gastrografin enema tomorrow AM  - Will leave decision regarding CT of the chest, abdomen, pelvis to the discretion of the primary team. If desired, would recommend at least a small amount of PO contrast with five hour delay to assess for progression.      Sameer Mckee MD, MPH  PGY-4 General Surgery  08/07/22  1:56 PM

## 2022-08-07 NOTE — PLAN OF CARE
Problem: Discharge Planning  Goal: Discharge to home or other facility with appropriate resources  8/7/2022 0445 by Dax Sun RN  Outcome: Progressing     Problem: Safety - Adult  Goal: Free from fall injury  8/7/2022 0445 by Dax Sun RN  Outcome: Progressing     Problem: ABCDS Injury Assessment  Goal: Absence of physical injury  8/7/2022 0445 by Dax Sun RN  Outcome: Progressing  Flowsheets (Taken 8/6/2022 2033)  Absence of Physical Injury: Implement safety measures based on patient assessment     Problem: Skin/Tissue Integrity  Goal: Absence of new skin breakdown  Description: 1. Monitor for areas of redness and/or skin breakdown  2. Assess vascular access sites hourly  3. Every 4-6 hours minimum:  Change oxygen saturation probe site  4. Every 4-6 hours:  If on nasal continuous positive airway pressure, respiratory therapy assess nares and determine need for appliance change or resting period.   8/7/2022 0445 by Dax Sun RN  Outcome: Progressing     Problem: Respiratory - Adult  Goal: Achieves optimal ventilation and oxygenation  8/7/2022 0445 by Dax Sun RN  Outcome: Progressing

## 2022-08-07 NOTE — PROGRESS NOTES
Hospital Medicine Progress Note    PCP: Shanon Officer    Date of Admission: 7/30/2022    Chief Complaint:  Abdominal pain, transferred for ortho eval with Rt CASS site with fluid collection. Subjective:  Appears less lethargic today. Still has significant abdominal pain when due for pain meds. Also says like she feels like she hurts everywhere. Oxygen requirement is decreased. She has some shortness of breath that she says is worse when she is more reclined. Not having hip pain. Her lower legs feels sore bilaterally. No improvement in bowel function. NG still to suction. History Of Present Illness:   \"  74 y.o. female Mac Connelly  - Hx of HTN, HLD, Gout, OA, recent Rt CASS, morbid obesity  - Presents with generalized weakness nausea vomiting and abdominal pain, she woke up with the symptoms night prior to presentation. Her last bowel movement was about 3 days prior. She was seen at [facility] (now [facility] where she underwent CT imaging of the abdomen and pelvis which showed:    IMPRESSION:   1. There is a large amount of stool cecum through the sigmoid colon. Transition point is rectosigmoid but there is no obvious mass in the rectosigmoid region. No bowel thickening or peribowel stranding in the rectosigmoid region. 2. Likely, multiple renal cysts. 3. Partially organized fluid without obvious rim enhancement anterior to the THR. This may simply represent postoperative seroma but clinical correlation needed. The entire inferior extent of this is not covered on this exam.   4. Urinary bladder is distended with fluid    Labs were significant for WBC 22.8k, with elevated ANC, hgb 10.1, na131, k 5.0 BUN of 19 cr 1.1, Alk phos 139. With noted fluid around [facility] she was transferred to [facility]. for orthopedic surgeon Dr. Sindi Razo evaluation. \"    Medications : Reviewed      Allergies:  Buspirone, Duloxetine, Fenofibrate, Gabapentin, Venlafaxine, Doxycycline, Statins, and Sulfa antibiotics      REVIEW OF SYSTEMS:   Pertinent positives as noted in the HPI. All other systems reviewed and negative. PHYSICAL EXAM PERFORMED:    BP (!) 115/51   Pulse 75   Temp 98 °F (36.7 °C) (Axillary)   Resp 12   Ht 5' 2\" (1.575 m)   Wt 268 lb 15.4 oz (122 kg)   SpO2 (!) 89%   BMI 49.19 kg/m²     General appearance: Lethargic  HEENT:  Normal cephalic, atraumatic without obvious deformity. Neck: Supple, with full range of motion. No jugular venous distention. Respiratory:  Normal respiratory effort. Clear to auscultation, bilaterally without Rales/Wheezes/Rhonchi. Cardiovascular:  Regular rate and rhythm with normal S1/S2 without murmurs, rubs or gallops. Abdomen: Softer on left, still feels a bit firmer on right side of abdomen  Musculoskeletal: Right hip swelling but no erythema or warmth. Incision dressing C/D/I  Skin: Skin color, texture, turgor normal.  No rashes or lesions. Neurologic:  grossly non-focal.  Capillary Refill: Brisk,< 3 seconds   Peripheral Pulses: +2 palpable, equal bilaterally       Labs:     Recent Labs     08/05/22  0534 08/06/22  0345 08/06/22  1216 08/06/22  1828 08/07/22  0404   WBC 15.7* 17.8*  --   --  13.3*   HGB 7.3* 7.0* 6.7* 7.7* 7.4*   HCT 21.9* 20.9* 20.3* 22.8* 22.1*    109*  --   --  see below       Recent Labs     08/05/22  0534 08/06/22  0345 08/07/22  0403   * 128* 130*  126*   K 4.9 5.3* 5.2*  5.1   CL 93* 90* 92*  90*   CO2 21 18* 30  26   BUN 90* 96* 100*  98*   CREATININE 3.2* 3.5* 3.5*  3.3*   CALCIUM 7.6* 7.3* 6.7*  7.3*   PHOS 8.1* 8.1* 8.2*       No results for input(s): AST, ALT, BILIDIR, BILITOT, ALKPHOS in the last 72 hours. Recent Labs     08/04/22  1855   INR 1.15*       No results for input(s): Cherre Gash in the last 72 hours.     Urinalysis:      Lab Results   Component Value Date/Time    NITRU POSITIVE 07/31/2022 05:15 AM    WBCUA 3-5 07/31/2022 05:15 AM    BACTERIA 1+ 07/31/2022 05:15 AM    RBCUA None seen 07/31/2022 05:15 AM    BLOODU Negative 07/31/2022 05:15 AM    SPECGRAV 1.015 07/31/2022 05:15 AM    GLUCOSEU Negative 07/31/2022 05:15 AM       Radiology: No imaging here. Reviewed imaging from OSH    ASSESSMENT/PLAN:    Admitted for severe sepsis: POA - No clear source of infection. SIRS likely due to problems as noted below. - Leukocytosis with tachycardia; Transferred for evaluation of post-op hip fluid collection/possible infection (s/p THR 7/13) however concern for intra abdominal source, possible C. Diff?  -Started on broad spectrum abx  -Started on vanc enema, flagyl  - Blood cultures x 2 NGTD  - Ortho c/s - likely seroma, normal post operative finding  - ID consulted, Abx stopped    Atrial Fibrillation with RVR - converted to NSR  Diltiazem drip - changing to IV lopressor as needed  Amiodarone drip  Heparin drip  Echo normal EF, normal diastolic  Cardiology consulted    Progressive hypoxemic respiratory failure  Unclear cause, started on heparin drip for Afib and possible also new PE  8/3 CT chest wo contrast b/l pleural effusions, small left and moderate right  Prelim doppler report no DVT  Briefly on IV lasix/lasix drip but was stopped. O2 requirement still high.    8/7 Weaned from 15 L to 5 L O2    ARIANE on CKD  Likely secondary to ATN  Nephrology consulted    Bowel obstruction: POA Secondary to severe constipation  - CT of the [facility] system showed significant stool from cecum to the sigmoid colon and transition point at the rectosigmoid.   - GI, Surgery consulted, appreciate recs  - Miralax and enema unsuccessful  - Underwent Phelps decompression 8/3 with improvement but not resolution, recommendation continue SMOG enemas BID, Relistor, disimpaction  - Did not tolerate GoLytely  - Continues NPO with NG to suction  - GI, General surgery following    Acute Urinary retention  Possible partially treated Complicated UTI: POA  - Appears recently had E coli in urine, although CC was less than 100k. Was treated with cipro. Also appears on Keflex PTA.   - Urine on admission has some bacteria, and nitrite positive, but neg leuc esterase and no signif pyuria  - Winter catheter placed at [facility] for retention likely sec to severe constipation  - continue winter for now, ongoing reassessment    Chronic Anemia  - 10.2 better than hgb on recent DC from hospital 2 weeks back, possible hemoconcentration  - Monitor Hgb  - Blood transfusion 8/6  Essential Hypertension- Hold blood pressure medications for now with sepsis  Mild hyponatremia: monitor electrolytes  Gout - stable    Morbid obesity Body mass index is 41.01 kg/m². - Complicating assessment and treatment. Placing patient at risk for multiple co-morbidities as well as early death and contributing to the patient's presentation.  on weight loss when appropriate. DVT Prophylaxis: High risk: Heparin drip  Diet: Diet NPO  Code Status: Full Code    PT/OT Eval Status: order once acute issues resolved      Disposition: - Remains in pxt; ICU.      Shannan Archer DO

## 2022-08-07 NOTE — PROGRESS NOTES
ICU Progress Note    Admit Date: 7/30/2022  ICU Day: 3  Vent Day: None  IV Access:Peripheral  IV Fluids:None  Vasopressors:None                Antibiotics: None  Diet: Diet NPO    CC: SOB    Interval history:   - Patient had manual disimpaction of fecalith yesterday per general surgery, tolerated the procedure well  - Continue enemas  - Continue GoLytely via NG, if possible. HPI: Patient is a 75 y/o female that has a PMHx of obesity (BMI 41.01), HTN, HLD, Gout, and OA. Patient is s/p CASS (DOS 07/16/2022). Patient was additionally diagnosed with a UTI during that hospitalization and was initially treated with Cipro. She was transitioned over to Keflex prior to her discharge but states that she was having multiple bouts of diarrhea with the Keflex. Patient took an Imodium on 7/25 to help her diarrhea but she soon became constipated. Patient was doing well from the operation upon her discharge but said that she had a possible femoral crack during procedure that complicated the CASS. Since the operation the patient was able to do her rehab appropriately. However, starting 07/29/2022-07/30/2022 patient started to develop some generalized weakness, nausea, vomiting and abdominal pain that only got worse throughout the day. As a result, patient decided to present to [facility] for further evaluation. In the ED at [facility] patient had labs which were significant for WBC 22.8k, with elevated ANC, hgb 10.1, na131, k 5.0 BUN of 19 cr 1.1,Alk phos 139. Furthermore, patient got CT Abdomen & Pelvis which showed large amount of stool cecum through the sigmoid colon along with  a partially organized fluid without obvious rim enhancement anterior to the CASS (possible seroma). Patient was then transferred to [facility]. for further evaluation with Dr. Mac Velazquez.       Once patient was transferred to [facility] patient was noted to have an elevated ESR (52), CRP (63), Pro-robin (10.91), and Lactic Acid (4.5); and was subsequently started on vancomycin, cefepime, and flagyl but was then transitioned to Linezolid and Meropenem. Today at bedside with patients daughter, patient feels like she is doing a lot better than when she came in. Patient is currently on 6L of HFNC. Patients WBC and Lactic Acid has downtrended to WNL 10.7 and 2.0 respectively but patients pro-robin has almost doubled 20.38 along with an elevated pro-BNP 3165. Patient denies any nausea, vomiting, chills, or abdominal pain. Patient says that she only feels the pain in her abdomen when people touch the stomach. Patient denies any acute overnight events.       Medications:     Scheduled Meds:   lidocaine  1 patch TransDERmal Daily    SMOG Enema  330 mL Rectal BID    metoclopramide  5 mg IntraVENous Q6H    pantoprazole  40 mg IntraVENous BID    sodium chloride (Inhalant)  4 mL Nebulization BID    albuterol  2.5 mg Nebulization TID    polyethylene glycol  17 g Per NG tube TID    bisacodyl  10 mg Rectal Once    lidocaine   Topical Once    sodium chloride flush  5-40 mL IntraVENous 2 times per day    fluticasone  2 spray Each Nostril Daily    [Held by provider] pregabalin  50 mg Oral TID     Continuous Infusions:   sodium chloride      dextrose 20 mL/hr at 08/07/22 2495    amiodarone 0.5 mg/min (08/07/22 0633)    heparin (PORCINE) Infusion 14 Units/kg/hr (08/07/22 0633)    dextrose      sodium chloride       PRN Meds:lidocaine, sodium chloride, heparin (porcine), heparin (porcine), prochlorperazine, metoprolol, glucose, dextrose bolus **OR** dextrose bolus, glucagon (rDNA), dextrose, albuterol, phenol, morphine **OR** morphine, sodium chloride flush, sodium chloride, ondansetron **OR** ondansetron, acetaminophen **OR** acetaminophen, methocarbamol    Objective:   Vitals:   T-max:  Patient Vitals for the past 8 hrs:   BP Temp Temp src Pulse Resp SpO2 Weight   08/07/22 0819 -- -- -- 78 15 92 % --   08/07/22 0800 (!) 131/50 98.1 °F (36.7 °C) Axillary 80 17 (!) 89 % --   08/07/22 0700 (!) 115/51 -- -- 75 12 (!) 89 % --   08/07/22 0600 (!) 110/47 -- -- 76 11 93 % 268 lb 15.4 oz (122 kg)   08/07/22 0500 (!) 114/47 -- -- 77 11 94 % --   08/07/22 0437 -- -- -- -- 15 -- --   08/07/22 0400 (!) 125/50 98 °F (36.7 °C) Axillary 81 15 93 % --   08/07/22 0300 (!) 122/48 -- -- 80 12 96 % --   08/07/22 0200 (!) 105/49 -- -- 78 12 94 % --   08/07/22 0100 131/61 -- -- 82 16 94 % --         Intake/Output Summary (Last 24 hours) at 8/7/2022 5131  Last data filed at 8/7/2022 0528  Gross per 24 hour   Intake 1790.21 ml   Output 895 ml   Net 895.21 ml           Physical Exam  Constitutional:       General: She is not in acute distress. Appearance: She is obese. She is ill-appearing. HENT:      Head: Normocephalic and atraumatic. Nose: Nose normal.      Mouth/Throat:      Mouth: Mucous membranes are dry. Pharynx: Oropharynx is clear. Eyes:      Extraocular Movements: Extraocular movements intact. Conjunctiva/sclera: Conjunctivae normal.      Pupils: Pupils are equal, round, and reactive to light. Neck:      Comments: Central line in place  Cardiovascular:      Rate and Rhythm: Normal rate and regular rhythm. Pulses: Normal pulses. Heart sounds: Normal heart sounds. Pulmonary:      Effort: Pulmonary effort is normal. No respiratory distress. Breath sounds: No wheezing or rhonchi. Comments: Diminished breath sounds at bases bilaterally   Abdominal:      General: Abdomen is flat. There is distension. Tenderness: There is no abdominal tenderness. There is no guarding or rebound. Comments: Reduced bowel sounds   Musculoskeletal:      Cervical back: Normal range of motion and neck supple. Right lower leg: Edema present. Left lower leg: Edema present. Comments: +Sacral edema. R Hip CASS site dressing present   Skin:     General: Skin is warm and dry. Capillary Refill: Capillary refill takes less than 2 seconds. Neurological:      General: No focal deficit present. Mental Status: She is alert. Sensory: Sensory deficit present. Comments: Somnolent, able to follow commands       LABS:    CBC:   Recent Labs     08/05/22  0534 08/06/22  0345 08/06/22  1216 08/06/22  1828 08/07/22  0404   WBC 15.7* 17.8*  --   --  13.3*   HGB 7.3* 7.0* 6.7* 7.7* 7.4*   HCT 21.9* 20.9* 20.3* 22.8* 22.1*    109*  --   --  see below   MCV 92.6 92.7  --   --  92.0       Renal:    Recent Labs     08/05/22  0534 08/05/22  1438 08/06/22  0345 08/07/22  0403   *  --  128* 130*  126*   K 4.9  --  5.3* 5.2*  5.1   CL 93*  --  90* 92*  90*   CO2 21  --  18* 30  26   BUN 90*  --  96* 100*  98*   CREATININE 3.2*  --  3.5* 3.5*  3.3*   GLUCOSE 141*  --  124* 104*  102*   CALCIUM 7.6*  --  7.3* 6.7*  7.3*   MG 2.90* 3.00* 3.00* 2.80*   PHOS 8.1*  --  8.1* 8.2*   ANIONGAP 15  --  20* 8  10       Hepatic:   Recent Labs     08/05/22  0534 08/06/22  0345 08/07/22  0403   LABALBU 2.2* 2.3* 2.2*       Troponin: No results for input(s): TROPONINI in the last 72 hours. BNP: No results for input(s): BNP in the last 72 hours. Lipids: No results for input(s): CHOL, HDL in the last 72 hours. Invalid input(s): LDLCALCU, TRIGLYCERIDE  ABGs:    Recent Labs     08/04/22  1839 08/05/22  0250 08/06/22  0331   PHART 7.405 7.330* 7.332*   LEM8WFW 31.9* 40.7 40.1   PO2ART 77.7 32.7* 36.5*   NVA1YDQ 19.9* 21.5 21.2   BEART -5* -4* -5*   G7WDSBVT 96 59* 66*   TIY9WQU 21 23 22         INR:   Recent Labs     08/04/22  1855   INR 1.15*       Lactate:   Recent Labs     08/04/22  1839   LACTATE 0.75       Cultures:  -----------------------------------------------------------------  RAD:   XR CHEST PORTABLE   Final Result      Mild bilateral airspace disease. XR ABDOMEN (KUB) (SINGLE AP VIEW)   Final Result   1. No evidence of free intraperitoneal air. 2. Indeterminate bowel gas pattern due to a paucity of small bowel gas.       XR ABDOMEN (KUB) (SINGLE AP VIEW)   Final Result   Impression: Stable appearance of the abdomen. XR CHEST PORTABLE   Final Result      Low lung volumes with central bronchovascular crowding. Atelectasis in the left lung base. Normal cardiomediastinal silhouette. Lines and tubes in standard position. VL Extremity Venous Bilateral         XR ABDOMEN (KUB) (SINGLE AP VIEW)   Final Result   Impression: Stable appearance of the abdomen with gas distended loops of central small bowel again noted. CT CHEST WO CONTRAST   Final Result      1. Moderate right pleural effusion and small left effusion with basilar airspace opacity most consistent with atelectasis. Superimposed pneumonia be difficult to exclude. 2. Narrowing of the trachea with expiration compatible with treated bronchial malacia. 3. Nasogastric tube tip in the lower esophagus. 4. Persistent colonic distention. 5. Mild coronary artery calcification. XR ABDOMEN (KUB) (SINGLE AP VIEW)   Final Result      Frontal supine views demonstrate an unremarkable bowel gas pattern. No significant colonic stool is seen. Right hip arthroplasty noted. XR CHEST PORTABLE   Final Result      Prominence of the perihilar interstitial markings with mild peribronchial cuffing is favored to represent mild pulmonary edema. Low lung volumes bronchovascular crowding. Stable cardiomediastinal silhouette. Lines and tubes in standard position. Cholecystectomy clips are noted. CT ABDOMEN PELVIS WO CONTRAST Additional Contrast? Oral and Rectal   Final Result   1. Moderate colonic distention. Correlate for constipation. Fecal content within the ileum which may suggest stasis. There is no small bowel obstruction. 2. Subcutaneous fluid collection overlying the right total hip arthroplasty. CT HIP RIGHT WO CONTRAST   Final Result   1. Moderate colonic distention. Correlate for constipation.  Fecal content within the ileum which may suggest stasis. There is no small bowel obstruction. 2. Subcutaneous fluid collection overlying the right total hip arthroplasty. XR CHEST PORTABLE   Final Result      1. Right IJ CVC tip in the cavoatrial junction. 2.  Bibasilar airspace disease suggesting atelectasis. Pneumonia is not excluded. XR ABDOMEN (KUB) (SINGLE AP VIEW)   Final Result      Dilated colonic and small bowel loops may suggest ileus but obstruction is not excluded. XR ABDOMEN (KUB) (SINGLE AP VIEW)   Final Result      No acute radiographic abnormality of the abdomen. Status post right hip arthroplasty with mildly displaced greater trochanteric fracture fragment, new or more pronounced compared to the prior study. Assessment/Plan:     Respiratory  Hypoxia   On 14 L now. CT chest showed moderate R pleural effusion and small L effusion with basilar atelectasis. - Continue albuterol,   -Hypertonic nacl neb,  - Acapella  - Follow up CXR     Renal  ARIANE  UO 2.7 L, -600s/24h. net negative since admission 3.2 L  Cr 3.3,3.2, Baseline 0.8. Diuretics stopped yesterday per cardiology  -f/u BMP  -monitor lytes replace as needed  -avoid Nephrotoxic meds  -Nephrology following  -monitor I/Os strictly    Hyponatremia  Hyponatremia possibly 2/2 to decreased PO intake in setting of nausea, vomiting and constipation.   -Monitor BMP  - Off Lasix  - Nephrology following   - winter in place     Cardiac  Afib with RVR  Found to be in atrial fibrillation with poorly controlled ventricular rate on telemetry, new diagnosis. Unclear contribution of possible HF however echo 8/3 with EF >65%  - Converted yesterday back to normal sinus rhythm. - Continue Amiodarone gtt, likely transition to PO amiodarone  -Continue heparin drip   - cardiology consulted, appreciate recommendations. - holding BP meds    Rule out acute heart failure  Patient with worse respiratory status and Afib RVR.  Echo normal  -Diuresis stopped

## 2022-08-07 NOTE — PROGRESS NOTES
Office : [phone]     Fax :[phone]         Renal Progress Note    Subjective:   Admit Date: 7/30/2022     Interval History:   NAONE. Patient lethargic   On  HFNC.    UO decent              DIET Diet NPO  Medications:   Scheduled Meds:   lidocaine  1 patch TransDERmal Daily    SMOG Enema  330 mL Rectal BID    metoclopramide  5 mg IntraVENous Q6H    pantoprazole  40 mg IntraVENous BID    sodium chloride (Inhalant)  4 mL Nebulization BID    albuterol  2.5 mg Nebulization TID    polyethylene glycol  17 g Per NG tube TID    bisacodyl  10 mg Rectal Once    lidocaine   Topical Once    sodium chloride flush  5-40 mL IntraVENous 2 times per day    fluticasone  2 spray Each Nostril Daily    [Held by provider] pregabalin  50 mg Oral TID     Continuous Infusions:   sodium chloride      dextrose 20 mL/hr at 08/07/22 3838    amiodarone 0.5 mg/min (08/07/22 0633)    heparin (PORCINE) Infusion 14 Units/kg/hr (08/07/22 6742)    dextrose      sodium chloride         Labs:  CBC:   Recent Labs     08/05/22  0534 08/06/22  0345 08/06/22  1216 08/06/22  1828 08/07/22  0404   WBC 15.7* 17.8*  --   --  13.3*   HGB 7.3* 7.0* 6.7* 7.7* 7.4*    109*  --   --  see below       BMP:    Recent Labs     08/05/22  0534 08/06/22  0345 08/07/22  0403   * 128* 130*  126*   K 4.9 5.3* 5.2*  5.1   CL 93* 90* 92*  90*   CO2 21 18* 30  26   BUN 90* 96* 100*  98*   CREATININE 3.2* 3.5* 3.5*  3.3*   GLUCOSE 141* 124* 104*  102*       Ca/Mg/Phos:   Recent Labs     08/05/22  0534 08/05/22  1438 08/06/22  0345 08/07/22  0403   CALCIUM 7.6*  --  7.3* 6.7*  7.3*   MG 2.90* 3.00* 3.00* 2.80*   PHOS 8.1*  --  8.1* 8.2*       Hepatic:   No results for input(s): AST, ALT, ALB, BILITOT, ALKPHOS in the last 72 hours. Troponin: No results for input(s): TROPONINI in the last 72 hours. BNP: No results for input(s): BNP in the last 72 hours. Lipids: No results for input(s): CHOL, TRIG, HDL, LDLCALC, LABVLDL in the last 72 hours. ABGs:   Recent Labs     08/06/22  0331   PHART 7.332*   PO2ART 36.5*   PST1VMW 40.1       INR:   Recent Labs     08/04/22  1855   INR 1.15*       UA:  No results for input(s): Merrily Juniata, GLUCOSEU, BILIRUBINUR, KETUA, SPECGRAV, BLOODU, PHUR, PROTEINU, UROBILINOGEN, NITRU, LEUKOCYTESUR, Kelley Oh in the last 72 hours. Urine Microscopic:   No results for input(s): LABCAST, BACTERIA, COMU, HYALCAST, WBCUA, RBCUA, EPIU in the last 72 hours. Urine Culture: No results for input(s): LABURIN in the last 72 hours. Urine Chemistry: No results for input(s): Luis Armando Ng, PROTEINUR, NAUR in the last 72 hours. Objective:   Vitals: BP (!) 126/56   Pulse 82   Temp 97.9 °F (36.6 °C) (Axillary)   Resp 16   Ht 5' 2\" (1.575 m)   Wt 268 lb 15.4 oz (122 kg)   SpO2 90%   BMI 49.19 kg/m²    Wt Readings from Last 3 Encounters:   08/07/22 268 lb 15.4 oz (122 kg)   07/13/22 218 lb 9.6 oz (99.2 kg)   06/14/22 217 lb (98.4 kg)      24HR INTAKE/OUTPUT:    Intake/Output Summary (Last 24 hours) at 8/7/2022 1049  Last data filed at 8/7/2022 0800  Gross per 24 hour   Intake 1790.21 ml   Output 945 ml   Net 845.21 ml       Physical Exam  Constitutional:       Appearance: She is ill-appearing. She is not diaphoretic. Cardiovascular:      Rate and Rhythm: Normal rate. Pulmonary:      Effort: Pulmonary effort is normal.      Breath sounds: Rhonchi present. No wheezing. Abdominal:      General: There is distension. Palpations: Abdomen is soft. Tenderness: There is abdominal tenderness. There is no guarding. Musculoskeletal:      Right lower leg: Edema present. Left lower leg: Edema present. Skin:     General: Skin is warm and dry. Neurological:      General: No focal deficit present. Mental Status: She is oriented to person, place, and time. Assessment and Plan:       IMAGING:  XR CHEST PORTABLE   Final Result      Mild bilateral airspace disease. XR ABDOMEN (KUB) (SINGLE AP VIEW)   Final Result   1. No evidence of free intraperitoneal air. 2. Indeterminate bowel gas pattern due to a paucity of small bowel gas. XR ABDOMEN (KUB) (SINGLE AP VIEW)   Final Result   Impression: Stable appearance of the abdomen. XR CHEST PORTABLE   Final Result      Low lung volumes with central bronchovascular crowding. Atelectasis in the left lung base. Normal cardiomediastinal silhouette. Lines and tubes in standard position. VL Extremity Venous Bilateral         XR ABDOMEN (KUB) (SINGLE AP VIEW)   Final Result   Impression: Stable appearance of the abdomen with gas distended loops of central small bowel again noted. CT CHEST WO CONTRAST   Final Result      1. Moderate right pleural effusion and small left effusion with basilar airspace opacity most consistent with atelectasis. Superimposed pneumonia be difficult to exclude. 2. Narrowing of the trachea with expiration compatible with treated bronchial malacia. 3. Nasogastric tube tip in the lower esophagus. 4. Persistent colonic distention. 5. Mild coronary artery calcification. XR ABDOMEN (KUB) (SINGLE AP VIEW)   Final Result      Frontal supine views demonstrate an unremarkable bowel gas pattern. No significant colonic stool is seen. Right hip arthroplasty noted. XR CHEST PORTABLE   Final Result      Prominence of the perihilar interstitial markings with mild peribronchial cuffing is favored to represent mild pulmonary edema. Low lung volumes bronchovascular crowding. Stable cardiomediastinal silhouette. Lines and tubes in standard position. Cholecystectomy clips are noted. CT ABDOMEN PELVIS WO CONTRAST Additional Contrast? Oral and Rectal   Final Result   1. Moderate colonic distention. Correlate for constipation. Fecal content within the ileum which may suggest stasis. There is no small bowel obstruction. 2. Subcutaneous fluid collection overlying the right total hip arthroplasty. CT HIP RIGHT WO CONTRAST   Final Result   1. Moderate colonic distention. Correlate for constipation. Fecal content within the ileum which may suggest stasis. There is no small bowel obstruction. 2. Subcutaneous fluid collection overlying the right total hip arthroplasty. XR CHEST PORTABLE   Final Result      1. Right IJ CVC tip in the cavoatrial junction. 2.  Bibasilar airspace disease suggesting atelectasis. Pneumonia is not excluded. XR ABDOMEN (KUB) (SINGLE AP VIEW)   Final Result      Dilated colonic and small bowel loops may suggest ileus but obstruction is not excluded. XR ABDOMEN (KUB) (SINGLE AP VIEW)   Final Result      No acute radiographic abnormality of the abdomen. Status post right hip arthroplasty with mildly displaced greater trochanteric fracture fragment, new or more pronounced compared to the prior study. Assessment/Plan     ARIANE   2. Sepsis  3. AGMA   4. Lactic Acidosis - resolved  4. Hyperkalemia  5. Hyponatremia  6. Bowel Obstruction  7.  Hypertension      Plan:  - Diuresis on hold per cardiology team  - ECHO normnal EF - IVC compressible   - Hypoxia likely sec to Aspiration pneumonitis   - ARIANE sec to ATN   - Monitor I/Os  - Avoid nephrotoxic agents  - monitor need for RRT       Recommend to dose adjust all medications  based on renal functions  Maintain SBP> 90 mmHg   Daily weights   AVOID NSAIDs  Avoid Nephrotoxins  Monitor Intake/Output  Call if significant decrease in urine output              Ren Pacheco MD

## 2022-08-08 PROBLEM — J96.01 ACUTE HYPOXEMIC RESPIRATORY FAILURE (HCC): Status: ACTIVE | Noted: 2022-08-08

## 2022-08-08 LAB
ALBUMIN SERPL-MCNC: 2.1 G/DL (ref 3.4–5)
ANION GAP SERPL CALCULATED.3IONS-SCNC: 17 MMOL/L (ref 3–16)
ANTI-XA UNFRAC HEPARIN: 0.32 IU/ML (ref 0.3–0.7)
BASOPHILS ABSOLUTE: 0 K/UL (ref 0–0.2)
BASOPHILS RELATIVE PERCENT: 0.2 %
BLOOD BANK DISPENSE STATUS: NORMAL
BLOOD BANK PRODUCT CODE: NORMAL
BPU ID: NORMAL
BUN BLDV-MCNC: 97 MG/DL (ref 7–20)
CALCIUM SERPL-MCNC: 7.3 MG/DL (ref 8.3–10.6)
CHLORIDE BLD-SCNC: 89 MMOL/L (ref 99–110)
CO2: 18 MMOL/L (ref 21–32)
CREAT SERPL-MCNC: 2.8 MG/DL (ref 0.6–1.2)
DESCRIPTION BLOOD BANK: NORMAL
EOSINOPHILS ABSOLUTE: 0.2 K/UL (ref 0–0.6)
EOSINOPHILS RELATIVE PERCENT: 1.4 %
GFR AFRICAN AMERICAN: 20
GFR NON-AFRICAN AMERICAN: 17
GLUCOSE BLD-MCNC: 102 MG/DL (ref 70–99)
GLUCOSE BLD-MCNC: 108 MG/DL (ref 70–99)
GLUCOSE BLD-MCNC: 70 MG/DL (ref 70–99)
GLUCOSE BLD-MCNC: 79 MG/DL (ref 70–99)
GLUCOSE BLD-MCNC: 80 MG/DL (ref 70–99)
GLUCOSE BLD-MCNC: 84 MG/DL (ref 70–99)
GLUCOSE BLD-MCNC: 86 MG/DL (ref 70–99)
GLUCOSE BLD-MCNC: 88 MG/DL (ref 70–99)
HCT VFR BLD CALC: 20.4 % (ref 36–48)
HCT VFR BLD CALC: 23.5 % (ref 36–48)
HEMOGLOBIN: 6.7 G/DL (ref 12–16)
HEMOGLOBIN: 7.8 G/DL (ref 12–16)
LYMPHOCYTES ABSOLUTE: 0.9 K/UL (ref 1–5.1)
LYMPHOCYTES RELATIVE PERCENT: 7.7 %
MAGNESIUM: 2.7 MG/DL (ref 1.8–2.4)
MCH RBC QN AUTO: 30.3 PG (ref 26–34)
MCHC RBC AUTO-ENTMCNC: 32.6 G/DL (ref 31–36)
MCV RBC AUTO: 92.8 FL (ref 80–100)
MONOCYTES ABSOLUTE: 0.9 K/UL (ref 0–1.3)
MONOCYTES RELATIVE PERCENT: 7.4 %
NEUTROPHILS ABSOLUTE: 10 K/UL (ref 1.7–7.7)
NEUTROPHILS RELATIVE PERCENT: 83.3 %
PDW BLD-RTO: 14.4 % (ref 12.4–15.4)
PERFORMED ON: ABNORMAL
PERFORMED ON: ABNORMAL
PERFORMED ON: NORMAL
PHOSPHORUS: 8.2 MG/DL (ref 2.5–4.9)
PLATELET # BLD: 95 K/UL (ref 135–450)
PMV BLD AUTO: 8.8 FL (ref 5–10.5)
POTASSIUM SERPL-SCNC: 4.6 MMOL/L (ref 3.5–5.1)
RBC # BLD: 2.2 M/UL (ref 4–5.2)
SODIUM BLD-SCNC: 124 MMOL/L (ref 136–145)
SODIUM BLD-SCNC: 128 MMOL/L (ref 136–145)
WBC # BLD: 12 K/UL (ref 4–11)

## 2022-08-08 PROCEDURE — 36415 COLL VENOUS BLD VENIPUNCTURE: CPT

## 2022-08-08 PROCEDURE — 97530 THERAPEUTIC ACTIVITIES: CPT

## 2022-08-08 PROCEDURE — 97535 SELF CARE MNGMENT TRAINING: CPT

## 2022-08-08 PROCEDURE — C9113 INJ PANTOPRAZOLE SODIUM, VIA: HCPCS

## 2022-08-08 PROCEDURE — 83735 ASSAY OF MAGNESIUM: CPT

## 2022-08-08 PROCEDURE — 6360000002 HC RX W HCPCS

## 2022-08-08 PROCEDURE — 99232 SBSQ HOSP IP/OBS MODERATE 35: CPT | Performed by: INTERNAL MEDICINE

## 2022-08-08 PROCEDURE — 85014 HEMATOCRIT: CPT

## 2022-08-08 PROCEDURE — 94640 AIRWAY INHALATION TREATMENT: CPT

## 2022-08-08 PROCEDURE — 99233 SBSQ HOSP IP/OBS HIGH 50: CPT | Performed by: INTERNAL MEDICINE

## 2022-08-08 PROCEDURE — 6360000002 HC RX W HCPCS: Performed by: INTERNAL MEDICINE

## 2022-08-08 PROCEDURE — 2580000003 HC RX 258

## 2022-08-08 PROCEDURE — 97162 PT EVAL MOD COMPLEX 30 MIN: CPT

## 2022-08-08 PROCEDURE — 84295 ASSAY OF SERUM SODIUM: CPT

## 2022-08-08 PROCEDURE — 2500000003 HC RX 250 WO HCPCS: Performed by: INTERNAL MEDICINE

## 2022-08-08 PROCEDURE — 36430 TRANSFUSION BLD/BLD COMPNT: CPT

## 2022-08-08 PROCEDURE — 94669 MECHANICAL CHEST WALL OSCILL: CPT

## 2022-08-08 PROCEDURE — 99231 SBSQ HOSP IP/OBS SF/LOW 25: CPT | Performed by: SURGERY

## 2022-08-08 PROCEDURE — 85520 HEPARIN ASSAY: CPT

## 2022-08-08 PROCEDURE — 2700000000 HC OXYGEN THERAPY PER DAY

## 2022-08-08 PROCEDURE — 6370000000 HC RX 637 (ALT 250 FOR IP): Performed by: STUDENT IN AN ORGANIZED HEALTH CARE EDUCATION/TRAINING PROGRAM

## 2022-08-08 PROCEDURE — 94761 N-INVAS EAR/PLS OXIMETRY MLT: CPT

## 2022-08-08 PROCEDURE — 1200000000 HC SEMI PRIVATE

## 2022-08-08 PROCEDURE — 99291 CRITICAL CARE FIRST HOUR: CPT | Performed by: INTERNAL MEDICINE

## 2022-08-08 PROCEDURE — 85018 HEMOGLOBIN: CPT

## 2022-08-08 PROCEDURE — 2580000003 HC RX 258: Performed by: INTERNAL MEDICINE

## 2022-08-08 PROCEDURE — 85025 COMPLETE CBC W/AUTO DIFF WBC: CPT

## 2022-08-08 PROCEDURE — 6370000000 HC RX 637 (ALT 250 FOR IP)

## 2022-08-08 PROCEDURE — 97167 OT EVAL HIGH COMPLEX 60 MIN: CPT

## 2022-08-08 PROCEDURE — 80069 RENAL FUNCTION PANEL: CPT

## 2022-08-08 PROCEDURE — 82533 TOTAL CORTISOL: CPT

## 2022-08-08 RX ORDER — METOPROLOL TARTRATE 5 MG/5ML
5 INJECTION INTRAVENOUS EVERY 6 HOURS
Status: DISCONTINUED | OUTPATIENT
Start: 2022-08-08 | End: 2022-08-12

## 2022-08-08 RX ORDER — SODIUM CHLORIDE 9 MG/ML
INJECTION, SOLUTION INTRAVENOUS PRN
Status: DISCONTINUED | OUTPATIENT
Start: 2022-08-08 | End: 2022-08-23 | Stop reason: HOSPADM

## 2022-08-08 RX ORDER — LIDOCAINE 4 G/G
1 PATCH TOPICAL DAILY
Status: DISCONTINUED | OUTPATIENT
Start: 2022-08-08 | End: 2022-08-23 | Stop reason: HOSPADM

## 2022-08-08 RX ADMIN — Medication 125 ML: at 12:55

## 2022-08-08 RX ADMIN — MORPHINE SULFATE 2 MG: 2 INJECTION, SOLUTION INTRAMUSCULAR; INTRAVENOUS at 01:07

## 2022-08-08 RX ADMIN — METOCLOPRAMIDE HYDROCHLORIDE 5 MG: 5 INJECTION INTRAMUSCULAR; INTRAVENOUS at 20:34

## 2022-08-08 RX ADMIN — PANTOPRAZOLE SODIUM 40 MG: 40 INJECTION, POWDER, FOR SOLUTION INTRAVENOUS at 08:53

## 2022-08-08 RX ADMIN — METOCLOPRAMIDE HYDROCHLORIDE 5 MG: 5 INJECTION INTRAMUSCULAR; INTRAVENOUS at 16:47

## 2022-08-08 RX ADMIN — SODIUM CHLORIDE 30 MG/ML INHALATION SOLUTION 4 ML: 30 SOLUTION INHALANT at 21:08

## 2022-08-08 RX ADMIN — SODIUM CHLORIDE, PRESERVATIVE FREE 10 ML: 5 INJECTION INTRAVENOUS at 20:35

## 2022-08-08 RX ADMIN — HEPARIN SODIUM 14 UNITS/KG/HR: 10000 INJECTION, SOLUTION INTRAVENOUS at 04:38

## 2022-08-08 RX ADMIN — AMIODARONE HYDROCHLORIDE 0.5 MG/MIN: 50 INJECTION, SOLUTION INTRAVENOUS at 04:41

## 2022-08-08 RX ADMIN — METOCLOPRAMIDE HYDROCHLORIDE 5 MG: 5 INJECTION INTRAMUSCULAR; INTRAVENOUS at 04:10

## 2022-08-08 RX ADMIN — PHENOL 1 SPRAY: 1.5 LIQUID ORAL at 23:25

## 2022-08-08 RX ADMIN — FLUTICASONE PROPIONATE 2 SPRAY: 50 SPRAY, METERED NASAL at 10:11

## 2022-08-08 RX ADMIN — MORPHINE SULFATE 2 MG: 2 INJECTION, SOLUTION INTRAMUSCULAR; INTRAVENOUS at 08:06

## 2022-08-08 RX ADMIN — METOPROLOL TARTRATE 5 MG: 5 INJECTION INTRAVENOUS at 16:47

## 2022-08-08 RX ADMIN — DEXTROSE MONOHYDRATE: 100 INJECTION, SOLUTION INTRAVENOUS at 05:33

## 2022-08-08 RX ADMIN — METOCLOPRAMIDE HYDROCHLORIDE 5 MG: 5 INJECTION INTRAMUSCULAR; INTRAVENOUS at 10:15

## 2022-08-08 RX ADMIN — ALBUTEROL SULFATE 2.5 MG: 2.5 SOLUTION RESPIRATORY (INHALATION) at 08:13

## 2022-08-08 RX ADMIN — ALBUTEROL SULFATE 2.5 MG: 2.5 SOLUTION RESPIRATORY (INHALATION) at 14:30

## 2022-08-08 RX ADMIN — METOPROLOL TARTRATE 5 MG: 5 INJECTION INTRAVENOUS at 10:15

## 2022-08-08 RX ADMIN — PHENOL 1 SPRAY: 1.5 LIQUID ORAL at 20:57

## 2022-08-08 RX ADMIN — MORPHINE SULFATE 2 MG: 2 INJECTION, SOLUTION INTRAMUSCULAR; INTRAVENOUS at 16:53

## 2022-08-08 RX ADMIN — SODIUM CHLORIDE 30 MG/ML INHALATION SOLUTION 4 ML: 30 SOLUTION INHALANT at 08:14

## 2022-08-08 RX ADMIN — MORPHINE SULFATE 2 MG: 2 INJECTION, SOLUTION INTRAMUSCULAR; INTRAVENOUS at 12:49

## 2022-08-08 RX ADMIN — PANTOPRAZOLE SODIUM 40 MG: 40 INJECTION, POWDER, FOR SOLUTION INTRAVENOUS at 20:34

## 2022-08-08 RX ADMIN — PHENOL 1 SPRAY: 1.5 LIQUID ORAL at 18:11

## 2022-08-08 RX ADMIN — ALBUTEROL SULFATE 2.5 MG: 2.5 SOLUTION RESPIRATORY (INHALATION) at 21:08

## 2022-08-08 RX ADMIN — PHENOL 1 SPRAY: 1.5 LIQUID ORAL at 10:27

## 2022-08-08 RX ADMIN — SODIUM CHLORIDE, PRESERVATIVE FREE 10 ML: 5 INJECTION INTRAVENOUS at 08:12

## 2022-08-08 RX ADMIN — PHENOL 1 SPRAY: 1.5 LIQUID ORAL at 14:50

## 2022-08-08 RX ADMIN — METOPROLOL TARTRATE 5 MG: 5 INJECTION INTRAVENOUS at 20:58

## 2022-08-08 RX ADMIN — HEPARIN SODIUM 14 UNITS/KG/HR: 10000 INJECTION, SOLUTION INTRAVENOUS at 20:14

## 2022-08-08 ASSESSMENT — PAIN DESCRIPTION - ORIENTATION
ORIENTATION: LOWER

## 2022-08-08 ASSESSMENT — PAIN DESCRIPTION - LOCATION
LOCATION: BACK

## 2022-08-08 ASSESSMENT — PAIN DESCRIPTION - DESCRIPTORS
DESCRIPTORS: PRESSURE;DISCOMFORT;ACHING
DESCRIPTORS: ACHING;PRESSURE
DESCRIPTORS: PRESSURE

## 2022-08-08 ASSESSMENT — PAIN SCALES - GENERAL
PAINLEVEL_OUTOF10: 8
PAINLEVEL_OUTOF10: 7
PAINLEVEL_OUTOF10: 8
PAINLEVEL_OUTOF10: 7

## 2022-08-08 NOTE — CARE COORDINATION
Case Management Assessment           Daily Note                 Date/ Time of Note: 8/8/2022 8:41 AM         Note completed by: Jodie Turpin RN    Patient Name: Lisa Sanchez  YOB: 1950    Diagnosis:Abdominal pain [R10.9]  Patient Admission Status: Inpatient    Date of Admission:7/30/2022  8:43 PM Length of Stay: 9 GLOS: GMLOS: 4.8    Current Plan of Care: Continue supplemental O2. Wean as tolerated. Amio gtt, heaparin gtt. NPO. NGT bowel compression. S/P digital disimpation. Rectal tube. Hgb 6.7 this morning. 1 unit PRCs    Discharge Plan: TBD. Anticipate the need for SNF @ discharge. Pt's preferred skilled nursing facilities are [facility] [address] and [facility]    Case Management Notes: Pt is from home with her spouse. The home setting is a ranch-style house with a ramped entrance. She was relatively independent with self care and functional mobility with family support prior to admission. She was active with [facility] for a SN 3x week. She has a rollator and a rolling walker. [employer] provides a HHA via [employer] and her family were provided with choice of provider; she and her family are in agreement with the discharge plan.     Jodie Turpin RN  [facility] [employer]  Case Management Department  [phone]

## 2022-08-08 NOTE — PLAN OF CARE
Problem: Discharge Planning  Goal: Discharge to home or other facility with appropriate resources  Outcome: Progressing     Problem: Safety - Adult  Goal: Free from fall injury  Outcome: Progressing     Problem: ABCDS Injury Assessment  Goal: Absence of physical injury  Outcome: Progressing     Problem: Skin/Tissue Integrity  Goal: Absence of new skin breakdown  Description: 1. Monitor for areas of redness and/or skin breakdown  2. Assess vascular access sites hourly  3. Every 4-6 hours minimum:  Change oxygen saturation probe site  4. Every 4-6 hours:  If on nasal continuous positive airway pressure, respiratory therapy assess nares and determine need for appliance change or resting period.   Outcome: Progressing     Problem: Respiratory - Adult  Goal: Achieves optimal ventilation and oxygenation  Outcome: Progressing     Problem: Skin/Tissue Integrity - Adult  Goal: Incisions, wounds, or drain sites healing without S/S of infection  Outcome: Progressing  Goal: Oral mucous membranes remain intact  Outcome: Progressing

## 2022-08-08 NOTE — PROGRESS NOTES
ICU Progress Note    Admit Date: 7/30/2022  ICU Day: 3  Vent Day: None  IV Access:Peripheral  IV Fluids:None  Vasopressors:None                Antibiotics: None  Diet: Diet NPO    CC: SOB    Interval history:   NAEO, awake and conversational  Is now s/p manual disimpaction x2 with stool ball removal and subsequent bowel evacuation now requiring rectal tube  Received 1U PRBC this AM    Medications:     Scheduled Meds:   naloxegol  12.5 mg Oral QAM AC    metoclopramide  5 mg IntraVENous Q6H    pantoprazole  40 mg IntraVENous BID    sodium chloride (Inhalant)  4 mL Nebulization BID    albuterol  2.5 mg Nebulization TID    polyethylene glycol  17 g Per NG tube TID    lidocaine   Topical Once    sodium chloride flush  5-40 mL IntraVENous 2 times per day    fluticasone  2 spray Each Nostril Daily    [Held by provider] pregabalin  50 mg Oral TID     Continuous Infusions:   sodium chloride      sodium chloride      dextrose 20 mL/hr at 08/08/22 0533    amiodarone 0.5 mg/min (08/08/22 0441)    heparin (PORCINE) Infusion 14 Units/kg/hr (08/08/22 0438)    dextrose      sodium chloride       PRN Meds:sodium chloride, lidocaine, sodium chloride, sodium chloride, heparin (porcine), heparin (porcine), prochlorperazine, metoprolol, glucose, dextrose bolus **OR** dextrose bolus, glucagon (rDNA), dextrose, albuterol, phenol, morphine **OR** morphine, sodium chloride flush, sodium chloride, ondansetron **OR** ondansetron, acetaminophen **OR** acetaminophen, methocarbamol    Objective:   Vitals:   T-max:  Patient Vitals for the past 8 hrs:   BP Temp Pulse Resp SpO2 Weight   08/08/22 0600 (!) 121/45 98 °F (36.7 °C) 83 13 92 % --   08/08/22 0557 -- -- 78 14 94 % --   08/08/22 0546 -- -- -- -- -- 262 lb 5.6 oz (119 kg)   08/08/22 0540 (!) 103/46 98 °F (36.7 °C) 74 (!) 9 93 % --   08/08/22 0500 (!) 103/46 -- 76 13 94 % --   08/08/22 0400 (!) 105/48 -- 75 11 93 % --   08/08/22 0300 (!) 104/43 -- 79 12 94 % --   08/08/22 0200 (!) 106/50 -- 80 13 93 % --   08/08/22 0137 -- -- -- 21 -- --   08/08/22 0100 99/80 -- 90 21 91 % --   08/08/22 0031 -- -- 82 18 93 % --         Intake/Output Summary (Last 24 hours) at 8/8/2022 0801  Last data filed at 8/8/2022 0600  Gross per 24 hour   Intake 1304.96 ml   Output 1325 ml   Net -20.04 ml         Physical Exam  Constitutional:       General: She is not in acute distress. Appearance: She is obese. She is ill-appearing. HENT:      Head: Normocephalic and atraumatic. Nose: Nose normal.      Mouth/Throat:      Mouth: Mucous membranes are dry. Pharynx: Oropharynx is clear. Eyes:      Extraocular Movements: Extraocular movements intact. Conjunctiva/sclera: Conjunctivae normal.      Pupils: Pupils are equal, round, and reactive to light. Neck:      Comments: Central line in place  Cardiovascular:      Rate and Rhythm: Normal rate and regular rhythm. Pulses: Normal pulses. Heart sounds: Normal heart sounds. Pulmonary:      Effort: Pulmonary effort is normal. No respiratory distress. Breath sounds: No wheezing or rhonchi. Comments: Diminished breath sounds at bases bilaterally   Abdominal:      General: Abdomen is flat. Bowel sounds are normal. There is distension. Tenderness: There is no abdominal tenderness. There is no guarding or rebound. Musculoskeletal:      Cervical back: Normal range of motion and neck supple. Right lower leg: Edema present. Left lower leg: Edema present. Comments: +Sacral edema. R Hip CASS site dressing present   Skin:     General: Skin is warm and dry. Capillary Refill: Capillary refill takes less than 2 seconds. Neurological:      General: No focal deficit present. Mental Status: She is alert and oriented to person, place, and time. Mental status is at baseline. Cranial Nerves: No cranial nerve deficit. Sensory: No sensory deficit. Motor: No weakness.        LABS:    CBC:   Recent Labs 08/06/22  0345 08/06/22  1216 08/06/22  1828 08/07/22  0404 08/08/22  0444   WBC 17.8*  --   --  13.3* 12.0*   HGB 7.0*   < > 7.7* 7.4* 6.7*   HCT 20.9*   < > 22.8* 22.1* 20.4*   *  --   --  see below 95*   MCV 92.7  --   --  92.0 92.8    < > = values in this interval not displayed. Renal:    Recent Labs     08/06/22 0345 08/07/22  0403 08/08/22  0445   * 130*  126* 124*   K 5.3* 5.2*  5.1 4.6   CL 90* 92*  90* 89*   CO2 18* 30  26 18*   BUN 96* 100*  98* 97*   CREATININE 3.5* 3.5*  3.3* 2.8*   GLUCOSE 124* 104*  102* 88   CALCIUM 7.3* 6.7*  7.3* 7.3*   MG 3.00* 2.80* 2.70*   PHOS 8.1* 8.2* 8.2*   ANIONGAP 20* 8  10 17*       Hepatic:   Recent Labs     08/06/22 0345 08/07/22  0403 08/08/22  0445   LABALBU 2.3* 2.2* 2.1*       Troponin: No results for input(s): TROPONINI in the last 72 hours. BNP: No results for input(s): BNP in the last 72 hours. Lipids: No results for input(s): CHOL, HDL in the last 72 hours. Invalid input(s): LDLCALCU, TRIGLYCERIDE  ABGs:    Recent Labs     08/06/22  0331   PHART 7.332*   [id number] 40.1   PO2ART 36.5*   [id number] 21.2   BEART -5*   [id number] 66*   [id number] 22         INR:   No results for input(s): INR in the last 72 hours. Lactate: No results for input(s): LACTATE in the last 72 hours. Cultures:  -----------------------------------------------------------------  RAD:   XR CHEST PORTABLE   Final Result      Mild bilateral airspace disease. XR ABDOMEN (KUB) (SINGLE AP VIEW)   Final Result   1. No evidence of free intraperitoneal air. 2. Indeterminate bowel gas pattern due to a paucity of small bowel gas. XR ABDOMEN (KUB) (SINGLE AP VIEW)   Final Result   Impression: Stable appearance of the abdomen. XR CHEST PORTABLE   Final Result      Low lung volumes with central bronchovascular crowding. Atelectasis in the left lung base. Normal cardiomediastinal silhouette. Lines and tubes in standard position.       VL Extremity Venous Bilateral   Final Result      XR ABDOMEN (KUB) (SINGLE AP VIEW)   Final Result   Impression: Stable appearance of the abdomen with gas distended loops of central small bowel again noted. CT CHEST WO CONTRAST   Final Result      1. Moderate right pleural effusion and small left effusion with basilar airspace opacity most consistent with atelectasis. Superimposed pneumonia be difficult to exclude. 2. Narrowing of the trachea with expiration compatible with treated bronchial malacia. 3. Nasogastric tube tip in the lower esophagus. 4. Persistent colonic distention. 5. Mild coronary artery calcification. XR ABDOMEN (KUB) (SINGLE AP VIEW)   Final Result      Frontal supine views demonstrate an unremarkable bowel gas pattern. No significant colonic stool is seen. Right hip arthroplasty noted. XR CHEST PORTABLE   Final Result      Prominence of the perihilar interstitial markings with mild peribronchial cuffing is favored to represent mild pulmonary edema. Low lung volumes bronchovascular crowding. Stable cardiomediastinal silhouette. Lines and tubes in standard position. Cholecystectomy clips are noted. CT ABDOMEN PELVIS WO CONTRAST Additional Contrast? Oral and Rectal   Final Result   1. Moderate colonic distention. Correlate for constipation. Fecal content within the ileum which may suggest stasis. There is no small bowel obstruction. 2. Subcutaneous fluid collection overlying the right total hip arthroplasty. CT HIP RIGHT WO CONTRAST   Final Result   1. Moderate colonic distention. Correlate for constipation. Fecal content within the ileum which may suggest stasis. There is no small bowel obstruction. 2. Subcutaneous fluid collection overlying the right total hip arthroplasty. XR CHEST PORTABLE   Final Result      1. Right IJ CVC tip in the cavoatrial junction. 2.  Bibasilar airspace disease suggesting atelectasis.  Pneumonia is not excluded. XR ABDOMEN (KUB) (SINGLE AP VIEW)   Final Result      Dilated colonic and small bowel loops may suggest ileus but obstruction is not excluded. XR ABDOMEN (KUB) (SINGLE AP VIEW)   Final Result      No acute radiographic abnormality of the abdomen. Status post right hip arthroplasty with mildly displaced greater trochanteric fracture fragment, new or more pronounced compared to the prior study. Assessment/Plan:     Respiratory  Hypoxia likely 2/2 atelectasis given CT chest showed moderate R pleural effusion and small L effusion with basilar atelectasis. - continue albuterol, hypertonic nacl neb, acapella  - O2 requirements improving over the weekend    Renal  ARIANE likely 2/2 hypoperfusion, baseline creatinine 0.8. Hyponatremia possibly 2/2 to decreased PO intake in setting of nausea, vomiting and constipation. - monitor lytes replace as needed  - avoid Nephrotoxic meds  - Nephrology following   - stopped d10, hold all free water 2/2 persistent hyponatremia  - monitor I/Os strictly  - winter in place     Cardiac  Found to be in atrial fibrillation with poorly controlled ventricular rate on telemetry, new diagnosis. Unclear contribution of possible HF however echo 8/3 with EF >65%  - Continue Amiodarone gtt, likely transition to PO amiodarone  - Continue heparin drip   - cardiology consulted, appreciate recommendations. - holding BP meds     GI  Ileus likely 2/2 to constipation/stool burden in setting of opioid use from recent CASS. No SBO on 8/1 CT abd pelvis. S/p colonoscopy decompression 8/3 but hard stool unable to be evacuated.   - Continue NG tube decompression  - Continue NPO w/bowel rest, serial abdominal exams, smog enemas, bowel regimen, D10 to prevent hypoglycemia  - general surgery and GI following   - continue reglan q6h and IV protonix  - adding naloxegol  - S/p manual disimpaction with output, now on rectal tube     Heme  Anemia likely 2/2 NG emesis in setting of heparin gtt for afib w/rvr  - Hgb 6.7, receiving 1U PRBC today   -post transfusion H&H pending    MSK  S/p complicated R CASS 4/15, now with R CASS fluid collection on CT  - No intervention indicated per orthopedic surgery  - Pain control with morphine prn   - Orthopedic surgery following        Code Status: Full code  FEN: NPO  PPX: Heparin  DISPO: pending    Alexi Epstein MD   PGY1, Internal Medicine  08/08/22  8:01 AM    Pulm/CC    Patient seen and examined. I agree with Dr. Swanson Ours history, physical, lab findings, assessment and plan. Ileus -continue Movantik  Hypoxemic Resp Failure - O2 requirement is stable at 6 L. Low lung volume on CXR. CT scan with small bilateral pleural effusion, possible mild edema, atelectasis. Bilateral leg edema with small pleural effusions however echo with normal systolic function. IVC with normal size and collapsibility  Paroxysmal A.fib -on heparin  No definite focus of infection. Currently off ABX. Lactic acidosis is resolved. ARIANE: Gradual improvement with creatinine 2.8 down from 3.3.   Nephrology following and patient off diuretics  Leukocytosis -gradually improving and down to 12  Hyponatremia -recheck this afternoon shows some improvement to 128    Okay to downgrade to PCU    Paulino Reyes MD

## 2022-08-08 NOTE — PROGRESS NOTES
Glucose level continuously dropping. Most recent was 70.   125mL D10 bolus initiated.  Will recheck glucose in 15 minutes

## 2022-08-08 NOTE — PROGRESS NOTES
Cardiology Consult Service  Daily Progress Note        Admit Date:  7/30/2022  Primary cardiologist: Dr. Usama Harley    Reason for Consultation/Chief Complaint: AHF    Subjective:     Joseph Gamez is a 74 y.o. female with a past medical history of obesity, HTN, HLP, recent CASS 7/16/2022. Patient presented to the emergency room on 7/30 with nausea, vomiting, abdominal pain. Patient was found to be obstipated due to ileus from recent opioid use for CASS. She was admitted and was given IV fluids and IV antibiotics. Subsequently patient developed AF RVR and EP was consulted. She was started on diltiazem and heparin drips. Today I was consulted to assess patient in view of likely acute heart failure. Apparently on admission patient's oxygen requirements were 2 L, received IV fluids, oxygen requirements increased to 8 L, received Lasix 40 mg IV x1 on 8/2, oxygen requirements are now 6.5 L. Creatinine on admission was 2.2, increased to a peak of 2.7 today. ECG consistent with AF  bpm.  There were no ischemic changes. Echo 8/3/2022: Normal LV, EF greater than 20%, normal diastolic function, normal filling pressures, normal RV and valves. Interval history:  Patient is currently on 7 L of supplemental oxygen. She remains n.p.o. with an NG tube. Telemetry was personally reviewed, reveals NSR 70s. Hemoglobin 7.8 up from 6.7, WBC 12.  Weight down 6 pounds, I's and O's even, creatinine improving at 2.8 from 3.3 from 3.5 of Lasix drip since 8/5.     Objective:     Medications:   metoprolol  5 mg IntraVENous Q6H    naloxegol  12.5 mg Oral QAM AC    metoclopramide  5 mg IntraVENous Q6H    pantoprazole  40 mg IntraVENous BID    sodium chloride (Inhalant)  4 mL Nebulization BID    albuterol  2.5 mg Nebulization TID    polyethylene glycol  17 g Per NG tube TID    lidocaine   Topical Once    sodium chloride flush  5-40 mL IntraVENous 2 times per day    fluticasone  2 spray Each Nostril Daily    [Held by provider] pregabalin  50 mg Oral TID       IV drips:   sodium chloride      sodium chloride      heparin (PORCINE) Infusion 14 Units/kg/hr (08/08/22 0438)    dextrose      sodium chloride         PRN:  sodium chloride, lidocaine, sodium chloride, sodium chloride, heparin (porcine), heparin (porcine), prochlorperazine, glucose, dextrose bolus **OR** dextrose bolus, glucagon (rDNA), dextrose, albuterol, phenol, morphine **OR** morphine, sodium chloride flush, sodium chloride, ondansetron **OR** ondansetron, acetaminophen **OR** acetaminophen, methocarbamol    Vitals:    08/08/22 0800 08/08/22 0814 08/08/22 0816 08/08/22 1430   BP: (!) 115/51      Pulse: 78 79 78 81   Resp: 13 14 15 15   Temp: 98 °F (36.7 °C)      TempSrc: Axillary      SpO2: 94% 95% 96% 94%   Weight:       Height:           Intake/Output Summary (Last 24 hours) at 8/8/2022 1503  Last data filed at 8/8/2022 1200  Gross per 24 hour   Intake 1528.29 ml   Output 1000 ml   Net 528.29 ml       I/O last 3 completed shifts: In: 2076.5 [I.V.:1636.5; Blood:300; NG/GT:140]  Out: 1925 [Urine:1525; Emesis/NG output:400]  Wt Readings from Last 3 Encounters:   08/08/22 262 lb 5.6 oz (119 kg)   07/13/22 218 lb 9.6 oz (99.2 kg)   06/14/22 217 lb (98.4 kg)       Admit Wt: Weight: 224 lb 3.3 oz (101.7 kg)   Todays Wt: Weight: 262 lb 5.6 oz (119 kg)    TELEMETRY personally reviewed: NSR    Physical Exam:         General Appearance:  Alert, cooperative, no distress, appears stated age Appropriate weight   Head:  Normocephalic, without obvious abnormality, atraumatic   Eyes:  PERRL, conjunctiva/corneas clear EOM intact  Ears normal   Throat no lesions       Nose: Nares normal, no drainage or sinus tenderness   Throat: Lips, mucosa, and tongue normal   Neck: Supple, symmetrical, trachea midline, no adenopathy, thyroid: not enlarged, symmetric, no tenderness/mass/nodules, no carotid bruit. Lungs:   Normal respiratory rate, bilateral ronchi.     Chest Wall:  No tenderness or deformity   Heart:  Regular rhythm, rate is controlled, S1, S2 normal, there is no murmur, there is no rub or gallop, cannot assess jvd, 2-3+ bilateral lower extremity edema   Abdomen:   Soft, non-tender, bowel sounds active all four quadrants,  no masses, no organomegaly       Extremities: Extremities normal, atraumatic, no cyanosis. Pulses: 2+ and symmetric   Skin: Skin color, texture, turgor normal, no rashes or lesions   Pysch: Normal mood and affect   Neurologic: Normal gross motor and sensory exam.  Cranial nerves intact       Labs:   Recent Labs     08/06/22  0345 08/07/22  0403 08/08/22  0445   * 130*  126* 124*   K 5.3* 5.2*  5.1 4.6   BUN 96* 100*  98* 97*   CREATININE 3.5* 3.5*  3.3* 2.8*   CL 90* 92*  90* 89*   CO2 18* 30  26 18*   GLUCOSE 124* 104*  102* 88   CALCIUM 7.3* 6.7*  7.3* 7.3*   MG 3.00* 2.80* 2.70*       Recent Labs     08/06/22  0345 08/06/22  1216 08/07/22  0404 08/08/22  0444 08/08/22  0918   WBC 17.8*  --  13.3* 12.0*  --    HGB 7.0*   < > 7.4* 6.7* 7.8*   HCT 20.9*   < > 22.1* 20.4* 23.5*   *  --  see below 95*  --    MCV 92.7  --  92.0 92.8  --     < > = values in this interval not displayed. No results for input(s): CHOLTOT, TRIG, HDL, CHOLHDL, LDL in the last 72 hours. Invalid input(s): Jerome Walker  No results for input(s): PTT, INR in the last 72 hours. Invalid input(s): PT    No results for input(s): CKTOTAL, CKMB, CKMBINDEX, TROPONINI in the last 72 hours. No results for input(s): BNP in the last 72 hours. No results for input(s): NTPROBNP in the last 72 hours. No results for input(s): TSH in the last 72 hours. Imaging:       Assessment & Plan:     1. R/o sepsis:  Obstipation due to ileus from recent opioid use for CASS, cannot r/o sepsis. Patient had a colonoscopy for decompression on 8/3/2022.     -On iv antibiotics per primary team     2.  PAFRVR:  Patient was in sinus, converted into afib with RVR after incomplete colonic decompression. Atrial fibrillation will compromise our efforts for safe diuresis. - Will stop amiodarone drip and start lopressor 5 mg iv q 6  - Cw heparin drip  - EP following. 3.  Rule out acute heart failure. Patient appeared volume overloaded with worse respiratory status and AFRVR. However, echocardiogram is overall normal with normal filling pressures and was done during sinus (images personally reviewed). Discussed case with Dr Jorge Luis Chacko, he witnessed aspiration, hence respiratory failure could be from aspiration pneumonitis rather than acute heart failure. Albumin and Hb low could explain peripheral edema. IV diuretics with no meaningful response, likely due to ATN and sepsis. - We stopped diuretics; Dr Jorge Luis Chacko may elect to give albumin, may consider blood transfusion if Hb < 7.   - Strict I's and O's every shift and standing weights if possible, low-salt diet, daily BMP with reflex to Mg (correct lytes for goals K >4.0 and Mg > 2.0) and wean supplemental oxygen to off (or down to baseline supplemental oxygen requirements) for sats greater than 92-94%. 4. ARIANE on CKD:   Improving off diuretics. Due to the high probability of clinically significant life threating deterioration of the patient's condition that required my urgent intervention, a total critical care time 35 minutes was used. This time excludes any time that may have been spent performing procedures. This includes but not limited to vital sign monitoring, telemetry monitoring, continuous pulse oximety, IV medication, clinical response to the IV medications, documentation time, consultation time, interpretation of lab data, review of nursing notes and old record review. I have personally reviewed the reports and images of labs, radiological studies, cardiac studies including ECG's and telemetry, current and old medical records. The note was completed using EMR and Dragon dictation system.  Every effort was made to ensure accuracy; however, inadvertent computerized transcription errors may be present. All questions and concerns were addressed to the patient/family. Alternatives to my treatment were discussed. Thank you for allowing to us to participate in the care or Maria Guadalupe Davila. Please call our service with questions.     Denise Kyle MD, [facility], [address]  The [address]  [address] [place] [address]  Ph: [phone]  Fax: [phone]

## 2022-08-08 NOTE — PROGRESS NOTES
Hospital Medicine Progress Note    PCP: Mariaelena Jose    Date of Admission: 7/30/2022    Chief Complaint:  Abdominal pain, transferred for ortho eval with Rt CASS site with fluid collection. History Of Present Illness:     74 y.o. female Anisha Cam  - Hx of HTN, HLD, Gout, OA, recent Rt CASS, morbid obesity  - Presents with generalized weakness nausea vomiting and abdominal pain, she woke up with the symptoms night prior to presentation. Her last bowel movement was about 3 days prior. She was seen at [facility] (now [facility] where she underwent CT imaging of the abdomen and pelvis which showed:    IMPRESSION:   1. There is a large amount of stool cecum through the sigmoid colon. Transition point is rectosigmoid but there is no obvious mass in the rectosigmoid region. No bowel thickening or peribowel stranding in the rectosigmoid region. 2. Likely, multiple renal cysts. 3. Partially organized fluid without obvious rim enhancement anterior to the THR. This may simply represent postoperative seroma but clinical correlation needed. The entire inferior extent of this is not covered on this exam.   4. Urinary bladder is distended with fluid    Labs were significant for WBC 22.8k, with elevated ANC, hgb 10.1, na131, k 5.0 BUN of 19 cr 1.1, Alk phos 139. With noted fluid around [facility] she was transferred to [facility] for orthopedic surgeon Dr. Reji Mixon evaluation. \"        Subjective:  Appears less lethargic today. Abdominal pain improved and she had a BM today    Oxygen requirement is decreased. Not having hip pain. Family at bedside    Medications : Reviewed      Allergies:  Buspirone, Duloxetine, Fenofibrate, Gabapentin, Venlafaxine, Doxycycline, Statins, and Sulfa antibiotics      REVIEW OF SYSTEMS:   Pertinent positives as noted in the HPI. All other systems reviewed and negative.       PHYSICAL EXAM PERFORMED:    BP (!) 115/51   Pulse 78 Temp 98 °F (36.7 °C) (Axillary)   Resp 15   Ht 5' 2\" (1.575 m)   Wt 262 lb 5.6 oz (119 kg)   SpO2 96%   BMI 47.98 kg/m²     General appearance: Awake,alert  HEENT:  Normal cephalic, atraumatic without obvious deformity. Neck: Supple, with full range of motion. No jugular venous distention. Respiratory:  Normal respiratory effort. Clear to auscultation, bilaterally without Rales/Wheezes/Rhonchi. Cardiovascular:  Regular rate and rhythm with normal S1/S2 without murmurs, rubs or gallops. Abdomen: Less distended, less firm, less tender. BS heard  Musculoskeletal: Right hip swelling but no erythema or warmth. Incision dressing C/D/I  Skin: Skin color, texture, turgor normal.  No rashes or lesions. Neurologic:  grossly non-focal.  Capillary Refill: Brisk,< 3 seconds   Peripheral Pulses: +2 palpable, equal bilaterally       Labs:     Recent Labs     08/06/22  0345 08/06/22  1216 08/06/22  1828 08/07/22  0404 08/08/22  0444   WBC 17.8*  --   --  13.3* 12.0*   HGB 7.0*   < > 7.7* 7.4* 6.7*   HCT 20.9*   < > 22.8* 22.1* 20.4*   *  --   --  see below 95*    < > = values in this interval not displayed. Recent Labs     08/06/22  0345 08/07/22  0403 08/08/22  0445   * 130*  126* 124*   K 5.3* 5.2*  5.1 4.6   CL 90* 92*  90* 89*   CO2 18* 30  26 18*   BUN 96* 100*  98* 97*   CREATININE 3.5* 3.5*  3.3* 2.8*   CALCIUM 7.3* 6.7*  7.3* 7.3*   PHOS 8.1* 8.2* 8.2*       No results for input(s): AST, ALT, BILIDIR, BILITOT, ALKPHOS in the last 72 hours. No results for input(s): INR in the last 72 hours. No results for input(s): Colleen Avery in the last 72 hours.     Urinalysis:      Lab Results   Component Value Date/Time    NITRU POSITIVE 07/31/2022 05:15 AM    WBCUA 3-5 07/31/2022 05:15 AM    BACTERIA 1+ 07/31/2022 05:15 AM    RBCUA None seen 07/31/2022 05:15 AM    BLOODU Negative 07/31/2022 05:15 AM    SPECGRAV 1.015 07/31/2022 05:15 AM    GLUCOSEU Negative 07/31/2022 05:15 AM Radiology: No imaging here. Reviewed imaging from OSH        ASSESSMENT/PLAN:    Severe sepsis: POA   - Leukocytosis with tachycardia; on admission; - Possibly from GI source with severe constipation, fecal impaction  -  Transferred for evaluation of post-op hip fluid collection/possible infection (s/p THR 7/13): felt likely seroma,  normal post operative finding by orthopedics  -Started on admission on broad spectrum abx  - Blood cultures x 2 NGTD  - ID consulted  - Not currently on Abx   - Monitor off antibx, hopefully should improve as bowels appear functioning now      Atrial Fibrillation with RVR - converted to NSR  Diltiazem drip - changing to IV lopressor   Heparin drip  Echo normal EF, normal diastolic  Cardiology consulted  Plan transition to oral meds when enteral route re-established      Progressive hypoxemic respiratory failure  Possible fluid overload from recent surgery, and while NPO with intra-abdominal issue this admit; also possible aspiration pneumonitis  Was started on heparin drip for Afib and possible also new PE  8/3 CT chest wo contrast b/l pleural effusions, small left and moderate right  Prelim doppler report no DVT  Briefly on IV lasix/lasix drip but was stopped. O2 requirement still high. 8/7 Weaned from 15 L to 5 L O2  Encourage incentive spirometry  Wean O2 further  - Monitor vitals, labs      ARIAEN   Likely sec to Hypoperfusion  - Cr was 0.8 in 7/14; 2.2 on admission.  Increased while admitted up to 3.5  Nephrology consulted  -Monitor cr  - Avoid nephrotoxins      Bowel obstruction: POA Secondary to severe constipation  - CT of the [facility] showed significant stool from cecum to the sigmoid colon and transition point at the rectosigmoid.   - GI, Surgery consulted, appreciate recs  - Miralax and enema unsuccessful  - Underwent [place] decompression 8/3 with improvement but not resolution, recommendation continue SMOG enemas BID, Relistor, disimpaction  - Did not tolerate GoLytely  - Continues NPO with NG to suction pending further improvement  - GI, General surgery following  - May need to consider alternative methods of nutrition (PPN/TPN) if remaining NPO further      Acute Urinary retention  Possible partially treated Complicated UTI: POA  - Appears recently had E coli in urine, although CC was less than 100k. Was treated with cipro. Also appears on Keflex PTA.   - Urine on admission has some bacteria, and nitrite positive, but neg leuc esterase and no signif pyuria  - Winter catheter placed at [facility] for retention likely sec to severe constipation  - Continue winter for now, ongoing reassessment      Acute on Chronic Anemia  - Hg 11.2 on admission  - Dropped to 6s; - Blood transfusion 8/6  - Monitor Hgb  - Evaluate for hemorrhage lashawn as on heparin gtt  - DC hep gtt if further decline in hgb noted      Essential Hypertension- Holding  blood pressure medications for now with low Bps    Mild hyponatremia: monitor electrolytes    Gout - stable    Morbid obesity Body mass index is 41.01 kg/m². - Complicating assessment and treatment. Placing patient at risk for multiple co-morbidities as well as early death and contributing to the patient's presentation.  on weight loss when appropriate. DVT Prophylaxis: High risk: Heparin drip  Diet: Diet NPO  Code Status: Full Code      Disposition: - Transfer from ICU.   Remains in pxt pending progress  Mobilize  PT/OT alejandra Rodriguez MD

## 2022-08-08 NOTE — PROGRESS NOTES
General Surgery   Daily Progress Note  Patient: Anastasiya Ford      CC: Abdominal pain with bowel obstruction    SUBJECTIVE:   Pt is currently receiving 1u pRBC. Pt also received manual disimpaction yesterday with a medium-sized stool ball removed and subsequent spontaneous evacuation of the bowels now requiring a rectal tube. ROS:   A 14 point review of systems was conducted, significant findings as noted above. All other systems negative. OBJECTIVE:    PHYSICAL EXAM:    Vitals:    08/08/22 0540 08/08/22 0546 08/08/22 0557 08/08/22 0600   BP: (!) 103/46   (!) 121/45   Pulse: 74  78 83   Resp: (!) 9  14 13   Temp: 98 °F (36.7 °C)   98 °F (36.7 °C)   TempSrc:       SpO2: 93%  94% 92%   Weight:  262 lb 5.6 oz (119 kg)     Height:           General appearance: ill appearing  Neuro: A&Ox3, no focal deficits, sensation intact  HEENT: Trachea midline. RIJ CVC in place. NGT in place with bilious output. Chest/Lungs: CTAB, no crackles/rales, wheezes/rhonchi, normal effort with no accessory muscle use on 7 L NC. Cardiovascular: Afib, rate controlled   Abdomen: Obese, non-tender, no rebound, guarding, or rigidity. Skin: warm and dry, no rashes  Extremities: no edema, no cyanosis  Genitourinary: Iqbal in place with clear yellow urine      LABS:   Recent Labs     08/07/22  0404 08/08/22  0444   WBC 13.3* 12.0*   HGB 7.4* 6.7*   HCT 22.1* 20.4*   MCV 92.0 92.8   PLT see below 95*        Recent Labs     08/07/22  0403 08/08/22  0445   *  126* 124*   K 5.2*  5.1 4.6   CL 92*  90* 89*   CO2 30  26 18*   PHOS 8.2* 8.2*   *  98* 97*   CREATININE 3.5*  3.3* 2.8*      No results for input(s): AST, ALT, ALB, BILIDIR, BILITOT, ALKPHOS in the last 72 hours. No results for input(s): LIPASE, AMYLASE in the last 72 hours. No results for input(s): PROT, INR, APTT in the last 72 hours. No results for input(s): CKTOTAL, CKMB, CKMBINDEX, TROPONINI in the last 72 hours.       ASSESSMENT & PLAN:   This is a 74 y.o. female with Hx of HTN, HLD, Tuberculosis (1981), gout, and OA s/p right CASS on 7/13/22 who was transferred to [facility] on 7/30 due to fluid associated with her right CASS on 7/13 and for abdominal pain.     -Lasix drip and fluid management per nephro  -BID SMOG enemas  d/c'd  -Manual disimpaction 8/6 and 8/7 with some stool ballls removed, now with diarrhea.  -Continue NPO with NGT to LIWS. -Will need nutritional support. Will consider TPN today vs enteral feeding tube. -Will attempt gastrografin challenge today  - No surgical intervention from Ortho  - Will continue to monitor closely    Jewels Camacho DO   PGY1, General Surgery  08/08/22  6:58 AM  Pager # [phone]    I have personally performed the medical history, physical exam and medical decision making and agree with all pertinent clinical information unless otherwise noted.      Svitlana Farooq MD  Surgery Attending

## 2022-08-08 NOTE — PROGRESS NOTES
ID Follow-up NOTE    CC:   Leukocytosis   Antibiotics: None    Admit Date: 7/30/2022  Hospital Day: 10    Subjective:     POD #5: Colonoscopy with decompression    Pt had BM on 8/7. Now with rectal tube  Pt c/o 'tired' after sitting up with OT / PT    Objective:     Patient Vitals for the past 8 hrs:   BP Temp Temp src Pulse Resp SpO2 Weight   08/08/22 0816 -- -- -- 78 15 96 % --   08/08/22 0814 -- -- -- 79 14 95 % --   08/08/22 0800 (!) 115/51 98 °F (36.7 °C) Axillary 78 13 94 % --   08/08/22 0600 (!) 121/45 98 °F (36.7 °C) -- 83 13 92 % --   08/08/22 0557 -- -- -- 78 14 94 % --   08/08/22 0546 -- -- -- -- -- -- 262 lb 5.6 oz (119 kg)   08/08/22 0540 (!) 103/46 98 °F (36.7 °C) -- 74 (!) 9 93 % --   08/08/22 0500 (!) 103/46 -- -- 76 13 94 % --   08/08/22 0400 (!) 105/48 -- -- 75 11 93 % --       I/O last 3 completed shifts: In: 2076.5 [I.V.:1636.5; Blood:300; NG/GT:140]  Out: 1925 [Urine:1525; Emesis/NG output:400]  I/O this shift:  In: 363.3 [Blood:333.3; NG/GT:30]  Out: 0     EXAM:  GENERAL: Patient is obese. 7L  HEENT: Membranes moist, no oral lesion. NG Tube placed on 7/31/2022 with dark brown fluid noted. NECK:  Supple, no lymphadenopathy  LUNGS: Diminished breath sounds at bases. CARDIAC: RRR, no murmur appreciated  ABD:  Mild abdominal distension along LLQ but decreased. No tenderness or pain to palpation noted. luggish bowel sounds. Iqbal cathter in place with tea colored urine present. EXT:  Right CASS incision site appears intact with no acute signs on infection noted. No pain to palpation or tenderness noted.    NEURO: No focal neurologic findings  PSYCH: Orientation, sensorium, mood normal  LINES:  Central line (R IJ)     Data Review:  Lab Results   Component Value Date    WBC 12.0 (H) 08/08/2022    HGB 7.8 (L) 08/08/2022    HCT 23.5 (L) 08/08/2022    MCV 92.8 08/08/2022    PLT 95 (L) 08/08/2022     Lab Results   Component Value Date    CREATININE 2.8 (H) 08/08/2022    BUN 97 (HH) 08/08/2022    NA 124 (L) 08/08/2022    K 4.6 08/08/2022    CL 89 (L) 08/08/2022    CO2 18 (L) 08/08/2022     Hepatic Function Panel:   Lab Results   Component Value Date/Time    ALKPHOS 456 08/01/2022 05:41 AM    ALT 27 08/01/2022 05:41 AM    AST 33 08/01/2022 05:41 AM    PROT 4.8 08/01/2022 05:41 AM    BILITOT 0.5 08/01/2022 05:41 AM    BILIDIR <0.2 08/01/2022 05:41 AM    IBILI see below 08/01/2022 05:41 AM    LABALBU 2.1 08/08/2022 04:45 AM       Micro:  07/31   Blood culture #1: NGTD              Blood culture #2: NGTD              Urine culture: NGTD    07/30   Urine culture (Alan): E. Coli      Imaging:   ECHO (08/03/2022)  Summary  Left ventricular cavity size is normal. Normal left ventricular wall thickness. Overall left ventricular systolic function appears  hyperdynamic with an ejection fraction of >65%. No regional wall motion abnormalities are noted. Normal diastolic function. Normal LVEDP. Tricuspid valve leaflets are structurally normal. Mild tricuspid regurgitation. CT Chest w/o Contrast (08/02/2022)  Impression   1. Moderate right pleural effusion and small left effusion with basilar airspace opacity most consistent with atelectasis. Superimposed pneumonia be difficult to exclude. 2. Narrowing of the trachea with expiration compatible with treated bronchial malacia. 3. Nasogastric tube tip in the lower esophagus. 4. Persistent colonic distention. 5. Mild coronary artery calcification. CT Abdomen Pelvis and Right Hip w/o Contrast (08/01/2022)  Impression   1. Moderate colonic distention. Correlate for constipation. Fecal content within the ileum which may suggest stasis. There is no small bowel obstruction. 2. Subcutaneous fluid collection overlying the right total hip arthroplasty. XR Chest (08/01/2022)  Impression   1. Right IJ CVC tip in the cavoatrial junction. 2.  Bibasilar airspace disease suggesting atelectasis. Pneumonia is not excluded.       XR Abdomen (07/31/2022)  Impression

## 2022-08-08 NOTE — PROGRESS NOTES
[facility]              Progress Note      Admit Date 2022  HPI: Asked to see by colleagues for afib in presence apparent post op hip and narcotic causing obstipation. In retrospect she had palpitations before her surgery that would make her sit down. One wonders whether she had pre op afib by hx. The IV Amiodarone was stopped by team today. Patient remains in sinus rhythm for two days. Interval history:     Ms. Frankie Justin denies chest pain, shortness of breath, palpitations      Subjective:       Scheduled Meds:   metoprolol  5 mg IntraVENous Q6H    naloxegol  12.5 mg Oral QAM AC    metoclopramide  5 mg IntraVENous Q6H    pantoprazole  40 mg IntraVENous BID    sodium chloride (Inhalant)  4 mL Nebulization BID    albuterol  2.5 mg Nebulization TID    polyethylene glycol  17 g Per NG tube TID    lidocaine   Topical Once    sodium chloride flush  5-40 mL IntraVENous 2 times per day    fluticasone  2 spray Each Nostril Daily    [Held by provider] pregabalin  50 mg Oral TID     Continuous Infusions:   sodium chloride      sodium chloride      dextrose 20 mL/hr at 22 0533    heparin (PORCINE) Infusion 14 Units/kg/hr (22 0438)    dextrose      sodium chloride       PRN Meds:sodium chloride, lidocaine, sodium chloride, sodium chloride, heparin (porcine), heparin (porcine), prochlorperazine, glucose, dextrose bolus **OR** dextrose bolus, glucagon (rDNA), dextrose, albuterol, phenol, morphine **OR** morphine, sodium chloride flush, sodium chloride, ondansetron **OR** ondansetron, acetaminophen **OR** acetaminophen, methocarbamol       Objective:      Wt Readings from Last 3 Encounters:   22 262 lb 5.6 oz (119 kg)   22 218 lb 9.6 oz (99.2 kg)   22 217 lb (98.4 kg)   Admit weight: Weight: 224 lb 3.3 oz (101.7 kg)      Temperature range over 24hrs:   Temp  Av.9 °F (36.6 °C)  Min: 97.5 °F (36.4 °C)  Max: 98 °F (36.7 °C)  Current Respiratory Rate:  Resp: 15  Current Pulse: Heart Rate: 78  Current Blood Pressure:  BP: (!) 115/51  24hr Blood Pressure Range:  Systolic (09RZH), YOJ:631 , Min:88 , STY:487   ; Diastolic (00VYZ), HPH:79, Min:40, Max:80    Current Pulse Oximetry:  SpO2: 96 %      Intake/Output Summary (Last 24 hours) at 8/8/2022 1023  Last data filed at 8/8/2022 0900  Gross per 24 hour   Intake 1668.29 ml   Output 1325 ml   Net 343.29 ml       Telemetry monitor:     Physical Exam:  General:  Awake, alert, NAD   NG in place  Psych : composed  Skin:  Warm and dry  Chest:  Clear to auscultation, respiration easy  Cardiovascular:  RRR S1S2 no murmur to auscultation  Abdomen:  abd soft, non-tender  Extremities:  No edema        Imaging        Summary echo:   Left ventricular cavity size is normal. Normal left ventricular wall   thickness. Overall left ventricular systolic function appears hyperdynamic   with an ejection fraction of >65%. No regional wall motion abnormalities are   noted. Normal diastolic function. Normal LVEDP. Tricuspid valve leaflets are structurally normal. Mild tricuspid   regurgitation.       Signature      ------------------------------------------------------------------   Electronically signed by Arelis Villegas MD (Interpreting   physician) on 08/03/2022 at 12:41 PM      Lab Review     Renal Profile:   Lab Results   Component Value Date/Time    CREATININE 2.8 08/08/2022 04:45 AM    BUN 97 08/08/2022 04:45 AM     08/08/2022 04:45 AM    K 4.6 08/08/2022 04:45 AM    K 4.3 07/14/2022 07:27 AM    CL 89 08/08/2022 04:45 AM    CO2 18 08/08/2022 04:45 AM     CBC:    Lab Results   Component Value Date/Time    WBC 12.0 08/08/2022 04:44 AM    RBC 2.20 08/08/2022 04:44 AM    HGB 7.8 08/08/2022 09:18 AM    HCT 23.5 08/08/2022 09:18 AM    MCV 92.8 08/08/2022 04:44 AM    RDW 14.4 08/08/2022 04:44 AM    PLT 95 08/08/2022 04:44 AM     BNP:  No results found for: BNP  Fasting Lipid Panel:  No results found for: CHOL, HDL, TRIG  Cardiac Enzymes:  No results found for: CKTOTAL, CKMB, CKMBINDEX, TROPONINI  PT/ INR   Lab Results   Component Value Date/Time    INR 1.15 08/04/2022 06:55 PM    INR 1.18 08/02/2022 06:30 PM    INR 1.27 08/01/2022 02:24 AM    PROTIME 14.6 08/04/2022 06:55 PM    PROTIME 14.9 08/02/2022 06:30 PM    PROTIME 15.8 08/01/2022 02:24 AM     PTT No results found for: PTT   Lab Results   Component Value Date/Time    MG 2.70 08/08/2022 04:45 AM    No results found for: TSH    Assessment/Plan:     Patient Active Problem List   Diagnosis    Primary osteoarthritis of right hip    Osteoarthritis of right hip    Osteoarthritis of right hip joint due to dysplasia    Status post left hip replacement    Abdominal pain    ARIANE (acute kidney injury) ([address]    Electrolyte imbalance    Lactic acid acidosis    Leukocytosis    Ileus (HCC)    Status post total hip replacement, right    Atrial fibrillation with RVR (HCC)    On continuous heparin infusion    Benign essential HTN    Status post right hip replacement      The atrial has ceased post ~ 4 grams of amiodarone iv. The team stopped. In view of Dr. Malu Kennedy following will check in intermittently for afib. Might be logical to monitor for afib at home and [place] [facility] [place] with [id number] vasc score of 3.    Spoke with patient and adrienne

## 2022-08-08 NOTE — PROGRESS NOTES
Occupational Therapy  Facility/Department: [facility] ICU  Occupational Therapy Initial Assessment / Treatment    Name: Rosi Parish  : 1950  MRN: [id number]  Date of Service: 2022    Discharge Recommendations: Rosi Parish scored a  on the AM-PAC ADL Inpatient form. Current research shows that an AM-PAC score of 17 or less is typically not associated with a discharge to the patient's home setting. Based on the patient's AM-PAC score and their current ADL deficits, it is recommended that the patient have 3-5 sessions per week of Occupational Therapy at d/c to increase the patient's independence. Please see assessment section for further patient specific details. If patient discharges prior to next session this note will serve as a discharge summary. Please see below for the latest assessment towards goals. OT Equipment Recommendations  Other: defer       Patient Diagnosis(es): The encounter diagnosis was Right lower quadrant abdominal pain. Past Medical History:  has a past medical history of Back pain, Hyperlipidemia, Hypertension, Tuberculosis, and Wears dentures. Past Surgical History:  has a past surgical history that includes Appendectomy (); Tonsillectomy (); Ectopic pregnancy surgery (); Partial hysterectomy (); Cholecystectomy (); Cataract removal (Bilateral, ); Total hip arthroplasty (Right, 2022); and Colonoscopy (N/A, 8/3/2022). Treatment Diagnosis: impaired ADLs and mobility      Assessment   Performance deficits / Impairments: Decreased functional mobility ; Decreased balance;Decreased ADL status; Decreased endurance;Decreased strength;Decreased ROM; Decreased posture  Assessment: Pt is a 74 y.o. F admitted to [facility] for abdominal pain and is s/p colonoscopy with decompression on 8/3. Pt w/ recent R CASS on  and had d/c'd to SNF before returning home. Pt typically independent w/ ADLs and req some assist from family for IADLs prior to hip sx. garage)  Bathroom Shower/Tub: Walk-in shower (small lip to enter, glass doors)  Bathroom Toilet: Handicap height (+ toilet riser w/ rails)  Bathroom Equipment: Grab bars in shower, Toilet raiser, Hand-held shower  Home Equipment: [place] [employer], [place], Walker, 4 wheeled, [place], [address] (transport chair)  [address] Help From: Family, Other (comment) (skilled RN 1x weekly, daughter assists \"as often as she needs me\" w/ heavy household mgmt)  ADL Assistance: Independent (prior to hip sx)  Homemaking Assistance: Independent (shares w/ )  Ambulation Assistance: Independent (prior to hip sx, w/ AD)  Transfer Assistance: Independent  Active : Yes  Leisure & Hobbies: \"Keeping everything up\"  IADL Comments: Shared w/ , daughter and granddaugher assist PRN w/ household mgmt  Additional Comments: Pt d/c'd to SNF from recent hip sx on 7/16       Objective   Heart Rate: 78  Heart Rate Source: Monitor  BP: (!) 115/51  BP Location: Left Arm  BP Method: Automatic  Patient Position: Semi fowlers  MAP (Calculated): 72.33  Resp: 15  SpO2: 96 %  O2 Device: High flow nasal cannula          Observation/Palpation  Posture: Fair (posterior lean seated EOB)  Observation: rectal tube, skin tear RLE- RN notified  Edema: significant BLE and BUE edema  Safety Devices  Type of Devices:  (daughter present. RN notified of skin tear right lower leg, clear draininage noted - pad placed under/gauze on tear. No bleeding noted)  Balance  Sitting: Impaired (initially Mod A due to posterior lean, progressing to SBA)  Sitting - Static: Poor (constant support); Supported sitting (approx. 10 mins EOB)  Sitting - Dynamic: Poor (constant support)  Toilet Transfers  Toilet Transfers Comments: GHADA, anticipate dependent via lift to [facility]     ADL  Grooming: Setup; Increased time to complete;Maximum assistance  Grooming Skilled Clinical Factors: Washed face semi-supine in bed w/ set up of wash cloth and Min A for thoroughness, Max A to comb hair d/t difficulty reaching  UE Dressing: Dependent/Total  UE Dressing Skilled Clinical Factors: Total A to change gowns seated EOB  LE Dressing: Dependent/Total  LE Dressing Skilled Clinical Factors: Total A to don depends by rolling L and R while supine in bed  Toileting: Dependent/Total  Toileting Skilled Clinical Factors: Pt incontinent of large amounts of stool - RN notified re: possible rectal tube misplacement. Pt required Total A x2 for pericare w/ +++ time and multiple bouts of rolling to remove and replace soiled pads, Total A to don clean depends     Activity Tolerance  Activity Tolerance: Patient limited by endurance; Patient limited by pain  Bed mobility  Rolling to Left: Moderate assistance (multiple bouts w/ use of bedrail)  Rolling to Right: Moderate assistance (multiple bouts w/ use of bedrail)  Supine to Sit: 2 Person assistance (Mod A x2, HOB slightly elevated, use of bedrail, VCs for sequencing)  Sit to Supine: 2 Person assistance (Total A x2)  Scootin Person assistance;Dependent/Total (Total A x2 and use of jazmyn lift to scoot up toward [facility] and to center of bed)  Transfers  Transfer Comments: GHADA  Vision  Vision: Impaired  Vision Exceptions: Wears glasses for reading  Hearing  Hearing: Within functional limits  Cognition  Overall Cognitive Status: Exceptions  Arousal/Alertness: Delayed responses to stimuli  Following Commands: Follows one step commands with increased time; Follows one step commands with repetition  Memory: Decreased recall of recent events  Safety Judgement: Decreased awareness of need for assistance;Decreased awareness of need for safety  Problem Solving: Assistance required to generate solutions  Insights: Decreased awareness of deficits  Initiation: Requires cues for some  Sequencing: Requires cues for all  Cognition Comment: delayed processing/response at times, soft spoken  Orientation  Overall Orientation Status: Within Functional Limits  Orientation Level: Oriented X4 A/AROM Exercises: x3 reps weak grasp/release - educated on performing digit, wrist, and elbow flexion/extension daily to reduce edema and increase ROM/strength  Education Given To: Patient; Family  Education Provided: Role of Therapy;Plan of Care;Precautions; ADL Adaptive Strategies; Home Exercise Program  Education Provided Comments: safety w/ bed mobility, BUE HEP  Education Method: Demonstration;Verbal  Barriers to Learning: Cognition  Education Outcome: Verbalized understanding;Continued education needed  LUE AROM (degrees)  LUE AROM : Exceptions  LUE General AROM: not formally assessed, observed limited shoulder ROM during ADLs and bed mobility  Left Hand AROM (degrees)  Left Hand AROM: WFL  Left Hand General AROM: weak grasp  RUE AROM (degrees)  RUE AROM : Exceptions  RUE General AROM: not formally assessed, observed limited shoulder ROM during ADLs and bed mobility  Right Hand AROM (degrees)  Right Hand AROM: WFL  Right Hand General AROM: weak grasp                     G-Code     OutComes Score                                                  AM-PAC Score             Tinneti Score       Goals  Short Term Goals  Time Frame for Short term goals: by d/c  Short Term Goal 1: pt will complete toileting w/ Mod A  Short Term Goal 2: pt will complete LE dressing w/ Mod A  Short Term Goal 3: pt will perform BUE HEP i'ly x10 reps AROM to all joints to reduce edema and improve strength for ADLs  Short Term Goal 4: pt will perofrm supine to sit w/ Mod A  Patient Goals   Patient goals : not stated       Therapy Time   Individual Concurrent Group Co-treatment   Time In  853         Time Out  955         Minutes  62             62 Minutes Total (15 min eval 47 min tx)    Mary Avitia, OT

## 2022-08-08 NOTE — PROGRESS NOTES
H&H this am was 6.7 and 20.4. Blood products ordered. 1 unit PRBC started by Marquez Barnett RN. Still running at this time. Will redraw labs once completed. Patient tolerating well.

## 2022-08-08 NOTE — CONSULTS
g/pro, and 698 mL FW + FW flushes of 30 mL q4    Anthropometric Measures:  Height: 5' 2.01\" (157.5 cm)  Ideal Body Weight (IBW): 110 lbs (50 kg)       Current Body Weight: 262 lb 5.6 oz (119 kg), 238.5 % IBW. Current BMI (kg/m2): 48                          BMI Categories: Obese Class 3 (BMI 40.0 or greater)    Estimated Daily Nutrient Needs:  Energy Requirements Based On: Kcal/kg (30-35 kcal/kg IBW)  Weight Used for Energy Requirements: Ideal (IBW 50 kg)  Energy (kcal/day): 8016-9803  Weight Used for Protein Requirements: Ideal  Protein (g/day): 60-75  Method Used for Fluid Requirements: 1 ml/kcal  Fluid (ml/day): or per MD    Nutrition Diagnosis:   Inadequate oral intake related to altered GI structure as evidenced by NPO or clear liquid status due to medical condition, nutrition support - enteral nutrition    Nutrition Interventions:   Food and/or Nutrient Delivery: Start Tube Feeding, Continue NPO  Nutrition Education/Counseling: Education not indicated  Coordination of Nutrition Care: Continue to monitor while inpatient       Goals:  Previous Goal Met: Progressing toward Goal(s)  Goals: Initiate nutrition support, within 2 days       Nutrition Monitoring and Evaluation:   Behavioral-Environmental Outcomes: None Identified  Food/Nutrient Intake Outcomes: Enteral Nutrition Intake/Tolerance  Physical Signs/Symptoms Outcomes: Biochemical Data, Nutrition Focused Physical Findings, Weight, GI Status, Fluid Status or Edema    Discharge Planning:     Too soon to determine     Bob Arreguin, [address] [place] [address],   Contact: [phone]

## 2022-08-08 NOTE — PROGRESS NOTES
Office : [phone]     Fax :[phone]         Renal Progress Note    Subjective:   Admit Date: 7/30/2022     Interval History:   NAONE. Patient lethargic   On  HFNC. UO  much better   Na low              DIET Diet NPO  Medications:   Scheduled Meds:   metoprolol  5 mg IntraVENous Q6H    naloxegol  12.5 mg Oral QAM AC    metoclopramide  5 mg IntraVENous Q6H    pantoprazole  40 mg IntraVENous BID    sodium chloride (Inhalant)  4 mL Nebulization BID    albuterol  2.5 mg Nebulization TID    polyethylene glycol  17 g Per NG tube TID    lidocaine   Topical Once    sodium chloride flush  5-40 mL IntraVENous 2 times per day    fluticasone  2 spray Each Nostril Daily    [Held by provider] pregabalin  50 mg Oral TID     Continuous Infusions:   sodium chloride      sodium chloride      dextrose 20 mL/hr at 08/08/22 0533    heparin (PORCINE) Infusion 14 Units/kg/hr (08/08/22 0438)    dextrose      sodium chloride         Labs:  CBC:   Recent Labs     08/06/22  0345 08/06/22  1216 08/07/22  0404 08/08/22  0444 08/08/22  0918   WBC 17.8*  --  13.3* 12.0*  --    HGB 7.0*   < > 7.4* 6.7* 7.8*   *  --  see below 95*  --     < > = values in this interval not displayed.        BMP:    Recent Labs     08/06/22  0345 08/07/22  0403 08/08/22  0445   * 130*  126* 124*   K 5.3* 5.2*  5.1 4.6   CL 90* 92*  90* 89*   CO2 18* 30  26 18*   BUN 96* 100*  98* 97*   CREATININE 3.5* 3.5*  3.3* 2.8*   GLUCOSE 124* 104*  102* 88       Ca/Mg/Phos:   Recent Labs     08/06/22  0345 08/07/22  0403 08/08/22  0445   CALCIUM 7.3* 6.7*  7.3* 7.3*   MG 3.00* 2.80* 2.70*   PHOS 8.1* Edema present. Skin:     General: Skin is warm and dry. Neurological:      General: No focal deficit present. Mental Status: She is oriented to person, place, and time. Assessment and Plan:       IMAGING:  XR CHEST PORTABLE   Final Result      Mild bilateral airspace disease. XR ABDOMEN (KUB) (SINGLE AP VIEW)   Final Result   1. No evidence of free intraperitoneal air. 2. Indeterminate bowel gas pattern due to a paucity of small bowel gas. XR ABDOMEN (KUB) (SINGLE AP VIEW)   Final Result   Impression: Stable appearance of the abdomen. XR CHEST PORTABLE   Final Result      Low lung volumes with central bronchovascular crowding. Atelectasis in the left lung base. Normal cardiomediastinal silhouette. Lines and tubes in standard position. VL Extremity Venous Bilateral   Final Result      XR ABDOMEN (KUB) (SINGLE AP VIEW)   Final Result   Impression: Stable appearance of the abdomen with gas distended loops of central small bowel again noted. CT CHEST WO CONTRAST   Final Result      1. Moderate right pleural effusion and small left effusion with basilar airspace opacity most consistent with atelectasis. Superimposed pneumonia be difficult to exclude. 2. Narrowing of the trachea with expiration compatible with treated bronchial malacia. 3. Nasogastric tube tip in the lower esophagus. 4. Persistent colonic distention. 5. Mild coronary artery calcification. XR ABDOMEN (KUB) (SINGLE AP VIEW)   Final Result      Frontal supine views demonstrate an unremarkable bowel gas pattern. No significant colonic stool is seen. Right hip arthroplasty noted. XR CHEST PORTABLE   Final Result      Prominence of the perihilar interstitial markings with mild peribronchial cuffing is favored to represent mild pulmonary edema. Low lung volumes bronchovascular crowding. Stable cardiomediastinal silhouette. Lines and tubes in standard position. Cholecystectomy clips are noted. CT ABDOMEN PELVIS WO CONTRAST Additional Contrast? Oral and Rectal   Final Result   1. Moderate colonic distention. Correlate for constipation. Fecal content within the ileum which may suggest stasis. There is no small bowel obstruction. 2. Subcutaneous fluid collection overlying the right total hip arthroplasty. CT HIP RIGHT WO CONTRAST   Final Result   1. Moderate colonic distention. Correlate for constipation. Fecal content within the ileum which may suggest stasis. There is no small bowel obstruction. 2. Subcutaneous fluid collection overlying the right total hip arthroplasty. XR CHEST PORTABLE   Final Result      1. Right IJ CVC tip in the cavoatrial junction. 2.  Bibasilar airspace disease suggesting atelectasis. Pneumonia is not excluded. XR ABDOMEN (KUB) (SINGLE AP VIEW)   Final Result      Dilated colonic and small bowel loops may suggest ileus but obstruction is not excluded. XR ABDOMEN (KUB) (SINGLE AP VIEW)   Final Result      No acute radiographic abnormality of the abdomen. Status post right hip arthroplasty with mildly displaced greater trochanteric fracture fragment, new or more pronounced compared to the prior study. Assessment/Plan     ARIANE   2. Sepsis  3. AGMA   4. Lactic Acidosis - resolved  4. Hyperkalemia  5. Hyponatremia  6. Bowel Obstruction  7.  Hypertension      Plan:  - Diuresis as janice   - no free water for now   - ECHO normnal EF - IVC compressible   - Hypoxia likely sec to Aspiration pneumonitis   - ARIANE sec to ATN   - Monitor I/Os  - Avoid nephrotoxic agents    Recommend to dose adjust all medications  based on renal functions  Maintain SBP> 90 mmHg   Daily weights   AVOID NSAIDs  Avoid Nephrotoxins  Monitor Intake/Output  Call if significant decrease in urine output              Alexys Purvis MD

## 2022-08-08 NOTE — PROGRESS NOTES
Physical Therapy  Facility/Department: [facility] ICU  Physical Therapy Initial Assessment    Name: Lilian Cox  : 1950  MRN: [id number]  Date of Service: 2022    Discharge Recommendations:    Lilian Cox scored a  on the AM-PAC short mobility form. Current research shows that an AM-PAC score of 17 or less is typically not associated with a discharge to the patient's home setting. Based on the patient's AM-PAC score and their current functional mobility deficits, it is recommended that the patient have 3-5 sessions per week of Physical Therapy at d/c to increase the patient's independence. Please see assessment section for further patient specific details. If patient discharges prior to next session this note will serve as a discharge summary. Please see below for the latest assessment towards goals. PT Equipment Recommendations  Equipment Needed:  (to be determined by next LOC)      Patient Diagnosis(es): The encounter diagnosis was Right lower quadrant abdominal pain. Past Medical History:  has a past medical history of Back pain, Hyperlipidemia, Hypertension, Tuberculosis, and Wears dentures. Past Surgical History:  has a past surgical history that includes Appendectomy (); Tonsillectomy (); Ectopic pregnancy surgery (); Partial hysterectomy (); Cholecystectomy (); Cataract removal (Bilateral, ); Total hip arthroplasty (Right, 2022); and Colonoscopy (N/A, 8/3/2022). Assessment   Assessment: Pt normally lives with spouse and is independent with functional mobility and ADL/driving. .  Family assists with heavier housecleaning. Currently needs mod A for rolling and Mod to Max x 2 for supine<>sit.   Recommend continued IP therapies to maximize mobility, safety and independence  Treatment Diagnosis: Decreased functional mobility post admission for weakness/nausea  Therapy Prognosis: Good  Barriers to Learning: none noted  Activity Tolerance  Activity Tolerance: Patient limited by endurance; Patient limited by pain     Plan   Plan  Plan:  (2-5)  Current Treatment Recommendations: Strengthening, ROM, Functional mobility training, Gait training, Safety education & training, Transfer training  Safety Devices  Type of Devices:  (daughter present. RN notified of skin tear right lower leg, clear draininage noted - pad placed under/gauze on tear. No bleeding noted)     Restrictions  Position Activity Restriction  Other position/activity restrictions: up wtih assist,  50% WB right hip     Subjective   General  Patient assessed for rehabilitation services?: Yes  Additional Pertinent Hx: Adm 7/30 with weakness, nausea and abdominal pain. Recent. anterior approach CASS done 7/16,  mL, complicated procedure in the setting of patient's morbid obesity and proximal femur osteoporotic bone lesions and possibility of calcified crack which was treated with proximal femur cable. At SNF until 7/19  Discharged home with HHPT and 24 hr A from family. Transferredto ICU 8/2/22.  8/3  colonic decompression.   Pt developed afib with RVR  Referring Practitioner: Ghassan Luciano  Diagnosis: Weakness, nausea, abdominal pain       Social/Functional History  Social/Functional History  Lives With: Spouse  Type of Home: House  Home Layout: One level  Home Access: Ramped entrance (through garage)  Bathroom Shower/Tub: Walk-in shower (small lip to enter, glass doors)  Bathroom Toilet: Handicap height (+ toilet riser w/ rails)  Bathroom Equipment: Grab bars in shower, Toilet raiser, Hand-held shower  Home Equipment: Walker, New Prema, Walker, 4 wheeled, [address] [address], [address] (transport chair)  [place] [address] Help From: Family, Other (comment) (skilled RN 1x weekly, daughter assists \"as often as she needs me\" w/ heavy household mgmt)  ADL Assistance: Independent (prior to hip sx)  Homemaking Assistance: Independent (shares w/ )  Ambulation Assistance: Independent (prior to hip sx, w/ AD)  Transfer Assistance: Independent  Active : Yes  Leisure & Hobbies: \"Keeping everything up\"  IADL Comments: Shared w/ , daughter and granddaugher assist PRN w/ household mgmt  Additional Comments: Pt d/c'd to SNF from recent hip sx on 7/16  Vision/Hearing  Vision  Vision: Impaired  Vision Exceptions: Wears glasses for reading  Hearing  Hearing: Within functional limits    Cognition   Orientation  Overall Orientation Status: Within Functional Limits  Orientation Level: Oriented X4  Cognition  Overall Cognitive Status: Exceptions  Arousal/Alertness: Delayed responses to stimuli  Following Commands: Follows one step commands with increased time; Follows one step commands with repetition  Memory: Decreased recall of recent events  Safety Judgement: Decreased awareness of need for assistance;Decreased awareness of need for safety  Problem Solving: Assistance required to generate solutions  Insights: Decreased awareness of deficits  Initiation: Requires cues for some  Sequencing: Requires cues for all  Cognition Comment: delayed processing/response at times, soft spoken     Objective   Heart Rate: 78  Heart Rate Source: Monitor  BP: (!) 115/51  BP Location: Left Arm  BP Method: Automatic  Patient Position: Semi fowlers  MAP (Calculated): 72.33  Resp: 15  SpO2: 96 %  O2 Device: High flow nasal cannula     Observation/Palpation  Observation: rectal tube, skin tear RLE- RN notified  Edema: significant BLE and BUE edema        ROM/Strength - all four extremities are edematous, limited ROM and strength with difficulty moving in supine and seated position. Notes pain with suma LE movement. Balance  Sitting: Impaired (initially Mod A due to posterior lean, progressing to SBA)  Sitting - Static: Poor (constant support); Supported sitting (approx.  10 mins EOB)  Sitting - Dynamic: Poor (constant support)  Bed mobility  Rolling to Left: Moderate assistance (multiple times, use of side rail)  Rolling to Right: Moderate assistance (multiple times, use of side rail)  Supine to Sit: Moderate assistance;2 Person assistance (use of side rails, cueiing)  Sit to Supine: Maximum assistance;2 Person assistance  Scootin Person assistance;Dependent/Total       Balance  Comments: Pt sat on eob 10 min with assist from initial mod x 1 to SBA     AM-PAC Score  AM-PAC Inpatient Mobility Raw Score : 7 (22)  AM-PAC Inpatient T-Scale Score : 26.42 (22)  Mobility Inpatient CMS 0-100% Score: 92.36 (22)  Mobility Inpatient CMS G-Code Modifier : CM (22)    Goals  Short Term Goals  Time Frame for Short term goals: Discharge  Short term goal 1: roll with CG R/L  Short term goal 2: Supine to sit with CG  Short term goal 3: Sit to stand with mod x 2  Short term goal 4: Bed to chair with mod x 2  Short term goal 5: Ambulate 20 ft with RW, 50% WB on right with mod x 2  Patient Goals   Patient goals : not specifically stated       Education role of PT, safety, bed mobility.          Therapy Time   Individual Concurrent Group Co-treatment   Time In 8156         Time Out 0950         Minutes 55             Timed Code Treatment Minutes:   40    Total Treatment Minutes:  [address] [address]

## 2022-08-09 PROBLEM — I48.0 PAROXYSMAL ATRIAL FIBRILLATION (HCC): Status: ACTIVE | Noted: 2022-08-09

## 2022-08-09 LAB
ALBUMIN SERPL-MCNC: 1.7 G/DL (ref 3.4–5)
ANION GAP SERPL CALCULATED.3IONS-SCNC: 11 MMOL/L (ref 3–16)
ANISOCYTOSIS: ABNORMAL
ANTI-XA UNFRAC HEPARIN: 0.19 IU/ML (ref 0.3–0.7)
ANTI-XA UNFRAC HEPARIN: 0.23 IU/ML (ref 0.3–0.7)
BASOPHILS ABSOLUTE: 0 K/UL (ref 0–0.2)
BASOPHILS RELATIVE PERCENT: 0 %
BUN BLDV-MCNC: 83 MG/DL (ref 7–20)
CALCIUM SERPL-MCNC: 7.6 MG/DL (ref 8.3–10.6)
CHLORIDE BLD-SCNC: 95 MMOL/L (ref 99–110)
CO2: 23 MMOL/L (ref 21–32)
CREAT SERPL-MCNC: 1.9 MG/DL (ref 0.6–1.2)
EOSINOPHILS ABSOLUTE: 0.1 K/UL (ref 0–0.6)
EOSINOPHILS RELATIVE PERCENT: 1 %
GFR AFRICAN AMERICAN: 31
GFR NON-AFRICAN AMERICAN: 26
GLUCOSE BLD-MCNC: 108 MG/DL (ref 70–99)
GLUCOSE BLD-MCNC: 74 MG/DL (ref 70–99)
GLUCOSE BLD-MCNC: 81 MG/DL (ref 70–99)
GLUCOSE BLD-MCNC: 94 MG/DL (ref 70–99)
HCT VFR BLD CALC: 22.8 % (ref 36–48)
HEMOGLOBIN: 7.5 G/DL (ref 12–16)
LYMPHOCYTES ABSOLUTE: 1.1 K/UL (ref 1–5.1)
LYMPHOCYTES RELATIVE PERCENT: 9 %
MACROCYTES: ABNORMAL
MAGNESIUM: 2.4 MG/DL (ref 1.8–2.4)
MCH RBC QN AUTO: 29.7 PG (ref 26–34)
MCHC RBC AUTO-ENTMCNC: 32.7 G/DL (ref 31–36)
MCV RBC AUTO: 90.7 FL (ref 80–100)
MONOCYTES ABSOLUTE: 1.1 K/UL (ref 0–1.3)
MONOCYTES RELATIVE PERCENT: 9 %
NEUTROPHILS ABSOLUTE: 10.1 K/UL (ref 1.7–7.7)
NEUTROPHILS RELATIVE PERCENT: 81 %
PDW BLD-RTO: 15.2 % (ref 12.4–15.4)
PERFORMED ON: ABNORMAL
PERFORMED ON: NORMAL
PERFORMED ON: NORMAL
PHOSPHORUS: 6.6 MG/DL (ref 2.5–4.9)
PLATELET # BLD: 130 K/UL (ref 135–450)
PMV BLD AUTO: 8.8 FL (ref 5–10.5)
POLYCHROMASIA: ABNORMAL
POTASSIUM SERPL-SCNC: 3.9 MMOL/L (ref 3.5–5.1)
RBC # BLD: 2.51 M/UL (ref 4–5.2)
SODIUM BLD-SCNC: 129 MMOL/L (ref 136–145)
TARGET CELLS: ABNORMAL
WBC # BLD: 12.5 K/UL (ref 4–11)

## 2022-08-09 PROCEDURE — 6360000002 HC RX W HCPCS

## 2022-08-09 PROCEDURE — 99291 CRITICAL CARE FIRST HOUR: CPT | Performed by: INTERNAL MEDICINE

## 2022-08-09 PROCEDURE — 6370000000 HC RX 637 (ALT 250 FOR IP): Performed by: INTERNAL MEDICINE

## 2022-08-09 PROCEDURE — 94669 MECHANICAL CHEST WALL OSCILL: CPT

## 2022-08-09 PROCEDURE — 94761 N-INVAS EAR/PLS OXIMETRY MLT: CPT

## 2022-08-09 PROCEDURE — 36592 COLLECT BLOOD FROM PICC: CPT

## 2022-08-09 PROCEDURE — 85520 HEPARIN ASSAY: CPT

## 2022-08-09 PROCEDURE — 6370000000 HC RX 637 (ALT 250 FOR IP)

## 2022-08-09 PROCEDURE — 92526 ORAL FUNCTION THERAPY: CPT

## 2022-08-09 PROCEDURE — 80069 RENAL FUNCTION PANEL: CPT

## 2022-08-09 PROCEDURE — 97535 SELF CARE MNGMENT TRAINING: CPT

## 2022-08-09 PROCEDURE — 2580000003 HC RX 258

## 2022-08-09 PROCEDURE — 92610 EVALUATE SWALLOWING FUNCTION: CPT

## 2022-08-09 PROCEDURE — 85025 COMPLETE CBC W/AUTO DIFF WBC: CPT

## 2022-08-09 PROCEDURE — C9113 INJ PANTOPRAZOLE SODIUM, VIA: HCPCS

## 2022-08-09 PROCEDURE — 97530 THERAPEUTIC ACTIVITIES: CPT

## 2022-08-09 PROCEDURE — 99233 SBSQ HOSP IP/OBS HIGH 50: CPT | Performed by: NURSE PRACTITIONER

## 2022-08-09 PROCEDURE — 36415 COLL VENOUS BLD VENIPUNCTURE: CPT

## 2022-08-09 PROCEDURE — 94640 AIRWAY INHALATION TREATMENT: CPT

## 2022-08-09 PROCEDURE — 1200000000 HC SEMI PRIVATE

## 2022-08-09 PROCEDURE — 2500000003 HC RX 250 WO HCPCS: Performed by: INTERNAL MEDICINE

## 2022-08-09 PROCEDURE — 6360000002 HC RX W HCPCS: Performed by: NURSE PRACTITIONER

## 2022-08-09 PROCEDURE — 83735 ASSAY OF MAGNESIUM: CPT

## 2022-08-09 PROCEDURE — 6360000002 HC RX W HCPCS: Performed by: INTERNAL MEDICINE

## 2022-08-09 PROCEDURE — 2700000000 HC OXYGEN THERAPY PER DAY

## 2022-08-09 PROCEDURE — 99231 SBSQ HOSP IP/OBS SF/LOW 25: CPT | Performed by: SURGERY

## 2022-08-09 PROCEDURE — 99233 SBSQ HOSP IP/OBS HIGH 50: CPT | Performed by: INTERNAL MEDICINE

## 2022-08-09 PROCEDURE — 6370000000 HC RX 637 (ALT 250 FOR IP): Performed by: STUDENT IN AN ORGANIZED HEALTH CARE EDUCATION/TRAINING PROGRAM

## 2022-08-09 PROCEDURE — 97110 THERAPEUTIC EXERCISES: CPT

## 2022-08-09 RX ORDER — ALBUTEROL SULFATE 2.5 MG/3ML
2.5 SOLUTION RESPIRATORY (INHALATION) 2 TIMES DAILY
Status: DISCONTINUED | OUTPATIENT
Start: 2022-08-09 | End: 2022-08-23 | Stop reason: HOSPADM

## 2022-08-09 RX ORDER — HEPARIN SODIUM 1000 [USP'U]/ML
2000 INJECTION, SOLUTION INTRAVENOUS; SUBCUTANEOUS PRN
Status: DISCONTINUED | OUTPATIENT
Start: 2022-08-09 | End: 2022-08-15

## 2022-08-09 RX ORDER — HEPARIN SODIUM 1000 [USP'U]/ML
4000 INJECTION, SOLUTION INTRAVENOUS; SUBCUTANEOUS PRN
Status: DISCONTINUED | OUTPATIENT
Start: 2022-08-09 | End: 2022-08-13

## 2022-08-09 RX ORDER — HEPARIN SODIUM 10000 [USP'U]/100ML
5-30 INJECTION, SOLUTION INTRAVENOUS CONTINUOUS
Status: DISCONTINUED | OUTPATIENT
Start: 2022-08-09 | End: 2022-08-13

## 2022-08-09 RX ORDER — MECOBALAMIN 5000 MCG
10 TABLET,DISINTEGRATING ORAL NIGHTLY
Status: DISCONTINUED | OUTPATIENT
Start: 2022-08-09 | End: 2022-08-23 | Stop reason: HOSPADM

## 2022-08-09 RX ORDER — MECOBALAMIN 5000 MCG
10 TABLET,DISINTEGRATING ORAL ONCE
Status: COMPLETED | OUTPATIENT
Start: 2022-08-10 | End: 2022-08-09

## 2022-08-09 RX ADMIN — METOCLOPRAMIDE HYDROCHLORIDE 5 MG: 5 INJECTION INTRAMUSCULAR; INTRAVENOUS at 16:40

## 2022-08-09 RX ADMIN — PHENOL 1 SPRAY: 1.5 LIQUID ORAL at 16:56

## 2022-08-09 RX ADMIN — METHOCARBAMOL 500 MG: 500 TABLET ORAL at 23:40

## 2022-08-09 RX ADMIN — ALBUTEROL SULFATE 2.5 MG: 2.5 SOLUTION RESPIRATORY (INHALATION) at 07:21

## 2022-08-09 RX ADMIN — METOCLOPRAMIDE HYDROCHLORIDE 5 MG: 5 INJECTION INTRAMUSCULAR; INTRAVENOUS at 04:28

## 2022-08-09 RX ADMIN — SODIUM CHLORIDE, PRESERVATIVE FREE 20 ML: 5 INJECTION INTRAVENOUS at 08:31

## 2022-08-09 RX ADMIN — METOPROLOL TARTRATE 5 MG: 5 INJECTION INTRAVENOUS at 10:12

## 2022-08-09 RX ADMIN — MORPHINE SULFATE 2 MG: 2 INJECTION, SOLUTION INTRAMUSCULAR; INTRAVENOUS at 08:11

## 2022-08-09 RX ADMIN — MORPHINE SULFATE 2 MG: 2 INJECTION, SOLUTION INTRAMUSCULAR; INTRAVENOUS at 16:40

## 2022-08-09 RX ADMIN — ALBUTEROL SULFATE 2.5 MG: 2.5 SOLUTION RESPIRATORY (INHALATION) at 19:45

## 2022-08-09 RX ADMIN — PANTOPRAZOLE SODIUM 40 MG: 40 INJECTION, POWDER, FOR SOLUTION INTRAVENOUS at 20:20

## 2022-08-09 RX ADMIN — MORPHINE SULFATE 2 MG: 2 INJECTION, SOLUTION INTRAMUSCULAR; INTRAVENOUS at 12:09

## 2022-08-09 RX ADMIN — METOPROLOL TARTRATE 5 MG: 5 INJECTION INTRAVENOUS at 04:28

## 2022-08-09 RX ADMIN — FLUTICASONE PROPIONATE 2 SPRAY: 50 SPRAY, METERED NASAL at 08:10

## 2022-08-09 RX ADMIN — METOPROLOL TARTRATE 5 MG: 5 INJECTION INTRAVENOUS at 21:36

## 2022-08-09 RX ADMIN — METOCLOPRAMIDE HYDROCHLORIDE 5 MG: 5 INJECTION INTRAMUSCULAR; INTRAVENOUS at 21:36

## 2022-08-09 RX ADMIN — SODIUM CHLORIDE 30 MG/ML INHALATION SOLUTION 4 ML: 30 SOLUTION INHALANT at 07:22

## 2022-08-09 RX ADMIN — HEPARIN SODIUM 14 UNITS/KG/HR: 10000 INJECTION, SOLUTION INTRAVENOUS at 15:15

## 2022-08-09 RX ADMIN — SODIUM CHLORIDE, PRESERVATIVE FREE 10 ML: 5 INJECTION INTRAVENOUS at 20:21

## 2022-08-09 RX ADMIN — METHOCARBAMOL 500 MG: 500 TABLET ORAL at 12:09

## 2022-08-09 RX ADMIN — PHENOL 1 SPRAY: 1.5 LIQUID ORAL at 08:10

## 2022-08-09 RX ADMIN — MORPHINE SULFATE 2 MG: 2 INJECTION, SOLUTION INTRAMUSCULAR; INTRAVENOUS at 01:09

## 2022-08-09 RX ADMIN — MORPHINE SULFATE 2 MG: 2 INJECTION, SOLUTION INTRAMUSCULAR; INTRAVENOUS at 21:18

## 2022-08-09 RX ADMIN — Medication 10 MG: at 02:00

## 2022-08-09 RX ADMIN — HEPARIN SODIUM 2000 UNITS: 1000 INJECTION INTRAVENOUS; SUBCUTANEOUS at 16:52

## 2022-08-09 RX ADMIN — METOCLOPRAMIDE HYDROCHLORIDE 5 MG: 5 INJECTION INTRAMUSCULAR; INTRAVENOUS at 10:12

## 2022-08-09 RX ADMIN — Medication 10 MG: at 23:38

## 2022-08-09 RX ADMIN — PANTOPRAZOLE SODIUM 40 MG: 40 INJECTION, POWDER, FOR SOLUTION INTRAVENOUS at 08:10

## 2022-08-09 RX ADMIN — METOPROLOL TARTRATE 5 MG: 5 INJECTION INTRAVENOUS at 16:40

## 2022-08-09 RX ADMIN — SODIUM CHLORIDE 30 MG/ML INHALATION SOLUTION 4 ML: 30 SOLUTION INHALANT at 19:45

## 2022-08-09 RX ADMIN — PHENOL 1 SPRAY: 1.5 LIQUID ORAL at 12:09

## 2022-08-09 ASSESSMENT — PAIN DESCRIPTION - DESCRIPTORS
DESCRIPTORS: ACHING;PRESSURE;SPASM
DESCRIPTORS: ACHING
DESCRIPTORS: ACHING;PRESSURE
DESCRIPTORS: ACHING;PRESSURE

## 2022-08-09 ASSESSMENT — PAIN DESCRIPTION - ONSET: ONSET: ON-GOING

## 2022-08-09 ASSESSMENT — PAIN DESCRIPTION - ORIENTATION
ORIENTATION: LOWER

## 2022-08-09 ASSESSMENT — PAIN SCALES - GENERAL
PAINLEVEL_OUTOF10: 6
PAINLEVEL_OUTOF10: 7
PAINLEVEL_OUTOF10: 8
PAINLEVEL_OUTOF10: 6
PAINLEVEL_OUTOF10: 3
PAINLEVEL_OUTOF10: 8

## 2022-08-09 ASSESSMENT — PAIN DESCRIPTION - LOCATION
LOCATION: BUTTOCKS
LOCATION: BACK
LOCATION: BACK
LOCATION: HIP

## 2022-08-09 ASSESSMENT — PAIN DESCRIPTION - PAIN TYPE: TYPE: SURGICAL PAIN

## 2022-08-09 ASSESSMENT — PAIN DESCRIPTION - FREQUENCY: FREQUENCY: CONTINUOUS

## 2022-08-09 ASSESSMENT — PAIN - FUNCTIONAL ASSESSMENT: PAIN_FUNCTIONAL_ASSESSMENT: PREVENTS OR INTERFERES SOME ACTIVE ACTIVITIES AND ADLS

## 2022-08-09 NOTE — PROGRESS NOTES
Speech Language Pathology  Facility/Department: [facility] ICU   CLINICAL BEDSIDE SWALLOW EVALUATION  Treatment     NAME: Gina Salmeron  : 1950  MRN: [id number]    ADMISSION DATE: 2022       Past Medical History:  has a past medical history of Back pain, Hyperlipidemia, Hypertension, Tuberculosis, and Wears dentures. Past Surgical History:  has a past surgical history that includes Appendectomy (); Tonsillectomy (); Ectopic pregnancy surgery (); Partial hysterectomy (); Cholecystectomy (); Cataract removal (Bilateral, ); Total hip arthroplasty (Right, 2022); and Colonoscopy (N/A, 8/3/2022). Recent Chest Xray 22  Mild bilateral airspace disease. Date of Eval: 2022  Evaluating Therapist: JUAN ANTONIO Garland    Current Diet level:  Current Diet : NPO    Primary Complaint:   Patient Complaint: pt is without complaints        Pain:   Denied pain       Reason for Referral  Gina Salmeron was referred for a bedside swallow evaluation to assess the efficiency of her swallow function, identify signs and symptoms of aspiration, and make recommendations regarding safe dietary consistencies, effective compensatory strategies, and safe eating environment. History Of Present Illness:   74 y.o. female Gina Salmeron  - Hx of HTN, HLD, Gout, OA, recent Rt CASS, morbid obesity  -Presents with generalized weakness nausea vomiting and abdominal pain, she woke up with the symptoms last night    Impression  Pt sleeping, sitting up in chair with daughter present. Pt woke fairly easily and was able to participate with only min cues to remain alert at the beginning of the session. Pt had NG removed this date and has not taken in PO nutrition for some time. Pt with upper and lower denture. ROM of oral structures was [facility]. Pt had minimal difficulty closing lips around spoon or drawing liquid up a straw.   Pt had mild difficulty with mastication with solids with prolonged mastication noted. There was no anterior spillage or oral residue noted with any consistency. Pt demonstrated no s/s aspiration with any consistency presented. Pt able to initiate swallow without difficulty with laryngeal movement observed. Vocal quality remained clear throughout. No coughing, throat clearing or choking observed with any consistency. Pt was instructed to consume 3oz of water continuously but stopped to take a breath after 3 successive swallows. No s/s aspiration noted. RR fluctuated between 14-18 through out the session. Recommend full liquid diet at this time due to concern for fatigue as pt is deconditioned. Dysphagia Diagnosis  Dysphagia Diagnosis: Mild to moderate oral stage dysphagia (due to fatigue)  Dysphagia Outcome Severity Scale: Level 4: Mild moderate dysphagia- Intermittent supervision/cueing. One - two diet consistencies restricted    Treatment Plan  Requires SLP Intervention: Yes        D/C Recommendations: To be determined      Recommended Diet and Intervention  Diet recommendation- full liquid diet-Make NPO if s/s aspiration emerge and alert SLP  Medication:Recommended Form of Meds: PO  Therapeutic Interventions: Diet tolerance monitoring, Patient/Family education    Compensatory Swallowing Strategies Attempted  Compensatory Swallowing Strategies : Upright as possible for all oral intake;Remain upright for 30-45 minutes after meals;Eat/Feed slowly; Alternate solids and liquids;Small bites/sips      Treatment/Goals  1-The patient will tolerate recommended diet without observed clinical signs of aspiration    2- The pt/caregiver will demonstrate understanding of swallowing recommendations and concerns.    8/9- The pt and daughter were educated to purpose of the visit, anatomy and physiology of the swallow, concerns for aspiration, role breathing plays in swallowing, concern for fatigue swallowing strategies, diet recommendations and possibility of being made NPO if s/s aspiration emerge. Both stated comprehension and agreement with diet recommendation and had no further questions. con't goal      General  Chart Reviewed: Yes  Behavior/Cognition  Behavior/Cognition: Cooperative;Pleasant mood; Alert  Respiratory Status: O2 via nasual cannula  Communication Observation: Functional  Follows Directions: Simple  Dentition: Dentures bottom; Dentures top  Patient Positioning: Upright in chair  Baseline Vocal Quality: Weak  Volitional Cough: Weak  Prior Dysphagia History: no history of oral or  pharyngeal phase dysphagia         Vision/Hearing  Vision  Vision Exceptions: Wears glasses for reading  Hearing  Hearing: Within functional limits      Prognosis  Individuals consulted  Consulted and agree with results and recommendations: Patient;RN;Family member  Family member consulted: daughter  RN Name: Meme [place]    Education  Patient Education: Role of SLP  Patient Education Response: Verbalizes understanding       Therapy Time  SLP Individual Minutes  Time In: 1120  Time Out: 2728  Minutes: 25            Pt's goal: to eat      Plan:  Continue goals per POC- 2-4x  Recommended diet:Full liquid diet -Make NPO if s/s aspiration emerge and alert SLP  Pt may benefit from nutritional supplement due to concern for fatigue and possible poor intake  Will con't to assess for diet tolerance and possible upgrade   Total treatment time:25  Pt's discharge plan:to home   Discharge Plan: To be determined closer to discharge  Discussed with Meme LOERA [place]   Needs within reach.        Blanquita Glover, [address] [facility], [facility]- [address] [place] [address]  Pg # [phone]  This document will serve as a discharge summary if pt discharge before next treatment   session

## 2022-08-09 NOTE — PROGRESS NOTES
Physical Therapy  Facility/Department: [facility] ICU  Physical Therapy Treatment Note    Name: Silverio Parrish  : 1950  MRN: [id number]  Date of Service: 2022    Discharge Recommendations:    Silverio Parrish scored a  on the AM-PAC short mobility form. Current research shows that an AM-PAC score of 17 or less is typically not associated with a discharge to the patient's home setting. Based on the patient's AM-PAC score and their current functional mobility deficits, it is recommended that the patient have 3-5 sessions per week of Physical Therapy at d/c to increase the patient's independence. Please see assessment section for further patient specific details. If patient discharges prior to next session this note will serve as a discharge summary. Please see below for the latest assessment towards goals. PT Equipment Recommendations  Equipment Needed:  (defer)      Patient Diagnosis(es): The encounter diagnosis was Right lower quadrant abdominal pain. Past Medical History:  has a past medical history of Back pain, Hyperlipidemia, Hypertension, Tuberculosis, and Wears dentures. Past Surgical History:  has a past surgical history that includes Appendectomy (); Tonsillectomy (); Ectopic pregnancy surgery (); Partial hysterectomy (); Cholecystectomy (); Cataract removal (Bilateral, ); Total hip arthroplasty (Right, 2022); and Colonoscopy (N/A, 8/3/2022). Assessment   Body Structures, Functions, Activity Limitations Requiring Skilled Therapeutic Intervention: Decreased functional mobility ; Decreased strength;Decreased endurance;Decreased balance; Increased pain  Assessment: Able to stand this date with mod assist x 2. Continues to be below baseline. Limited by pain in back and R hip.   Rec cont skilled PT to maximize mobility and independence  Treatment Diagnosis: Decreased functional mobility post admission for weakness/nausea        Plan   Plan  Plan: (2-5)  Current Treatment Recommendations: Strengthening, ROM, Functional mobility training, Gait training, Safety education & training, Transfer training  Safety Devices  Type of Devices: Chair alarm in place, Call light within reach, Nurse notified, Left in chair     Restrictions  Position Activity Restriction  Other position/activity restrictions: up wtih assist,  50% WB right hip     Subjective   General  Chart Reviewed: Yes  Additional Pertinent Hx: Adm 7/30 with weakness, nausea and abdominal pain. Recent. anterior approach CASS done 7/16,  mL, complicated procedure in the setting of patient's morbid obesity and proximal femur osteoporotic bone lesions and possibility of calcified crack which was treated with proximal femur cable. At SNF until 7/19  Discharged home with HHPT and 24 hr A from family. Transferredto ICU 8/2/22.  8/3  colonic decompression. Pt developed afib with RVR  Subjective  Subjective: Pt found supine. Agreeable to PT. Reports back and R hip pain - not rated, RN gave pain meds. Social/Functional History  Social/Functional History  Lives With: Spouse  Type of Home: House  Home Layout: One level  Home Access: Ramped entrance (through garage)  Bathroom Shower/Tub: Walk-in shower (small lip to enter, glass doors)  Bathroom Toilet: Handicap height (+ toilet riser w/ rails)  Bathroom Equipment: Grab bars in shower, Toilet raiser, Hand-held shower  Home Equipment: Walker, [employer]ma, Walker, 4 wheeled, [employer], [address] (transport chair)  [employer] Help From: Family, Other (comment) (skilled RN 1x weekly, daughter assists \"as often as she needs me\" w/ heavy household mgmt)  ADL Assistance: Independent (prior to hip sx)  Homemaking Assistance: Independent (shares w/ )  Ambulation Assistance: Independent (prior to hip sx, w/ AD)  Transfer Assistance: Independent  Active : Yes  Leisure & Hobbies:  \"Keeping everything up\"  IADL Comments: Shared w/ , daughter and nito assist PRN w/ household mgmt  Additional Comments: Pt d/c'd to SNF from recent hip sx on 7/16  Vision/Hearing  Vision  Vision: Impaired  Vision Exceptions: Wears glasses for reading  Hearing  Hearing: Within functional limits    Cognition     WFL    Objective                                Bed mobility  Supine to Sit: Dependent/Total (max assist x 2, HOB elevated)  Transfers  Sit to Stand: Dependent/Total (mod assist x 2 from bed and chair with JAZZY steady; mod assist x 1 from JAZZY steady seat)  Stand to sit: Dependent/Total (mod assist x 2)  Bed to Chair: Dependent/Total (via JAZZY steady)        Balance  Standing - Static:  (Stood x 2 trials within JAZZY steady with mod assist x 1, stood for 30 sec x 2. Trunk flexed - cues for upright posture)           OutComes Score                                                  AM-PAC Score  AM-PAC Inpatient Mobility Raw Score : 9 (08/09/22 1047)  AM-PAC Inpatient T-Scale Score : 30.55 (08/09/22 1047)  Mobility Inpatient CMS 0-100% Score: 81.38 (08/09/22 1047)  Mobility Inpatient CMS G-Code Modifier : CM (08/09/22 1047)          Tinneti Score       Goals  Short Term Goals  Time Frame for Short term goals: Discharge  Short term goal 1: roll with CG R/L. Ongoing  Short term goal 2: Supine to sit with CG. Ongoing  Short term goal 3: Sit to stand with mod x 2. MET 8/9 . Revised goal:  Pt will transfer sit to stand with min assist x 1  Short term goal 4: Bed to chair with mod x 2. Ongoing  Short term goal 5: Ambulate 20 ft with RW, 50% WB on right with mod x 2.   Ongoing  Patient Goals   Patient goals : not specifically stated       Education  Patient Education  Education Given To: Patient  Education Provided: Role of Therapy;Plan of Care  Education Method: Verbal  Education Outcome: Verbalized understanding;Continued education needed      Therapy Time   Individual Concurrent Group Co-treatment   Time In 0900         Time Out 0938         Minutes 38 Timed Code Treatment Minutes: 38      Total Treatment Minutes:  [place], [address] [facility]

## 2022-08-09 NOTE — PLAN OF CARE
Problem: Discharge Planning  Goal: Discharge to home or other facility with appropriate resources  8/9/2022 0423 by Antonio Villalobos RN  Outcome: Progressing  8/8/2022 2002 by Earl Malik RN    Problem: Safety - Adult  Goal: Free from fall injury  8/9/2022 0423 by Antonio Villalobos RN  Outcome: Progressing     Problem: ABCDS Injury Assessment  Goal: Absence of physical injury  8/9/2022 0423 by Antonio Villalobos RN  Outcome: Progressing  Flowsheets (Taken 8/8/2022 2202)  Absence of Physical Injury: Implement safety measures based on patient assessment     Problem: Skin/Tissue Integrity  Goal: Absence of new skin breakdown  Description: 1. Monitor for areas of redness and/or skin breakdown  2. Assess vascular access sites hourly  3. Every 4-6 hours minimum:  Change oxygen saturation probe site  4. Every 4-6 hours:  If on nasal continuous positive airway pressure, respiratory therapy assess nares and determine need for appliance change or resting period.   8/9/2022 0423 by Antonio Villalobos RN  Outcome: Progressing

## 2022-08-09 NOTE — PROGRESS NOTES
Occupational Therapy  Facility/Department: [facility] ICU  Daily Treatment    Name: Jessa Bermudez  : 1950  MRN: [id number]  Date of Service: 2022    Discharge Recommendations:  Jessa Bermudez scored a  on the AM-PAC ADL Inpatient form. Current research shows that an AM-PAC score of 17 or less is typically not associated with a discharge to the patient's home setting. Based on the patient's AM-PAC score and their current ADL deficits, it is recommended that the patient have 3-5 sessions per week of Occupational Therapy at d/c to increase the patient's independence. Please see assessment section for further patient specific details. If patient discharges prior to next session this note will serve as a discharge summary. Please see below for the latest assessment towards goals. OT Equipment Recommendations  Other: defer     Patient Diagnosis(es): The encounter diagnosis was Right lower quadrant abdominal pain. Past Medical History:  has a past medical history of Back pain, Hyperlipidemia, Hypertension, Tuberculosis, and Wears dentures. Past Surgical History:  has a past surgical history that includes Appendectomy (581); Tonsillectomy (); Ectopic pregnancy surgery (); Partial hysterectomy (); Cholecystectomy (); Cataract removal (Bilateral, ); Total hip arthroplasty (Right, 2022); and Colonoscopy (N/A, 8/3/2022). Treatment Diagnosis: impaired ADLs and mobility    Assessment   Performance deficits / Impairments: Decreased functional mobility ; Decreased balance;Decreased ADL status; Decreased endurance;Decreased strength;Decreased ROM; Decreased posture  Assessment: Pt is a 74 y.o. F admitted to [facility] for abdominal pain and is s/p colonoscopy with decompression on 8/3. Pt w/ recent R CASS on  and had d/c'd to SNF before returning home. Pt typically independent w/ ADLs and req some assist from family for IADLs prior to hip sx.  Pt required max A x2 for supine >< sit, Total A for LE dressing and toileting. Pt able to stand with mod A x2 with [place] today. Pt presents significantly below baseline and is limited by generalized weakness, pain, and decreased activity tolerance. Pt plans to d/c to SNF. Pt benefits from continued skilled OT while in acute care, cont OTper POC to maximize functional independence. Treatment Diagnosis: impaired ADLs and mobility  Prognosis: Fair  REQUIRES OT FOLLOW-UP: Yes  Activity Tolerance  Activity Tolerance: Patient limited by fatigue;Treatment limited secondary to medical complications (free text); Patient limited by pain  Activity Tolerance Comments: limited by R hip and lower back pain, fatigue and decreased strength/endurance        Plan   Plan  Times per Week: 2-5  Times per Day: Daily  Current Treatment Recommendations: Strengthening, Functional mobility training, Safety education & training, Patient/Caregiver education & training, Self-Care / ADL, Positioning, Endurance training, Pain management, Balance training, ROM     Restrictions  Position Activity Restriction  Other position/activity restrictions: up wtih assist,  50% WB right hip    Subjective   General  Chart Reviewed: Yes  Patient assessed for rehabilitation services?: Yes  Additional Pertinent Hx: Greer Turner is a 74 y.o. female with Hx of HTN, HLD, Tuberculosis (1981), gout, and OA s/p right CASS on 7/13/22 who was admitted to [facility] on 7/30 due to fluid associated with her right CASS on 7/13 and for abdominal pain. CT scan abdomen shows colonic distention with fecal burden throughout colon and terminal ileum. S/p Colonoscopy with decompression on 8/3  Family / Caregiver Present: No  Referring Practitioner: Carroll Gibbs MD  Diagnosis: abdominal pain  Subjective  Subjective: Pt semi-supine in bed upon arrival, pleasant and agreeable to OT tx. Pt c/o unspecified level of pain at R CASS site with movement. RN present and aware.  Pt able to participate, but somewhat limited by pain. Social/Functional History  Social/Functional History  Lives With: Spouse  Type of Home: House  Home Layout: One level  Home Access: Ramped entrance (through garage)  Bathroom Shower/Tub: Walk-in shower (small lip to enter, glass doors)  Bathroom Toilet: Handicap height (+ toilet riser w/ rails)  Bathroom Equipment: Grab bars in shower, Toilet raiser, Hand-held shower  Home Equipment: Walker, [place], Walker, 4 wheeled, [place], [address] (transport chair)  [place] [address] Help From: Family, Other (comment) (skilled RN 1x weekly, daughter assists \"as often as she needs me\" w/ heavy household mgmt)  ADL Assistance: Independent (prior to hip sx)  Homemaking Assistance: Independent (shares w/ )  Ambulation Assistance: Independent (prior to hip sx, w/ AD)  Transfer Assistance: Independent  Active : Yes  Leisure & Hobbies: \"Keeping everything up\"  IADL Comments: Shared w/ , daughter and granddaugher assist PRN w/ household mgmt  Additional Comments: Pt d/c'd to SNF from recent hip sx on 7/16       Objective   BP: 123/52 after t/f to chair. Pt without complaint. RN aware. Safety Devices  Type of Devices: Chair alarm in place;Call light within reach;Nurse notified; Left in chair;Gait belt             ADL  Grooming: Setup; Increased time to complete;Stand by assistance  Toileting: Dependent/Total (incontinent of bowel during session - TA for hygiene while static standing with min A in [place], flexed posture; winter)          Bed mobility  Supine to Sit: Dependent/Total (max A x2, HOB elevated)  Scooting: Dependent/Total (max A x2 to EOB)    Transfers  Sit to stand: Dependent/Total (mod A x2 with [place] Prior from elevated bed height and recliner)  Stand to sit: Dependent/Total (max A x2 in [place] Prior)    Vision  Vision: Impaired  Vision Exceptions: Wears glasses for reading  Hearing  Hearing: Within functional limits    Cognition  Overall Cognitive Status: Exceptions  Arousal/Alertness: Delayed responses to stimuli  Following Commands: Follows one step commands with increased time; Follows one step commands with repetition  Memory: Decreased recall of recent events  Safety Judgement: Decreased awareness of need for assistance;Decreased awareness of need for safety  Problem Solving: Assistance required to generate solutions;Assistance required to implement solutions;Assistance required to identify errors made;Assistance required to correct errors made;Decreased awareness of errors  Insights: Decreased awareness of deficits  Initiation: Requires cues for some  Sequencing: Requires cues for some  Orientation  Overall Orientation Status: Within Functional Limits (alert, appropriate, and cooperative)                                    Pt progressing from mod A to SBA for static sitting balance EOB over 5 min period. Therapeutic Exercise  Pt completed 1 set, 10 reps of B ankle, knee, digit, wrist, elbow, and shoulder flex/ext while seated or in supine after education. Pt educated to complete at least 3x/day, 10 reps increasing resistance and repetitions as able. Pt stated and demonstrated understanding, needs reinforcement. Functional mobility: Pt walked to/from bathroom with gait belt, RW, and SBA.     Education: Role of OT, safe t/f training, safe use of DME, awareness of deficits, discharge planning, ADL as therapeutic exercise, importance of OOB, energy conservation, therex       AM-PAC Score        AM-[facility] Inpatient Daily Activity Raw Score: 8 (08/09/22 1102)  AM-PAC Inpatient ADL T-Scale Score : 22.86 (08/09/22 1102)  ADL Inpatient CMS 0-100% Score: 85.69 (08/09/22 1102)  ADL Inpatient CMS G-Code Modifier : CM (08/09/22 1102)       Goals  Short Term Goals  Time Frame for Short term goals: by d/c  Short Term Goal 1: pt will complete toileting w/ Mod A  Short Term Goal 2: pt will complete LE dressing w/ Mod A  Short Term Goal 3: pt will perform BUE HEP i'ly x10 reps AROM to all joints to reduce edema and improve strength for ADLs  Short Term Goal 4: pt will perofrm supine to sit w/ Mod A  Patient Goals   Patient goals : \"Get stronger. \"       Therapy Time   Individual Concurrent Group Co-treatment   Time In 0900         Time Out 0938         Minutes 38         Timed Code Treatment Minutes: 45 Minutes       If patient is discharged prior to next treatment session, this note will serve as the discharge summary.   [place], OTR/L #[id number]

## 2022-08-09 NOTE — RT PROTOCOL NOTE
Protocol order mode. 4-6 - enter or revise RT Bronchodilator order(s) to two equivalent RT bronchodilator orders with one order with BID Frequency and one order with Frequency of every 4 hours PRN wheezing or increased work of breathing using Per Protocol order mode. 7-10 - enter or revise RT Bronchodilator order(s) to two equivalent RT bronchodilator orders with one order with TID Frequency and one order with Frequency of every 4 hours PRN wheezing or increased work of breathing using Per Protocol order mode. 11-13 - enter or revise RT Bronchodilator order(s) to one equivalent RT bronchodilator order with QID Frequency and an Albuterol order with Frequency of every 4 hours PRN wheezing or increased work of breathing using Per Protocol order mode. Greater than 13 - enter or revise RT Bronchodilator order(s) to one equivalent RT bronchodilator order with every 4 hours Frequency and an Albuterol order with Frequency of every 2 hours PRN wheezing or increased work of breathing using Per Protocol order mode. RT to enter RT Home Evaluation for COPD & MDI Assessment order using Per Protocol order mode.     Electronically signed by Lucius Bates RCP on 8/9/2022 at 8:50 AM

## 2022-08-09 NOTE — PROGRESS NOTES
Electrophysiology - PROGRESS NOTE    Admit Date: 7/30/2022     Chief Complaint: AF     Interval History:   Patient seen and examined and notes reviewed. Patient is being followed for paroxysmal AF. Patient had presented to the hospital with abdominal pain was found to have a bowel obstruction along with sepsis. She had gone into atrial fibrillation with RVR and was placed on a diltiazem drip and converted back to normal sinus rhythm. She had a colonoscopy with a decompression on 8/3/2022 and was found to be back in atrial fibrillation afterwards. She was placed on amiodarone drip and converted to normal sinus rhythm. She is now off amiodarone and has remained in normal sinus rhythm. Heparin was turned off overnight due to a slightly bloody nose and stool with red tinge. She had 5 BMs last night. Still complains of some abdominal pain. Denies heart racing or palpitations.     In: 1530.9 [I.V.:1067.6; Blood:333.3; NG/GT:130]  Out: 3825    Wt Readings from Last 2 Encounters:   08/09/22 250 lb 7.1 oz (113.6 kg)   07/13/22 218 lb 9.6 oz (99.2 kg)       Data:   Scheduled Meds:   Scheduled Meds:   melatonin  10 mg Oral Nightly    metoprolol  5 mg IntraVENous Q6H    lidocaine  1 patch TransDERmal Daily    naloxegol  12.5 mg Oral QAM AC    metoclopramide  5 mg IntraVENous Q6H    pantoprazole  40 mg IntraVENous BID    sodium chloride (Inhalant)  4 mL Nebulization BID    albuterol  2.5 mg Nebulization TID    polyethylene glycol  17 g Per NG tube TID    lidocaine   Topical Once    sodium chloride flush  5-40 mL IntraVENous 2 times per day    fluticasone  2 spray Each Nostril Daily    [Held by provider] pregabalin  50 mg Oral TID     Continuous Infusions:   sodium chloride      sodium chloride      heparin (PORCINE) Infusion Stopped (08/09/22 7642)    dextrose      sodium chloride       PRN Meds:.sodium chloride, lidocaine, sodium chloride, sodium chloride, heparin (porcine), heparin (porcine), prochlorperazine, glucose, dextrose bolus **OR** dextrose bolus, glucagon (rDNA), dextrose, albuterol, phenol, morphine **OR** morphine, sodium chloride flush, sodium chloride, ondansetron **OR** ondansetron, acetaminophen **OR** acetaminophen, methocarbamol  Continuous Infusions:   sodium chloride      sodium chloride      heparin (PORCINE) Infusion Stopped (08/09/22 3903)    dextrose      sodium chloride         Intake/Output Summary (Last 24 hours) at 8/9/2022 0813  Last data filed at 8/9/2022 0648  Gross per 24 hour   Intake 1500.92 ml   Output 3825 ml   Net -2324.08 ml       CBC:   Lab Results   Component Value Date/Time    WBC 12.5 08/09/2022 04:06 AM    HGB 7.5 08/09/2022 04:06 AM     08/09/2022 04:06 AM     BMP:  Lab Results   Component Value Date/Time     08/09/2022 04:06 AM    K 3.9 08/09/2022 04:06 AM    K 4.3 07/14/2022 07:27 AM    CL 95 08/09/2022 04:06 AM    CO2 23 08/09/2022 04:06 AM    BUN 83 08/09/2022 04:06 AM    CREATININE 1.9 08/09/2022 04:06 AM    GLUCOSE 81 08/09/2022 04:06 AM     INR:   Lab Results   Component Value Date/Time    INR 1.15 08/04/2022 06:55 PM    INR 1.18 08/02/2022 06:30 PM    INR 1.27 08/01/2022 02:24 AM        CARDIAC LABS  ENZYMES:No results for input(s): CKMB, CKMBINDEX, TROPONINI in the last 72 hours.     Invalid input(s): CKTOTAL;3  FASTING LIPID PANEL:No results found for: HDL, LDLDIRECT, LDLCALC, TRIG, TSH  LIVER PROFILE:  Lab Results   Component Value Date/Time    AST 33 08/01/2022 05:41 AM    AST 51 08/01/2022 12:23 AM    ALT 27 08/01/2022 05:41 AM    ALT 39 08/01/2022 12:23 AM       -----------------------------------------------------------------  Telemetry: Personally reviewed NSR    Objective:   Vitals: BP (!) 117/55   Pulse 87   Temp 97.8 °F (36.6 °C) (Axillary)   Resp 15   Ht 5' 2.01\" (1.575 m)   Wt 250 lb 7.1 oz (113.6 kg)   SpO2 99%   BMI 45.79 kg/m²   General appearance: alert, appears stated age and 65%, LA 3.7/49.7      Carey Gonzalez CNP  [facility], [place]      I spent a total of 35 minutes in care of the patient and greater than 50% of the time was spent counseling with Kaylen Jane and coordinating care regarding their diagnosis, treatments and plan of care.

## 2022-08-09 NOTE — PROGRESS NOTES
times per day    fluticasone  2 spray Each Nostril Daily    [Held by provider] pregabalin  50 mg Oral TID       IV drips:   sodium chloride      sodium chloride      heparin (PORCINE) Infusion Stopped (08/09/22 2379)    dextrose      sodium chloride         PRN:  sodium chloride, lidocaine, sodium chloride, sodium chloride, heparin (porcine), heparin (porcine), prochlorperazine, glucose, dextrose bolus **OR** dextrose bolus, glucagon (rDNA), dextrose, albuterol, phenol, morphine **OR** morphine, sodium chloride flush, sodium chloride, ondansetron **OR** ondansetron, acetaminophen **OR** acetaminophen, methocarbamol    Vitals:    08/09/22 0600 08/09/22 0722 08/09/22 0730 08/09/22 0800   BP:    (!) 118/59   Pulse:   87 87   Resp:   15 11   Temp:    97.7 °F (36.5 °C)   TempSrc:    Oral   SpO2:  93% 99% 93%   Weight: 250 lb 7.1 oz (113.6 kg)      Height:           Intake/Output Summary (Last 24 hours) at 8/9/2022 1044  Last data filed at 8/9/2022 0800  Gross per 24 hour   Intake 1214.59 ml   Output 3825 ml   Net -2610.41 ml       I/O last 3 completed shifts: In: 2203.3 [I.V.:1439.9; Blood:633.3; NG/GT:130]  Out: 4825 [Urine:4625; Stool:200]  Wt Readings from Last 3 Encounters:   08/09/22 250 lb 7.1 oz (113.6 kg)   07/13/22 218 lb 9.6 oz (99.2 kg)   06/14/22 217 lb (98.4 kg)       Admit Wt: Weight: 224 lb 3.3 oz (101.7 kg)   Todays Wt: Weight: 250 lb 7.1 oz (113.6 kg)    TELEMETRY personally reviewed: NSR    Physical Exam:         General Appearance:  Alert, cooperative, no distress, appears stated age Appropriate weight   Head:  Normocephalic, without obvious abnormality, atraumatic   Eyes:  PERRL, conjunctiva/corneas clear EOM intact  Ears normal   Throat no lesions       Nose: Nares normal, no drainage or sinus tenderness   Throat: Lips, mucosa, and tongue normal   Neck: Supple, symmetrical, trachea midline, no adenopathy, thyroid: not enlarged, symmetric, no tenderness/mass/nodules, no carotid bruit.         Lungs: with RVR after incomplete colonic decompression. Atrial fibrillation will compromise our efforts for safe diuresis. - Cw lopressor 5 mg iv q 6; if able to tolerate diet, may consider switching to po lopressor tomorrow. 3.  Rule out acute heart failure. Patient appeared volume overloaded with worse respiratory status and AFRVR. However, echocardiogram is overall normal with normal filling pressures and was done during sinus (images personally reviewed). Discussed case with Dr Florian Carver, he witnessed aspiration, hence respiratory failure could be from aspiration pneumonitis rather than acute heart failure. Albumin and Hb low could explain peripheral edema. IV diuretics with no meaningful response, likely due to ATN and sepsis. - We stopped diuretics; Dr Florian Carver may elect to give albumin, may consider blood transfusion if Hb < 7.   - Strict I's and O's every shift and standing weights if possible, low-salt diet, daily BMP with reflex to Mg (correct lytes for goals K >4.0 and Mg > 2.0) and wean supplemental oxygen to off (or down to baseline supplemental oxygen requirements) for sats greater than 92-94%. 4. ARIANE on CKD:   Improving off diuretics. Due to the high probability of clinically significant life threating deterioration of the patient's condition that required my urgent intervention, a total critical care time 35 minutes was used. This time excludes any time that may have been spent performing procedures. This includes but not limited to vital sign monitoring, telemetry monitoring, continuous pulse oximety, IV medication, clinical response to the IV medications, documentation time, consultation time, interpretation of lab data, review of nursing notes and old record review. I have personally reviewed the reports and images of labs, radiological studies, cardiac studies including ECG's and telemetry, current and old medical records.  The note was completed using [employer] and Dragon dictation system. Every effort was made to ensure accuracy; however, inadvertent computerized transcription errors may be present. All questions and concerns were addressed to the patient/family. Alternatives to my treatment were discussed. Thank you for allowing to us to participate in the care or Vidhya Contreras. Please call our service with questions.     Milady Gar MD, [facility] [place], [address]  The [address] [place] [address]  Ph: [phone]  Fax: [phone]

## 2022-08-09 NOTE — PLAN OF CARE
Problem: Discharge Planning  Goal: Discharge to home or other facility with appropriate resources  Outcome: Progressing  Flowsheets (Taken 8/3/2022 1600 by Ab Cuenca, RN)  Discharge to home or other facility with appropriate resources:   Identify barriers to discharge with patient and caregiver   Identify discharge learning needs (meds, wound care, etc)     Problem: Safety - Adult  Goal: Free from fall injury  Outcome: Progressing  Flowsheets (Taken 8/4/2022 1013 by Yeyo Delgado RN)  Free From Fall Injury:   Instruct family/caregiver on patient safety   Based on caregiver fall risk screen, instruct family/caregiver to ask for assistance with transferring infant if caregiver noted to have fall risk factors     Problem: ABCDS Injury Assessment  Goal: Absence of physical injury  Outcome: Progressing  Flowsheets (Taken 8/6/2022 2033 by Genie Ortiz RN)  Absence of Physical Injury: Implement safety measures based on patient assessment     Problem: Skin/Tissue Integrity  Goal: Absence of new skin breakdown  Description: 1. Monitor for areas of redness and/or skin breakdown  2. Assess vascular access sites hourly  3. Every 4-6 hours minimum:  Change oxygen saturation probe site  4. Every 4-6 hours:  If on nasal continuous positive airway pressure, respiratory therapy assess nares and determine need for appliance change or resting period.   Outcome: Progressing     Problem: Respiratory - Adult  Goal: Achieves optimal ventilation and oxygenation  Outcome: Progressing  Flowsheets (Taken 8/3/2022 1600 by Ab Cuenca RN)  Achieves optimal ventilation and oxygenation:   Assess for changes in respiratory status   Assess for changes in mentation and behavior   Position to facilitate oxygenation and minimize respiratory effort     Problem: Skin/Tissue Integrity - Adult  Goal: Incisions, wounds, or drain sites healing without S/S of infection  Outcome: Progressing  Goal: Oral mucous membranes remain intact  Outcome: Progressing     Problem: Musculoskeletal - Adult  Goal: Return mobility to safest level of function  Outcome: Progressing  Goal: Maintain proper alignment of affected body part  Outcome: Progressing  Goal: Return ADL status to a safe level of function  Outcome: Progressing     Problem: Gastrointestinal - Adult  Goal: Minimal or absence of nausea and vomiting  Outcome: Progressing  Goal: Maintains adequate nutritional intake  Outcome: Progressing  Goal: Maintains or returns to baseline bowel function  Outcome: Progressing     Problem: Genitourinary - Adult  Goal: Absence of urinary retention  Outcome: Progressing  Goal: Urinary catheter remains patent  Outcome: Progressing     Problem: Infection - Adult  Goal: Absence of infection at discharge  Outcome: Progressing  Goal: Absence of infection during hospitalization  Outcome: Progressing     Problem: Metabolic/Fluid and Electrolytes - Adult  Goal: Electrolytes maintained within normal limits  Outcome: Progressing  Goal: Hemodynamic stability and optimal renal function maintained  Outcome: Progressing     Problem: Pain  Goal: Verbalizes/displays adequate comfort level or baseline comfort level  Outcome: Progressing     Problem: Cardiovascular - Adult  Goal: Maintains optimal cardiac output and hemodynamic stability  Outcome: Progressing  Goal: Absence of cardiac dysrhythmias or at baseline  Outcome: Progressing     Problem: Nutrition Deficit:  Goal: Optimize nutritional status  Outcome: Progressing  Flowsheets (Taken 8/8/2022 2002)  Nutrient intake appropriate for improving, restoring, or maintaining nutritional needs:   Assess nutritional status and recommend course of action   Monitor oral intake, labs, and treatment plans

## 2022-08-09 NOTE — PROGRESS NOTES
Office : [phone]     Fax :[phone]         Renal Progress Note    Subjective:   Admit Date: 7/30/2022     Interval History:   NAONE. Reports back pain. Sitting on chair. On 1L HFNC. UO improving.        DIET Diet NPO  Medications:   Scheduled Meds:   melatonin  10 mg Oral Nightly    metoprolol  5 mg IntraVENous Q6H    lidocaine  1 patch TransDERmal Daily    naloxegol  12.5 mg Oral QAM AC    metoclopramide  5 mg IntraVENous Q6H    pantoprazole  40 mg IntraVENous BID    sodium chloride (Inhalant)  4 mL Nebulization BID    albuterol  2.5 mg Nebulization TID    polyethylene glycol  17 g Per NG tube TID    lidocaine   Topical Once    sodium chloride flush  5-40 mL IntraVENous 2 times per day    fluticasone  2 spray Each Nostril Daily    [Held by provider] pregabalin  50 mg Oral TID     Continuous Infusions:   sodium chloride      sodium chloride      heparin (PORCINE) Infusion Stopped (08/09/22 0985)    dextrose      sodium chloride         Labs:  CBC:   Recent Labs     08/07/22  0404 08/08/22 0444 08/08/22  0918 08/09/22  0406   WBC 13.3* 12.0*  --  12.5*   HGB 7.4* 6.7* 7.8* 7.5*   PLT see below 95*  --  130*       BMP:    Recent Labs     08/07/22  0403 08/08/22 0445 08/08/22  1648 08/09/22  0406   *  126* 124* 128* 129*   K 5.2*  5.1 4.6  --  3.9   CL 92*  90* 89*  --  95*   CO2 30  26 18*  --  23   *  98* 97*  --  83*   CREATININE 3.5*  3.3* 2.8*  --  1.9*   GLUCOSE 104*  102* 88  --  81       Ca/Mg/Phos:   Recent Labs     08/07/22 0403 08/08/22 0445 08/09/22  0406   CALCIUM 6.7*  7.3* 7.3* 7.6*   MG 2.80* 2.70* 2.40   PHOS 8.2* 8.2* 6.6*       Hepatic:   No results for input(s): AST, ALT, ALB, BILITOT, ALKPHOS in the last 72 hours. Troponin: No results for input(s): TROPONINI in the last 72 hours. BNP: No results for input(s): BNP in the last 72 hours. Lipids: No results for input(s): CHOL, TRIG, HDL, LDLCALC, LABVLDL in the last 72 hours. ABGs:   No results for input(s): PHART, PO2ART, OIQ4VPK in the last 72 hours. INR:   No results for input(s): INR in the last 72 hours. UA:  No results for input(s): Shermon Honer, GLUCOSEU, BILIRUBINUR, KETUA, SPECGRAV, BLOODU, PHUR, PROTEINU, UROBILINOGEN, NITRU, LEUKOCYTESUR, Richard Gianotti in the last 72 hours. Urine Microscopic:   No results for input(s): LABCAST, BACTERIA, COMU, HYALCAST, WBCUA, RBCUA, EPIU in the last 72 hours. Urine Culture: No results for input(s): LABURIN in the last 72 hours. Urine Chemistry: No results for input(s): Calhoun Miracle, PROTEINUR, NAUR in the last 72 hours. Objective:   Vitals: BP (!) 118/59   Pulse 87   Temp 97.7 °F (36.5 °C) (Oral)   Resp 11   Ht 5' 2.01\" (1.575 m)   Wt 250 lb 7.1 oz (113.6 kg)   SpO2 93%   BMI 45.79 kg/m²    Wt Readings from Last 3 Encounters:   08/09/22 250 lb 7.1 oz (113.6 kg)   07/13/22 218 lb 9.6 oz (99.2 kg)   06/14/22 217 lb (98.4 kg)      24HR INTAKE/OUTPUT:    Intake/Output Summary (Last 24 hours) at 8/9/2022 0851  Last data filed at 8/9/2022 0800  Gross per 24 hour   Intake 1500.92 ml   Output 3825 ml   Net -2324.08 ml       Physical Exam  Constitutional:       Appearance: She is ill-appearing. She is not diaphoretic. Cardiovascular:      Rate and Rhythm: Normal rate. Pulmonary:      Effort: Pulmonary effort is normal.      Breath sounds: Rhonchi present. No wheezing. Abdominal:      General: There is distension. Palpations: Abdomen is soft. Tenderness: There is abdominal tenderness. There is no guarding. Musculoskeletal:      Right lower leg: Edema present.       Left lower leg: Edema present. Skin:     General: Skin is warm and dry. Neurological:      General: No focal deficit present. Mental Status: She is oriented to person, place, and time. Assessment and Plan:       IMAGING:  XR CHEST PORTABLE   Final Result      Mild bilateral airspace disease. XR ABDOMEN (KUB) (SINGLE AP VIEW)   Final Result   1. No evidence of free intraperitoneal air. 2. Indeterminate bowel gas pattern due to a paucity of small bowel gas. XR ABDOMEN (KUB) (SINGLE AP VIEW)   Final Result   Impression: Stable appearance of the abdomen. XR CHEST PORTABLE   Final Result      Low lung volumes with central bronchovascular crowding. Atelectasis in the left lung base. Normal cardiomediastinal silhouette. Lines and tubes in standard position. VL Extremity Venous Bilateral   Final Result      XR ABDOMEN (KUB) (SINGLE AP VIEW)   Final Result   Impression: Stable appearance of the abdomen with gas distended loops of central small bowel again noted. CT CHEST WO CONTRAST   Final Result      1. Moderate right pleural effusion and small left effusion with basilar airspace opacity most consistent with atelectasis. Superimposed pneumonia be difficult to exclude. 2. Narrowing of the trachea with expiration compatible with treated bronchial malacia. 3. Nasogastric tube tip in the lower esophagus. 4. Persistent colonic distention. 5. Mild coronary artery calcification. XR ABDOMEN (KUB) (SINGLE AP VIEW)   Final Result      Frontal supine views demonstrate an unremarkable bowel gas pattern. No significant colonic stool is seen. Right hip arthroplasty noted. XR CHEST PORTABLE   Final Result      Prominence of the perihilar interstitial markings with mild peribronchial cuffing is favored to represent mild pulmonary edema. Low lung volumes bronchovascular crowding. Stable cardiomediastinal silhouette. Lines and tubes in standard position. Cholecystectomy clips are noted. CT ABDOMEN PELVIS WO CONTRAST Additional Contrast? Oral and Rectal   Final Result   1. Moderate colonic distention. Correlate for constipation. Fecal content within the ileum which may suggest stasis. There is no small bowel obstruction. 2. Subcutaneous fluid collection overlying the right total hip arthroplasty. CT HIP RIGHT WO CONTRAST   Final Result   1. Moderate colonic distention. Correlate for constipation. Fecal content within the ileum which may suggest stasis. There is no small bowel obstruction. 2. Subcutaneous fluid collection overlying the right total hip arthroplasty. XR CHEST PORTABLE   Final Result      1. Right IJ CVC tip in the cavoatrial junction. 2.  Bibasilar airspace disease suggesting atelectasis. Pneumonia is not excluded. XR ABDOMEN (KUB) (SINGLE AP VIEW)   Final Result      Dilated colonic and small bowel loops may suggest ileus but obstruction is not excluded. XR ABDOMEN (KUB) (SINGLE AP VIEW)   Final Result      No acute radiographic abnormality of the abdomen. Status post right hip arthroplasty with mildly displaced greater trochanteric fracture fragment, new or more pronounced compared to the prior study. Assessment/Plan     ARIANE   2. Sepsis  3. AGMA   4. Lactic Acidosis - resolved  4. Hyperkalemia  5. Hyponatremia  6. Bowel Obstruction  7. Hypertension      Plan:  - Diuresis as tolerated  - no free water for now   - ECHO normnal EF - IVC compressible   - Hypoxia likely sec to Aspiration pneumonitis   - ARIANE sec to ATN   - Monitor I/Os  - Avoid nephrotoxic agents    Recommend to dose adjust all medications  based on renal functions  Maintain SBP> 90 mmHg   Daily weights   AVOID NSAIDs  Avoid Nephrotoxins  Monitor Intake/Output  Call if significant decrease in urine output              Naresh Garcia MD        I have seen the patient independently from the resident . I discussed the care with the resident. I personally reviewed the HPI, PH, FH, SH, ROS and medications. I repeated pertinent portions of the examination and reviewed the relevant imaging and laboratory data.  I agree with the findings, assessment and plan as documented, with the following addendum:      Stephanie Chester MD

## 2022-08-09 NOTE — PROGRESS NOTES
Patients rectal tube came out during a turn overnight and BM color had more red tinge per nightshift RN. Nightshift RN messaged overnight attending MD and asked about herpain gtt. Order was placed to stop Heparin for remainder of night and reassess with dayshift team.  Heparin still off at this time. H+H 7.5 and 22.8  NA+ increasing and was 129 this am  2100mL urine output overnight  4-5 small-moderate watery BMs overnight    This RN informed Gen Surgery team of changes this morning.     Patient down to 1L on NC with O2Sat of 93%  NSR on monitor  BP is 117/55(70)  Afebrile

## 2022-08-09 NOTE — CARE COORDINATION
Case Management Assessment           Daily Note                 Date/ Time of Note: 8/9/2022 8:45 AM         Note completed by: Kwame Kelly RN    Patient Name: Cristina Palumbo  YOB: 1950    Diagnosis:Abdominal pain [R10.9]  Patient Admission Status: Inpatient    Date of Admission:7/30/2022  8:43 PM Length of Stay: 10 GLOS: GMLOS: 4.8    Current Plan of Care: O2 1L/NC this am. Weaning. Plan to DC NGT today. Return of bowel function. SLP consult. If unable to swallow, core lc will be placed for feeding. Rectal tube out. Heparin gtt stopped due to BRBPR. H/H 7.5& 22.8  ________________________________________________________________________________________  PT AM-PAC: 7 / 24 per last evaluation on: 8/8    OT AM-PAC: 8 / 24 per last evaluation on: 8/8    Discharge Plan: TBD. Anticipate the need for SNF @ discharge. Pt's preferred skilled nursing facilities are [facility] [address] and [facility]    Case Management Notes:  Pt is from home with her spouse. The home setting is a ranch-style house with a ramped entrance. She was relatively independent with self care and functional mobility with family support prior to admission. She was active with [facility] for a SN 3x week. She has a rollator and a rolling walker. [facility] provides a HHA via [employer] and her family were provided with choice of provider; she and her family are in agreement with the discharge plan.       Kwame Kelly RN  [facility]  Case Management Department  [phone]

## 2022-08-09 NOTE — PROGRESS NOTES
Cont Tf stopped and NG tube flushed and removed per order. Patient tolerated well.     SLP eval and treat ordered and team will come to bedside to complete this AM.

## 2022-08-10 ENCOUNTER — APPOINTMENT (OUTPATIENT)
Dept: GENERAL RADIOLOGY | Age: 72
DRG: 853 | End: 2022-08-10
Attending: INTERNAL MEDICINE
Payer: MEDICARE

## 2022-08-10 ENCOUNTER — APPOINTMENT (OUTPATIENT)
Dept: VASCULAR LAB | Age: 72
DRG: 853 | End: 2022-08-10
Attending: INTERNAL MEDICINE
Payer: MEDICARE

## 2022-08-10 LAB
ABO/RH: NORMAL
ALBUMIN SERPL-MCNC: 1.9 G/DL (ref 3.4–5)
ANION GAP SERPL CALCULATED.3IONS-SCNC: 13 MMOL/L (ref 3–16)
ANISOCYTOSIS: ABNORMAL
ANTI-XA UNFRAC HEPARIN: 0.45 IU/ML (ref 0.3–0.7)
ANTI-XA UNFRAC HEPARIN: 0.48 IU/ML (ref 0.3–0.7)
ANTIBODY SCREEN: NORMAL
BASOPHILS ABSOLUTE: 0 K/UL (ref 0–0.2)
BASOPHILS RELATIVE PERCENT: 0 %
BLOOD BANK DISPENSE STATUS: NORMAL
BLOOD BANK DISPENSE STATUS: NORMAL
BLOOD BANK PRODUCT CODE: NORMAL
BLOOD BANK PRODUCT CODE: NORMAL
BPU ID: NORMAL
BPU ID: NORMAL
BUN BLDV-MCNC: 62 MG/DL (ref 7–20)
CALCIUM SERPL-MCNC: 7.7 MG/DL (ref 8.3–10.6)
CHLORIDE BLD-SCNC: 99 MMOL/L (ref 99–110)
CO2: 22 MMOL/L (ref 21–32)
CORTISOL TOTAL: 8.7 UG/DL
CREAT SERPL-MCNC: 1.2 MG/DL (ref 0.6–1.2)
DESCRIPTION BLOOD BANK: NORMAL
DESCRIPTION BLOOD BANK: NORMAL
EOSINOPHILS ABSOLUTE: 0 K/UL (ref 0–0.6)
EOSINOPHILS RELATIVE PERCENT: 0 %
GFR AFRICAN AMERICAN: 53
GFR NON-AFRICAN AMERICAN: 44
GLUCOSE BLD-MCNC: 85 MG/DL (ref 70–99)
HCT VFR BLD CALC: 20.4 % (ref 36–48)
HCT VFR BLD CALC: 21.7 % (ref 36–48)
HCT VFR BLD CALC: 26.4 % (ref 36–48)
HEMOGLOBIN: 7 G/DL (ref 12–16)
HEMOGLOBIN: 7.1 G/DL (ref 12–16)
HEMOGLOBIN: 8.7 G/DL (ref 12–16)
HYPERSEGMENTED NEUTROPHILS: PRESENT
HYPOCHROMIA: ABNORMAL
LYMPHOCYTES ABSOLUTE: 0.9 K/UL (ref 1–5.1)
LYMPHOCYTES RELATIVE PERCENT: 6 %
MAGNESIUM: 2.2 MG/DL (ref 1.8–2.4)
MCH RBC QN AUTO: 31.1 PG (ref 26–34)
MCHC RBC AUTO-ENTMCNC: 34.6 G/DL (ref 31–36)
MCV RBC AUTO: 89.8 FL (ref 80–100)
MICROCYTES: ABNORMAL
MONOCYTES ABSOLUTE: 1.2 K/UL (ref 0–1.3)
MONOCYTES RELATIVE PERCENT: 8 %
NEUTROPHILS ABSOLUTE: 12.6 K/UL (ref 1.7–7.7)
NEUTROPHILS RELATIVE PERCENT: 86 %
OVALOCYTES: ABNORMAL
PDW BLD-RTO: 15 % (ref 12.4–15.4)
PHOSPHORUS: 4.3 MG/DL (ref 2.5–4.9)
PLATELET # BLD: 168 K/UL (ref 135–450)
PMV BLD AUTO: 8.4 FL (ref 5–10.5)
POIKILOCYTES: ABNORMAL
POLYCHROMASIA: ABNORMAL
POTASSIUM SERPL-SCNC: 3.7 MMOL/L (ref 3.5–5.1)
RBC # BLD: 2.27 M/UL (ref 4–5.2)
SCHISTOCYTES: ABNORMAL
SODIUM BLD-SCNC: 134 MMOL/L (ref 136–145)
SPHEROCYTES: ABNORMAL
STOMATOCYTES: ABNORMAL
TARGET CELLS: ABNORMAL
VACUOLATED NEUTROPHILS: PRESENT
WBC # BLD: 14.6 K/UL (ref 4–11)

## 2022-08-10 PROCEDURE — 94761 N-INVAS EAR/PLS OXIMETRY MLT: CPT

## 2022-08-10 PROCEDURE — 73502 X-RAY EXAM HIP UNI 2-3 VIEWS: CPT

## 2022-08-10 PROCEDURE — 6370000000 HC RX 637 (ALT 250 FOR IP): Performed by: INTERNAL MEDICINE

## 2022-08-10 PROCEDURE — 85520 HEPARIN ASSAY: CPT

## 2022-08-10 PROCEDURE — 2580000003 HC RX 258

## 2022-08-10 PROCEDURE — C9113 INJ PANTOPRAZOLE SODIUM, VIA: HCPCS

## 2022-08-10 PROCEDURE — 86923 COMPATIBILITY TEST ELECTRIC: CPT

## 2022-08-10 PROCEDURE — 6360000002 HC RX W HCPCS: Performed by: INTERNAL MEDICINE

## 2022-08-10 PROCEDURE — 94640 AIRWAY INHALATION TREATMENT: CPT

## 2022-08-10 PROCEDURE — 86900 BLOOD TYPING SEROLOGIC ABO: CPT

## 2022-08-10 PROCEDURE — 99233 SBSQ HOSP IP/OBS HIGH 50: CPT | Performed by: INTERNAL MEDICINE

## 2022-08-10 PROCEDURE — 97535 SELF CARE MNGMENT TRAINING: CPT

## 2022-08-10 PROCEDURE — 99231 SBSQ HOSP IP/OBS SF/LOW 25: CPT | Performed by: SURGERY

## 2022-08-10 PROCEDURE — 36430 TRANSFUSION BLD/BLD COMPNT: CPT

## 2022-08-10 PROCEDURE — 6360000002 HC RX W HCPCS

## 2022-08-10 PROCEDURE — 6370000000 HC RX 637 (ALT 250 FOR IP): Performed by: STUDENT IN AN ORGANIZED HEALTH CARE EDUCATION/TRAINING PROGRAM

## 2022-08-10 PROCEDURE — 6370000000 HC RX 637 (ALT 250 FOR IP)

## 2022-08-10 PROCEDURE — 86901 BLOOD TYPING SEROLOGIC RH(D): CPT

## 2022-08-10 PROCEDURE — 83735 ASSAY OF MAGNESIUM: CPT

## 2022-08-10 PROCEDURE — 85018 HEMOGLOBIN: CPT

## 2022-08-10 PROCEDURE — 85014 HEMATOCRIT: CPT

## 2022-08-10 PROCEDURE — 86850 RBC ANTIBODY SCREEN: CPT

## 2022-08-10 PROCEDURE — P9016 RBC LEUKOCYTES REDUCED: HCPCS

## 2022-08-10 PROCEDURE — 1200000000 HC SEMI PRIVATE

## 2022-08-10 PROCEDURE — 6360000002 HC RX W HCPCS: Performed by: SURGERY

## 2022-08-10 PROCEDURE — 93970 EXTREMITY STUDY: CPT

## 2022-08-10 PROCEDURE — 92526 ORAL FUNCTION THERAPY: CPT

## 2022-08-10 PROCEDURE — 2500000003 HC RX 250 WO HCPCS: Performed by: INTERNAL MEDICINE

## 2022-08-10 PROCEDURE — 6370000000 HC RX 637 (ALT 250 FOR IP): Performed by: NURSE PRACTITIONER

## 2022-08-10 PROCEDURE — 85025 COMPLETE CBC W/AUTO DIFF WBC: CPT

## 2022-08-10 PROCEDURE — 80069 RENAL FUNCTION PANEL: CPT

## 2022-08-10 PROCEDURE — 94669 MECHANICAL CHEST WALL OSCILL: CPT

## 2022-08-10 PROCEDURE — 97530 THERAPEUTIC ACTIVITIES: CPT

## 2022-08-10 PROCEDURE — 99233 SBSQ HOSP IP/OBS HIGH 50: CPT | Performed by: NURSE PRACTITIONER

## 2022-08-10 PROCEDURE — 36415 COLL VENOUS BLD VENIPUNCTURE: CPT

## 2022-08-10 RX ORDER — SODIUM CHLORIDE 9 MG/ML
INJECTION, SOLUTION INTRAVENOUS PRN
Status: DISCONTINUED | OUTPATIENT
Start: 2022-08-10 | End: 2022-08-23 | Stop reason: HOSPADM

## 2022-08-10 RX ORDER — PREGABALIN 50 MG/1
50 CAPSULE ORAL ONCE
Status: COMPLETED | OUTPATIENT
Start: 2022-08-10 | End: 2022-08-10

## 2022-08-10 RX ORDER — TRAMADOL HYDROCHLORIDE 50 MG/1
50 TABLET ORAL EVERY 4 HOURS PRN
Status: DISCONTINUED | OUTPATIENT
Start: 2022-08-10 | End: 2022-08-12

## 2022-08-10 RX ORDER — ACETAMINOPHEN 500 MG
1000 TABLET ORAL EVERY 8 HOURS SCHEDULED
Status: DISPENSED | OUTPATIENT
Start: 2022-08-10 | End: 2022-08-13

## 2022-08-10 RX ORDER — MORPHINE SULFATE 4 MG/ML
4 INJECTION, SOLUTION INTRAMUSCULAR; INTRAVENOUS ONCE
Status: COMPLETED | OUTPATIENT
Start: 2022-08-10 | End: 2022-08-10

## 2022-08-10 RX ADMIN — PANTOPRAZOLE SODIUM 40 MG: 40 INJECTION, POWDER, FOR SOLUTION INTRAVENOUS at 08:25

## 2022-08-10 RX ADMIN — SODIUM CHLORIDE 30 MG/ML INHALATION SOLUTION 4 ML: 30 SOLUTION INHALANT at 09:14

## 2022-08-10 RX ADMIN — PREGABALIN 50 MG: 50 CAPSULE ORAL at 16:58

## 2022-08-10 RX ADMIN — POLYETHYLENE GLYCOL (3350) 17 G: 17 POWDER, FOR SOLUTION ORAL at 08:24

## 2022-08-10 RX ADMIN — ACETAMINOPHEN 1000 MG: 500 TABLET ORAL at 13:07

## 2022-08-10 RX ADMIN — SODIUM CHLORIDE, PRESERVATIVE FREE 10 ML: 5 INJECTION INTRAVENOUS at 19:58

## 2022-08-10 RX ADMIN — SODIUM CHLORIDE, PRESERVATIVE FREE 10 ML: 5 INJECTION INTRAVENOUS at 08:25

## 2022-08-10 RX ADMIN — Medication 10 MG: at 19:57

## 2022-08-10 RX ADMIN — TRAMADOL HYDROCHLORIDE 50 MG: 50 TABLET, COATED ORAL at 12:15

## 2022-08-10 RX ADMIN — MORPHINE SULFATE 2 MG: 2 INJECTION, SOLUTION INTRAMUSCULAR; INTRAVENOUS at 05:53

## 2022-08-10 RX ADMIN — MORPHINE SULFATE 2 MG: 2 INJECTION, SOLUTION INTRAMUSCULAR; INTRAVENOUS at 18:57

## 2022-08-10 RX ADMIN — ONDANSETRON 4 MG: 2 INJECTION INTRAMUSCULAR; INTRAVENOUS at 22:10

## 2022-08-10 RX ADMIN — POLYETHYLENE GLYCOL (3350) 17 G: 17 POWDER, FOR SOLUTION ORAL at 13:07

## 2022-08-10 RX ADMIN — METOCLOPRAMIDE HYDROCHLORIDE 5 MG: 5 INJECTION INTRAMUSCULAR; INTRAVENOUS at 08:25

## 2022-08-10 RX ADMIN — METOPROLOL TARTRATE 5 MG: 5 INJECTION INTRAVENOUS at 09:45

## 2022-08-10 RX ADMIN — METOPROLOL TARTRATE 5 MG: 5 INJECTION INTRAVENOUS at 03:59

## 2022-08-10 RX ADMIN — FLUTICASONE PROPIONATE 2 SPRAY: 50 SPRAY, METERED NASAL at 08:25

## 2022-08-10 RX ADMIN — MORPHINE SULFATE 2 MG: 2 INJECTION, SOLUTION INTRAMUSCULAR; INTRAVENOUS at 09:45

## 2022-08-10 RX ADMIN — METOCLOPRAMIDE HYDROCHLORIDE 5 MG: 5 INJECTION INTRAMUSCULAR; INTRAVENOUS at 22:53

## 2022-08-10 RX ADMIN — MORPHINE SULFATE 4 MG: 4 INJECTION INTRAVENOUS at 02:03

## 2022-08-10 RX ADMIN — ACETAMINOPHEN 1000 MG: 500 TABLET ORAL at 08:24

## 2022-08-10 RX ADMIN — MORPHINE SULFATE 2 MG: 2 INJECTION, SOLUTION INTRAMUSCULAR; INTRAVENOUS at 01:13

## 2022-08-10 RX ADMIN — MORPHINE SULFATE 2 MG: 2 INJECTION, SOLUTION INTRAMUSCULAR; INTRAVENOUS at 13:07

## 2022-08-10 RX ADMIN — PREGABALIN 50 MG: 50 CAPSULE ORAL at 19:57

## 2022-08-10 RX ADMIN — PANTOPRAZOLE SODIUM 40 MG: 40 INJECTION, POWDER, FOR SOLUTION INTRAVENOUS at 19:58

## 2022-08-10 RX ADMIN — METHOCARBAMOL 500 MG: 500 TABLET ORAL at 12:15

## 2022-08-10 RX ADMIN — METOPROLOL TARTRATE 5 MG: 5 INJECTION INTRAVENOUS at 16:58

## 2022-08-10 RX ADMIN — METOPROLOL TARTRATE 5 MG: 5 INJECTION INTRAVENOUS at 22:53

## 2022-08-10 RX ADMIN — ALBUTEROL SULFATE 2.5 MG: 2.5 SOLUTION RESPIRATORY (INHALATION) at 09:09

## 2022-08-10 RX ADMIN — TRAMADOL HYDROCHLORIDE 50 MG: 50 TABLET, COATED ORAL at 16:58

## 2022-08-10 RX ADMIN — ONDANSETRON 4 MG: 2 INJECTION INTRAMUSCULAR; INTRAVENOUS at 12:23

## 2022-08-10 RX ADMIN — METOCLOPRAMIDE HYDROCHLORIDE 5 MG: 5 INJECTION INTRAMUSCULAR; INTRAVENOUS at 03:59

## 2022-08-10 RX ADMIN — METOCLOPRAMIDE HYDROCHLORIDE 5 MG: 5 INJECTION INTRAMUSCULAR; INTRAVENOUS at 16:58

## 2022-08-10 ASSESSMENT — PAIN DESCRIPTION - ONSET: ONSET: ON-GOING

## 2022-08-10 ASSESSMENT — PAIN DESCRIPTION - ORIENTATION: ORIENTATION: RIGHT;LOWER

## 2022-08-10 ASSESSMENT — PAIN SCALES - GENERAL
PAINLEVEL_OUTOF10: 10
PAINLEVEL_OUTOF10: 7
PAINLEVEL_OUTOF10: 8
PAINLEVEL_OUTOF10: 7
PAINLEVEL_OUTOF10: 8

## 2022-08-10 ASSESSMENT — PAIN DESCRIPTION - LOCATION
LOCATION: LEG;HIP;BACK
LOCATION: ABDOMEN;BACK
LOCATION: ABDOMEN;BACK;HIP
LOCATION: ABDOMEN;BACK

## 2022-08-10 ASSESSMENT — PAIN DESCRIPTION - PAIN TYPE: TYPE: SURGICAL PAIN;ACUTE PAIN

## 2022-08-10 ASSESSMENT — PAIN DESCRIPTION - FREQUENCY: FREQUENCY: CONTINUOUS

## 2022-08-10 ASSESSMENT — PAIN DESCRIPTION - DESCRIPTORS
DESCRIPTORS: ACHING;CRAMPING;DISCOMFORT
DESCRIPTORS: BURNING;NUMBNESS

## 2022-08-10 NOTE — PROGRESS NOTES
Office : [phone]     Fax :[phone]         Renal Progress Note    Subjective:   Admit Date: 7/30/2022     Interval History:   NAONE. Complains of back pain. Making good urine. On RA. DIET ADULT DIET;  Full Liquid  Medications:   Scheduled Meds:   acetaminophen  1,000 mg Oral 3 times per day    melatonin  10 mg Oral Nightly    albuterol  2.5 mg Nebulization BID    metoprolol  5 mg IntraVENous Q6H    lidocaine  1 patch TransDERmal Daily    naloxegol  12.5 mg Oral QAM AC    metoclopramide  5 mg IntraVENous Q6H    pantoprazole  40 mg IntraVENous BID    sodium chloride (Inhalant)  4 mL Nebulization BID    polyethylene glycol  17 g Per NG tube TID    lidocaine   Topical Once    sodium chloride flush  5-40 mL IntraVENous 2 times per day    fluticasone  2 spray Each Nostril Daily    [Held by provider] pregabalin  50 mg Oral TID     Continuous Infusions:   [Held by provider] heparin (PORCINE) Infusion 16 Units/kg/hr (08/09/22 1655)    sodium chloride      sodium chloride      dextrose      sodium chloride         Labs:  CBC:   Recent Labs     08/08/22 0444 08/08/22 0918 08/09/22  0406 08/10/22  0437   WBC 12.0*  --  12.5* 14.6*   HGB 6.7* 7.8* 7.5* 7.1*   PLT 95*  --  130* 168       BMP:    Recent Labs     08/08/22 0445 08/08/22  1648 08/09/22 0406 08/10/22  0438   * 128* 129* 134*   K 4.6  --  3.9 3.7   CL 89*  --  95* 99   CO2 18*  --  23 22   BUN 97*  --  83* 62*   CREATININE 2.8*  --  1.9* 1.2   GLUCOSE 88  --  81 85       Ca/Mg/Phos:   Recent Labs     08/08/22  0445 08/09/22  0406 08/10/22  0438   CALCIUM 7.3* 7.6* 7.7*   MG 2.70* 2.40 2.20   PHOS 8. 2* 6.6* 4.3       Hepatic:   No results for input(s): AST, ALT, ALB, BILITOT, ALKPHOS in the last 72 hours. Troponin: No results for input(s): TROPONINI in the last 72 hours. BNP: No results for input(s): BNP in the last 72 hours. Lipids: No results for input(s): CHOL, TRIG, HDL, LDLCALC, LABVLDL in the last 72 hours. ABGs:   No results for input(s): PHART, PO2ART, AFY3ACZ in the last 72 hours. INR:   No results for input(s): INR in the last 72 hours. UA:  No results for input(s): Joy Mick, GLUCOSEU, BILIRUBINUR, KETUA, SPECGRAV, BLOODU, PHUR, PROTEINU, UROBILINOGEN, NITRU, LEUKOCYTESUR, Elnita Sahara in the last 72 hours. Urine Microscopic:   No results for input(s): LABCAST, BACTERIA, COMU, HYALCAST, WBCUA, RBCUA, EPIU in the last 72 hours. Urine Culture: No results for input(s): LABURIN in the last 72 hours. Urine Chemistry: No results for input(s): Janel Nicely, PROTEINUR, NAUR in the last 72 hours. Objective:   Vitals: /73   Pulse 96   Temp 97.6 °F (36.4 °C) (Oral)   Resp 19   Ht 5' 2.01\" (1.575 m)   Wt 249 lb 9 oz (113.2 kg)   SpO2 95%   BMI 45.63 kg/m²    Wt Readings from Last 3 Encounters:   08/10/22 249 lb 9 oz (113.2 kg)   07/13/22 218 lb 9.6 oz (99.2 kg)   06/14/22 217 lb (98.4 kg)      24HR INTAKE/OUTPUT:    Intake/Output Summary (Last 24 hours) at 8/10/2022 4501  Last data filed at 8/10/2022 5463  Gross per 24 hour   Intake 462.89 ml   Output 2125 ml   Net -1662.11 ml       Physical Exam  Constitutional:       Appearance: She is ill-appearing. She is not diaphoretic. Cardiovascular:      Rate and Rhythm: Normal rate. Pulmonary:      Effort: Pulmonary effort is normal.      Breath sounds: Rhonchi present. No wheezing. Abdominal:      General: There is distension. Palpations: Abdomen is soft. Tenderness: There is abdominal tenderness. There is no guarding. Musculoskeletal:      Right lower leg: Edema present.       Left lower leg: Edema present. Skin:     General: Skin is warm and dry. Neurological:      General: No focal deficit present. Mental Status: She is oriented to person, place, and time. Assessment and Plan:       IMAGING:  XR CHEST PORTABLE   Final Result      Mild bilateral airspace disease. XR ABDOMEN (KUB) (SINGLE AP VIEW)   Final Result   1. No evidence of free intraperitoneal air. 2. Indeterminate bowel gas pattern due to a paucity of small bowel gas. XR ABDOMEN (KUB) (SINGLE AP VIEW)   Final Result   Impression: Stable appearance of the abdomen. XR CHEST PORTABLE   Final Result      Low lung volumes with central bronchovascular crowding. Atelectasis in the left lung base. Normal cardiomediastinal silhouette. Lines and tubes in standard position. VL Extremity Venous Bilateral   Final Result      XR ABDOMEN (KUB) (SINGLE AP VIEW)   Final Result   Impression: Stable appearance of the abdomen with gas distended loops of central small bowel again noted. CT CHEST WO CONTRAST   Final Result      1. Moderate right pleural effusion and small left effusion with basilar airspace opacity most consistent with atelectasis. Superimposed pneumonia be difficult to exclude. 2. Narrowing of the trachea with expiration compatible with treated bronchial malacia. 3. Nasogastric tube tip in the lower esophagus. 4. Persistent colonic distention. 5. Mild coronary artery calcification. XR ABDOMEN (KUB) (SINGLE AP VIEW)   Final Result      Frontal supine views demonstrate an unremarkable bowel gas pattern. No significant colonic stool is seen. Right hip arthroplasty noted. XR CHEST PORTABLE   Final Result      Prominence of the perihilar interstitial markings with mild peribronchial cuffing is favored to represent mild pulmonary edema. Low lung volumes bronchovascular crowding. Stable cardiomediastinal silhouette. Lines and tubes in standard position. Cholecystectomy clips are noted. CT ABDOMEN PELVIS WO CONTRAST Additional Contrast? Oral and Rectal   Final Result   1. Moderate colonic distention. Correlate for constipation. Fecal content within the ileum which may suggest stasis. There is no small bowel obstruction. 2. Subcutaneous fluid collection overlying the right total hip arthroplasty. CT HIP RIGHT WO CONTRAST   Final Result   1. Moderate colonic distention. Correlate for constipation. Fecal content within the ileum which may suggest stasis. There is no small bowel obstruction. 2. Subcutaneous fluid collection overlying the right total hip arthroplasty. XR CHEST PORTABLE   Final Result      1. Right IJ CVC tip in the cavoatrial junction. 2.  Bibasilar airspace disease suggesting atelectasis. Pneumonia is not excluded. XR ABDOMEN (KUB) (SINGLE AP VIEW)   Final Result      Dilated colonic and small bowel loops may suggest ileus but obstruction is not excluded. XR ABDOMEN (KUB) (SINGLE AP VIEW)   Final Result      No acute radiographic abnormality of the abdomen. Status post right hip arthroplasty with mildly displaced greater trochanteric fracture fragment, new or more pronounced compared to the prior study. Assessment/Plan     ARIANE   2. Sepsis  3. AGMA   4. Lactic Acidosis - resolved  4. Hyperkalemia  5. Hyponatremia  6. Bowel Obstruction  7. Hypertension      Plan:  Cr improved, making good urine  - No need for diuresis today  - ECHO normnal EF - IVC compressible   - Hypoxia likely sec to Aspiration pneumonitis   - ARIANE sec to ATN   - Monitor I/Os  - Avoid nephrotoxic agents    Recommend to dose adjust all medications  based on renal functions  Maintain SBP> 90 mmHg   Daily weights   AVOID NSAIDs  Avoid Nephrotoxins  Monitor Intake/Output  Call if significant decrease in urine output        Neftaly Prasad MD        I have seen the patient independently from the resident . I discussed the care with the resident. I personally reviewed the HPI, PH, FH, SH, ROS and medications. I repeated pertinent portions of the examination and reviewed the relevant imaging and laboratory data.  I agree with the findings, assessment and plan as documented, with the following addendum:      Brittany Johnson MD

## 2022-08-10 NOTE — PROGRESS NOTES
General Surgery   Daily Progress Note  Patient: Christin Judd      CC: Abdominal pain with bowel obstruction    SUBJECTIVE:   Afebrile and HDS. Patient complaining of hip and back pain overnight, controlled with additional dosing of Morphine. BM x 4 overnight. Passed SLP swallow eval yesterday. Tolerating FLD without n/v.     ROS:   A 14 point review of systems was conducted, significant findings as noted above. All other systems negative. OBJECTIVE:    PHYSICAL EXAM:    Vitals:    08/10/22 0300 08/10/22 0400 08/10/22 0500 08/10/22 0600   BP:  136/73     Pulse: 90 95 84 96   Resp:  19     Temp:  97.6 °F (36.4 °C)     TempSrc:  Oral     SpO2: 96% 96% 95% 95%   Weight:    249 lb 9 oz (113.2 kg)   Height:           General appearance: Resting in bed, in NAD   Neuro: A&Ox3  HEENT: Trachea midline. RIJ CVC in place. Chest/Lungs: normal effort with no accessory muscle use on 1L NC  Cardiovascular: regular rhythm   Abdomen: Obese, non-tender, no rebound, guarding, or rigidity. Skin: warm and dry, no rashes  Extremities: no edema, no cyanosis  Genitourinary: Iqbal in place with clear yellow urine      LABS:   Recent Labs     08/09/22  0406 08/10/22  0437   WBC 12.5* 14.6*   HGB 7.5* 7.1*   HCT 22.8* 20.4*   MCV 90.7 89.8   * 168          Recent Labs     08/09/22  0406 08/10/22  0438   * 134*   K 3.9 3.7   CL 95* 99   CO2 23 22   PHOS 6.6* 4.3   BUN 83* 62*   CREATININE 1.9* 1.2        No results for input(s): AST, ALT, ALB, BILIDIR, BILITOT, ALKPHOS in the last 72 hours. No results for input(s): LIPASE, AMYLASE in the last 72 hours. No results for input(s): PROT, INR, APTT in the last 72 hours. No results for input(s): CKTOTAL, CKMB, CKMBINDEX, TROPONINI in the last 72 hours.       ASSESSMENT & PLAN:   This is a 74 y.o. female with Hx of HTN, HLD, Tuberculosis (1981), gout, and OA s/p right CASS on 7/13/22 who was transferred to [facility] on 7/30 due to fluid associated with her right CASS on 7/13 and for abdominal pain. - SLP completed yesterday; recommends FLD. Continue FLD  - Heparin drip per cardiology for anticoagulation for Afib   - Continue PT/OT  - Dispo: will need SNF at DC due to deconditioning. OT 8/24, PT 9/24    Kailee Pham DO, MSMEd  PGY1, General Surgery  08/10/22  5:08 PM  [employer]  Pager: [phone]    I have personally performed the medical history, physical exam and medical decision making and agree with all pertinent clinical information unless otherwise noted.      Robbi Newman MD  Surgery Attending

## 2022-08-10 NOTE — PROGRESS NOTES
Pt complaining of pain in hip and back. Morphine 2mg and robaxin given, pain not improved. Hospitalist notified, one time 4mg morphine ordered. Will continue to  monitor.

## 2022-08-10 NOTE — PROGRESS NOTES
Occupational Therapy  Facility/Department: [facility] ICU  Occupational Therapy Treatment    Name: Valencia Gann  : 1950  MRN: [id number]  Date of Service: 8/10/2022    Discharge Recommendations:   Valencia Gann scored a 8/24 on the AM-PAC ADL Inpatient form. Current research shows that an AM-PAC score of 17 or less is typically not associated with a discharge to the patient's home setting. Based on the patient's AM-PAC score and their current ADL deficits, it is recommended that the patient have 3-5 sessions per week of Occupational Therapy at d/c to increase the patient's independence. Please see assessment section for further patient specific details. If patient discharges prior to next session this note will serve as a discharge summary. Please see below for the latest assessment towards goals. OT Equipment Recommendations  Equipment Needed: No  Other: defer to next level of care       Patient Diagnosis(es): The encounter diagnosis was Right lower quadrant abdominal pain. Past Medical History:  has a past medical history of Back pain, Hyperlipidemia, Hypertension, Tuberculosis, and Wears dentures. Past Surgical History:  has a past surgical history that includes Appendectomy (); Tonsillectomy (); Ectopic pregnancy surgery (); Partial hysterectomy (); Cholecystectomy (); Cataract removal (Bilateral, ); Total hip arthroplasty (Right, 2022); and Colonoscopy (N/A, 8/3/2022). Treatment Diagnosis: impaired ADLs and mobility      Assessment   Performance deficits / Impairments: Decreased functional mobility ; Decreased balance;Decreased ADL status; Decreased endurance;Decreased strength;Decreased ROM; Decreased posture  Assessment: Pt c/o weakness and back pain. Unable to go sit to stand from bed to [facility] and [employer] was used for transfer to chair. Feel pt would benefit from further IP OT services.   Treatment Diagnosis: impaired ADLs and mobility  Prognosis: Fair  REQUIRES OT FOLLOW-UP: Yes  Activity Tolerance  Activity Tolerance: Patient limited by fatigue;Patient limited by pain        Plan   Plan  Times per Week: 2-5  Times per Day: Daily  Current Treatment Recommendations: Strengthening, Functional mobility training, Safety education & training, Patient/Caregiver education & training, Self-Care / ADL, Positioning, Endurance training, Pain management, Balance training, ROM     Restrictions  Restrictions/Precautions  Restrictions/Precautions: Modified Diet, Up as Tolerated, Fall Risk (High fall risk, full liquid diet)  Position Activity Restriction  Other position/activity restrictions: up wtih assist,  50% WB right hip    Subjective   General  Chart Reviewed: Yes  Patient assessed for rehabilitation services?: Yes  Additional Pertinent Hx: Tres Newman is a 74 y.o. female with Hx of HTN, HLD, Tuberculosis (1981), gout, and OA s/p right CASS on 7/13/22 who was admitted to [facility] on 7/30 due to fluid associated with her right CASS on 7/13 and for abdominal pain. CT scan abdomen shows colonic distention with fecal burden throughout colon and terminal ileum. S/p Colonoscopy with decompression on 8/3  Family / Caregiver Present: No  Referring Practitioner: David Gold MD  Diagnosis: abdominal pain  Subjective  Subjective: Pt in bed upon entry.    Pt c/o back pain (not rated, nurse aware)     Social/Functional History  Social/Functional History  Lives With: Spouse  Type of Home: House  Home Layout: One level  Home Access: Ramped entrance (through garage)  Bathroom Shower/Tub: Walk-in shower (small lip to enter, glass doors)  Bathroom Toilet: Handicap height (+ toilet riser w/ rails)  Bathroom Equipment: Grab bars in shower, Toilet raiser, Hand-held shower  Home Equipment: Alicia Wilson, Carlos Lloyd, Alicia Wilson, 4 wheeled, [employer], [address] (transport chair)  [employer] Help From: Family, Other (comment) (skilled RN 1x weekly, daughter assists \"as often as she needs me\" w/ heavy household mgmt)  ADL Assistance: Independent (prior to hip sx)  Homemaking Assistance: Independent (shares w/ )  Ambulation Assistance: Independent (prior to hip sx, w/ AD)  Transfer Assistance: Independent  Active : Yes  Leisure & Hobbies: \"Keeping everything up\"  IADL Comments: Shared w/ , daughter and granddaugher assist PRN w/ household mgmt  Additional Comments: Pt d/c'd to SNF from recent hip sx on 7/16       Objective   Heart Rate: 94  Heart Rate Source: Monitor  BP: 136/73  BP Location: Right upper arm  BP Method: Automatic  Patient Position: Semi fowlers  MAP (Calculated): 94  Resp: 16  SpO2: 98 %  O2 Device: None (Room air)          Observation/Palpation  Observation: Pt on RA on arrival. R groin bandage clean and intact. Safety Devices  Type of Devices: Left in chair;Call light within reach; Chair alarm in place;Nurse notified (Daughter present)  Balance  Sitting - Static:  (Pt sat edge of bed ~8 min with CG-SBA)        ADL  LE Dressing: Dependent/Total  Toileting: Dependent/Total (catheter. Incont of stool.)     Activity Tolerance  Activity Tolerance Comments: Pt was limited this date by pain and generalized weakness  Bed mobility  Rolling to Left: Dependent/Total;2 Person assistance (Mod A x 2)  Rolling to Right: Dependent/Total;2 Person assistance (Mod A x 2)  Supine to Sit: Dependent/Total;2 Person assistance (Max A x 2, increased time required to complete task, use of bed rail, HOB elevated)  Sit to Supine: Dependent/Total;2 Person assistance (HOB flat)  Scooting: Dependent/Total;2 Person assistance  Transfers  Sit to stand:  (Attempted sit to stand from bed to [employer] w/o success)  Transfer Comments: Transferred bed to chair via [place]  Overall Cognitive Status: Exceptions  Arousal/Alertness: Delayed responses to stimuli  Following Commands: Follows one step commands with increased time; Follows one step commands with repetition  Memory: Decreased recall of recent events  Safety Judgement: Decreased awareness of need for assistance;Decreased awareness of need for safety  Problem Solving: Assistance required to generate solutions;Assistance required to implement solutions;Assistance required to identify errors made;Assistance required to correct errors made;Decreased awareness of errors  Insights: Decreased awareness of deficits  Initiation: Requires cues for some  Sequencing: Requires cues for some  Orientation  Overall Orientation Status: Within Functional Limits (alert, appropriate, and cooperative)                  Education Given To: Patient  Education Provided: Role of Therapy;Plan of Care;Precautions;Transfer Training  Education Method: Verbal  Education Outcome: Verbalized understanding;Continued education needed           AM-PAC Score        AM-PAC Inpatient Daily Activity Raw Score: 8 (08/10/22 1220)  AM-PAC Inpatient ADL T-Scale Score : 22.86 (08/10/22 1220)  ADL Inpatient CMS 0-100% Score: 85.69 (08/10/22 1220)  ADL Inpatient CMS G-Code Modifier : CM (08/10/22 1220)    Tinneti Score       Goals  Short Term Goals  Time Frame for Short term goals: by d/c  Short Term Goal 1: pt will complete toileting w/ Mod A    -not met-  Short Term Goal 2: pt will complete LE dressing w/ Mod A    -not met-  Short Term Goal 3: pt will perform BUE HEP i'ly x10 reps AROM to all joints to reduce edema and improve strength for ADLs     -not met-  Short Term Goal 4: pt will perorfm supine to sit w/ Mod A     -not met-  Patient Goals   Patient goals : \"Get stronger. \"       Therapy Time   Individual Concurrent Group Co-treatment   Time In 1709         Time Out 6457         Minutes 60         Timed Code Treatment Minutes: [address], OTR/L [address]

## 2022-08-10 NOTE — PROGRESS NOTES
Pulse 92   Temp 97.5 °F (36.4 °C)   Resp 18   Ht 5' 2.01\" (1.575 m)   Wt 250 lb 7.1 oz (113.6 kg)   SpO2 95%   BMI 45.79 kg/m²     General appearance: Awake,alert  HEENT:  Normal cephalic, atraumatic without obvious deformity. Neck: Supple, with full range of motion. No jugular venous distention. Respiratory:  Normal respiratory effort. Clear to auscultation, bilaterally without Rales/Wheezes/Rhonchi. Cardiovascular:  Regular rate and rhythm with normal S1/S2 without murmurs, rubs or gallops. Abdomen: Less distended, less firm, less tender. BS heard  Musculoskeletal: Right hip swelling but no erythema or warmth. Incision dressing C/D/I  Skin: Skin color, texture, turgor normal.  No rashes or lesions. Neurologic:  grossly non-focal.  Capillary Refill: Brisk,< 3 seconds   Peripheral Pulses: +2 palpable, equal bilaterally       Labs:     Recent Labs     08/07/22  0404 08/08/22  0444 08/08/22  0918 08/09/22  0406   WBC 13.3* 12.0*  --  12.5*   HGB 7.4* 6.7* 7.8* 7.5*   HCT 22.1* 20.4* 23.5* 22.8*   PLT see below 95*  --  130*       Recent Labs     08/07/22  0403 08/08/22  0445 08/08/22  1648 08/09/22  0406   *  126* 124* 128* 129*   K 5.2*  5.1 4.6  --  3.9   CL 92*  90* 89*  --  95*   CO2 30  26 18*  --  23   *  98* 97*  --  83*   CREATININE 3.5*  3.3* 2.8*  --  1.9*   CALCIUM 6.7*  7.3* 7.3*  --  7.6*   PHOS 8.2* 8.2*  --  6.6*       No results for input(s): AST, ALT, BILIDIR, BILITOT, ALKPHOS in the last 72 hours. No results for input(s): INR in the last 72 hours. No results for input(s): Kisha Parisian in the last 72 hours. Urinalysis:      Lab Results   Component Value Date/Time    NITRU POSITIVE 07/31/2022 05:15 AM    WBCUA 3-5 07/31/2022 05:15 AM    BACTERIA 1+ 07/31/2022 05:15 AM    RBCUA None seen 07/31/2022 05:15 AM    BLOODU Negative 07/31/2022 05:15 AM    SPECGRAV 1.015 07/31/2022 05:15 AM    GLUCOSEU Negative 07/31/2022 05:15 AM       Radiology: No imaging here. Reviewed imaging from OSH        ASSESSMENT/PLAN:    Severe sepsis: POA   - Leukocytosis with tachycardia; on admission; - Possibly from GI source with severe constipation, fecal impaction  -  Transferred for evaluation of post-op hip fluid collection/possible infection (s/p THR 7/13): felt likely seroma,  normal post operative finding by orthopedics  -Started on admission on broad spectrum abx  - Blood cultures x 2 NGTD  - ID consulted  - Not currently on Abx   - Monitor off antibx, hopefully should improve as bowels appear functioning now      Atrial Fibrillation with RVR - converted to NSR  Diltiazem drip - changing to IV lopressor   Heparin drip  Echo normal EF, normal diastolic  Cardiology consulted  Plan transition to oral meds when enteral route re-established; possibly 8/9      Acute  hypoxemic respiratory failure  Possible fluid overload from recent surgery, and while NPO with intra-abdominal issue this admit; also possible aspiration pneumonitis  Was started on heparin drip for Afib and possible also new PE  8/3 CT chest wo contrast b/l pleural effusions, small left and moderate right  Prelim doppler report no DVT  Briefly on IV lasix/lasix drip but was stopped. O2 requirement still high. Resp failure resolving  Encourage incentive spirometry  Wean O2 further      ARIANE   Likely sec to Hypoperfusion  - Cr was 0.8 in 7/14; 2.2 on admission. Increased while admitted up to 3.5  Nephrology consulted  -Monitor Cr: ARIANE resolving  - Avoid nephrotoxins      Bowel obstruction: POA Secondary to severe constipation  - CT of the [facility] system showed significant stool from cecum to the sigmoid colon and transition point at the rectosigmoid.   - GI, Surgery consulted, appreciate recs  - Miralax and enema unsuccessful  - Underwent [place] decompression 8/3 with improvement but not resolution, recommendation continue SMOG enemas BID, Relistor, disimpaction  - GI, General surgery following  - NG removed.  Started on liquid diet: monitor for tolerance      Acute Urinary retention  Possible partially treated Complicated UTI: POA  - Appears recently had E coli in urine, although CC was less than 100k. Was treated with cipro. Also appears on Keflex PTA.   - Urine on admission has some bacteria, and nitrite positive, but neg leuc esterase and no signif pyuria  - Winter catheter placed at [facility] for retention likely sec to severe constipation  - Continue winter for now, ongoing reassessment; possible TOV soon      Acute on Chronic Anemia  - Hg 11.2 on admission  - Dropped to 6s; - Blood transfusion 8/6  - Monitor Hgb  - Evaluate for hemorrhage lashawn as on heparin gtt      Essential Hypertension- Monitor Bps    Mild hyponatremia: monitor electrolytes    Gout - stable    Morbid obesity Body mass index is 41.01 kg/m². - Complicating assessment and treatment. Placing patient at risk for multiple co-morbidities as well as early death and contributing to the patient's presentation.  on weight loss when appropriate. DVT Prophylaxis: High risk: Heparin drip  Diet: ADULT DIET; Full Liquid  Code Status: Full Code      Disposition: - Transfer from ICU. Remains in pxt pending progress  Mobilize  Hopefully DC in about 2 days pending progress  Recently at SNF after hip ortho surgery:  Will likely need SNF  PT/OT alejandra Dickey MD

## 2022-08-10 NOTE — RT PROTOCOL NOTE
orders with one order with TID Frequency and one order with Frequency of every 4 hours PRN wheezing or increased work of breathing using Per Protocol order mode. 11-13 - enter or revise RT Bronchodilator order(s) to one equivalent RT bronchodilator order with QID Frequency and an Albuterol order with Frequency of every 4 hours PRN wheezing or increased work of breathing using Per Protocol order mode. Greater than 13 - enter or revise RT Bronchodilator order(s) to one equivalent RT bronchodilator order with every 4 hours Frequency and an Albuterol order with Frequency of every 2 hours PRN wheezing or increased work of breathing using Per Protocol order mode. RT to enter RT Home Evaluation for COPD & MDI Assessment order using Per Protocol order mode.     Electronically signed by Jeri Hampton RCP on 8/10/2022 at 9:16 AM

## 2022-08-10 NOTE — PROGRESS NOTES
Notified Kaya TORRES with ortho of patient's persistent pain despite giving PRN medications. Home dose of tramadol ordered.

## 2022-08-10 NOTE — PROGRESS NOTES
Electrophysiology - PROGRESS NOTE    Admit Date: 7/30/2022     Chief Complaint: AF     Interval History:   Patient seen and examined and notes reviewed. Patient is being followed for paroxysmal AF. Patient had presented to the hospital with abdominal pain was found to have a bowel obstruction/ileus along with sepsis. She had gone into atrial fibrillation with RVR and was placed on a diltiazem drip and converted back to normal sinus rhythm. She had a colonoscopy with a decompression on 8/3/2022 and was found to be back in atrial fibrillation afterwards. She was placed on amiodarone drip and converted to normal sinus rhythm. She is now off amiodarone and has remained in normal sinus rhythm since 8/6/22. Heparin was turned off overnight 8/9 due to a slightly bloody nose and stool with red tinge. H&H trending down. Remains off heparin. Complains of leg and back pain. Patient with a h/o chronic pain. Denies heart racing or palpitations.     In: 509.9 [I.V.:462.9; NG/GT:47]  Out: 2125    Wt Readings from Last 2 Encounters:   08/10/22 249 lb 9 oz (113.2 kg)   07/13/22 218 lb 9.6 oz (99.2 kg)       Data:   Scheduled Meds:   Scheduled Meds:   acetaminophen  1,000 mg Oral 3 times per day    melatonin  10 mg Oral Nightly    albuterol  2.5 mg Nebulization BID    metoprolol  5 mg IntraVENous Q6H    lidocaine  1 patch TransDERmal Daily    naloxegol  12.5 mg Oral QAM AC    metoclopramide  5 mg IntraVENous Q6H    pantoprazole  40 mg IntraVENous BID    sodium chloride (Inhalant)  4 mL Nebulization BID    polyethylene glycol  17 g Per NG tube TID    lidocaine   Topical Once    sodium chloride flush  5-40 mL IntraVENous 2 times per day    fluticasone  2 spray Each Nostril Daily    [Held by provider] pregabalin  50 mg Oral TID     Continuous Infusions:   [Held by provider] heparin (PORCINE) Infusion 16 Units/kg/hr (08/09/22 1040)    sodium chloride      sodium chloride dextrose      sodium chloride       PRN Meds:.heparin (porcine), heparin (porcine), sodium chloride, lidocaine, sodium chloride, sodium chloride, prochlorperazine, glucose, dextrose bolus **OR** dextrose bolus, glucagon (rDNA), dextrose, albuterol, phenol, morphine **OR** morphine, sodium chloride flush, sodium chloride, ondansetron **OR** ondansetron, methocarbamol  Continuous Infusions:   [Held by provider] heparin (PORCINE) Infusion 16 Units/kg/hr (08/09/22 1655)    sodium chloride      sodium chloride      dextrose      sodium chloride         Intake/Output Summary (Last 24 hours) at 8/10/2022 1016  Last data filed at 8/10/2022 2494  Gross per 24 hour   Intake 462.89 ml   Output 2125 ml   Net -1662.11 ml       CBC:   Lab Results   Component Value Date/Time    WBC 14.6 08/10/2022 04:37 AM    HGB 7.1 08/10/2022 04:37 AM     08/10/2022 04:37 AM     BMP:  Lab Results   Component Value Date/Time     08/10/2022 04:38 AM    K 3.7 08/10/2022 04:38 AM    K 4.3 07/14/2022 07:27 AM    CL 99 08/10/2022 04:38 AM    CO2 22 08/10/2022 04:38 AM    BUN 62 08/10/2022 04:38 AM    CREATININE 1.2 08/10/2022 04:38 AM    GLUCOSE 85 08/10/2022 04:38 AM     INR:   Lab Results   Component Value Date/Time    INR 1.15 08/04/2022 06:55 PM    INR 1.18 08/02/2022 06:30 PM    INR 1.27 08/01/2022 02:24 AM        CARDIAC LABS  ENZYMES:No results for input(s): CKMB, CKMBINDEX, TROPONINI in the last 72 hours.     Invalid input(s): CKTOTAL;3  FASTING LIPID PANEL:No results found for: HDL, LDLDIRECT, LDLCALC, TRIG, TSH  LIVER PROFILE:  Lab Results   Component Value Date/Time    AST 33 08/01/2022 05:41 AM    AST 51 08/01/2022 12:23 AM    ALT 27 08/01/2022 05:41 AM    ALT 39 08/01/2022 12:23 AM       -----------------------------------------------------------------  Telemetry: Personally reviewed NSR    Objective:   Vitals: /73   Pulse 94   Temp 97.9 °F (36.6 °C) (Axillary)   Resp 19   Ht 5' 2.01\" (1.575 m)   Wt 249 lb 9 oz exercise, manage stress sleep apnea evaluation and symptoms of a stroke. - Keep K+ > 4.0 and Mg > 2.0  - Reviewed recent labs  - Echo - EF > 65%, LA 3.7/49.7  - 2 week CAM on d/c    HTN  - BP labile  - On no meds    Kimberly Hay CNP  [address]      I spent a total of 35 minutes in care of the patient and greater than 50% of the time was spent counseling with Mac Connelly and coordinating care regarding their diagnosis, treatments and plan of care.

## 2022-08-10 NOTE — CARE COORDINATION
Case Management Assessment           Daily Note                 Date/ Time of Note: 8/10/2022 1:55 PM         Note completed by: Alecia Owen RN    Patient Name: Tonya Romero  YOB: 1950    Diagnosis:Abdominal pain [R10.9]  Patient Admission Status: Inpatient    Date of Admission:7/30/2022  8:43 PM Length of Stay: 11 GLOS: GMLOS: 4.8    PT AM-PAC: 6 / 24 per last evaluation on: 8/10    OT AM-PAC: 8 / 24 per last evaluation on: 8/10    Discharge Plan:  Skilled nursing facility. [facility]: [facility]. [facility] [address]  Phone: [phone] with Lisa in admissions. Faxed a packet to [phone]. Case Management Notes: Met with Ms. Teersa Burt and her family at the bedside this afternoon to discuss discharge disposition. The family is interested in the above referral.     Pt is from home with her spouse. The home setting is a ranch-style house with a ramped entrance. She was relatively independent with self care and functional mobility with family support prior to admission. She was active with [facility] for a SN 3x week. She has a rollator and a rolling walker. [facility] provides a HHA via [employer]. Maximo Mandujano and her family were provided with choice of provider; she and her family are in agreement with the discharge plan.       Alecia Owen RN  [facility]  Case Management Department  [phone]

## 2022-08-10 NOTE — PROGRESS NOTES
Pt transferred from ICU, c/o pain and family upset there is no explanation for drop in Hgb, doppler done on BLEs, ortho ordered XR of R hip d/t new complaints of R leg numbness and will see in AM. One unit of PRBCs given, pt resting comfortably.

## 2022-08-10 NOTE — PROGRESS NOTES
Speech Language Pathology  Facility/Department: [facility] ICU  Dysphagia Daily Treatment Note    NAME: Tonya Romero  : 1950  MRN: [id number]    Patient Diagnosis(es):   Patient Active Problem List    Diagnosis Date Noted    Paroxysmal atrial fibrillation ([facility] [address] 2022    Acute hypoxemic respiratory failure ([address] 2022    Atrial fibrillation with RVR ([facility] [address] 2022    On continuous heparin infusion 2022    Benign essential HTN 2022    Status post right hip replacement 2022    ARIANE (acute kidney injury) ([facility] [address] 2022    Electrolyte imbalance 2022    Lactic acid acidosis 2022    Leukocytosis 2022    Ileus ([facility] [address] 2022    Status post total hip replacement, right 2022    Abdominal pain 2022    Status post left hip replacement 07/15/2022    Osteoarthritis of right hip 2022    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 Previous MBS - n/a  Chart reviewed. Medical Diagnosis: Abdominal pain [R10.9]   Treatment Diagnosis: dysphagia    BSE Impression 22  Pt sleeping, sitting up in chair with daughter present. Pt woke fairly easily and was able to participate with only min cues to remain alert at the beginning of the session. Pt had NG removed this date and has not taken in PO nutrition for some time. Pt with upper and lower denture.  ROM of oral structures was WFL.  Pt had minimal difficulty closing lips around spoon or drawing liquid up a straw. Pt had mild difficulty with mastication with solids with prolonged mastication noted. There was no anterior spillage or oral residue noted with any consistency. Pt demonstrated no s/s aspiration with any consistency presented. Pt able to initiate swallow without difficulty with laryngeal movement observed. Vocal quality remained clear throughout. No coughing, throat clearing or choking observed with any consistency. Pt was instructed to consume 3oz of water continuously but stopped to take a breath after 3 successive swallows. No s/s aspiration noted. RR fluctuated between 14-18 through out the session. Recommend full liquid diet at this time due to concern for fatigue as pt is deconditioned. Dysphagia Diagnosis: Mild to moderate oral stage dysphagia (due to fatigue)    MBS results   Not indicated at this time    Pain: yes, RN giving pain medication at this time    Current Diet : ADULT DIET; Full Liquid   Recommended Form of Meds: PO     Treatment:  Pt seen bedside to address the following goals:  1-The patient will tolerate recommended diet without observed clinical signs of aspiration  8/10: Rn stated keep pt on full today, as still some GIB. pt sitting up in chair, family present. Pt has full liquid tray at bedside. Pt consumed puree and thin liquids with no overt signs of aspiration. Pt cleared oral cavity fully. Pt reported some difficulty earlier when taking medication whole with water and this was a large pill. Pt family and RN report no problem with taking medications crushed in puree. Recommend cont Full liquids, pt remains very weak. Cont goal    2- The pt/caregiver will demonstrate understanding of swallowing recommendations and concerns.   8/9- The pt and daughter were educated to purpose of the visit, anatomy and physiology of the swallow, concerns for aspiration, role breathing plays in swallowing, concern for fatigue swallowing strategies, diet recommendations and possibility of being made NPO if s/s aspiration emerge. Both stated comprehension and agreement with diet recommendation and had no further questions. con't goal  8/10: pt and family educated to purpose of visit, reviewed s/s of aspiration and concern if aspiration occurs. Instructed to notify staff if any issues emerge. Discussed cont of current texture at this time. Pt and family stated comprehension  Cont goal     Patient/Family/Caregiver Education:  As above    Compensatory Strategies:  Upright as possible for all oral intake, Remain upright for 30-45 minutes after meals, Eat/Feed slowly, Alternate solids and liquids, Small bites/sips      Plan:  Continue dysphagia treatment with goals per plan of care. Diet recommendations: cont Full liquids  DC recommendation: TBD  Treatment: 21  D/W nursing Sophia Keating  Needs met prior to leaving room, call button in reach. Dk Kuo M.S./[facility]-SLP #[id number]  Pg.  # [id number]  If patient is discharged prior to next treatment, this note will serve as the discharge summary

## 2022-08-10 NOTE — PROGRESS NOTES
Physical Therapy  Facility/Department: [address] [place] [address] ICU  Physical Therapy Daily Treatment Note    Name: Olesya Arzola  : 1950  MRN: [id number]  Date of Service: 8/10/2022    Discharge Recommendations: Olesya Arzola scored a 6/24 on the AM-PAC short mobility form. Current research shows that an AM-PAC score of 17 or less is typically not associated with a discharge to the patient's home setting. Based on the patient's AM-PAC score and their current functional mobility deficits, it is recommended that the patient have 3-5 sessions per week of Physical Therapy at d/c to increase the patient's independence. Please see assessment section for further patient specific details. If patient discharges prior to next session this note will serve as a discharge summary. Please see below for the latest assessment towards goals. Patient would benefit from continued therapy after discharge, Therapy recommended at discharge   PT Equipment Recommendations  Equipment Needed: No (Defer to next level of care)      Patient Diagnosis(es): The encounter diagnosis was Right lower quadrant abdominal pain. Past Medical History:  has a past medical history of Back pain, Hyperlipidemia, Hypertension, Tuberculosis, and Wears dentures. Past Surgical History:  has a past surgical history that includes Appendectomy (); Tonsillectomy (); Ectopic pregnancy surgery (); Partial hysterectomy (); Cholecystectomy (); Cataract removal (Bilateral, ); Total hip arthroplasty (Right, 2022); and Colonoscopy (N/A, 8/3/2022). Assessment   Body Structures, Functions, Activity Limitations Requiring Skilled Therapeutic Intervention: Decreased functional mobility ; Decreased strength;Decreased endurance;Decreased balance; Increased pain  Assessment: Pt presents today with a decline in functional mobility.  Pt requiring the assistance of two people for all functional mobility and was unable to stand from the raised EOB due to pain and generalized weakness. Pt continues to present below baseline. Pt will continue to benefit from skilled PT to facilitate return to PLOF and to promote independence. Continued IP PT recommended at discharge. Treatment Diagnosis: Decreased functional mobility post admission for weakness/nausea  Requires PT Follow-Up: Yes  Activity Tolerance  Activity Tolerance Comments: Pt was limited this date by pain and generalized weakness     Plan   Plan  Plan:  (2-5)  Current Treatment Recommendations: Strengthening, ROM, Functional mobility training, Gait training, Safety education & training, Transfer training  Safety Devices  Type of Devices: All fall risk precautions in place, Call light within reach, Gait belt, Chair alarm in place, Nurse notified, Left in chair  Restraints  Restraints Initially in Place: No     Restrictions  Restrictions/Precautions  Restrictions/Precautions: Modified Diet, Up as Tolerated, Fall Risk (High fall risk, full liquid diet)  Position Activity Restriction  Other position/activity restrictions: up wtih assist,  50% WB right hip     Subjective   General  Chart Reviewed: Yes  Patient assessed for rehabilitation services?: Yes  Additional Pertinent Hx: Adm 7/30 with weakness, nausea and abdominal pain. Recent. anterior approach CASS done 7/16,  mL, complicated procedure in the setting of patient's morbid obesity and proximal femur osteoporotic bone lesions and possibility of calcified crack which was treated with proximal femur cable. At SNF until 7/19  Discharged home with HHPT and 24 hr A from family. Transferredto ICU 8/2/22.  8/3  colonic decompression. Pt developed afib with RVR  Response To Previous Treatment: Patient with no complaints from previous session.   Family / Caregiver Present: No  Follows Commands: Impaired (Follows one step commands with increased time)  General Comment  Comments: Pt supine in bed on arrival, agreeable to participate in PT/OT treatment session. Subjective  Subjective: Pt reports low back pain, not rated. Pain medication in place prior to arrival, RN notified. Social/Functional History  Social/Functional History  Lives With: Spouse  Type of Home: House  Home Layout: One level  Home Access: Ramped entrance (through garage)  Bathroom Shower/Tub: Walk-in shower (small lip to enter, glass doors)  Bathroom Toilet: Handicap height (+ toilet riser w/ rails)  Bathroom Equipment: Grab bars in shower, Toilet raiser, Hand-held shower  Home Equipment: Walker, [place], Walker, 4 wheeled, [employer], [address] (transport chair)  [address] Help From: Family, Other (comment) (skilled RN 1x weekly, daughter assists \"as often as she needs me\" w/ heavy household mgmt)  ADL Assistance: Independent (prior to hip sx)  Homemaking Assistance: Independent (shares w/ )  Ambulation Assistance: Independent (prior to hip sx, w/ AD)  Transfer Assistance: Independent  Active : Yes  Leisure & Hobbies: \"Keeping everything up\"  IADL Comments: Shared w/ , daughter and granddaugher assist PRN w/ household mgmt  Additional Comments: Pt d/c'd to SNF from recent hip sx on 7/16    Cognition   Orientation  Overall Orientation Status:  (alert, appropriate, and cooperative)  Cognition  Overall Cognitive Status: Exceptions  Arousal/Alertness: Delayed responses to stimuli  Following Commands: Follows one step commands with increased time; Follows one step commands with repetition  Safety Judgement: Decreased awareness of need for assistance;Decreased awareness of need for safety  Problem Solving: Assistance required to generate solutions;Assistance required to implement solutions;Assistance required to identify errors made;Assistance required to correct errors made;Decreased awareness of errors  Insights: Decreased awareness of deficits  Initiation: Requires cues for some  Sequencing: Requires cues for some  Cognition Comment: Pt able to verbalize and abide by 50% WB precautions on R LE. Objective   Observation/Palpation  Observation: Pt on RA on arrival. R groin bandage clean and intact. Balance  Sitting - Static:  (Pt sat edge of bed ~8 min with CG-SBA)  Bed mobility  Rolling to Left: Dependent/Total;2 Person assistance (Mod A x 2)  Rolling to Right: Dependent/Total;2 Person assistance (Mod A x 2)  Supine to Sit: Dependent/Total;2 Person assistance (Max A x 2, increased time required to complete task, use of bed rail, HOB elevated)  Sit to Supine: Dependent/Total;2 Person assistance (HOB flat)  Scooting: Dependent/Total;2 Person assistance  Transfers  Sit to Stand: Dependent/Total;2 Person Assistance (Attempted two STS transfers from the raised EOB. Pt demonstrating poor initiation and unable to complete transfer. Attempted transfers with danica dumont.)  Bed to Chair: Dependent/Total;2 Person Assistance (Via maxi jazmyn)     Balance  Sitting - Static:  (Pt tolerated sitting EOB for ~ 8 minutes with CGA to SBA)      AM-PAC Score  -PAC Inpatient Mobility Raw Score : 6 (08/10/22 1219)  AM-PAC Inpatient T-Scale Score : 23.55 (08/10/22 1219)  Mobility Inpatient CMS 0-100% Score: 100 (08/10/22 1219)  Mobility Inpatient CMS G-Code Modifier : CN (08/10/22 1219)     Goals  Short Term Goals  Time Frame for Short term goals: Discharge  Short term goal 1: roll with CG R/L. Ongoing  Short term goal 2: Supine to sit with CG. Ongoing  Short term goal 3: Sit to stand with mod x 2. MET 8/9 . Revised goal:  Pt will transfer sit to stand with min assist x 1  Short term goal 4: Bed to chair with mod x 2. Ongoing  Short term goal 5: Ambulate 20 ft with RW, 50% WB on right with mod x 2.   Ongoing  Patient Goals   Patient goals : not specifically stated  *No goals met 8/10/22    Education  Patient Education  Education Given To: Patient  Education Provided: Role of Therapy;Plan of Care;Equipment  Education Method: Verbal  Barriers to Learning: Cognition  Education Outcome: Verbalized understanding;Continued education needed    Therapy Time   Individual Concurrent Group Co-treatment   Time In       1112   Time Out       1214   Minutes       62   Timed Code Treatment Minutes: One Roni Mueller PT, DPT [id number]

## 2022-08-10 NOTE — PROGRESS NOTES
Cardiology Consult Service  Daily Progress Note        Admit Date:  7/30/2022  Primary cardiologist: Dr. Shari Klinefelter    Reason for Consultation/Chief Complaint: AHF    Subjective:     Guerrero Tolentino is a 74 y.o. female with a past medical history of obesity, HTN, HLP, recent CASS 7/16/2022. Patient presented to the emergency room on 7/30 with nausea, vomiting, abdominal pain. Patient was found to be obstipated due to ileus from recent opioid use for CASS. She was admitted and was given IV fluids and IV antibiotics. Subsequently patient developed AF RVR and EP was consulted. She was started on diltiazem and heparin drips. Today I was consulted to assess patient in view of likely acute heart failure. Apparently on admission patient's oxygen requirements were 2 L, received IV fluids, oxygen requirements increased to 8 L, received Lasix 40 mg IV x1 on 8/2, oxygen requirements are now 6.5 L. Creatinine on admission was 2.2, increased to a peak of 2.7 today. ECG consistent with AF  bpm.  There were no ischemic changes. Echo 8/3/2022: Normal LV, EF greater than 83%, normal diastolic function, normal filling pressures, normal RV and valves. Interval history:  Patient is now off supplemental oxygen she reports significant right lower extremity pain and numbness. Heparin drip was held due to blood in stool and naris. Hemoglobin lower at 7.1 from 7.5. Albumin 1.9. Patient is currently on full liquid diet. Creatinine improving at 1.2 from 1.9. I's and O's -1.9 L.     Objective:     Medications:   acetaminophen  1,000 mg Oral 3 times per day    melatonin  10 mg Oral Nightly    albuterol  2.5 mg Nebulization BID    metoprolol  5 mg IntraVENous Q6H    lidocaine  1 patch TransDERmal Daily    naloxegol  12.5 mg Oral QAM AC    metoclopramide  5 mg IntraVENous Q6H    pantoprazole  40 mg IntraVENous BID    sodium chloride (Inhalant)  4 mL Nebulization BID    polyethylene glycol  17 g Per NG tube TID lidocaine   Topical Once    sodium chloride flush  5-40 mL IntraVENous 2 times per day    fluticasone  2 spray Each Nostril Daily    [Held by provider] pregabalin  50 mg Oral TID       IV drips:   sodium chloride      sodium chloride      [Held by provider] heparin (PORCINE) Infusion 16 Units/kg/hr (08/09/22 1655)    sodium chloride      sodium chloride      dextrose      sodium chloride         PRN:  traMADol, sodium chloride, sodium chloride, [Held by provider] heparin (porcine), [Held by provider] heparin (porcine), sodium chloride, lidocaine, sodium chloride, sodium chloride, prochlorperazine, glucose, dextrose bolus **OR** dextrose bolus, glucagon (rDNA), dextrose, albuterol, phenol, morphine **OR** morphine, sodium chloride flush, sodium chloride, ondansetron **OR** ondansetron, methocarbamol    Vitals:    08/10/22 1200 08/10/22 1245 08/10/22 1337 08/10/22 1524   BP: (!) 123/54   115/60   Pulse: 90   87   Resp:  16 16 16   Temp: 97.7 °F (36.5 °C)   97.5 °F (36.4 °C)   TempSrc: Axillary   Oral   SpO2:    91%   Weight:       Height:           Intake/Output Summary (Last 24 hours) at 8/10/2022 1604  Last data filed at 8/10/2022 1400  Gross per 24 hour   Intake 462.89 ml   Output 1950 ml   Net -1487.11 ml       I/O last 3 completed shifts:   In: 1677.5 [I.V.:1530.5; NG/GT:147]  Out: 4225 [Urine:4225]  Wt Readings from Last 3 Encounters:   08/10/22 249 lb 9 oz (113.2 kg)   07/13/22 218 lb 9.6 oz (99.2 kg)   06/14/22 217 lb (98.4 kg)       Admit Wt: Weight: 224 lb 3.3 oz (101.7 kg)   Todays Wt: Weight: 249 lb 9 oz (113.2 kg)    TELEMETRY personally reviewed: NSR    Physical Exam:         General Appearance:  Alert, cooperative, no distress, appears stated age Appropriate weight   Head:  Normocephalic, without obvious abnormality, atraumatic   Eyes:  PERRL, conjunctiva/corneas clear EOM intact  Ears normal   Throat no lesions       Nose: Nares normal, no drainage or sinus tenderness   Throat: Lips, mucosa, and tongue normal   Neck: Supple, symmetrical, trachea midline, no adenopathy, thyroid: not enlarged, symmetric, no tenderness/mass/nodules, no carotid bruit. Lungs:   Normal respiratory rate, coarse bilateral breath sounds. Chest Wall:  No tenderness or deformity   Heart:  Regular rhythm, rate is controlled, S1, S2 normal, there is no murmur, there is no rub or gallop, cannot assess jvd, 1+ bilateral lower extremity edema   Abdomen:   Soft, non-tender, bowel sounds active all four quadrants,  no masses, no organomegaly       Extremities: Extremities normal, atraumatic, no cyanosis. Pulses: 2+ and symmetric   Skin: Skin color, texture, turgor normal, no rashes or lesions   Pysch: Normal mood and affect   Neurologic: Normal gross motor and sensory exam.  Cranial nerves intact       Labs:   Recent Labs     08/08/22  0445 08/08/22  1648 08/09/22  0406 08/10/22  0438   * 128* 129* 134*   K 4.6  --  3.9 3.7   BUN 97*  --  83* 62*   CREATININE 2.8*  --  1.9* 1.2   CL 89*  --  95* 99   CO2 18*  --  23 22   GLUCOSE 88  --  81 85   CALCIUM 7.3*  --  7.6* 7.7*   MG 2.70*  --  2.40 2.20       Recent Labs     08/08/22  0444 08/08/22  0918 08/09/22  0406 08/10/22  0437 08/10/22  0858   WBC 12.0*  --  12.5* 14.6*  --    HGB 6.7*   < > 7.5* 7.1* 7.0*   HCT 20.4*   < > 22.8* 20.4* 21.7*   PLT 95*  --  130* 168  --    MCV 92.8  --  90.7 89.8  --     < > = values in this interval not displayed. No results for input(s): CHOLTOT, TRIG, HDL, CHOLHDL, LDL in the last 72 hours. Invalid input(s): Regina Tay  No results for input(s): PTT, INR in the last 72 hours. Invalid input(s): PT    No results for input(s): CKTOTAL, CKMB, CKMBINDEX, TROPONINI in the last 72 hours. No results for input(s): BNP in the last 72 hours. No results for input(s): NTPROBNP in the last 72 hours. No results for input(s): TSH in the last 72 hours. Imaging:       Assessment & Plan:     1.   R/o sepsis:  Obstipation due to ileus from recent opioid use for CASS, cannot r/o sepsis. Patient had a colonoscopy for decompression on 8/3/2022.     -On iv antibiotics per primary team     2. PAFRVR:  Patient was in sinus, converted into afib with RVR after incomplete colonic decompression. Atrial fibrillation will compromise our efforts for safe diuresis. - Cw lopressor 5 mg iv q 6; will not switch to p.o. in case patient needs to be NPO to have GI work-up for anemia and other possible procedures/surgeries (patient with recent right hip arthroplasty, now with pain and anemia). -We will need to investigate cause of anemia prior to reinitiating anticoagulation    3. Rule out acute heart failure. Patient appeared volume overloaded with worse respiratory status and AFRVR. However, echocardiogram is overall normal with normal filling pressures and was done during sinus (images personally reviewed). Discussed case with Dr Radny Ma, he witnessed aspiration, hence respiratory failure could be from aspiration pneumonitis rather than acute heart failure. Albumin and Hb low could explain peripheral edema. IV diuretics with no meaningful response, likely due to ATN and sepsis. - We stopped diuretics; Dr Randy Ma may elect to give albumin, may consider blood transfusion if Hb < 7.   - Strict I's and O's every shift and standing weights if possible, low-salt diet, daily BMP with reflex to Mg (correct lytes for goals K >4.0 and Mg > 2.0) and wean supplemental oxygen to off (or down to baseline supplemental oxygen requirements) for sats greater than 92-94%. 4. ARIANE on CKD:   Improving off diuretics. I have spent 35 minutes of face to face time with the patient with more than 50% spent counseling and coordinating care. I have personally reviewed the reports and images of labs, radiological studies, cardiac studies including ECG's and telemetry, current and old medical records. The note was completed using EMR and Dragon dictation system. Every effort was made to ensure accuracy; however, inadvertent computerized transcription errors may be present. All questions and concerns were addressed to the patient/family. Alternatives to my treatment were discussed. Thank you for allowing to us to participate in the care or Jessa Bermudez. Please call our service with questions.     Cruz Santacruz MD, [facility], [address]  The [address]  [address] [address]  Ph: [phone]  Fax: [phone]

## 2022-08-10 NOTE — PROGRESS NOTES
Hospital Medicine Progress Note    PCP: Madelyn Romo    Date of Admission: 7/30/2022    Chief Complaint:  Abdominal pain, transferred for ortho eval with Rt CASS site with fluid collection. History Of Present Illness:     74 y.o. female Gina Salmeron  - Hx of HTN, HLD, Gout, OA, recent Rt CASS, morbid obesity  - Presents with generalized weakness nausea vomiting and abdominal pain, she woke up with the symptoms night prior to presentation. Her last bowel movement was about 3 days prior. She was seen at [facility] (now [facility] where she underwent CT imaging of the abdomen and pelvis which showed:    IMPRESSION:   1. There is a large amount of stool cecum through the sigmoid colon. Transition point is rectosigmoid but there is no obvious mass in the rectosigmoid region. No bowel thickening or peribowel stranding in the rectosigmoid region. 2. Likely, multiple renal cysts. 3. Partially organized fluid without obvious rim enhancement anterior to the THR. This may simply represent postoperative seroma but clinical correlation needed. The entire inferior extent of this is not covered on this exam.   4. Urinary bladder is distended with fluid    Labs were significant for WBC 22.8k, with elevated ANC, hgb 10.1, na131, k 5.0 BUN of 19 cr 1.1, Alk phos 139. With noted fluid around [facility] she was transferred to [facility] for orthopedic surgeon Dr. Reginaldo Stauffer evaluation. \"        Subjective:  Appears less lethargic today. Abdominal pain improved and she having Bms. NGT removed    Oxygen requirement is decreased. Not having hip pain. Family at bedside    Medications : Reviewed      Allergies:  Buspirone, Duloxetine, Fenofibrate, Gabapentin, Venlafaxine, Doxycycline, Statins, and Sulfa antibiotics      REVIEW OF SYSTEMS:   Pertinent positives as noted in the HPI. All other systems reviewed and negative.       PHYSICAL EXAM PERFORMED:    /60 Pulse 87   Temp 97.5 °F (36.4 °C) (Oral)   Resp 16   Ht 5' 2.01\" (1.575 m)   Wt 249 lb 9 oz (113.2 kg)   SpO2 91%   BMI 45.63 kg/m²     General appearance: Awake,alert  HEENT:  Normal cephalic, atraumatic without obvious deformity. Neck: Supple, with full range of motion. No jugular venous distention. Respiratory:  Normal respiratory effort. Clear to auscultation, bilaterally without Rales/Wheezes/Rhonchi. Cardiovascular:  Regular rate and rhythm with normal S1/S2 without murmurs, rubs or gallops. Abdomen: Less distended, less firm, less tender. BS heard  Musculoskeletal: Right hip swelling but no erythema or warmth. Incision dressing C/D/I  Skin: Skin color, texture, turgor normal.  No rashes or lesions. Neurologic:  grossly non-focal.  Capillary Refill: Brisk,< 3 seconds   Peripheral Pulses: +2 palpable, equal bilaterally       Labs:     Recent Labs     08/08/22  0444 08/08/22  0918 08/09/22  0406 08/10/22  0437 08/10/22  0858   WBC 12.0*  --  12.5* 14.6*  --    HGB 6.7*   < > 7.5* 7.1* 7.0*   HCT 20.4*   < > 22.8* 20.4* 21.7*   PLT 95*  --  130* 168  --     < > = values in this interval not displayed. Recent Labs     08/08/22  0445 08/08/22  1648 08/09/22  0406 08/10/22  0438   * 128* 129* 134*   K 4.6  --  3.9 3.7   CL 89*  --  95* 99   CO2 18*  --  23 22   BUN 97*  --  83* 62*   CREATININE 2.8*  --  1.9* 1.2   CALCIUM 7.3*  --  7.6* 7.7*   PHOS 8.2*  --  6.6* 4.3       No results for input(s): AST, ALT, BILIDIR, BILITOT, ALKPHOS in the last 72 hours. No results for input(s): INR in the last 72 hours. No results for input(s): Alia Horn in the last 72 hours.     Urinalysis:      Lab Results   Component Value Date/Time    NITRU POSITIVE 07/31/2022 05:15 AM    WBCUA 3-5 07/31/2022 05:15 AM    BACTERIA 1+ 07/31/2022 05:15 AM    RBCUA None seen 07/31/2022 05:15 AM    BLOODU Negative 07/31/2022 05:15 AM    SPECGRAV 1.015 07/31/2022 05:15 AM    GLUCOSEU Negative 07/31/2022 05:15 AM       Radiology: No imaging here. Reviewed imaging from OSH        ASSESSMENT/PLAN:    Severe sepsis: POA   - Leukocytosis with tachycardia; on admission; - Possibly from GI source with severe constipation, fecal impaction  -  Transferred for evaluation of post-op hip fluid collection/possible infection (s/p THR 7/13): felt likely seroma,  normal post operative finding by orthopedics  -Started on admission on broad spectrum abx  - Blood cultures x 2 NGTD  - ID consulted: - Not currently on Abx   - Sepsis appears resolved  - Monitor off antibx, hopefully should continue to improve as bowels appear functioning now      Parox Atrial Fibrillation   Afib with RVR - converted to NSR  Echo normal EF, normal diastolic  Continue AVN agents  Heparin drip: held with drop in hgb, and rectal bleed. Cardiology consulted  Transition to oral meds when enteral route re-established      Acute  hypoxemic respiratory failure  Possible fluid overload from recent surgery, and while NPO with intra-abdominal issue this admit; also possible aspiration pneumonitis  Was started on heparin drip for Afib and possible also new PE  8/3 CT chest wo contrast b/l pleural effusions, small left and moderate right  Prelim doppler report no DVT  Briefly on IV lasix/lasix drip but was stopped. O2 requirement still high. Resp failure resolving/resolved  Encourage incentive spirometry      ARIANE   Likely sec to Hypoperfusion  - Cr was 0.8 in 7/14; 2.2 on admission.  Increased while admitted up to 3.5  Nephrology consulted  -Monitor Cr: ARIANE resolving  - Avoid nephrotoxins      Bowel obstruction: POA Secondary to severe constipation  - CT of the [facility] system showed significant stool from cecum to the sigmoid colon and transition point at the rectosigmoid.   - GI, Surgery consulted, appreciate recs  - Miralax and enema unsuccessful  - Underwent [place] decompression 8/3 with improvement but not resolution, recommendation continue SMOG enemas BID, Relistor, disimpaction: appears bowel functioning now  - GI, General surgery following  - NG removed. Started on liquid diet:  tolerating, but noted with rectal bleed 8/09/22. ? If due to heparin. ? Acute on Chronic Anemia  - Hg 11.2 on admission  - Dropped to 6s; - Blood transfusion 8/6; dropping again to 7 8/10/2022; rectal bleed noted: Heparin gtt held. - Will transfuse more PRBC  - Monitor Hgb  - Notified GI of rectal bleed. Will keep NPO/clears pending re-eval or until hgb stabilizes. Acute Urinary retention  Possible partially treated Complicated UTI: POA  - Appears recently had E coli in urine, although CC was less than 100k. Was treated with cipro. Also appears on Keflex PTA.   - Urine on admission has some bacteria, and nitrite positive, but neg leuc esterase and no signif pyuria  - Winter catheter placed at [facility] for retention likely sec to severe constipation  - Continue winter for now, ongoing reassessment; possible TOV soon      Essential Hypertension- Monitor Bps    Mild hyponatremia: monitor electrolytes    Gout - stable    Morbid obesity Body mass index is 41.01 kg/m². - Complicating assessment and treatment. Placing patient at risk for multiple co-morbidities as well as early death and contributing to the patient's presentation.  on weight loss when appropriate. DVT Prophylaxis: High risk: Heparin drip  Diet: Diet NPO Exceptions are: Sips of Water with Meds  Code Status: Full Code      Disposition: - Transfer from ICU. Remains in pxt pending progress  Recently at SNF after hip ortho surgery: Will likely need SNF at discharge.        Quan Ty MD

## 2022-08-10 NOTE — PLAN OF CARE
Problem: Discharge Planning  Goal: Discharge to home or other facility with appropriate resources  Outcome: Progressing  Flowsheets (Taken 8/9/2022 2000)  Discharge to home or other facility with appropriate resources: Identify barriers to discharge with patient and caregiver     Problem: Safety - Adult  Goal: Free from fall injury  Outcome: Progressing  Flowsheets (Taken 8/9/2022 2225)  Free From Fall Injury: Instruct family/caregiver on patient safety     Problem: ABCDS Injury Assessment  Goal: Absence of physical injury  Outcome: Progressing  Flowsheets (Taken 8/9/2022 2225)  Absence of Physical Injury: Implement safety measures based on patient assessment     Problem: Skin/Tissue Integrity  Goal: Absence of new skin breakdown  Description: 1. Monitor for areas of redness and/or skin breakdown  2. Assess vascular access sites hourly  3. Every 4-6 hours minimum:  Change oxygen saturation probe site  4. Every 4-6 hours:  If on nasal continuous positive airway pressure, respiratory therapy assess nares and determine need for appliance change or resting period.   Outcome: Progressing     Problem: Respiratory - Adult  Goal: Achieves optimal ventilation and oxygenation  Outcome: Progressing     Problem: Skin/Tissue Integrity - Adult  Goal: Incisions, wounds, or drain sites healing without S/S of infection  Outcome: Progressing  Flowsheets  Taken 8/9/2022 2225  Incisions, Wounds, or Drain Sites Healing Without Sign and Symptoms of Infection: ADMISSION and DAILY: Assess and document risk factors for pressure ulcer development  Taken 8/9/2022 2000  Incisions, Wounds, or Drain Sites Healing Without Sign and Symptoms of Infection: ADMISSION and DAILY: Assess and document risk factors for pressure ulcer development  Goal: Oral mucous membranes remain intact  Outcome: Progressing  Flowsheets  Taken 8/9/2022 2225  Oral Mucous Membranes Remain Intact: Assess oral mucosa and hygiene practices  Taken 8/9/2022 2000  Oral Mucous Membranes Remain Intact: Assess oral mucosa and hygiene practices     Problem: Musculoskeletal - Adult  Goal: Return mobility to safest level of function  Outcome: Progressing  Flowsheets (Taken 8/9/2022 2000)  Return Mobility to Safest Level of Function: Assess patient stability and activity tolerance for standing, transferring and ambulating with or without assistive devices  Goal: Maintain proper alignment of affected body part  Outcome: Progressing  Flowsheets (Taken 8/9/2022 2000)  Maintain proper alignment of affected body part: Support and protect limb and body alignment per provider's orders  Goal: Return ADL status to a safe level of function  Outcome: Progressing  Flowsheets (Taken 8/9/2022 2000)  Return ADL Status to a Safe Level of Function: Administer medication as ordered     Problem: Gastrointestinal - Adult  Goal: Minimal or absence of nausea and vomiting  Outcome: Progressing  Flowsheets (Taken 8/9/2022 2000)  Minimal or absence of nausea and vomiting: Administer IV fluids as ordered to ensure adequate hydration

## 2022-08-11 ENCOUNTER — ANESTHESIA EVENT (OUTPATIENT)
Dept: ENDOSCOPY | Age: 72
DRG: 853 | End: 2022-08-11
Payer: MEDICARE

## 2022-08-11 ENCOUNTER — APPOINTMENT (OUTPATIENT)
Dept: MRI IMAGING | Age: 72
DRG: 853 | End: 2022-08-11
Attending: INTERNAL MEDICINE
Payer: MEDICARE

## 2022-08-11 ENCOUNTER — ANESTHESIA (OUTPATIENT)
Dept: ENDOSCOPY | Age: 72
DRG: 853 | End: 2022-08-11
Payer: MEDICARE

## 2022-08-11 LAB
ALBUMIN SERPL-MCNC: 2.1 G/DL (ref 3.4–5)
ANION GAP SERPL CALCULATED.3IONS-SCNC: 14 MMOL/L (ref 3–16)
BASOPHILS ABSOLUTE: 0.1 K/UL (ref 0–0.2)
BASOPHILS RELATIVE PERCENT: 0.5 %
BUN BLDV-MCNC: 55 MG/DL (ref 7–20)
CALCIUM SERPL-MCNC: 8.1 MG/DL (ref 8.3–10.6)
CHLORIDE BLD-SCNC: 101 MMOL/L (ref 99–110)
CO2: 22 MMOL/L (ref 21–32)
CREAT SERPL-MCNC: 1.3 MG/DL (ref 0.6–1.2)
EOSINOPHILS ABSOLUTE: 0.1 K/UL (ref 0–0.6)
EOSINOPHILS RELATIVE PERCENT: 0.4 %
GFR AFRICAN AMERICAN: 48
GFR NON-AFRICAN AMERICAN: 40
GLUCOSE BLD-MCNC: 77 MG/DL (ref 70–99)
HCT VFR BLD CALC: 21.3 % (ref 36–48)
HCT VFR BLD CALC: 22.1 % (ref 36–48)
HCT VFR BLD CALC: 22.8 % (ref 36–48)
HCT VFR BLD CALC: 22.9 % (ref 36–48)
HCT VFR BLD CALC: 23.2 % (ref 36–48)
HEMOGLOBIN: 7.1 G/DL (ref 12–16)
HEMOGLOBIN: 7.3 G/DL (ref 12–16)
HEMOGLOBIN: 7.4 G/DL (ref 12–16)
HEMOGLOBIN: 7.5 G/DL (ref 12–16)
HEMOGLOBIN: 7.6 G/DL (ref 12–16)
LYMPHOCYTES ABSOLUTE: 0.8 K/UL (ref 1–5.1)
LYMPHOCYTES RELATIVE PERCENT: 5.3 %
MAGNESIUM: 2.1 MG/DL (ref 1.8–2.4)
MCH RBC QN AUTO: 30.6 PG (ref 26–34)
MCHC RBC AUTO-ENTMCNC: 33.7 G/DL (ref 31–36)
MCV RBC AUTO: 90.9 FL (ref 80–100)
MONOCYTES ABSOLUTE: 0.6 K/UL (ref 0–1.3)
MONOCYTES RELATIVE PERCENT: 3.9 %
NEUTROPHILS ABSOLUTE: 13 K/UL (ref 1.7–7.7)
NEUTROPHILS RELATIVE PERCENT: 89.9 %
PDW BLD-RTO: 15 % (ref 12.4–15.4)
PHOSPHORUS: 4.1 MG/DL (ref 2.5–4.9)
PLATELET # BLD: 212 K/UL (ref 135–450)
PMV BLD AUTO: 8.6 FL (ref 5–10.5)
POTASSIUM SERPL-SCNC: 3.6 MMOL/L (ref 3.5–5.1)
RBC # BLD: 2.43 M/UL (ref 4–5.2)
SODIUM BLD-SCNC: 137 MMOL/L (ref 136–145)
WBC # BLD: 14.5 K/UL (ref 4–11)

## 2022-08-11 PROCEDURE — C9113 INJ PANTOPRAZOLE SODIUM, VIA: HCPCS

## 2022-08-11 PROCEDURE — 80069 RENAL FUNCTION PANEL: CPT

## 2022-08-11 PROCEDURE — 99233 SBSQ HOSP IP/OBS HIGH 50: CPT | Performed by: NURSE PRACTITIONER

## 2022-08-11 PROCEDURE — 2500000003 HC RX 250 WO HCPCS: Performed by: INTERNAL MEDICINE

## 2022-08-11 PROCEDURE — 94669 MECHANICAL CHEST WALL OSCILL: CPT

## 2022-08-11 PROCEDURE — 99452 NTRPROF PH1/NTRNET/EHR RFRL: CPT | Performed by: INTERNAL MEDICINE

## 2022-08-11 PROCEDURE — 7100000011 HC PHASE II RECOVERY - ADDTL 15 MIN: Performed by: INTERNAL MEDICINE

## 2022-08-11 PROCEDURE — 6360000002 HC RX W HCPCS

## 2022-08-11 PROCEDURE — 72148 MRI LUMBAR SPINE W/O DYE: CPT

## 2022-08-11 PROCEDURE — 2580000003 HC RX 258: Performed by: NURSE ANESTHETIST, CERTIFIED REGISTERED

## 2022-08-11 PROCEDURE — 3609012400 HC EGD TRANSORAL BIOPSY SINGLE/MULTIPLE: Performed by: INTERNAL MEDICINE

## 2022-08-11 PROCEDURE — 1200000000 HC SEMI PRIVATE

## 2022-08-11 PROCEDURE — 2709999900 HC NON-CHARGEABLE SUPPLY: Performed by: INTERNAL MEDICINE

## 2022-08-11 PROCEDURE — 2580000003 HC RX 258

## 2022-08-11 PROCEDURE — 99233 SBSQ HOSP IP/OBS HIGH 50: CPT | Performed by: INTERNAL MEDICINE

## 2022-08-11 PROCEDURE — 83735 ASSAY OF MAGNESIUM: CPT

## 2022-08-11 PROCEDURE — 7100000010 HC PHASE II RECOVERY - FIRST 15 MIN: Performed by: INTERNAL MEDICINE

## 2022-08-11 PROCEDURE — 6370000000 HC RX 637 (ALT 250 FOR IP): Performed by: INTERNAL MEDICINE

## 2022-08-11 PROCEDURE — 94761 N-INVAS EAR/PLS OXIMETRY MLT: CPT

## 2022-08-11 PROCEDURE — 6370000000 HC RX 637 (ALT 250 FOR IP): Performed by: STUDENT IN AN ORGANIZED HEALTH CARE EDUCATION/TRAINING PROGRAM

## 2022-08-11 PROCEDURE — 3700000001 HC ADD 15 MINUTES (ANESTHESIA): Performed by: INTERNAL MEDICINE

## 2022-08-11 PROCEDURE — 6360000002 HC RX W HCPCS: Performed by: NURSE ANESTHETIST, CERTIFIED REGISTERED

## 2022-08-11 PROCEDURE — 85025 COMPLETE CBC W/AUTO DIFF WBC: CPT

## 2022-08-11 PROCEDURE — 94640 AIRWAY INHALATION TREATMENT: CPT

## 2022-08-11 PROCEDURE — 2700000000 HC OXYGEN THERAPY PER DAY

## 2022-08-11 PROCEDURE — 3700000000 HC ANESTHESIA ATTENDED CARE: Performed by: INTERNAL MEDICINE

## 2022-08-11 PROCEDURE — 85018 HEMOGLOBIN: CPT

## 2022-08-11 PROCEDURE — 6370000000 HC RX 637 (ALT 250 FOR IP)

## 2022-08-11 PROCEDURE — 6360000002 HC RX W HCPCS: Performed by: SURGERY

## 2022-08-11 PROCEDURE — 88305 TISSUE EXAM BY PATHOLOGIST: CPT

## 2022-08-11 PROCEDURE — 2500000003 HC RX 250 WO HCPCS

## 2022-08-11 PROCEDURE — 88342 IMHCHEM/IMCYTCHM 1ST ANTB: CPT

## 2022-08-11 PROCEDURE — 85014 HEMATOCRIT: CPT

## 2022-08-11 PROCEDURE — 2500000003 HC RX 250 WO HCPCS: Performed by: NURSE ANESTHETIST, CERTIFIED REGISTERED

## 2022-08-11 PROCEDURE — 6360000002 HC RX W HCPCS: Performed by: INTERNAL MEDICINE

## 2022-08-11 PROCEDURE — 99231 SBSQ HOSP IP/OBS SF/LOW 25: CPT | Performed by: SURGERY

## 2022-08-11 RX ORDER — ESMOLOL HYDROCHLORIDE 10 MG/ML
INJECTION INTRAVENOUS PRN
Status: DISCONTINUED | OUTPATIENT
Start: 2022-08-11 | End: 2022-08-11 | Stop reason: SDUPTHER

## 2022-08-11 RX ORDER — PROPOFOL 10 MG/ML
INJECTION, EMULSION INTRAVENOUS PRN
Status: DISCONTINUED | OUTPATIENT
Start: 2022-08-11 | End: 2022-08-11 | Stop reason: SDUPTHER

## 2022-08-11 RX ORDER — SODIUM CHLORIDE 9 MG/ML
INJECTION, SOLUTION INTRAVENOUS CONTINUOUS PRN
Status: DISCONTINUED | OUTPATIENT
Start: 2022-08-11 | End: 2022-08-11 | Stop reason: SDUPTHER

## 2022-08-11 RX ORDER — POTASSIUM CHLORIDE 7.45 MG/ML
10 INJECTION INTRAVENOUS
Status: COMPLETED | OUTPATIENT
Start: 2022-08-11 | End: 2022-08-11

## 2022-08-11 RX ORDER — TRAMADOL HYDROCHLORIDE 50 MG/1
50 TABLET ORAL EVERY 6 HOURS PRN
COMMUNITY

## 2022-08-11 RX ORDER — ALLOPURINOL 100 MG/1
100 TABLET ORAL DAILY
COMMUNITY

## 2022-08-11 RX ADMIN — ESMOLOL HYDROCHLORIDE 30 MG: 10 INJECTION, SOLUTION INTRAVENOUS at 14:47

## 2022-08-11 RX ADMIN — METOPROLOL TARTRATE 5 MG: 5 INJECTION INTRAVENOUS at 04:49

## 2022-08-11 RX ADMIN — MORPHINE SULFATE 2 MG: 2 INJECTION, SOLUTION INTRAMUSCULAR; INTRAVENOUS at 20:48

## 2022-08-11 RX ADMIN — METOCLOPRAMIDE HYDROCHLORIDE 5 MG: 5 INJECTION INTRAMUSCULAR; INTRAVENOUS at 20:48

## 2022-08-11 RX ADMIN — ACETAMINOPHEN 1000 MG: 500 TABLET ORAL at 20:49

## 2022-08-11 RX ADMIN — PANTOPRAZOLE SODIUM 40 MG: 40 INJECTION, POWDER, FOR SOLUTION INTRAVENOUS at 20:49

## 2022-08-11 RX ADMIN — METOPROLOL TARTRATE 5 MG: 5 INJECTION INTRAVENOUS at 18:23

## 2022-08-11 RX ADMIN — SODIUM CHLORIDE 30 MG/ML INHALATION SOLUTION 4 ML: 30 SOLUTION INHALANT at 21:22

## 2022-08-11 RX ADMIN — SODIUM CHLORIDE, PRESERVATIVE FREE 10 ML: 5 INJECTION INTRAVENOUS at 10:25

## 2022-08-11 RX ADMIN — PANTOPRAZOLE SODIUM 40 MG: 40 INJECTION, POWDER, FOR SOLUTION INTRAVENOUS at 09:49

## 2022-08-11 RX ADMIN — POTASSIUM CHLORIDE 10 MEQ: 10 INJECTION, SOLUTION INTRAVENOUS at 13:15

## 2022-08-11 RX ADMIN — PROPOFOL 50 MG: 10 INJECTION, EMULSION INTRAVENOUS at 14:43

## 2022-08-11 RX ADMIN — METOCLOPRAMIDE HYDROCHLORIDE 5 MG: 5 INJECTION INTRAMUSCULAR; INTRAVENOUS at 04:49

## 2022-08-11 RX ADMIN — ALBUTEROL SULFATE 2.5 MG: 2.5 SOLUTION RESPIRATORY (INHALATION) at 21:22

## 2022-08-11 RX ADMIN — Medication 10 MG: at 20:49

## 2022-08-11 RX ADMIN — SODIUM CHLORIDE, PRESERVATIVE FREE 10 ML: 5 INJECTION INTRAVENOUS at 20:50

## 2022-08-11 RX ADMIN — METOPROLOL TARTRATE 5 MG: 5 INJECTION INTRAVENOUS at 20:48

## 2022-08-11 RX ADMIN — METOPROLOL TARTRATE 5 MG: 5 INJECTION INTRAVENOUS at 09:50

## 2022-08-11 RX ADMIN — POTASSIUM CHLORIDE 10 MEQ: 10 INJECTION, SOLUTION INTRAVENOUS at 09:21

## 2022-08-11 RX ADMIN — FLUTICASONE PROPIONATE 2 SPRAY: 50 SPRAY, METERED NASAL at 10:24

## 2022-08-11 RX ADMIN — ALBUTEROL SULFATE 2.5 MG: 2.5 SOLUTION RESPIRATORY (INHALATION) at 07:46

## 2022-08-11 RX ADMIN — SODIUM CHLORIDE: 9 INJECTION, SOLUTION INTRAVENOUS at 09:19

## 2022-08-11 RX ADMIN — POTASSIUM CHLORIDE 10 MEQ: 10 INJECTION, SOLUTION INTRAVENOUS at 12:18

## 2022-08-11 RX ADMIN — NALOXEGOL OXALATE 12.5 MG: 12.5 TABLET, FILM COATED ORAL at 10:15

## 2022-08-11 RX ADMIN — PREGABALIN 50 MG: 50 CAPSULE ORAL at 09:49

## 2022-08-11 RX ADMIN — PREGABALIN 50 MG: 50 CAPSULE ORAL at 20:49

## 2022-08-11 RX ADMIN — METOCLOPRAMIDE HYDROCHLORIDE 5 MG: 5 INJECTION INTRAMUSCULAR; INTRAVENOUS at 09:51

## 2022-08-11 RX ADMIN — PROPOFOL 25 MG: 10 INJECTION, EMULSION INTRAVENOUS at 14:46

## 2022-08-11 RX ADMIN — PHENYLEPHRINE HYDROCHLORIDE 200 MCG: 10 INJECTION, SOLUTION INTRAMUSCULAR; INTRAVENOUS; SUBCUTANEOUS at 14:48

## 2022-08-11 RX ADMIN — SODIUM CHLORIDE 30 MG/ML INHALATION SOLUTION 4 ML: 30 SOLUTION INHALANT at 07:49

## 2022-08-11 RX ADMIN — POTASSIUM CHLORIDE 10 MEQ: 10 INJECTION, SOLUTION INTRAVENOUS at 10:23

## 2022-08-11 RX ADMIN — SODIUM CHLORIDE: 9 INJECTION, SOLUTION INTRAVENOUS at 14:39

## 2022-08-11 ASSESSMENT — PAIN SCALES - WONG BAKER: WONGBAKER_NUMERICALRESPONSE: 0

## 2022-08-11 ASSESSMENT — PAIN SCALES - GENERAL
PAINLEVEL_OUTOF10: 0
PAINLEVEL_OUTOF10: 0
PAINLEVEL_OUTOF10: 7
PAINLEVEL_OUTOF10: 0

## 2022-08-11 ASSESSMENT — PAIN DESCRIPTION - ORIENTATION: ORIENTATION: RIGHT;LOWER

## 2022-08-11 ASSESSMENT — PAIN - FUNCTIONAL ASSESSMENT: PAIN_FUNCTIONAL_ASSESSMENT: NONE - DENIES PAIN

## 2022-08-11 ASSESSMENT — PAIN DESCRIPTION - LOCATION: LOCATION: ANKLE

## 2022-08-11 ASSESSMENT — PAIN DESCRIPTION - DESCRIPTORS: DESCRIPTORS: BURNING

## 2022-08-11 NOTE — PROGRESS NOTES
Patient denies any pain, nausea, or vomiting. Patient is resting supine with the HOB at 45 vitals are stable. Called and gave report to unit nurse in care of this patient. Will call for transport take patient back to the unit from endo via bed  All care needs are addressed at this time.

## 2022-08-11 NOTE — RT PROTOCOL NOTE
RT Inhaler-Nebulizer Bronchodilator Protocol Note    There is a bronchodilator order in the chart from a provider indicating to follow the RT Bronchodilator Protocol and there is an Initiate RT Inhaler-Nebulizer Bronchodilator Protocol order as well (see protocol at bottom of note). CXR Findings:  No results found. The findings from the last RT Protocol Assessment were as follows:   History Pulmonary Disease: None or smoker <15 pack years  Respiratory Pattern: Regular pattern and RR 12-20 bpm  Breath Sounds: Slightly diminished and/or crackles  Cough: Strong, spontaneous, non-productive  Indication for Bronchodilator Therapy: Decreased or absent breath sounds  Bronchodilator Assessment Score: 2    Aerosolized bronchodilator medication orders have not been revised according to the RT Inhaler-Nebulizer Bronchodilator Protocol below. Pt on 3% sodium chloride so Albuterol will stay BID. Respiratory Therapist to perform RT Therapy Protocol Assessment initially then follow the protocol. Repeat RT Therapy Protocol Assessment PRN for score 0-3 or on second treatment, BID, and PRN for scores above 3. No Indications - adjust the frequency to every 6 hours PRN wheezing or bronchospasm, if no treatments needed after 48 hours then discontinue using Per Protocol order mode. If indication present, adjust the RT bronchodilator orders based on the Bronchodilator Assessment Score as indicated below. Use Inhaler orders unless patient has one or more of the following: on home nebulizer, not able to hold breath for 10 seconds, is not alert and oriented, cannot activate and use MDI correctly, or respiratory rate 25 breaths per minute or more, then use the equivalent nebulizer order(s) with same Frequency and PRN reasons based on the score. If a patient is on this medication at home then do not decrease Frequency below that used at home.     0-3 - enter or revise RT bronchodilator order(s) to equivalent RT Bronchodilator order with Frequency of every 4 hours PRN for wheezing or increased work of breathing using Per Protocol order mode. 4-6 - enter or revise RT Bronchodilator order(s) to two equivalent RT bronchodilator orders with one order with BID Frequency and one order with Frequency of every 4 hours PRN wheezing or increased work of breathing using Per Protocol order mode. 7-10 - enter or revise RT Bronchodilator order(s) to two equivalent RT bronchodilator orders with one order with TID Frequency and one order with Frequency of every 4 hours PRN wheezing or increased work of breathing using Per Protocol order mode. 11-13 - enter or revise RT Bronchodilator order(s) to one equivalent RT bronchodilator order with QID Frequency and an Albuterol order with Frequency of every 4 hours PRN wheezing or increased work of breathing using Per Protocol order mode. Greater than 13 - enter or revise RT Bronchodilator order(s) to one equivalent RT bronchodilator order with every 4 hours Frequency and an Albuterol order with Frequency of every 2 hours PRN wheezing or increased work of breathing using Per Protocol order mode. RT to enter RT Home Evaluation for COPD & MDI Assessment order using Per Protocol order mode.     Electronically signed by Linda Hall RCP on 8/11/2022 at 7:53 AM

## 2022-08-11 NOTE — PROGRESS NOTES
GI Progress Note      Joseph Gamez is a 74 y.o. female patient. 1. Right lower quadrant abdominal pain        Admit Date: 7/30/2022    Subjective:     Asked to reevaluate patient previously with colon decompression and manual disimpaction with Dr. Steffi Richard on 8/3. Overnight had melena x 2. Was on heparin. Received blood products. We are asked to do an EGD.        ROS:  As per above    Scheduled Meds:   potassium chloride  10 mEq IntraVENous Q1H    acetaminophen  1,000 mg Oral 3 times per day    melatonin  10 mg Oral Nightly    albuterol  2.5 mg Nebulization BID    metoprolol  5 mg IntraVENous Q6H    lidocaine  1 patch TransDERmal Daily    naloxegol  12.5 mg Oral QAM AC    metoclopramide  5 mg IntraVENous Q6H    pantoprazole  40 mg IntraVENous BID    sodium chloride (Inhalant)  4 mL Nebulization BID    polyethylene glycol  17 g Per NG tube TID    lidocaine   Topical Once    sodium chloride flush  5-40 mL IntraVENous 2 times per day    fluticasone  2 spray Each Nostril Daily    pregabalin  50 mg Oral TID       Continuous Infusions:   sodium chloride      sodium chloride      [Held by provider] heparin (PORCINE) Infusion 16 Units/kg/hr (08/09/22 1655)    sodium chloride      sodium chloride      dextrose      sodium chloride 5 mL/hr at 08/11/22 0919       PRN Meds:  traMADol, sodium chloride, sodium chloride, [Held by provider] heparin (porcine), [Held by provider] heparin (porcine), sodium chloride, lidocaine, sodium chloride, sodium chloride, prochlorperazine, glucose, dextrose bolus **OR** dextrose bolus, glucagon (rDNA), dextrose, albuterol, phenol, morphine **OR** morphine, sodium chloride flush, sodium chloride, ondansetron **OR** ondansetron, methocarbamol      Objective:       Patient Vitals for the past 24 hrs:   BP Temp Temp src Pulse Resp SpO2 Weight   08/11/22 0817 -- -- -- -- -- 94 % --   08/11/22 0800 127/78 98.1 °F (36.7 °C) Oral 99 18 90 % --   08/11/22 0740 -- -- -- -- -- 94 % --   08/11/22 0600 -- -- -- -- -- -- 251 lb 5.2 oz (114 kg)   22 0409 (!) 156/88 98.8 °F (37.1 °C) Oral 67 20 95 % --   08/10/22 2313 (!) 149/83 97.4 °F (36.3 °C) Oral 67 22 93 % --   08/10/22 1946 133/72 97.9 °F (36.6 °C) Oral 87 14 92 % --   08/10/22 1849 124/68 97.4 °F (36.3 °C) Oral 88 16 -- --   08/10/22 1640 125/72 97.6 °F (36.4 °C) Oral 90 16 95 % --   08/10/22 1623 132/70 97.5 °F (36.4 °C) -- 89 14 93 % --   08/10/22 1524 115/60 97.5 °F (36.4 °C) Oral 87 16 91 % --   08/10/22 1337 -- -- -- -- 16 -- --   08/10/22 1245 -- -- -- -- 16 -- --   08/10/22 1200 (!) 123/54 97.7 °F (36.5 °C) Axillary 90 -- -- --   08/10/22 1100 (!) 114/50 -- -- 84 -- 93 % --   08/10/22 1015 -- -- -- -- 16 -- --   08/10/22 1000 (!) 114/51 -- -- 83 -- 93 % --       Exam:  VITALS:  /78   Pulse 99   Temp 98.1 °F (36.7 °C) (Oral)   Resp 18   Ht 5' 2.01\" (1.575 m)   Wt 251 lb 5.2 oz (114 kg)   SpO2 94%   BMI 45.96 kg/m²   TEMPERATURE:  Current - Temp: 98.1 °F (36.7 °C); Max - Temp  Av.8 °F (36.6 °C)  Min: 97.4 °F (36.3 °C)  Max: 98.8 °F (37.1 °C)      General appearance: alert, appears stated age, cooperative and no distress  Head: Normocephalic, without obvious abnormality, atraumatic  Neck: supple, symmetrical, trachea midline and thyroid not enlarged, symmetric, no tenderness/mass/nodules  CVS:  RRR, Nl s1s2  Lungs CTA Bilaterally, normal effort  Abdomen: soft, non-tender; bowel sounds normal; no masses,  no organomegaly  AAOx3, No asterixis or encephalopathy  Extremities: No edema. Recent Labs     22  0406 08/10/22  0437 08/10/22  0858 08/10/22  1940 22  0138 22  0506   WBC 12.5* 14.6*  --   --   --  14.5*   HGB 7.5* 7.1*   < > 8.7* 7.6* 7.4*   HCT 22.8* 20.4*   < > 26.4* 23.2* 22.1*   MCV 90.7 89.8  --   --   --  90.9   * 168  --   --   --  212    < > = values in this interval not displayed.      Recent Labs     22  0406 08/10/22  0438 22  0506   * 134* 137   K 3.9 3.7 3.6   CL 95* 99

## 2022-08-11 NOTE — CARE COORDINATION
Received a call from [address] at the [email or web address]. Ms. Frankie Justin has been accepted. She is aware the pt has not been vaccinated for COVID. Contact Lisa when the pt is ready for DC. She has Medicare A&B. No pre-cert needed. [phone].     Electronically signed by DAVON Santiago RN-[facility]  Case Management  [phone]

## 2022-08-11 NOTE — PROGRESS NOTES
Rosi Parish is a 74 y.o. female with a past medical history of obesity, HTN, HLP, recent CASS 7/16/2022. Patient presented to the emergency room on 7/30 with nausea, vomiting, abdominal pain. Patient was found to be obstipated due to ileus from recent opioid use for CASS. She was admitted and was given IV fluids and IV antibiotics. Subsequently patient developed AF RVR and EP was consulted. She was started on diltiazem and heparin drips. Today I was consulted to assess patient in view of likely acute heart failure. Apparently on admission patient's oxygen requirements were 2 L, received IV fluids, oxygen requirements increased to 8 L, received Lasix 40 mg IV x1 on 8/2, oxygen requirements are now 6.5 L. Creatinine on admission was 2.2, increased to a peak of 2.7 today. ECG consistent with AF  bpm.  There were no ischemic changes. Echo 8/3/2022: Normal LV, EF greater than 39%, normal diastolic function, normal filling pressures, normal RV and valves. Interval history:  EMR was reviewed. Patient is now on 2 L of supplemental oxygen from 9 yesterday. She remains on a full liquid diet. Venous ultrasound negative for bilateral DVTs. Creatinine stable at 1.3, I's and O's -1.1 L. Albumin 2, hemoglobin 7.4 from 8.7. Heparin drip is held due to possible GI bleeding. Assessment & Plan:      1. R/o sepsis:  Obstipation due to ileus from recent opioid use for CASS, cannot r/o sepsis. Patient had a colonoscopy for decompression on 8/3/2022.     -On iv antibiotics per primary team     2. PAFRVR:  Patient was in sinus, converted into afib with RVR after incomplete colonic decompression. Atrial fibrillation will compromise our efforts for safe diuresis. - Cw lopressor 5 mg iv q 6; will not switch to p.o. in case patient needs to be NPO to have GI work-up for anemia and other possible procedures/surgeries (patient with recent right hip arthroplasty, now with pain and anemia).   -We will need to investigate cause of anemia prior to reinitiating anticoagulation    3. Rule out acute heart failure. Patient appeared volume overloaded with worse respiratory status and AFRVR. However, echocardiogram is overall normal with normal filling pressures and was done during sinus (images personally reviewed). Discussed case with Dr Raffi Lucas, he witnessed aspiration, hence respiratory failure could be from aspiration pneumonitis rather than acute heart failure. Albumin and Hb low could explain peripheral edema. IV diuretics with no meaningful response, likely due to ATN and sepsis. - We stopped diuretics; Dr Raffi Lucas may elect to give albumin, may consider blood transfusion if Hb < 7.  - Strict I's and O's every shift and standing weights if possible, low-salt diet, daily BMP with reflex to Mg (correct lytes for goals K >4.0 and Mg > 2.0) and wean supplemental oxygen to off (or down to baseline supplemental oxygen requirements) for sats greater than 92-94%. 4. ARIANE on CKD:  Improved off diuretics.     -Will defer diuretics to nephrology. I would like to thank you for providing me the opportunity to participate in the care of your patient. If you have any questions, please do not hesitate to contact me.      Lizzy Eugene MD, [address], [address]  The [address]  [address] [facility]  [address] [facility] [address]  Ph: [phone]  Fax: [phone]

## 2022-08-11 NOTE — PLAN OF CARE
Orthopedic Plan of Care    -Patient seen and chart reviewed. - 1 month sp right total hip arthroplasty, doing well post operatively  - Incision well healed with no erythema or drainage. -Xrays ordered. CASS in place with no signs of loosening or hardware malfunction.   - She is complaining of right leg burning pain with radiation of pain and numbness to the toes. She has history of chronic lumbar spine radicular pain for which she sees Melissa Quintana MD at [facility].   - No intervention needed from Orthopedics in regard to the hip. Stable post op course SP right CASS. -Could consider Neuro consult for help with management of back pain while inpatient? Sandra Cabrera, [id number]  Ortho will sign off. Call with further questions.

## 2022-08-11 NOTE — PROCEDURES
Retroflexion did show a small hiatal hernia. Esophagus: GE junction at 40cms. No evidence of Laguna's or esophagitis.     Estimated blood loss none    Plan:  Ulcers are clean based so ok to restart anticoagulation  BID PPI x 8 weeks  Repeat EGD in 8 weeks  If bleeding persists will attempt prep at a colonoscopy  Call back with questions/concerns  Discussed with daughter

## 2022-08-11 NOTE — ANESTHESIA POSTPROCEDURE EVALUATION
Department of Anesthesiology  Postprocedure Note    Patient: Kaylen Jane  MRN: [id number]  YOB: 1950  Date of evaluation: 8/11/2022      Procedure Summary     Date: 08/11/22 Room / Location: [address] [place] [address] April Cardona  / [facility]    Anesthesia Start: 0782 Anesthesia Stop: 1500    Procedure: EGD BIOPSY Diagnosis:       Melena      (Melena [K92.1])    Surgeons: Dorian Pérez MD Responsible Provider: Wolf Wright MD    Anesthesia Type: MAC ASA Status: 3          Anesthesia Type: No value filed.     Rina Phase I: Rina Score: 9    Rina Phase II:        Anesthesia Post Evaluation    Patient location during evaluation: PACU  Patient participation: complete - patient participated  Level of consciousness: awake and alert  Airway patency: patent  Nausea & Vomiting: no nausea and no vomiting  Complications: no  Cardiovascular status: hemodynamically stable  Respiratory status: acceptable  Hydration status: euvolemic  Multimodal analgesia pain management approach

## 2022-08-11 NOTE — PROGRESS NOTES
Office : [phone]     Fax :[phone]         Renal Progress Note    Subjective:   Admit Date: 7/30/2022     Interval History:   NAONE. Complains of back pain. Had bloody BM yesterday. DIET Diet NPO Exceptions are: Sips of Water with Meds  Medications:   Scheduled Meds:   potassium chloride  10 mEq IntraVENous Q1H    acetaminophen  1,000 mg Oral 3 times per day    melatonin  10 mg Oral Nightly    albuterol  2.5 mg Nebulization BID    metoprolol  5 mg IntraVENous Q6H    lidocaine  1 patch TransDERmal Daily    naloxegol  12.5 mg Oral QAM AC    metoclopramide  5 mg IntraVENous Q6H    pantoprazole  40 mg IntraVENous BID    sodium chloride (Inhalant)  4 mL Nebulization BID    polyethylene glycol  17 g Per NG tube TID    lidocaine   Topical Once    sodium chloride flush  5-40 mL IntraVENous 2 times per day    fluticasone  2 spray Each Nostril Daily    pregabalin  50 mg Oral TID     Continuous Infusions:   sodium chloride      sodium chloride      [Held by provider] heparin (PORCINE) Infusion 16 Units/kg/hr (08/09/22 1655)    sodium chloride      sodium chloride      dextrose      sodium chloride         Labs:  CBC:   Recent Labs     08/09/22  0406 08/10/22  0437 08/10/22  0858 08/10/22  1940 08/11/22  0138 08/11/22  0506   WBC 12.5* 14.6*  --   --   --  14.5*   HGB 7.5* 7.1*   < > 8.7* 7.6* 7.4*   * 168  --   --   --  212    < > = values in this interval not displayed.        BMP:    Recent Labs     08/09/22  0406 08/10/22  0438 08/11/22  0506   * 134* 137   K 3.9 3.7 3.6   CL 95* 99 101   CO2 23 22 22   BUN 83* 62* 55*   CREATININE 1.9* 1.2 1.3* GLUCOSE 81 85 77       Ca/Mg/Phos:   Recent Labs     08/09/22  0406 08/10/22  0438 08/11/22  0506   CALCIUM 7.6* 7.7* 8.1*   MG 2.40 2.20 2.10   PHOS 6.6* 4.3 4.1       Hepatic:   No results for input(s): AST, ALT, ALB, BILITOT, ALKPHOS in the last 72 hours. Troponin: No results for input(s): TROPONINI in the last 72 hours. BNP: No results for input(s): BNP in the last 72 hours. Lipids: No results for input(s): CHOL, TRIG, HDL, LDLCALC, LABVLDL in the last 72 hours. ABGs:   No results for input(s): PHART, PO2ART, BHJ1JFO in the last 72 hours. INR:   No results for input(s): INR in the last 72 hours. UA:  No results for input(s): Ivett Solum, GLUCOSEU, BILIRUBINUR, KETUA, SPECGRAV, BLOODU, PHUR, PROTEINU, UROBILINOGEN, NITRU, LEUKOCYTESUR, Lorrane [place] in the last 72 hours. Urine Microscopic:   No results for input(s): LABCAST, BACTERIA, COMU, HYALCAST, WBCUA, RBCUA, EPIU in the last 72 hours. Urine Culture: No results for input(s): LABURIN in the last 72 hours. Urine Chemistry: No results for input(s): Velna Overlie, PROTEINUR, NAUR in the last 72 hours. Objective:   Vitals: /78   Pulse 99   Temp 98.1 °F (36.7 °C) (Oral)   Resp 18   Ht 5' 2.01\" (1.575 m)   Wt 251 lb 5.2 oz (114 kg)   SpO2 94%   BMI 45.96 kg/m²    Wt Readings from Last 3 Encounters:   08/11/22 251 lb 5.2 oz (114 kg)   07/13/22 218 lb 9.6 oz (99.2 kg)   06/14/22 217 lb (98.4 kg)      24HR INTAKE/OUTPUT:    Intake/Output Summary (Last 24 hours) at 8/11/2022 0845  Last data filed at 8/11/2022 0422  Gross per 24 hour   Intake --   Output 1100 ml   Net -1100 ml       Physical Exam  Constitutional:       Appearance: She is ill-appearing. She is not diaphoretic. Cardiovascular:      Rate and Rhythm: Normal rate. Pulmonary:      Effort: Pulmonary effort is normal.      Breath sounds: Rhonchi present. No wheezing. Abdominal:      General: There is distension. Palpations: Abdomen is soft. Tenderness: There is abdominal tenderness. There is no guarding. Musculoskeletal:      Right lower leg: Edema present. Left lower leg: Edema present. Skin:     General: Skin is warm and dry. Neurological:      General: No focal deficit present. Mental Status: She is oriented to person, place, and time. Assessment and Plan:       IMAGING:  XR HIP 2-3 VW W PELVIS RIGHT   Final Result   Impression:    1. Right total hip arthroplasty. The orthopedic hardware is intact without evidence of loosening. No significant change from prior CT examination. VL Extremity Venous Bilateral   Final Result      XR CHEST PORTABLE   Final Result      Mild bilateral airspace disease. XR ABDOMEN (KUB) (SINGLE AP VIEW)   Final Result   1. No evidence of free intraperitoneal air. 2. Indeterminate bowel gas pattern due to a paucity of small bowel gas. XR ABDOMEN (KUB) (SINGLE AP VIEW)   Final Result   Impression: Stable appearance of the abdomen. XR CHEST PORTABLE   Final Result      Low lung volumes with central bronchovascular crowding. Atelectasis in the left lung base. Normal cardiomediastinal silhouette. Lines and tubes in standard position. VL Extremity Venous Bilateral   Final Result      XR ABDOMEN (KUB) (SINGLE AP VIEW)   Final Result   Impression: Stable appearance of the abdomen with gas distended loops of central small bowel again noted. CT CHEST WO CONTRAST   Final Result      1. Moderate right pleural effusion and small left effusion with basilar airspace opacity most consistent with atelectasis. Superimposed pneumonia be difficult to exclude. 2. Narrowing of the trachea with expiration compatible with treated bronchial malacia. 3. Nasogastric tube tip in the lower esophagus. 4. Persistent colonic distention. 5. Mild coronary artery calcification.          XR ABDOMEN (KUB) (SINGLE AP VIEW)   Final Result      Frontal supine views demonstrate an unremarkable bowel gas pattern. No significant colonic stool is seen. Right hip arthroplasty noted. XR CHEST PORTABLE   Final Result      Prominence of the perihilar interstitial markings with mild peribronchial cuffing is favored to represent mild pulmonary edema. Low lung volumes bronchovascular crowding. Stable cardiomediastinal silhouette. Lines and tubes in standard position. Cholecystectomy clips are noted. CT ABDOMEN PELVIS WO CONTRAST Additional Contrast? Oral and Rectal   Final Result   1. Moderate colonic distention. Correlate for constipation. Fecal content within the ileum which may suggest stasis. There is no small bowel obstruction. 2. Subcutaneous fluid collection overlying the right total hip arthroplasty. CT HIP RIGHT WO CONTRAST   Final Result   1. Moderate colonic distention. Correlate for constipation. Fecal content within the ileum which may suggest stasis. There is no small bowel obstruction. 2. Subcutaneous fluid collection overlying the right total hip arthroplasty. XR CHEST PORTABLE   Final Result      1. Right IJ CVC tip in the cavoatrial junction. 2.  Bibasilar airspace disease suggesting atelectasis. Pneumonia is not excluded. XR ABDOMEN (KUB) (SINGLE AP VIEW)   Final Result      Dilated colonic and small bowel loops may suggest ileus but obstruction is not excluded. XR ABDOMEN (KUB) (SINGLE AP VIEW)   Final Result      No acute radiographic abnormality of the abdomen. Status post right hip arthroplasty with mildly displaced greater trochanteric fracture fragment, new or more pronounced compared to the prior study. Assessment/Plan     ARIANE   2. Sepsis  3. AGMA   4. Lactic Acidosis - resolved  4. Hyperkalemia  5. Hyponatremia  6. Bowel Obstruction  7. Hypertension  8. GI bleed  9.  Anemia      Plan:  - Cr stable  - No need for diuresis   - ECHO normnal EF - IVC compressible   - Hypoxia likely sec to Aspiration pneumonitis   - ARIANE sec to ATN   - Monitor I/Os  - Avoid nephrotoxic agents    Recommend to dose adjust all medications  based on renal functions  Maintain SBP> 90 mmHg   Daily weights   AVOID NSAIDs  Avoid Nephrotoxins  Monitor Intake/Output  Call if significant decrease in urine output        Salma Vital MD        I have seen the patient independently from the resident . I discussed the care with the resident. I personally reviewed the HPI, PH, FH, SH, ROS and medications. I repeated pertinent portions of the examination and reviewed the relevant imaging and laboratory data.  I agree with the findings, assessment and plan as documented, with the following addendum:      Linette Mast MD

## 2022-08-11 NOTE — CONSULTS
Clinical Pharmacy Progress Note  Medication History     Admit Date: 7/30/2022    Pharmacy consulted to verify home medication list by Dr Glenys Porter. List of of current medications patient is taking is complete. Home Medication list in EPIC updated to reflect changes noted below. Source of information: [facility] documentation, RN review with patient, and pharmacy fills    Patient's home pharmacy: [facility]), [facility] (old), [facility]     Changes made to medication list:   Medications removed:   Meloxican 15mg daily  Medrol dose pack -- finished  Medications added:   Tramadol   Medication doses adjusted:   Flonase - changed to prn  Other notes:   Pain regimen changing often recently due to acute pain. Tramadol 50mg q6h prn -- filled routinely for the last year, last filled for 30 days supply 7/5 (may be out of supply)  Percocet 5/325mg - filled 7/13 for a 7 day supply; pt may be out so did not add to med list  Norco 5/325mg BID prn -- filled last on 6/27 for a 30 day suppy, did not add to med list as may be out of supply    Current Outpatient Medications   Medication Instructions    albuterol sulfate  (90 Base) MCG/ACT inhaler 2 puffs, Inhalation, EVERY 6 HOURS PRN    allopurinol (ZYLOPRIM) 100 mg, Oral, DAILY    amLODIPine (NORVASC) 10 mg, Oral, DAILY    aspirin EC 81 mg, Oral, 2 TIMES DAILY    fluticasone (FLONASE) 50 MCG/ACT nasal spray 2 sprays, Nasal, DAILY PRN    furosemide (LASIX) 20 mg, Oral, DAILY    lisinopril (PRINIVIL;ZESTRIL) 5 mg, Oral, 2 times daily    methocarbamol (ROBAXIN) 500 mg, Oral, 3 TIMES DAILY PRN    naloxone 4 MG/0.1ML LIQD nasal spray 1 spray, Nasal, PRN    ondansetron (ZOFRAN) 4 mg, Oral, 3 TIMES DAILY PRN    pregabalin (LYRICA) 50 mg, Oral, 3 TIMES DAILY    traMADol (ULTRAM) 50 mg, Oral, EVERY 6 HOURS PRN       Please call with any questions.   Judson Peters PharmD., BCPS   8/11/2022 12:44 PM  Wireless: [phone]

## 2022-08-11 NOTE — PROGRESS NOTES
Occupational Therapy/Physical Therapy  Unavailable    Pt off the floor having an EGD procedure. Unavailable for PT and OT. Continue per POC.     Niharika Hackett OTR/L #[id number]  Liz [address], DPT, NCS, CSRS

## 2022-08-11 NOTE — PROGRESS NOTES
Hospital Medicine Progress Note    PCP: Maria D Nunez    Date of Admission: 7/30/2022    Chief Complaint:  Abdominal pain, transferred for ortho eval with Rt CASS site with fluid collection. History Of Present Illness:     74 y.o. female Paloma Dunlapner  - Hx of HTN, HLD, Gout, OA, recent Rt CASS, morbid obesity  - Presents with generalized weakness nausea vomiting and abdominal pain, she woke up with the symptoms night prior to presentation. Her last bowel movement was about 3 days prior. She was seen at [facility] ([facility] where she underwent CT imaging of the abdomen and pelvis which showed:    IMPRESSION:   1. There is a large amount of stool cecum through the sigmoid colon. Transition point is rectosigmoid but there is no obvious mass in the rectosigmoid region. No bowel thickening or peribowel stranding in the rectosigmoid region. 2. Likely, multiple renal cysts. 3. Partially organized fluid without obvious rim enhancement anterior to the THR. This may simply represent postoperative seroma but clinical correlation needed. The entire inferior extent of this is not covered on this exam.   4. Urinary bladder is distended with fluid    Labs were significant for WBC 22.8k, with elevated ANC, hgb 10.1, na131, k 5.0 BUN of 19 cr 1.1, Alk phos 139. With noted fluid around [facility] she was transferred to [facility] for orthopedic surgeon Dr. Erick Garcia evaluation. \"        Subjective:  Remains more awake   Abdominal pain improved and she having Bms. NGT removed days ago    Oxygen requirement is decreased. Now on RA     Had recurrent rectal bleed (bright red, per RN) over past 1-2 days; appears minimal 2 nights ago, was felt sec to irritation from rectal tube, but more yesterday PM)    Heparin has been held. Last bleed was last  night. Had 1 u of pRBCs yesterday with good response, however H/H is trending down again.  Denies abdominal pain, hours. No results for input(s): INR in the last 72 hours. No results for input(s): Delisa Mould in the last 72 hours. Urinalysis:      Lab Results   Component Value Date/Time    NITRU POSITIVE 07/31/2022 05:15 AM    WBCUA 3-5 07/31/2022 05:15 AM    BACTERIA 1+ 07/31/2022 05:15 AM    RBCUA None seen 07/31/2022 05:15 AM    BLOODU Negative 07/31/2022 05:15 AM    SPECGRAV 1.015 07/31/2022 05:15 AM    GLUCOSEU Negative 07/31/2022 05:15 AM       Radiology: No imaging here. Reviewed imaging from OSH        ASSESSMENT/PLAN:    Severe sepsis: POA   - Leukocytosis with tachycardia; on admission; - Possibly from GI source with severe constipation, fecal impaction  -  Transferred for evaluation of post-op hip fluid collection/possible infection (s/p THR 7/13): felt likely seroma,  normal post operative finding by orthopedics  -Started on admission on broad spectrum abx  - Blood cultures x 2 NGTD  - ID consulted: - Not currently on Abx   - Sepsis appears resolved  - Monitor off antibx, hopefully should continue to improve as bowels appear functioning now      Parox Atrial Fibrillation   Afib with RVR  Echo normal EF, normal diastolic  Continue AVN agents  Heparin drip: held with drop in hgb, and rectal bleed. Cardiology following  Transition to oral meds when enteral route re-established  - Keep Mg> 2; K 4 or above      Acute  hypoxemic respiratory failure  Possible fluid overload from recent surgery, and while NPO with intra-abdominal issue this admit; also possible aspiration pneumonitis  Was started on heparin drip for Afib and possible also new PE  8/3 CT chest wo contrast b/l pleural effusions, small left and moderate right  Prelim doppler report no DVT  Briefly on IV lasix/lasix drip but was stopped. O2 requirement still high.    Resp failure resolving/resolved  Encourage incentive spirometry      Fluid overload  - Patient appears developed volume overload with worse respiratory status and AFRVR while but neg leuc esterase and no signif pyuria  - Winter catheter placed at [facility] for retention likely sec to severe constipation  - Continue winter for now, ongoing reassessment; possible TOV soon      Chronic back pain, narcotic dependent: POA  - She is chronically on narcotics for chronic bck pain (tramadol and norco; and also in setting of recent hip surgery)  - Also on Lyrica  - Appears to have acute component to her pain, likely sec to not being on as much narcs as she was on PTA.   - Pain appears radicular, appears sciatica: may benefit from MRI/BRONSON. Will discuss with NSGY. Recent right hip surgery: [facility]er surgeon Dr. Mercy Zendejas, consulted  - Dopplers done for LE edema: No DVT  - To notify re right LE numbness: although pxt states is old, and appears better  - PT/OT      Essential Hypertension- Monitor Bps    Mild hyponatremia: monitor electrolytes    Gout - stable    Morbid obesity Body mass index is 41.01 kg/m². - Complicating assessment and treatment. Placing patient at risk for multiple co-morbidities as well as early death and contributing to the patient's presentation.  on weight loss when appropriate. DVT Prophylaxis: High risk: Heparin drip  Diet: Diet NPO Exceptions are: Sips of Water with Meds  Code Status: Full Code      Disposition: - Transferred from ICU. Remains in pxt pending progress  GI to eval for GI bleed  Recently at SNF after hip ortho surgery: Will likely need SNF at discharge.        Dalene Pallas, MD

## 2022-08-11 NOTE — PROGRESS NOTES
Speech Language Pathology  HOLD    Chart reviewed. Spoke with RN who confirmed pt is NPO for procedure. Will attempt again tomorrow as able.     Damion Tiwari, [address]  Speech-Language Pathologist  Pager [phone]

## 2022-08-11 NOTE — ANESTHESIA PRE PROCEDURE
Department of Anesthesiology  Preprocedure Note       Name:  Kaylen Jane   Age:  74 y.o.  :  1950                                          MRN:  [id number]         Date:  2022      Surgeon: Alia Bhatia):  Dorian Pérez MD    Procedure: Procedure(s):  EGD ESOPHAGOGASTRODUODENOSCOPY    Medications prior to admission:   Prior to Admission medications    Medication Sig Start Date End Date Taking? Authorizing Provider   allopurinol (ZYLOPRIM) 100 MG tablet Take 100 mg by mouth in the morning. Yes Historical Provider, MD   traMADol (ULTRAM) 50 MG tablet Take 50 mg by mouth every 6 hours as needed for Pain. Yes Historical Provider, MD   naloxone 4 MG/0.1ML LIQD nasal spray 1 spray by Nasal route as needed for Opioid Reversal 7/15/22   BRIAN Monique   aspirin EC 81 MG EC tablet Take 1 tablet by mouth 2 times daily 22   Cade Lira PA-C   ondansetron (ZOFRAN) 4 MG tablet Take 1 tablet by mouth 3 times daily as needed for Nausea or Vomiting 22   Cade Lira PA-C   amLODIPine (NORVASC) 10 MG tablet Take 10 mg by mouth daily 22  Historical Provider, MD   albuterol sulfate  (90 Base) MCG/ACT inhaler Inhale 2 puffs into the lungs every 6 hours as needed for Wheezing or Shortness of Breath    Historical Provider, MD   furosemide (LASIX) 20 MG tablet Take 20 mg by mouth daily 16  Historical Provider, MD   lisinopril (PRINIVIL;ZESTRIL) 5 MG tablet Take 5 mg by mouth in the morning and at bedtime  16  Historical Provider, MD   methocarbamol (ROBAXIN) 500 MG tablet Take 500 mg by mouth 3 times daily as needed 16   Historical Provider, MD   pregabalin (LYRICA) 50 MG capsule Take 50 mg by mouth 3 times daily.   4/20/22 10/17/22  Historical Provider, MD   fluticasone (FLONASE) 50 MCG/ACT nasal spray 2 sprays by Nasal route daily as needed for Allergies or Rhinitis    Historical Provider, MD       Current medications:    Current Facility-Administered Medications   Medication Dose Route Frequency Provider Last Rate Last Admin    acetaminophen (TYLENOL) tablet 1,000 mg  1,000 mg Oral 3 times per day GENA Jovel CNP   1,000 mg at 08/10/22 1307    traMADol (ULTRAM) tablet 50 mg  50 mg Oral Q4H PRN Felicita Buckley MD   50 mg at 08/10/22 1658    0.9 % sodium chloride infusion   IntraVENous PRN Emma Maki MD        0.9 % sodium chloride infusion   IntraVENous PRN Emma Maki MD        melatonin disintegrating tablet 10 mg  10 mg Oral Nightly Bradyd TONJA Cline, DO   10 mg at 08/10/22 1957    albuterol (PROVENTIL) nebulizer solution 2.5 mg  2.5 mg Nebulization BID Nancy London MD   2.5 mg at 08/11/22 0746    [Held by provider] heparin (porcine) injection 4,000 Units  4,000 Units IntraVENous PRN Michelle GENA Rosario - CNP        [Held by provider] heparin (porcine) injection 2,000 Units  2,000 Units IntraVENous PRN GENA Herrera - CNP   2,000 Units at 08/09/22 1652    [Held by provider] heparin 25,000 units in dextrose 5% 250 mL (premix) infusion  5-30 Units/kg/hr (Order-Specific) IntraVENous Continuous MichelleGENA Bunch CNP 17.9 mL/hr at 08/09/22 1655 16 Units/kg/hr at 08/09/22 1655    0.9 % sodium chloride infusion   IntraVENous PRN Yessi Abraham MD        metoprolol (LOPRESSOR) injection 5 mg  5 mg IntraVENous Q6H Warner Anderson MD   5 mg at 08/11/22 0950    lidocaine 4 % external patch 1 patch  1 patch TransDERmal Daily Brittanie Torres MD   1 patch at 08/11/22 0951    lidocaine (XYLOCAINE) 2 % uro-jet   Topical PRN Rukhsana Forrester MD   Given at 08/07/22 6519    sodium chloride (OCEAN, BABY AYR) 0.65 % nasal spray 1 spray  1 spray Each Nostril PRN Rukhsana Forrester MD   1 spray at 08/07/22 1319    naloxegol (MOVANTIK) tablet 12.5 mg  12.5 mg Oral QAM AC Bud Javier RiderTerrence, MD   12.5 mg at 08/11/22 1015    0.9 % sodium chloride infusion   IntraVENous PRN Hugo Ramon MD        metoclopramide (REGLAN) injection 5 mg  5 mg IntraVENous Q6H Heraclio Grey MD   5 mg at 08/11/22 0951    pantoprazole (PROTONIX) injection 40 mg  40 mg IntraVENous BID Rommel Forbes MD   40 mg at 08/11/22 0949    prochlorperazine (COMPAZINE) injection 10 mg  10 mg IntraVENous Q6H PRN Manjinder Jean MD   10 mg at 08/06/22 0009    sodium chloride (Inhalant) 3 % nebulizer solution 4 mL  4 mL Nebulization BID Heraclio Grey MD   4 mL at 08/11/22 0749    glucose chewable tablet 16 g  4 tablet Oral PRN Heraclio Grey MD        dextrose bolus 10% 125 mL  125 mL IntraVENous PRN Heraclio Grey MD   Stopped at 08/08/22 1305    Or    dextrose bolus 10% 250 mL  250 mL IntraVENous PRN Heraclio Grey MD   Stopped at 08/04/22 0912    glucagon (rDNA) injection 1 mg  1 mg SubCUTAneous PRN Heraclio Grey MD        dextrose 10 % infusion   IntraVENous Continuous PRN Heraclio Grey MD        albuterol (PROVENTIL) nebulizer solution 2.5 mg  2.5 mg Nebulization Q4H PRN Jose Rossi DO   2.5 mg at 08/09/22 1945    phenol 1.4 % mouth spray 1 spray  1 spray Mouth/Throat Q2H PRN Naveed Cast DO   1 spray at 08/09/22 1656    polyethylene glycol (GLYCOLAX) packet 17 g  17 g Per NG tube TID Heraclio Grey MD   17 g at 08/10/22 1307    morphine (PF) injection 1 mg  1 mg IntraVENous Q4H PRN Heraclio Grey MD   1 mg at 08/06/22 0911    Or    morphine (PF) injection 2 mg  2 mg IntraVENous Q4H PRN Heraclio Grey MD   2 mg at 08/10/22 1857    lidocaine (XYLOCAINE) 2 % uro-jet   Topical Once Heraclio Grey MD        sodium chloride flush 0.9 % injection 5-40 mL  5-40 mL IntraVENous 2 times per day Heraclio Grey MD   10 mL at 08/11/22 1025    sodium chloride flush 0.9 % injection 5-40 mL  5-40 mL IntraVENous PRN Heraclio Grey MD        0.9 % sodium chloride infusion   IntraVENous PRN Heraclio Grey MD 5 mL/hr at 08/11/22 0919 New Bag at 08/11/22 0919    ondansetron (ZOFRAN-ODT) disintegrating tablet 4 mg  4 mg Oral Q8H PRN Heraclio Grey MD        Or   Yahaira Rodriguez ondansetron (ZOFRAN) injection 4 mg  4 mg IntraVENous Q6H PRN Heraclio Grey MD   4 mg at 08/10/22 2210    fluticasone (FLONASE) 50 MCG/ACT nasal spray 2 spray  2 spray Each Nostril Daily Heraclio Grey MD   2 spray at 08/11/22 1024    methocarbamol (ROBAXIN) tablet 500 mg  500 mg Oral TID PRN Heraclio Grey MD   500 mg at 08/10/22 1215    pregabalin (LYRICA) capsule 50 mg  50 mg Oral TID Heraclio Grey MD   50 mg at 08/11/22 0949       Allergies:     Allergies   Allergen Reactions    Buspirone Hives    Duloxetine Nausea Only    Fenofibrate      Other reaction(s): Myalgias (muscle pain)  Other reaction(s): Myalgias (muscle pain)      Gabapentin Other (See Comments)     Panic attacks  Panic attacks      Venlafaxine      Other reaction(s): Tachycardia  Other reaction(s): Tachycardia      Doxycycline Nausea And Vomiting and Palpitations    Statins Rash    Sulfa Antibiotics Rash       Problem List:    Patient Active Problem List   Diagnosis Code    Primary osteoarthritis of right hip M16.11    Osteoarthritis of right hip M16.11    Osteoarthritis of right hip joint due to dysplasia M16.31    Status post left hip replacement Z96.642    Abdominal pain R10.9    ARIANE (acute kidney injury) ([facility] [address] N17.9    Electrolyte imbalance E87.8    Lactic acid acidosis E87.2    Leukocytosis D72.829    Ileus (HCC) K56.7    Status post total hip replacement, right Z96.641    Atrial fibrillation with RVR (HCC) I48.91    On continuous heparin infusion Z79.01    Benign essential HTN I10    Status post right hip replacement Z96.641    Acute hypoxemic respiratory failure (HCC) J96.01    Paroxysmal atrial fibrillation (HCC) I48.0       Past Medical History:        Diagnosis Date    Back pain     Hyperlipidemia     Hypertension     Tuberculosis 1981    Wears dentures        Past Surgical History:        Procedure Laterality Date    APPENDECTOMY  1957    CATARACT REMOVAL Bilateral 2015    CHOLECYSTECTOMY  2002    COLONOSCOPY N/A 8/3/2022    COLONOSCOPY DIAGNOSTIC performed by Fiorella Romero MD at [address] (CERVIX NOT REMOVED)  [address]    TOTAL HIP ARTHROPLASTY Right 7/13/2022    RIGHT TOTAL HIP ARTHROPLASTY/ MINIMALLY INVASIVE DIRECT ANTERIOR performed by Darby Roa MD at [address] History:    Social History     Tobacco Use    Smoking status: Never    Smokeless tobacco: Never   Substance Use Topics    Alcohol use: Never                                Counseling given: Not Answered      Vital Signs (Current):   Vitals:    08/11/22 0600 08/11/22 0740 08/11/22 0800 08/11/22 0817   BP:   127/78    Pulse:   99    Resp:   18    Temp:   98.1 °F (36.7 °C)    TempSrc:   Oral    SpO2:  94% 90% 94%   Weight: 251 lb 5.2 oz (114 kg)      Height:                                                  BP Readings from Last 3 Encounters:   08/11/22 127/78   07/15/22 111/60       NPO Status: Time of last liquid consumption: 0100 (patient does not remember, she thinks yesterday)                        Time of last solid consumption: 0100 (patient is unaware of date of last solid food consumption)                        Date of last liquid consumption: 08/02/22                        Date of last solid food consumption: 09/02/22 (patient is unsure excatly)    BMI:   Wt Readings from Last 3 Encounters:   08/11/22 251 lb 5.2 oz (114 kg)   07/13/22 218 lb 9.6 oz (99.2 kg)   06/14/22 217 lb (98.4 kg)     Body mass index is 45.96 kg/m².     CBC:   Lab Results   Component Value Date/Time    WBC 14.5 08/11/2022 05:06 AM    RBC 2.43 08/11/2022 05:06 AM    HGB 7.5 08/11/2022 01:35 PM    HCT 22.9 08/11/2022 01:35 PM    MCV 90.9 08/11/2022 05:06 AM    RDW 15.0 08/11/2022 05:06 AM     08/11/2022 05:06 AM       CMP:   Lab Results   Component Value Date/Time     08/11/2022 05:06 AM    K 3.6 08/11/2022 05:06 AM    K 4.3 07/14/2022 07:27 AM  08/11/2022 05:06 AM    CO2 22 08/11/2022 05:06 AM    BUN 55 08/11/2022 05:06 AM    CREATININE 1.3 08/11/2022 05:06 AM    GFRAA 48 08/11/2022 05:06 AM    LABGLOM 40 08/11/2022 05:06 AM    GLUCOSE 77 08/11/2022 05:06 AM    PROT 4.8 08/01/2022 05:41 AM    CALCIUM 8.1 08/11/2022 05:06 AM    BILITOT 0.5 08/01/2022 05:41 AM    ALKPHOS 456 08/01/2022 05:41 AM    AST 33 08/01/2022 05:41 AM    ALT 27 08/01/2022 05:41 AM       POC Tests:   Recent Labs     08/09/22  1639   POCGLU 108*       Coags:   Lab Results   Component Value Date/Time    PROTIME 14.6 08/04/2022 06:55 PM    INR 1.15 08/04/2022 06:55 PM    APTT >180.0 08/04/2022 11:48 PM       HCG (If Applicable): No results found for: PREGTESTUR, PREGSERUM, HCG, HCGQUANT     ABGs:   Lab Results   Component Value Date/Time    PHART 7.332 08/06/2022 03:31 AM    PO2ART 36.5 08/06/2022 03:31 AM    GDL2CFE 40.1 08/06/2022 03:31 AM    LRB1YKS 21.2 08/06/2022 03:31 AM    BEART -5 08/06/2022 03:31 AM    S9ZGVGYV 66 08/06/2022 03:31 AM        Type & Screen (If Applicable):  No results found for: LABABO, LABRH    Drug/Infectious Status (If Applicable):  No results found for: HIV, HEPCAB    COVID-19 Screening (If Applicable):   Lab Results   Component Value Date/Time    COVID19 Not Detected 07/15/2022 01:43 PM           Anesthesia Evaluation  Patient summary reviewed and Nursing notes reviewed no history of anesthetic complications:   Airway: Mallampati: II  TM distance: >3 FB   Neck ROM: full  Mouth opening: > = 3 FB   Dental:    (+) upper dentures and lower dentures      Pulmonary:   (+) COPD:  asthma:                            Cardiovascular:    (+) hypertension:, dysrhythmias: atrial fibrillation,         Rhythm: irregular                      Neuro/Psych:   Negative Neuro/Psych ROS              GI/Hepatic/Renal:   (+) morbid obesity          Endo/Other: Negative Endo/Other ROS                    Abdominal:   (+) obese,           Vascular: negative vascular ROS. Other Findings:           Anesthesia Plan      MAC     ASA 3       Induction: intravenous. Anesthetic plan and risks discussed with patient. Plan discussed with CRNA.     Attending anesthesiologist reviewed and agrees with Preprocedure content                Randy Patricia MD   8/11/2022

## 2022-08-11 NOTE — PROGRESS NOTES
Pt had two episodes of bilious emesis over night, 1 medium sized bloody bowel movement and one large bloody bowel movement.    Eileen Call RN

## 2022-08-11 NOTE — PROGRESS NOTES
General Surgery   Daily Progress Note  Patient: Damon Caicedo      CC: Abdominal pain with bowel obstruction    SUBJECTIVE:   Afebrile and HDS. BM x 2 overnight with dark blood. Had another melena BM this AM. Was made NPO last night and heparin was held. Patient had 1 u of pRBCs yesterday with good response however H/H is trending down again. Denies abdominal pain, N/V.    ROS:   A 14 point review of systems was conducted, significant findings as noted above. All other systems negative. OBJECTIVE:    PHYSICAL EXAM:    Vitals:    08/10/22 1849 08/10/22 1946 08/10/22 2313 08/11/22 0409   BP: 124/68 133/72 (!) 149/83 (!) 156/88   Pulse: 88 87 67 67   Resp: 16 14 22 20   Temp: 97.4 °F (36.3 °C) 97.9 °F (36.6 °C) 97.4 °F (36.3 °C) 98.8 °F (37.1 °C)   TempSrc: Oral Oral Oral Oral   SpO2:  92% 93% 95%   Weight:       Height:           General appearance: Resting in bed, in NAD   Neuro: A&Ox3  HEENT: Trachea midline. RIJ CVC in place. Chest/Lungs: normal effort with no accessory muscle use on RA  Cardiovascular: regular rhythm   Abdomen: Obese, non-tender, no rebound, guarding, or rigidity. Skin: warm and dry, no rashes  Extremities: no edema, no cyanosis  Genitourinary: Iqbal in place with clear yellow urine      LABS:   Recent Labs     08/10/22  0437 08/10/22  0858 08/11/22  0138 08/11/22  0506   WBC 14.6*  --   --  14.5*   HGB 7.1*   < > 7.6* 7.4*   HCT 20.4*   < > 23.2* 22.1*   MCV 89.8  --   --  90.9     --   --  212    < > = values in this interval not displayed. Recent Labs     08/10/22  0438 08/11/22  0506   * 137   K 3.7 3.6   CL 99 101   CO2 22 22   PHOS 4.3 4.1   BUN 62* 55*   CREATININE 1.2 1.3*        No results for input(s): AST, ALT, ALB, BILIDIR, BILITOT, ALKPHOS in the last 72 hours. No results for input(s): LIPASE, AMYLASE in the last 72 hours. No results for input(s): PROT, INR, APTT in the last 72 hours.    No results for input(s): CKTOTAL, CKMB, CKMBINDEX, TROPONINI in the last 72 hours. ASSESSMENT & PLAN:   This is a 74 y.o. female with Hx of HTN, HLD, Tuberculosis (1981), gout, and OA s/p right CASS on 7/13/22 who was transferred to [facility] on 7/30 due to fluid associated with her right CASS on 7/13 and for abdominal pain. - SLP completed yesterday; recommends FLD. Currently NPO  - Heparin drip per cardiology for anticoagulation for Afib but is currently held due to GI bleed  - F/u GI recs  - Recommend H/H q6h  - Continue PT/OT  - Dispo: will need SNF at DC due to deconditioning. OT 8/24, PT 9/24    Mike Espana DO, MSMEd  PGY1, General Surgery  08/11/22  6:07 AM  [employer]  Pager: [phone]    I have personally performed the medical history, physical exam and medical decision making and agree with all pertinent clinical information unless otherwise noted.      Lester Alvarez MD  Surgery Attending

## 2022-08-11 NOTE — PROGRESS NOTES
Electrophysiology - PROGRESS NOTE    Admit Date: 7/30/2022     Chief Complaint: AF     Interval History:   Patient seen and examined and notes reviewed. Patient is being followed for paroxysmal AF. Patient had presented to the hospital with abdominal pain was found to have a bowel obstruction/ileus along with sepsis. She had gone into atrial fibrillation with RVR and was placed on a diltiazem drip and converted back to normal sinus rhythm. She had a colonoscopy with a decompression on 8/3/2022 and was found to be back in atrial fibrillation afterwards. She was placed on amiodarone drip and converted to normal sinus rhythm. She is now off amiodarone and has remained in normal sinus rhythm since 8/6/22. Heparin was turned off overnight 8/9 due to a slightly bloody nose and stool with red tinge. H&H trending down. Remains off heparin. Episode of AF this am, now appears to be in [facility]  Was s/s with palps this am. Overall feels better. Edema in legs sl improved.      In: -   Out: 1100    Wt Readings from Last 2 Encounters:   08/11/22 251 lb 5.2 oz (114 kg)   07/13/22 218 lb 9.6 oz (99.2 kg)       Data:   Scheduled Meds:   Scheduled Meds:   potassium chloride  10 mEq IntraVENous Q1H    acetaminophen  1,000 mg Oral 3 times per day    melatonin  10 mg Oral Nightly    albuterol  2.5 mg Nebulization BID    metoprolol  5 mg IntraVENous Q6H    lidocaine  1 patch TransDERmal Daily    naloxegol  12.5 mg Oral QAM AC    metoclopramide  5 mg IntraVENous Q6H    pantoprazole  40 mg IntraVENous BID    sodium chloride (Inhalant)  4 mL Nebulization BID    polyethylene glycol  17 g Per NG tube TID    lidocaine   Topical Once    sodium chloride flush  5-40 mL IntraVENous 2 times per day    fluticasone  2 spray Each Nostril Daily    pregabalin  50 mg Oral TID     Continuous Infusions:   sodium chloride      sodium chloride      [Held by provider] heparin (PORCINE) Infusion 16 Units/kg/hr (08/09/22 1655)    sodium chloride      sodium chloride      dextrose      sodium chloride       PRN Meds:.traMADol, sodium chloride, sodium chloride, [Held by provider] heparin (porcine), [Held by provider] heparin (porcine), sodium chloride, lidocaine, sodium chloride, sodium chloride, prochlorperazine, glucose, dextrose bolus **OR** dextrose bolus, glucagon (rDNA), dextrose, albuterol, phenol, morphine **OR** morphine, sodium chloride flush, sodium chloride, ondansetron **OR** ondansetron, methocarbamol  Continuous Infusions:   sodium chloride      sodium chloride      [Held by provider] heparin (PORCINE) Infusion 16 Units/kg/hr (08/09/22 1655)    sodium chloride      sodium chloride      dextrose      sodium chloride         Intake/Output Summary (Last 24 hours) at 8/11/2022 0814  Last data filed at 8/11/2022 0422  Gross per 24 hour   Intake --   Output 1100 ml   Net -1100 ml       CBC:   Lab Results   Component Value Date/Time    WBC 14.5 08/11/2022 05:06 AM    HGB 7.4 08/11/2022 05:06 AM     08/11/2022 05:06 AM     BMP:  Lab Results   Component Value Date/Time     08/11/2022 05:06 AM    K 3.6 08/11/2022 05:06 AM    K 4.3 07/14/2022 07:27 AM     08/11/2022 05:06 AM    CO2 22 08/11/2022 05:06 AM    BUN 55 08/11/2022 05:06 AM    CREATININE 1.3 08/11/2022 05:06 AM    GLUCOSE 77 08/11/2022 05:06 AM     INR:   Lab Results   Component Value Date/Time    INR 1.15 08/04/2022 06:55 PM    INR 1.18 08/02/2022 06:30 PM    INR 1.27 08/01/2022 02:24 AM        CARDIAC LABS  ENZYMES:No results for input(s): CKMB, CKMBINDEX, TROPONINI in the last 72 hours.     Invalid input(s): CKTOTAL;3  FASTING LIPID PANEL:No results found for: HDL, LDLDIRECT, LDLCALC, TRIG, TSH  LIVER PROFILE:  Lab Results   Component Value Date/Time    AST 33 08/01/2022 05:41 AM    AST 51 08/01/2022 12:23 AM    ALT 27 08/01/2022 05:41 AM    ALT 39 08/01/2022 12:23 AM On metoprolol 5 mg every 6 w/ hold parameters - remains NPO  - Reviewed risk factors, pathophysiology, treatment options and lifestyle modification for atrial fibrillation: Blood pressure control, blood sugar control, healthy diet, minimal alcohol intake, no smoking, activity and exercise, manage stress sleep apnea evaluation and symptoms of a stroke. - Keep K+ > 4.0 and Mg > 2.0  - Reviewed recent labs  - Echo - EF > 65%, LA 3.7/49.7  - 2 week CAM on d/c    HTN  - BP labile  - On no meds    Kimberly Guido [facility]    I spent a total of 35 minutes in care of the patient and greater than 50% of the time was spent counseling with Mac Connelly and coordinating care regarding their diagnosis, treatments and plan of care.

## 2022-08-12 LAB
ALBUMIN SERPL-MCNC: 1.9 G/DL (ref 3.4–5)
ANION GAP SERPL CALCULATED.3IONS-SCNC: 11 MMOL/L (ref 3–16)
BASOPHILS ABSOLUTE: 0.1 K/UL (ref 0–0.2)
BASOPHILS RELATIVE PERCENT: 0.8 %
BUN BLDV-MCNC: 45 MG/DL (ref 7–20)
CALCIUM SERPL-MCNC: 7.7 MG/DL (ref 8.3–10.6)
CHLORIDE BLD-SCNC: 101 MMOL/L (ref 99–110)
CO2: 21 MMOL/L (ref 21–32)
CREAT SERPL-MCNC: 1.1 MG/DL (ref 0.6–1.2)
EOSINOPHILS ABSOLUTE: 0.1 K/UL (ref 0–0.6)
EOSINOPHILS RELATIVE PERCENT: 1.1 %
GFR AFRICAN AMERICAN: 59
GFR NON-AFRICAN AMERICAN: 49
GLUCOSE BLD-MCNC: 110 MG/DL (ref 70–99)
GLUCOSE BLD-MCNC: 152 MG/DL (ref 70–99)
GLUCOSE BLD-MCNC: 50 MG/DL (ref 70–99)
GLUCOSE BLD-MCNC: 58 MG/DL (ref 70–99)
GLUCOSE BLD-MCNC: 66 MG/DL (ref 70–99)
GLUCOSE BLD-MCNC: 84 MG/DL (ref 70–99)
HCT VFR BLD CALC: 21.7 % (ref 36–48)
HCT VFR BLD CALC: 22.1 % (ref 36–48)
HCT VFR BLD CALC: 22.5 % (ref 36–48)
HEMOGLOBIN: 7.3 G/DL (ref 12–16)
HEMOGLOBIN: 7.4 G/DL (ref 12–16)
HEMOGLOBIN: 7.6 G/DL (ref 12–16)
LYMPHOCYTES ABSOLUTE: 0.8 K/UL (ref 1–5.1)
LYMPHOCYTES RELATIVE PERCENT: 8.2 %
MAGNESIUM: 1.6 MG/DL (ref 1.8–2.4)
MCH RBC QN AUTO: 30.8 PG (ref 26–34)
MCHC RBC AUTO-ENTMCNC: 33.3 G/DL (ref 31–36)
MCV RBC AUTO: 92.5 FL (ref 80–100)
MONOCYTES ABSOLUTE: 0.8 K/UL (ref 0–1.3)
MONOCYTES RELATIVE PERCENT: 7.9 %
NEUTROPHILS ABSOLUTE: 8.3 K/UL (ref 1.7–7.7)
NEUTROPHILS RELATIVE PERCENT: 82 %
PDW BLD-RTO: 15.6 % (ref 12.4–15.4)
PERFORMED ON: ABNORMAL
PERFORMED ON: NORMAL
PHOSPHORUS: 3.8 MG/DL (ref 2.5–4.9)
PLATELET # BLD: 257 K/UL (ref 135–450)
PMV BLD AUTO: 8.4 FL (ref 5–10.5)
POTASSIUM SERPL-SCNC: 3.9 MMOL/L (ref 3.5–5.1)
RBC # BLD: 2.38 M/UL (ref 4–5.2)
SODIUM BLD-SCNC: 133 MMOL/L (ref 136–145)
WBC # BLD: 10.1 K/UL (ref 4–11)

## 2022-08-12 PROCEDURE — 6370000000 HC RX 637 (ALT 250 FOR IP): Performed by: INTERNAL MEDICINE

## 2022-08-12 PROCEDURE — 92526 ORAL FUNCTION THERAPY: CPT

## 2022-08-12 PROCEDURE — 2580000003 HC RX 258

## 2022-08-12 PROCEDURE — 2500000003 HC RX 250 WO HCPCS: Performed by: INTERNAL MEDICINE

## 2022-08-12 PROCEDURE — 94761 N-INVAS EAR/PLS OXIMETRY MLT: CPT

## 2022-08-12 PROCEDURE — 6360000002 HC RX W HCPCS

## 2022-08-12 PROCEDURE — 99231 SBSQ HOSP IP/OBS SF/LOW 25: CPT | Performed by: SURGERY

## 2022-08-12 PROCEDURE — 1200000000 HC SEMI PRIVATE

## 2022-08-12 PROCEDURE — 94640 AIRWAY INHALATION TREATMENT: CPT

## 2022-08-12 PROCEDURE — 2500000003 HC RX 250 WO HCPCS

## 2022-08-12 PROCEDURE — 80069 RENAL FUNCTION PANEL: CPT

## 2022-08-12 PROCEDURE — 6370000000 HC RX 637 (ALT 250 FOR IP)

## 2022-08-12 PROCEDURE — 6360000002 HC RX W HCPCS: Performed by: INTERNAL MEDICINE

## 2022-08-12 PROCEDURE — 99233 SBSQ HOSP IP/OBS HIGH 50: CPT | Performed by: INTERNAL MEDICINE

## 2022-08-12 PROCEDURE — 83735 ASSAY OF MAGNESIUM: CPT

## 2022-08-12 PROCEDURE — 94669 MECHANICAL CHEST WALL OSCILL: CPT

## 2022-08-12 PROCEDURE — 85014 HEMATOCRIT: CPT

## 2022-08-12 PROCEDURE — 99233 SBSQ HOSP IP/OBS HIGH 50: CPT | Performed by: NURSE PRACTITIONER

## 2022-08-12 PROCEDURE — C9113 INJ PANTOPRAZOLE SODIUM, VIA: HCPCS

## 2022-08-12 PROCEDURE — 99452 NTRPROF PH1/NTRNET/EHR RFRL: CPT | Performed by: INTERNAL MEDICINE

## 2022-08-12 PROCEDURE — 6370000000 HC RX 637 (ALT 250 FOR IP): Performed by: NURSE PRACTITIONER

## 2022-08-12 PROCEDURE — 85025 COMPLETE CBC W/AUTO DIFF WBC: CPT

## 2022-08-12 PROCEDURE — 2700000000 HC OXYGEN THERAPY PER DAY

## 2022-08-12 PROCEDURE — 85018 HEMOGLOBIN: CPT

## 2022-08-12 RX ORDER — HYDROCODONE BITARTRATE AND ACETAMINOPHEN 5; 325 MG/1; MG/1
1 TABLET ORAL EVERY 6 HOURS PRN
COMMUNITY

## 2022-08-12 RX ORDER — PANTOPRAZOLE SODIUM 40 MG/1
40 TABLET, DELAYED RELEASE ORAL
Status: DISCONTINUED | OUTPATIENT
Start: 2022-08-12 | End: 2022-08-23 | Stop reason: HOSPADM

## 2022-08-12 RX ORDER — TRAMADOL HYDROCHLORIDE 50 MG/1
50 TABLET ORAL EVERY 6 HOURS PRN
Status: DISCONTINUED | OUTPATIENT
Start: 2022-08-12 | End: 2022-08-23 | Stop reason: HOSPADM

## 2022-08-12 RX ORDER — POTASSIUM CHLORIDE 20 MEQ/1
20 TABLET, EXTENDED RELEASE ORAL ONCE
Status: COMPLETED | OUTPATIENT
Start: 2022-08-12 | End: 2022-08-12

## 2022-08-12 RX ORDER — MAGNESIUM SULFATE IN WATER 40 MG/ML
2000 INJECTION, SOLUTION INTRAVENOUS ONCE
Status: DISCONTINUED | OUTPATIENT
Start: 2022-08-12 | End: 2022-08-12

## 2022-08-12 RX ORDER — MAGNESIUM SULFATE IN WATER 40 MG/ML
4000 INJECTION, SOLUTION INTRAVENOUS ONCE
Status: COMPLETED | OUTPATIENT
Start: 2022-08-12 | End: 2022-08-12

## 2022-08-12 RX ADMIN — PANTOPRAZOLE SODIUM 40 MG: 40 TABLET, DELAYED RELEASE ORAL at 18:34

## 2022-08-12 RX ADMIN — SODIUM CHLORIDE: 9 INJECTION, SOLUTION INTRAVENOUS at 11:54

## 2022-08-12 RX ADMIN — MAGNESIUM SULFATE HEPTAHYDRATE 4000 MG: 40 INJECTION, SOLUTION INTRAVENOUS at 11:59

## 2022-08-12 RX ADMIN — METOCLOPRAMIDE HYDROCHLORIDE 5 MG: 5 INJECTION INTRAMUSCULAR; INTRAVENOUS at 04:10

## 2022-08-12 RX ADMIN — METOPROLOL TARTRATE 5 MG: 5 INJECTION INTRAVENOUS at 04:09

## 2022-08-12 RX ADMIN — PREGABALIN 50 MG: 50 CAPSULE ORAL at 14:00

## 2022-08-12 RX ADMIN — ACETAMINOPHEN 1000 MG: 500 TABLET ORAL at 06:13

## 2022-08-12 RX ADMIN — METOPROLOL TARTRATE 12.5 MG: 25 TABLET, FILM COATED ORAL at 21:54

## 2022-08-12 RX ADMIN — ALBUTEROL SULFATE 2.5 MG: 2.5 SOLUTION RESPIRATORY (INHALATION) at 11:53

## 2022-08-12 RX ADMIN — MORPHINE SULFATE 2 MG: 2 INJECTION, SOLUTION INTRAMUSCULAR; INTRAVENOUS at 04:10

## 2022-08-12 RX ADMIN — SODIUM CHLORIDE, PRESERVATIVE FREE 10 ML: 5 INJECTION INTRAVENOUS at 10:46

## 2022-08-12 RX ADMIN — Medication 10 MG: at 21:51

## 2022-08-12 RX ADMIN — SODIUM CHLORIDE 30 MG/ML INHALATION SOLUTION 4 ML: 30 SOLUTION INHALANT at 06:50

## 2022-08-12 RX ADMIN — METHOCARBAMOL 500 MG: 500 TABLET ORAL at 10:19

## 2022-08-12 RX ADMIN — ACETAMINOPHEN 1000 MG: 500 TABLET ORAL at 21:51

## 2022-08-12 RX ADMIN — Medication 16 G: at 08:59

## 2022-08-12 RX ADMIN — PREGABALIN 50 MG: 50 CAPSULE ORAL at 21:51

## 2022-08-12 RX ADMIN — PREGABALIN 50 MG: 50 CAPSULE ORAL at 10:43

## 2022-08-12 RX ADMIN — SODIUM CHLORIDE 30 MG/ML INHALATION SOLUTION 4 ML: 30 SOLUTION INHALANT at 19:54

## 2022-08-12 RX ADMIN — METOPROLOL TARTRATE 5 MG: 5 INJECTION INTRAVENOUS at 10:44

## 2022-08-12 RX ADMIN — FLUTICASONE PROPIONATE 2 SPRAY: 50 SPRAY, METERED NASAL at 10:47

## 2022-08-12 RX ADMIN — ALBUTEROL SULFATE 2.5 MG: 2.5 SOLUTION RESPIRATORY (INHALATION) at 19:53

## 2022-08-12 RX ADMIN — PANTOPRAZOLE SODIUM 40 MG: 40 INJECTION, POWDER, FOR SOLUTION INTRAVENOUS at 10:43

## 2022-08-12 RX ADMIN — ALBUTEROL SULFATE 2.5 MG: 2.5 SOLUTION RESPIRATORY (INHALATION) at 06:50

## 2022-08-12 RX ADMIN — SODIUM CHLORIDE, PRESERVATIVE FREE 10 ML: 5 INJECTION INTRAVENOUS at 21:53

## 2022-08-12 RX ADMIN — POTASSIUM CHLORIDE 20 MEQ: 1500 TABLET, EXTENDED RELEASE ORAL at 14:08

## 2022-08-12 RX ADMIN — MICONAZOLE NITRATE: 2 POWDER TOPICAL at 23:00

## 2022-08-12 RX ADMIN — ACETAMINOPHEN 1000 MG: 500 TABLET ORAL at 14:00

## 2022-08-12 RX ADMIN — METOCLOPRAMIDE HYDROCHLORIDE 5 MG: 5 INJECTION INTRAMUSCULAR; INTRAVENOUS at 10:43

## 2022-08-12 RX ADMIN — NALOXEGOL OXALATE 12.5 MG: 12.5 TABLET, FILM COATED ORAL at 06:13

## 2022-08-12 RX ADMIN — SODIUM CHLORIDE 30 MG/ML INHALATION SOLUTION 4 ML: 30 SOLUTION INHALANT at 11:54

## 2022-08-12 ASSESSMENT — PAIN SCALES - GENERAL
PAINLEVEL_OUTOF10: 0
PAINLEVEL_OUTOF10: 8
PAINLEVEL_OUTOF10: 3
PAINLEVEL_OUTOF10: 8
PAINLEVEL_OUTOF10: 0
PAINLEVEL_OUTOF10: 5
PAINLEVEL_OUTOF10: 0
PAINLEVEL_OUTOF10: 0

## 2022-08-12 ASSESSMENT — PAIN DESCRIPTION - ORIENTATION
ORIENTATION: LOWER
ORIENTATION: RIGHT;LEFT

## 2022-08-12 ASSESSMENT — PAIN DESCRIPTION - LOCATION
LOCATION: BACK

## 2022-08-12 ASSESSMENT — PAIN DESCRIPTION - DESCRIPTORS
DESCRIPTORS: BURNING
DESCRIPTORS: ACHING
DESCRIPTORS: ACHING

## 2022-08-12 NOTE — PROGRESS NOTES
Office : [phone]     Fax :[phone]         Renal Progress Note    Subjective:   Admit Date: 7/30/2022     Interval History:   NAONE. Complains of back pain. DIET ADULT DIET; Full Liquid  Medications:   Scheduled Meds:   acetaminophen  1,000 mg Oral 3 times per day    melatonin  10 mg Oral Nightly    albuterol  2.5 mg Nebulization BID    metoprolol  5 mg IntraVENous Q6H    lidocaine  1 patch TransDERmal Daily    naloxegol  12.5 mg Oral QAM AC    metoclopramide  5 mg IntraVENous Q6H    pantoprazole  40 mg IntraVENous BID    sodium chloride (Inhalant)  4 mL Nebulization BID    polyethylene glycol  17 g Per NG tube TID    lidocaine   Topical Once    sodium chloride flush  5-40 mL IntraVENous 2 times per day    fluticasone  2 spray Each Nostril Daily    pregabalin  50 mg Oral TID     Continuous Infusions:   sodium chloride      sodium chloride      [Held by provider] heparin (PORCINE) Infusion 16 Units/kg/hr (08/09/22 1655)    sodium chloride      sodium chloride      dextrose      sodium chloride 5 mL/hr at 08/11/22 0919       Labs:  CBC:   Recent Labs     08/10/22  0437 08/10/22  0858 08/11/22  0506 08/11/22  1335 08/11/22  2331 08/12/22  0630   WBC 14.6*  --  14.5*  --   --  10.1   HGB 7.1*   < > 7.4* 7.5* 7.1* 7.4*     --  212  --   --  257    < > = values in this interval not displayed.        BMP:    Recent Labs     08/10/22  0438 08/11/22  0506 08/12/22  0630   * 137 133*   K 3.7 3.6 3.9   CL 99 101 101   CO2 22 22 21   BUN 62* 55* 45*   CREATININE 1.2 1.3* 1.1   GLUCOSE 85 77 58*       Ca/Mg/Phos:   Recent Labs     08/10/22  0438 08/11/22  0506 08/12/22  0630   CALCIUM 7.7* 8.1* 7.7*   MG 2.20 2.10 1.60*   PHOS 4.3 4.1 3.8       Hepatic:   No results for input(s): AST, ALT, ALB, BILITOT, ALKPHOS in the last 72 hours. Troponin: No results for input(s): TROPONINI in the last 72 hours. BNP: No results for input(s): BNP in the last 72 hours. Lipids: No results for input(s): CHOL, TRIG, HDL, LDLCALC, LABVLDL in the last 72 hours. ABGs:   No results for input(s): PHART, PO2ART, [id number] in the last 72 hours. INR:   No results for input(s): INR in the last 72 hours. UA:  No results for input(s): Rahul [place], GLUCOSEU, BILIRUBINUR, KETUA, SPECGRAV, BLOODU, PHUR, PROTEINU, UROBILINOGEN, NITRU, LEUKOCYTESUR, Dottie Solum in the last 72 hours. Urine Microscopic:   No results for input(s): LABCAST, BACTERIA, COMU, HYALCAST, WBCUA, RBCUA, EPIU in the last 72 hours. Urine Culture: No results for input(s): LABURIN in the last 72 hours. Urine Chemistry: No results for input(s): Angely Emily, PROTEINUR, NAUR in the last 72 hours. Objective:   Vitals: /69   Pulse 94   Temp 97.7 °F (36.5 °C) (Oral)   Resp 18   Ht 5' 2.01\" (1.575 m)   Wt 234 lb 12.6 oz (106.5 kg)   SpO2 93%   BMI 42.93 kg/m²    Wt Readings from Last 3 Encounters:   08/12/22 234 lb 12.6 oz (106.5 kg)   07/13/22 218 lb 9.6 oz (99.2 kg)   06/14/22 217 lb (98.4 kg)      24HR INTAKE/OUTPUT:    Intake/Output Summary (Last 24 hours) at 8/12/2022 0850  Last data filed at 8/12/2022 0301  Gross per 24 hour   Intake 600 ml   Output 850 ml   Net -250 ml       Physical Exam  Constitutional:       Appearance: She is ill-appearing. She is not diaphoretic. Cardiovascular:      Rate and Rhythm: Normal rate. Pulmonary:      Effort: Pulmonary effort is normal.      Breath sounds: Rhonchi present. No wheezing. Abdominal:      General: There is distension. Palpations: Abdomen is soft. Tenderness: There is abdominal tenderness. There is no guarding. Musculoskeletal:      Right lower leg: Edema present. Left lower leg: Edema present. Skin:     General: Skin is warm and dry. Neurological:      General: No focal deficit present. Mental Status: She is oriented to person, place, and time. Assessment and Plan:       IMAGING:  MRI LUMBAR SPINE WO CONTRAST   Final Result   1. Moderate size intramuscular hematoma along the right iliopsoas muscle. This is new in comparison to abdominal CT 8/1/2022.   2. No central canal stenosis. 3. Moderate multilevel foraminal narrowing as described. 4. No acute osseous abnormality. XR HIP 2-3 VW W PELVIS RIGHT   Final Result   Impression:    1. Right total hip arthroplasty. The orthopedic hardware is intact without evidence of loosening. No significant change from prior CT examination. VL Extremity Venous Bilateral   Final Result      XR CHEST PORTABLE   Final Result      Mild bilateral airspace disease. XR ABDOMEN (KUB) (SINGLE AP VIEW)   Final Result   1. No evidence of free intraperitoneal air. 2. Indeterminate bowel gas pattern due to a paucity of small bowel gas. XR ABDOMEN (KUB) (SINGLE AP VIEW)   Final Result   Impression: Stable appearance of the abdomen. XR CHEST PORTABLE   Final Result      Low lung volumes with central bronchovascular crowding. Atelectasis in the left lung base. Normal cardiomediastinal silhouette. Lines and tubes in standard position. VL Extremity Venous Bilateral   Final Result      XR ABDOMEN (KUB) (SINGLE AP VIEW)   Final Result   Impression: Stable appearance of the abdomen with gas distended loops of central small bowel again noted. CT CHEST WO CONTRAST   Final Result      1. Moderate right pleural effusion and small left effusion with basilar airspace opacity most consistent with atelectasis. Superimposed pneumonia be difficult to exclude.    2. Narrowing of the trachea with expiration compatible with treated bronchial malacia. 3. Nasogastric tube tip in the lower esophagus. 4. Persistent colonic distention. 5. Mild coronary artery calcification. XR ABDOMEN (KUB) (SINGLE AP VIEW)   Final Result      Frontal supine views demonstrate an unremarkable bowel gas pattern. No significant colonic stool is seen. Right hip arthroplasty noted. XR CHEST PORTABLE   Final Result      Prominence of the perihilar interstitial markings with mild peribronchial cuffing is favored to represent mild pulmonary edema. Low lung volumes bronchovascular crowding. Stable cardiomediastinal silhouette. Lines and tubes in standard position. Cholecystectomy clips are noted. CT ABDOMEN PELVIS WO CONTRAST Additional Contrast? Oral and Rectal   Final Result   1. Moderate colonic distention. Correlate for constipation. Fecal content within the ileum which may suggest stasis. There is no small bowel obstruction. 2. Subcutaneous fluid collection overlying the right total hip arthroplasty. CT HIP RIGHT WO CONTRAST   Final Result   1. Moderate colonic distention. Correlate for constipation. Fecal content within the ileum which may suggest stasis. There is no small bowel obstruction. 2. Subcutaneous fluid collection overlying the right total hip arthroplasty. XR CHEST PORTABLE   Final Result      1. Right IJ CVC tip in the cavoatrial junction. 2.  Bibasilar airspace disease suggesting atelectasis. Pneumonia is not excluded. XR ABDOMEN (KUB) (SINGLE AP VIEW)   Final Result      Dilated colonic and small bowel loops may suggest ileus but obstruction is not excluded. XR ABDOMEN (KUB) (SINGLE AP VIEW)   Final Result      No acute radiographic abnormality of the abdomen. Status post right hip arthroplasty with mildly displaced greater trochanteric fracture fragment, new or more pronounced compared to the prior study. Assessment/Plan     ARIANE   2. Sepsis  3. AGMA   4. Lactic Acidosis - resolved  4. Hyperkalemia  5. Hyponatremia  6. Bowel Obstruction  7. Hypertension  8. GI bleed  9. Anemia      Plan:  - Cr stable  - Mag repleted  - No need for diuresis/IVF  - ECHO normal EF - IVC compressible   - Hypoxia likely sec to Aspiration pneumonitis   - ARIANE sec to ATN   - Monitor I/Os  - Avoid nephrotoxic agents    Recommend to dose adjust all medications  based on renal functions  Maintain SBP> 90 mmHg   Daily weights   AVOID NSAIDs  Avoid Nephrotoxins  Monitor Intake/Output  Call if significant decrease in urine output        Chetan Arzate MD        I have seen the patient independently from the resident . I discussed the care with the resident. I personally reviewed the HPI, PH, FH, SH, ROS and medications. I repeated pertinent portions of the examination and reviewed the relevant imaging and laboratory data.  I agree with the findings, assessment and plan as documented, with the following addendum:      Antoni Walters MD

## 2022-08-12 NOTE — PROGRESS NOTES
Electrophysiology - PROGRESS NOTE    Admit Date: 7/30/2022     Chief Complaint: AF     Interval History:   Patient seen and examined and notes reviewed. Patient is being followed for AF. Pt presented to the hospital with abdominal pain. Found to have bowel obstruction along with sepsis. She went into AF/RVR, placed on dilt gtt  and converted to NSR. She then underwent a colonoscopy with decompression on 8/3/2022 and was found to back in AF afterwards, placed on amnio drip and converted to NSR. She has been in NSR since 8/6/2022 however did have an episode of AF yesterday morning. Her heparin was turned off on 8/9 due to bloody nose and stool. H&H has been trending down, remains off the heparin. Appears to be back in AF this morning however rates mostly well controlled. No longer n.p.o. and tolerating p.o. medications. She is conversant at bedside. Does endorse occasional polyps.     In: 1100 [P.O.:1100]  Out: 850    Wt Readings from Last 2 Encounters:   08/12/22 234 lb 12.6 oz (106.5 kg)   07/13/22 218 lb 9.6 oz (99.2 kg)         Data:   Scheduled Meds:   Scheduled Meds:   magnesium sulfate  4,000 mg IntraVENous Once    acetaminophen  1,000 mg Oral 3 times per day    melatonin  10 mg Oral Nightly    albuterol  2.5 mg Nebulization BID    metoprolol  5 mg IntraVENous Q6H    lidocaine  1 patch TransDERmal Daily    naloxegol  12.5 mg Oral QAM AC    metoclopramide  5 mg IntraVENous Q6H    pantoprazole  40 mg IntraVENous BID    sodium chloride (Inhalant)  4 mL Nebulization BID    polyethylene glycol  17 g Per NG tube TID    lidocaine   Topical Once    sodium chloride flush  5-40 mL IntraVENous 2 times per day    fluticasone  2 spray Each Nostril Daily    pregabalin  50 mg Oral TID     Continuous Infusions:   sodium chloride      sodium chloride      [Held by provider] heparin (PORCINE) Infusion 16 Units/kg/hr (08/09/22 8263)    sodium chloride      sodium chloride      dextrose      sodium chloride 5 mL/hr at 08/11/22 0919     PRN Meds:.traMADol, sodium chloride, sodium chloride, [Held by provider] heparin (porcine), [Held by provider] heparin (porcine), sodium chloride, lidocaine, sodium chloride, sodium chloride, prochlorperazine, glucose, dextrose bolus **OR** dextrose bolus, glucagon (rDNA), dextrose, albuterol, phenol, morphine **OR** morphine, sodium chloride flush, sodium chloride, ondansetron **OR** ondansetron, methocarbamol  Continuous Infusions:   sodium chloride      sodium chloride      [Held by provider] heparin (PORCINE) Infusion 16 Units/kg/hr (08/09/22 1655)    sodium chloride      sodium chloride      dextrose      sodium chloride 5 mL/hr at 08/11/22 0919       Intake/Output Summary (Last 24 hours) at 8/12/2022 1149  Last data filed at 8/12/2022 0936  Gross per 24 hour   Intake 1100 ml   Output 850 ml   Net 250 ml       CBC:   Lab Results   Component Value Date/Time    WBC 10.1 08/12/2022 06:30 AM    HGB 7.4 08/12/2022 06:30 AM     08/12/2022 06:30 AM     BMP:  Lab Results   Component Value Date/Time     08/12/2022 06:30 AM    K 3.9 08/12/2022 06:30 AM    K 4.3 07/14/2022 07:27 AM     08/12/2022 06:30 AM    CO2 21 08/12/2022 06:30 AM    BUN 45 08/12/2022 06:30 AM    CREATININE 1.1 08/12/2022 06:30 AM    GLUCOSE 58 08/12/2022 06:30 AM     INR:   Lab Results   Component Value Date/Time    INR 1.15 08/04/2022 06:55 PM    INR 1.18 08/02/2022 06:30 PM    INR 1.27 08/01/2022 02:24 AM        CARDIAC LABS  ENZYMES:No results for input(s): CKMB, CKMBINDEX, TROPONINI in the last 72 hours.     Invalid input(s): CKTOTAL;3  FASTING LIPID PANEL:No results found for: HDL, LDLDIRECT, LDLCALC, TRIG, TSH  LIVER PROFILE:  Lab Results   Component Value Date/Time    AST 33 08/01/2022 05:41 AM    AST 51 08/01/2022 12:23 AM    ALT 27 08/01/2022 05:41 AM    ALT 39 08/01/2022 12:23 AM [place]    I spent a total of 38 minutes and greater than 50% of the time was spent counseling with Guerrero Tolentino and coordinating care regarding her diagnosis, treatments and plan of care.

## 2022-08-12 NOTE — CARE COORDINATION
CM cont to follow for  d/c planning       Lisa in Admissions  at the [address] [place] [address] has been accepted. -  She is aware the pt has not been vaccinated for [facility] when the pt is ready for DC. She has Medicare A&B. No pre-cert needed. [phone]. Per  MD Documentation:  Disposition: - Transferred from ICU. Remains in pxt pending progress  Resume diet  Ortho to re-eval in view of hematoma right iliopsoas  Recently at SNF after hip ortho surgery: Will likely need SNF at discharge. Likely early next week      Electronically signed by Lillie Mckee RN on 8/12/2022 at 4:30 PM       Lillie Mckee RN Case Manager  [facility]  [address] [place] [address].   [facility] [address]  [phone]  Fax [phone]

## 2022-08-12 NOTE — PLAN OF CARE
Problem: Safety - Adult  Goal: Free from fall injury  Outcome: Progressing     Problem: ABCDS Injury Assessment  Goal: Absence of physical injury  Outcome: Progressing     Problem: Skin/Tissue Integrity  Goal: Absence of new skin breakdown  Outcome: Progressing     Problem: Respiratory - Adult  Goal: Achieves optimal ventilation and oxygenation  Outcome: Progressing

## 2022-08-12 NOTE — PROGRESS NOTES
MRI results received. Secure message sent to Dr Carlin Hobson (covering MD) with orthopedics to review and determine if they would like to reevaluate. Awaiting response.

## 2022-08-12 NOTE — PROGRESS NOTES
Patient winter removed per nurse driven protocol. Placed new purewick suction setup. Patient tolerated well. Void goal of 0230 on 08/13.

## 2022-08-12 NOTE — PLAN OF CARE
Problem: Safety - Adult  Goal: Free from fall injury  Outcome: Progressing     Problem: ABCDS Injury Assessment  Goal: Absence of physical injury  Outcome: Progressing     Problem: Skin/Tissue Integrity  Goal: Absence of new skin breakdown  Description: 1. Monitor for areas of redness and/or skin breakdown  2. Assess vascular access sites hourly  3. Every 4-6 hours minimum:  Change oxygen saturation probe site  4. Every 4-6 hours:  If on nasal continuous positive airway pressure, respiratory therapy assess nares and determine need for appliance change or resting period.   Outcome: Progressing     Problem: Respiratory - Adult  Goal: Achieves optimal ventilation and oxygenation  Outcome: Progressing

## 2022-08-12 NOTE — PROGRESS NOTES
Hospital Medicine Progress Note    PCP: Helene Law    Date of Admission: 7/30/2022    Chief Complaint:  Abdominal pain, transferred for ortho eval with Rt CASS site with fluid collection. History Of Present Illness:     74 y.o. female Lilian Cox  - Hx of HTN, HLD, Gout, OA, recent Rt CASS, morbid obesity  - Presents with generalized weakness nausea vomiting and abdominal pain, she woke up with the symptoms night prior to presentation. Her last bowel movement was about 3 days prior. She was seen at [facility] (now [facility] of [facility] where she underwent CT imaging of the abdomen and pelvis which showed:    IMPRESSION:   1. There is a large amount of stool cecum through the sigmoid colon. Transition point is rectosigmoid but there is no obvious mass in the rectosigmoid region. No bowel thickening or peribowel stranding in the rectosigmoid region. 2. Likely, multiple renal cysts. 3. Partially organized fluid without obvious rim enhancement anterior to the THR. This may simply represent postoperative seroma but clinical correlation needed. The entire inferior extent of this is not covered on this exam.   4. Urinary bladder is distended with fluid    Labs were significant for WBC 22.8k, with elevated ANC, hgb 10.1, na131, k 5.0 BUN of 19 cr 1.1, Alk phos 139. With noted fluid around [facility] she was transferred to [facility]. for orthopedic surgeon Dr. Wai Bucio evaluation. \"        Subjective:  Remains more awake   Abdominal pain improved and she having BMs. NGT removed days ago    Oxygen requirement is decreased. Now on RA -minimal O2    Had recurrent rectal bleed (bright red, per RN) over 1-2 days; appears minimal 2 nights ago, was felt sec to irritation from rectal tube, but more yesterday PM)    Heparin has been held. Last bleed was last  night. Had 1 u of pRBCs yesterday with good response, however H/H is trending down again.  Denies abdominal pain, N/V    Remains NPO. GI was notified last PM. Awaiting re-eval.      She is s/p recent Right hip surgery  Right leg numbness and pain few days ago after working with PT: MRI done to eval for nerve impingemen as she has hx of chronic back paint: shows illio-psoas hematoma. Notified Ortho. Will keep off hep gtt. Family at bedside        Medications : Reviewed      Allergies:  Buspirone, Duloxetine, Fenofibrate, Gabapentin, Venlafaxine, Doxycycline, Statins, and Sulfa antibiotics      REVIEW OF SYSTEMS:   Pertinent positives as noted in the HPI. All other systems reviewed and negative. PHYSICAL EXAM PERFORMED:    /69   Pulse 94   Temp 97.7 °F (36.5 °C) (Oral)   Resp 18   Ht 5' 2.01\" (1.575 m)   Wt 234 lb 12.6 oz (106.5 kg)   SpO2 93%   BMI 42.93 kg/m²     General appearance: Awake,alert  HEENT:  Normal cephalic, atraumatic without obvious deformity. Neck: Supple, with full range of motion. No jugular venous distention. Respiratory:  Normal respiratory effort. Clear to auscultation, bilaterally without Rales/Wheezes/Rhonchi. Cardiovascular:  Regular rate and rhythm with normal S1/S2 without murmurs, rubs or gallops. Abdomen: Less distended, less firm, less tender. BS heard  Musculoskeletal: Right hip swelling but no erythema or warmth. Incision dressing C/D/I  Skin: Skin color, texture, turgor normal.  No rashes or lesions. Neurologic:  grossly non-focal.  Capillary Refill: Brisk,< 3 seconds   Peripheral Pulses: +2 palpable, equal bilaterally       Labs:     Recent Labs     08/10/22  0437 08/10/22  0858 08/11/22  0506 08/11/22  1335 08/11/22  2331 08/12/22  0630   WBC 14.6*  --  14.5*  --   --  10.1   HGB 7.1*   < > 7.4* 7.5* 7.1* 7.4*   HCT 20.4*   < > 22.1* 22.9* 21.3* 22.1*     --  212  --   --  257    < > = values in this interval not displayed.        Recent Labs     08/10/22  0438 08/11/22  0506 08/12/22  0630   * 137 133*   K 3.7 3.6 3.9   CL 99 101 101   CO2 22 22 21   BUN 62* 55* 45*   CREATININE 1.2 1.3* 1.1   CALCIUM 7.7* 8.1* 7.7*   PHOS 4.3 4.1 3.8       No results for input(s): AST, ALT, BILIDIR, BILITOT, ALKPHOS in the last 72 hours. No results for input(s): INR in the last 72 hours. No results for input(s): [facility] in the last 72 hours. Urinalysis:      Lab Results   Component Value Date/Time    NITRU POSITIVE 07/31/2022 05:15 AM    WBCUA 3-5 07/31/2022 05:15 AM    BACTERIA 1+ 07/31/2022 05:15 AM    RBCUA None seen 07/31/2022 05:15 AM    BLOODU Negative 07/31/2022 05:15 AM    SPECGRAV 1.015 07/31/2022 05:15 AM    GLUCOSEU Negative 07/31/2022 05:15 AM       Radiology: No imaging here. Reviewed imaging from OSH        ASSESSMENT/PLAN:    Severe sepsis: POA   - Leukocytosis with tachycardia; on admission; - Possibly from GI source with severe constipation, fecal impaction  -  Transferred for evaluation of post-op hip fluid collection/possible infection (s/p THR 7/13): felt likely seroma,  normal post operative finding by orthopedics  -Started on admission on broad spectrum abx  - Blood cultures x 2 NGTD  - ID consulted: - Not currently on Abx   - Sepsis appears resolved  - Monitor off antibx, hopefully should continue to improve as bowels appear functioning now      Parox Atrial Fibrillation   Afib with RVR  Echo normal EF, normal diastolic  Continue AVN agents  Heparin drip: held with drop in hgb, and blood loss anemia   Cardiology following  Transition to oral meds when enteral route re-established  - Keep Mg> 2; K 4 or above      Acute  hypoxemic respiratory failure  Possible fluid overload from recent surgery, and while NPO with intra-abdominal issue this admit; also possible aspiration pneumonitis  Was started on heparin drip for Afib and possible also new PE  8/3 CT chest wo contrast b/l pleural effusions, small left and moderate right  Prelim doppler report no DVT  Briefly on IV lasix/lasix drip but was stopped. O2 requirement still high.    Resp failure resolving/resolved  Encourage incentive spirometry      Fluid overload  - Patient appears developed volume overload with worse respiratory status and AFRVR while hospitalized. - Echocardiogram was overall normal with normal filling pressures and was done during sinus   - Prob volume overload related to fluid mgt for bowel issues, low Albumin and Hb low   - Acute heart failure ruled out  - Was treated with IV diuretics. Now held  - Still volume overloaded, but improved; on minimal-no O2: Consider some diuresis PO.: Will d/w Cardiology  - Dly weights, I/O monitoring      ARIANE   Likely sec to Hypoperfusion  - Cr was 0.8 in 7/14; 2.2 on admission. Increased while admitted up to 3.5  Nephrology consulted  -Monitor Cr: ARIANE resolving  - Avoid nephrotoxins      Bowel obstruction: POA Secondary to severe constipation  - CT of the [facility] system showed significant stool from cecum to the sigmoid colon and transition point at the rectosigmoid.   - GI, Surgery consulted, appreciate recs  - Possible severe constipation with fecal impaction possibly sec to narcotics: she is chronically on narcotics for chronic bck pain (tramadol and norco; and also in setting of recent hip surgery)  - Miralax and enema unsuccessful  - Underwent [facility] decompression 8/3 with improvement but not resolution. Was treated with SMOG enemas BID, Relistor, disimpaction. Now on Movantic and miralax. Appears bowel functioning now  - GI, General surgery following  - NG removed. Started on liquid diet:  tolerating, but noted with rectal bleed 8/09/22. ? If due to heparin. Held. Monitoring. GI notified. EGD done for \"melena\": report noted. Resume diet. Re-notify GI if rectal bleed noted, as ? appears was the original issue      Acute on Chronic Anemia  - Hg 11.2 on admission  - Dropped to 6s; - Blood transfusion 8/6; dropping again to 7 8/10/2022; rectal bleed noted: Heparin gtt held.  Maintain hold with finding of hematoma right ileo-psoas.   -  PRBC PRN  - Monitor Hgb        Acute Urinary retention  Possible partially treated Complicated UTI: POA  - Appears recently had E coli in urine, although CC was less than 100k. Was treated with cipro. Also appears on Keflex PTA.   - Urine on admission has some bacteria, and nitrite positive, but neg leuc esterase and no signif pyuria  - Winter catheter placed at [facility] for retention likely sec to severe constipation  - Continue winter for now, ongoing reassessment; possible TOV soon      Chronic back pain, narcotic dependent: POA  - She is chronically on narcotics for chronic bck pain (tramadol and norco; and also in setting of recent hip surgery)  - Also on Lyrica  - Appears to have acute component to her pain, likely sec to not being on as much narcs as she was on PTA.   - Pain appears radicular, appears sciatica: may benefit from MRI/BRONSON. Will discuss with NSGY. Recent right hip surgery: POA  Hematoma right iliopsoas muscle: prob new, sec to recent surgery and anticoagulation for Afib   - She is s/p recent Right hip surgery  - Developed Right leg numbness and pain few days ago after working with PT: MRI done to eval for nerve impingement as she has hx of chronic back paint: shows illio-psoas hematoma. Notified Ortho. Will keep off hep gtt. - Dopplers done for LE edema: No DVT      Essential Hypertension- Monitor Bps    Mild hyponatremia: monitor electrolytes    Gout - stable    Morbid obesity Body mass index is 41.01 kg/m². - Complicating assessment and treatment. Placing patient at risk for multiple co-morbidities as well as early death and contributing to the patient's presentation.  on weight loss when appropriate. DVT Prophylaxis: High risk: Heparin drip  Diet: ADULT DIET; Full Liquid  Code Status: Full Code      Disposition: - Transferred from ICU.   Remains in pxt pending progress  Resume diet  Ortho to re-eval in view of hematoma right iliopsoas  Recently at SNF after hip ortho surgery: Will likely need SNF at discharge.   Likely early next week       Nahum Hurtado MD

## 2022-08-12 NOTE — PROGRESS NOTES
WFL.  Pt had minimal difficulty closing lips around spoon or drawing liquid up a straw. Pt had mild difficulty with mastication with solids with prolonged mastication noted. There was no anterior spillage or oral residue noted with any consistency. Pt demonstrated no s/s aspiration with any consistency presented. Pt able to initiate swallow without difficulty with laryngeal movement observed. Vocal quality remained clear throughout. No coughing, throat clearing or choking observed with any consistency. Pt was instructed to consume 3oz of water continuously but stopped to take a breath after 3 successive swallows. No s/s aspiration noted. RR fluctuated between 14-18 through out the session. Recommend full liquid diet at this time due to concern for fatigue as pt is deconditioned. Dysphagia Diagnosis: Mild to moderate oral stage dysphagia (due to fatigue)    MBS results   Not indicated at this time    Pain: No pain reported     Current Diet : ADULT DIET; Full Liquid   Recommended Form of Meds: PO     Treatment:  Pt seen bedside to address the following goals:  1-The patient will tolerate recommended diet without observed clinical signs of aspiration  8/10: Rn stated keep pt on full today, as still some GIB. pt sitting up in chair, family present. Pt has full liquid tray at bedside. Pt consumed puree and thin liquids with no overt signs of aspiration. Pt cleared oral cavity fully. Pt reported some difficulty earlier when taking medication whole with water and this was a large pill. Pt family and RN report no problem with taking medications crushed in puree. Recommend cont Full liquids, pt remains very weak. 8/12: RN cleared patient; Per chart review, patient able to upgrade diet as tolerated. Patient sitting upright in bed, family present. She is eating jose crackers with peanut butter / thin liquids. Adequate labial seal, increased mastication time for solids and independent use of liquid rinse observed. Patient taking consecutive drinks thin liquid via cup and straw. No overt s/s of aspiration or penetration noted. Voice remained clear. Discussed diet levels with patient and she selects soft/ bites sized secondary to some generalized weakness and inability to cut food up. RN and MD notified of recommendations. Cont goal    2- The pt/caregiver will demonstrate understanding of swallowing recommendations and concerns. 8/9- The pt and daughter were educated to purpose of the visit, anatomy and physiology of the swallow, concerns for aspiration, role breathing plays in swallowing, concern for fatigue swallowing strategies, diet recommendations and possibility of being made NPO if s/s aspiration emerge. Both stated comprehension and agreement with diet recommendation and had no further questions. con't goal  8/10: pt and family educated to purpose of visit, reviewed s/s of aspiration and concern if aspiration occurs. Instructed to notify staff if any issues emerge. Discussed cont of current texture at this time. Pt and family stated comprehension  8/12: Patient and family educated on recommendations for swallow strategies (sit upright, alternate consistencies, slow rate) to reduced risk of aspiration. They verbalized understanding of all information. Cont goal     Patient/Family/Caregiver Education:  As above    Compensatory Strategies:  Upright as possible for all oral intake, Remain upright for 30-45 minutes after meals, Eat/Feed slowly, Alternate solids and liquids, Small bites/sips      Plan:  Continue dysphagia treatment with goals per plan of care. Diet recommendations:   -Upgrade to soft / bite sized per medical team  -Continue thin liquids  -Aspiration precautions  -Oral care 2-3x/day    DC recommendation: TBD  Treatment: 13  D/W nursingJessica  Needs met prior to leaving room, call button in reach. Diaz Jackson, [address] [facility], [phone]  Pg.  # [phone]    If patient is discharged prior to next treatment, this note will serve as the discharge summary

## 2022-08-12 NOTE — FLOWSHEET NOTE
Pt returned from MRI. Pt incontinent small amount of stool britany care complete. Pt repositioned in bed, medicated with morphine for right ankle pain. POC discussed with pt for night and all questions answered.

## 2022-08-12 NOTE — PLAN OF CARE
Problem: Discharge Planning  Goal: Discharge to home or other facility with appropriate resources  Outcome: Progressing  Flowsheets (Taken 8/11/2022 2220)  Discharge to home or other facility with appropriate resources: Identify discharge learning needs (meds, wound care, etc)     Problem: Safety - Adult  Goal: Free from fall injury  8/11/2022 2220 by Roselyn Maria RN  Outcome: Progressing     Problem: ABCDS Injury Assessment  Goal: Absence of physical injury  8/11/2022 2220 by Roselyn Maria RN  Outcome: Progressing  Flowsheets (Taken 8/11/2022 2220)  Absence of Physical Injury: Implement safety measures based on patient assessment     Problem: Skin/Tissue Integrity  Goal: Absence of new skin breakdown  Description: 1. Monitor for areas of redness and/or skin breakdown  2. Assess vascular access sites hourly  3. Every 4-6 hours minimum:  Change oxygen saturation probe site  4. Every 4-6 hours:  If on nasal continuous positive airway pressure, respiratory therapy assess nares and determine need for appliance change or resting period.   8/11/2022 2220 by Roselyn Maria RN  Outcome: Progressing     Problem: Respiratory - Adult  Goal: Achieves optimal ventilation and oxygenation  8/11/2022 2220 by Roselyn Maria RN  Outcome: Progressing  Flowsheets (Taken 8/11/2022 2220)  Achieves optimal ventilation and oxygenation: Assess for changes in respiratory status     Problem: Skin/Tissue Integrity - Adult  Goal: Incisions, wounds, or drain sites healing without S/S of infection  Outcome: Progressing  Flowsheets (Taken 8/11/2022 2220)  Incisions, Wounds, or Drain Sites Healing Without Sign and Symptoms of Infection: TWICE DAILY: Assess and document skin integrity     Problem: Musculoskeletal - Adult  Goal: Return mobility to safest level of function  Outcome: Progressing  Flowsheets (Taken 8/11/2022 2220)  Return Mobility to Safest Level of Function: Assess patient stability and activity tolerance for standing, transferring and ambulating with or without assistive devices     Problem: Musculoskeletal - Adult  Goal: Return ADL status to a safe level of function  Outcome: Progressing  Flowsheets (Taken 8/11/2022 2220)  Return ADL Status to a Safe Level of Function: Assess activities of daily living deficits and provide assistive devices as needed     Problem: Gastrointestinal - Adult  Goal: Maintains or returns to baseline bowel function  Outcome: Progressing  Flowsheets (Taken 8/11/2022 2220)  Maintains or returns to baseline bowel function: Assess bowel function     Problem: Genitourinary - Adult  Goal: Absence of urinary retention  Outcome: Progressing     Problem: Genitourinary - Adult  Goal: Urinary catheter remains patent  Outcome: Progressing  Flowsheets (Taken 8/11/2022 2220)  Urinary catheter remains patent: Assess patency of urinary catheter     Problem: Metabolic/Fluid and Electrolytes - Adult  Goal: Electrolytes maintained within normal limits  Outcome: Progressing  Flowsheets (Taken 8/11/2022 2220)  Electrolytes maintained within normal limits: Monitor labs and assess patient for signs and symptoms of electrolyte imbalances     Problem: Pain  Goal: Verbalizes/displays adequate comfort level or baseline comfort level  Outcome: Progressing  Flowsheets (Taken 8/11/2022 2220)  Verbalizes/displays adequate comfort level or baseline comfort level:   Encourage patient to monitor pain and request assistance   Assess pain using appropriate pain scale     Problem: Cardiovascular - Adult  Goal: Absence of cardiac dysrhythmias or at baseline  Outcome: Progressing  Flowsheets (Taken 8/11/2022 2220)  Absence of cardiac dysrhythmias or at baseline: Monitor cardiac rate and rhythm     Problem: Nutrition Deficit:  Goal: Optimize nutritional status  Outcome: Progressing  Flowsheets (Taken 8/11/2022 2220)  Nutrient intake appropriate for improving, restoring, or maintaining nutritional needs: Monitor oral intake, labs, and

## 2022-08-12 NOTE — PROGRESS NOTES
Occupational Therapy/Physical Therapy  Attempted Treatment    Currently receiving breathing treatment and unavailable for PT/OT. Will attempt f/u later today as schedule permits vs. Continue per POC.      Rhoda Rios OTR/L #[id number]  Liz 54, DPT, NCS, CSRS

## 2022-08-12 NOTE — PROGRESS NOTES
Valencia Gann is a 74 y.o. female with a past medical history of obesity, HTN, HLP, recent CASS 7/16/2022. Patient presented to the emergency room on 7/30 with nausea, vomiting, abdominal pain. Patient was found to be obstipated due to ileus from recent opioid use for CASS. She was admitted and was given IV fluids and IV antibiotics. Subsequently patient developed AF RVR and EP was consulted. She was started on diltiazem and heparin drips. Today I was consulted to assess patient in view of likely acute heart failure. Apparently on admission patient's oxygen requirements were 2 L, received IV fluids, oxygen requirements increased to 8 L, received Lasix 40 mg IV x1 on 8/2, oxygen requirements are now 6.5 L. Creatinine on admission was 2.2, increased to a peak of 2.7 today. ECG consistent with AF  bpm.  There were no ischemic changes. Echo 8/3/2022: Normal LV, EF greater than 13%, normal diastolic function, normal filling pressures, normal RV and valves. MRI L-spine 8/11/2022: Right iliopsoas hematoma. Interval history:  EMR was reviewed. Patient remains on 2 L of supplemental oxygen. Hemoglobin stable 7.4, creatinine improving at 1.1 from 1.3, I's and O's inaccurate, weight decreasing. Assessment & Plan:      1. R/o sepsis:  Obstipation due to ileus from recent opioid use for CASS, cannot r/o sepsis. Patient had a colonoscopy for decompression on 8/3/2022.     -On iv antibiotics per primary team     2. PAFRVR:  Patient was in sinus, converted into afib with RVR after incomplete colonic decompression. Atrial fibrillation will compromise our efforts for safe diuresis. - On lopressor 5 mg iv q 6; patient was switched to Lopressor 12.5 mg p.o. twice daily by EP today.   -We will need to investigate cause of anemia prior to reinitiating anticoagulation. Patient has a new right iliopsoas hematoma s/p hip surgery after being on anticoagulation for A. fib.     3.  Rule out acute heart failure. Patient appeared volume overloaded with worse respiratory status and AFRVR. However, echocardiogram is overall normal with normal filling pressures and was done during sinus (images personally reviewed). Discussed case with Dr Josee Khan, he witnessed aspiration, hence respiratory failure could be from aspiration pneumonitis rather than acute heart failure. Albumin and Hb low could explain peripheral edema. IV diuretics with no meaningful response, likely due to ATN and sepsis. - We stopped diuretics; Dr Josee Khan may elect to give albumin, may consider blood transfusion if Hb < 7.  - Strict I's and O's every shift and standing weights if possible, low-salt diet, daily BMP with reflex to Mg (correct lytes for goals K >4.0 and Mg > 2.0) and wean supplemental oxygen to off (or down to baseline supplemental oxygen requirements) for sats greater than 92-94%. 4. ARIANE on CKD:  Improved off diuretics.     -Will defer diuretics to nephrology. There are no further recommendations at this time and hence we will now sign off. I would like to thank you for providing me the opportunity to participate in the care of your patient. If you have any questions, please do not hesitate to contact me.      Darshana Mix MD, [address] [facility], [address]  The [address]  [address] [place] [address]  Ph: [phone]  Fax: [phone]

## 2022-08-12 NOTE — PROGRESS NOTES
is a 74 y.o. female with Hx of HTN, HLD, Tuberculosis (1983), gout, and OA s/p right CASS on 7/13/22 who was transferred to [facility] on 7/30 due to fluid associated with her right CASS on 7/13 and for abdominal pain. - MRI shows R iliopsoas hematoma; may have been contributing to her anemia. Will need to reach out orthopedic surgery  - Continue FLD per SLP  - Heparin drip per cardiology for anticoagulation for Afib but is currently held due to GI bleed  - GI did EGD; clean base ulcer, will repeat EGD in 8 weeks after PPI BID  - Okay to space out H/H  - Continue PT/OT  - Dispo: will need SNF at DC due to deconditioning. OT 8/24, PT 9/24    Aysha Sims DO, MSMEd  PGY1, General Surgery  08/12/22  5:49 AM  [employer]  Pager: [phone]    I have personally performed the medical history, physical exam and medical decision making and agree with all pertinent clinical information unless otherwise noted.      Dominga Delacruz MD  Surgery Attending

## 2022-08-12 NOTE — PROGRESS NOTES
related to inadequate protein-energy intake as evidenced by other (comment) (Full liquid diet)    Nutrition Interventions:   Food and/or Nutrient Delivery: Continue Current Diet  Nutrition Education/Counseling: Education not indicated  Coordination of Nutrition Care: Continue to monitor while inpatient  Plan of Care discussed with: Pt    Goals:  Previous Goal Met: Progressing toward Goal(s)  Goals: PO intake 50% or greater, prior to discharge       Nutrition Monitoring and Evaluation:   Behavioral-Environmental Outcomes: None Identified  Food/Nutrient Intake Outcomes: Diet Advancement/Tolerance  Physical Signs/Symptoms Outcomes: Biochemical Data, Weight, Nutrition Focused Physical Findings, GI Status    Discharge Planning:     Too soon to determine     Maria Eugenia Mclaughlin, [address] [place] [address], LD  Contact: [phone]

## 2022-08-13 ENCOUNTER — APPOINTMENT (OUTPATIENT)
Dept: GENERAL RADIOLOGY | Age: 72
DRG: 853 | End: 2022-08-13
Attending: INTERNAL MEDICINE
Payer: MEDICARE

## 2022-08-13 PROBLEM — I95.89 HYPOTENSION DUE TO HYPOVOLEMIA: Status: ACTIVE | Noted: 2022-08-13

## 2022-08-13 PROBLEM — E86.1 HYPOTENSION DUE TO HYPOVOLEMIA: Status: ACTIVE | Noted: 2022-08-13

## 2022-08-13 LAB
ALBUMIN SERPL-MCNC: 2 G/DL (ref 3.4–5)
ANION GAP SERPL CALCULATED.3IONS-SCNC: 10 MMOL/L (ref 3–16)
ANION GAP SERPL CALCULATED.3IONS-SCNC: 11 MMOL/L (ref 3–16)
BASOPHILS ABSOLUTE: 0.1 K/UL (ref 0–0.2)
BASOPHILS RELATIVE PERCENT: 0.9 %
BLOOD BANK DISPENSE STATUS: NORMAL
BLOOD BANK DISPENSE STATUS: NORMAL
BLOOD BANK PRODUCT CODE: NORMAL
BLOOD BANK PRODUCT CODE: NORMAL
BPU ID: NORMAL
BPU ID: NORMAL
BUN BLDV-MCNC: 43 MG/DL (ref 7–20)
BUN BLDV-MCNC: 44 MG/DL (ref 7–20)
C DIFF TOXIN/ANTIGEN: NORMAL
CALCIUM SERPL-MCNC: 7.1 MG/DL (ref 8.3–10.6)
CALCIUM SERPL-MCNC: 7.6 MG/DL (ref 8.3–10.6)
CHLORIDE BLD-SCNC: 98 MMOL/L (ref 99–110)
CHLORIDE BLD-SCNC: 98 MMOL/L (ref 99–110)
CO2: 20 MMOL/L (ref 21–32)
CO2: 21 MMOL/L (ref 21–32)
CREAT SERPL-MCNC: 1.2 MG/DL (ref 0.6–1.2)
CREAT SERPL-MCNC: 1.3 MG/DL (ref 0.6–1.2)
DESCRIPTION BLOOD BANK: NORMAL
DESCRIPTION BLOOD BANK: NORMAL
EOSINOPHILS ABSOLUTE: 0.1 K/UL (ref 0–0.6)
EOSINOPHILS RELATIVE PERCENT: 0.6 %
GFR AFRICAN AMERICAN: 48
GFR AFRICAN AMERICAN: 53
GFR NON-AFRICAN AMERICAN: 40
GFR NON-AFRICAN AMERICAN: 44
GLUCOSE BLD-MCNC: 119 MG/DL (ref 70–99)
GLUCOSE BLD-MCNC: 122 MG/DL (ref 70–99)
GLUCOSE BLD-MCNC: 125 MG/DL (ref 70–99)
GLUCOSE BLD-MCNC: 128 MG/DL (ref 70–99)
GLUCOSE BLD-MCNC: 134 MG/DL (ref 70–99)
HCT VFR BLD CALC: 20.6 % (ref 36–48)
HCT VFR BLD CALC: 20.8 % (ref 36–48)
HCT VFR BLD CALC: 21.2 % (ref 36–48)
HCT VFR BLD CALC: 21.2 % (ref 36–48)
HEMOGLOBIN: 6.9 G/DL (ref 12–16)
HEMOGLOBIN: 7.1 G/DL (ref 12–16)
LACTIC ACID: 0.9 MMOL/L (ref 0.4–2)
LYMPHOCYTES ABSOLUTE: 0.8 K/UL (ref 1–5.1)
LYMPHOCYTES RELATIVE PERCENT: 5.4 %
MAGNESIUM: 1.9 MG/DL (ref 1.8–2.4)
MAGNESIUM: 2.4 MG/DL (ref 1.8–2.4)
MCH RBC QN AUTO: 30.1 PG (ref 26–34)
MCHC RBC AUTO-ENTMCNC: 32.9 G/DL (ref 31–36)
MCV RBC AUTO: 91.4 FL (ref 80–100)
MONOCYTES ABSOLUTE: 0.9 K/UL (ref 0–1.3)
MONOCYTES RELATIVE PERCENT: 6.3 %
NEUTROPHILS ABSOLUTE: 13 K/UL (ref 1.7–7.7)
NEUTROPHILS RELATIVE PERCENT: 86.8 %
PDW BLD-RTO: 15.3 % (ref 12.4–15.4)
PERFORMED ON: ABNORMAL
PHOSPHORUS: 2.9 MG/DL (ref 2.5–4.9)
PLATELET # BLD: 363 K/UL (ref 135–450)
PMV BLD AUTO: 9 FL (ref 5–10.5)
POTASSIUM REFLEX MAGNESIUM: 4.2 MMOL/L (ref 3.5–5.1)
POTASSIUM SERPL-SCNC: 4.4 MMOL/L (ref 3.5–5.1)
RBC # BLD: 2.28 M/UL (ref 4–5.2)
SODIUM BLD-SCNC: 129 MMOL/L (ref 136–145)
SODIUM BLD-SCNC: 129 MMOL/L (ref 136–145)
WBC # BLD: 15 K/UL (ref 4–11)

## 2022-08-13 PROCEDURE — 6360000002 HC RX W HCPCS

## 2022-08-13 PROCEDURE — 94664 DEMO&/EVAL PT USE INHALER: CPT

## 2022-08-13 PROCEDURE — 83605 ASSAY OF LACTIC ACID: CPT

## 2022-08-13 PROCEDURE — 71045 X-RAY EXAM CHEST 1 VIEW: CPT

## 2022-08-13 PROCEDURE — 80069 RENAL FUNCTION PANEL: CPT

## 2022-08-13 PROCEDURE — 99233 SBSQ HOSP IP/OBS HIGH 50: CPT | Performed by: INTERNAL MEDICINE

## 2022-08-13 PROCEDURE — 83735 ASSAY OF MAGNESIUM: CPT

## 2022-08-13 PROCEDURE — 6370000000 HC RX 637 (ALT 250 FOR IP): Performed by: INTERNAL MEDICINE

## 2022-08-13 PROCEDURE — 86923 COMPATIBILITY TEST ELECTRIC: CPT

## 2022-08-13 PROCEDURE — 2580000003 HC RX 258: Performed by: INTERNAL MEDICINE

## 2022-08-13 PROCEDURE — 87493 C DIFF AMPLIFIED PROBE: CPT

## 2022-08-13 PROCEDURE — 86850 RBC ANTIBODY SCREEN: CPT

## 2022-08-13 PROCEDURE — 6370000000 HC RX 637 (ALT 250 FOR IP)

## 2022-08-13 PROCEDURE — 99232 SBSQ HOSP IP/OBS MODERATE 35: CPT | Performed by: SURGERY

## 2022-08-13 PROCEDURE — 2580000003 HC RX 258

## 2022-08-13 PROCEDURE — 94669 MECHANICAL CHEST WALL OSCILL: CPT

## 2022-08-13 PROCEDURE — 85018 HEMOGLOBIN: CPT

## 2022-08-13 PROCEDURE — 94761 N-INVAS EAR/PLS OXIMETRY MLT: CPT

## 2022-08-13 PROCEDURE — 99233 SBSQ HOSP IP/OBS HIGH 50: CPT | Performed by: NURSE PRACTITIONER

## 2022-08-13 PROCEDURE — 2060000000 HC ICU INTERMEDIATE R&B

## 2022-08-13 PROCEDURE — 36592 COLLECT BLOOD FROM PICC: CPT

## 2022-08-13 PROCEDURE — P9016 RBC LEUKOCYTES REDUCED: HCPCS

## 2022-08-13 PROCEDURE — 87324 CLOSTRIDIUM AG IA: CPT

## 2022-08-13 PROCEDURE — 51798 US URINE CAPACITY MEASURE: CPT

## 2022-08-13 PROCEDURE — 86901 BLOOD TYPING SEROLOGIC RH(D): CPT

## 2022-08-13 PROCEDURE — 36430 TRANSFUSION BLD/BLD COMPNT: CPT

## 2022-08-13 PROCEDURE — 6360000002 HC RX W HCPCS: Performed by: INTERNAL MEDICINE

## 2022-08-13 PROCEDURE — 51702 INSERT TEMP BLADDER CATH: CPT

## 2022-08-13 PROCEDURE — 87449 NOS EACH ORGANISM AG IA: CPT

## 2022-08-13 PROCEDURE — 85014 HEMATOCRIT: CPT

## 2022-08-13 PROCEDURE — 94640 AIRWAY INHALATION TREATMENT: CPT

## 2022-08-13 PROCEDURE — 85025 COMPLETE CBC W/AUTO DIFF WBC: CPT

## 2022-08-13 PROCEDURE — 2700000000 HC OXYGEN THERAPY PER DAY

## 2022-08-13 PROCEDURE — 86900 BLOOD TYPING SEROLOGIC ABO: CPT

## 2022-08-13 PROCEDURE — 36415 COLL VENOUS BLD VENIPUNCTURE: CPT

## 2022-08-13 RX ORDER — SODIUM CHLORIDE 9 MG/ML
INJECTION, SOLUTION INTRAVENOUS PRN
Status: COMPLETED | OUTPATIENT
Start: 2022-08-13 | End: 2022-08-13

## 2022-08-13 RX ORDER — SODIUM CHLORIDE 9 MG/ML
INJECTION, SOLUTION INTRAVENOUS PRN
Status: DISCONTINUED | OUTPATIENT
Start: 2022-08-13 | End: 2022-08-16

## 2022-08-13 RX ORDER — 0.9 % SODIUM CHLORIDE 0.9 %
1000 INTRAVENOUS SOLUTION INTRAVENOUS ONCE
Status: COMPLETED | OUTPATIENT
Start: 2022-08-13 | End: 2022-08-13

## 2022-08-13 RX ORDER — 0.9 % SODIUM CHLORIDE 0.9 %
500 INTRAVENOUS SOLUTION INTRAVENOUS ONCE
Status: DISCONTINUED | OUTPATIENT
Start: 2022-08-13 | End: 2022-08-16

## 2022-08-13 RX ORDER — SODIUM CHLORIDE 9 MG/ML
INJECTION, SOLUTION INTRAVENOUS CONTINUOUS
Status: DISCONTINUED | OUTPATIENT
Start: 2022-08-13 | End: 2022-08-16

## 2022-08-13 RX ORDER — 0.9 % SODIUM CHLORIDE 0.9 %
500 INTRAVENOUS SOLUTION INTRAVENOUS ONCE
Status: COMPLETED | OUTPATIENT
Start: 2022-08-13 | End: 2022-08-13

## 2022-08-13 RX ADMIN — PREGABALIN 50 MG: 50 CAPSULE ORAL at 14:01

## 2022-08-13 RX ADMIN — MICONAZOLE NITRATE: 2 POWDER TOPICAL at 21:22

## 2022-08-13 RX ADMIN — MORPHINE SULFATE 2 MG: 2 INJECTION, SOLUTION INTRAMUSCULAR; INTRAVENOUS at 18:31

## 2022-08-13 RX ADMIN — ALBUTEROL SULFATE 2.5 MG: 2.5 SOLUTION RESPIRATORY (INHALATION) at 21:45

## 2022-08-13 RX ADMIN — SODIUM CHLORIDE, PRESERVATIVE FREE 10 ML: 5 INJECTION INTRAVENOUS at 08:57

## 2022-08-13 RX ADMIN — SODIUM CHLORIDE, PRESERVATIVE FREE 10 ML: 5 INJECTION INTRAVENOUS at 21:23

## 2022-08-13 RX ADMIN — METHOCARBAMOL 500 MG: 500 TABLET ORAL at 14:01

## 2022-08-13 RX ADMIN — SODIUM CHLORIDE: 9 INJECTION, SOLUTION INTRAVENOUS at 21:32

## 2022-08-13 RX ADMIN — PANTOPRAZOLE SODIUM 40 MG: 40 TABLET, DELAYED RELEASE ORAL at 14:01

## 2022-08-13 RX ADMIN — SODIUM CHLORIDE 30 MG/ML INHALATION SOLUTION 4 ML: 30 SOLUTION INHALANT at 09:01

## 2022-08-13 RX ADMIN — SODIUM CHLORIDE 1000 ML: 9 INJECTION, SOLUTION INTRAVENOUS at 12:57

## 2022-08-13 RX ADMIN — MICONAZOLE NITRATE: 2 POWDER TOPICAL at 13:00

## 2022-08-13 RX ADMIN — PREGABALIN 50 MG: 50 CAPSULE ORAL at 21:21

## 2022-08-13 RX ADMIN — SODIUM CHLORIDE: 9 INJECTION, SOLUTION INTRAVENOUS at 05:06

## 2022-08-13 RX ADMIN — Medication 10 MG: at 21:21

## 2022-08-13 RX ADMIN — SODIUM CHLORIDE 1000 ML: 9 INJECTION, SOLUTION INTRAVENOUS at 17:21

## 2022-08-13 RX ADMIN — SODIUM CHLORIDE 30 MG/ML INHALATION SOLUTION 4 ML: 30 SOLUTION INHALANT at 21:45

## 2022-08-13 RX ADMIN — SODIUM CHLORIDE: 9 INJECTION, SOLUTION INTRAVENOUS at 18:10

## 2022-08-13 RX ADMIN — ALBUTEROL SULFATE 2.5 MG: 2.5 SOLUTION RESPIRATORY (INHALATION) at 09:01

## 2022-08-13 RX ADMIN — SODIUM CHLORIDE 500 ML: 900 INJECTION, SOLUTION INTRAVENOUS at 17:42

## 2022-08-13 ASSESSMENT — PAIN SCALES - GENERAL
PAINLEVEL_OUTOF10: 3
PAINLEVEL_OUTOF10: 0
PAINLEVEL_OUTOF10: 0
PAINLEVEL_OUTOF10: 8
PAINLEVEL_OUTOF10: 0

## 2022-08-13 ASSESSMENT — PAIN DESCRIPTION - PAIN TYPE: TYPE: CHRONIC PAIN

## 2022-08-13 ASSESSMENT — PAIN DESCRIPTION - ORIENTATION: ORIENTATION: LOWER

## 2022-08-13 ASSESSMENT — PAIN DESCRIPTION - DESCRIPTORS: DESCRIPTORS: ACHING

## 2022-08-13 ASSESSMENT — PAIN DESCRIPTION - LOCATION: LOCATION: BACK

## 2022-08-13 ASSESSMENT — PAIN - FUNCTIONAL ASSESSMENT: PAIN_FUNCTIONAL_ASSESSMENT: PREVENTS OR INTERFERES SOME ACTIVE ACTIVITIES AND ADLS

## 2022-08-13 ASSESSMENT — PAIN DESCRIPTION - FREQUENCY: FREQUENCY: CONTINUOUS

## 2022-08-13 NOTE — PROGRESS NOTES
Rec'd report from charge nurse. Called to bedside by ICU resident to perform BP w/ sphygmomanometer. Left 79/38, Right 81/38. Pt alert and oriented, but pale. Strong smell of stool. Stat order for 500mL bolus, am told pt has no Hx of heart failure; lungs clear/ dim, ankle edema +3 pitting.

## 2022-08-13 NOTE — PROGRESS NOTES
Interval Progress    The patient did not respond to pRBCs appropriately from a post-transfusion hemoglobin standpoint; accordingly, the primary team has ordered a CTA of the chest abdomen and pelvis. The primary physician taking care of the patient, Dr. Flavio Verdugo, was called to request that the patient receive PO contrast if he believed a one hour delay would be acceptable. Given her stability, he agrees that giving the oral contrast would be in the patient's best interest all things considered. The plan was discussed with the in-house radiologist who stated that a one hour delay for liquids would be appropriate in this setting. The patient's RN and the CT technician was made aware of the plan.     Brinda Morton MD, MPH  PGY-4 General Surgery  08/13/22  3:38 PM

## 2022-08-13 NOTE — PROGRESS NOTES
Report called to [facility] on PCU. Pt transferred to 4312 per bed. Family at the bedside.

## 2022-08-13 NOTE — SIGNIFICANT EVENT
Rapid response called at 0555, ICU team at bedside. 74year old female with history of HTN, HLD, Gout, S/P R CASS, morbid obesity who was admitted for nausea and vomiting found to have ileus, now s/p stool disimpaction. This AM pt was receiving PRBC transfusion when she became hypotensive to 67/41 during the transfusion. Temp 99, , satting well on 4L NC. Patient awake and appropriately interactive, no acute distress, denies any symptoms other than episode of diarrhea before rapid called. Transfusion was stopped, gave 350cc bolus with prompt improvement in BP to 67'Q systolic. Pt remained hemodynamically stable and denied any new symptoms during this rapid such as itching, SOB or lightheadedness. Last hgb at 0222 was 6.9, follow up CBC Hgb 6.9, same as prior.

## 2022-08-13 NOTE — PROGRESS NOTES
ICU Progress Note    Admit Date: 7/30/2022  ICU Day: NA  Vent Day: None  IV Access:Peripheral  IV Fluids:None  Vasopressors:None                Antibiotics: None  Diet: ADULT DIET; Dysphagia - Soft and Bite Sized; 1200 ml    CC: SOB    Interval history:   I was called to evaluate Vikas Maxwell for evaluation of hypotension  Early this morning she was receiving 1 unit of blood for hemoglobin of 6.9 when she dropped her blood pressure to 67/41. She was reported to be in no acute distress, but is noted to have frequent large amount of diarrhea that is brown, without blood, and smells like C. difficile per her daughter. Patient was given 350 mL bolus with improvement of her systolic blood pressure to 90    Repeat hemoglobin at 530 and 1102 shows it remains at 6.9  Blood pressure at 7 AM and around 1045 revealed an SBP in the 70s  When I saw her she states that she is lightheaded, although she mentions this is chronic it is worse today. No chest pain or shortness of breath.   She complains that her mouth is dry and she thinks she is dehydrated    Medications:     Scheduled Meds:   sodium chloride  500 mL IntraVENous Once    sodium chloride  1,000 mL IntraVENous Once    [Held by provider] metoprolol tartrate  12.5 mg Oral BID    pantoprazole  40 mg Oral BID AC    miconazole   Topical BID    melatonin  10 mg Oral Nightly    albuterol  2.5 mg Nebulization BID    lidocaine  1 patch TransDERmal Daily    sodium chloride (Inhalant)  4 mL Nebulization BID    lidocaine   Topical Once    sodium chloride flush  5-40 mL IntraVENous 2 times per day    fluticasone  2 spray Each Nostril Daily    pregabalin  50 mg Oral TID     Continuous Infusions:   sodium chloride      sodium chloride      sodium chloride      sodium chloride      sodium chloride      dextrose      sodium chloride 5 mL/hr at 08/12/22 1154     PRN Meds:sodium chloride, traMADol, sodium chloride, sodium chloride, [Held by provider] heparin (porcine), sodium chloride, lidocaine, sodium chloride, sodium chloride, prochlorperazine, glucose, dextrose bolus **OR** dextrose bolus, glucagon (rDNA), dextrose, albuterol, phenol, morphine **OR** morphine, sodium chloride flush, sodium chloride, ondansetron **OR** ondansetron, methocarbamol    Objective:   Vitals:   T-max:  Patient Vitals for the past 8 hrs:   BP Temp Temp src Pulse Resp SpO2   08/13/22 1045 (!) 79/48 -- -- 87 18 --   08/13/22 0856 99/62 98.4 °F (36.9 °C) Oral 86 16 91 %   08/13/22 0855 -- -- -- -- -- 92 %   08/13/22 0800 110/82 -- -- 90 -- 91 %   08/13/22 0736 126/61 -- -- 100 -- 90 %   08/13/22 0723 104/60 98.7 °F (37.1 °C) Axillary 99 16 90 %   08/13/22 0713 (!) 71/44 -- -- -- -- --   08/13/22 0705 123/63 98.7 °F (37.1 °C) Axillary (!) 101 18 90 %   08/13/22 0654 (!) 85/45 98.6 °F (37 °C) Axillary (!) 108 18 92 %   08/13/22 0640 91/68 98.5 °F (36.9 °C) Axillary (!) 105 18 94 %   08/13/22 0628 93/61 98.5 °F (36.9 °C) Axillary (!) 108 18 94 %   08/13/22 0615 103/62 -- -- (!) 106 -- 94 %   08/13/22 0614 (!) 90/41 -- -- (!) 105 -- 94 %   08/13/22 0606 (!) 84/43 -- -- (!) 106 -- 94 %   08/13/22 0602 (!) 85/47 -- -- (!) 109 -- 94 %   08/13/22 0556 (!) 87/56 -- -- (!) 110 -- --   08/13/22 0553 (!) 67/41 -- -- (!) 112 -- --   08/13/22 0551 (!) 89/55 99 °F (37.2 °C) Axillary (!) 108 -- --   08/13/22 0539 (!) 77/46 98.4 °F (36.9 °C) -- (!) 108 -- --   08/13/22 0534 -- 98.4 °F (36.9 °C) Axillary (!) 118 18 93 %   08/13/22 0520 111/71 98.4 °F (36.9 °C) Axillary (!) 105 18 94 %   08/13/22 0507 115/67 97.5 °F (36.4 °C) Oral (!) 112 18 94 %         Intake/Output Summary (Last 24 hours) at 8/13/2022 1257  Last data filed at 8/13/2022 0700  Gross per 24 hour   Intake 770 ml   Output 700 ml   Net 70 ml         Physical Exam  Constitutional:       General: She is not in acute distress. Appearance: She is obese. She is ill-appearing. HENT:      Head: Normocephalic and atraumatic.       Nose: Nose normal.      Mouth/Throat:      Mouth: Mucous membranes are dry. Pharynx: Oropharynx is clear. Eyes:      Extraocular Movements: Extraocular movements intact. Conjunctiva/sclera: Conjunctivae normal.      Pupils: Pupils are equal, round, and reactive to light. Neck:      Comments: Central line in place  Cardiovascular:      Rate and Rhythm: Normal rate and regular rhythm. Pulses: Normal pulses. Heart sounds: Normal heart sounds. Pulmonary:      Effort: Pulmonary effort is normal. No respiratory distress. Breath sounds: No wheezing or rhonchi. Comments: Diminished breath sounds at bases bilaterally   Abdominal:      General: Abdomen is flat. Bowel sounds are normal. There is distension. Tenderness: There is no abdominal tenderness. There is no guarding or rebound. Musculoskeletal:      Cervical back: Normal range of motion and neck supple. Right lower leg: Edema present. Left lower leg: Edema present. Comments: +Sacral edema. R Hip CASS site dressing present   Skin:     General: Skin is warm and dry. Capillary Refill: Capillary refill takes less than 2 seconds. Neurological:      General: No focal deficit present. Mental Status: She is alert and oriented to person, place, and time. Mental status is at baseline. Cranial Nerves: No cranial nerve deficit. Sensory: No sensory deficit. Motor: No weakness. LABS:    CBC:   Recent Labs     08/11/22  0506 08/11/22  1335 08/12/22  0630 08/12/22  1216 08/13/22  0222 08/13/22  0530 08/13/22  1102   WBC 14.5*  --  10.1  --   --  15.0*  --    HGB 7.4*   < > 7.4*   < > 6.9* 6.9* 6.9*   HCT 22.1*   < > 22.1*   < > 21.2* 20.8* 21.2*     --  257  --   --  363  --    MCV 90.9  --  92.5  --   --  91.4  --     < > = values in this interval not displayed.        Renal:    Recent Labs     08/11/22  0506 08/12/22  0630 08/13/22  0530    133* 129*   K 3.6 3.9 4.4    101 98*   CO2 22 21 20*   BUN 55* 45* 44*   CREATININE 1. 3* 1.1 1.3*   GLUCOSE 77 58* 119*   CALCIUM 8.1* 7.7* 7.6*   MG 2.10 1.60* 2.40   PHOS 4.1 3.8 2.9   ANIONGAP 14 11 11       Hepatic:   Recent Labs     08/11/22  0506 08/12/22  0630 08/13/22  0530   LABALBU 2.1* 1.9* 2.0*       Troponin: No results for input(s): TROPONINI in the last 72 hours. BNP: No results for input(s): BNP in the last 72 hours. Lipids: No results for input(s): CHOL, HDL in the last 72 hours. Invalid input(s): LDLCALCU, TRIGLYCERIDE  ABGs:    No results for input(s): PHART, OGO1GYU, PO2ART, JSM2HIB, BEART, THGBART, P2RTYUNT, EGP8LVH in the last 72 hours. INR:   No results for input(s): INR in the last 72 hours. Lactate: No results for input(s): LACTATE in the last 72 hours. Cultures:  -----------------------------------------------------------------  RAD:   XR CHEST PORTABLE   Final Result      Limited inspiration. Accentuation of pulmonary interstitial markings, with minor basilar atelectasis. No other acute findings. MRI LUMBAR SPINE WO CONTRAST   Final Result   1. Moderate size intramuscular hematoma along the right iliopsoas muscle. This is new in comparison to abdominal CT 8/1/2022.   2. No central canal stenosis. 3. Moderate multilevel foraminal narrowing as described. 4. No acute osseous abnormality. XR HIP 2-3 VW W PELVIS RIGHT   Final Result   Impression:    1. Right total hip arthroplasty. The orthopedic hardware is intact without evidence of loosening. No significant change from prior CT examination. VL Extremity Venous Bilateral   Final Result      XR CHEST PORTABLE   Final Result      Mild bilateral airspace disease. XR ABDOMEN (KUB) (SINGLE AP VIEW)   Final Result   1. No evidence of free intraperitoneal air. 2. Indeterminate bowel gas pattern due to a paucity of small bowel gas. XR ABDOMEN (KUB) (SINGLE AP VIEW)   Final Result   Impression: Stable appearance of the abdomen.       XR CHEST PORTABLE   Final Result      Low lung volumes with central bronchovascular crowding. Atelectasis in the left lung base. Normal cardiomediastinal silhouette. Lines and tubes in standard position. VL Extremity Venous Bilateral   Final Result      XR ABDOMEN (KUB) (SINGLE AP VIEW)   Final Result   Impression: Stable appearance of the abdomen with gas distended loops of central small bowel again noted. CT CHEST WO CONTRAST   Final Result      1. Moderate right pleural effusion and small left effusion with basilar airspace opacity most consistent with atelectasis. Superimposed pneumonia be difficult to exclude. 2. Narrowing of the trachea with expiration compatible with treated bronchial malacia. 3. Nasogastric tube tip in the lower esophagus. 4. Persistent colonic distention. 5. Mild coronary artery calcification. XR ABDOMEN (KUB) (SINGLE AP VIEW)   Final Result      Frontal supine views demonstrate an unremarkable bowel gas pattern. No significant colonic stool is seen. Right hip arthroplasty noted. XR CHEST PORTABLE   Final Result      Prominence of the perihilar interstitial markings with mild peribronchial cuffing is favored to represent mild pulmonary edema. Low lung volumes bronchovascular crowding. Stable cardiomediastinal silhouette. Lines and tubes in standard position. Cholecystectomy clips are noted. CT ABDOMEN PELVIS WO CONTRAST Additional Contrast? Oral and Rectal   Final Result   1. Moderate colonic distention. Correlate for constipation. Fecal content within the ileum which may suggest stasis. There is no small bowel obstruction. 2. Subcutaneous fluid collection overlying the right total hip arthroplasty. CT HIP RIGHT WO CONTRAST   Final Result   1. Moderate colonic distention. Correlate for constipation. Fecal content within the ileum which may suggest stasis. There is no small bowel obstruction.    2. Subcutaneous fluid collection overlying the right total hip arthroplasty. XR CHEST PORTABLE   Final Result      1. Right IJ CVC tip in the cavoatrial junction. 2.  Bibasilar airspace disease suggesting atelectasis. Pneumonia is not excluded. XR ABDOMEN (KUB) (SINGLE AP VIEW)   Final Result      Dilated colonic and small bowel loops may suggest ileus but obstruction is not excluded. XR ABDOMEN (KUB) (SINGLE AP VIEW)   Final Result      No acute radiographic abnormality of the abdomen. Status post right hip arthroplasty with mildly displaced greater trochanteric fracture fragment, new or more pronounced compared to the prior study. CTA CHEST ABDOMEN PELVIS W CONTRAST    (Results Pending)       Assessment/Plan  Hypotension -I suspect dehydration especially given diarrhea. However sepsis and hemorrhage is in the differential.  I will give 1 L normal saline, check a lactic acid, check UA with culture, and blood cultures x2. I have a high suspicion for C. difficile will send stool for C. difficile PCR. Chest x-ray is obtained that shows no significant pneumonia. After liter of fluid I will give 1 unit of blood. Hypoxemic Resp Failure - O2 requirement is stable at 4 L. Low lung volume on CXR. CT scan with small bilateral pleural effusion, possible mild edema, atelectasis. Bilateral leg edema with small pleural effusions however echo with normal systolic function. IVC with normal size and collapsibility  Paroxysmal A.fib -on heparin  Ileus  ARIANE: Gradual improvement with creatinine 1.3 down from high of 3.5. Nephrology following and patient off diuretics  Leukocytosis -worse up to 15  Hyponatremia -labile at 129    If blood pressure normalizes with volume can keep on 6 S. however if remains hypotensive despite volume resuscitation then would transfer to ICU for vasopressors.     Fariha Cortés MD

## 2022-08-13 NOTE — PROGRESS NOTES
0222 was 6.9, follow up CBC Hgb 6.9    Had been on heparin gtt in the ICU per cards for Afib. Discontinued days ago due to concern for rectal bleed/hemorrhage    Had recurrent rectal bleed several days ago. EGD done for melena. Has been having diarrhea, but RN unable to tell if blood, but states was not told it was. She is s/p recent Right hip surgery  Right leg numbness and pain few days ago after working with PT: MRI done to eval for nerve impingemen as she has hx of chronic back paint: shows illio-psoas hematoma. Notified Ortho. Imaging showed enlarging right hemtoma iliopsoas . She is s/p right hip surgery, and had just worked with PT/OT. Orthopedic notified. Now on 4L, slight RVR on telemetry    WBC now 14    Iqbal was removed yesterday      Remains more awake     Abdominal pain improved and she having BMs. NGT removed days ago      No Family at bedside today. Medications : Reviewed      Allergies:  Buspirone, Duloxetine, Fenofibrate, Gabapentin, Venlafaxine, Doxycycline, Statins, and Sulfa antibiotics      REVIEW OF SYSTEMS:   Pertinent positives as noted in the HPI. All other systems reviewed and negative. PHYSICAL EXAM PERFORMED:    BP 99/62   Pulse 86   Temp 98.4 °F (36.9 °C) (Oral)   Resp 16   Ht 5' 2.01\" (1.575 m)   Wt 234 lb 12.6 oz (106.5 kg)   SpO2 91%   BMI 42.93 kg/m²     General appearance: Awake,alert  HEENT:  Normal cephalic, atraumatic without obvious deformity. Neck: Supple, with full range of motion. No jugular venous distention. Respiratory:  Normal respiratory effort. Clear to auscultation, bilaterally without Rales/Wheezes/Rhonchi. Cardiovascular:  Regular rate and rhythm with normal S1/S2 without murmurs, rubs or gallops. Abdomen: Less distended, less firm, less tender. BS heard  Musculoskeletal: Right hip swelling but no erythema or warmth. Incision dressing C/D/I  Skin: Skin color, texture, turgor normal.  No rashes or lesions.   Neurologic: grossly non-focal.  Capillary Refill: Brisk,< 3 seconds   Peripheral Pulses: +2 palpable, equal bilaterally       Labs:     Recent Labs     08/11/22  0506 08/11/22  1335 08/12/22  0630 08/12/22  1216 08/12/22  1900 08/13/22  0222 08/13/22  0530   WBC 14.5*  --  10.1  --   --   --  15.0*   HGB 7.4*   < > 7.4*   < > 7.3* 6.9* 6.9*   HCT 22.1*   < > 22.1*   < > 21.7* 21.2* 20.8*     --  257  --   --   --  363    < > = values in this interval not displayed. Recent Labs     08/11/22  0506 08/12/22  0630 08/13/22  0530    133* 129*   K 3.6 3.9 4.4    101 98*   CO2 22 21 20*   BUN 55* 45* 44*   CREATININE 1.3* 1.1 1.3*   CALCIUM 8.1* 7.7* 7.6*   PHOS 4.1 3.8 2.9       No results for input(s): AST, ALT, BILIDIR, BILITOT, ALKPHOS in the last 72 hours. No results for input(s): INR in the last 72 hours. No results for input(s): Nika Height in the last 72 hours. Urinalysis:      Lab Results   Component Value Date/Time    NITRU POSITIVE 07/31/2022 05:15 AM    WBCUA 3-5 07/31/2022 05:15 AM    BACTERIA 1+ 07/31/2022 05:15 AM    RBCUA None seen 07/31/2022 05:15 AM    BLOODU Negative 07/31/2022 05:15 AM    SPECGRAV 1.015 07/31/2022 05:15 AM    GLUCOSEU Negative 07/31/2022 05:15 AM       Radiology: No imaging here. Reviewed imaging from OSH        ASSESSMENT/PLAN:    Severe sepsis: POA   - Leukocytosis with tachycardia; on admission; - Possibly from GI source with severe constipation, fecal impaction  -  Transferred to [facility] for evaluation of post-op hip fluid collection/possible infection (s/p THR 7/13): felt by ortho to be likely seroma,  normal post operative finding .  - Started on admission on broad spectrum abx. - Blood cultures x 2 NGTD. ID consulted: - Not currently on Abx   - Sepsis appears resolved  - Monitor off antibx, hopefully should continue to improve as bowels appear functioning now  - she is developing leucocytosis again now, and will need to be monitored.  She has had diarrhea, but has been on laxatives. Consider C diff testing if able to be done, lashawn if diarrhea persists off laxatives. Parox Atrial Fibrillation   Afib with RVR  Echo normal EF, normal diastolic  Continue AVN agents as tolerated. Was placed on Heparin drip in the ICU:  held with drop in hgb, and blood loss anemia   Cardiology following  Transitioned to oral meds: Soft Bps may preclude metop. Will iuse digoxin if needed  - Keep Mg> 2; K 4 or above      Acute  hypoxemic respiratory failure  Possible fluid overload from recent surgery, and while NPO with intra-abdominal issue this admit; also possible aspiration pneumonitis at that time  - Was started on heparin drip for Afib and possible new PE  8/3. CT chest wo contrast b/l pleural effusions, small left and moderate right  - Was Briefly on IV lasix/lasix drip but was stopped. O2 requirement had improved  - Dopplers:  no DVT  Resp failure resolving/resolved, but now increasing again: Possibly sec to fluid overload. - Low threshold to obtain CTPE protocol  - Encourage incentive spirometry      Fluid overload  - Patient appears developed volume overload with worse respiratory status and AFRVR while hospitalized. - Echocardiogram was overall normal with normal filling pressures and was done during sinus   - Prob volume overload related to fluid mgt for bowel issues, low Albumin and Hb low   - Acute heart failure ruled out  - Was treated with IV diuretics. Now held  - Still volume overloaded, with increasing O2 reqt  - Consider some diuresis if BP can tolerate  - Dly weights, I/O monitoring      Acute on Chronic Anemia  Hypotension  - Hypotension possibly sec to diarrhea but concenred for hemorrhage with drop in hgb as well. - Hg 11.2 on admission  - Dropped to 6 range; - Blood transfusion 8/6; dropped again to 7 8/10/2022; rectal bleed noted: Heparin gtt stopped. Hgb dropped again to 6 range.    - MRI lumber showed iliopsoas hematoma:   - With dropping hgb again, re-image to verify if hematoma right ileo-psoas is expanding   - Repeat hgb stat- Resume PRBC transfusion   -  PRBC PRN  - Monitor Hgb  - Was on bowel meds for ileus. Stopped. With diarrhea and bump in WBCs, Stool for C diff. Monitoroff laxatives. ARIANE   Likely sec to Hypoperfusion  - Cr was 0.8 in 7/14; 2.2 on admission. Increased while admitted up to 3.5  Nephrology consulted  -Monitor Cr: ARIANE resolving  - Avoid nephrotoxins      Bowel obstruction: POA Secondary to severe constipation  - CT of the [facility] system showed significant stool from cecum to the sigmoid colon and transition point at the rectosigmoid.   - GI, Surgery consulted, appreciate recs  - Possible severe constipation with fecal impaction possibly sec to narcotics: she is chronically on narcotics for chronic bck pain (tramadol and norco; and also in setting of recent hip surgery)  - Miralax and enema unsuccessful  - Underwent [place] decompression 8/3 with improvement but not resolution. Was treated with SMOG enemas BID, Relistor, disimpaction. Now on Movantic and miralax. Appears bowel functioning now  - GI, General surgery following  - NG removed. Started on liquid diet:  tolerating, but noted with rectal bleed 8/09/22. ? If due to heparin. Held. Monitoring. GI notified. EGD done for \"melena\": report noted. Resume diet. Re-notify GI if rectal bleed noted, as ? appears was the original issue        Recent right hip surgery: POA  Hematoma right iliopsoas muscle: prob new, sec to recent surgery and anticoagulation for Afib   - She is s/p recent Right hip surgery  - Developed Right leg numbness and pain few days ago after working with PT: MRI done to eval for nerve impingement as she has hx of chronic back paint: shows illio-psoas hematoma. Notified Ortho. Will keep off hep gtt.   - Dopplers done for LE edema: No DVT  - Ortho notified re hemtoma  - Re-image to verify if expanding further      Acute Urinary retention  - Appears recently had E coli in urine, although CC was less than 100k. Was treated with cipro. Also appears on Keflex PTA.   - Urine on admission has some bacteria, and nitrite positive, but neg leuc esterase and no signif pyuria  - Iqbal catheter placed at [facility] for retention likely sec to severe constipation  - Iqbal removed 6/12: Bladder scans ,may need to re-insert. Chronic back pain, narcotic dependent: POA  - She is chronically on narcotics for chronic bck pain (tramadol and norco; and also in setting of recent hip surgery)  - Also on Lyrica  - Appears to have acute component to her pain, likely sec to not being on as much narcs as she was on PTA.   - Pain appears radicular, appears sciatica: may benefit from MRI/BRONSON. Will discuss with NSGY. Essential Hypertension- Hypotensive now: Monitor Bps    Mild hyponatremia: monitor electrolytes, free water restriction: Nephrology following    Morbid obesity Body mass index is 41.01 kg/m². - Complicating assessment and treatment. Placing patient at risk for multiple co-morbidities as well as early death and contributing to the patient's presentation.  on weight loss when appropriate. DVT Prophylaxis: SCD for now. Resume chem prophylaxis if bleed ruled out. Diet: ADULT DIET; Dysphagia - Soft and Bite Sized; 1200 ml  Code Status: Full Code      Disposition: - Transferred from ICU. Pxt is currently not stable with hypotension, prob ongoing hemorrhage  Will ask CCT to eval to consider re-transfer to the ICU as may need pressors as already fluid overloaded from previous fluid administration, and developing increasing O2 reqt. Re-eval  hematoma right iliopsoas for progression    Recently at SNF after hip ortho surgery: Will likely need SNF at discharge. Discussed with pxt and daughter at bedside.         Anil Guido MD

## 2022-08-13 NOTE — PROGRESS NOTES
General Surgery   Daily Progress Note  Patient: Delfina Sender      CC: Abdominal pain with bowel obstruction    SUBJECTIVE:   Had a rapid response called on her this AM because of low BPs and tachycardic. She continues to have multiple diarrhea in her rectal tube. Last BP reading was 103/62. Currently on 3 L NC. Patient is doing well on rounds. Tolerating diet yesterday, continues to have diarrhea without blood. Currently receiving 1 U of blood    ROS:   A 14 point review of systems was conducted, significant findings as noted above. All other systems negative. OBJECTIVE:    PHYSICAL EXAM:    Vitals:    08/13/22 0602 08/13/22 0606 08/13/22 0614 08/13/22 0615   BP: (!) 85/47 (!) 84/43 (!) 90/41 103/62   Pulse: (!) 109 (!) 106 (!) 105 (!) 106   Resp:       Temp:       TempSrc:       SpO2: 94% 94% 94% 94%   Weight:       Height:           General appearance: Resting in bed, in NAD   Neuro: A&Ox3  HEENT: Trachea midline. RIJ CVC in place. Chest/Lungs: normal effort with no accessory muscle use on 3 L NC  Cardiovascular: regular rhythm   Abdomen: Obese, non-tender, no rebound, guarding, or rigidity. Skin: warm and dry, no rashes  Extremities: bilateral LE edema, no cyanosis  Genitourinary: Grossly normal      LABS:   Recent Labs     08/11/22  0506 08/11/22  1335 08/12/22  0630 08/12/22  1216 08/12/22  1900 08/13/22  0222   WBC 14.5*  --  10.1  --   --   --    HGB 7.4*   < > 7.4*   < > 7.3* 6.9*   HCT 22.1*   < > 22.1*   < > 21.7* 21.2*   MCV 90.9  --  92.5  --   --   --      --  257  --   --   --     < > = values in this interval not displayed. Recent Labs     08/11/22  0506 08/12/22  0630    133*   K 3.6 3.9    101   CO2 22 21   PHOS 4.1 3.8   BUN 55* 45*   CREATININE 1.3* 1.1        No results for input(s): AST, ALT, ALB, BILIDIR, BILITOT, ALKPHOS in the last 72 hours. No results for input(s): LIPASE, AMYLASE in the last 72 hours.    No results for input(s): PROT, INR, APTT in the last 72 hours. No results for input(s): CKTOTAL, CKMB, CKMBINDEX, TROPONINI in the last 72 hours. ASSESSMENT & PLAN:   This is a 74 y.o. female with Hx of HTN, HLD, Tuberculosis (1981), gout, and OA s/p right CASS on 7/13/22 who was transferred to [facility] on 7/30 due to fluid associated with her right CASS on 7/13 and for abdominal pain. - F/u post transfusion labs  - Continue to hold Reglan and glycolax due to diarrhea  - Iqbal was removed; patient not retaining urine  - MRI shows R iliopsoas hematoma; may have been contributing to her anemia. Will need to reach out orthopedic surgery  - Continue dysphagia diet per SLP  - Heparin drip per cardiology for anticoagulation for Afib but is currently held due to GI bleed and hip hematoma  - GI did EGD; clean base ulcer, will repeat EGD in 8 weeks after PPI BID  - Okay to space out H/H  - Continue PT/OT  - Dispo: will need SNF at DC due to deconditioning. OT 8/24, PT 9/24    Aysha Sims DO, MSMEd  PGY1, General Surgery  08/13/22  6:27 AM  Julia  Pager: [phone]    I have seen, examined, and reviewed the patients chart. I agree with the residents assessment and have made appropriate changes.     [place]

## 2022-08-13 NOTE — PROGRESS NOTES
Electrophysiology - PROGRESS NOTE    Admit Date: 7/30/2022     Chief Complaint: AF     Interval History:   Patient seen and examined and notes reviewed. Patient is being followed for paroxysmal AF. Patient had presented to the hospital with abdominal pain was found to have a bowel obstruction/ileus along with sepsis. She had gone into atrial fibrillation with RVR and was placed on a diltiazem drip and converted back to normal sinus rhythm. She had a colonoscopy with a decompression on 8/3/2022 and was found to be back in atrial fibrillation afterwards. She was placed on amiodarone drip and converted to normal sinus rhythm. Heparin was turned off overnight 8/9 due to a slightly bloody nose and stool with red tinge. H&H trending down. Remains off heparin. Developed AF 8/11. Changed to metoprolol 12.5 mg BID however now on hold d/t hypotension. Remains in AF/AFL. Hypotensive overnight. MRI of hip 8/11/ showed mod hematoma. RR called this am for hypotension during blood transfusion this am. Transfusion d/c'd. 350 cc IV bolus given with improvement of BP. Patient with episodes of diarrhea  ongoing.     In: 1270 [P.O.:1170; Blood:100]  Out: 700    Wt Readings from Last 2 Encounters:   08/12/22 234 lb 12.6 oz (106.5 kg)   07/13/22 218 lb 9.6 oz (99.2 kg)       Data:   Scheduled Meds:   Scheduled Meds:   sodium chloride  500 mL IntraVENous Once    sodium chloride  1,000 mL IntraVENous Once    [Held by provider] metoprolol tartrate  12.5 mg Oral BID    pantoprazole  40 mg Oral BID AC    miconazole   Topical BID    melatonin  10 mg Oral Nightly    albuterol  2.5 mg Nebulization BID    lidocaine  1 patch TransDERmal Daily    sodium chloride (Inhalant)  4 mL Nebulization BID    lidocaine   Topical Once    sodium chloride flush  5-40 mL IntraVENous 2 times per day    fluticasone  2 spray Each Nostril Daily    pregabalin  50 mg Oral TID     Continuous Infusions:   sodium chloride      sodium chloride      sodium chloride      sodium chloride      dextrose      sodium chloride 5 mL/hr at 08/12/22 1154     PRN Meds:.traMADol, sodium chloride, sodium chloride, [Held by provider] heparin (porcine), sodium chloride, lidocaine, sodium chloride, sodium chloride, prochlorperazine, glucose, dextrose bolus **OR** dextrose bolus, glucagon (rDNA), dextrose, albuterol, phenol, morphine **OR** morphine, sodium chloride flush, sodium chloride, ondansetron **OR** ondansetron, methocarbamol  Continuous Infusions:   sodium chloride      sodium chloride      sodium chloride      sodium chloride      dextrose      sodium chloride 5 mL/hr at 08/12/22 1154       Intake/Output Summary (Last 24 hours) at 8/13/2022 1211  Last data filed at 8/13/2022 0700  Gross per 24 hour   Intake 770 ml   Output 700 ml   Net 70 ml       CBC:   Lab Results   Component Value Date/Time    WBC 15.0 08/13/2022 05:30 AM    HGB 6.9 08/13/2022 11:02 AM     08/13/2022 05:30 AM     BMP:  Lab Results   Component Value Date/Time     08/13/2022 05:30 AM    K 4.4 08/13/2022 05:30 AM    K 4.3 07/14/2022 07:27 AM    CL 98 08/13/2022 05:30 AM    CO2 20 08/13/2022 05:30 AM    BUN 44 08/13/2022 05:30 AM    CREATININE 1.3 08/13/2022 05:30 AM    GLUCOSE 119 08/13/2022 05:30 AM     INR:   Lab Results   Component Value Date/Time    INR 1.15 08/04/2022 06:55 PM    INR 1.18 08/02/2022 06:30 PM    INR 1.27 08/01/2022 02:24 AM        CARDIAC LABS  ENZYMES:No results for input(s): CKMB, CKMBINDEX, TROPONINI in the last 72 hours.     Invalid input(s): CKTOTAL;3  FASTING LIPID PANEL:No results found for: HDL, LDLDIRECT, LDLCALC, TRIG, TSH  LIVER PROFILE:  Lab Results   Component Value Date/Time    AST 33 08/01/2022 05:41 AM    AST 51 08/01/2022 12:23 AM    ALT 27 08/01/2022 05:41 AM    ALT 39 08/01/2022 12:23 AM       -----------------------------------------------------------------  Telemetry: Personally reviewed AF/AFL - HR controlled    Objective:   Vitals: BP (!) 79/48   Pulse 87   Temp 98.4 °F (36.9 °C) (Oral)   Resp 18   Ht 5' 2.01\" (1.575 m)   Wt 234 lb 12.6 oz (106.5 kg)   SpO2 91%   BMI 42.93 kg/m²   General appearance: alert, appears stated age and cooperative, No acute distress   Eyes: Conjunctiva and pupils normal and reactive  Skin: Skin color, texture, turgor normal. No rashes or ecchymosis. Neck: no JVD, supple, symmetrical, trachea midline   Lungs: , no accessory muscle use, no respiratory distress  Heart: RRR  Abdomen: soft, non-tender; bowel sounds normal  Extremities: No edema, DP +  Psychiatric: normal insight and affect    Patient Active Problem List:     Primary osteoarthritis of right hip     Osteoarthritis of right hip     Osteoarthritis of right hip joint due to dysplasia     Status post left hip replacement     Abdominal pain     ARIAEN (acute kidney injury) ([address]     Electrolyte imbalance     Lactic acid acidosis     Leukocytosis     Ileus (HCC)     Status post total hip replacement, right     Atrial fibrillation with RVR (HCC)     On continuous heparin infusion     Benign essential HTN     Status post right hip replacement     Acute hypoxemic respiratory failure (HCC)        Assessment & Plan:      1. AF/RVR  2. Ileus  3. HTN  4. ST    75 y/o woman with a h/o HTN, HLD, TB, morbid obesity, recent total hip arthroplasty who p/w abd pain and vomiting, CT abd showed mod colonic distention, CT hip showed fluid overlying R hip, developed AF/RVR, noted to be fluid overloaded, echo EF 65%, placed on dilt gtt, converted to NSR 8/3/22, s/p colonoscopy and decompression (8/3/22, developed recurrent AF/RVR, placed on amio gtt, converted to NSR, now off amio, on IV BB Q 6.   [id number] 3. TSH 5.32 (8/2/22).      pAF  - Back in AF/AFL since 8/11 - HR controlled  - Heparin on hold d/t blood in nares and stool, increasing hip hematoma, H&H 6.9/21.2  - Metoprolol 12.5 mg BID on hold for hypotension  - Reviewed risk factors, pathophysiology, treatment options and lifestyle modification for atrial fibrillation: Blood pressure control, blood sugar control, healthy diet, minimal alcohol intake, no smoking, activity and exercise, manage stress sleep apnea evaluation and symptoms of a stroke. - Keep K+ > 4.0 and Mg > 2.0  - Reviewed recent labs  - Echo - EF > 65%, LA 3.7/49.7  - 2 week CAM on d/c    HTN  - Hypotensive - BB on hold  - On no meds    Chandra Malik [facility]    I spent a total of 35 minutes in care of the patient and greater than 50% of the time was spent counseling with Agatha Malik and coordinating care regarding their diagnosis, treatments and plan of care.

## 2022-08-13 NOTE — PROGRESS NOTES
Received call from lab for critical result Hgb 6.9 - noted by Dr Sabrina Clarke. Blood transfusion started at 0507 hrs. /67,  bpm, T 97.5, SPO2 94%. At 0539 BP 77/46 to 67/41,  - 112 bpm, T 98.4 - noted by Dr. Sabrina Clarke. Rapid response activated at 0555 hrs. IV N/S 350 mls given. Blood transfusion restarted back as per order by Rapid Team - B 90/41 to 103/62, , BP 94%. Bladder scan at 0220 hrs 150 mls. Bladder scan at 0634 hrs 231 mls. Will continue to monitor.

## 2022-08-13 NOTE — PROGRESS NOTES
Comm w/ Radiology dept.  about contrast needs for CT scanning; Rad requests Surgery resident team contact Radiologist formally for clarification

## 2022-08-13 NOTE — PROGRESS NOTES
Cdiff specimen sent to lab. Pt continues to have copious amounts of diarrhea. Pt became nauseous after taking a small sip of oral contrast will notify physicians.

## 2022-08-13 NOTE — PROGRESS NOTES
4 Eyes Admission Assessment     I agree as the admission nurse that 2 RN's have performed a thorough Head to Toe Skin Assessment on the patient. ALL assessment sites listed below have been assessed on admission. Areas assessed by both nurses: -Paula Rodriguez and Kandy Moralester  [x]   Head, Face, and Ears   [x]   Shoulders, Back, and Chest  [x]   Arms, Elbows, and Hands   [x]   Coccyx, Sacrum, and Ischium  - excoriation  [x]   Legs, Feet, and Heels  -skin tear to RLE  -Incision to R hip        Does the Patient have Skin Breakdown?    Excoriation, skin tear          Leonard Prevention initiated:  Yes   Wound Care Orders initiated:  No      WOC nurse consulted for Pressure Injury (Stage 3,4, Unstageable, DTI, NWPT, and Complex wounds) or Leonard score 18 or lower:  No      Nurse 1 eSignature: Electronically signed by Yogi Shaver RN on 8/13/22 at 6:03 PM EDT    **SHARE this note so that the co-signing nurse is able to place an eSignature**    Nurse 2 eSignature: {Esignature:[id number]}

## 2022-08-13 NOTE — PROGRESS NOTES
Labs     08/11/22  0506 08/12/22  0630 08/13/22  0530 08/13/22  1430   CALCIUM 8.1* 7.7* 7.6* 7.1*   MG 2.10 1.60* 2.40 1.90   PHOS 4.1 3.8 2.9  --        Hepatic:   No results for input(s): AST, ALT, ALB, BILITOT, ALKPHOS in the last 72 hours. Troponin: No results for input(s): TROPONINI in the last 72 hours. BNP: No results for input(s): BNP in the last 72 hours. Lipids: No results for input(s): CHOL, TRIG, HDL, LDLCALC, LABVLDL in the last 72 hours. ABGs:   No results for input(s): PHART, PO2ART, [id number] in the last 72 hours. INR:   No results for input(s): INR in the last 72 hours. UA:  No results for input(s): Rahul Vivian, GLUCOSEU, BILIRUBINUR, KETUA, SPECGRAV, BLOODU, PHUR, PROTEINU, UROBILINOGEN, NITRU, LEUKOCYTESUR, Dottie Solum in the last 72 hours. Urine Microscopic:   No results for input(s): LABCAST, BACTERIA, COMU, HYALCAST, WBCUA, RBCUA, EPIU in the last 72 hours. Urine Culture: No results for input(s): LABURIN in the last 72 hours. Urine Chemistry: No results for input(s): Angely Emily, PROTEINUR, NAUR in the last 72 hours. Objective:   Vitals: BP 96/63   Pulse (!) 107   Temp 97.7 °F (36.5 °C) (Oral)   Resp 18   Ht 5' 2.01\" (1.575 m)   Wt 234 lb 12.6 oz (106.5 kg)   SpO2 99%   BMI 42.93 kg/m²    Wt Readings from Last 3 Encounters:   08/12/22 234 lb 12.6 oz (106.5 kg)   07/13/22 218 lb 9.6 oz (99.2 kg)   06/14/22 217 lb (98.4 kg)      24HR INTAKE/OUTPUT:    Intake/Output Summary (Last 24 hours) at 8/13/2022 1821  Last data filed at 8/13/2022 1500  Gross per 24 hour   Intake 1449.9 ml   Output 300 ml   Net 1149.9 ml       Physical Exam  Constitutional:       Appearance: She is ill-appearing. She is not diaphoretic. Cardiovascular:      Rate and Rhythm: Normal rate. Pulmonary:      Effort: Pulmonary effort is normal.      Breath sounds: Rhonchi present. No wheezing. Abdominal:      General: There is distension. Palpations: Abdomen is soft. Tenderness: There is abdominal tenderness. There is no guarding. Musculoskeletal:      Right lower leg: Edema present. Left lower leg: Edema present. Skin:     General: Skin is warm and dry. Neurological:      General: No focal deficit present. Mental Status: She is oriented to person, place, and time. Assessment and Plan:       IMAGING:  XR CHEST PORTABLE   Final Result      Limited inspiration. Accentuation of pulmonary interstitial markings, with minor basilar atelectasis. No other acute findings. MRI LUMBAR SPINE WO CONTRAST   Final Result   1. Moderate size intramuscular hematoma along the right iliopsoas muscle. This is new in comparison to abdominal CT 8/1/2022.   2. No central canal stenosis. 3. Moderate multilevel foraminal narrowing as described. 4. No acute osseous abnormality. XR HIP 2-3 VW W PELVIS RIGHT   Final Result   Impression:    1. Right total hip arthroplasty. The orthopedic hardware is intact without evidence of loosening. No significant change from prior CT examination. VL Extremity Venous Bilateral   Final Result      XR CHEST PORTABLE   Final Result      Mild bilateral airspace disease. XR ABDOMEN (KUB) (SINGLE AP VIEW)   Final Result   1. No evidence of free intraperitoneal air. 2. Indeterminate bowel gas pattern due to a paucity of small bowel gas. XR ABDOMEN (KUB) (SINGLE AP VIEW)   Final Result   Impression: Stable appearance of the abdomen. XR CHEST PORTABLE   Final Result      Low lung volumes with central bronchovascular crowding. Atelectasis in the left lung base. Normal cardiomediastinal silhouette. Lines and tubes in standard position. VL Extremity Venous Bilateral   Final Result      XR ABDOMEN (KUB) (SINGLE AP VIEW)   Final Result   Impression: Stable appearance of the abdomen with gas distended loops of central small bowel again noted. CT CHEST WO CONTRAST   Final Result      1. Moderate right pleural effusion and small left effusion with basilar airspace opacity most consistent with atelectasis. Superimposed pneumonia be difficult to exclude. 2. Narrowing of the trachea with expiration compatible with treated bronchial malacia. 3. Nasogastric tube tip in the lower esophagus. 4. Persistent colonic distention. 5. Mild coronary artery calcification. XR ABDOMEN (KUB) (SINGLE AP VIEW)   Final Result      Frontal supine views demonstrate an unremarkable bowel gas pattern. No significant colonic stool is seen. Right hip arthroplasty noted. XR CHEST PORTABLE   Final Result      Prominence of the perihilar interstitial markings with mild peribronchial cuffing is favored to represent mild pulmonary edema. Low lung volumes bronchovascular crowding. Stable cardiomediastinal silhouette. Lines and tubes in standard position. Cholecystectomy clips are noted. CT ABDOMEN PELVIS WO CONTRAST Additional Contrast? Oral and Rectal   Final Result   1. Moderate colonic distention. Correlate for constipation. Fecal content within the ileum which may suggest stasis. There is no small bowel obstruction. 2. Subcutaneous fluid collection overlying the right total hip arthroplasty. CT HIP RIGHT WO CONTRAST   Final Result   1. Moderate colonic distention. Correlate for constipation. Fecal content within the ileum which may suggest stasis. There is no small bowel obstruction. 2. Subcutaneous fluid collection overlying the right total hip arthroplasty. XR CHEST PORTABLE   Final Result      1. Right IJ CVC tip in the cavoatrial junction. 2.  Bibasilar airspace disease suggesting atelectasis. Pneumonia is not excluded. XR ABDOMEN (KUB) (SINGLE AP VIEW)   Final Result      Dilated colonic and small bowel loops may suggest ileus but obstruction is not excluded.       XR ABDOMEN (KUB) (SINGLE AP VIEW)   Final Result      No acute radiographic abnormality of the abdomen. Status post right hip arthroplasty with mildly displaced greater trochanteric fracture fragment, new or more pronounced compared to the prior study. CTA CHEST ABDOMEN PELVIS W CONTRAST    (Results Pending)       Assessment/Plan     ARIANE   2. Sepsis  3. AGMA   4. Lactic Acidosis - resolved  4. Hyperkalemia  5. Hyponatremia  6. Bowel Obstruction  7. Hypertension  8. GI bleed  9.  Anemia      Plan:  - Cr stable  - fluid restriction as Na dropping   - encourage solute intake   - Mag repleted  - No need for diuresis/IVF  - ECHO normal EF - IVC compressible   - Hypoxia likely sec to Aspiration pneumonitis   - ARIANE sec to ATN   - Monitor I/Os  - Avoid nephrotoxic agents    Recommend to dose adjust all medications  based on renal functions  Maintain SBP> 90 mmHg   Daily weights   AVOID NSAIDs  Avoid Nephrotoxins  Monitor Intake/Output  Call if significant decrease in urine output        Mauricio Chilel MD

## 2022-08-13 NOTE — PROGRESS NOTES
At 0654 hrs, BP 85/45,  - noted by Rapid Team Dr. Jamie Romero - stopped the blood transfusion as ordered and continue to monitor the VS. To give IV  mls if <SBP 80. At 0713 hrs, received call fr Rapid team - noted that BP 71/44, IV  mls given as bolus. S/B Dr Wayne Florez at 0736 hrs, /61,  bpm. Will continue to monitor.

## 2022-08-14 ENCOUNTER — APPOINTMENT (OUTPATIENT)
Dept: CT IMAGING | Age: 72
DRG: 853 | End: 2022-08-14
Attending: INTERNAL MEDICINE
Payer: MEDICARE

## 2022-08-14 PROBLEM — I95.9 HYPOTENSION: Status: ACTIVE | Noted: 2022-08-13

## 2022-08-14 LAB
ABO/RH: NORMAL
ALBUMIN SERPL-MCNC: 1.7 G/DL (ref 3.4–5)
ANION GAP SERPL CALCULATED.3IONS-SCNC: 9 MMOL/L (ref 3–16)
ANTIBODY SCREEN: NORMAL
BANDED NEUTROPHILS RELATIVE PERCENT: 8 % (ref 0–7)
BASOPHILS ABSOLUTE: 0.1 K/UL (ref 0–0.2)
BASOPHILS RELATIVE PERCENT: 1 %
BLOOD BANK DISPENSE STATUS: NORMAL
BLOOD BANK DISPENSE STATUS: NORMAL
BLOOD BANK PRODUCT CODE: NORMAL
BLOOD BANK PRODUCT CODE: NORMAL
BPU ID: NORMAL
BPU ID: NORMAL
BUN BLDV-MCNC: 35 MG/DL (ref 7–20)
CALCIUM SERPL-MCNC: 7.1 MG/DL (ref 8.3–10.6)
CHLORIDE BLD-SCNC: 105 MMOL/L (ref 99–110)
CO2: 19 MMOL/L (ref 21–32)
CREAT SERPL-MCNC: 0.9 MG/DL (ref 0.6–1.2)
DESCRIPTION BLOOD BANK: NORMAL
DESCRIPTION BLOOD BANK: NORMAL
EOSINOPHILS ABSOLUTE: 0.1 K/UL (ref 0–0.6)
EOSINOPHILS RELATIVE PERCENT: 1 %
GFR AFRICAN AMERICAN: >60
GFR NON-AFRICAN AMERICAN: >60
GLUCOSE BLD-MCNC: 90 MG/DL (ref 70–99)
HCT VFR BLD CALC: 20.1 % (ref 36–48)
HCT VFR BLD CALC: 21 % (ref 36–48)
HCT VFR BLD CALC: 22.2 % (ref 36–48)
HEMOGLOBIN: 6.6 G/DL (ref 12–16)
HEMOGLOBIN: 7.2 G/DL (ref 12–16)
HEMOGLOBIN: 7.9 G/DL (ref 12–16)
LYMPHOCYTES ABSOLUTE: 0.5 K/UL (ref 1–5.1)
LYMPHOCYTES RELATIVE PERCENT: 9 %
MAGNESIUM: 1.8 MG/DL (ref 1.8–2.4)
MCH RBC QN AUTO: 31.4 PG (ref 26–34)
MCHC RBC AUTO-ENTMCNC: 34.3 G/DL (ref 31–36)
MCV RBC AUTO: 91.7 FL (ref 80–100)
MONOCYTES ABSOLUTE: 0.8 K/UL (ref 0–1.3)
MONOCYTES RELATIVE PERCENT: 14 %
NEUTROPHILS ABSOLUTE: 4.4 K/UL (ref 1.7–7.7)
NEUTROPHILS RELATIVE PERCENT: 67 %
PDW BLD-RTO: 15.1 % (ref 12.4–15.4)
PHOSPHORUS: 2.8 MG/DL (ref 2.5–4.9)
PLATELET # BLD: 336 K/UL (ref 135–450)
PMV BLD AUTO: 8.6 FL (ref 5–10.5)
POTASSIUM SERPL-SCNC: 4.2 MMOL/L (ref 3.5–5.1)
RBC # BLD: 2.29 M/UL (ref 4–5.2)
SODIUM BLD-SCNC: 133 MMOL/L (ref 136–145)
WBC # BLD: 5.9 K/UL (ref 4–11)

## 2022-08-14 PROCEDURE — 85025 COMPLETE CBC W/AUTO DIFF WBC: CPT

## 2022-08-14 PROCEDURE — 94640 AIRWAY INHALATION TREATMENT: CPT

## 2022-08-14 PROCEDURE — 2060000000 HC ICU INTERMEDIATE R&B

## 2022-08-14 PROCEDURE — 6360000002 HC RX W HCPCS

## 2022-08-14 PROCEDURE — 80069 RENAL FUNCTION PANEL: CPT

## 2022-08-14 PROCEDURE — 36430 TRANSFUSION BLD/BLD COMPNT: CPT

## 2022-08-14 PROCEDURE — 99232 SBSQ HOSP IP/OBS MODERATE 35: CPT | Performed by: SURGERY

## 2022-08-14 PROCEDURE — 6370000000 HC RX 637 (ALT 250 FOR IP): Performed by: INTERNAL MEDICINE

## 2022-08-14 PROCEDURE — 85014 HEMATOCRIT: CPT

## 2022-08-14 PROCEDURE — 87493 C DIFF AMPLIFIED PROBE: CPT

## 2022-08-14 PROCEDURE — 99233 SBSQ HOSP IP/OBS HIGH 50: CPT | Performed by: INTERNAL MEDICINE

## 2022-08-14 PROCEDURE — 6370000000 HC RX 637 (ALT 250 FOR IP): Performed by: SURGERY

## 2022-08-14 PROCEDURE — 2580000003 HC RX 258

## 2022-08-14 PROCEDURE — 99233 SBSQ HOSP IP/OBS HIGH 50: CPT | Performed by: NURSE PRACTITIONER

## 2022-08-14 PROCEDURE — 6370000000 HC RX 637 (ALT 250 FOR IP)

## 2022-08-14 PROCEDURE — 83735 ASSAY OF MAGNESIUM: CPT

## 2022-08-14 PROCEDURE — 2700000000 HC OXYGEN THERAPY PER DAY

## 2022-08-14 PROCEDURE — 85018 HEMOGLOBIN: CPT

## 2022-08-14 PROCEDURE — 6360000002 HC RX W HCPCS: Performed by: INTERNAL MEDICINE

## 2022-08-14 PROCEDURE — 2580000003 HC RX 258: Performed by: INTERNAL MEDICINE

## 2022-08-14 PROCEDURE — 94761 N-INVAS EAR/PLS OXIMETRY MLT: CPT

## 2022-08-14 PROCEDURE — 71250 CT THORAX DX C-: CPT

## 2022-08-14 RX ORDER — DIGOXIN 0.25 MG/ML
250 INJECTION INTRAMUSCULAR; INTRAVENOUS EVERY 6 HOURS
Status: COMPLETED | OUTPATIENT
Start: 2022-08-14 | End: 2022-08-14

## 2022-08-14 RX ORDER — MAGNESIUM SULFATE IN WATER 40 MG/ML
2000 INJECTION, SOLUTION INTRAVENOUS ONCE
Status: COMPLETED | OUTPATIENT
Start: 2022-08-14 | End: 2022-08-14

## 2022-08-14 RX ORDER — DIGOXIN 125 MCG
125 TABLET ORAL DAILY
Status: DISCONTINUED | OUTPATIENT
Start: 2022-08-15 | End: 2022-08-23 | Stop reason: HOSPADM

## 2022-08-14 RX ORDER — SODIUM CHLORIDE 9 MG/ML
INJECTION, SOLUTION INTRAVENOUS PRN
Status: DISCONTINUED | OUTPATIENT
Start: 2022-08-14 | End: 2022-08-16

## 2022-08-14 RX ADMIN — Medication 10 MG: at 20:52

## 2022-08-14 RX ADMIN — ALBUTEROL SULFATE 2.5 MG: 2.5 SOLUTION RESPIRATORY (INHALATION) at 22:01

## 2022-08-14 RX ADMIN — TRAMADOL HYDROCHLORIDE 50 MG: 50 TABLET, COATED ORAL at 16:33

## 2022-08-14 RX ADMIN — MORPHINE SULFATE 1 MG: 2 INJECTION, SOLUTION INTRAMUSCULAR; INTRAVENOUS at 02:18

## 2022-08-14 RX ADMIN — MAGNESIUM SULFATE HEPTAHYDRATE 2000 MG: 2 INJECTION, SOLUTION INTRAVENOUS at 12:07

## 2022-08-14 RX ADMIN — DIBASIC SODIUM PHOSPHATE, MONOBASIC POTASSIUM PHOSPHATE AND MONOBASIC SODIUM PHOSPHATE 2 TABLET: 852; 155; 130 TABLET ORAL at 12:02

## 2022-08-14 RX ADMIN — Medication 125 MG: at 13:23

## 2022-08-14 RX ADMIN — PANTOPRAZOLE SODIUM 40 MG: 40 TABLET, DELAYED RELEASE ORAL at 06:09

## 2022-08-14 RX ADMIN — SODIUM CHLORIDE 30 MG/ML INHALATION SOLUTION 4 ML: 30 SOLUTION INHALANT at 22:01

## 2022-08-14 RX ADMIN — SODIUM CHLORIDE, PRESERVATIVE FREE 10 ML: 5 INJECTION INTRAVENOUS at 08:14

## 2022-08-14 RX ADMIN — DIGOXIN 250 MCG: 0.25 INJECTION INTRAMUSCULAR; INTRAVENOUS at 14:32

## 2022-08-14 RX ADMIN — METHOCARBAMOL 500 MG: 500 TABLET ORAL at 16:33

## 2022-08-14 RX ADMIN — PANTOPRAZOLE SODIUM 40 MG: 40 TABLET, DELAYED RELEASE ORAL at 14:32

## 2022-08-14 RX ADMIN — SODIUM CHLORIDE, PRESERVATIVE FREE 10 ML: 5 INJECTION INTRAVENOUS at 20:56

## 2022-08-14 RX ADMIN — DIGOXIN 250 MCG: 0.25 INJECTION INTRAMUSCULAR; INTRAVENOUS at 19:45

## 2022-08-14 RX ADMIN — MICONAZOLE NITRATE: 2 POWDER TOPICAL at 20:55

## 2022-08-14 RX ADMIN — PREGABALIN 50 MG: 50 CAPSULE ORAL at 20:52

## 2022-08-14 RX ADMIN — Medication 125 MG: at 19:35

## 2022-08-14 RX ADMIN — MORPHINE SULFATE 2 MG: 2 INJECTION, SOLUTION INTRAMUSCULAR; INTRAVENOUS at 09:26

## 2022-08-14 RX ADMIN — PREGABALIN 50 MG: 50 CAPSULE ORAL at 09:26

## 2022-08-14 RX ADMIN — ALBUTEROL SULFATE 2.5 MG: 2.5 SOLUTION RESPIRATORY (INHALATION) at 09:46

## 2022-08-14 RX ADMIN — SODIUM CHLORIDE: 9 INJECTION, SOLUTION INTRAVENOUS at 09:31

## 2022-08-14 RX ADMIN — PREGABALIN 50 MG: 50 CAPSULE ORAL at 14:31

## 2022-08-14 ASSESSMENT — PAIN DESCRIPTION - DESCRIPTORS
DESCRIPTORS: ACHING
DESCRIPTORS: ACHING

## 2022-08-14 ASSESSMENT — PAIN SCALES - GENERAL
PAINLEVEL_OUTOF10: 8
PAINLEVEL_OUTOF10: 0
PAINLEVEL_OUTOF10: 5

## 2022-08-14 ASSESSMENT — PAIN DESCRIPTION - LOCATION
LOCATION: LEG
LOCATION: BACK;HIP;LEG
LOCATION: LEG

## 2022-08-14 ASSESSMENT — PAIN - FUNCTIONAL ASSESSMENT: PAIN_FUNCTIONAL_ASSESSMENT: PREVENTS OR INTERFERES SOME ACTIVE ACTIVITIES AND ADLS

## 2022-08-14 ASSESSMENT — PAIN DESCRIPTION - PAIN TYPE: TYPE: CHRONIC PAIN

## 2022-08-14 ASSESSMENT — PAIN DESCRIPTION - ORIENTATION
ORIENTATION: LOWER
ORIENTATION: RIGHT
ORIENTATION: RIGHT

## 2022-08-14 ASSESSMENT — PAIN DESCRIPTION - FREQUENCY: FREQUENCY: CONTINUOUS

## 2022-08-14 ASSESSMENT — PAIN DESCRIPTION - ONSET: ONSET: ON-GOING

## 2022-08-14 NOTE — PLAN OF CARE
Problem: Respiratory - Adult  Goal: Achieves optimal ventilation and oxygenation  Outcome: Progressing   Patient on Room Air and weaned off oxygen today

## 2022-08-14 NOTE — PROGRESS NOTES
Hospital Medicine Progress Note    PCP: Leandra Burton    Date of Admission: 7/30/2022    Chief Complaint:  Abdominal pain, transferred for ortho eval with Rt CASS site with fluid collection. History Of Present Illness:     74 y.o. female  - Hx of HTN, HLD, Gout, OA, recent Rt CASS, morbid obesity  - Presents with generalized weakness nausea vomiting and abdominal pain, she woke up with the symptoms night prior to presentation. Her last bowel movement was about 3 days prior. She was seen at [facility] (now [facility] where she underwent CT imaging of the abdomen and pelvis which showed:    IMPRESSION:   1. There is a large amount of stool cecum through the sigmoid colon. Transition point is rectosigmoid but there is no obvious mass in the rectosigmoid region. No bowel thickening or peribowel stranding in the rectosigmoid region. 2. Likely, multiple renal cysts. 3. Partially organized fluid without obvious rim enhancement anterior to the THR. This may simply represent postoperative seroma but clinical correlation needed. The entire inferior extent of this is not covered on this exam.   4. Urinary bladder is distended with fluid    Labs were significant for WBC 22.8k, with elevated ANC, hgb 10.1, na131, k 5.0 BUN of 19 cr 1.1, Alk phos 139. With noted fluid around [facility] she was transferred to [facility], [facility]. for orthopedic surgeon Dr. Milvia Dicksony evaluation. \"        Subjective:    Rapid response 8/13 AM. For hypotension: Was hypotensive to 67/41 during blood transfusion. , satting well on 4L NC. Patient awake and appropriately interactive, no acute distress, denies any symptoms other than episode of diarrhea before rapid called. Transfusion was stopped, gave 350cc bolus with prompt improvement in BP to 00'V systolic. Pt remained hemodynamically stable and denied any new symptoms during this rapid. \"    Had been on heparin gtt in the ICU per cards for Afib. Discontinued days ago due to concern for rectal bleed/hemorrhage    Had recurrent rectal bleed several days ago. EGD done for \"melena\". Has been having diarrhea, non bloody. C diff ordered. Not done. Empirically started on PO Vanco.     She is s/p recent Right hip surgery  Right leg numbness and pain few days ago after working with PT: MRI done to eval for nerve impingemen as she has hx of chronic back paint: shows illio-psoas hematoma. Notified Ortho. Imaging showed enlarging right hemtoma iliopsoas . She is s/p right hip surgery, and had just worked with PT/OT. RVR on telemetry: HR up to 140s sometimes. Remains more awake     Still diarrhea. Rectal tube placed. C diff indeterminate. Abdominal pain improved         Medications : Reviewed      Allergies:  Buspirone, Duloxetine, Fenofibrate, Gabapentin, Venlafaxine, Doxycycline, Statins, and Sulfa antibiotics      REVIEW OF SYSTEMS:   Pertinent positives as noted in the HPI. All other systems reviewed and negative. PHYSICAL EXAM PERFORMED:    /65   Pulse (!) 108   Temp 98.3 °F (36.8 °C) (Oral)   Resp 18   Ht 5' 2.01\" (1.575 m)   Wt 234 lb 10.2 oz (106.4 kg)   SpO2 94%   BMI 42.90 kg/m²     General appearance: Awake,alert  HEENT:  Normal cephalic, atraumatic without obvious deformity. Neck: Supple, with full range of motion. No jugular venous distention. Respiratory:  Normal respiratory effort. Clear to auscultation, bilaterally without Rales/Wheezes/Rhonchi. Cardiovascular:  Regular rate and rhythm with normal S1/S2 without murmurs, rubs or gallops. Abdomen: Less distended, less firm, less tender. BS heard  Musculoskeletal: Right hip swelling but no erythema or warmth. Incision dressing C/D/I  Skin: Skin color, texture, turgor normal.  No rashes or lesions.   Neurologic:  grossly non-focal.  Capillary Refill: Brisk,< 3 seconds   Peripheral Pulses: +2 palpable, equal bilaterally       Labs:     Recent Labs     08/12/22  0630 08/12/22  1216 08/13/22  0530 08/13/22  1102 08/13/22  1430 08/14/22  0105 08/14/22  0456   WBC 10.1  --  15.0*  --   --   --  5.9   HGB 7.4*   < > 6.9*   < > 7.1* 6.6* 7.2*   HCT 22.1*   < > 20.8*   < > 20.6* 20.1* 21.0*     --  363  --   --   --  336    < > = values in this interval not displayed. Recent Labs     08/12/22  0630 08/13/22  0530 08/13/22  1430 08/14/22  0456   * 129* 129* 133*   K 3.9 4.4 4.2 4.2    98* 98* 105   CO2 21 20* 21 19*   BUN 45* 44* 43* 35*   CREATININE 1.1 1.3* 1.2 0.9   CALCIUM 7.7* 7.6* 7.1* 7.1*   PHOS 3.8 2.9  --  2.8       No results for input(s): AST, ALT, BILIDIR, BILITOT, ALKPHOS in the last 72 hours. No results for input(s): INR in the last 72 hours. No results for input(s): [place] in the last 72 hours. Urinalysis:      Lab Results   Component Value Date/Time    NITRU POSITIVE 07/31/2022 05:15 AM    WBCUA 3-5 07/31/2022 05:15 AM    BACTERIA 1+ 07/31/2022 05:15 AM    RBCUA None seen 07/31/2022 05:15 AM    BLOODU Negative 07/31/2022 05:15 AM    SPECGRAV 1.015 07/31/2022 05:15 AM    GLUCOSEU Negative 07/31/2022 05:15 AM       Radiology: Reviewed          ASSESSMENT/PLAN:    Severe sepsis: POA   - Leukocytosis with tachycardia; on admission; - Possibly from GI source with severe constipation, fecal impaction  -  Transferred to [facility] for evaluation of post-op hip fluid collection/possible infection (s/p THR 7/13): felt by ortho to be likely seroma,  normal post operative finding .  - Started on admission on broad spectrum abx. - Blood cultures x 2 NGTD. ID consulted: - Not currently on Abx   - Sepsis appears resolved  - Monitor off antibx, hopefully should continue to improve as bowels appear functioning now  - C diff testing ordered: not done. Empirically started on txt      Parox Atrial Fibrillation   Afib with RVR  Echo normal EF, normal diastolic  Continue AVN agents as tolerated.    Was placed on Heparin drip in the ICU:  held with drop in hgb, and blood loss anemia   Cardiology following  Transitioned to oral meds: Soft Bps preclude metop. Will use digoxin. (Renal fxn ok)  - Keep Mg> 2; K 4 or above      Acute  hypoxemic respiratory failure  Possible fluid overload from recent surgery, and while NPO with intra-abdominal issue this admit  - Was Briefly on IV lasix/lasix drip but was stopped. O2 requirement had improved  - Dopplers:  no DVT  - Resp failure resolving/resolved  - Encourage incentive spirometry      Fluid overload  - Patient appears developed volume overload with worse respiratory status and AFRVR while hospitalized. - Echocardiogram was overall normal with normal filling pressures and was done during sinus   - Prob volume overload related to fluid mgt for bowel issues, low Albumin and Hb low   - Acute heart failure ruled out  - Was treated with IV diuretics. Now held  - DC continuous fluids for now. Use PRN boluses  - Dly weights, I/O monitoring      Acute on Chronic Anemia  Hypotension  - Hypotension possibly sec to diarrhea but concenred for hemorrhage with drop in hgb as well. - Hg 11.2 on admission  - Dropped to 6 range; - Blood transfusion 8/6; dropped again to 7 8/10/2022; rectal bleed noted: Heparin gtt stopped. Hgb dropped again to 6 range. - MRI lumber showed iliopsoas hematoma:   - With dropping hgb again, re-imaged to verify if hematoma right ileo-psoas is expanding . Report reviewed. -  PRBC PRN  - Monitor Hgb  - Was on bowel meds for ileus. Stopped. With diarrhea and bump in WBCs, Stool for C diff ordered. Not done.  Started on empiric txt      ARIANE   Mild hyponatremia  Likely sec to Hypoperfusion  Nephrology consulted  -Monitor Cr: ARIANE resolved      Bowel obstruction  Secondary to severe constipation  - CT of [facility] system showed significant stool from cecum to the sigmoid colon and transition point at the rectosigmoid.   - GI, Surgery consulted, appreciate recs  - Possible severe constipation with fecal impaction possibly sec to narcotics: she is chronically on narcotics for chronic bck pain (tramadol and norco; and also in setting of recent hip surgery)  - Miralax and enema unsuccessful  - Underwent Marshall decompression 8/3 with improvement but not resolution. Was treated with SMOG enemas BID, Relistor, disimpaction. Now on Movantic and miralax. Appears bowel functioning now, with diarrhea noted: Rectal tube placed. C diff indeterminate. PCR ordered. - Started empirically on PO vanco pending w/u. - Repeat imaging reviewed  - Tolerating diet      Recent right hip surgery: POA  Hematoma right iliopsoas muscle: prob new, sec to recent surgery and anticoagulation for Afib   - She is s/p recent Right hip surgery  - Developed Right leg numbness and pain few days ago after working with PT: MRI done to eval for nerve impingement as she has hx of chronic back paint: shows illio-psoas hematoma. Notified Ortho. Will keep off hep gtt. - Dopplers done for LE edema: No DVT  - Ortho following      Acute Urinary retention  - Appears recently had E coli in urine, although CC was less than 100k. Was treated with cipro. Also appears on Keflex PTA.   - Urine on admission has some bacteria, and nitrite positive, but neg leuc esterase and no signif pyuria  - Iqbal catheter placed at [facility] for retention likely sec to severe constipation  - Iqbal removed 6/12: Bladder scans PRN      Chronic back pain, narcotic dependent: POA  - She is chronically on narcotics for chronic bck pain (tramadol and norco; and also in setting of recent hip surgery)  - Acurte pain, likely radicular from iliopsoas hematoma  - Continue Lyrica      Essential Hypertension- Monitor Bps      Morbid obesity Body mass index is 41.01 kg/m². - Complicating assessment and treatment. Placing patient at risk for multiple co-morbidities as well as early death and contributing to the patient's presentation.  on weight loss when appropriate.        DVT Prophylaxis: SCD for now. Resume chem prophylaxis if bleed ruled out/hgb stabel/ok with ortho. Diet: ADULT DIET; Dysphagia - Soft and Bite Sized; 1200 ml  Code Status: Full Code      Disposition: - [facility] ICU[facility]: Will likely need SNF at discharge. Hopefully ready for DC in 2-3 days    Discussed with pxt and daughter at bedside.         Semaj Jennings MD

## 2022-08-14 NOTE — PROGRESS NOTES
ICU Progress Note    Admit Date: 7/30/2022  ICU Day: NA  Vent Day: None  IV Access:Peripheral  IV Fluids:None  Vasopressors:None                Antibiotics: None  Diet: ADULT DIET; Dysphagia - Soft and Bite Sized; 1500 ml    CC: SOB    Interval history:     Hypotension resolved after 1.5 L of fluid with maintenance of 100 mL an hour. Creatinine is down to 0.9 from 1.2 after volume resuscitation. SBP has been running mainly in the 120s to 140s. She is still having diarrhea and now has a rectal tube. C. difficile assay is indeterminate. Patient does not feel much different today. She is on room air with an O2 sat of 94% and no complaints of dyspnea. Work of breathing is normal.  Leukocytosis has resolved -WBC was 15 yesterday and now is 5.9. Hemoglobin stable at 7.2.   CT chest/abdomen shows no significant abnormality    Medications:     Scheduled Meds:   magnesium sulfate  2,000 mg IntraVENous Once    vancomycin  125 mg Oral 4 times per day    digoxin  250 mcg IntraVENous Q6H    [START ON 8/15/2022] digoxin  125 mcg Oral Daily    sodium chloride  500 mL IntraVENous Once    [Held by provider] metoprolol tartrate  12.5 mg Oral BID    pantoprazole  40 mg Oral BID AC    miconazole   Topical BID    melatonin  10 mg Oral Nightly    albuterol  2.5 mg Nebulization BID    lidocaine  1 patch TransDERmal Daily    sodium chloride (Inhalant)  4 mL Nebulization BID    lidocaine   Topical Once    sodium chloride flush  5-40 mL IntraVENous 2 times per day    fluticasone  2 spray Each Nostril Daily    pregabalin  50 mg Oral TID     Continuous Infusions:   sodium chloride      sodium chloride      sodium chloride      [Held by provider] sodium chloride 100 mL/hr at 08/14/22 0931    sodium chloride      sodium chloride      sodium chloride      sodium chloride      dextrose      sodium chloride 5 mL/hr at 08/12/22 1154     PRN Meds:sodium chloride, sodium chloride, sodium chloride, traMADol, sodium chloride, sodium chloride, [Held by provider] heparin (porcine), sodium chloride, lidocaine, sodium chloride, sodium chloride, prochlorperazine, glucose, dextrose bolus **OR** dextrose bolus, glucagon (rDNA), dextrose, albuterol, phenol, morphine **OR** morphine, sodium chloride flush, sodium chloride, ondansetron **OR** ondansetron, methocarbamol    Objective:   Vitals:   T-max:  Patient Vitals for the past 8 hrs:   BP Temp Temp src Pulse Resp SpO2   08/14/22 1159 102/65 98.3 °F (36.8 °C) Oral (!) 108 -- 94 %   08/14/22 0948 -- -- -- (!) 108 18 96 %   08/14/22 0836 (!) 142/81 98.2 °F (36.8 °C) Oral (!) 109 18 96 %   08/14/22 0636 138/80 98 °F (36.7 °C) Oral 97 16 97 %         Intake/Output Summary (Last 24 hours) at 8/14/2022 1353  Last data filed at 8/13/2022 2015  Gross per 24 hour   Intake 879.9 ml   Output 800 ml   Net 79.9 ml         Physical Exam  Constitutional:       General: She is not in acute distress. Appearance: She is obese. She is ill-appearing. HENT:      Head: Normocephalic and atraumatic. Nose: Nose normal.      Mouth/Throat:      Mouth: Mucous membranes are dry. Pharynx: Oropharynx is clear. Eyes:      Extraocular Movements: Extraocular movements intact. Conjunctiva/sclera: Conjunctivae normal.      Pupils: Pupils are equal, round, and reactive to light. Neck:      Comments: Central line in place  Cardiovascular:      Rate and Rhythm: Normal rate and regular rhythm. Pulses: Normal pulses. Heart sounds: Normal heart sounds. Pulmonary:      Effort: Pulmonary effort is normal. No respiratory distress. Breath sounds: No wheezing or rhonchi. Comments: Diminished breath sounds at bases bilaterally   Abdominal:      General: Abdomen is flat. Bowel sounds are normal. There is distension. Tenderness: There is no abdominal tenderness. There is no guarding or rebound. Musculoskeletal:      Cervical back: Normal range of motion and neck supple. Right lower leg: Edema present. Left lower leg: Edema present. Comments: +Sacral edema. R Hip CASS site dressing present   Skin:     General: Skin is warm and dry. Capillary Refill: Capillary refill takes less than 2 seconds. Neurological:      General: No focal deficit present. Mental Status: She is alert and oriented to person, place, and time. Mental status is at baseline. Cranial Nerves: No cranial nerve deficit. Sensory: No sensory deficit. Motor: No weakness. LABS:    CBC:   Recent Labs     08/12/22  0630 08/12/22  1216 08/13/22  0530 08/13/22  1102 08/13/22  1430 08/14/22  0105 08/14/22  0456   WBC 10.1  --  15.0*  --   --   --  5.9   HGB 7.4*   < > 6.9*   < > 7.1* 6.6* 7.2*   HCT 22.1*   < > 20.8*   < > 20.6* 20.1* 21.0*     --  363  --   --   --  336   MCV 92.5  --  91.4  --   --   --  91.7    < > = values in this interval not displayed. Renal:    Recent Labs     08/12/22  0630 08/13/22  0530 08/13/22  1430 08/14/22  0456   * 129* 129* 133*   K 3.9 4.4 4.2 4.2    98* 98* 105   CO2 21 20* 21 19*   BUN 45* 44* 43* 35*   CREATININE 1.1 1.3* 1.2 0.9   GLUCOSE 58* 119* 122* 90   CALCIUM 7.7* 7.6* 7.1* 7.1*   MG 1.60* 2.40 1.90 1.80   PHOS 3.8 2.9  --  2.8   ANIONGAP 11 11 10 9       Hepatic:   Recent Labs     08/12/22  0630 08/13/22  0530 08/14/22  0456   LABALBU 1.9* 2.0* 1.7*       Troponin: No results for input(s): TROPONINI in the last 72 hours. BNP: No results for input(s): BNP in the last 72 hours. Lipids: No results for input(s): CHOL, HDL in the last 72 hours. Invalid input(s): LDLCALCU, TRIGLYCERIDE  ABGs:    No results for input(s): PHART, XZE0VPN, PO2ART, IGG4MOZ, BEART, THGBART, E9UPJTQE, FEY2DIN in the last 72 hours. INR:   No results for input(s): INR in the last 72 hours. Lactate: No results for input(s): LACTATE in the last 72 hours.     Cultures:  -----------------------------------------------------------------  RAD:   CT CHEST ABDOMEN PELVIS WO CONTRAST   Final Result      CHEST:      1. No evidence for hematoma   2. Decreased small pleural effusions and bibasilar atelectasis compared to 8/2/2022      ABDOMEN/PELVIS:      1. Right iliopsoas muscle small to medium-sized intramuscular acute hematoma . An accurate characterization of size of hematoma is not possible due to its infiltrative nature. 2. Right proximal thigh subcutaneous 3.8 x 3.7 cm water attenuation fluid collection   3. Nonspecific, nonobstructive bowel gas pattern      XR CHEST PORTABLE   Final Result      Limited inspiration. Accentuation of pulmonary interstitial markings, with minor basilar atelectasis. No other acute findings. MRI LUMBAR SPINE WO CONTRAST   Final Result   1. Moderate size intramuscular hematoma along the right iliopsoas muscle. This is new in comparison to abdominal CT 8/1/2022.   2. No central canal stenosis. 3. Moderate multilevel foraminal narrowing as described. 4. No acute osseous abnormality. XR HIP 2-3 VW W PELVIS RIGHT   Final Result   Impression:    1. Right total hip arthroplasty. The orthopedic hardware is intact without evidence of loosening. No significant change from prior CT examination. VL Extremity Venous Bilateral   Final Result      XR CHEST PORTABLE   Final Result      Mild bilateral airspace disease. XR ABDOMEN (KUB) (SINGLE AP VIEW)   Final Result   1. No evidence of free intraperitoneal air. 2. Indeterminate bowel gas pattern due to a paucity of small bowel gas. XR ABDOMEN (KUB) (SINGLE AP VIEW)   Final Result   Impression: Stable appearance of the abdomen. XR CHEST PORTABLE   Final Result      Low lung volumes with central bronchovascular crowding. Atelectasis in the left lung base. Normal cardiomediastinal silhouette. Lines and tubes in standard position.       VL Extremity Venous Bilateral   Final Result      XR ABDOMEN (KUB) (SINGLE AP VIEW)   Final Result   Impression: Stable appearance of the abdomen with gas distended loops of central small bowel again noted. CT CHEST WO CONTRAST   Final Result      1. Moderate right pleural effusion and small left effusion with basilar airspace opacity most consistent with atelectasis. Superimposed pneumonia be difficult to exclude. 2. Narrowing of the trachea with expiration compatible with treated bronchial malacia. 3. Nasogastric tube tip in the lower esophagus. 4. Persistent colonic distention. 5. Mild coronary artery calcification. XR ABDOMEN (KUB) (SINGLE AP VIEW)   Final Result      Frontal supine views demonstrate an unremarkable bowel gas pattern. No significant colonic stool is seen. Right hip arthroplasty noted. XR CHEST PORTABLE   Final Result      Prominence of the perihilar interstitial markings with mild peribronchial cuffing is favored to represent mild pulmonary edema. Low lung volumes bronchovascular crowding. Stable cardiomediastinal silhouette. Lines and tubes in standard position. Cholecystectomy clips are noted. CT ABDOMEN PELVIS WO CONTRAST Additional Contrast? Oral and Rectal   Final Result   1. Moderate colonic distention. Correlate for constipation. Fecal content within the ileum which may suggest stasis. There is no small bowel obstruction. 2. Subcutaneous fluid collection overlying the right total hip arthroplasty. CT HIP RIGHT WO CONTRAST   Final Result   1. Moderate colonic distention. Correlate for constipation. Fecal content within the ileum which may suggest stasis. There is no small bowel obstruction. 2. Subcutaneous fluid collection overlying the right total hip arthroplasty. XR CHEST PORTABLE   Final Result      1. Right IJ CVC tip in the cavoatrial junction. 2.  Bibasilar airspace disease suggesting atelectasis. Pneumonia is not excluded.        XR ABDOMEN (KUB) (SINGLE AP VIEW)   Final Result      Dilated colonic and small bowel loops may suggest ileus but obstruction is not excluded. XR ABDOMEN (KUB) (SINGLE AP VIEW)   Final Result      No acute radiographic abnormality of the abdomen. Status post right hip arthroplasty with mildly displaced greater trochanteric fracture fragment, new or more pronounced compared to the prior study. Assessment/Plan  Hypotension -resolved. Likely due to dehydration from diarrhea. Hypoxemic Resp Failure -resolved  Bilateral leg edema   Paroxysmal A.fib -on heparin  Diarrhea -I still do have concern for C. Difficile. Will send PCR since assay indeterminate  ARIANE: Improved with creatinine 0.9 down from high of 3.5.   Nephrology following and patient off diuretics  Leukocytosis -resolved  Hyponatremia -improving at Leighton Owens MD

## 2022-08-14 NOTE — PLAN OF CARE
Problem: Discharge Planning  Goal: Discharge to home or other facility with appropriate resources  Outcome: Progressing     Problem: Safety - Adult  Goal: Free from fall injury  Outcome: Progressing     Problem: ABCDS Injury Assessment  Goal: Absence of physical injury  Outcome: Progressing     Problem: Skin/Tissue Integrity  Goal: Absence of new skin breakdown  Description: 1. Monitor for areas of redness and/or skin breakdown  2. Assess vascular access sites hourly  3. Every 4-6 hours minimum:  Change oxygen saturation probe site  4. Every 4-6 hours:  If on nasal continuous positive airway pressure, respiratory therapy assess nares and determine need for appliance change or resting period.   Outcome: Progressing     Problem: Respiratory - Adult  Goal: Achieves optimal ventilation and oxygenation  Outcome: Progressing     Problem: Skin/Tissue Integrity - Adult  Goal: Incisions, wounds, or drain sites healing without S/S of infection  Outcome: Progressing  Goal: Oral mucous membranes remain intact  Outcome: Progressing     Problem: Musculoskeletal - Adult  Goal: Return mobility to safest level of function  Outcome: Progressing  Goal: Maintain proper alignment of affected body part  Outcome: Progressing  Goal: Return ADL status to a safe level of function  Outcome: Progressing     Problem: Gastrointestinal - Adult  Goal: Minimal or absence of nausea and vomiting  Outcome: Progressing  Goal: Maintains adequate nutritional intake  Outcome: Progressing  Goal: Maintains or returns to baseline bowel function  Outcome: Progressing     Problem: Genitourinary - Adult  Goal: Absence of urinary retention  Outcome: Progressing  Goal: Urinary catheter remains patent  Outcome: Progressing     Problem: Infection - Adult  Goal: Absence of infection at discharge  Outcome: Progressing  Goal: Absence of infection during hospitalization  Outcome: Progressing     Problem: Metabolic/Fluid and Electrolytes - Adult  Goal: Electrolytes maintained within normal limits  Outcome: Progressing  Goal: Hemodynamic stability and optimal renal function maintained  Outcome: Progressing     Problem: Pain  Goal: Verbalizes/displays adequate comfort level or baseline comfort level  Outcome: Progressing     Problem: Cardiovascular - Adult  Goal: Maintains optimal cardiac output and hemodynamic stability  Outcome: Progressing  Goal: Absence of cardiac dysrhythmias or at baseline  Outcome: Progressing     Problem: Nutrition Deficit:  Goal: Optimize nutritional status  Outcome: Progressing

## 2022-08-14 NOTE — PROGRESS NOTES
Pt was educated on CT with Contrast, she refused to get the CT and was educated on how we could possibly not be able to visualize the bleed she has which could misdiagnose and cause detrimental affects including death.  Pt still decided that she was unable to tolerate this IV contrast.  Pt agreed to the CT however she still chose to opt out on the IV contrast.

## 2022-08-14 NOTE — CONSENT
Informed Consent for Blood Component Transfusion Note    I have discussed with the patient the rationale for blood component transfusion; its benefits in treating or preventing fatigue, organ damage, or death; and its risk which includes mild transfusion reactions, rare risk of blood borne infection, or more serious but rare reactions. I have discussed the alternatives to transfusion, including the risk and consequences of not receiving transfusion. The patient had an opportunity to ask questions and had agreed to proceed with transfusion of blood components.     Electronically signed by Alexandra Jerome MD on 8/14/22 at 1:01 PM EDT

## 2022-08-14 NOTE — PROGRESS NOTES
Electrophysiology - PROGRESS NOTE    Admit Date: 7/30/2022     Chief Complaint: AF (New onset)    Interval History:   Patient seen and examined and notes reviewed. Patient is being followed for paroxysmal AF. Patient had presented to the hospital with abdominal pain and was found to have a bowel obstruction/ileus along with sepsis. She had gone into atrial fibrillation with RVR and was placed on a diltiazem drip and converted back to normal sinus rhythm. She had a colonoscopy with a decompression on 8/3/2022 and was found to be back in atrial fibrillation afterwards. She was placed on amiodarone drip and converted to normal sinus rhythm. Heparin was turned off overnight 8/9 due to a slightly bloody nose and stool with red tinge. H&H trending down. MRI of the spine 8/11/22 showed a mod sized intramuscular hematoma, new when compare with previous CT on 8/1/. Developed AF 8/11. Changed to metoprolol 12.5 mg BID however now on hold d/t hypotension. Ongoing diarrhea, now w/ a rectal tube and in isolation for c.dif. Hypotensive overnight into 8/13. RR called 8/12 am for hypotension during blood transfusion this am. Transfusion d/c'd. 350 cc IV bolus given with improvement of BP. Patient continued to be hypotensive yesterday and was seen by Dr. Taina Nagy. Given IV bolus with 1 u PRBC. BP improved after fluids and blood. Remains edematous with arms and legs weeping.  Remains in AF w/ a HR ~ 100 bpm. Patient thinks she may have had AF prior as she has felt heart racing and palpitations in the past.     In: 999.9 [P.O.:120]  Out: 800    Wt Readings from Last 2 Encounters:   08/14/22 234 lb 10.2 oz (106.4 kg)   07/13/22 218 lb 9.6 oz (99.2 kg)       Data:   Scheduled Meds:   Scheduled Meds:   sodium chloride  500 mL IntraVENous Once    [Held by provider] metoprolol tartrate  12.5 mg Oral BID    pantoprazole  40 mg Oral BID AC    miconazole   Topical BID    melatonin  10 mg Oral Nightly    albuterol  2.5 mg Nebulization BID    lidocaine  1 patch TransDERmal Daily    sodium chloride (Inhalant)  4 mL Nebulization BID    lidocaine   Topical Once    sodium chloride flush  5-40 mL IntraVENous 2 times per day    fluticasone  2 spray Each Nostril Daily    pregabalin  50 mg Oral TID     Continuous Infusions:   sodium chloride      sodium chloride      sodium chloride      sodium chloride 100 mL/hr at 08/13/22 2132    sodium chloride      sodium chloride      sodium chloride      sodium chloride      dextrose      sodium chloride 5 mL/hr at 08/12/22 1154     PRN Meds:.sodium chloride, sodium chloride, sodium chloride, traMADol, sodium chloride, sodium chloride, [Held by provider] heparin (porcine), sodium chloride, lidocaine, sodium chloride, sodium chloride, prochlorperazine, glucose, dextrose bolus **OR** dextrose bolus, glucagon (rDNA), dextrose, albuterol, phenol, morphine **OR** morphine, sodium chloride flush, sodium chloride, ondansetron **OR** ondansetron, methocarbamol  Continuous Infusions:   sodium chloride      sodium chloride      sodium chloride      sodium chloride 100 mL/hr at 08/13/22 2132    sodium chloride      sodium chloride      sodium chloride      sodium chloride      dextrose      sodium chloride 5 mL/hr at 08/12/22 1154       Intake/Output Summary (Last 24 hours) at 8/14/2022 0816  Last data filed at 8/13/2022 2015  Gross per 24 hour   Intake 999.9 ml   Output 800 ml   Net 199.9 ml       CBC:   Lab Results   Component Value Date/Time    WBC 5.9 08/14/2022 04:56 AM    HGB 7.2 08/14/2022 04:56 AM     08/14/2022 04:56 AM     BMP:  Lab Results   Component Value Date/Time     08/14/2022 04:56 AM    K 4.2 08/14/2022 04:56 AM    K 4.2 08/13/2022 02:30 PM     08/14/2022 04:56 AM    CO2 19 08/14/2022 04:56 AM    BUN 35 08/14/2022 04:56 AM    CREATININE 0.9 08/14/2022 04:56 AM    GLUCOSE 90 08/14/2022 04:56 AM     INR:   Lab Results   Component Value Date/Time    INR 1.15 08/04/2022 06:55 PM    INR 1.18 08/02/2022 06:30 PM    INR 1.27 08/01/2022 02:24 AM        CARDIAC LABS  ENZYMES:No results for input(s): CKMB, CKMBINDEX, TROPONINI in the last 72 hours. Invalid input(s): CKTOTAL;3  FASTING LIPID PANEL:No results found for: HDL, LDLDIRECT, LDLCALC, TRIG, TSH  LIVER PROFILE:  Lab Results   Component Value Date/Time    AST 33 08/01/2022 05:41 AM    AST 51 08/01/2022 12:23 AM    ALT 27 08/01/2022 05:41 AM    ALT 39 08/01/2022 12:23 AM       -----------------------------------------------------------------  Telemetry: Personally reviewed AF/AFL - HR controlled, ~100    Objective:   Vitals: /80   Pulse 97   Temp 98 °F (36.7 °C) (Oral)   Resp 16   Ht 5' 2.01\" (1.575 m)   Wt 234 lb 10.2 oz (106.4 kg)   SpO2 97%   BMI 42.90 kg/m²   General appearance: alert, appears stated age and cooperative, No acute distress   Eyes: Conjunctiva and pupils normal and reactive  Skin: Skin color, texture, turgor normal. No rashes or ecchymosis. Neck: no JVD, supple, symmetrical, trachea midline   Lungs: , no accessory muscle use, no respiratory distress  Heart: irreg, not tachy  Abdomen: soft, non-tender; bowel sounds normal  Extremities: generalized BLE/OPHELIA edema, DP +, skin weeping  Psychiatric: normal insight and affect    Patient Active Problem List:     Primary osteoarthritis of right hip     Osteoarthritis of right hip     Osteoarthritis of right hip joint due to dysplasia     Status post left hip replacement     Abdominal pain     ARIANE (acute kidney injury) ([facility] [address]     Electrolyte imbalance     Lactic acid acidosis     Leukocytosis     Ileus (HCC)     Status post total hip replacement, right     Atrial fibrillation with RVR (HCC)     On continuous heparin infusion     Benign essential HTN     Status post right hip replacement     Acute hypoxemic respiratory failure (HCC)        Assessment & Plan:      1. AF/RVR  2. Ileus  3. HTN  4. ST  5.  Edema    73 y/o woman with a h/o HTN, HLD, TB, morbid obesity, recent total hip arthroplasty who p/w abd pain and vomiting, CT abd showed mod colonic distention, CT hip showed fluid overlying R hip, developed a new onset AF/RVR, noted to be fluid overloaded, echo EF 65%, placed on dilt gtt, converted to NSR 8/3/22, s/p colonoscopy and decompression (8/3/22, developed recurrent AF/RVR, placed on amio gtt, converted to NSR, now off amio, on IV BB Q 6.   [id number] 3. TSH 5.32 (8/2/22). pAF  - Back in AF/AFL since 8/11 - HR controlled ~100  - Heparin on hold d/t blood in nares and stool, increasing hip hematoma, H&H 7.2/21 after 1 unit PRBC  - Metoprolol 12.5 mg BID on hold for hypotension - if BP continues to be stable would consider adding back in  - Reviewed risk factors, pathophysiology, treatment options and lifestyle modification for atrial fibrillation: Blood pressure control, blood sugar control, healthy diet, minimal alcohol intake, no smoking, activity and exercise, manage stress sleep apnea evaluation and symptoms of a stroke. - Keep K+ > 4.0 and Mg > 2.0- replacement ordered  - Reviewed recent labs  - Echo - EF > 65%, LA 3.7/49.7  - 2 week CAM on d/c    HTN  - Hypotensive - BB on hold  - On no meds    Margo Alanis CNP  [address]    I spent a total of 35 minutes in care of the patient and greater than 50% of the time was spent counseling with Valencia Gann and coordinating care regarding their diagnosis, treatments and plan of care.

## 2022-08-14 NOTE — PROGRESS NOTES
General Surgery   Daily Progress Note  Patient: Greer Turner      CC: Abdominal pain with bowel obstruction    SUBJECTIVE:   Tachycardia resolved. Currently on 3 L NC. Rectal tube in place due to diarrhea. Patient refused oral contrast yesterday. ROS:   A 14 point review of systems was conducted, significant findings as noted above. All other systems negative. OBJECTIVE:    PHYSICAL EXAM:    Vitals:    08/14/22 0302 08/14/22 0327 08/14/22 0533 08/14/22 0636   BP: 124/68 129/86  138/80   Pulse: (!) 112 (!) 111  97   Resp: 16 16  16   Temp: 98 °F (36.7 °C) 97.6 °F (36.4 °C)  98 °F (36.7 °C)   TempSrc:  Oral  Oral   SpO2: 98% 100%  97%   Weight:   234 lb 10.2 oz (106.4 kg)    Height:           General appearance: Resting in bed, in NAD   Neuro: A&Ox3  HEENT: Trachea midline. RIJ CVC in place. Chest/Lungs: normal effort with no accessory muscle use on 3 L NC  Cardiovascular: regular rhythm   Abdomen: Obese, non-tender, no rebound, guarding, or rigidity. Skin: warm and dry, no rashes  Extremities: bilateral LE edema, no cyanosis  Genitourinary: Grossly normal. Rectal tube in place. Iqbal with clear urine      LABS:   Recent Labs     08/13/22  0530 08/13/22  1102 08/14/22  0105 08/14/22  0456   WBC 15.0*  --   --  5.9   HGB 6.9*   < > 6.6* 7.2*   HCT 20.8*   < > 20.1* 21.0*   MCV 91.4  --   --  91.7     --   --  336    < > = values in this interval not displayed. Recent Labs     08/13/22  0530 08/13/22  1430 08/14/22  0456   * 129* 133*   K 4.4 4.2 4.2   CL 98* 98* 105   CO2 20* 21 19*   PHOS 2.9  --  2.8   BUN 44* 43* 35*   CREATININE 1.3* 1.2 0.9        No results for input(s): AST, ALT, ALB, BILIDIR, BILITOT, ALKPHOS in the last 72 hours. No results for input(s): LIPASE, AMYLASE in the last 72 hours. No results for input(s): PROT, INR, APTT in the last 72 hours. No results for input(s): CKTOTAL, CKMB, CKMBINDEX, TROPONINI in the last 72 hours.       ASSESSMENT & PLAN:   This is a 74 y.o. female with Hx of HTN, HLD, Tuberculosis (1981), gout, and OA s/p right CASS on 7/13/22 who was transferred to [facility] on 7/30 due to fluid associated with her right CASS on 7/13 and for abdominal pain. - Patient partially responded to blood transfusion 6.6 to 7.2  - CT abd/pelvis with IV contrast showed no evidence of pneumoperitoneum or hemoperitnonem  - Patient has stool without blood  - Continue to hold Reglan and glycolax due to diarrhea  - Iqbal was re-inserted for I/Os  - MRI shows R iliopsoas hematoma; may have been contributing to her anemia. Will need to reach out orthopedic surgery  - Continue dysphagia diet per SLP  - Heparin drip per cardiology for anticoagulation for Afib but is currently held due to GI bleed and hip hematoma  - GI did EGD; clean base ulcer, will repeat EGD in 8 weeks after PPI BID  - Continue PT/OT  - Dispo: will need SNF at DC due to deconditioning. OT 8/24, PT 9/24  Lizet Dee DO, [facility]  PGY1, General Surgery  08/14/22  8:08 AM    I am post-call today and will not be on campus. Please contact Dr. Spencer Oswald at [phone] or Dr. Eben Rodriguez at [phone] for questions or concerns regarding this patient. I have seen, examined, and reviewed the patients chart. I agree with the residents assessment and have made appropriate changes.     Justo Trammell

## 2022-08-15 LAB
ALBUMIN SERPL-MCNC: 1.8 G/DL (ref 3.4–5)
ANION GAP SERPL CALCULATED.3IONS-SCNC: 11 MMOL/L (ref 3–16)
ANION GAP SERPL CALCULATED.3IONS-SCNC: 9 MMOL/L (ref 3–16)
ANISOCYTOSIS: ABNORMAL
BASOPHILS ABSOLUTE: 0.1 K/UL (ref 0–0.2)
BASOPHILS RELATIVE PERCENT: 2 %
BUN BLDV-MCNC: 25 MG/DL (ref 7–20)
BUN BLDV-MCNC: 27 MG/DL (ref 7–20)
C. DIFFICILE TOXIN MOLECULAR: ABNORMAL
CALCIUM SERPL-MCNC: 7.2 MG/DL (ref 8.3–10.6)
CALCIUM SERPL-MCNC: 7.5 MG/DL (ref 8.3–10.6)
CHLORIDE BLD-SCNC: 100 MMOL/L (ref 99–110)
CHLORIDE BLD-SCNC: 104 MMOL/L (ref 99–110)
CO2: 19 MMOL/L (ref 21–32)
CO2: 20 MMOL/L (ref 21–32)
CREAT SERPL-MCNC: 0.7 MG/DL (ref 0.6–1.2)
CREAT SERPL-MCNC: 0.8 MG/DL (ref 0.6–1.2)
EOSINOPHILS ABSOLUTE: 0 K/UL (ref 0–0.6)
EOSINOPHILS RELATIVE PERCENT: 0 %
GFR AFRICAN AMERICAN: >60
GFR AFRICAN AMERICAN: >60
GFR NON-AFRICAN AMERICAN: >60
GFR NON-AFRICAN AMERICAN: >60
GLUCOSE BLD-MCNC: 113 MG/DL (ref 70–99)
GLUCOSE BLD-MCNC: 127 MG/DL (ref 70–99)
GLUCOSE BLD-MCNC: 279 MG/DL (ref 70–99)
GLUCOSE BLD-MCNC: 82 MG/DL (ref 70–99)
GLUCOSE BLD-MCNC: 84 MG/DL (ref 70–99)
GLUCOSE BLD-MCNC: 88 MG/DL (ref 70–99)
HCT VFR BLD CALC: 23.8 % (ref 36–48)
HEMOGLOBIN: 8 G/DL (ref 12–16)
LYMPHOCYTES ABSOLUTE: 0.4 K/UL (ref 1–5.1)
LYMPHOCYTES RELATIVE PERCENT: 7 %
MAGNESIUM: 1.7 MG/DL (ref 1.8–2.4)
MCH RBC QN AUTO: 30.9 PG (ref 26–34)
MCHC RBC AUTO-ENTMCNC: 33.7 G/DL (ref 31–36)
MCV RBC AUTO: 91.7 FL (ref 80–100)
MONOCYTES ABSOLUTE: 1 K/UL (ref 0–1.3)
MONOCYTES RELATIVE PERCENT: 18 %
NEUTROPHILS ABSOLUTE: 4.2 K/UL (ref 1.7–7.7)
NEUTROPHILS RELATIVE PERCENT: 73 %
ORGANISM: ABNORMAL
PDW BLD-RTO: 14.9 % (ref 12.4–15.4)
PERFORMED ON: ABNORMAL
PERFORMED ON: NORMAL
PHOSPHORUS: 3.2 MG/DL (ref 2.5–4.9)
PHOSPHORUS: 3.3 MG/DL (ref 2.5–4.9)
PLATELET # BLD: 433 K/UL (ref 135–450)
PMV BLD AUTO: 8.4 FL (ref 5–10.5)
POTASSIUM REFLEX MAGNESIUM: 4 MMOL/L (ref 3.5–5.1)
POTASSIUM SERPL-SCNC: 4.2 MMOL/L (ref 3.5–5.1)
RBC # BLD: 2.6 M/UL (ref 4–5.2)
SODIUM BLD-SCNC: 131 MMOL/L (ref 136–145)
SODIUM BLD-SCNC: 132 MMOL/L (ref 136–145)
WBC # BLD: 5.8 K/UL (ref 4–11)

## 2022-08-15 PROCEDURE — 94761 N-INVAS EAR/PLS OXIMETRY MLT: CPT

## 2022-08-15 PROCEDURE — 6360000002 HC RX W HCPCS: Performed by: NURSE PRACTITIONER

## 2022-08-15 PROCEDURE — 97110 THERAPEUTIC EXERCISES: CPT

## 2022-08-15 PROCEDURE — 84100 ASSAY OF PHOSPHORUS: CPT

## 2022-08-15 PROCEDURE — 94669 MECHANICAL CHEST WALL OSCILL: CPT

## 2022-08-15 PROCEDURE — 97530 THERAPEUTIC ACTIVITIES: CPT

## 2022-08-15 PROCEDURE — 6360000002 HC RX W HCPCS: Performed by: SURGERY

## 2022-08-15 PROCEDURE — 99231 SBSQ HOSP IP/OBS SF/LOW 25: CPT | Performed by: INTERNAL MEDICINE

## 2022-08-15 PROCEDURE — 2580000003 HC RX 258

## 2022-08-15 PROCEDURE — 6370000000 HC RX 637 (ALT 250 FOR IP)

## 2022-08-15 PROCEDURE — 99232 SBSQ HOSP IP/OBS MODERATE 35: CPT | Performed by: SURGERY

## 2022-08-15 PROCEDURE — 6370000000 HC RX 637 (ALT 250 FOR IP): Performed by: INTERNAL MEDICINE

## 2022-08-15 PROCEDURE — 6360000002 HC RX W HCPCS: Performed by: INTERNAL MEDICINE

## 2022-08-15 PROCEDURE — 97535 SELF CARE MNGMENT TRAINING: CPT

## 2022-08-15 PROCEDURE — 2060000000 HC ICU INTERMEDIATE R&B

## 2022-08-15 PROCEDURE — 83735 ASSAY OF MAGNESIUM: CPT

## 2022-08-15 PROCEDURE — 92526 ORAL FUNCTION THERAPY: CPT

## 2022-08-15 PROCEDURE — 85025 COMPLETE CBC W/AUTO DIFF WBC: CPT

## 2022-08-15 PROCEDURE — 94640 AIRWAY INHALATION TREATMENT: CPT

## 2022-08-15 PROCEDURE — 80069 RENAL FUNCTION PANEL: CPT

## 2022-08-15 PROCEDURE — 2500000003 HC RX 250 WO HCPCS: Performed by: INTERNAL MEDICINE

## 2022-08-15 PROCEDURE — 99233 SBSQ HOSP IP/OBS HIGH 50: CPT | Performed by: NURSE PRACTITIONER

## 2022-08-15 PROCEDURE — 6370000000 HC RX 637 (ALT 250 FOR IP): Performed by: SURGERY

## 2022-08-15 PROCEDURE — 99233 SBSQ HOSP IP/OBS HIGH 50: CPT | Performed by: INTERNAL MEDICINE

## 2022-08-15 RX ORDER — MAGNESIUM SULFATE IN WATER 40 MG/ML
2000 INJECTION, SOLUTION INTRAVENOUS ONCE
Status: COMPLETED | OUTPATIENT
Start: 2022-08-15 | End: 2022-08-15

## 2022-08-15 RX ORDER — HEPARIN SODIUM 5000 [USP'U]/ML
5000 INJECTION, SOLUTION INTRAVENOUS; SUBCUTANEOUS EVERY 8 HOURS SCHEDULED
Status: DISCONTINUED | OUTPATIENT
Start: 2022-08-15 | End: 2022-08-23 | Stop reason: HOSPADM

## 2022-08-15 RX ADMIN — SODIUM CHLORIDE 30 MG/ML INHALATION SOLUTION 4 ML: 30 SOLUTION INHALANT at 08:14

## 2022-08-15 RX ADMIN — HEPARIN SODIUM 5000 UNITS: 5000 INJECTION INTRAVENOUS; SUBCUTANEOUS at 14:39

## 2022-08-15 RX ADMIN — METHOCARBAMOL 500 MG: 500 TABLET ORAL at 16:12

## 2022-08-15 RX ADMIN — TRAMADOL HYDROCHLORIDE 50 MG: 50 TABLET, COATED ORAL at 18:37

## 2022-08-15 RX ADMIN — Medication 125 MG: at 06:23

## 2022-08-15 RX ADMIN — SODIUM CHLORIDE, PRESERVATIVE FREE 10 ML: 5 INJECTION INTRAVENOUS at 10:01

## 2022-08-15 RX ADMIN — Medication 125 MG: at 18:37

## 2022-08-15 RX ADMIN — TRAMADOL HYDROCHLORIDE 50 MG: 50 TABLET, COATED ORAL at 05:03

## 2022-08-15 RX ADMIN — PANTOPRAZOLE SODIUM 40 MG: 40 TABLET, DELAYED RELEASE ORAL at 05:03

## 2022-08-15 RX ADMIN — Medication 125 MG: at 00:00

## 2022-08-15 RX ADMIN — Medication 10 MG: at 22:12

## 2022-08-15 RX ADMIN — ALBUTEROL SULFATE 2.5 MG: 2.5 SOLUTION RESPIRATORY (INHALATION) at 08:14

## 2022-08-15 RX ADMIN — SODIUM CHLORIDE, PRESERVATIVE FREE 10 ML: 5 INJECTION INTRAVENOUS at 22:13

## 2022-08-15 RX ADMIN — PANTOPRAZOLE SODIUM 40 MG: 40 TABLET, DELAYED RELEASE ORAL at 16:01

## 2022-08-15 RX ADMIN — HEPARIN SODIUM 5000 UNITS: 5000 INJECTION INTRAVENOUS; SUBCUTANEOUS at 06:30

## 2022-08-15 RX ADMIN — FLUTICASONE PROPIONATE 2 SPRAY: 50 SPRAY, METERED NASAL at 11:33

## 2022-08-15 RX ADMIN — MICONAZOLE NITRATE: 2 POWDER TOPICAL at 10:01

## 2022-08-15 RX ADMIN — DIGOXIN 125 MCG: 125 TABLET ORAL at 09:56

## 2022-08-15 RX ADMIN — MAGNESIUM SULFATE HEPTAHYDRATE 2000 MG: 40 INJECTION, SOLUTION INTRAVENOUS at 10:02

## 2022-08-15 RX ADMIN — Medication 125 MG: at 14:39

## 2022-08-15 RX ADMIN — PREGABALIN 50 MG: 50 CAPSULE ORAL at 09:56

## 2022-08-15 RX ADMIN — PREGABALIN 50 MG: 50 CAPSULE ORAL at 14:39

## 2022-08-15 RX ADMIN — SODIUM CHLORIDE 30 MG/ML INHALATION SOLUTION 4 ML: 30 SOLUTION INHALANT at 20:47

## 2022-08-15 RX ADMIN — PREGABALIN 50 MG: 50 CAPSULE ORAL at 21:30

## 2022-08-15 RX ADMIN — MICONAZOLE NITRATE: 2 POWDER TOPICAL at 22:11

## 2022-08-15 RX ADMIN — HEPARIN SODIUM 5000 UNITS: 5000 INJECTION INTRAVENOUS; SUBCUTANEOUS at 22:15

## 2022-08-15 RX ADMIN — ALBUTEROL SULFATE 2.5 MG: 2.5 SOLUTION RESPIRATORY (INHALATION) at 20:47

## 2022-08-15 RX ADMIN — TRAMADOL HYDROCHLORIDE 50 MG: 50 TABLET, COATED ORAL at 11:31

## 2022-08-15 ASSESSMENT — PAIN DESCRIPTION - ONSET
ONSET: ON-GOING

## 2022-08-15 ASSESSMENT — PAIN SCALES - GENERAL
PAINLEVEL_OUTOF10: 8
PAINLEVEL_OUTOF10: 7
PAINLEVEL_OUTOF10: 7
PAINLEVEL_OUTOF10: 8
PAINLEVEL_OUTOF10: 6
PAINLEVEL_OUTOF10: 8
PAINLEVEL_OUTOF10: 5

## 2022-08-15 ASSESSMENT — PAIN DESCRIPTION - DESCRIPTORS
DESCRIPTORS: ACHING
DESCRIPTORS: ACHING;DISCOMFORT
DESCRIPTORS: ACHING
DESCRIPTORS: ACHING

## 2022-08-15 ASSESSMENT — PAIN DESCRIPTION - ORIENTATION
ORIENTATION: RIGHT

## 2022-08-15 ASSESSMENT — PAIN DESCRIPTION - PAIN TYPE
TYPE: CHRONIC PAIN
TYPE: CHRONIC PAIN

## 2022-08-15 ASSESSMENT — PAIN DESCRIPTION - LOCATION
LOCATION: HIP;LEG
LOCATION: LEG;HIP;BACK
LOCATION: LEG;HIP
LOCATION: GENERALIZED

## 2022-08-15 ASSESSMENT — PAIN DESCRIPTION - FREQUENCY
FREQUENCY: CONTINUOUS

## 2022-08-15 NOTE — PROGRESS NOTES
to [facility] on 7/30 due to fluid associated with her right CASS on 7/13 and for abdominal pain. - Hgb 8.0 this am, stable. - CT abd/pelvis with IV contrast on 8/14 showed no evidence of pneumoperitoneum or hemoperitoneum  - MRI 8/11 shows R iliopsoas hematoma; may have been contributing to her anemia. Will need to reach out orthopedic surgery  - Continue to hold Reglan and glycolax due to diarrhea  - Iqbal for strict I/Os  - Continue dysphagia diet per SLP  - GI did EGD; clean base ulcer, will repeat EGD in 8 weeks after PPI BID  - Continue PT/OT  - Dispo: will need SNF at DC due to deconditioning. OT 8/24, PT 9/24    Clayton Sagastume DO   PGY1, General Surgery  08/15/22  6:40 AM  Pager # [phone]    I have personally performed the medical history, physical exam and medical decision making and agree with all pertinent clinical information unless otherwise noted.      Weekend events noted  CT scan reviewed    Leroy Rasheed MD  Surgery Attending

## 2022-08-15 NOTE — PROGRESS NOTES
Speech Language Pathology  Dysphagia attempt- pt working with PT    Chart reviewed. Attempted to see pt; however, PT/OT working with pt at present. D/W nursing, Kiara Looney, who indicated pt tolerating current diet without difficulty and lungs clear. Per previous dysphagia session, pt preferred soft and bite sized diet. Will f/u next session.     Cy Andre MA CCC/SLP [id number]  Speech Language Pathologist  Pager [phone]

## 2022-08-15 NOTE — PROGRESS NOTES
Discontinued days ago due to concern for rectal bleed/hemorrhage    Had recurrent rectal bleed several days ago. EGD done for \"melena\". Has been having diarrhea, non bloody. C diff ordered. Not done. Empirically started on PO Vanco.     She is s/p recent Right hip surgery  Right leg numbness and pain few days ago after working with PT: MRI done to eval for nerve impingemen as she has hx of chronic back paint: shows illio-psoas hematoma. Notified Ortho. Imaging showed enlarging right hemtoma iliopsoas . She is s/p right hip surgery, and had just worked with PT/OT. RVR on telemetry: HR up to 140s sometimes. Remains more awake     Still diarrhea. Rectal tube placed. C diff indeterminate. Repeat (PCR): positive  On PO vanco    Abdominal pain improved         Medications : Reviewed      Allergies:  Buspirone, Duloxetine, Fenofibrate, Gabapentin, Venlafaxine, Doxycycline, Statins, and Sulfa antibiotics      REVIEW OF SYSTEMS:   Pertinent positives as noted in the HPI. All other systems reviewed and negative. PHYSICAL EXAM PERFORMED:    /62   Pulse (!) 101   Temp 98.2 °F (36.8 °C) (Oral)   Resp 18   Ht 5' 2.01\" (1.575 m)   Wt 239 lb 3.2 oz (108.5 kg)   SpO2 93%   BMI 43.74 kg/m²     General appearance: Awake,alert  HEENT:  Normal cephalic, atraumatic without obvious deformity. Neck: Supple, with full range of motion. No jugular venous distention. Respiratory:  Normal respiratory effort. Clear to auscultation, bilaterally without Rales/Wheezes/Rhonchi. Cardiovascular:  Regular rate and rhythm with normal S1/S2 without murmurs, rubs or gallops. Abdomen: Less distended, less firm, less tender. BS heard  Musculoskeletal: Right hip swelling but no erythema or warmth. Incision dressing C/D/I  Skin: Skin color, texture, turgor normal.  No rashes or lesions.   Neurologic:  grossly non-focal.  Capillary Refill: Brisk,< 3 seconds   Peripheral Pulses: +2 palpable, equal bilaterally       Labs: Recent Labs     08/13/22  0530 08/13/22  1102 08/14/22  0456 08/14/22  2244 08/15/22  0555   WBC 15.0*  --  5.9  --  5.8   HGB 6.9*   < > 7.2* 7.9* 8.0*   HCT 20.8*   < > 21.0* 22.2* 23.8*     --  336  --  433    < > = values in this interval not displayed. Recent Labs     08/14/22  0456 08/15/22  0555 08/15/22  0707   * 132* 131*   K 4.2 4.2 4.0    104 100   CO2 19* 19* 20*   BUN 35* 27* 25*   CREATININE 0.9 0.8 0.7   CALCIUM 7.1* 7.5* 7.2*   PHOS 2.8 3.2 3.3       No results for input(s): AST, ALT, BILIDIR, BILITOT, ALKPHOS in the last 72 hours. No results for input(s): INR in the last 72 hours. No results for input(s): Vernell Ill in the last 72 hours. Urinalysis:      Lab Results   Component Value Date/Time    NITRU POSITIVE 07/31/2022 05:15 AM    WBCUA 3-5 07/31/2022 05:15 AM    BACTERIA 1+ 07/31/2022 05:15 AM    RBCUA None seen 07/31/2022 05:15 AM    BLOODU Negative 07/31/2022 05:15 AM    SPECGRAV 1.015 07/31/2022 05:15 AM    GLUCOSEU Negative 07/31/2022 05:15 AM       Radiology: Reviewed          ASSESSMENT/PLAN:    Severe sepsis: POA   - Leukocytosis with tachycardia; on admission; - Possibly from GI source with severe constipation, fecal impaction  -  Transferred to [facility] for evaluation of post-op hip fluid collection/possible infection (s/p THR 7/13): felt by ortho to be likely seroma,  normal post operative finding .  - Started on admission on broad spectrum abx. - Blood cultures x 2 NGTD. ID consulted: - Not currently on Abx   - Sepsis appears resolved  - Monitor off antibx, hopefully should continue to improve as bowels appear functioning now      Parox Atrial Fibrillation   Afib with RVR  Echo normal EF, normal diastolic  Continue AVN agents as tolerated. Was placed on Heparin drip in the ICU:  held with drop in hgb, and blood loss anemia   Cardiology following  Transitioned to oral meds: Soft Bps preclude metop. Will use digoxin.  (Renal fxn ok)  - Keep Mg> 2; K 4 or above      Acute  hypoxemic respiratory failure  Possible fluid overload from recent surgery, and while NPO with intra-abdominal issue this admit  - Was Briefly on IV lasix/lasix drip but was stopped. O2 requirement had improved  - Dopplers:  no DVT  - Resp failure resolving/resolved  - Encourage incentive spirometry      Fluid overload  - Patient appears developed volume overload with worse respiratory status and AFRVR while hospitalized. - Echocardiogram was overall normal with normal filling pressures and was done during sinus   - Prob volume overload related to fluid mgt for bowel issues, low Albumin and Hb low   - Acute heart failure ruled out  - Was treated with IV diuretics. Now held  - DC'd continuous fluids for now. Use PRN boluses  - Dly weights, I/O monitoring      Acute on Chronic Anemia  Hypotension  - Hypotension probably sec to severe diarrhea   - Was concerned for ongoing hemorrhage with drop in hgb as well: ruled out.   - MRI lumber showed iliopsoas hematoma: With dropping hgb again, re-imaged to verify if hematoma right ileo-psoas is expanding Report reviewed. Hgb sppears stable now post PRBC transfusions.   - Monitor Hgb routinely        Acute severe diarrhea sec to C diff infection  - Was on bowel meds for ileus. Stopped with diarrhea. Diarrhea h/e persisted, and with bump in WBCs, Stool for C diff ordered.  Toxin indeterminate, PCR: positive  - On Rectal tube for profuse diarrhea   - Started on PO vancomycin: plan 10-14 day txt      ARIANE   Mild hyponatremia  Likely sec to Hypoperfusion  Nephrology consulted  -Monitor Cr: ARIANE resolved      Bowel obstruction  Secondary to severe constipation  - CT of the [facility] system showed significant stool from cecum to the sigmoid colon and transition point at the rectosigmoid.   - GI, Surgery consulted, appreciate recs  - Possible severe constipation with fecal impaction possibly sec to narcotics: she is chronically on narcotics for chronic bck pain (tramadol and norco; and also in setting of recent hip surgery)  - Miralax and enema unsuccessful  - Underwent [place] decompression 8/3 with improvement but not resolution. Was treated with SMOG enemas BID, Relistor, disimpaction. Now on Movantic and miralax. Appears bowel functioning now, with diarrhea noted: Off bowel meds  - Obstruction resolved      Recent right hip surgery: POA  Hematoma right iliopsoas muscle: prob new, sec to recent surgery and anticoagulation for Afib   - She is s/p recent Right hip surgery  - Developed Right leg numbness and pain few days ago after working with PT: MRI done to eval for nerve impingement as she has hx of chronic back paint: shows illio-psoas hematoma. Notified Ortho. Will keep off hep gtt. - Hg appears stable now: monitor routinely. - Dopplers done for LE edema: No DVT  - Ortho following      Acute Urinary retention  - Appears recently had E coli in urine, although CC was less than 100k. Was treated with cipro. Also appears on Keflex PTA.   - Urine on admission has some bacteria, and nitrite positive, but neg leuc esterase and no signif pyuria  - Iqbal catheter placed at [facility] for retention likely sec to severe constipation  - Iqbal removed 6/12: Bladder scans PRN      Chronic back pain, narcotic dependent: POA  - She is chronically on narcotics for chronic bck pain (tramadol and norco; and also in setting of recent hip surgery)  - Acurte pain, likely radicular from iliopsoas hematoma  - Continue Lyrica      Essential Hypertension- Monitor Bps      Morbid obesity Body mass index is 41.01 kg/m². - Complicating assessment and treatment. Placing patient at risk for multiple co-morbidities as well as early death and contributing to the patient's presentation.  on weight loss when appropriate. DVT Prophylaxis: SCD for now. Resume chem prophylaxis if bleed ruled out/hgb stabel/ok with ortho. Diet: ADULT DIET;  Dysphagia - Soft and Bite Sized; 1500 ml  Code Status: Full Code      Disposition: - Transferred from ICU. Recently at [facility] after hip ortho surgery: Will likely need SNF at discharge.     Hopefully ready for DC in 2-3 days with improvement of diarrhea    PT/OT alejandra Maki MD

## 2022-08-15 NOTE — PLAN OF CARE
Problem: Discharge Planning  Goal: Discharge to home or other facility with appropriate resources  Outcome: Progressing     Problem: Safety - Adult  Goal: Free from fall injury  Outcome: Progressing     Problem: ABCDS Injury Assessment  Goal: Absence of physical injury  Outcome: Progressing     Problem: Skin/Tissue Integrity  Goal: Absence of new skin breakdown  Description: 1. Monitor for areas of redness and/or skin breakdown  2. Assess vascular access sites hourly  3. Every 4-6 hours minimum:  Change oxygen saturation probe site  4. Every 4-6 hours:  If on nasal continuous positive airway pressure, respiratory therapy assess nares and determine need for appliance change or resting period.   Outcome: Progressing     Problem: Respiratory - Adult  Goal: Achieves optimal ventilation and oxygenation  8/15/2022 0010 by Katarzyna Amin RN  Outcome: Progressing  8/14/2022 1748 by Pretty Clark RN  Outcome: Progressing     Problem: Skin/Tissue Integrity - Adult  Goal: Incisions, wounds, or drain sites healing without S/S of infection  Outcome: Progressing  Goal: Oral mucous membranes remain intact  Outcome: Progressing     Problem: Musculoskeletal - Adult  Goal: Return mobility to safest level of function  Outcome: Progressing  Goal: Maintain proper alignment of affected body part  Outcome: Progressing  Goal: Return ADL status to a safe level of function  Outcome: Progressing     Problem: Gastrointestinal - Adult  Goal: Minimal or absence of nausea and vomiting  Outcome: Progressing  Goal: Maintains adequate nutritional intake  Outcome: Progressing  Goal: Maintains or returns to baseline bowel function  Outcome: Progressing     Problem: Genitourinary - Adult  Goal: Absence of urinary retention  Outcome: Progressing  Goal: Urinary catheter remains patent  Outcome: Progressing     Problem: Infection - Adult  Goal: Absence of infection at discharge  Outcome: Progressing  Goal: Absence of infection during hospitalization  Outcome: Progressing     Problem: Metabolic/Fluid and Electrolytes - Adult  Goal: Electrolytes maintained within normal limits  Outcome: Progressing  Goal: Hemodynamic stability and optimal renal function maintained  Outcome: Progressing     Problem: Pain  Goal: Verbalizes/displays adequate comfort level or baseline comfort level  Outcome: Progressing     Problem: Cardiovascular - Adult  Goal: Maintains optimal cardiac output and hemodynamic stability  Outcome: Progressing  Goal: Absence of cardiac dysrhythmias or at baseline  Outcome: Progressing     Problem: Nutrition Deficit:  Goal: Optimize nutritional status  Outcome: Progressing

## 2022-08-15 NOTE — PROGRESS NOTES
Physical Therapy  Facility/Department: [place] [address] [facility]  Daily Treatment Note  NAME: Lilian Cox  : 1950  MRN: [id number]    Date of Service: 8/15/2022    Discharge Recommendations:  Lilian Cox scored a 6/24 on the AM-PAC short mobility form. Current research shows that an AM-PAC score of 17 or less is typically not associated with a discharge to the patient's home setting. Based on the patient's AM-PAC score and their current functional mobility deficits, it is recommended that the patient have 3-5 sessions per week of Physical Therapy at d/c to increase the patient's independence. Please see assessment section for further patient specific details. If patient discharges prior to next session this note will serve as a discharge summary. Please see below for the latest assessment towards goals. Patient would benefit from continued therapy after discharge   PT Equipment Recommendations  Equipment Needed: No (defer)    Patient Diagnosis(es): The primary encounter diagnosis was Right lower quadrant abdominal pain. A diagnosis of Melena was also pertinent to this visit. Assessment   Assessment: Pt continues to require max A x 2 for bed mobility. Remains unable to stand. Cont to recommend further inpt PT upon D/C. Will follow per plan of care. Activity Tolerance: Patient limited by endurance; Patient limited by pain; Patient limited by fatigue  Equipment Needed: No (defer)     Plan    Plan  Plan:  (2-5)  Current Treatment Recommendations: Strengthening;ROM; Functional mobility training;Gait training; Safety education & training;Transfer training; Endurance training     Restrictions  Restrictions/Precautions  Restrictions/Precautions: Modified Diet, Up as Tolerated, Fall Risk (High fall risk, full liquid diet)  Position Activity Restriction  Other position/activity restrictions: up wtih assist,  50% WB right hip     Subjective    Subjective  Subjective: Pt supine in bed & willing to participate. Objective      Bed Mobility Training  Bed Mobility Training: Yes  Rolling: Assist X2 (max A x 2)  Supine to Sit: Assist X2 (max A x 2)  Sit to Supine: Assist X2 (max A x 2)  Scooting: Total assistance (x 2 supine in Trendelenburg.)  Balance  Sitting:  (CGA/min A initially, progressing to SBA/CGA, pt holding bed rail & frame. pt sat at EOB for ~25 min. pt c/o dizziness. vitals stable.)  Transfer Training  Transfer Training:  (attempted sit->stand from elevated bed to danica stedy, pt able to minimally clear buttocks from bed with max A x 2. unable to come to full standing. pt declined further attempts d/t fatigue. discussed need for david transfers with RN.)     PT Exercises  Exercise Treatment: x 10 bilat: ankle pumps, GS, QS, heel slides (mod A left, max A right), hip abd (min A left, max A right), LAQ (max A right). Safety Devices  Type of Devices: Call light within reach; Left in bed;Bed alarm in place;Nurse notified       Goals  Short Term Goals  Time Frame for Short term goals: Discharge  Short term goal 1: roll with CG R/L. Ongoing  Short term goal 2: Supine to sit with CG. Ongoing  Short term goal 3: Sit to stand with mod x 2. MET 8/9 . Revised goal:  Pt will transfer sit to stand with min assist x 1  Short term goal 4: Bed to chair with mod x 2. Ongoing  Short term goal 5: Ambulate 20 ft with RW, 50% WB on right with mod x 2.   Ongoing  Patient Goals   Patient goals : not specifically stated    Education  Patient Education  Education Given To: Patient  Education Provided: Plan of Care;Home Exercise Program;Transfer Training  Education Method: Verbal;Demonstration  Education Outcome: Verbalized understanding;Continued education needed    Therapy Time   Individual Concurrent Group Co-treatment   Time In 1112         Time Out 1222         Minutes 70                 [address], PT

## 2022-08-15 NOTE — PROGRESS NOTES
cues to remain alert at the beginning of the session. Pt had NG removed this date and has not taken in PO nutrition for some time. Pt with upper and lower denture. ROM of oral structures was [facility]. Pt had minimal difficulty closing lips around spoon or drawing liquid up a straw. Pt had mild difficulty with mastication with solids with prolonged mastication noted. There was no anterior spillage or oral residue noted with any consistency. Pt demonstrated no s/s aspiration with any consistency presented. Pt able to initiate swallow without difficulty with laryngeal movement observed. Vocal quality remained clear throughout. No coughing, throat clearing or choking observed with any consistency. Pt was instructed to consume 3oz of water continuously but stopped to take a breath after 3 successive swallows. No s/s aspiration noted. RR fluctuated between 14-18 through out the session. Recommend full liquid diet at this time due to concern for fatigue as pt is deconditioned. Dysphagia Diagnosis: Mild to moderate oral stage dysphagia (due to fatigue)    MBS results   Not indicated at this time    Pain: No pain reported     Current Diet : ADULT DIET; Dysphagia - Soft and Bite Sized; 1500 ml   Recommended Form of Meds: PO     Treatment:  Pt seen bedside to address the following goals:  1-The patient will tolerate recommended diet without observed clinical signs of aspiration  8/10: Rn stated keep pt on full today, as still some GIB. pt sitting up in chair, family present. Pt has full liquid tray at bedside. Pt consumed puree and thin liquids with no overt signs of aspiration. Pt cleared oral cavity fully. Pt reported some difficulty earlier when taking medication whole with water and this was a large pill. Pt family and RN report no problem with taking medications crushed in puree. Recommend cont Full liquids, pt remains very weak. 8/12: RN cleared patient; Per chart review, patient able to upgrade diet as tolerated. Patient sitting upright in bed, family present. She is eating jose crackers with peanut butter / thin liquids. Adequate labial seal, increased mastication time for solids and independent use of liquid rinse observed. Patient taking consecutive drinks thin liquid via cup and straw. No overt s/s of aspiration or penetration noted. Voice remained clear. Discussed diet levels with patient and she selects soft/ bites sized secondary to some generalized weakness and inability to cut food up. RN and MD notified of recommendations. 8/15: Patient cleared by RN who reports tolerance of current soft /bite sized diet texture. Patient with family present; She reports preference for current diet texture given difficulty with cutting food. She does accept hard snack and thin liquids in which she demonstrates mildly increased time for mastication though adequate bolus clearance. No overt s/s of aspiration. Pt reports sore tongue; Upon visualization, tongue is pink/moist. Encouraged continuing good oral care with soft bristled toothbrush. Cont goal    2- The pt/caregiver will demonstrate understanding of swallowing recommendations and concerns. 8/9- The pt and daughter were educated to purpose of the visit, anatomy and physiology of the swallow, concerns for aspiration, role breathing plays in swallowing, concern for fatigue swallowing strategies, diet recommendations and possibility of being made NPO if s/s aspiration emerge. Both stated comprehension and agreement with diet recommendation and had no further questions. con't goal  8/10: pt and family educated to purpose of visit, reviewed s/s of aspiration and concern if aspiration occurs. Instructed to notify staff if any issues emerge. Discussed cont of current texture at this time.   Pt and family stated comprehension  8/12: Patient and family educated on recommendations for swallow strategies (sit upright, alternate consistencies, slow rate) to reduced risk of aspiration. They verbalized understanding of all information. 8/15: Education re: oral care, aspiration precautions completed this date. Cont goal     Patient/Family/Caregiver Education:  As above    Compensatory Strategies:  Upright as possible for all oral intake, Remain upright for 30-45 minutes after meals, Eat/Feed slowly, Alternate solids and liquids, Small bites/sips      Plan:  Continue dysphagia treatment with goals per plan of care. Diet recommendations:   -Continue soft / bite sized   -Continue thin liquids  -Aspiration precautions  -Oral care 2-3x/day    DC recommendation: TBD  Treatment: 15  D/W nursing, Noelle Boast  Needs met prior to leaving room, call button in reach. [place], [address], [facility], [id number]  Pg.  # [phone]    If patient is discharged prior to next treatment, this note will serve as the discharge summary

## 2022-08-15 NOTE — PROGRESS NOTES
ICU Progress Note    Admit Date: 7/30/2022  ICU Day: NA  Vent Day: None  IV Access:Peripheral  IV Fluids:None  Vasopressors:None                Antibiotics: None  Diet: ADULT DIET;  Dysphagia - Soft and Bite Sized; 1500 ml    CC: SOB    Interval history:     C. difficile is positive  She was started on oral vancomycin  Blood pressure much better at 129/80 with a pulse of 101  She remains on room air with oxygen saturation of 94%    Medications:     Scheduled Meds:   heparin (porcine)  5,000 Units SubCUTAneous 3 times per day    vancomycin  125 mg Oral 4 times per day    digoxin  125 mcg Oral Daily    sodium chloride  500 mL IntraVENous Once    [Held by provider] metoprolol tartrate  12.5 mg Oral BID    pantoprazole  40 mg Oral BID AC    miconazole   Topical BID    melatonin  10 mg Oral Nightly    albuterol  2.5 mg Nebulization BID    lidocaine  1 patch TransDERmal Daily    sodium chloride (Inhalant)  4 mL Nebulization BID    lidocaine   Topical Once    sodium chloride flush  5-40 mL IntraVENous 2 times per day    fluticasone  2 spray Each Nostril Daily    pregabalin  50 mg Oral TID     Continuous Infusions:   sodium chloride      sodium chloride      sodium chloride      [Held by provider] sodium chloride Stopped (08/14/22 1330)    sodium chloride      sodium chloride      sodium chloride      sodium chloride      dextrose      sodium chloride 5 mL/hr at 08/12/22 1154     PRN Meds:sodium chloride, sodium chloride, sodium chloride, traMADol, sodium chloride, sodium chloride, sodium chloride, lidocaine, sodium chloride, sodium chloride, prochlorperazine, glucose, dextrose bolus **OR** dextrose bolus, glucagon (rDNA), dextrose, albuterol, phenol, morphine **OR** morphine, sodium chloride flush, sodium chloride, ondansetron **OR** ondansetron, methocarbamol    Objective:   Vitals:   T-max:  Patient Vitals for the past 8 hrs:   BP Temp Temp src Pulse Resp SpO2   08/15/22 1600 129/80 98.1 °F (36.7 °C) Oral (!) 101 18 94 % 08/15/22 1200 124/69 98.1 °F (36.7 °C) Oral 97 18 94 %         Intake/Output Summary (Last 24 hours) at 8/15/2022 1723  Last data filed at 8/15/2022 0548  Gross per 24 hour   Intake --   Output 2800 ml   Net -2800 ml             LABS:    CBC:   Recent Labs     08/13/22  0530 08/13/22  1102 08/14/22  0456 08/14/22  2244 08/15/22  0555   WBC 15.0*  --  5.9  --  5.8   HGB 6.9*   < > 7.2* 7.9* 8.0*   HCT 20.8*   < > 21.0* 22.2* 23.8*     --  336  --  433   MCV 91.4  --  91.7  --  91.7    < > = values in this interval not displayed. Renal:    Recent Labs     08/13/22  1430 08/14/22  0456 08/15/22  0555 08/15/22  0707   * 133* 132* 131*   K 4.2 4.2 4.2 4.0   CL 98* 105 104 100   CO2 21 19* 19* 20*   BUN 43* 35* 27* 25*   CREATININE 1.2 0.9 0.8 0.7   GLUCOSE 122* 90 84 82   CALCIUM 7.1* 7.1* 7.5* 7.2*   MG 1.90 1.80  --  1.70*   PHOS  --  2.8 3.2 3.3   ANIONGAP 10 9 9 11       Hepatic:   Recent Labs     08/13/22  0530 08/14/22  0456 08/15/22  0555   LABALBU 2.0* 1.7* 1.8*       Cultures:  -----------------------------------------------------------------  RAD:   CT CHEST ABDOMEN PELVIS WO CONTRAST   Final Result      CHEST:      1. No evidence for hematoma   2. Decreased small pleural effusions and bibasilar atelectasis compared to 8/2/2022      ABDOMEN/PELVIS:      1. Right iliopsoas muscle small to medium-sized intramuscular acute hematoma . An accurate characterization of size of hematoma is not possible due to its infiltrative nature. 2. Right proximal thigh subcutaneous 3.8 x 3.7 cm water attenuation fluid collection   3. Nonspecific, nonobstructive bowel gas pattern      XR CHEST PORTABLE   Final Result      Limited inspiration. Accentuation of pulmonary interstitial markings, with minor basilar atelectasis. No other acute findings. MRI LUMBAR SPINE WO CONTRAST   Final Result   1. Moderate size intramuscular hematoma along the right iliopsoas muscle.  This is new in comparison to peribronchial cuffing is favored to represent mild pulmonary edema. Low lung volumes bronchovascular crowding. Stable cardiomediastinal silhouette. Lines and tubes in standard position. Cholecystectomy clips are noted. CT ABDOMEN PELVIS WO CONTRAST Additional Contrast? Oral and Rectal   Final Result   1. Moderate colonic distention. Correlate for constipation. Fecal content within the ileum which may suggest stasis. There is no small bowel obstruction. 2. Subcutaneous fluid collection overlying the right total hip arthroplasty. CT HIP RIGHT WO CONTRAST   Final Result   1. Moderate colonic distention. Correlate for constipation. Fecal content within the ileum which may suggest stasis. There is no small bowel obstruction. 2. Subcutaneous fluid collection overlying the right total hip arthroplasty. XR CHEST PORTABLE   Final Result      1. Right IJ CVC tip in the cavoatrial junction. 2.  Bibasilar airspace disease suggesting atelectasis. Pneumonia is not excluded. XR ABDOMEN (KUB) (SINGLE AP VIEW)   Final Result      Dilated colonic and small bowel loops may suggest ileus but obstruction is not excluded. XR ABDOMEN (KUB) (SINGLE AP VIEW)   Final Result      No acute radiographic abnormality of the abdomen. Status post right hip arthroplasty with mildly displaced greater trochanteric fracture fragment, new or more pronounced compared to the prior study. Assessment/Plan  Sepsis -due to C. difficile colitis. Hypotension resolved after volume resuscitation. Oral vancomycin has been started  Hypoxemic Resp Failure -resolved  Bilateral leg edema   Paroxysmal A.fib -on heparin  ARIANE: Improved with creatinine 0.9 down from high of 3.5. Nephrology following and patient off diuretics  Leukocytosis -resolved  Hyponatremia -stable at 131    As there is no ongoing pulmonary ro critical care issues I will sign off.      Leida Johnson MD

## 2022-08-15 NOTE — PROGRESS NOTES
Office : [phone]     Fax :[phone]         Renal Progress Note    Subjective:   Admit Date: 7/30/2022     Interval History:   NAONE. Complains of back pain. Ur decent   Cr stable       DIET ADULT DIET; Dysphagia - Soft and Bite Sized; 1500 ml  Medications:   Scheduled Meds:   heparin (porcine)  5,000 Units SubCUTAneous 3 times per day    magnesium sulfate  2,000 mg IntraVENous Once    vancomycin  125 mg Oral 4 times per day    digoxin  125 mcg Oral Daily    sodium chloride  500 mL IntraVENous Once    [Held by provider] metoprolol tartrate  12.5 mg Oral BID    pantoprazole  40 mg Oral BID AC    miconazole   Topical BID    melatonin  10 mg Oral Nightly    albuterol  2.5 mg Nebulization BID    lidocaine  1 patch TransDERmal Daily    sodium chloride (Inhalant)  4 mL Nebulization BID    lidocaine   Topical Once    sodium chloride flush  5-40 mL IntraVENous 2 times per day    fluticasone  2 spray Each Nostril Daily    pregabalin  50 mg Oral TID     Continuous Infusions:   sodium chloride      sodium chloride      sodium chloride      [Held by provider] sodium chloride Stopped (08/14/22 1330)    sodium chloride      sodium chloride      sodium chloride      sodium chloride      dextrose      sodium chloride 5 mL/hr at 08/12/22 1154       Labs:  CBC:   Recent Labs     08/13/22  0530 08/13/22  1102 08/14/22  0456 08/14/22  2244 08/15/22  0555   WBC 15.0*  --  5.9  --  5.8   HGB 6.9*   < > 7.2* 7.9* 8.0*     --  336  --  433    < > = values in this interval not displayed.        BMP:    Recent Labs     08/14/22  0456 08/15/22  0555 08/15/22  0707   * 132* 131*   K 4.2 4.2 4.0    104 100   CO2 19* 19* 20*   BUN 35* 27* 25*   CREATININE 0.9 0.8 0.7   GLUCOSE 90 84 82       Ca/Mg/Phos:   Recent Labs     08/13/22  0530 08/13/22  1430 08/14/22  0456 08/15/22  0555 08/15/22  0707   CALCIUM 7.6* 7.1* 7.1* 7.5* 7.2*   MG 2.40 1.90 1.80  --  1.70*   PHOS 2.9  --  2.8 3.2  --        Hepatic:   No results for input(s): AST, ALT, ALB, BILITOT, ALKPHOS in the last 72 hours. Troponin: No results for input(s): TROPONINI in the last 72 hours. BNP: No results for input(s): BNP in the last 72 hours. Lipids: No results for input(s): CHOL, TRIG, HDL, LDLCALC, LABVLDL in the last 72 hours. ABGs:   No results for input(s): PHART, PO2ART, ROT1RQR in the last 72 hours. INR:   No results for input(s): INR in the last 72 hours. UA:  No results for input(s): Brain Douse, GLUCOSEU, BILIRUBINUR, KETUA, SPECGRAV, BLOODU, PHUR, PROTEINU, UROBILINOGEN, NITRU, LEUKOCYTESUR, Brynn Vibha in the last 72 hours. Urine Microscopic:   No results for input(s): LABCAST, BACTERIA, COMU, HYALCAST, WBCUA, RBCUA, EPIU in the last 72 hours. Urine Culture: No results for input(s): LABURIN in the last 72 hours. Urine Chemistry: No results for input(s): [place], PROTEINUR, NAUR in the last 72 hours. Objective:   Vitals: /62   Pulse (!) 101   Temp 98.2 °F (36.8 °C) (Oral)   Resp 18   Ht 5' 2.01\" (1.575 m)   Wt 239 lb 3.2 oz (108.5 kg)   SpO2 93%   BMI 43.74 kg/m²    Wt Readings from Last 3 Encounters:   08/15/22 239 lb 3.2 oz (108.5 kg)   07/13/22 218 lb 9.6 oz (99.2 kg)   06/14/22 217 lb (98.4 kg)      24HR INTAKE/OUTPUT:    Intake/Output Summary (Last 24 hours) at 8/15/2022 1024  Last data filed at 8/15/2022 0548  Gross per 24 hour   Intake 360 ml   Output 2800 ml   Net -2440 ml       Physical Exam  Constitutional:       Appearance: She is ill-appearing. She is not diaphoretic. Cardiovascular:      Rate and Rhythm: Normal rate.    Pulmonary: Effort: Pulmonary effort is normal.      Breath sounds: Rhonchi present. No wheezing. Abdominal:      General: There is distension. Palpations: Abdomen is soft. Tenderness: There is abdominal tenderness. There is no guarding. Musculoskeletal:      Right lower leg: Edema present. Left lower leg: Edema present. Skin:     General: Skin is warm and dry. Neurological:      General: No focal deficit present. Mental Status: She is oriented to person, place, and time. Assessment and Plan:       IMAGING:  CT CHEST ABDOMEN PELVIS WO CONTRAST   Final Result      CHEST:      1. No evidence for hematoma   2. Decreased small pleural effusions and bibasilar atelectasis compared to 8/2/2022      ABDOMEN/PELVIS:      1. Right iliopsoas muscle small to medium-sized intramuscular acute hematoma . An accurate characterization of size of hematoma is not possible due to its infiltrative nature. 2. Right proximal thigh subcutaneous 3.8 x 3.7 cm water attenuation fluid collection   3. Nonspecific, nonobstructive bowel gas pattern      XR CHEST PORTABLE   Final Result      Limited inspiration. Accentuation of pulmonary interstitial markings, with minor basilar atelectasis. No other acute findings. MRI LUMBAR SPINE WO CONTRAST   Final Result   1. Moderate size intramuscular hematoma along the right iliopsoas muscle. This is new in comparison to abdominal CT 8/1/2022.   2. No central canal stenosis. 3. Moderate multilevel foraminal narrowing as described. 4. No acute osseous abnormality. XR HIP 2-3 VW W PELVIS RIGHT   Final Result   Impression:    1. Right total hip arthroplasty. The orthopedic hardware is intact without evidence of loosening. No significant change from prior CT examination. VL Extremity Venous Bilateral   Final Result      XR CHEST PORTABLE   Final Result      Mild bilateral airspace disease. XR ABDOMEN (KUB) (SINGLE AP VIEW)   Final Result   1.  No evidence of free intraperitoneal air. 2. Indeterminate bowel gas pattern due to a paucity of small bowel gas. XR ABDOMEN (KUB) (SINGLE AP VIEW)   Final Result   Impression: Stable appearance of the abdomen. XR CHEST PORTABLE   Final Result      Low lung volumes with central bronchovascular crowding. Atelectasis in the left lung base. Normal cardiomediastinal silhouette. Lines and tubes in standard position. VL Extremity Venous Bilateral   Final Result      XR ABDOMEN (KUB) (SINGLE AP VIEW)   Final Result   Impression: Stable appearance of the abdomen with gas distended loops of central small bowel again noted. CT CHEST WO CONTRAST   Final Result      1. Moderate right pleural effusion and small left effusion with basilar airspace opacity most consistent with atelectasis. Superimposed pneumonia be difficult to exclude. 2. Narrowing of the trachea with expiration compatible with treated bronchial malacia. 3. Nasogastric tube tip in the lower esophagus. 4. Persistent colonic distention. 5. Mild coronary artery calcification. XR ABDOMEN (KUB) (SINGLE AP VIEW)   Final Result      Frontal supine views demonstrate an unremarkable bowel gas pattern. No significant colonic stool is seen. Right hip arthroplasty noted. XR CHEST PORTABLE   Final Result      Prominence of the perihilar interstitial markings with mild peribronchial cuffing is favored to represent mild pulmonary edema. Low lung volumes bronchovascular crowding. Stable cardiomediastinal silhouette. Lines and tubes in standard position. Cholecystectomy clips are noted. CT ABDOMEN PELVIS WO CONTRAST Additional Contrast? Oral and Rectal   Final Result   1. Moderate colonic distention. Correlate for constipation. Fecal content within the ileum which may suggest stasis. There is no small bowel obstruction. 2. Subcutaneous fluid collection overlying the right total hip arthroplasty. CT HIP RIGHT WO CONTRAST   Final Result   1. Moderate colonic distention. Correlate for constipation. Fecal content within the ileum which may suggest stasis. There is no small bowel obstruction. 2. Subcutaneous fluid collection overlying the right total hip arthroplasty. XR CHEST PORTABLE   Final Result      1. Right IJ CVC tip in the cavoatrial junction. 2.  Bibasilar airspace disease suggesting atelectasis. Pneumonia is not excluded. XR ABDOMEN (KUB) (SINGLE AP VIEW)   Final Result      Dilated colonic and small bowel loops may suggest ileus but obstruction is not excluded. XR ABDOMEN (KUB) (SINGLE AP VIEW)   Final Result      No acute radiographic abnormality of the abdomen. Status post right hip arthroplasty with mildly displaced greater trochanteric fracture fragment, new or more pronounced compared to the prior study. Assessment/Plan     RAIANE   2. Sepsis  3. AGMA   4. Lactic Acidosis - resolved  4. Hyperkalemia  5. Hyponatremia  6. Bowel Obstruction  7. Hypertension  8. GI bleed  9.  Anemia      Plan:  - Cr stable  - fluid restriction as Na was dropping   - encourage solute intake   - Mag repleted  - No need for diuresis/IVF  - ECHO normal EF - IVC compressible   - Hypoxia likely sec to Aspiration pneumonitis   - ARIANE sec to ATN   - Monitor I/Os  - Avoid nephrotoxic agents    Recommend to dose adjust all medications  based on renal functions  Maintain SBP> 90 mmHg   Daily weights   AVOID NSAIDs  Avoid Nephrotoxins  Monitor Intake/Output  Call if significant decrease in urine output        Drew Luciano MD

## 2022-08-15 NOTE — PROGRESS NOTES
Office : [phone]     Fax :[phone]         Renal Progress Note    Subjective:   Admit Date: 7/30/2022     Interval History:   NAONE. Complains of back pain. Ur decent   Cr stable       DIET ADULT DIET; Dysphagia - Soft and Bite Sized; 1500 ml  Medications:   Scheduled Meds:   vancomycin  125 mg Oral 4 times per day    [START ON 8/15/2022] digoxin  125 mcg Oral Daily    sodium chloride  500 mL IntraVENous Once    [Held by provider] metoprolol tartrate  12.5 mg Oral BID    pantoprazole  40 mg Oral BID AC    miconazole   Topical BID    melatonin  10 mg Oral Nightly    albuterol  2.5 mg Nebulization BID    lidocaine  1 patch TransDERmal Daily    sodium chloride (Inhalant)  4 mL Nebulization BID    lidocaine   Topical Once    sodium chloride flush  5-40 mL IntraVENous 2 times per day    fluticasone  2 spray Each Nostril Daily    pregabalin  50 mg Oral TID     Continuous Infusions:   sodium chloride      sodium chloride      sodium chloride      [Held by provider] sodium chloride Stopped (08/14/22 1330)    sodium chloride      sodium chloride      sodium chloride      sodium chloride      dextrose      sodium chloride 5 mL/hr at 08/12/22 1154       Labs:  CBC:   Recent Labs     08/12/22  0630 08/12/22  1216 08/13/22  0530 08/13/22  1102 08/14/22  0105 08/14/22  0456 08/14/22  2244   WBC 10.1  --  15.0*  --   --  5.9  --    HGB 7.4*   < > 6.9*   < > 6.6* 7.2* 7.9*     --  363  --   --  336  --     < > = values in this interval not displayed.        BMP:    Recent Labs     08/13/22  0530 08/13/22  1430 08/14/22  0456   * 129* 133*   K 4.4 4.2 4.2   CL 98* 98* 105   CO2 20* 21 19*   BUN 44* 43* 35*   CREATININE 1.3* 1.2 0.9   GLUCOSE 119* 122* 90       Ca/Mg/Phos:   Recent Labs     08/12/22  0630 08/13/22  0530 08/13/22  1430 08/14/22  0456   CALCIUM 7.7* 7.6* 7.1* 7.1*   MG 1.60* 2.40 1.90 1.80   PHOS 3.8 2.9  --  2.8       Hepatic:   No results for input(s): AST, ALT, ALB, BILITOT, ALKPHOS in the last 72 hours. Troponin: No results for input(s): TROPONINI in the last 72 hours. BNP: No results for input(s): BNP in the last 72 hours. Lipids: No results for input(s): CHOL, TRIG, HDL, LDLCALC, LABVLDL in the last 72 hours. ABGs:   No results for input(s): PHART, PO2ART, RDO1GEN in the last 72 hours. INR:   No results for input(s): INR in the last 72 hours. UA:  No results for input(s): Sevilla Sous, GLUCOSEU, BILIRUBINUR, KETUA, SPECGRAV, BLOODU, PHUR, PROTEINU, UROBILINOGEN, NITRU, LEUKOCYTESUR, Reyne Ramonita in the last 72 hours. Urine Microscopic:   No results for input(s): LABCAST, BACTERIA, COMU, HYALCAST, WBCUA, RBCUA, EPIU in the last 72 hours. Urine Culture: No results for input(s): LABURIN in the last 72 hours. Urine Chemistry: No results for input(s): Deisy Norma, PROTEINUR, NAUR in the last 72 hours. Objective:   Vitals: /75   Pulse 72   Temp 98.3 °F (36.8 °C) (Oral)   Resp 17   Ht 5' 2.01\" (1.575 m)   Wt 234 lb 10.2 oz (106.4 kg)   SpO2 94%   BMI 42.90 kg/m²    Wt Readings from Last 3 Encounters:   08/14/22 234 lb 10.2 oz (106.4 kg)   07/13/22 218 lb 9.6 oz (99.2 kg)   06/14/22 217 lb (98.4 kg)      24HR INTAKE/OUTPUT:    Intake/Output Summary (Last 24 hours) at 8/14/2022 2339  Last data filed at 8/14/2022 2242  Gross per 24 hour   Intake 420 ml   Output 2200 ml   Net -1780 ml       Physical Exam  Constitutional:       Appearance: She is ill-appearing. She is not diaphoretic. Cardiovascular:      Rate and Rhythm: Normal rate.    Pulmonary:      Effort: Pulmonary effort is normal.      Breath sounds: Rhonchi present. No wheezing. Abdominal:      General: There is distension. Palpations: Abdomen is soft. Tenderness: There is abdominal tenderness. There is no guarding. Musculoskeletal:      Right lower leg: Edema present. Left lower leg: Edema present. Skin:     General: Skin is warm and dry. Neurological:      General: No focal deficit present. Mental Status: She is oriented to person, place, and time. Assessment and Plan:       IMAGING:  CT CHEST ABDOMEN PELVIS WO CONTRAST   Final Result      CHEST:      1. No evidence for hematoma   2. Decreased small pleural effusions and bibasilar atelectasis compared to 8/2/2022      ABDOMEN/PELVIS:      1. Right iliopsoas muscle small to medium-sized intramuscular acute hematoma . An accurate characterization of size of hematoma is not possible due to its infiltrative nature. 2. Right proximal thigh subcutaneous 3.8 x 3.7 cm water attenuation fluid collection   3. Nonspecific, nonobstructive bowel gas pattern      XR CHEST PORTABLE   Final Result      Limited inspiration. Accentuation of pulmonary interstitial markings, with minor basilar atelectasis. No other acute findings. MRI LUMBAR SPINE WO CONTRAST   Final Result   1. Moderate size intramuscular hematoma along the right iliopsoas muscle. This is new in comparison to abdominal CT 8/1/2022.   2. No central canal stenosis. 3. Moderate multilevel foraminal narrowing as described. 4. No acute osseous abnormality. XR HIP 2-3 VW W PELVIS RIGHT   Final Result   Impression:    1. Right total hip arthroplasty. The orthopedic hardware is intact without evidence of loosening. No significant change from prior CT examination. VL Extremity Venous Bilateral   Final Result      XR CHEST PORTABLE   Final Result      Mild bilateral airspace disease. XR ABDOMEN (KUB) (SINGLE AP VIEW)   Final Result   1. No evidence of free intraperitoneal air.    2. Indeterminate bowel gas pattern due to a paucity of small bowel gas. XR ABDOMEN (KUB) (SINGLE AP VIEW)   Final Result   Impression: Stable appearance of the abdomen. XR CHEST PORTABLE   Final Result      Low lung volumes with central bronchovascular crowding. Atelectasis in the left lung base. Normal cardiomediastinal silhouette. Lines and tubes in standard position. VL Extremity Venous Bilateral   Final Result      XR ABDOMEN (KUB) (SINGLE AP VIEW)   Final Result   Impression: Stable appearance of the abdomen with gas distended loops of central small bowel again noted. CT CHEST WO CONTRAST   Final Result      1. Moderate right pleural effusion and small left effusion with basilar airspace opacity most consistent with atelectasis. Superimposed pneumonia be difficult to exclude. 2. Narrowing of the trachea with expiration compatible with treated bronchial malacia. 3. Nasogastric tube tip in the lower esophagus. 4. Persistent colonic distention. 5. Mild coronary artery calcification. XR ABDOMEN (KUB) (SINGLE AP VIEW)   Final Result      Frontal supine views demonstrate an unremarkable bowel gas pattern. No significant colonic stool is seen. Right hip arthroplasty noted. XR CHEST PORTABLE   Final Result      Prominence of the perihilar interstitial markings with mild peribronchial cuffing is favored to represent mild pulmonary edema. Low lung volumes bronchovascular crowding. Stable cardiomediastinal silhouette. Lines and tubes in standard position. Cholecystectomy clips are noted. CT ABDOMEN PELVIS WO CONTRAST Additional Contrast? Oral and Rectal   Final Result   1. Moderate colonic distention. Correlate for constipation. Fecal content within the ileum which may suggest stasis. There is no small bowel obstruction. 2. Subcutaneous fluid collection overlying the right total hip arthroplasty. CT HIP RIGHT WO CONTRAST   Final Result   1. Moderate colonic distention. Correlate for constipation. Fecal content within the ileum which may suggest stasis. There is no small bowel obstruction. 2. Subcutaneous fluid collection overlying the right total hip arthroplasty. XR CHEST PORTABLE   Final Result      1. Right IJ CVC tip in the cavoatrial junction. 2.  Bibasilar airspace disease suggesting atelectasis. Pneumonia is not excluded. XR ABDOMEN (KUB) (SINGLE AP VIEW)   Final Result      Dilated colonic and small bowel loops may suggest ileus but obstruction is not excluded. XR ABDOMEN (KUB) (SINGLE AP VIEW)   Final Result      No acute radiographic abnormality of the abdomen. Status post right hip arthroplasty with mildly displaced greater trochanteric fracture fragment, new or more pronounced compared to the prior study. Assessment/Plan     ARIANE   2. Sepsis  3. AGMA   4. Lactic Acidosis - resolved  4. Hyperkalemia  5. Hyponatremia  6. Bowel Obstruction  7. Hypertension  8. GI bleed  9.  Anemia      Plan:  - Cr stable  - fluid restriction as Na dropping   - encourage solute intake   - Mag repleted  - No need for diuresis/IVF  - ECHO normal EF - IVC compressible   - Hypoxia likely sec to Aspiration pneumonitis   - ARIANE sec to ATN   - Monitor I/Os  - Avoid nephrotoxic agents    Recommend to dose adjust all medications  based on renal functions  Maintain SBP> 90 mmHg   Daily weights   AVOID NSAIDs  Avoid Nephrotoxins  Monitor Intake/Output  Call if significant decrease in urine output        Chandrakant Shin MD

## 2022-08-15 NOTE — PROGRESS NOTES
Occupational Therapy  Facility/Department: [facility] [address]  Occupational Therapy Daily Treatment    Name: Gina Salmeron  : 1950  MRN: [id number]  Date of Service: 8/15/2022    Discharge Recommendations:  Gina Salmeron scored a 824 on the AM-PAC ADL Inpatient form. Current research shows that an AM-PAC score of 17 or less is typically not associated with a discharge to the patient's home setting. Based on the patient's AM-PAC score and their current ADL deficits, it is recommended that the patient have 3-5 sessions per week of Occupational Therapy at d/c to increase the patient's independence. Please see assessment section for further patient specific details. If patient discharges prior to next session this note will serve as a discharge summary. Please see below for the latest assessment towards goals. Patient Diagnosis(es): The primary encounter diagnosis was Right lower quadrant abdominal pain. A diagnosis of Melena was also pertinent to this visit. Past Medical History:  has a past medical history of Back pain, Hyperlipidemia, Hypertension, Tuberculosis, and Wears dentures. Past Surgical History:  has a past surgical history that includes Appendectomy (); Tonsillectomy (); Ectopic pregnancy surgery (); Partial hysterectomy (); Cholecystectomy (); Cataract removal (Bilateral, ); Total hip arthroplasty (Right, 2022); Colonoscopy (N/A, 8/3/2022); and Upper gastrointestinal endoscopy (N/A, 2022). Treatment Diagnosis: impaired ADLs and mobility      Assessment   Performance deficits / Impairments: Decreased functional mobility ; Decreased balance;Decreased ADL status; Decreased endurance;Decreased strength;Decreased ROM; Decreased posture  Assessment: Pt c/o weakness and back pain. Unable to complete sit to stand from bed to Flor Corderoser this date and declined additional attempts.  Pt was able to clear buttocks from EOB when attempting to stand to danica bijudy w/ Max Ax2. Pt remains below functional baseline and would benefit from cont inpt Ot services upon dc to maximize safety and functional independence. Cont OT per POC  Treatment Diagnosis: impaired ADLs and mobility  REQUIRES OT FOLLOW-UP: Yes  Activity Tolerance  Activity Tolerance: Patient limited by fatigue;Patient limited by pain  Activity Tolerance Comments: limited by R hip and lower back pain, fatigue and decreased strength/endurance        Plan   Plan  Times per Week: 2-5  Times per Day: Daily  Current Treatment Recommendations: Strengthening, Functional mobility training, Safety education & training, Patient/Caregiver education & training, Self-Care / ADL, Positioning, Endurance training, Pain management, Balance training, ROM     Restrictions  Restrictions/Precautions  Restrictions/Precautions: Modified Diet, Up as Tolerated, Fall Risk (High fall risk, full liquid diet)  Position Activity Restriction  Other position/activity restrictions: up wtih assist,  50% WB right hip    Subjective   General  Chart Reviewed: Yes  Patient assessed for rehabilitation services?: Yes  Additional Pertinent Hx: Anisha Gongora is a 74 y.o. female with Hx of HTN, HLD, Tuberculosis (1981), gout, and OA s/p right CASS on 7/13/22 who was admitted to [facility] on 7/30 due to fluid associated with her right CASS on 7/13 and for abdominal pain. CT scan abdomen shows colonic distention with fecal burden throughout colon and terminal ileum. S/p Colonoscopy with decompression on 8/3; MRI shows R iliopsoas hematoma  Family / Caregiver Present: Yes (pt's daughter present at beginning of session)  Referring Practitioner: Roselyn Melendez MD  Diagnosis: abdominal pain  Subjective  Subjective: Pt in bed upon entry.    Pt c/o back pain (not rated, nurse aware)     Social/Functional History  Social/Functional History  Lives With: Spouse  Type of Home: House  Home Layout: One level  Home Access: Ramped entrance (through garage)  [facility] Shower/Tub: Walk-in shower (small lip to enter, glass doors)  Bathroom Toilet: Handicap height (+ toilet riser w/ rails)  Bathroom Equipment: Grab bars in shower, Toilet raiser, Hand-held shower  Home Equipment: Walker, [place], Walker, 4 wheeled, [place], [address] (transport chair)  [place] [address] Help From: Family, Other (comment) (skilled RN 1x weekly, daughter assists \"as often as she needs me\" w/ heavy household mgmt)  ADL Assistance: Independent (prior to hip sx)  Homemaking Assistance: Independent (shares w/ )  Ambulation Assistance: Independent (prior to hip sx, w/ AD)  Transfer Assistance: Independent  Active : Yes  Leisure & Hobbies: \"Keeping everything up\"  IADL Comments: Shared w/ , daughter and granddaugher assist PRN w/ household mgmt  Additional Comments: Pt d/c'd to SNF from recent hip sx on 7/16       Objective   Heart Rate: 97  Heart Rate Source: Monitor  BP: 124/69  BP Location: Left upper arm  BP Method: Automatic  Patient Position: Semi fowlers  MAP (Calculated): 87.33  Resp: 18  SpO2: 94 %  O2 Device: None (Room air)             Safety Devices  Type of Devices: Call light within reach; Left in bed;Bed alarm in place;Nurse notified  Bed Mobility Training  Bed Mobility Training: Yes  Rolling: Assist X2;Maximum assistance (Max Ax2)  Supine to Sit: Assist X2;Maximum assistance (Max Ax2)  Sit to Supine: Assist X2;Maximum assistance (Max Ax2)  Scooting: Total assistance (x 2 supine in Trendelenburg)  Balance  Sitting:  (CGA/min A initially, progressing to SBA/CGA, pt holding bed rail & frame. pt sat at EOB for ~25 min. pt c/o dizziness. vitals stable)  Transfer Training  Transfer Training:  (attempted sit->stand from elevated bed to danica dumont, pt able to minimally clear buttocks from bed with max A x 2. unable to come to full standing. pt declined further attempts d/t fatigue.  discussed need for david transfers with RN.)        ADL  LE Dressing: Dependent/Total  LE Dressing Skilled Clinical Factors: Total A to don socks  Toileting: Dependent/Total  Toileting Skilled Clinical Factors: rectal tube and winter cath     Activity Tolerance  Activity Tolerance: Patient limited by endurance; Patient limited by pain; Patient limited by fatigue           Cognition  Overall Cognitive Status: Exceptions  Arousal/Alertness: Delayed responses to stimuli  Following Commands: Follows one step commands with increased time; Follows one step commands with repetition  Memory: Decreased recall of recent events  Safety Judgement: Decreased awareness of need for assistance;Decreased awareness of need for safety  Problem Solving: Assistance required to generate solutions;Assistance required to implement solutions;Assistance required to identify errors made;Assistance required to correct errors made;Decreased awareness of errors  Insights: Decreased awareness of deficits  Initiation: Requires cues for some  Sequencing: Requires cues for some               A/AROM Exercises: x10 reps weak grasp/release, shoulder flex/ext (demo difficulty) - educated on performing digit, wrist, and elbow flexion/extension daily to reduce edema and increase ROM/strength  Education Given To: Patient  Education Provided: Role of Therapy;Plan of Care;Precautions;Transfer Training  Education Provided Comments: safety w/ bed mobility, BUE HEP  Education Method: Verbal;Demonstration  Barriers to Learning: Cognition  Education Outcome: Verbalized understanding;Continued education needed                        G-Code     OutComes Score                                                  AM-PAC Score        AM-[facility] Inpatient Daily Activity Raw Score: 8 (08/15/22 1555)  AM-PAC Inpatient ADL T-Scale Score : 22.86 (08/15/22 1555)  ADL Inpatient CMS 0-100% Score: 85.69 (08/15/22 1555)  ADL Inpatient CMS G-Code Modifier : CM (08/15/22 1555)    Tinneti Score       Goals  Short Term Goals  Time Frame for Short term goals: by d/c  Short Term Goal 1: pt will complete toileting w/ Mod A    -not met-  Short Term Goal 2: pt will complete LE dressing w/ Mod A    -not met-  Short Term Goal 3: pt will perform BUE HEP i'ly x10 reps AROM to all joints to reduce edema and improve strength for ADLs     -not met-  Short Term Goal 4: pt will perorfm supine to sit w/ Mod A     -not met-  Patient Goals   Patient goals : \"Get stronger. \"       Therapy Time   Individual Concurrent Group Co-treatment   Time In 4914         Time Out 1222         Minutes Elise Veliz

## 2022-08-15 NOTE — CARE COORDINATION
Case Management Assessment           Daily Note                 Date/ Time of Note: 8/15/2022 11:33 AM         Note completed by: Josh Hu RN    Patient Name: Tonya Romero  YOB: 1950    Diagnosis:Abdominal pain [R10.9]  Patient Admission Status: Inpatient    Date of Admission:7/30/2022  8:43 PM Length of Stay: 16 GLOS: GMLOS: 4.8    Current Plan of Care: monitoring H/H, C-DIFF isolation, rectal tube in place  ________________________________________________________________________________________  PT AM-PAC: 6 / 24 per last evaluation on: 08.10.22    OT AM-PAC: 8 / 24 per last evaluation on: 08.10.22    DME Needs for discharge:   ________________________________________________________________________________________  Discharge Plan: SNF: [facility]    Tentative discharge date: TBD    Current barriers to discharge: medical stability    Referrals completed: Not Applicable    Resources/ information provided: Not indicated at this time  ________________________________________________________________________________________  Case Management Notes: CM continues to follow for DC planning and needs. Patient continues with rectal tube in place, isolation for C-DIFF, continuing to monitor H/H. Patient will need updated PT/OT evals once appropriate and able to work with therapy. Patient has been accepted to SNF at [facility] when medically stable for DC. Patient will need transport and HENS at [place]. Maximo Mandujano and her family were provided with choice of provider; she and her family are in agreement with the discharge plan.     Care Transition Patient: Daily Hu RN  [facility]  Case Management Department  Ph: [phone]  Fax: [phone]

## 2022-08-16 LAB
ALBUMIN SERPL-MCNC: 1.7 G/DL (ref 3.4–5)
ANION GAP SERPL CALCULATED.3IONS-SCNC: 9 MMOL/L (ref 3–16)
BASOPHILS ABSOLUTE: 0 K/UL (ref 0–0.2)
BASOPHILS RELATIVE PERCENT: 0 %
BUN BLDV-MCNC: 19 MG/DL (ref 7–20)
CALCIUM SERPL-MCNC: 7.4 MG/DL (ref 8.3–10.6)
CHLORIDE BLD-SCNC: 102 MMOL/L (ref 99–110)
CO2: 19 MMOL/L (ref 21–32)
CREAT SERPL-MCNC: 0.6 MG/DL (ref 0.6–1.2)
EOSINOPHILS ABSOLUTE: 0.1 K/UL (ref 0–0.6)
EOSINOPHILS RELATIVE PERCENT: 1 %
GFR AFRICAN AMERICAN: >60
GFR NON-AFRICAN AMERICAN: >60
GLUCOSE BLD-MCNC: 116 MG/DL (ref 70–99)
GLUCOSE BLD-MCNC: 116 MG/DL (ref 70–99)
GLUCOSE BLD-MCNC: 83 MG/DL (ref 70–99)
GLUCOSE BLD-MCNC: 90 MG/DL (ref 70–99)
GLUCOSE BLD-MCNC: 98 MG/DL (ref 70–99)
HCT VFR BLD CALC: 23.2 % (ref 36–48)
HEMOGLOBIN: 7.6 G/DL (ref 12–16)
HYPOCHROMIA: ABNORMAL
LYMPHOCYTES ABSOLUTE: 0.7 K/UL (ref 1–5.1)
LYMPHOCYTES RELATIVE PERCENT: 11 %
MAGNESIUM: 1.6 MG/DL (ref 1.8–2.4)
MCH RBC QN AUTO: 30.6 PG (ref 26–34)
MCHC RBC AUTO-ENTMCNC: 33 G/DL (ref 31–36)
MCV RBC AUTO: 92.7 FL (ref 80–100)
MONOCYTES ABSOLUTE: 1.3 K/UL (ref 0–1.3)
MONOCYTES RELATIVE PERCENT: 21 %
NEUTROPHILS ABSOLUTE: 4 K/UL (ref 1.7–7.7)
NEUTROPHILS RELATIVE PERCENT: 67 %
PDW BLD-RTO: 14.9 % (ref 12.4–15.4)
PERFORMED ON: ABNORMAL
PERFORMED ON: ABNORMAL
PERFORMED ON: NORMAL
PERFORMED ON: NORMAL
PHOSPHORUS: 2.6 MG/DL (ref 2.5–4.9)
PLATELET # BLD: 469 K/UL (ref 135–450)
PLATELET SLIDE REVIEW: ABNORMAL
PMV BLD AUTO: 8.4 FL (ref 5–10.5)
POTASSIUM SERPL-SCNC: 4.1 MMOL/L (ref 3.5–5.1)
RBC # BLD: 2.5 M/UL (ref 4–5.2)
RBC # BLD: NORMAL 10*6/UL
SLIDE REVIEW: ABNORMAL
SODIUM BLD-SCNC: 130 MMOL/L (ref 136–145)
WBC # BLD: 6 K/UL (ref 4–11)

## 2022-08-16 PROCEDURE — 94640 AIRWAY INHALATION TREATMENT: CPT

## 2022-08-16 PROCEDURE — 94669 MECHANICAL CHEST WALL OSCILL: CPT

## 2022-08-16 PROCEDURE — 2060000000 HC ICU INTERMEDIATE R&B

## 2022-08-16 PROCEDURE — 83735 ASSAY OF MAGNESIUM: CPT

## 2022-08-16 PROCEDURE — 6360000002 HC RX W HCPCS: Performed by: INTERNAL MEDICINE

## 2022-08-16 PROCEDURE — 97530 THERAPEUTIC ACTIVITIES: CPT

## 2022-08-16 PROCEDURE — 2580000003 HC RX 258

## 2022-08-16 PROCEDURE — 80069 RENAL FUNCTION PANEL: CPT

## 2022-08-16 PROCEDURE — 99232 SBSQ HOSP IP/OBS MODERATE 35: CPT | Performed by: INTERNAL MEDICINE

## 2022-08-16 PROCEDURE — 6370000000 HC RX 637 (ALT 250 FOR IP): Performed by: INTERNAL MEDICINE

## 2022-08-16 PROCEDURE — 97110 THERAPEUTIC EXERCISES: CPT

## 2022-08-16 PROCEDURE — 6360000002 HC RX W HCPCS: Performed by: NURSE PRACTITIONER

## 2022-08-16 PROCEDURE — 6370000000 HC RX 637 (ALT 250 FOR IP): Performed by: SURGERY

## 2022-08-16 PROCEDURE — 85025 COMPLETE CBC W/AUTO DIFF WBC: CPT

## 2022-08-16 PROCEDURE — 99233 SBSQ HOSP IP/OBS HIGH 50: CPT | Performed by: NURSE PRACTITIONER

## 2022-08-16 PROCEDURE — 6360000002 HC RX W HCPCS: Performed by: SURGERY

## 2022-08-16 PROCEDURE — 6370000000 HC RX 637 (ALT 250 FOR IP)

## 2022-08-16 PROCEDURE — 92526 ORAL FUNCTION THERAPY: CPT

## 2022-08-16 PROCEDURE — 99231 SBSQ HOSP IP/OBS SF/LOW 25: CPT | Performed by: SURGERY

## 2022-08-16 RX ORDER — FLUORIDE TOOTHPASTE
10 TOOTHPASTE DENTAL 3 TIMES DAILY PRN
Status: DISCONTINUED | OUTPATIENT
Start: 2022-08-16 | End: 2022-08-23 | Stop reason: HOSPADM

## 2022-08-16 RX ORDER — MAGNESIUM SULFATE IN WATER 40 MG/ML
4000 INJECTION, SOLUTION INTRAVENOUS ONCE
Status: COMPLETED | OUTPATIENT
Start: 2022-08-16 | End: 2022-08-16

## 2022-08-16 RX ADMIN — SODIUM CHLORIDE, PRESERVATIVE FREE 10 ML: 5 INJECTION INTRAVENOUS at 20:35

## 2022-08-16 RX ADMIN — MICONAZOLE NITRATE: 2 POWDER TOPICAL at 09:28

## 2022-08-16 RX ADMIN — MAGNESIUM SULFATE HEPTAHYDRATE 4000 MG: 40 INJECTION, SOLUTION INTRAVENOUS at 09:27

## 2022-08-16 RX ADMIN — METHOCARBAMOL 500 MG: 500 TABLET ORAL at 16:37

## 2022-08-16 RX ADMIN — FLUTICASONE PROPIONATE 2 SPRAY: 50 SPRAY, METERED NASAL at 09:27

## 2022-08-16 RX ADMIN — HEPARIN SODIUM 5000 UNITS: 5000 INJECTION INTRAVENOUS; SUBCUTANEOUS at 05:33

## 2022-08-16 RX ADMIN — PANTOPRAZOLE SODIUM 40 MG: 40 TABLET, DELAYED RELEASE ORAL at 05:33

## 2022-08-16 RX ADMIN — Medication 125 MG: at 23:11

## 2022-08-16 RX ADMIN — TRAMADOL HYDROCHLORIDE 50 MG: 50 TABLET, COATED ORAL at 20:37

## 2022-08-16 RX ADMIN — METOPROLOL TARTRATE 25 MG: 25 TABLET, FILM COATED ORAL at 20:34

## 2022-08-16 RX ADMIN — Medication 125 MG: at 12:50

## 2022-08-16 RX ADMIN — ALBUTEROL SULFATE 2.5 MG: 2.5 SOLUTION RESPIRATORY (INHALATION) at 20:16

## 2022-08-16 RX ADMIN — PREGABALIN 50 MG: 50 CAPSULE ORAL at 20:34

## 2022-08-16 RX ADMIN — PREGABALIN 50 MG: 50 CAPSULE ORAL at 09:27

## 2022-08-16 RX ADMIN — MICONAZOLE NITRATE: 2 POWDER TOPICAL at 20:36

## 2022-08-16 RX ADMIN — HEPARIN SODIUM 5000 UNITS: 5000 INJECTION INTRAVENOUS; SUBCUTANEOUS at 20:34

## 2022-08-16 RX ADMIN — Medication 125 MG: at 05:33

## 2022-08-16 RX ADMIN — DIGOXIN 125 MCG: 125 TABLET ORAL at 09:27

## 2022-08-16 RX ADMIN — TRAMADOL HYDROCHLORIDE 50 MG: 50 TABLET, COATED ORAL at 00:45

## 2022-08-16 RX ADMIN — TRAMADOL HYDROCHLORIDE 50 MG: 50 TABLET, COATED ORAL at 13:58

## 2022-08-16 RX ADMIN — PANTOPRAZOLE SODIUM 40 MG: 40 TABLET, DELAYED RELEASE ORAL at 16:37

## 2022-08-16 RX ADMIN — Medication 125 MG: at 17:29

## 2022-08-16 RX ADMIN — SODIUM CHLORIDE, PRESERVATIVE FREE 10 ML: 5 INJECTION INTRAVENOUS at 09:27

## 2022-08-16 RX ADMIN — TRAMADOL HYDROCHLORIDE 50 MG: 50 TABLET, COATED ORAL at 08:00

## 2022-08-16 RX ADMIN — PREGABALIN 50 MG: 50 CAPSULE ORAL at 13:58

## 2022-08-16 RX ADMIN — Medication 125 MG: at 00:10

## 2022-08-16 RX ADMIN — HEPARIN SODIUM 5000 UNITS: 5000 INJECTION INTRAVENOUS; SUBCUTANEOUS at 13:58

## 2022-08-16 RX ADMIN — Medication 10 MG: at 20:34

## 2022-08-16 RX ADMIN — SODIUM CHLORIDE 30 MG/ML INHALATION SOLUTION 4 ML: 30 SOLUTION INHALANT at 20:17

## 2022-08-16 RX ADMIN — METOPROLOL TARTRATE 25 MG: 25 TABLET, FILM COATED ORAL at 09:27

## 2022-08-16 ASSESSMENT — PAIN SCALES - GENERAL
PAINLEVEL_OUTOF10: 0
PAINLEVEL_OUTOF10: 8
PAINLEVEL_OUTOF10: 5
PAINLEVEL_OUTOF10: 7
PAINLEVEL_OUTOF10: 0
PAINLEVEL_OUTOF10: 7
PAINLEVEL_OUTOF10: 5
PAINLEVEL_OUTOF10: 0
PAINLEVEL_OUTOF10: 8
PAINLEVEL_OUTOF10: 4
PAINLEVEL_OUTOF10: 8

## 2022-08-16 ASSESSMENT — PAIN DESCRIPTION - DESCRIPTORS
DESCRIPTORS: ACHING;DISCOMFORT
DESCRIPTORS: DISCOMFORT
DESCRIPTORS: ACHING;DISCOMFORT
DESCRIPTORS: DISCOMFORT
DESCRIPTORS: ACHING
DESCRIPTORS: ACHING;DISCOMFORT
DESCRIPTORS: ACHING
DESCRIPTORS: ACHING

## 2022-08-16 ASSESSMENT — PAIN DESCRIPTION - PAIN TYPE
TYPE: CHRONIC PAIN

## 2022-08-16 ASSESSMENT — PAIN DESCRIPTION - LOCATION
LOCATION: HIP;LEG
LOCATION: GENERALIZED
LOCATION: HIP;BACK
LOCATION: HIP;LEG
LOCATION: OTHER (COMMENT)

## 2022-08-16 ASSESSMENT — PAIN DESCRIPTION - ORIENTATION
ORIENTATION: RIGHT

## 2022-08-16 ASSESSMENT — PAIN DESCRIPTION - ONSET
ONSET: ON-GOING
ONSET: ON-GOING

## 2022-08-16 ASSESSMENT — PAIN DESCRIPTION - FREQUENCY
FREQUENCY: CONTINUOUS
FREQUENCY: CONTINUOUS

## 2022-08-16 NOTE — PLAN OF CARE
Problem: Safety - Adult  Goal: Free from fall injury  Outcome: Progressing  Note: Pt is a Fall Risk. See Debbrah Fass Fall Risk Score. Pt bed in low position and side rails up. Call light and belongings in reach. Pt encouraged to call for assistance. Will continue with hourly rounds for PO intake, pain needs, toileting, and repositioning as needed. Bed alarm on. Problem: ABCDS Injury Assessment  Goal: Absence of physical injury  Outcome: Progressing  Flowsheets (Taken 8/16/2022 0427)  Absence of Physical Injury: Implement safety measures based on patient assessment     Problem: Skin/Tissue Integrity  Goal: Absence of new skin breakdown  Description: 1. Monitor for areas of redness and/or skin breakdown  2. Assess vascular access sites hourly  3. Every 4-6 hours minimum:  Change oxygen saturation probe site  4. Every 4-6 hours:  If on nasal continuous positive airway pressure, respiratory therapy assess nares and determine need for appliance change or resting period. Outcome: Progressing  Note: Assessed skin. Turned pt q2 and repositioned pt as prn. Pericare, incontinence care, winter care and fixed rectal tube from leaking. Attended needs. Will continue to monitor. Problem: Respiratory - Adult  Goal: Achieves optimal ventilation and oxygenation  Outcome: Progressing  Flowsheets (Taken 8/16/2022 0427)  Achieves optimal ventilation and oxygenation: Assess for changes in respiratory status     Problem: Gastrointestinal - Adult  Goal: Maintains or returns to baseline bowel function  Outcome: Progressing  Flowsheets (Taken 8/16/2022 0427)  Maintains or returns to baseline bowel function: Assess bowel function  Note: On rectal tube, monitored. Cleaned as PRN. Stool is still loose and watery, brown greenish. Will continue to monitor.       Problem: Genitourinary - Adult  Goal: Urinary catheter remains patent  Outcome: Progressing  Flowsheets (Taken 8/16/2022 0427)  Urinary catheter remains patent: Assess patency of urinary catheter. Iqbal care done.       Problem: Pain  Goal: Verbalizes/displays adequate comfort level or baseline comfort level  Outcome: Progressing  Flowsheets (Taken 8/16/2022 0427)  Verbalizes/displays adequate comfort level or baseline comfort level:   Encourage patient to monitor pain and request assistance   Assess pain using appropriate pain scale   Administer analgesics based on type and severity of pain and evaluate response   Implement non-pharmacological measures as appropriate and evaluate response   Consider cultural and social influences on pain and pain management   Notify Licensed Independent Practitioner if interventions unsuccessful or patient reports new pain

## 2022-08-16 NOTE — PLAN OF CARE
Problem: Discharge Planning  Goal: Discharge to home or other facility with appropriate resources  Outcome: Progressing  Flowsheets (Taken 8/16/2022 1154)  Discharge to home or other facility with appropriate resources:   Identify barriers to discharge with patient and caregiver   Identify discharge learning needs (meds, wound care, etc)     Problem: Safety - Adult  Goal: Free from fall injury  8/16/2022 1154 by Yung Lin RN  Outcome: Progressing  Flowsheets (Taken 8/16/2022 1154)  Free From Fall Injury: Instruct family/caregiver on patient safety     Problem: Skin/Tissue Integrity  Goal: Absence of new skin breakdown  Description: 1. Monitor for areas of redness and/or skin breakdown  2. Assess vascular access sites hourly  3. Every 4-6 hours minimum:  Change oxygen saturation probe site  4. Every 4-6 hours:  If on nasal continuous positive airway pressure, respiratory therapy assess nares and determine need for appliance change or resting period.   8/16/2022 1154 by Ynug Lin RN  Outcome: Progressing     Problem: Respiratory - Adult  Goal: Achieves optimal ventilation and oxygenation  8/16/2022 1154 by Yung Lin RN  Outcome: Progressing  Flowsheets (Taken 8/16/2022 1154)  Achieves optimal ventilation and oxygenation:   Assess for changes in respiratory status   Position to facilitate oxygenation and minimize respiratory effort   Assess and instruct to report shortness of breath or any respiratory difficulty     Problem: Musculoskeletal - Adult  Goal: Return mobility to safest level of function  Outcome: Progressing  Flowsheets (Taken 8/16/2022 1154)  Return Mobility to Safest Level of Function:   Assess patient stability and activity tolerance for standing, transferring and ambulating with or without assistive devices   Assist with transfers and ambulation using safe patient handling equipment as needed   Obtain physical therapy/occupational therapy consults as needed   Instruct patient/family in ordered activity level     Problem: Gastrointestinal - Adult  Goal: Maintains adequate nutritional intake  Outcome: Progressing  Flowsheets (Taken 8/16/2022 1154)  Maintains adequate nutritional intake:   Assist with meals as needed   Monitor intake and output, weight and lab values     Problem: Gastrointestinal - Adult  Goal: Maintains or returns to baseline bowel function  8/16/2022 1154 by Cee Kamara RN  Outcome: Progressing  Flowsheets (Taken 8/16/2022 1154)  Maintains or returns to baseline bowel function:   Assess bowel function   Encourage oral fluids to ensure adequate hydration   Encourage mobilization and activity     Problem: Genitourinary - Adult  Goal: Urinary catheter remains patent  8/16/2022 1154 by Cee Kamara RN  Outcome: Progressing  Flowsheets (Taken 8/16/2022 1154)  Urinary catheter remains patent: Assess patency of urinary catheter     Problem: Infection - Adult  Goal: Absence of infection at discharge  Outcome: Progressing  Flowsheets (Taken 8/16/2022 1154)  Absence of infection at discharge:   Assess and monitor for signs and symptoms of infection   Monitor lab/diagnostic results   Monitor all insertion sites i.e., indwelling lines, tubes and drains   Administer medications as ordered     Problem: Pain  Goal: Verbalizes/displays adequate comfort level or baseline comfort level  8/16/2022 1154 by Cee Kamara RN  Outcome: Progressing  Flowsheets (Taken 8/16/2022 1154)  Verbalizes/displays adequate comfort level or baseline comfort level:   Encourage patient to monitor pain and request assistance   Assess pain using appropriate pain scale   Administer analgesics based on type and severity of pain and evaluate response

## 2022-08-16 NOTE — PROGRESS NOTES
Physical Therapy  Facility/Department: [facility] [address] [facility]  Daily Treatment Note  NAME: Delfina Ashby  : 1950  MRN: [id number]    Date of Service: 2022    Discharge Recommendations:  Delfina Ashby scored a 6/24 on the AM-PAC short mobility form. Current research shows that an AM-PAC score of 17 or less is typically not associated with a discharge to the patient's home setting. Based on the patient's AM-PAC score and their current functional mobility deficits, it is recommended that the patient have 3-5 sessions per week of Physical Therapy at d/c to increase the patient's independence. Please see assessment section for further patient specific details. If patient discharges prior to next session this note will serve as a discharge summary. Please see below for the latest assessment towards goals. Patient would benefit from continued therapy after discharge   PT Equipment Recommendations  Equipment Needed: No (defer)    Patient Diagnosis(es): The primary encounter diagnosis was Right lower quadrant abdominal pain. A diagnosis of Melena was also pertinent to this visit. Assessment   Assessment: Pt requires assist x 2 for bed mobility & sit<->stand. Requires use of danica stedy for transfer to chair. Functioning well below baseline. Recommend further inpt PT upon D/C. WIll follow per plan of care. Activity Tolerance: Patient limited by endurance; Patient limited by pain; Patient limited by fatigue  Equipment Needed: No (defer)     Plan    Plan  Plan:  (2-5)  Current Treatment Recommendations: Strengthening;ROM; Functional mobility training;Gait training; Safety education & training;Transfer training; Endurance training     Restrictions  Restrictions/Precautions  Restrictions/Precautions: Modified Diet, Up as Tolerated, Fall Risk (High fall risk, full liquid diet)  Position Activity Restriction  Other position/activity restrictions: up wtih assist,  50% WB right hip     Subjective Subjective  Subjective: Pt supine in bed & willing to participate. Objective      Bed Mobility Training  Bed Mobility Training: Yes  Supine to Sit: Assist X2 (max A x 1, mod A x 2. HOB elevated)  Scooting: Total assistance (x 1 to scoot to EOB, x 2 to scoot back in chair.)  Balance  Sitting:  (pt sat at EOB for 10 min with mod A initially, progressing to SBA. pt required min A with reaching activity.)  Standing:  (pt came to full stand only briefly (3 sec) with max A x2)  Transfer Training  Transfer Training: Yes  Sit to Stand: Assist X2 (max A x 2 from elevated bed to danica stedy (2nd attempt after one failed attempt). mod A x 2 from stedy seat.)  Stand to Sit: Assist X2 (max A x 2)  Bed to Chair: Total assistance (via danica stedy)     PT Exercises  Exercise Treatment: x 10 bilat: ankle pumps, GS, QS, heel slides (mod A left, max A right), hip abd (min A left, max A right)     Safety Devices  Type of Devices: Call light within reach;Nurse notified; Left in chair;Chair alarm in place (Discussed with RN need for maxi jazmyn to return pt to bed.)       Goals  Short Term Goals  Time Frame for Short term goals: Discharge  Short term goal 1: roll with CG R/L. Ongoing  Short term goal 2: Supine to sit with CG. Ongoing  Short term goal 3: Sit to stand with mod x 2. MET 8/9 . Revised goal:  Pt will transfer sit to stand with min assist x 1  Short term goal 4: Bed to chair with mod x 2. Ongoing  Short term goal 5: Ambulate 20 ft with RW, 50% WB on right with mod x 2.   Ongoing  Patient Goals   Patient goals : not specifically stated    Education  Patient Education  Education Given To: Patient  Education Provided: Plan of Care;Home Exercise Program;Transfer Training  Education Method: Verbal;Demonstration  Education Outcome: Verbalized understanding;Demonstrated understanding;Continued education needed    Therapy Time   Individual Concurrent Group Co-treatment   Time In [phone]         Time Out 1550         Minutes 76 Clemencia Ceja, PT

## 2022-08-16 NOTE — PROGRESS NOTES
Electrophysiology - PROGRESS NOTE    Admit Date: 7/30/2022     Chief Complaint: AF     Interval History:   Patient seen and examined and notes reviewed. Patient is being followed for AF. Pt presented to the hospital with abdominal pain. Found to have bowel obstruction along with sepsis. She went into AF/RVR, placed on dilt gtt  and converted to NSR. She then underwent a colonoscopy with decompression on 8/3/2022 and was found to back in AF afterwards, placed on amio drip and converted to NSR. She has been in NSR since 8/6/2022 however did have an episode of AF. Her heparin was turned off on 8/9 due to bloody nose and stool. H&H has been trending down, remains off the heparin. RR 8/12 for hypotension during blood transfusion. Remains in AF/AFL, rates fairly well controlled. C/o occasional palpitations. + CDIFF, has rectal tube. In: 230 [P.O.:220;  I.V.:10]  Out: 1200    Wt Readings from Last 2 Encounters:   08/16/22 238 lb 1.6 oz (108 kg)   07/13/22 218 lb 9.6 oz (99.2 kg)         Data:   Scheduled Meds:   Scheduled Meds:   magnesium sulfate  4,000 mg IntraVENous Once    metoprolol tartrate  25 mg Oral BID    heparin (porcine)  5,000 Units SubCUTAneous 3 times per day    vancomycin  125 mg Oral 4 times per day    digoxin  125 mcg Oral Daily    pantoprazole  40 mg Oral BID AC    miconazole   Topical BID    melatonin  10 mg Oral Nightly    albuterol  2.5 mg Nebulization BID    lidocaine  1 patch TransDERmal Daily    sodium chloride (Inhalant)  4 mL Nebulization BID    sodium chloride flush  5-40 mL IntraVENous 2 times per day    fluticasone  2 spray Each Nostril Daily    pregabalin  50 mg Oral TID     Continuous Infusions:   sodium chloride      sodium chloride      sodium chloride      sodium chloride      dextrose      sodium chloride 5 mL/hr at 08/12/22 1154     PRN Meds:.traMADol, sodium chloride, sodium chloride, sodium chloride, lidocaine, sodium chloride, sodium chloride, prochlorperazine, glucose, dextrose bolus **OR** dextrose bolus, glucagon (rDNA), dextrose, albuterol, phenol, morphine **OR** morphine, sodium chloride flush, sodium chloride, ondansetron **OR** ondansetron, methocarbamol  Continuous Infusions:   sodium chloride      sodium chloride      sodium chloride      sodium chloride      dextrose      sodium chloride 5 mL/hr at 08/12/22 1154       Intake/Output Summary (Last 24 hours) at 8/16/2022 0955  Last data filed at 8/16/2022 0532  Gross per 24 hour   Intake 230 ml   Output 1200 ml   Net -970 ml       CBC:   Lab Results   Component Value Date/Time    WBC 6.0 08/16/2022 05:52 AM    HGB 7.6 08/16/2022 05:52 AM     08/16/2022 05:52 AM     BMP:  Lab Results   Component Value Date/Time     08/16/2022 05:58 AM    K 4.1 08/16/2022 05:58 AM    K 4.0 08/15/2022 07:07 AM     08/16/2022 05:58 AM    CO2 19 08/16/2022 05:58 AM    BUN 19 08/16/2022 05:58 AM    CREATININE 0.6 08/16/2022 05:58 AM    GLUCOSE 83 08/16/2022 05:58 AM     INR:   Lab Results   Component Value Date/Time    INR 1.15 08/04/2022 06:55 PM    INR 1.18 08/02/2022 06:30 PM    INR 1.27 08/01/2022 02:24 AM        CARDIAC LABS  ENZYMES:No results for input(s): CKMB, CKMBINDEX, TROPONINI in the last 72 hours.     Invalid input(s): CKTOTAL;3  FASTING LIPID PANEL:No results found for: HDL, LDLDIRECT, LDLCALC, TRIG, TSH  LIVER PROFILE:  Lab Results   Component Value Date/Time    AST 33 08/01/2022 05:41 AM    AST 51 08/01/2022 12:23 AM    ALT 27 08/01/2022 05:41 AM    ALT 39 08/01/2022 12:23 AM       -----------------------------------------------------------------  Telemetry: Personally reviewed  AF/AFL, rates fairly well controlled    Objective:   Vitals: BP (!) 148/65   Pulse (!) 104   Temp 99.6 °F (37.6 °C) (Oral)   Resp 18   Ht 5' 2.01\" (1.575 m)   Wt 238 lb 1.6 oz (108 kg)   SpO2 93%   BMI 43.54 kg/m²   General appearance: appears ill  Skin: Skin color, texture, turgor normal. No rashes or ecchymosis. Neck: no JVD, supple, symmetrical, trachea midline   Lungs: , no accessory muscle use, no respiratory distress  Heart: irregularly irregular  Extremities: + BLE edema, DP +  Psychiatric: abnormal insight and affect    Patient Active Problem List:     Primary osteoarthritis of right hip     Osteoarthritis of right hip     Osteoarthritis of right hip joint due to dysplasia     Status post left hip replacement     Abdominal pain     ARIANE (acute kidney injury) ([facility] [address]     Electrolyte imbalance     Lactic acid acidosis     Leukocytosis     Ileus (HCC)     Status post total hip replacement, right        Assessment & Plan:    AF/RVR  On hep gtt  Sepsis  Bowel obstruction  Obesity     Paloma Rolle Is a 74 y. o. woman w/ PMH HTN, HLD, morbid obesity recent R CASS who admitted with abd pain, vomiting, w/u showed sepsis, bowel obstruction, showed found to be in AF/RVR    AF/RVR  - In AFL, HR 's  - [id number] at least 3  - Heparin drip on hold due to H&H trending down, blood in nares and stool  - On dig 125 mcg QD  - BB on hold d/t soft BP's, would consider restarting if BP stabilizes  - Tx underlying  - Keep on telemetry  - Keep K >4.0, Mg >2.0, replacements ordered  - Reviewed risk factor modification for AF with patient including HTN/ DM control, STUART management, stress reduction, minimal alcohol intake, tobacco cessation, regular exercise, diet  - 2 week CAM at DC    HTN  - BP on the soft side  - BB on hold    Electronically signed by Shellie Valdez 1822    I spent a total of 35 minutes and greater than 50% of the time was spent counseling with Paloma Rolle and coordinating care regarding her diagnosis, treatments and plan of care.

## 2022-08-16 NOTE — PROGRESS NOTES
General Surgery   Daily Progress Note  Patient: Damon Caicedo    CC: Abdominal pain with bowel obstruction    SUBJECTIVE:   No overnight events, afebrile. Afib persists but rate is mostly controlled. Continues to complain of hip pain, denies abdominal pain. Denies n/v. Diarrhea slightly improving. Rectal tube in place. +Cdiff result yesterday     ROS:   A 14 point review of systems was conducted, significant findings as noted above. All other systems negative. OBJECTIVE:  PHYSICAL EXAM:    Vitals:    08/15/22 2047 08/15/22 2050 08/16/22 0012 08/16/22 0341   BP:  114/71  105/67   Pulse:  95 (!) 101 (!) 102   Resp:  18 17 19   Temp:  97.2 °F (36.2 °C) 97.8 °F (36.6 °C) 98.4 °F (36.9 °C)   TempSrc:  Oral Oral Oral   SpO2: 95% 95% 97% 91%   Weight:    238 lb 1.6 oz (108 kg)   Height:           General appearance: Resting in bed  HEENT: Trachea midline. RIJ CVC in place. Chest/Lungs: normal effort with no accessory muscle use on  RA  Cardiovascular: irregular rhythm   Abdomen: Obese, non-tender, no rebound, guarding, or rigidity. Rectal tube in place 450cc/24 hours   Skin: warm and dry, no rashes  Extremities: bilateral LE edema, no cyanosis  Genitourinary: Iqbal with clear urine 750cc/24 hours       LABS:   Recent Labs     08/15/22  0555 08/16/22  0552   WBC 5.8 6.0   HGB 8.0* 7.6*   HCT 23.8* 23.2*   MCV 91.7 92.7    469*          Recent Labs     08/15/22  0707 08/16/22  0558   * 130*   K 4.0 4.1    102   CO2 20* 19*   PHOS 3.3 2.6   BUN 25* 19   CREATININE 0.7 0.6        No results for input(s): AST, ALT, ALB, BILIDIR, BILITOT, ALKPHOS in the last 72 hours. No results for input(s): LIPASE, AMYLASE in the last 72 hours. No results for input(s): PROT, INR, APTT in the last 72 hours. No results for input(s): CKTOTAL, CKMB, CKMBINDEX, TROPONINI in the last 72 hours.       ASSESSMENT & PLAN:   This is a 74 y.o. female with Hx of HTN, HLD, Tuberculosis (1981), gout, and OA s/p right CASS on 7/13/22 who was transferred to [facility] on 7/30 due to fluid associated with her right CASS on 7/13 and for abdominal pain. - Continue to hold Reglan and glycolax due to diarrhea  - Iqbal for strict I/Os  - Continue dysphagia diet per SLP  - Continue PT/OT  - Dispo: will need SNF at DC due to deconditioning. OT 8/24, PT 6/24    Sammie Whitney, APRN - CNP 8/16/2022 7:05 AM   Available via "2,10E+07" [id number]      I have personally performed the medical history, physical exam and medical decision making and agree with all pertinent clinical information unless otherwise noted.      Serafin Garduno MD  Surgery Attending

## 2022-08-16 NOTE — PROGRESS NOTES
Speech Language Pathology  Facility/Department: [facility]  Dysphagia Daily Treatment Note    NAME: Agatha Malik  : 1950  MRN: [id number]    Patient Diagnosis(es):   Patient Active Problem List    Diagnosis Date Noted    Hypotension 2022    Paroxysmal atrial fibrillation ([address] 2022    Acute hypoxemic respiratory failure ([address] 2022    Atrial fibrillation with RVR ([address] 2022    On continuous heparin infusion 2022    Benign essential HTN 2022    Status post right hip replacement 2022    ARIANE (acute kidney injury) ([address] 2022    Electrolyte imbalance 2022    Lactic acid acidosis 2022    Leukocytosis 2022    Ileus ([address] 2022    Status post total hip replacement, right 2022    Abdominal pain 2022    Status post left hip replacement 07/15/2022    Osteoarthritis of right hip 2022    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 Chest CT- 8/15/22  Impression       CHEST:       1. No evidence for hematoma   2. Decreased small pleural effusions and bibasilar atelectasis compared to 2022     Previous MBS - n/a  Chart reviewed. Medical Diagnosis: Abdominal pain [R10.9]   Treatment Diagnosis: dysphagia    BSE Impression 22  Pt sleeping, sitting up in chair with daughter present.  Pt woke fairly easily and was able to participate with only min cues to remain alert at the beginning of the session. Pt had NG removed this date and has not taken in PO nutrition for some time. Pt with upper and lower denture. ROM of oral structures was [facility]. Pt had minimal difficulty closing lips around spoon or drawing liquid up a straw. Pt had mild difficulty with mastication with solids with prolonged mastication noted. There was no anterior spillage or oral residue noted with any consistency. Pt demonstrated no s/s aspiration with any consistency presented. Pt able to initiate swallow without difficulty with laryngeal movement observed. Vocal quality remained clear throughout. No coughing, throat clearing or choking observed with any consistency. Pt was instructed to consume 3oz of water continuously but stopped to take a breath after 3 successive swallows. No s/s aspiration noted. RR fluctuated between 14-18 through out the session. Recommend full liquid diet at this time due to concern for fatigue as pt is deconditioned. Dysphagia Diagnosis: Mild to moderate oral stage dysphagia (due to fatigue)    MBS results   Not indicated at this time    Pain: No pain reported     Current Diet : ADULT DIET; Dysphagia - Soft and Bite Sized; 1500 ml   Recommended Form of Meds: PO     Treatment:  Pt seen bedside to address the following goals:  1-The patient will tolerate recommended diet without observed clinical signs of aspiration  8/10: Rn stated keep pt on full today, as still some GIB. pt sitting up in chair, family present. Pt has full liquid tray at bedside. Pt consumed puree and thin liquids with no overt signs of aspiration. Pt cleared oral cavity fully. Pt reported some difficulty earlier when taking medication whole with water and this was a large pill. Pt family and RN report no problem with taking medications crushed in puree. Recommend cont Full liquids, pt remains very weak. 8/12: RN cleared patient; Per chart review, patient able to upgrade diet as tolerated. Patient sitting upright in bed, family present. She is eating jose crackers with peanut butter / thin liquids. Adequate labial seal, increased mastication time for solids and independent use of liquid rinse observed. Patient taking consecutive drinks thin liquid via cup and straw. No overt s/s of aspiration or penetration noted. Voice remained clear. Discussed diet levels with patient and she selects soft/ bites sized secondary to some generalized weakness and inability to cut food up. RN and MD notified of recommendations. 8/15: Patient cleared by RN who reports tolerance of current soft /bite sized diet texture. Patient with family present; She reports preference for current diet texture given difficulty with cutting food. She does accept hard snack and thin liquids in which she demonstrates mildly increased time for mastication though adequate bolus clearance. No overt s/s of aspiration. Pt reports sore tongue; Upon visualization, tongue is pink/moist. Encouraged continuing good oral care with soft bristled toothbrush. 8/16: Patient awake, alert, resting in bed, on 1L O2 via nc. Family member/visitor present. Per RN pt tolerating diet. Attempted to place dentures, however d/t pt endorsing discomfort, did not don. Trialed thins via straw and soft solid. Pt with positive oral acceptance, slowed mastication of solid (large bite, which pt ended up expectorating half of), positive swallow movement, good oral clearance, no overt signs of aspiration or penetration. In discussion with pt, she expressed wish to downgrade to minced and moist solid. Cont goal    2- The pt/caregiver will demonstrate understanding of swallowing recommendations and concerns.   8/9- The pt and daughter were educated to purpose of the visit, anatomy and physiology of the swallow, concerns for aspiration, role breathing plays in swallowing, concern for fatigue swallowing strategies, diet recommendations and possibility of being made NPO if s/s aspiration emerge. Both stated comprehension and agreement with diet recommendation and had no further questions. con't goal  8/10: pt and family educated to purpose of visit, reviewed s/s of aspiration and concern if aspiration occurs. Instructed to notify staff if any issues emerge. Discussed cont of current texture at this time. Pt and family stated comprehension  8/12: Patient and family educated on recommendations for swallow strategies (sit upright, alternate consistencies, slow rate) to reduced risk of aspiration. They verbalized understanding of all information. 8/15: Education re: oral care, aspiration precautions completed this date. 8/16: Educated pt to purpose of visit, swallow function, diet recommendations, safety strategies. Cont goal     Patient/Family/Caregiver Education:  As above    Compensatory Strategies:  Upright as possible for all oral intake, Remain upright for 30-45 minutes after meals, Eat/Feed slowly, Alternate solids and liquids, Small bites/sips      Plan:  Continue dysphagia treatment with goals per plan of care. Diet recommendations:   -Downgrade to Minced & Moist per patient preference  -Continue thin liquids  -Aspiration precautions  -Oral care 2-3x/day    DC recommendation: TBD  Treatment: 15  D/W nursing, Adin Neigh  Needs met prior to leaving room, call button in reach. [place], [facility], [address] [place] [address], .[phone]  Pg.  # [phone]    If patient is discharged prior to next treatment, this note will serve as the discharge summary

## 2022-08-16 NOTE — PROGRESS NOTES
Office : [phone]     Fax :[phone]         Renal Progress Note    Subjective:   Admit Date: 7/30/2022     Interval History:        Ur decent   Cr stable   No diarrhea       DIET ADULT DIET;  Dysphagia - Soft and Bite Sized; 1500 ml  Medications:   Scheduled Meds:   magnesium sulfate  4,000 mg IntraVENous Once    metoprolol tartrate  25 mg Oral BID    heparin (porcine)  5,000 Units SubCUTAneous 3 times per day    vancomycin  125 mg Oral 4 times per day    digoxin  125 mcg Oral Daily    pantoprazole  40 mg Oral BID AC    miconazole   Topical BID    melatonin  10 mg Oral Nightly    albuterol  2.5 mg Nebulization BID    lidocaine  1 patch TransDERmal Daily    sodium chloride (Inhalant)  4 mL Nebulization BID    sodium chloride flush  5-40 mL IntraVENous 2 times per day    fluticasone  2 spray Each Nostril Daily    pregabalin  50 mg Oral TID     Continuous Infusions:   sodium chloride      sodium chloride      sodium chloride      sodium chloride      dextrose      sodium chloride 5 mL/hr at 08/12/22 1154       Labs:  CBC:   Recent Labs     08/14/22  0456 08/14/22  2244 08/15/22  0555 08/16/22  0552   WBC 5.9  --  5.8 6.0   HGB 7.2* 7.9* 8.0* 7.6*     --  433 469*       BMP:    Recent Labs     08/15/22  0555 08/15/22  0707 08/16/22  0558   * 131* 130*   K 4.2 4.0 4.1    100 102   CO2 19* 20* 19*   BUN 27* 25* 19   CREATININE 0.8 0.7 0.6   GLUCOSE 84 82 83       Ca/Mg/Phos:   Recent Labs     08/14/22  0456 08/15/22  0555 08/15/22  0707 08/16/22  0558   CALCIUM 7.1* 7.5* 7.2* 7.4*   MG 1.80  --  1.70* 1.60*   PHOS 2.8 3.2 3.3 2.6       Hepatic:   No results for input(s): AST, ALT, ALB, BILITOT, ALKPHOS in the last 72 hours. Troponin: No results for input(s): TROPONINI in the last 72 hours. BNP: No results for input(s): BNP in the last 72 hours. Lipids: No results for input(s): CHOL, TRIG, HDL, LDLCALC, LABVLDL in the last 72 hours. ABGs:   No results for input(s): PHART, PO2ART, KHD1XUN in the last 72 hours. INR:   No results for input(s): INR in the last 72 hours. UA:  No results for input(s): [place], GLUCOSEU, BILIRUBINUR, KETUA, SPECGRAV, BLOODU, PHUR, PROTEINU, UROBILINOGEN, NITRU, LEUKOCYTESUR, [place] in the last 72 hours. Urine Microscopic:   No results for input(s): LABCAST, BACTERIA, COMU, HYALCAST, WBCUA, RBCUA, EPIU in the last 72 hours. Urine Culture: No results for input(s): LABURIN in the last 72 hours. Urine Chemistry: No results for input(s): [place], PROTEINUR, NAUR in the last 72 hours. Objective:   Vitals: BP (!) 148/65   Pulse (!) 104   Temp 99.6 °F (37.6 °C) (Oral)   Resp 18   Ht 5' 2.01\" (1.575 m)   Wt 238 lb 1.6 oz (108 kg)   SpO2 93%   BMI 43.54 kg/m²    Wt Readings from Last 3 Encounters:   08/16/22 238 lb 1.6 oz (108 kg)   07/13/22 218 lb 9.6 oz (99.2 kg)   06/14/22 217 lb (98.4 kg)      24HR INTAKE/OUTPUT:    Intake/Output Summary (Last 24 hours) at 8/16/2022 1025  Last data filed at 8/16/2022 0532  Gross per 24 hour   Intake 230 ml   Output 1200 ml   Net -970 ml       Physical Exam  Constitutional:       Appearance: She is ill-appearing. She is not diaphoretic. Cardiovascular:      Rate and Rhythm: Normal rate. Pulmonary:      Effort: Pulmonary effort is normal.      Breath sounds: Rhonchi present. No wheezing. Abdominal:      General: There is distension. Palpations: Abdomen is soft. Tenderness: There is abdominal tenderness. There is no guarding. Musculoskeletal:      Right lower leg: Edema present. Left lower leg: Edema present.    Skin:     General: Skin is warm and dry. Neurological:      General: No focal deficit present. Mental Status: She is oriented to person, place, and time. Assessment and Plan:       IMAGING:  CT CHEST ABDOMEN PELVIS WO CONTRAST   Final Result      CHEST:      1. No evidence for hematoma   2. Decreased small pleural effusions and bibasilar atelectasis compared to 8/2/2022      ABDOMEN/PELVIS:      1. Right iliopsoas muscle small to medium-sized intramuscular acute hematoma . An accurate characterization of size of hematoma is not possible due to its infiltrative nature. 2. Right proximal thigh subcutaneous 3.8 x 3.7 cm water attenuation fluid collection   3. Nonspecific, nonobstructive bowel gas pattern      XR CHEST PORTABLE   Final Result      Limited inspiration. Accentuation of pulmonary interstitial markings, with minor basilar atelectasis. No other acute findings. MRI LUMBAR SPINE WO CONTRAST   Final Result   1. Moderate size intramuscular hematoma along the right iliopsoas muscle. This is new in comparison to abdominal CT 8/1/2022.   2. No central canal stenosis. 3. Moderate multilevel foraminal narrowing as described. 4. No acute osseous abnormality. XR HIP 2-3 VW W PELVIS RIGHT   Final Result   Impression:    1. Right total hip arthroplasty. The orthopedic hardware is intact without evidence of loosening. No significant change from prior CT examination. VL Extremity Venous Bilateral   Final Result      XR CHEST PORTABLE   Final Result      Mild bilateral airspace disease. XR ABDOMEN (KUB) (SINGLE AP VIEW)   Final Result   1. No evidence of free intraperitoneal air. 2. Indeterminate bowel gas pattern due to a paucity of small bowel gas. XR ABDOMEN (KUB) (SINGLE AP VIEW)   Final Result   Impression: Stable appearance of the abdomen. XR CHEST PORTABLE   Final Result      Low lung volumes with central bronchovascular crowding. Atelectasis in the left lung base. Normal cardiomediastinal silhouette. Lines and tubes in standard position. VL Extremity Venous Bilateral   Final Result      XR ABDOMEN (KUB) (SINGLE AP VIEW)   Final Result   Impression: Stable appearance of the abdomen with gas distended loops of central small bowel again noted. CT CHEST WO CONTRAST   Final Result      1. Moderate right pleural effusion and small left effusion with basilar airspace opacity most consistent with atelectasis. Superimposed pneumonia be difficult to exclude. 2. Narrowing of the trachea with expiration compatible with treated bronchial malacia. 3. Nasogastric tube tip in the lower esophagus. 4. Persistent colonic distention. 5. Mild coronary artery calcification. XR ABDOMEN (KUB) (SINGLE AP VIEW)   Final Result      Frontal supine views demonstrate an unremarkable bowel gas pattern. No significant colonic stool is seen. Right hip arthroplasty noted. XR CHEST PORTABLE   Final Result      Prominence of the perihilar interstitial markings with mild peribronchial cuffing is favored to represent mild pulmonary edema. Low lung volumes bronchovascular crowding. Stable cardiomediastinal silhouette. Lines and tubes in standard position. Cholecystectomy clips are noted. CT ABDOMEN PELVIS WO CONTRAST Additional Contrast? Oral and Rectal   Final Result   1. Moderate colonic distention. Correlate for constipation. Fecal content within the ileum which may suggest stasis. There is no small bowel obstruction. 2. Subcutaneous fluid collection overlying the right total hip arthroplasty. CT HIP RIGHT WO CONTRAST   Final Result   1. Moderate colonic distention. Correlate for constipation. Fecal content within the ileum which may suggest stasis. There is no small bowel obstruction. 2. Subcutaneous fluid collection overlying the right total hip arthroplasty. XR CHEST PORTABLE   Final Result      1.   Right IJ CVC tip in the cavoatrial junction. 2.  Bibasilar airspace disease suggesting atelectasis. Pneumonia is not excluded. XR ABDOMEN (KUB) (SINGLE AP VIEW)   Final Result      Dilated colonic and small bowel loops may suggest ileus but obstruction is not excluded. XR ABDOMEN (KUB) (SINGLE AP VIEW)   Final Result      No acute radiographic abnormality of the abdomen. Status post right hip arthroplasty with mildly displaced greater trochanteric fracture fragment, new or more pronounced compared to the prior study. Assessment/Plan     ARIANE   2. Sepsis  3. AGMA   4. Lactic Acidosis - resolved  4. Hyperkalemia  5. Hyponatremia  6. Bowel Obstruction  7. Hypertension  8. GI bleed  9.  Anemia      Plan:  - Cr stable  - fluid restriction for hyponatremia   - encourage solute intake   - Mag repleted  - No need for diuresis/IVF  - ECHO normal EF - IVC compressible   - Hypoxia likely sec to Aspiration pneumonitis   - ARIANE sec to ATN   - Monitor I/Os  - Avoid nephrotoxic agents    Recommend to dose adjust all medications  based on renal functions  Maintain SBP> 90 mmHg   Daily weights   AVOID NSAIDs  Avoid Nephrotoxins  Monitor Intake/Output  Call if significant decrease in urine output        Alexys Purvis MD

## 2022-08-16 NOTE — PROGRESS NOTES
Hospital Medicine Progress Note    PCP: Madelyn Romo    Date of Admission: 7/30/2022    Chief Complaint:  Abdominal pain, transferred for ortho eval with Rt CASS site with fluid collection. History Of Present Illness:     74 y.o. female  - Hx of HTN, HLD, Gout, OA, recent Rt CASS, morbid obesity  - Presents with generalized weakness nausea vomiting and abdominal pain, she woke up with the symptoms night prior to presentation. Her last bowel movement was about 3 days prior. She was seen at [facility] ([facility] where she underwent CT imaging of the abdomen and pelvis which showed:    IMPRESSION:   1. There is a large amount of stool cecum through the sigmoid colon. Transition point is rectosigmoid but there is no obvious mass in the rectosigmoid region. No bowel thickening or peribowel stranding in the rectosigmoid region. 2. Likely, multiple renal cysts. 3. Partially organized fluid without obvious rim enhancement anterior to the THR. This may simply represent postoperative seroma but clinical correlation needed. The entire inferior extent of this is not covered on this exam.   4. Urinary bladder is distended with fluid    Labs were significant for WBC 22.8k, with elevated ANC, hgb 10.1, na131, k 5.0 BUN of 19 cr 1.1, Alk phos 139. With noted fluid around [facility] she was transferred to [facility]. for orthopedic surgeon Dr. Reginaldo Stauffer evaluation. \"        Interval HPI    Rapid response 8/13 AM. For hypotension: Was hypotensive to 67/41 during blood transfusion. , satting well on 4L NC. Patient awake and appropriately interactive, no acute distress, denies any symptoms other than episode of diarrhea before rapid called. Transfusion was stopped, gave 350cc bolus with prompt improvement in BP to 69'U systolic. Pt remained hemodynamically stable and denied any new symptoms during this rapid. \"    Appears this was likely sec to C diff infection: Resolving with txt of C diff. Has been having diarrhea, non bloody. On PO Vanco. Abdominal pain improved     Had been on heparin gtt in the ICU per cards for Afib. Discontinued days ago due to concern for hemorrhage. Had recurrent rectal bleed several days ago. EGD done for \"melena\". She is s/p recent Right hip surgery  Right leg numbness and pain few days ago after working with PT: MRI done to eval for nerve impingemen as she has hx of chronic back paint: shows illio-psoas hematoma. Imaging showed enlarging right hemtoma iliopsoas . She is s/p right hip surgery, and had just worked with PT/OT. Hgb appears stable mostly now. Subjective    Remains more awake     Main complaint is thirst (on free water restriction for hyponatremia)  Still a lot of diarrhea on rectal tube      RVR on telemetry: HR up to 140s sometimes. Improved with digoxin. Resume BB. Correct electrolytes      Medications : Reviewed      Allergies:  Buspirone, Duloxetine, Fenofibrate, Gabapentin, Venlafaxine, Doxycycline, Statins, and Sulfa antibiotics      REVIEW OF SYSTEMS:   Pertinent positives as noted in the HPI. All other systems reviewed and negative. PHYSICAL EXAM PERFORMED:    BP (!) 148/65   Pulse (!) 104   Temp 99.6 °F (37.6 °C) (Oral)   Resp 18   Ht 5' 2.01\" (1.575 m)   Wt 238 lb 1.6 oz (108 kg)   SpO2 93%   BMI 43.54 kg/m²     General appearance: Awake,alert  HEENT:  Normal cephalic, atraumatic without obvious deformity. Neck: Supple, with full range of motion. No jugular venous distention. Respiratory:  Normal respiratory effort. Clear to auscultation, bilaterally without Rales/Wheezes/Rhonchi. Cardiovascular:  Regular rate and rhythm with normal S1/S2 without murmurs, rubs or gallops. Abdomen: Less distended, less firm, less tender. BS heard  Musculoskeletal: Incision dressing C/D/I  Skin: Skin color, texture, turgor normal.  No rashes or lesions.   Neurologic:  grossly non-focal.  Capillary Refill: Brisk,< 3 seconds   Peripheral Pulses: +2 palpable, equal bilaterally       Labs:     Recent Labs     08/14/22  0456 08/14/22  2244 08/15/22  0555 08/16/22  0552   WBC 5.9  --  5.8 6.0   HGB 7.2* 7.9* 8.0* 7.6*   HCT 21.0* 22.2* 23.8* 23.2*     --  433 469*       Recent Labs     08/15/22  0555 08/15/22  0707 08/16/22  0558   * 131* 130*   K 4.2 4.0 4.1    100 102   CO2 19* 20* 19*   BUN 27* 25* 19   CREATININE 0.8 0.7 0.6   CALCIUM 7.5* 7.2* 7.4*   PHOS 3.2 3.3 2.6       No results for input(s): AST, ALT, BILIDIR, BILITOT, ALKPHOS in the last 72 hours. No results for input(s): INR in the last 72 hours. No results for input(s): [place] Are[place] in the last 72 hours. Urinalysis:      Lab Results   Component Value Date/Time    NITRU POSITIVE 07/31/2022 05:15 AM    WBCUA 3-5 07/31/2022 05:15 AM    BACTERIA 1+ 07/31/2022 05:15 AM    RBCUA None seen 07/31/2022 05:15 AM    BLOODU Negative 07/31/2022 05:15 AM    SPECGRAV 1.015 07/31/2022 05:15 AM    GLUCOSEU Negative 07/31/2022 05:15 AM       Radiology: Reviewed          ASSESSMENT/PLAN:    Severe sepsis: POA   - Leukocytosis with tachycardia; on admission; - Possibly from GI source with severe constipation, fecal impaction  -  Transferred to [place] [id number] for evaluation of post-op hip fluid collection/possible infection (s/p THR 7/13): felt by ortho to be likely seroma,  normal post operative finding .  - Started on admission on broad spectrum abx. - Blood cultures x 2 NGTD. ID consulted: - Not currently on Abx   - Sepsis appears resolved Monitor off antibx, hopefully should continue to improve as bowels appear functioning now  - New sepsis 8/13: sec to C diff: resolving/resolved. Parox Atrial Fibrillation   Afib with RVR  Echo normal EF, normal diastolic  Continue AVN agents as tolerated. Was placed on Heparin drip in the ICU:  held with drop in hgb, and blood loss anemia . Verify with ortho when [address] can be resumed.   Cardiology following  Transitioned to oral meds: continue Digoxin. Resume metop. - Keep Mg> 2; K 4 or above      Acute  hypoxemic respiratory failure  Possible fluid overload from recent surgery, and while NPO with intra-abdominal issue this admit  - Was Briefly on IV lasix/lasix drip but was stopped. O2 requirement had improved  - Dopplers:  no DVT  - Resp failure resolved  - Encourage incentive spirometry      Fluid overload  - Patient appears developed volume overload with worse respiratory status and AFRVR while hospitalized. - Echocardiogram was overall normal with normal filling pressures and was done during sinus   - Prob volume overload related to fluid mgt for bowel issues, low Albumin and Hb low   - Acute heart failure ruled out  - Was treated with IV diuretics. Now held. DC'd continuous fluids for now. Use PRN boluses  - Dly weights, I/O monitoring      Acute on Chronic Anemia  Hypotension  - Hypotension probably sec to severe diarrhea   - Was concerned for ongoing hemorrhage with drop in hgb as well: ruled out.   - MRI lumber showed iliopsoas hematoma: With dropping hgb again, re-imaged to verify if hematoma right ileo-psoas is expanding Report reviewed. Hgb sppears stable now post PRBC transfusions. Improved and now stable s/p transfusions. Monitor Hgb routinely      Acute severe diarrhea with sepsis sec to C diff infection  - Was on bowel meds for ileus. Stopped with diarrhea.  Diarrhea h/e persisted, and with bump in WBCs and hypotension, Stool for C diff  positive  - On Rectal tube for profuse diarrhea   - Started on PO vancomycin: plan 10-14 day txt      ARIANE   Mild hyponatremia  Likely sec to Hypoperfusion  Nephrology consulted  -Monitor Cr: ARIANE resolved      Bowel obstruction  Secondary to severe constipation  - CT of the [facility] system showed significant stool from cecum to the sigmoid colon and transition point at the rectosigmoid.   - GI, Surgery consulted, appreciate recs  - Possible severe constipation with fecal impaction possibly sec to narcotics: she is chronically on narcotics for chronic bck pain (tramadol and norco; and also in setting of recent hip surgery)  - Miralax and enema unsuccessful  - Underwent Rock decompression 8/3 with improvement but not resolution. Was treated with SMOG enemas BID, Relistor, disimpaction. Was plavced on Movantic and miralax. Appears bowel functioning now, with diarrhea noted: Off bowel meds  - Obstruction resolved      Recent right hip surgery: POA  Hematoma right iliopsoas muscle: prob new, sec to recent surgery and anticoagulation for Afib   - She is s/p recent Right hip surgery  - Developed Right leg numbness and pain few days ago after working with PT: MRI done to eval for nerve impingement as she has hx of chronic back paint: shows illio-psoas hematoma. Notified Ortho. Will keep off hep gtt. - Hg appears stable now: monitor routinely. - Dopplers done for LE edema: No DVT  - Ortho following      Acute Urinary retention  - Appears recently had E coli in urine, although CC was less than 100k. Was treated with cipro. Also appears on Keflex PTA.   - Urine on admission has some bacteria, and nitrite positive, but neg leuc esterase and no signif pyuria  - Iqbal catheter placed at [facility] for retention likely sec to severe constipation  - Iqbal removed 6/12: Bladder scans PRN      Chronic back pain, narcotic dependent: POA  - She is chronically on narcotics for chronic bck pain (tramadol and norco; and also in setting of recent hip surgery)  - Acurte pain, likely radicular from iliopsoas hematoma  - Continue Lyrica      Essential Hypertension- Monitor Bps      Morbid obesity Body mass index is 41.01 kg/m². - Complicating assessment and treatment. Placing patient at risk for multiple co-morbidities as well as early death and contributing to the patient's presentation.  on weight loss when appropriate. DVT Prophylaxis: SCD for now.  Resume chem prophylaxis if bleed ruled out/hgb stabel/ok with ortho. Diet: ADULT DIET; Dysphagia - Soft and Bite Sized; 1500 ml  Code Status: Full Code      Disposition: - Transferred from ICU. Recently at [facility] after hip ortho surgery: Will need SNF at discharge.     SNF with improvement of diarrhea       Lurdes Paulson MD

## 2022-08-16 NOTE — CARE COORDINATION
CM continues to follow for DC planning and needs. Patient continues with rectal tube in place, isolation for C-DIFF, continuing to monitor H/H.    PT/OT maría elena completed OT 8/24, PT 6/24. Pt is on a dysphagia diet per SLP. Patient has been accepted to SNF at [facility] when medically stable for DC. Patient will need transport and HENS at DC.      Francesca Cagel RN, BSN, [id number]  Case Management Department  [phone]

## 2022-08-16 NOTE — PROGRESS NOTES
Pt's blood culture fast 8/13 but was not collected. Pt on vancomycin oral already. Confirmed to lab that it was not collected and said no. Paged on call hospitalist, Dr. Sridevi Razo if she wants to reorder and said no. RN called the pt to let him know that we need clearance from his cardiology team to hold/restart the Eliquis. Once we get a response, we will forward to the surgery scheduler.   He verbalized understanding and will wait for a call.    Cheyanne Hidalgo RN   Neurology Care Coordinator

## 2022-08-17 LAB
ANION GAP SERPL CALCULATED.3IONS-SCNC: 9 MMOL/L (ref 3–16)
BASOPHILS ABSOLUTE: 0.1 K/UL (ref 0–0.2)
BASOPHILS RELATIVE PERCENT: 1.2 %
BUN BLDV-MCNC: 20 MG/DL (ref 7–20)
CALCIUM SERPL-MCNC: 7.4 MG/DL (ref 8.3–10.6)
CHLORIDE BLD-SCNC: 99 MMOL/L (ref 99–110)
CO2: 18 MMOL/L (ref 21–32)
CREAT SERPL-MCNC: 0.7 MG/DL (ref 0.6–1.2)
EOSINOPHILS ABSOLUTE: 0.1 K/UL (ref 0–0.6)
EOSINOPHILS RELATIVE PERCENT: 0.9 %
GFR AFRICAN AMERICAN: >60
GFR NON-AFRICAN AMERICAN: >60
GLUCOSE BLD-MCNC: 87 MG/DL (ref 70–99)
HCT VFR BLD CALC: 23.1 % (ref 36–48)
HEMOGLOBIN: 7.8 G/DL (ref 12–16)
LYMPHOCYTES ABSOLUTE: 1.5 K/UL (ref 1–5.1)
LYMPHOCYTES RELATIVE PERCENT: 16.9 %
MAGNESIUM: 2.3 MG/DL (ref 1.8–2.4)
MCH RBC QN AUTO: 31 PG (ref 26–34)
MCHC RBC AUTO-ENTMCNC: 33.6 G/DL (ref 31–36)
MCV RBC AUTO: 92.2 FL (ref 80–100)
MONOCYTES ABSOLUTE: 1.2 K/UL (ref 0–1.3)
MONOCYTES RELATIVE PERCENT: 13.7 %
NEUTROPHILS ABSOLUTE: 6.1 K/UL (ref 1.7–7.7)
NEUTROPHILS RELATIVE PERCENT: 67.3 %
PDW BLD-RTO: 15 % (ref 12.4–15.4)
PHOSPHORUS: 3 MG/DL (ref 2.5–4.9)
PLATELET # BLD: 527 K/UL (ref 135–450)
PMV BLD AUTO: 8.3 FL (ref 5–10.5)
POTASSIUM REFLEX MAGNESIUM: 4.4 MMOL/L (ref 3.5–5.1)
RBC # BLD: 2.51 M/UL (ref 4–5.2)
SODIUM BLD-SCNC: 126 MMOL/L (ref 136–145)
WBC # BLD: 9.1 K/UL (ref 4–11)

## 2022-08-17 PROCEDURE — 6370000000 HC RX 637 (ALT 250 FOR IP): Performed by: SURGERY

## 2022-08-17 PROCEDURE — 99231 SBSQ HOSP IP/OBS SF/LOW 25: CPT | Performed by: SURGERY

## 2022-08-17 PROCEDURE — 6370000000 HC RX 637 (ALT 250 FOR IP): Performed by: INTERNAL MEDICINE

## 2022-08-17 PROCEDURE — 99233 SBSQ HOSP IP/OBS HIGH 50: CPT | Performed by: INTERNAL MEDICINE

## 2022-08-17 PROCEDURE — 6370000000 HC RX 637 (ALT 250 FOR IP)

## 2022-08-17 PROCEDURE — 84100 ASSAY OF PHOSPHORUS: CPT

## 2022-08-17 PROCEDURE — 6360000002 HC RX W HCPCS

## 2022-08-17 PROCEDURE — 94640 AIRWAY INHALATION TREATMENT: CPT

## 2022-08-17 PROCEDURE — 94761 N-INVAS EAR/PLS OXIMETRY MLT: CPT

## 2022-08-17 PROCEDURE — 80048 BASIC METABOLIC PNL TOTAL CA: CPT

## 2022-08-17 PROCEDURE — 2580000003 HC RX 258

## 2022-08-17 PROCEDURE — 99233 SBSQ HOSP IP/OBS HIGH 50: CPT | Performed by: NURSE PRACTITIONER

## 2022-08-17 PROCEDURE — 6360000002 HC RX W HCPCS: Performed by: SURGERY

## 2022-08-17 PROCEDURE — P9047 ALBUMIN (HUMAN), 25%, 50ML: HCPCS | Performed by: INTERNAL MEDICINE

## 2022-08-17 PROCEDURE — 94669 MECHANICAL CHEST WALL OSCILL: CPT

## 2022-08-17 PROCEDURE — 85025 COMPLETE CBC W/AUTO DIFF WBC: CPT

## 2022-08-17 PROCEDURE — 6360000002 HC RX W HCPCS: Performed by: INTERNAL MEDICINE

## 2022-08-17 PROCEDURE — 2060000000 HC ICU INTERMEDIATE R&B

## 2022-08-17 PROCEDURE — 36415 COLL VENOUS BLD VENIPUNCTURE: CPT

## 2022-08-17 PROCEDURE — 92526 ORAL FUNCTION THERAPY: CPT

## 2022-08-17 PROCEDURE — 83735 ASSAY OF MAGNESIUM: CPT

## 2022-08-17 RX ORDER — TOLVAPTAN 15 MG/1
7.5 TABLET ORAL ONCE
Status: COMPLETED | OUTPATIENT
Start: 2022-08-17 | End: 2022-08-17

## 2022-08-17 RX ORDER — ALBUMIN (HUMAN) 12.5 G/50ML
25 SOLUTION INTRAVENOUS ONCE
Status: COMPLETED | OUTPATIENT
Start: 2022-08-17 | End: 2022-08-17

## 2022-08-17 RX ADMIN — Medication 125 MG: at 06:11

## 2022-08-17 RX ADMIN — PREGABALIN 50 MG: 50 CAPSULE ORAL at 21:38

## 2022-08-17 RX ADMIN — METHOCARBAMOL 500 MG: 500 TABLET ORAL at 15:42

## 2022-08-17 RX ADMIN — ALBUTEROL SULFATE 2.5 MG: 2.5 SOLUTION RESPIRATORY (INHALATION) at 20:42

## 2022-08-17 RX ADMIN — MICONAZOLE NITRATE: 2 POWDER TOPICAL at 21:38

## 2022-08-17 RX ADMIN — PANTOPRAZOLE SODIUM 40 MG: 40 TABLET, DELAYED RELEASE ORAL at 15:42

## 2022-08-17 RX ADMIN — ALBUMIN (HUMAN) 25 G: 0.25 INJECTION, SOLUTION INTRAVENOUS at 14:07

## 2022-08-17 RX ADMIN — PREGABALIN 50 MG: 50 CAPSULE ORAL at 13:43

## 2022-08-17 RX ADMIN — DIGOXIN 125 MCG: 125 TABLET ORAL at 08:49

## 2022-08-17 RX ADMIN — TRAMADOL HYDROCHLORIDE 50 MG: 50 TABLET, COATED ORAL at 13:43

## 2022-08-17 RX ADMIN — MORPHINE SULFATE 2 MG: 2 INJECTION, SOLUTION INTRAMUSCULAR; INTRAVENOUS at 03:19

## 2022-08-17 RX ADMIN — HEPARIN SODIUM 5000 UNITS: 5000 INJECTION INTRAVENOUS; SUBCUTANEOUS at 21:38

## 2022-08-17 RX ADMIN — FLUTICASONE PROPIONATE 2 SPRAY: 50 SPRAY, METERED NASAL at 09:09

## 2022-08-17 RX ADMIN — HEPARIN SODIUM 5000 UNITS: 5000 INJECTION INTRAVENOUS; SUBCUTANEOUS at 06:12

## 2022-08-17 RX ADMIN — METOPROLOL TARTRATE 25 MG: 25 TABLET, FILM COATED ORAL at 21:38

## 2022-08-17 RX ADMIN — MICONAZOLE NITRATE: 2 POWDER TOPICAL at 09:08

## 2022-08-17 RX ADMIN — Medication 10 MG: at 21:37

## 2022-08-17 RX ADMIN — MORPHINE SULFATE 2 MG: 2 INJECTION, SOLUTION INTRAMUSCULAR; INTRAVENOUS at 22:37

## 2022-08-17 RX ADMIN — MORPHINE SULFATE 1 MG: 2 INJECTION, SOLUTION INTRAMUSCULAR; INTRAVENOUS at 15:42

## 2022-08-17 RX ADMIN — Medication 125 MG: at 18:45

## 2022-08-17 RX ADMIN — HEPARIN SODIUM 5000 UNITS: 5000 INJECTION INTRAVENOUS; SUBCUTANEOUS at 13:43

## 2022-08-17 RX ADMIN — PANTOPRAZOLE SODIUM 40 MG: 40 TABLET, DELAYED RELEASE ORAL at 06:12

## 2022-08-17 RX ADMIN — ALBUTEROL SULFATE 2.5 MG: 2.5 SOLUTION RESPIRATORY (INHALATION) at 08:15

## 2022-08-17 RX ADMIN — METOPROLOL TARTRATE 25 MG: 25 TABLET, FILM COATED ORAL at 08:51

## 2022-08-17 RX ADMIN — Medication 125 MG: at 14:21

## 2022-08-17 RX ADMIN — TOLVAPTAN 7.5 MG: 15 TABLET ORAL at 14:08

## 2022-08-17 RX ADMIN — SODIUM CHLORIDE, PRESERVATIVE FREE 10 ML: 5 INJECTION INTRAVENOUS at 21:38

## 2022-08-17 RX ADMIN — SODIUM CHLORIDE 30 MG/ML INHALATION SOLUTION 4 ML: 30 SOLUTION INHALANT at 08:15

## 2022-08-17 RX ADMIN — SODIUM CHLORIDE, PRESERVATIVE FREE 10 ML: 5 INJECTION INTRAVENOUS at 09:06

## 2022-08-17 RX ADMIN — SODIUM CHLORIDE 30 MG/ML INHALATION SOLUTION 4 ML: 30 SOLUTION INHALANT at 20:42

## 2022-08-17 RX ADMIN — PREGABALIN 50 MG: 50 CAPSULE ORAL at 08:49

## 2022-08-17 ASSESSMENT — PAIN DESCRIPTION - FREQUENCY: FREQUENCY: CONTINUOUS

## 2022-08-17 ASSESSMENT — PAIN SCALES - GENERAL
PAINLEVEL_OUTOF10: 9
PAINLEVEL_OUTOF10: 7
PAINLEVEL_OUTOF10: 0
PAINLEVEL_OUTOF10: 7
PAINLEVEL_OUTOF10: 0

## 2022-08-17 ASSESSMENT — PAIN DESCRIPTION - LOCATION
LOCATION: BACK;HIP
LOCATION: BACK
LOCATION: BACK;HIP
LOCATION: HIP;BACK

## 2022-08-17 ASSESSMENT — PAIN DESCRIPTION - ONSET: ONSET: ON-GOING

## 2022-08-17 ASSESSMENT — PAIN SCALES - WONG BAKER: WONGBAKER_NUMERICALRESPONSE: 0

## 2022-08-17 ASSESSMENT — PAIN DESCRIPTION - PAIN TYPE: TYPE: CHRONIC PAIN

## 2022-08-17 ASSESSMENT — PAIN DESCRIPTION - ORIENTATION: ORIENTATION: MID

## 2022-08-17 ASSESSMENT — PAIN DESCRIPTION - DESCRIPTORS: DESCRIPTORS: ACHING

## 2022-08-17 ASSESSMENT — PAIN - FUNCTIONAL ASSESSMENT: PAIN_FUNCTIONAL_ASSESSMENT: PREVENTS OR INTERFERES WITH MANY ACTIVE NOT PASSIVE ACTIVITIES

## 2022-08-17 NOTE — PROGRESS NOTES
Electrophysiology - PROGRESS NOTE    Admit Date: 7/30/2022     Chief Complaint: AF     Interval History:   Patient seen and examined and notes reviewed. Patient is being followed for AF. Pt presented to the hospital with abdominal pain. Found to have bowel obstruction along with sepsis. She went into AF/RVR, placed on dilt gtt  and converted to NSR. She then underwent a colonoscopy with decompression on 8/3/2022 and was found to back in AF afterwards, placed on amio drip and converted to NSR. She has been in NSR since 8/6/2022 however did have an episode of AF. Her heparin was turned off on 8/9 due to bloody nose and stool. H&H has been trending down, remains off the heparin. RR 8/12 for hypotension during blood transfusion. Remains in AFL, rates controlled. C/o occasional palpitations. + CDIFF, has rectal tube.      In: 651 [P.O.:651]  Out: 2100    Wt Readings from Last 2 Encounters:   08/17/22 235 lb 10.8 oz (106.9 kg)   07/13/22 218 lb 9.6 oz (99.2 kg)         Data:   Scheduled Meds:   Scheduled Meds:   tolvaptan  7.5 mg Oral Once    metoprolol tartrate  25 mg Oral BID    heparin (porcine)  5,000 Units SubCUTAneous 3 times per day    vancomycin  125 mg Oral 4 times per day    digoxin  125 mcg Oral Daily    pantoprazole  40 mg Oral BID AC    miconazole   Topical BID    melatonin  10 mg Oral Nightly    albuterol  2.5 mg Nebulization BID    lidocaine  1 patch TransDERmal Daily    sodium chloride (Inhalant)  4 mL Nebulization BID    sodium chloride flush  5-40 mL IntraVENous 2 times per day    fluticasone  2 spray Each Nostril Daily    pregabalin  50 mg Oral TID     Continuous Infusions:   sodium chloride      sodium chloride      sodium chloride      sodium chloride      dextrose      sodium chloride 5 mL/hr at 08/12/22 1154     PRN Meds:.biotene, traMADol, sodium chloride, sodium chloride, sodium chloride, lidocaine, sodium chloride, sodium chloride, symmetrical, trachea midline   Lungs: , no accessory muscle use, no respiratory distress  Heart: irregularly irregular  Extremities: + BLE edema, DP +  Psychiatric: abnormal insight and affect    Patient Active Problem List:     Primary osteoarthritis of right hip     Osteoarthritis of right hip     Osteoarthritis of right hip joint due to dysplasia     Status post left hip replacement     Abdominal pain     ARIANE (acute kidney injury) ([facility] [address]     Electrolyte imbalance     Lactic acid acidosis     Leukocytosis     Ileus (HCC)     Status post total hip replacement, right        Assessment & Plan:    AF/RVR  On hep gtt  Sepsis  Bowel obstruction  Obesity     Linda Burk Is a 74 y. o. woman w/ PMH HTN, HLD, morbid obesity recent R CASS who admitted with abd pain, vomiting, w/u showed sepsis, bowel obstruction, showed found to be in AF/RVR    AF/RVR  - In AFL, HR 's  - [id number] at least 3  - Heparin drip on hold due to H&H trending down, blood in nares and stool  - On dig 125 mcg QD  - On lopressor 25 mg BID  - Tx underlying  - Keep on telemetry  - Keep K >4.0, Mg >2.0, replacements ordered  - Reviewed risk factor modification for AF with patient including HTN/ DM control, STUART management, stress reduction, minimal alcohol intake, tobacco cessation, regular exercise, diet  - 2 week CAM at DC  - EP will sign off    HTN  - BP on the soft side  - BB on hold    Electronically signed by Shellie Subramanian [id number]    I spent a total of 35 minutes and greater than 50% of the time was spent counseling with Linda Burk and coordinating care regarding her diagnosis, treatments and plan of care.

## 2022-08-17 NOTE — PROGRESS NOTES
Office : [phone]     Fax :[phone]         Renal Progress Note    Subjective:   Admit Date: 7/30/2022     Interval History:   Denies any abdominal pain but having diarrhea. Decreased PO intake. DIET ADULT DIET;  Dysphagia - Minced and Moist; 1500 ml  Medications:   Scheduled Meds:   metoprolol tartrate  25 mg Oral BID    heparin (porcine)  5,000 Units SubCUTAneous 3 times per day    vancomycin  125 mg Oral 4 times per day    digoxin  125 mcg Oral Daily    pantoprazole  40 mg Oral BID AC    miconazole   Topical BID    melatonin  10 mg Oral Nightly    albuterol  2.5 mg Nebulization BID    lidocaine  1 patch TransDERmal Daily    sodium chloride (Inhalant)  4 mL Nebulization BID    sodium chloride flush  5-40 mL IntraVENous 2 times per day    fluticasone  2 spray Each Nostril Daily    pregabalin  50 mg Oral TID     Continuous Infusions:   sodium chloride      sodium chloride      sodium chloride      sodium chloride      dextrose      sodium chloride 5 mL/hr at 08/12/22 1154       Labs:  CBC:   Recent Labs     08/15/22  0555 08/16/22  0552 08/17/22  0335   WBC 5.8 6.0 9.1   HGB 8.0* 7.6* 7.8*    469* 527*       BMP:    Recent Labs     08/15/22  0707 08/16/22  0558 08/17/22  0335   * 130* 126*   K 4.0 4.1 4.4    102 99   CO2 20* 19* 18*   BUN 25* 19 20   CREATININE 0.7 0.6 0.7   GLUCOSE 82 83 87       Ca/Mg/Phos:   Recent Labs     08/15/22  0555 08/15/22  0707 08/16/22  0558 08/17/22  0335   CALCIUM 7.5* 7.2* 7.4* 7.4*   MG  --  1.70* 1.60* 2.30   PHOS 3.2 3.3 2.6  --        Hepatic:   No results for input(s): AST, ALT, ALB, BILITOT, ALKPHOS in the last 72 hours. Troponin: No results for input(s): TROPONINI in the last 72 hours. BNP: No results for input(s): BNP in the last 72 hours. Lipids: No results for input(s): CHOL, TRIG, HDL, LDLCALC, LABVLDL in the last 72 hours. ABGs:   No results for input(s): PHART, PO2ART, [id number] in the last 72 hours. INR:   No results for input(s): INR in the last 72 hours. UA:  No results for input(s): Shermon Honer, GLUCOSEU, BILIRUBINUR, KETUA, SPECGRAV, BLOODU, PHUR, PROTEINU, UROBILINOGEN, NITRU, LEUKOCYTESUR, Richard Gianotti in the last 72 hours. Urine Microscopic:   No results for input(s): LABCAST, BACTERIA, COMU, HYALCAST, WBCUA, RBCUA, EPIU in the last 72 hours. Urine Culture: No results for input(s): LABURIN in the last 72 hours. Urine Chemistry: No results for input(s): Rafa Miracle, PROTEINUR, NAUR in the last 72 hours. Objective:   Vitals: /60   Pulse 81   Temp 99.8 °F (37.7 °C) (Axillary)   Resp 20   Ht 5' 2.01\" (1.575 m)   Wt 235 lb 10.8 oz (106.9 kg)   SpO2 93%   BMI 43.09 kg/m²    Wt Readings from Last 3 Encounters:   08/17/22 235 lb 10.8 oz (106.9 kg)   07/13/22 218 lb 9.6 oz (99.2 kg)   06/14/22 217 lb (98.4 kg)      24HR INTAKE/OUTPUT:    Intake/Output Summary (Last 24 hours) at 8/17/2022 1322  Last data filed at 8/17/2022 0326  Gross per 24 hour   Intake 651 ml   Output 2100 ml   Net -1449 ml       Physical Exam  Constitutional:       Appearance: She is ill-appearing. She is not diaphoretic. Cardiovascular:      Rate and Rhythm: Normal rate. Pulmonary:      Effort: Pulmonary effort is normal.      Breath sounds: Rhonchi present. No wheezing. Abdominal:      General: There is distension. Palpations: Abdomen is soft. Tenderness: There is abdominal tenderness. There is no guarding. Musculoskeletal:      Right lower leg: Edema present. Left lower leg: Edema present. Skin:     General: Skin is warm and dry.    Neurological:      General: No focal deficit present. Mental Status: She is oriented to person, place, and time. Assessment and Plan:       IMAGING:  CT CHEST ABDOMEN PELVIS WO CONTRAST   Final Result      CHEST:      1. No evidence for hematoma   2. Decreased small pleural effusions and bibasilar atelectasis compared to 8/2/2022      ABDOMEN/PELVIS:      1. Right iliopsoas muscle small to medium-sized intramuscular acute hematoma . An accurate characterization of size of hematoma is not possible due to its infiltrative nature. 2. Right proximal thigh subcutaneous 3.8 x 3.7 cm water attenuation fluid collection   3. Nonspecific, nonobstructive bowel gas pattern      XR CHEST PORTABLE   Final Result      Limited inspiration. Accentuation of pulmonary interstitial markings, with minor basilar atelectasis. No other acute findings. MRI LUMBAR SPINE WO CONTRAST   Final Result   1. Moderate size intramuscular hematoma along the right iliopsoas muscle. This is new in comparison to abdominal CT 8/1/2022.   2. No central canal stenosis. 3. Moderate multilevel foraminal narrowing as described. 4. No acute osseous abnormality. XR HIP 2-3 VW W PELVIS RIGHT   Final Result   Impression:    1. Right total hip arthroplasty. The orthopedic hardware is intact without evidence of loosening. No significant change from prior CT examination. VL Extremity Venous Bilateral   Final Result      XR CHEST PORTABLE   Final Result      Mild bilateral airspace disease. XR ABDOMEN (KUB) (SINGLE AP VIEW)   Final Result   1. No evidence of free intraperitoneal air. 2. Indeterminate bowel gas pattern due to a paucity of small bowel gas. XR ABDOMEN (KUB) (SINGLE AP VIEW)   Final Result   Impression: Stable appearance of the abdomen. XR CHEST PORTABLE   Final Result      Low lung volumes with central bronchovascular crowding. Atelectasis in the left lung base. Normal cardiomediastinal silhouette.       Lines and tubes in standard position. VL Extremity Venous Bilateral   Final Result      XR ABDOMEN (KUB) (SINGLE AP VIEW)   Final Result   Impression: Stable appearance of the abdomen with gas distended loops of central small bowel again noted. CT CHEST WO CONTRAST   Final Result      1. Moderate right pleural effusion and small left effusion with basilar airspace opacity most consistent with atelectasis. Superimposed pneumonia be difficult to exclude. 2. Narrowing of the trachea with expiration compatible with treated bronchial malacia. 3. Nasogastric tube tip in the lower esophagus. 4. Persistent colonic distention. 5. Mild coronary artery calcification. XR ABDOMEN (KUB) (SINGLE AP VIEW)   Final Result      Frontal supine views demonstrate an unremarkable bowel gas pattern. No significant colonic stool is seen. Right hip arthroplasty noted. XR CHEST PORTABLE   Final Result      Prominence of the perihilar interstitial markings with mild peribronchial cuffing is favored to represent mild pulmonary edema. Low lung volumes bronchovascular crowding. Stable cardiomediastinal silhouette. Lines and tubes in standard position. Cholecystectomy clips are noted. CT ABDOMEN PELVIS WO CONTRAST Additional Contrast? Oral and Rectal   Final Result   1. Moderate colonic distention. Correlate for constipation. Fecal content within the ileum which may suggest stasis. There is no small bowel obstruction. 2. Subcutaneous fluid collection overlying the right total hip arthroplasty. CT HIP RIGHT WO CONTRAST   Final Result   1. Moderate colonic distention. Correlate for constipation. Fecal content within the ileum which may suggest stasis. There is no small bowel obstruction. 2. Subcutaneous fluid collection overlying the right total hip arthroplasty. XR CHEST PORTABLE   Final Result      1. Right IJ CVC tip in the cavoatrial junction.    2.  Bibasilar airspace disease suggesting atelectasis. Pneumonia is not excluded. XR ABDOMEN (KUB) (SINGLE AP VIEW)   Final Result      Dilated colonic and small bowel loops may suggest ileus but obstruction is not excluded. XR ABDOMEN (KUB) (SINGLE AP VIEW)   Final Result      No acute radiographic abnormality of the abdomen. Status post right hip arthroplasty with mildly displaced greater trochanteric fracture fragment, new or more pronounced compared to the prior study. Assessment/Plan     ARIANE   2. Sepsis  3. AGMA   4. Lactic Acidosis - resolved  4. Hyperkalemia  5. Hyponatremia  6. Bowel Obstruction  7. Hypertension  8. GI bleed  9. Anemia  10. C diff       Plan:  - Cr stable  - Will give Samsca   - IV albumin   - encourage solute intake   - Replete electrolytes as needed  - No need for diuresis/IVF  - ECHO normal EF - IVC compressible   - Hypoxia likely sec to Aspiration pneumonitis   - ARIANE sec to ATN   - Monitor I/Os  - Avoid nephrotoxic agents    Recommend to dose adjust all medications  based on renal functions  Maintain SBP> 90 mmHg   Daily weights   AVOID NSAIDs  Avoid Nephrotoxins  Monitor Intake/Output  Call if significant decrease in urine output        Christiane Segura MD      I have seen the patient independently from the resident . I discussed the care with the resident. I personally reviewed the HPI, PH, FH, SH, ROS and medications. I repeated pertinent portions of the examination and reviewed the relevant imaging and laboratory data.  I agree with the findings, assessment and plan as documented, with the following addendum:      Christel Thornton MD

## 2022-08-17 NOTE — PROGRESS NOTES
Hospital Medicine Progress Note    PCP: Elly Nick    Date of Admission: 7/30/2022    Chief Complaint:  Abdominal pain, transferred for ortho eval with Rt CASS site with fluid collection. History Of Present Illness:     74 y.o. female  - Hx of HTN, HLD, Gout, OA, recent Rt CASS, morbid obesity  - Presents with generalized weakness nausea vomiting and abdominal pain, she woke up with the symptoms night prior to presentation. Her last bowel movement was about 3 days prior. She was seen at [facility] (now [facility] where she underwent CT imaging of the abdomen and pelvis which showed:    IMPRESSION:   1. There is a large amount of stool cecum through the sigmoid colon. Transition point is rectosigmoid but there is no obvious mass in the rectosigmoid region. No bowel thickening or peribowel stranding in the rectosigmoid region. 2. Likely, multiple renal cysts. 3. Partially organized fluid without obvious rim enhancement anterior to the THR. This may simply represent postoperative seroma but clinical correlation needed. The entire inferior extent of this is not covered on this exam.   4. Urinary bladder is distended with fluid    Labs were significant for WBC 22.8k, with elevated ANC, hgb 10.1, na131, k 5.0 BUN of 19 cr 1.1, Alk phos 139. With noted fluid around [facility] she was transferred to [facility]. for orthopedic surgeon Dr. Harvin Rubinstein evaluation. \"        Interval HPI    Rapid response 8/13 AM. For hypotension: Was hypotensive to 67/41 during blood transfusion. , satting well on 4L NC. Patient awake and appropriately interactive, no acute distress, denies any symptoms other than episode of diarrhea before rapid called. Transfusion was stopped, gave 350cc bolus with prompt improvement in BP to 85'P systolic. Pt remained hemodynamically stable and denied any new symptoms during this rapid. \"    Appears this was likely sec to C diff infection: Resolving with txt of C diff. Has been having diarrhea, non bloody. On PO Vanco. Abdominal pain improved     Had been on heparin gtt in the ICU per cards for Afib. Discontinued days ago due to concern for hemorrhage. Had recurrent rectal bleed several days ago. EGD done for \"melena\". She is s/p recent Right hip surgery  Right leg numbness and pain few days ago after working with PT: MRI done to eval for nerve impingemen as she has hx of chronic back paint: shows illio-psoas hematoma. Imaging showed enlarging right hemtoma iliopsoas . She is s/p right hip surgery, and had just worked with PT/OT. Hgb appears stable mostly now. Subjective    Remains more awake , family feeding her at bedside    HR Improved with digoxin, BB. Still diarrhea, cramps, rectal tube in place       Medications : Reviewed      Allergies:  Buspirone, Duloxetine, Fenofibrate, Gabapentin, Venlafaxine, Doxycycline, Statins, and Sulfa antibiotics      REVIEW OF SYSTEMS:   Pertinent positives as noted in the HPI. All other systems reviewed and negative. PHYSICAL EXAM PERFORMED:    /64   Pulse 95   Temp 98.4 °F (36.9 °C) (Oral)   Resp 17   Ht 5' 2.01\" (1.575 m)   Wt 235 lb 10.8 oz (106.9 kg)   SpO2 95%   BMI 43.09 kg/m²     General appearance: Awake,alert  HEENT:  Normal cephalic, atraumatic without obvious deformity. Neck: Supple, with full range of motion. No jugular venous distention. Respiratory:  Normal respiratory effort. Clear to auscultation, bilaterally without Rales/Wheezes/Rhonchi. Cardiovascular:  Regular rate and rhythm with normal S1/S2 without murmurs, rubs or gallops. Abdomen: Less distended, soft, less tender. BS heard  Musculoskeletal: Incision dressing C/D/I  Skin: Skin color, texture, turgor normal.  No rashes or lesions.   Neurologic:  grossly non-focal.  Capillary Refill: Brisk,< 3 seconds   Peripheral Pulses: +2 palpable, equal bilaterally       Labs:     Recent Labs 08/15/22  0555 08/16/22  0552 08/17/22  0335   WBC 5.8 6.0 9.1   HGB 8.0* 7.6* 7.8*   HCT 23.8* 23.2* 23.1*    469* 527*       Recent Labs     08/15/22  0555 08/15/22  0707 08/16/22  0558 08/17/22  0335   * 131* 130* 126*   K 4.2 4.0 4.1 4.4    100 102 99   CO2 19* 20* 19* 18*   BUN 27* 25* 19 20   CREATININE 0.8 0.7 0.6 0.7   CALCIUM 7.5* 7.2* 7.4* 7.4*   PHOS 3.2 3.3 2.6  --        No results for input(s): AST, ALT, BILIDIR, BILITOT, ALKPHOS in the last 72 hours. No results for input(s): INR in the last 72 hours. No results for input(s): Nika Height in the last 72 hours. Urinalysis:      Lab Results   Component Value Date/Time    NITRU POSITIVE 07/31/2022 05:15 AM    WBCUA 3-5 07/31/2022 05:15 AM    BACTERIA 1+ 07/31/2022 05:15 AM    RBCUA None seen 07/31/2022 05:15 AM    BLOODU Negative 07/31/2022 05:15 AM    SPECGRAV 1.015 07/31/2022 05:15 AM    GLUCOSEU Negative 07/31/2022 05:15 AM       Radiology: Reviewed          ASSESSMENT/PLAN:    Severe sepsis: POA   - Leukocytosis with tachycardia; on admission; - Possibly from GI source with severe constipation, fecal impaction  -  Transferred to [facility] for evaluation of post-op hip fluid collection/possible infection (s/p THR 7/13): felt by ortho to be likely seroma,  normal post operative finding .  - Started on admission on broad spectrum abx. - Blood cultures x 2 NGTD. ID consulted: - Not currently on Abx   - Sepsis appears resolved Monitor off antibx, hopefully should continue to improve as bowels appear functioning now  - New sepsis 8/13: sec to C diff: resolving/resolved. Parox Atrial Fibrillation   Afib with RVR  Echo normal EF, normal diastolic  Continue AVN agents as tolerated. Was placed on Heparin drip in the ICU:  held with drop in hgb, and blood loss anemia . Verify with ortho when [facility] can be resumed. Cardiology following  Transitioned to oral meds: continue Digoxin. Resume metop.    - Keep Mg> 2; K 4 or for chronic bck pain (tramadol and norco; and also in setting of recent hip surgery)  - Miralax and enema unsuccessful  - Underwent Cobalt decompression 8/3 with improvement but not resolution. Was treated with SMOG enemas BID, Relistor, disimpaction. Was plavced on Movantic and miralax. Appears bowel functioning now, with diarrhea noted: Off bowel meds  - Obstruction resolved      Recent right hip surgery: POA  Hematoma right iliopsoas muscle: prob new, sec to recent surgery and anticoagulation for Afib   - She is s/p recent Right hip surgery  - Developed Right leg numbness and pain few days ago after working with PT: MRI done to eval for nerve impingement as she has hx of chronic back paint: shows illio-psoas hematoma. Notified Ortho. Will keep off hep gtt. - Hg appears stable now: monitor routinely. - Dopplers done for LE edema: No DVT  - Ortho following      Acute Urinary retention  - Appears recently had E coli in urine, although CC was less than 100k. Was treated with cipro. Also appears on Keflex PTA.   - Urine on admission has some bacteria, and nitrite positive, but neg leuc esterase and no signif pyuria  - Iqbal catheter placed at [facility] for retention likely sec to severe constipation  - Iqbal removed 6/12: Bladder scans PRN      Chronic back pain, narcotic dependent: POA  - She is chronically on narcotics for chronic bck pain (tramadol and norco; and also in setting of recent hip surgery)  - Acute pain, likely radicular from iliopsoas hematoma  - Continue Lyrica      Essential Hypertension- Monitor Bps      Morbid obesity Body mass index is 41.01 kg/m². - Complicating assessment and treatment. Placing patient at risk for multiple co-morbidities as well as early death and contributing to the patient's presentation.  on weight loss when appropriate. DVT Prophylaxis: SCD for now. Resume chem prophylaxis if bleed ruled out/hgb stabel/ok with ortho. Diet: ADULT DIET;  Dysphagia - Minced and Moist; 1500 ml  Code Status: Full Code      Disposition: - Transferred from ICU. Recently at [facility] after hip ortho surgery: Will need SNF at discharge. SNF with improvement of diarrhea.  Hopefully Friday       Morris Medina MD

## 2022-08-17 NOTE — PROGRESS NOTES
Speech Language Pathology  Facility/Department: [address] PCU  Dysphagia Daily Treatment Note & Discharge    NAME: Gregg Verdugo  : 1950  MRN: [id number]    Patient Diagnosis(es):   Patient Active Problem List    Diagnosis Date Noted    Hypotension 2022    Paroxysmal atrial fibrillation ([address] 2022    Acute hypoxemic respiratory failure ([address] 2022    Atrial fibrillation with RVR ([address] 2022    On continuous heparin infusion 2022    Benign essential HTN 2022    Status post right hip replacement 2022    ARIANE (acute kidney injury) ([address] 2022    Electrolyte imbalance 2022    Lactic acid acidosis 2022    Leukocytosis 2022    Ileus ([address] 2022    Status post total hip replacement, right 2022    Abdominal pain 2022    Status post left hip replacement 07/15/2022    Osteoarthritis of right hip 2022    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 Chest CT- 8/15/22  Impression       CHEST:       1. No evidence for hematoma   2. Decreased small pleural effusions and bibasilar atelectasis compared to 2022     Previous MBS - n/a  Chart reviewed. Medical Diagnosis: Abdominal pain [R10.9]   Treatment Diagnosis: dysphagia    BSE Impression 22  Pt sleeping, sitting up in chair with daughter present.  Pt woke fairly easily and was able to participate with only min cues to remain alert at the beginning of the session. Pt had NG removed this date and has not taken in PO nutrition for some time. Pt with upper and lower denture. ROM of oral structures was [facility]. Pt had minimal difficulty closing lips around spoon or drawing liquid up a straw. Pt had mild difficulty with mastication with solids with prolonged mastication noted. There was no anterior spillage or oral residue noted with any consistency. Pt demonstrated no s/s aspiration with any consistency presented. Pt able to initiate swallow without difficulty with laryngeal movement observed. Vocal quality remained clear throughout. No coughing, throat clearing or choking observed with any consistency. Pt was instructed to consume 3oz of water continuously but stopped to take a breath after 3 successive swallows. No s/s aspiration noted. RR fluctuated between 14-18 through out the session. Recommend full liquid diet at this time due to concern for fatigue as pt is deconditioned. Dysphagia Diagnosis: Mild to moderate oral stage dysphagia (due to fatigue)    MBS results   Not indicated at this time    Pain: No pain reported     Current Diet : ADULT DIET; Minced & Moist; 1500 ml   Recommended Form of Meds: PO     Treatment:  Pt seen bedside to address the following goals:  1-The patient will tolerate recommended diet without observed clinical signs of aspiration  8/10: Rn stated keep pt on full today, as still some GIB. pt sitting up in chair, family present. Pt has full liquid tray at bedside. Pt consumed puree and thin liquids with no overt signs of aspiration. Pt cleared oral cavity fully. Pt reported some difficulty earlier when taking medication whole with water and this was a large pill. Pt family and RN report no problem with taking medications crushed in puree. Recommend cont Full liquids, pt remains very weak. 8/12: RN cleared patient; Per chart review, patient able to upgrade diet as tolerated.  Patient sitting upright in bed, family present. She is eating jose crackers with peanut butter / thin liquids. Adequate labial seal, increased mastication time for solids and independent use of liquid rinse observed. Patient taking consecutive drinks thin liquid via cup and straw. No overt s/s of aspiration or penetration noted. Voice remained clear. Discussed diet levels with patient and she selects soft/ bites sized secondary to some generalized weakness and inability to cut food up. RN and MD notified of recommendations. 8/15: Patient cleared by RN who reports tolerance of current soft /bite sized diet texture. Patient with family present; She reports preference for current diet texture given difficulty with cutting food. She does accept hard snack and thin liquids in which she demonstrates mildly increased time for mastication though adequate bolus clearance. No overt s/s of aspiration. Pt reports sore tongue; Upon visualization, tongue is pink/moist. Encouraged continuing good oral care with soft bristled toothbrush. 8/16: Patient awake, alert, resting in bed, on 1L O2 via nc. Family member/visitor present. Per RN pt tolerating diet. Attempted to place dentures, however d/t pt endorsing discomfort, did not don. Trialed thins via straw and soft solid. Pt with positive oral acceptance, slowed mastication of solid (large bite, which pt ended up expectorating half of), positive swallow movement, good oral clearance, no overt signs of aspiration or penetration. In discussion with pt, she expressed wish to downgrade to minced and moist solid. 8/17: Per nursing, no issues with diet tolerance noted. Received patient awake, alert, seated up in bed. Visitor present. Pt endorsed low appetite d/t other GI issues. Agreeable to trials of soft solid and thins; positive oral acceptance, adequate oral prep, positive swallow, good oral clearance, no overt signs of aspiration or penetration.  Patient endorses ongoing oral tenderness, and wishes to remain on minced & moist diet at this time subsequently. Goal met, d/c goal    2- The pt/caregiver will demonstrate understanding of swallowing recommendations and concerns. 8/9- The pt and daughter were educated to purpose of the visit, anatomy and physiology of the swallow, concerns for aspiration, role breathing plays in swallowing, concern for fatigue swallowing strategies, diet recommendations and possibility of being made NPO if s/s aspiration emerge. Both stated comprehension and agreement with diet recommendation and had no further questions. con't goal  8/10: pt and family educated to purpose of visit, reviewed s/s of aspiration and concern if aspiration occurs. Instructed to notify staff if any issues emerge. Discussed cont of current texture at this time. Pt and family stated comprehension  8/12: Patient and family educated on recommendations for swallow strategies (sit upright, alternate consistencies, slow rate) to reduced risk of aspiration. They verbalized understanding of all information. 8/15: Education re: oral care, aspiration precautions completed this date. 8/16: Educated pt to purpose of visit, swallow function, diet recommendations, safety strategies. 8/17: Further education provided to patient re: diet texture options, swallow function, aspiration signs, general safety strategies. Pt stated and demonstrated good comprehension. Goal met, d/c goal     Patient/Family/Caregiver Education:  As above    Compensatory Strategies:  Upright as possible for all oral intake, Remain upright for 30-45 minutes after meals, Eat/Feed slowly, Alternate solids and liquids, Small bites/sips      Plan:  Discharge from dysphagia treatment having met goals per plan of care.     - Continue Minced & Moist / Thin Liquids  - general aspiration precautions  - assist feed  - oral hygiene    DC recommendation: discharge from dysphagia therapy  Treatment: 16  D/W nursing, [facility]  Needs met prior to leaving room, call button in reach. [facility], [address] [facility], .[phone]  Pg.  # [phone]

## 2022-08-17 NOTE — PROGRESS NOTES
rectal tube, continue po Vanc  - Iqbal for strict I/Os  - Continue dysphagia diet per SLP  - Continue PT/OT  - Dispo: will need SNF at DC due to deconditioning. OT 8/24, PT 6/24    Nancy Lara DO, MSMEd  PGY1, General Surgery  08/17/22  5:56 AM  [facility]  Pager: [phone]    I have personally performed the medical history, physical exam and medical decision making and agree with all pertinent clinical information unless otherwise noted.      Josefa Ortega MD  Surgery Attending

## 2022-08-17 NOTE — PROGRESS NOTES
Physical Therapy  Chandrakant Camacho    Chart reviewed. PT attempted to see for follow up treatment although patient politely declining to participate - \"I just don't feel up to it. And this (rectal) tube hurts. \"  Patient encouraged to eat and continue HEP including ankle pumps, quad sets, and generally any LE movement she can tolerate. She remains 50% weight bearing on right LE. Plan to continue to follow. RN notified. Shivani Hanna PT, DPT [id number]    Addendum: Noted pt refused PT this afternoon. Will hold off on OT as well and continue to f/u per POC.    Levon Angel, OTR/L, [id number]

## 2022-08-17 NOTE — PLAN OF CARE
Problem: Safety - Adult  Goal: Free from fall injury  8/59/3397 1178 by Winter Call RN  Outcome: Progressing  Flowsheets (Taken 8/16/2022 2123 by Felix Medina RN)  Free From Fall Injury:   Instruct family/caregiver on patient safety   Based on caregiver fall risk screen, instruct family/caregiver to ask for assistance with transferring infant if caregiver noted to have fall risk factors  Note: Pt is a Fall Risk. See [place] Fall Risk Score. Pt bed in low position and side rails up. Call light and belongings in reach. Pt encouraged to call for assistance. Will continue with hourly rounds for PO intake, pain needs, toileting, and repositioning as needed. Problem: Gastrointestinal - Adult  Goal: Maintains or returns to baseline bowel function  4/09/3428 [id number] by Winter Call RN  Outcome: Progressing  Flowsheets (Taken 8/16/2022 1154 by Erik Narayan RN)  Maintains or returns to baseline bowel function:   Assess bowel function   Encourage oral fluids to ensure adequate hydration   Encourage mobilization and activity  Note: Rectal tube for increased diarrhea r/t c diff. PO vanc administered as ordered. Problem: Genitourinary - Adult  Goal: Absence of urinary retention  Outcome: Progressing  Flowsheets (Taken 8/9/2022 2000 by Kaushik Flores RN)  Absence of urinary retention: Monitor intake/output and perform bladder scan as needed  Note: Iqbal catheter in place for urinary retention.  Iqbal care performed q shift to prevent uti     Problem: Pain  Goal: Verbalizes/displays adequate comfort level or baseline comfort level  7/65/7286 [id number] by Winter Clal RN  Outcome: Progressing  Flowsheets (Taken 8/16/2022 1154 by Erik Narayan RN)  Verbalizes/displays adequate comfort level or baseline comfort level:   Encourage patient to monitor pain and request assistance   Assess pain using appropriate pain scale   Administer analgesics based on type and severity of pain and evaluate response  Note: Ultram administered by RN for back pain. Repositioning and rest encouraged. Pain assessed q4h and prn.

## 2022-08-17 NOTE — PROGRESS NOTES
Calm, pleasant. .  Taking meds with no problems. Vital wdl. Repositioned q2hrs. Rectal tube in place. No problems with it. Iqbal catheter still in place as well. Blood pressure 102/64, pulse (!) 113, temperature 98.8 °F (37.1 °C), temperature source Oral, resp. rate 18, height 5' 2.01\" (1.575 m), weight 235 lb 10.8 oz (106.9 kg), SpO2 94 %, not currently breastfeeding.     Diya Gómez RN

## 2022-08-17 NOTE — PROGRESS NOTES
Provided shift updates to Nick Rhodes, pt's daughter, at this time.     Electronically signed by Pradip Conroy RN on 7/27/1563 at 6:41 AM

## 2022-08-17 NOTE — CARE COORDINATION
CM continues to follow for DC planning and needs. Patient continues with rectal tube in place, isolation for C-DIFF positive, on po vanc. Continuing to monitor H/H. IV albumin ordered. EP signed off. Pain management as needed. PT/OT maría elena completed OT 8/24, PT 6/24. Pt is on a dysphagia diet per SLP. Patient has been accepted to SNF at [facility] [place], operated by [facility] when medically stable for DC. Patient will need transport and HENS at DC. No precert needed.      Denisse Holt RN, BSN, [phone]  Case Management Department  [phone]

## 2022-08-18 LAB
ANION GAP SERPL CALCULATED.3IONS-SCNC: 10 MMOL/L (ref 3–16)
BASOPHILS ABSOLUTE: 0.1 K/UL (ref 0–0.2)
BASOPHILS RELATIVE PERCENT: 0.5 %
BUN BLDV-MCNC: 25 MG/DL (ref 7–20)
C. DIFFICILE TOXIN MOLECULAR: ABNORMAL
CALCIUM SERPL-MCNC: 7.4 MG/DL (ref 8.3–10.6)
CHLORIDE BLD-SCNC: 98 MMOL/L (ref 99–110)
CO2: 18 MMOL/L (ref 21–32)
CREAT SERPL-MCNC: 0.9 MG/DL (ref 0.6–1.2)
EOSINOPHILS ABSOLUTE: 0.1 K/UL (ref 0–0.6)
EOSINOPHILS RELATIVE PERCENT: 1.2 %
GFR AFRICAN AMERICAN: >60
GFR NON-AFRICAN AMERICAN: >60
GLUCOSE BLD-MCNC: 94 MG/DL (ref 70–99)
HCT VFR BLD CALC: 20.5 % (ref 36–48)
HEMOGLOBIN: 7 G/DL (ref 12–16)
LYMPHOCYTES ABSOLUTE: 1.3 K/UL (ref 1–5.1)
LYMPHOCYTES RELATIVE PERCENT: 13.7 %
MAGNESIUM: 2 MG/DL (ref 1.8–2.4)
MCH RBC QN AUTO: 31.6 PG (ref 26–34)
MCHC RBC AUTO-ENTMCNC: 34.1 G/DL (ref 31–36)
MCV RBC AUTO: 92.6 FL (ref 80–100)
MONOCYTES ABSOLUTE: 1.5 K/UL (ref 0–1.3)
MONOCYTES RELATIVE PERCENT: 15.4 %
NEUTROPHILS ABSOLUTE: 6.8 K/UL (ref 1.7–7.7)
NEUTROPHILS RELATIVE PERCENT: 69.2 %
ORGANISM: ABNORMAL
PDW BLD-RTO: 14.8 % (ref 12.4–15.4)
PHOSPHORUS: 3.3 MG/DL (ref 2.5–4.9)
PLATELET # BLD: 514 K/UL (ref 135–450)
PMV BLD AUTO: 7.9 FL (ref 5–10.5)
POTASSIUM REFLEX MAGNESIUM: 4.3 MMOL/L (ref 3.5–5.1)
RBC # BLD: 2.21 M/UL (ref 4–5.2)
SODIUM BLD-SCNC: 126 MMOL/L (ref 136–145)
WBC # BLD: 9.9 K/UL (ref 4–11)

## 2022-08-18 PROCEDURE — 2580000003 HC RX 258

## 2022-08-18 PROCEDURE — 6360000002 HC RX W HCPCS: Performed by: SURGERY

## 2022-08-18 PROCEDURE — 83735 ASSAY OF MAGNESIUM: CPT

## 2022-08-18 PROCEDURE — 84100 ASSAY OF PHOSPHORUS: CPT

## 2022-08-18 PROCEDURE — 6370000000 HC RX 637 (ALT 250 FOR IP)

## 2022-08-18 PROCEDURE — 85025 COMPLETE CBC W/AUTO DIFF WBC: CPT

## 2022-08-18 PROCEDURE — 2500000003 HC RX 250 WO HCPCS: Performed by: INTERNAL MEDICINE

## 2022-08-18 PROCEDURE — 6360000002 HC RX W HCPCS: Performed by: INTERNAL MEDICINE

## 2022-08-18 PROCEDURE — 99231 SBSQ HOSP IP/OBS SF/LOW 25: CPT | Performed by: SURGERY

## 2022-08-18 PROCEDURE — 6370000000 HC RX 637 (ALT 250 FOR IP): Performed by: INTERNAL MEDICINE

## 2022-08-18 PROCEDURE — 2060000000 HC ICU INTERMEDIATE R&B

## 2022-08-18 PROCEDURE — 94669 MECHANICAL CHEST WALL OSCILL: CPT

## 2022-08-18 PROCEDURE — 99233 SBSQ HOSP IP/OBS HIGH 50: CPT | Performed by: INTERNAL MEDICINE

## 2022-08-18 PROCEDURE — 94640 AIRWAY INHALATION TREATMENT: CPT

## 2022-08-18 PROCEDURE — 80048 BASIC METABOLIC PNL TOTAL CA: CPT

## 2022-08-18 PROCEDURE — 6370000000 HC RX 637 (ALT 250 FOR IP): Performed by: SURGERY

## 2022-08-18 PROCEDURE — 6360000002 HC RX W HCPCS

## 2022-08-18 PROCEDURE — 2580000003 HC RX 258: Performed by: INTERNAL MEDICINE

## 2022-08-18 RX ADMIN — ALTEPLASE 1 MG: 2.2 INJECTION, POWDER, LYOPHILIZED, FOR SOLUTION INTRAVENOUS at 16:56

## 2022-08-18 RX ADMIN — METHOCARBAMOL 500 MG: 500 TABLET ORAL at 08:45

## 2022-08-18 RX ADMIN — ALBUTEROL SULFATE 2.5 MG: 2.5 SOLUTION RESPIRATORY (INHALATION) at 20:33

## 2022-08-18 RX ADMIN — Medication 125 MG: at 18:06

## 2022-08-18 RX ADMIN — PANTOPRAZOLE SODIUM 40 MG: 40 TABLET, DELAYED RELEASE ORAL at 16:01

## 2022-08-18 RX ADMIN — MICONAZOLE NITRATE: 2 POWDER TOPICAL at 23:05

## 2022-08-18 RX ADMIN — Medication 125 MG: at 00:49

## 2022-08-18 RX ADMIN — SODIUM CHLORIDE 30 MG/ML INHALATION SOLUTION 4 ML: 30 SOLUTION INHALANT at 08:51

## 2022-08-18 RX ADMIN — TRAMADOL HYDROCHLORIDE 50 MG: 50 TABLET, COATED ORAL at 16:01

## 2022-08-18 RX ADMIN — METHOCARBAMOL 500 MG: 500 TABLET ORAL at 16:01

## 2022-08-18 RX ADMIN — PREGABALIN 50 MG: 50 CAPSULE ORAL at 08:45

## 2022-08-18 RX ADMIN — METHOCARBAMOL 500 MG: 500 TABLET ORAL at 02:46

## 2022-08-18 RX ADMIN — Medication 125 MG: at 12:00

## 2022-08-18 RX ADMIN — SODIUM CHLORIDE, PRESERVATIVE FREE 10 ML: 5 INJECTION INTRAVENOUS at 08:49

## 2022-08-18 RX ADMIN — METOPROLOL TARTRATE 25 MG: 25 TABLET, FILM COATED ORAL at 08:45

## 2022-08-18 RX ADMIN — HEPARIN SODIUM 5000 UNITS: 5000 INJECTION INTRAVENOUS; SUBCUTANEOUS at 13:20

## 2022-08-18 RX ADMIN — PREGABALIN 50 MG: 50 CAPSULE ORAL at 21:05

## 2022-08-18 RX ADMIN — SODIUM BICARBONATE: 84 INJECTION, SOLUTION INTRAVENOUS at 14:18

## 2022-08-18 RX ADMIN — PANTOPRAZOLE SODIUM 40 MG: 40 TABLET, DELAYED RELEASE ORAL at 05:44

## 2022-08-18 RX ADMIN — Medication 125 MG: at 05:45

## 2022-08-18 RX ADMIN — MICONAZOLE NITRATE: 2 POWDER TOPICAL at 08:48

## 2022-08-18 RX ADMIN — TRAMADOL HYDROCHLORIDE 50 MG: 50 TABLET, COATED ORAL at 02:46

## 2022-08-18 RX ADMIN — PREGABALIN 50 MG: 50 CAPSULE ORAL at 13:20

## 2022-08-18 RX ADMIN — SODIUM CHLORIDE, PRESERVATIVE FREE 10 ML: 5 INJECTION INTRAVENOUS at 21:06

## 2022-08-18 RX ADMIN — HEPARIN SODIUM 5000 UNITS: 5000 INJECTION INTRAVENOUS; SUBCUTANEOUS at 21:05

## 2022-08-18 RX ADMIN — DIGOXIN 125 MCG: 125 TABLET ORAL at 08:45

## 2022-08-18 RX ADMIN — MORPHINE SULFATE 1 MG: 2 INJECTION, SOLUTION INTRAMUSCULAR; INTRAVENOUS at 13:20

## 2022-08-18 RX ADMIN — SODIUM CHLORIDE 30 MG/ML INHALATION SOLUTION 4 ML: 30 SOLUTION INHALANT at 20:33

## 2022-08-18 RX ADMIN — TRAMADOL HYDROCHLORIDE 50 MG: 50 TABLET, COATED ORAL at 08:45

## 2022-08-18 RX ADMIN — Medication 10 MG: at 21:05

## 2022-08-18 RX ADMIN — HEPARIN SODIUM 5000 UNITS: 5000 INJECTION INTRAVENOUS; SUBCUTANEOUS at 05:44

## 2022-08-18 RX ADMIN — ALBUTEROL SULFATE 2.5 MG: 2.5 SOLUTION RESPIRATORY (INHALATION) at 08:50

## 2022-08-18 RX ADMIN — FLUTICASONE PROPIONATE 2 SPRAY: 50 SPRAY, METERED NASAL at 08:49

## 2022-08-18 RX ADMIN — METOPROLOL TARTRATE 25 MG: 25 TABLET, FILM COATED ORAL at 21:05

## 2022-08-18 ASSESSMENT — PAIN DESCRIPTION - DESCRIPTORS
DESCRIPTORS: ACHING;DISCOMFORT
DESCRIPTORS: ACHING

## 2022-08-18 ASSESSMENT — PAIN DESCRIPTION - PAIN TYPE: TYPE: ACUTE PAIN

## 2022-08-18 ASSESSMENT — PAIN SCALES - GENERAL
PAINLEVEL_OUTOF10: 7
PAINLEVEL_OUTOF10: 5
PAINLEVEL_OUTOF10: 7

## 2022-08-18 ASSESSMENT — PAIN SCALES - WONG BAKER: WONGBAKER_NUMERICALRESPONSE: 0

## 2022-08-18 ASSESSMENT — PAIN DESCRIPTION - LOCATION
LOCATION: OTHER (COMMENT)
LOCATION: BACK

## 2022-08-18 ASSESSMENT — PAIN - FUNCTIONAL ASSESSMENT: PAIN_FUNCTIONAL_ASSESSMENT: ACTIVITIES ARE NOT PREVENTED

## 2022-08-18 ASSESSMENT — PAIN DESCRIPTION - ORIENTATION: ORIENTATION: MID

## 2022-08-18 ASSESSMENT — PAIN DESCRIPTION - ONSET: ONSET: ON-GOING

## 2022-08-18 ASSESSMENT — PAIN DESCRIPTION - FREQUENCY: FREQUENCY: CONTINUOUS

## 2022-08-18 NOTE — PROGRESS NOTES
Patient's daughter was updated on patient's condition overnight this morning. All questions and concerns were answered.

## 2022-08-18 NOTE — RT PROTOCOL NOTE
with one order with TID Frequency and one order with Frequency of every 4 hours PRN wheezing or increased work of breathing using Per Protocol order mode. 11-13 - enter or revise RT Bronchodilator order(s) to one equivalent RT bronchodilator order with QID Frequency and an Albuterol order with Frequency of every 4 hours PRN wheezing or increased work of breathing using Per Protocol order mode. Greater than 13 - enter or revise RT Bronchodilator order(s) to one equivalent RT bronchodilator order with every 4 hours Frequency and an Albuterol order with Frequency of every 2 hours PRN wheezing or increased work of breathing using Per Protocol order mode. RT to enter RT Home Evaluation for COPD & MDI Assessment order using Per Protocol order mode.     Electronically signed by Claudell Casino, RCP on 8/18/2022 at 10:25 AM

## 2022-08-18 NOTE — PROGRESS NOTES
Pt AOX4, No SOB,  Able to voice needs. PRN meds given for back pain. The mattress of her bed was found deflated a few times and an order for a bed replacement was placed. Patient was repositioned q2hrs. Rectal tube still in. I attempted to remove it but was unable. 15 ML of NS were taken out of the rectal tube balloon. I was expecting more water or normal saline to be suctioned out of the balloon prior to attempting to remove the flexa seal/rectal tube. Iqbal catheter still in place. Alteplase/cathflo given x 1 in blue lumen of IJ. Blood pressure (!) 95/57, pulse 73, temperature 97.5 °F (36.4 °C), temperature source Oral, resp. rate 16, height 5' 2.01\" (1.575 m), weight 243 lb 6.2 oz (110.4 kg), SpO2 93 %, not currently breastfeeding.     Kerry Lancaster RN

## 2022-08-18 NOTE — PROGRESS NOTES
Hospital Medicine Progress Note    PCP: Karlie Grace    Date of Admission: 7/30/2022    Chief Complaint:  Abdominal pain, transferred for ortho eval with Rt CASS site with fluid collection. History Of Present Illness:     74 y.o. female  - Hx of HTN, HLD, Gout, OA, recent Rt CASS, morbid obesity  - Presents with generalized weakness nausea vomiting and abdominal pain, she woke up with the symptoms night prior to presentation. Her last bowel movement was about 3 days prior. She was seen at [facility] (now [facility] where she underwent CT imaging of the abdomen and pelvis which showed:    IMPRESSION:   1. There is a large amount of stool cecum through the sigmoid colon. Transition point is rectosigmoid but there is no obvious mass in the rectosigmoid region. No bowel thickening or peribowel stranding in the rectosigmoid region. 2. Likely, multiple renal cysts. 3. Partially organized fluid without obvious rim enhancement anterior to the THR. This may simply represent postoperative seroma but clinical correlation needed. The entire inferior extent of this is not covered on this exam.   4. Urinary bladder is distended with fluid    Labs were significant for WBC 22.8k, with elevated ANC, hgb 10.1, na131, k 5.0 BUN of 19 cr 1.1, Alk phos 139. With noted fluid around [facility] Dr. Reardon Poag evaluation. \"        Interval HPI  Rapid response 8/13 AM. For hypotension: Was hypotensive to 67/41 during blood transfusion. , satting well on 4L NC. Patient awake and appropriately interactive, no acute distress, denies any symptoms other than episode of diarrhea before rapid called. Transfusion was stopped, gave 350cc bolus with prompt improvement in BP to 11'N systolic. Pt remained hemodynamically stable and denied any new symptoms during this rapid. \"    Appears this was likely sec to C diff infection: Resolving with txt of C diff. Has been having diarrhea, non bloody. On PO Vanco. Abdominal pain improved     Had been on heparin gtt in the ICU per cards for Afib. Discontinued days ago due to concern for hemorrhage. Had recurrent rectal bleed several days ago. EGD done for \"melena\". She is s/p recent Right hip surgery  Right leg numbness and pain few days ago after working with PT: MRI done to eval for nerve impingemen as she has hx of chronic back paint: shows illio-psoas hematoma. Imaging showed enlarging right hemtoma iliopsoas . She is s/p right hip surgery, and had just worked with PT/OT. Hgb appears stable mostly now. Subjective    Remains more awake , family at bedside    HR Improved with digoxin, BB. BP soft     Still diarrhea, cramps, rectal tube in place     We will obtain peripheral access and remove central line (placed 8/1  We will DC rectal tube per surgery recs, for now (concern may develop stercoral ulcer)    Sodium still low , NAGM: Nephro giving NaHCO3      Medications : Reviewed      Allergies:  Buspirone, Duloxetine, Fenofibrate, Gabapentin, Venlafaxine, Doxycycline, Statins, and Sulfa antibiotics      REVIEW OF SYSTEMS:   Pertinent positives as noted in the HPI. All other systems reviewed and negative. PHYSICAL EXAM PERFORMED:    BP (!) 92/57   Pulse 74   Temp 98.5 °F (36.9 °C) (Axillary)   Resp 18   Ht 5' 2.01\" (1.575 m)   Wt 243 lb 6.2 oz (110.4 kg)   SpO2 94%   BMI 44.50 kg/m²     General appearance: Awake,alert  HEENT:  Normal cephalic, atraumatic without obvious deformity. Neck: Supple, with full range of motion. No jugular venous distention. Respiratory:  Normal respiratory effort. Clear to auscultation, bilaterally without Rales/Wheezes/Rhonchi. Cardiovascular:  Regular rate and rhythm with normal S1/S2 without murmurs, rubs or gallops. Abdomen: Not distended, soft, much less tender.  BS heard  Musculoskeletal: Incision dressing C/D/I  Skin: Skin color, texture, turgor normal.  No rashes or lesions. Neurologic:  grossly non-focal.  Capillary Refill: Brisk,< 3 seconds   Peripheral Pulses: +2 palpable, equal bilaterally       Labs:     Recent Labs     08/16/22  0552 08/17/22  0335 08/18/22  0552   WBC 6.0 9.1 9.9   HGB 7.6* 7.8* 7.0*   HCT 23.2* 23.1* 20.5*   * 527* 514*       Recent Labs     08/16/22  0558 08/17/22  0335 08/17/22  0944 08/18/22  0552   * 126*  --  126*   K 4.1 4.4  --  4.3    99  --  98*   CO2 19* 18*  --  18*   BUN 19 20  --  25*   CREATININE 0.6 0.7  --  0.9   CALCIUM 7.4* 7.4*  --  7.4*   PHOS 2.6  --  3.0 3.3       No results for input(s): AST, ALT, BILIDIR, BILITOT, ALKPHOS in the last 72 hours. No results for input(s): INR in the last 72 hours. No results for input(s): Brady Pears in the last 72 hours. Radiology: Reviewed        ASSESSMENT/PLAN:    Severe sepsis: POA   - Leukocytosis with tachycardia; on admission; - Possibly from GI source with severe constipation, fecal impaction  -  Transferred to [facility] for evaluation of post-op hip fluid collection/possible infection (s/p THR 7/13): felt by ortho to be likely seroma,  normal post operative finding .  - Started on admission on broad spectrum abx. - Blood cultures x 2 NGTD. ID consulted: - Not currently on Abx   - Sepsis appears resolved   - New sepsis 8/13: sec to C diff: resolving/resolved. Parox Atrial Fibrillation   Afib with RVR  Echo normal EF, normal diastolic  Continue AVN agents as tolerated. Was placed on Heparin drip in the ICU:  held with drop in hgb, and blood loss anemia . [facility] can possibly be resumed, If hgb stable in the next few days: Defer to Cardiology  Cardiology following  Transitioned to oral meds: continue Digoxin, [place].    - Keep Mg> 2; K 4 or above      Acute  hypoxemic respiratory failure  Possible fluid overload from recent surgery, and while NPO with intra-abdominal issue this admit  - Was Briefly on IV lasix/lasix drip improvement but not resolution. Was treated with SMOG enemas BID, Relistor, disimpaction. Was placed on Movantic and miralax. Bowel functioning now, with diarrhea noted: Off bowel meds  - Obstruction resolved      Recent right hip surgery: POA  Hematoma right iliopsoas muscle: prob new, sec to recent surgery and anticoagulation for Afib   - She is s/p recent Right hip surgery  - Developed Right leg numbness and pain few days ago after working with PT: MRI done to eval for nerve impingement as she has hx of chronic back paint: shows illio-psoas hematoma. - Hg appears stable now: monitor routinely. - Dopplers done for LE edema: No DVT  - Ortho following      Acute Urinary retention  - Appears recently had E coli in urine, although CC was less than 100k. Was treated with cipro. Also appears on Keflex PTA.   - Urine on admission has some bacteria, and nitrite positive, but neg leuc esterase and no signif pyuria  - Iqbal catheter placed at [facility] for retention likely sec to severe constipation  - Iqbal removed 6/12: retention re-demonstrated, replaced. Chronic back pain, narcotic dependent: POA  - She is chronically on narcotics for chronic bck pain (tramadol and norco; and also in setting of recent hip surgery)  - Acute pain, likely radicular from iliopsoas hematoma  - Continue Lyrica      Essential Hypertension- Monitor Bps      Morbid obesity Body mass index is 41.01 kg/m². - Complicating assessment and treatment. Placing patient at risk for multiple co-morbidities as well as early death and contributing to the patient's presentation.  on weight loss when appropriate. DVT Prophylaxis: SCD for now. Resume chem prophylaxis if bleed ruled out/hgb stabel/ok with ortho. Diet: ADULT DIET; Dysphagia - Minced and Moist; 1200 ml  Code Status: Full Code      Disposition: - Transferred from ICU. Recently at [facility] after hip ortho surgery: Will need SNF at discharge.     SNF with improvement of diarrhea.  Hopefully 1-2 days       Kaleigh Stevens MD

## 2022-08-18 NOTE — PROGRESS NOTES
Comprehensive Nutrition Assessment    Type and Reason for Visit:  Reassess    Nutrition Recommendations/Plan:   Continue Dysphagia - minced and moist 1200 mL FR diet per SLP  Add Ensure BID  Monitor nutrition adequacy, pertinent labs, bowel habits, wt changes, and clinical progress     Malnutrition Assessment:  Malnutrition Status: At risk for malnutrition (Comment) (08/08/22 1520)    Context:  Acute Illness     Findings of the 6 clinical characteristics of malnutrition:  Energy Intake:  Mild decrease in energy intake (Comment)  Fluid Accumulation:  Moderate to Severe       Nutrition Assessment:    Follow up: Pt on Dysphagia - minced and moist diet w/ 1200 mL FR per SLP. Intakes has decreased since last RD assessment. Some meals between 51-75%, however most meals are <50%. Pt does have rectal tube now and has had diarrhea for past few days, d/t C-diff. Will add Ensure BID to encourage increased intake. Pt weight has remained steady for duration of admission. Will continue to monitor for increased intake and ONS tolerance. Nutrition Related Findings:    Labs reviewed. Na 126. BUN 25. Active bowel sounds. Diarrhea. Rectal tube. Wound Type: Surgical Incision       Current Nutrition Intake & Therapies:    Average Meal Intake: 51-75%, 26-50%, 0%  Average Supplements Intake: None Ordered  ADULT DIET; Dysphagia - Minced and Moist; 1200 ml    Anthropometric Measures:  Height: 5' 2.01\" (157.5 cm)  Ideal Body Weight (IBW): 110 lbs (50 kg)       Current Body Weight: 262 lb 5.6 oz (119 kg), 238.5 % IBW.     Current BMI (kg/m2): 48                          BMI Categories: Obese Class 3 (BMI 40.0 or greater)    Estimated Daily Nutrient Needs:  Energy Requirements Based On: Kcal/kg (30-35 kcal/kg IBW)  Weight Used for Energy Requirements: Ideal (IBW 50 kg)  Energy (kcal/day): 2191-3415  Weight Used for Protein Requirements: Ideal  Protein (g/day): 60-75  Method Used for Fluid Requirements: 1 ml/kcal  Fluid (ml/day): or per MD    Nutrition Diagnosis:   Inadequate protein-energy intake related to inadequate protein-energy intake as evidenced by intake 0-25%, intake 26-50%    Nutrition Interventions:   Food and/or Nutrient Delivery: Continue Current Diet, Start Oral Nutrition Supplement  Nutrition Education/Counseling: Education not indicated  Coordination of Nutrition Care: Continue to monitor while inpatient  Plan of Care discussed with: Pt    Goals:  Previous Goal Met: Progressing toward Goal(s)  Goals: PO intake 50% or greater, prior to discharge       Nutrition Monitoring and Evaluation:   Behavioral-Environmental Outcomes: None Identified  Food/Nutrient Intake Outcomes: Food and Nutrient Intake, Supplement Intake  Physical Signs/Symptoms Outcomes: Biochemical Data, Nutrition Focused Physical Findings, Weight    Discharge Planning:     Too soon to determine     Emily Hale, [address], [place]  Contact: [phone]

## 2022-08-18 NOTE — PROGRESS NOTES
Office : [phone]     Fax :[phone]         Renal Progress Note    Subjective:   Admit Date: 7/30/2022     Interval History:   Still having diarrhea. Decreased PO intake. DIET ADULT DIET;  Dysphagia - Minced and Moist; 1200 ml  Medications:   Scheduled Meds:   metoprolol tartrate  25 mg Oral BID    heparin (porcine)  5,000 Units SubCUTAneous 3 times per day    vancomycin  125 mg Oral 4 times per day    digoxin  125 mcg Oral Daily    pantoprazole  40 mg Oral BID AC    miconazole   Topical BID    melatonin  10 mg Oral Nightly    albuterol  2.5 mg Nebulization BID    lidocaine  1 patch TransDERmal Daily    sodium chloride (Inhalant)  4 mL Nebulization BID    sodium chloride flush  5-40 mL IntraVENous 2 times per day    fluticasone  2 spray Each Nostril Daily    pregabalin  50 mg Oral TID     Continuous Infusions:   sodium chloride      sodium chloride      sodium chloride      sodium chloride      dextrose      sodium chloride 5 mL/hr at 08/12/22 1154       Labs:  CBC:   Recent Labs     08/16/22  0552 08/17/22  0335 08/18/22  0552   WBC 6.0 9.1 9.9   HGB 7.6* 7.8* 7.0*   * 527* 514*       BMP:    Recent Labs     08/16/22  0558 08/17/22  0335 08/18/22  0552   * 126* 126*   K 4.1 4.4 4.3    99 98*   CO2 19* 18* 18*   BUN 19 20 25*   CREATININE 0.6 0.7 0.9   GLUCOSE 83 87 94       Ca/Mg/Phos:   Recent Labs     08/16/22  0558 08/17/22  0335 08/17/22  0944 08/18/22  0552   CALCIUM 7.4* 7.4*  --  7.4*   MG 1.60* 2.30  --  2.00   PHOS 2.6  --  3.0 3.3       Hepatic:   No results for input(s): AST, ALT, ALB, BILITOT, ALKPHOS in the last 72 hours.    Troponin: No results for input(s): TROPONINI in the last 72 hours. BNP: No results for input(s): BNP in the last 72 hours. Lipids: No results for input(s): CHOL, TRIG, HDL, LDLCALC, LABVLDL in the last 72 hours. ABGs:   No results for input(s): PHART, PO2ART, XBA1CPG in the last 72 hours. INR:   No results for input(s): INR in the last 72 hours. UA:  No results for input(s): Janace Carrington, GLUCOSEU, BILIRUBINUR, KETUA, SPECGRAV, BLOODU, PHUR, PROTEINU, UROBILINOGEN, NITRU, LEUKOCYTESUR, Radha Comment in the last 72 hours. Urine Microscopic:   No results for input(s): LABCAST, BACTERIA, COMU, HYALCAST, WBCUA, RBCUA, EPIU in the last 72 hours. Urine Culture: No results for input(s): LABURIN in the last 72 hours. Urine Chemistry: No results for input(s): Lucas Clos, PROTEINUR, NAUR in the last 72 hours. Objective:   Vitals: BP 97/61   Pulse 72   Temp 98 °F (36.7 °C) (Oral)   Resp 16   Ht 5' 2.01\" (1.575 m)   Wt 243 lb 6.2 oz (110.4 kg)   SpO2 94%   BMI 44.50 kg/m²    Wt Readings from Last 3 Encounters:   08/18/22 243 lb 6.2 oz (110.4 kg)   07/13/22 218 lb 9.6 oz (99.2 kg)   06/14/22 217 lb (98.4 kg)      24HR INTAKE/OUTPUT:    Intake/Output Summary (Last 24 hours) at 8/18/2022 0836  Last data filed at 8/18/2022 0429  Gross per 24 hour   Intake 1030 ml   Output 525 ml   Net 505 ml       Physical Exam  Constitutional:       Appearance: She is ill-appearing. She is not diaphoretic. Cardiovascular:      Rate and Rhythm: Normal rate. Pulmonary:      Effort: Pulmonary effort is normal.      Breath sounds: Rhonchi present. No wheezing. Abdominal:      General: There is distension. Palpations: Abdomen is soft. Tenderness: There is abdominal tenderness. There is no guarding. Musculoskeletal:      Right lower leg: Edema present. Left lower leg: Edema present. Skin:     General: Skin is warm and dry. Neurological:      General: No focal deficit present. Mental Status: She is oriented to person, place, and time. Assessment and Plan:       IMAGING:  CT CHEST ABDOMEN PELVIS WO CONTRAST   Final Result      CHEST:      1. No evidence for hematoma   2. Decreased small pleural effusions and bibasilar atelectasis compared to 8/2/2022      ABDOMEN/PELVIS:      1. Right iliopsoas muscle small to medium-sized intramuscular acute hematoma . An accurate characterization of size of hematoma is not possible due to its infiltrative nature. 2. Right proximal thigh subcutaneous 3.8 x 3.7 cm water attenuation fluid collection   3. Nonspecific, nonobstructive bowel gas pattern      XR CHEST PORTABLE   Final Result      Limited inspiration. Accentuation of pulmonary interstitial markings, with minor basilar atelectasis. No other acute findings. MRI LUMBAR SPINE WO CONTRAST   Final Result   1. Moderate size intramuscular hematoma along the right iliopsoas muscle. This is new in comparison to abdominal CT 8/1/2022.   2. No central canal stenosis. 3. Moderate multilevel foraminal narrowing as described. 4. No acute osseous abnormality. XR HIP 2-3 VW W PELVIS RIGHT   Final Result   Impression:    1. Right total hip arthroplasty. The orthopedic hardware is intact without evidence of loosening. No significant change from prior CT examination. VL Extremity Venous Bilateral   Final Result      XR CHEST PORTABLE   Final Result      Mild bilateral airspace disease. XR ABDOMEN (KUB) (SINGLE AP VIEW)   Final Result   1. No evidence of free intraperitoneal air. 2. Indeterminate bowel gas pattern due to a paucity of small bowel gas. XR ABDOMEN (KUB) (SINGLE AP VIEW)   Final Result   Impression: Stable appearance of the abdomen. XR CHEST PORTABLE   Final Result      Low lung volumes with central bronchovascular crowding. Atelectasis in the left lung base. Normal cardiomediastinal silhouette.       Lines and tubes in standard position. VL Extremity Venous Bilateral   Final Result      XR ABDOMEN (KUB) (SINGLE AP VIEW)   Final Result   Impression: Stable appearance of the abdomen with gas distended loops of central small bowel again noted. CT CHEST WO CONTRAST   Final Result      1. Moderate right pleural effusion and small left effusion with basilar airspace opacity most consistent with atelectasis. Superimposed pneumonia be difficult to exclude. 2. Narrowing of the trachea with expiration compatible with treated bronchial malacia. 3. Nasogastric tube tip in the lower esophagus. 4. Persistent colonic distention. 5. Mild coronary artery calcification. XR ABDOMEN (KUB) (SINGLE AP VIEW)   Final Result      Frontal supine views demonstrate an unremarkable bowel gas pattern. No significant colonic stool is seen. Right hip arthroplasty noted. XR CHEST PORTABLE   Final Result      Prominence of the perihilar interstitial markings with mild peribronchial cuffing is favored to represent mild pulmonary edema. Low lung volumes bronchovascular crowding. Stable cardiomediastinal silhouette. Lines and tubes in standard position. Cholecystectomy clips are noted. CT ABDOMEN PELVIS WO CONTRAST Additional Contrast? Oral and Rectal   Final Result   1. Moderate colonic distention. Correlate for constipation. Fecal content within the ileum which may suggest stasis. There is no small bowel obstruction. 2. Subcutaneous fluid collection overlying the right total hip arthroplasty. CT HIP RIGHT WO CONTRAST   Final Result   1. Moderate colonic distention. Correlate for constipation. Fecal content within the ileum which may suggest stasis. There is no small bowel obstruction. 2. Subcutaneous fluid collection overlying the right total hip arthroplasty. XR CHEST PORTABLE   Final Result      1. Right IJ CVC tip in the cavoatrial junction.    2.  Bibasilar airspace disease suggesting atelectasis. Pneumonia is not excluded. XR ABDOMEN (KUB) (SINGLE AP VIEW)   Final Result      Dilated colonic and small bowel loops may suggest ileus but obstruction is not excluded. XR ABDOMEN (KUB) (SINGLE AP VIEW)   Final Result      No acute radiographic abnormality of the abdomen. Status post right hip arthroplasty with mildly displaced greater trochanteric fracture fragment, new or more pronounced compared to the prior study. Assessment/Plan     ARIANE   2. Sepsis  3. AGMA   4. Lactic Acidosis - resolved  4. Hyperkalemia  5. Hyponatremia  6. Bowel Obstruction  7. Hypertension  8. GI bleed  9. Anemia  10. C diff       Plan:  - Cr worse  - BP low   - IVF with bicarb   - encourage solute intake   - Replete electrolytes as needed    - ECHO normal EF - IVC compressible   - Hypoxia likely sec to Aspiration pneumonitis   - ARIANE sec to ATN   - Monitor I/Os  - Avoid nephrotoxic agents    Recommend to dose adjust all medications  based on renal functions  Maintain SBP> 90 mmHg   Daily weights   AVOID NSAIDs  Avoid Nephrotoxins  Monitor Intake/Output  Call if significant decrease in urine output        Ileana Garcia MD        I have seen the patient independently from the resident . I discussed the care with the resident. I personally reviewed the HPI, PH, FH, SH, ROS and medications. I repeated pertinent portions of the examination and reviewed the relevant imaging and laboratory data.  I agree with the findings, assessment and plan as documented, with the following addendum:      Drew Luciano MD

## 2022-08-18 NOTE — PROGRESS NOTES
General Surgery   Daily Progress Note  Patient: [facility]    CC: Abdominal pain with bowel obstruction    SUBJECTIVE:   No overnight events. HDS and afebrile. Less stool overnight compared to night before. Complains of arm pain this am. Denies abdominal pain. Tolerating diet. No n/v.     ROS:   A 14 point review of systems was conducted, significant findings as noted above. All other systems negative. OBJECTIVE:  PHYSICAL EXAM:    Vitals:    08/17/22 2307 08/18/22 0316 08/18/22 0434 08/18/22 0455   BP:    97/61   Pulse:    72   Resp: 16 18 16   Temp:    98 °F (36.7 °C)   TempSrc:    Oral   SpO2:    94%   Weight:   243 lb 6.2 oz (110.4 kg)    Height:           General appearance: Resting in bed  HEENT: Trachea midline. RIJ CVC in place. Chest/Lungs: normal effort with no accessory muscle use on  RA  Cardiovascular: irregular rhythm   Abdomen: Obese, non-tender, no rebound, guarding, or rigidity. Rectal tube in place. Skin: warm and dry, no rashes  Extremities: bilateral LE edema, no cyanosis  Genitourinary: Iqbal with clear urine 425cc/24 hours       LABS:   Recent Labs     08/16/22  0552 08/17/22  0335   WBC 6.0 9.1   HGB 7.6* 7.8*   HCT 23.2* 23.1*   MCV 92.7 92.2   * 527*          Recent Labs     08/16/22  0558 08/17/22  0335 08/17/22  0944   * 126*  --    K 4.1 4.4  --     99  --    CO2 19* 18*  --    PHOS 2.6  --  3.0   BUN 19 20  --    CREATININE 0.6 0.7  --         No results for input(s): AST, ALT, ALB, BILIDIR, BILITOT, ALKPHOS in the last 72 hours. No results for input(s): LIPASE, AMYLASE in the last 72 hours. No results for input(s): PROT, INR, APTT in the last 72 hours. No results for input(s): CKTOTAL, CKMB, CKMBINDEX, TROPONINI in the last 72 hours.       ASSESSMENT & PLAN:   This is a 74 y.o. female with Hx of HTN, HLD, Tuberculosis (1983), gout, and OA s/p right CASS on 7/13/22 who was transferred to [facility] on 7/30 due to fluid associated with her right CASS on 7/13

## 2022-08-19 ENCOUNTER — NURSE ONLY (OUTPATIENT)
Dept: CARDIOLOGY | Age: 72
End: 2022-08-19

## 2022-08-19 LAB
ANION GAP SERPL CALCULATED.3IONS-SCNC: 10 MMOL/L (ref 3–16)
BASOPHILS ABSOLUTE: 0.1 K/UL (ref 0–0.2)
BASOPHILS RELATIVE PERCENT: 0.8 %
BUN BLDV-MCNC: 24 MG/DL (ref 7–20)
CALCIUM SERPL-MCNC: 7.6 MG/DL (ref 8.3–10.6)
CHLORIDE BLD-SCNC: 100 MMOL/L (ref 99–110)
CO2: 19 MMOL/L (ref 21–32)
CREAT SERPL-MCNC: 0.7 MG/DL (ref 0.6–1.2)
EOSINOPHILS ABSOLUTE: 0.1 K/UL (ref 0–0.6)
EOSINOPHILS RELATIVE PERCENT: 0.8 %
GFR AFRICAN AMERICAN: >60
GFR NON-AFRICAN AMERICAN: >60
GLUCOSE BLD-MCNC: 98 MG/DL (ref 70–99)
HCT VFR BLD CALC: 22.5 % (ref 36–48)
HEMOGLOBIN: 7.7 G/DL (ref 12–16)
LYMPHOCYTES ABSOLUTE: 1.4 K/UL (ref 1–5.1)
LYMPHOCYTES RELATIVE PERCENT: 14.5 %
MAGNESIUM: 1.9 MG/DL (ref 1.8–2.4)
MCH RBC QN AUTO: 32.6 PG (ref 26–34)
MCHC RBC AUTO-ENTMCNC: 34.5 G/DL (ref 31–36)
MCV RBC AUTO: 94.4 FL (ref 80–100)
MONOCYTES ABSOLUTE: 0.7 K/UL (ref 0–1.3)
MONOCYTES RELATIVE PERCENT: 7.2 %
NEUTROPHILS ABSOLUTE: 7.5 K/UL (ref 1.7–7.7)
NEUTROPHILS RELATIVE PERCENT: 76.7 %
PDW BLD-RTO: 15.4 % (ref 12.4–15.4)
PLATELET # BLD: 565 K/UL (ref 135–450)
PMV BLD AUTO: 7.8 FL (ref 5–10.5)
POTASSIUM SERPL-SCNC: 4.4 MMOL/L (ref 3.5–5.1)
RBC # BLD: 2.38 M/UL (ref 4–5.2)
SODIUM BLD-SCNC: 129 MMOL/L (ref 136–145)
WBC # BLD: 9.7 K/UL (ref 4–11)

## 2022-08-19 PROCEDURE — 2580000003 HC RX 258

## 2022-08-19 PROCEDURE — 6360000002 HC RX W HCPCS: Performed by: INTERNAL MEDICINE

## 2022-08-19 PROCEDURE — 2060000000 HC ICU INTERMEDIATE R&B

## 2022-08-19 PROCEDURE — 97530 THERAPEUTIC ACTIVITIES: CPT

## 2022-08-19 PROCEDURE — 2580000003 HC RX 258: Performed by: INTERNAL MEDICINE

## 2022-08-19 PROCEDURE — 80048 BASIC METABOLIC PNL TOTAL CA: CPT

## 2022-08-19 PROCEDURE — 97535 SELF CARE MNGMENT TRAINING: CPT

## 2022-08-19 PROCEDURE — 6370000000 HC RX 637 (ALT 250 FOR IP): Performed by: INTERNAL MEDICINE

## 2022-08-19 PROCEDURE — 94640 AIRWAY INHALATION TREATMENT: CPT

## 2022-08-19 PROCEDURE — 85025 COMPLETE CBC W/AUTO DIFF WBC: CPT

## 2022-08-19 PROCEDURE — 99231 SBSQ HOSP IP/OBS SF/LOW 25: CPT | Performed by: SURGERY

## 2022-08-19 PROCEDURE — 99233 SBSQ HOSP IP/OBS HIGH 50: CPT | Performed by: INTERNAL MEDICINE

## 2022-08-19 PROCEDURE — 6360000002 HC RX W HCPCS: Performed by: SURGERY

## 2022-08-19 PROCEDURE — 6370000000 HC RX 637 (ALT 250 FOR IP): Performed by: SURGERY

## 2022-08-19 PROCEDURE — 6370000000 HC RX 637 (ALT 250 FOR IP)

## 2022-08-19 PROCEDURE — 97110 THERAPEUTIC EXERCISES: CPT

## 2022-08-19 PROCEDURE — 51702 INSERT TEMP BLADDER CATH: CPT

## 2022-08-19 PROCEDURE — 83735 ASSAY OF MAGNESIUM: CPT

## 2022-08-19 PROCEDURE — 36415 COLL VENOUS BLD VENIPUNCTURE: CPT

## 2022-08-19 PROCEDURE — 2500000003 HC RX 250 WO HCPCS: Performed by: INTERNAL MEDICINE

## 2022-08-19 PROCEDURE — 94669 MECHANICAL CHEST WALL OSCILL: CPT

## 2022-08-19 RX ORDER — LIDOCAINE HYDROCHLORIDE 20 MG/ML
15 SOLUTION OROPHARYNGEAL
Status: DISCONTINUED | OUTPATIENT
Start: 2022-08-19 | End: 2022-08-23 | Stop reason: HOSPADM

## 2022-08-19 RX ADMIN — METOPROLOL TARTRATE 25 MG: 25 TABLET, FILM COATED ORAL at 08:47

## 2022-08-19 RX ADMIN — PANTOPRAZOLE SODIUM 40 MG: 40 TABLET, DELAYED RELEASE ORAL at 06:06

## 2022-08-19 RX ADMIN — Medication 125 MG: at 06:24

## 2022-08-19 RX ADMIN — PREGABALIN 50 MG: 50 CAPSULE ORAL at 21:24

## 2022-08-19 RX ADMIN — HEPARIN SODIUM 5000 UNITS: 5000 INJECTION INTRAVENOUS; SUBCUTANEOUS at 21:24

## 2022-08-19 RX ADMIN — Medication 125 MG: at 00:01

## 2022-08-19 RX ADMIN — MICONAZOLE NITRATE: 2 POWDER TOPICAL at 08:47

## 2022-08-19 RX ADMIN — HEPARIN SODIUM 5000 UNITS: 5000 INJECTION INTRAVENOUS; SUBCUTANEOUS at 06:06

## 2022-08-19 RX ADMIN — PANTOPRAZOLE SODIUM 40 MG: 40 TABLET, DELAYED RELEASE ORAL at 16:05

## 2022-08-19 RX ADMIN — Medication 125 MG: at 16:05

## 2022-08-19 RX ADMIN — PREGABALIN 50 MG: 50 CAPSULE ORAL at 08:47

## 2022-08-19 RX ADMIN — SODIUM BICARBONATE: 84 INJECTION, SOLUTION INTRAVENOUS at 21:26

## 2022-08-19 RX ADMIN — SODIUM CHLORIDE, PRESERVATIVE FREE 10 ML: 5 INJECTION INTRAVENOUS at 08:48

## 2022-08-19 RX ADMIN — TRAMADOL HYDROCHLORIDE 50 MG: 50 TABLET, COATED ORAL at 21:24

## 2022-08-19 RX ADMIN — PREGABALIN 50 MG: 50 CAPSULE ORAL at 12:46

## 2022-08-19 RX ADMIN — ALBUTEROL SULFATE 2.5 MG: 2.5 SOLUTION RESPIRATORY (INHALATION) at 10:34

## 2022-08-19 RX ADMIN — SODIUM BICARBONATE: 84 INJECTION, SOLUTION INTRAVENOUS at 06:05

## 2022-08-19 RX ADMIN — METOPROLOL TARTRATE 25 MG: 25 TABLET, FILM COATED ORAL at 21:24

## 2022-08-19 RX ADMIN — Medication 125 MG: at 23:45

## 2022-08-19 RX ADMIN — TRAMADOL HYDROCHLORIDE 50 MG: 50 TABLET, COATED ORAL at 06:06

## 2022-08-19 RX ADMIN — SODIUM CHLORIDE 30 MG/ML INHALATION SOLUTION 4 ML: 30 SOLUTION INHALANT at 10:37

## 2022-08-19 RX ADMIN — DIGOXIN 125 MCG: 125 TABLET ORAL at 08:47

## 2022-08-19 RX ADMIN — FLUTICASONE PROPIONATE 2 SPRAY: 50 SPRAY, METERED NASAL at 08:48

## 2022-08-19 RX ADMIN — Medication 125 MG: at 12:46

## 2022-08-19 RX ADMIN — SODIUM CHLORIDE, PRESERVATIVE FREE 10 ML: 5 INJECTION INTRAVENOUS at 21:49

## 2022-08-19 RX ADMIN — SODIUM CHLORIDE 30 MG/ML INHALATION SOLUTION 4 ML: 30 SOLUTION INHALANT at 20:47

## 2022-08-19 RX ADMIN — LIDOCAINE HYDROCHLORIDE: 20 JELLY TOPICAL at 21:48

## 2022-08-19 RX ADMIN — Medication 10 MG: at 21:24

## 2022-08-19 RX ADMIN — MICONAZOLE NITRATE: 2 POWDER TOPICAL at 21:47

## 2022-08-19 RX ADMIN — ALBUTEROL SULFATE 2.5 MG: 2.5 SOLUTION RESPIRATORY (INHALATION) at 20:47

## 2022-08-19 RX ADMIN — HEPARIN SODIUM 5000 UNITS: 5000 INJECTION INTRAVENOUS; SUBCUTANEOUS at 12:46

## 2022-08-19 ASSESSMENT — PAIN DESCRIPTION - PAIN TYPE
TYPE: CHRONIC PAIN;ACUTE PAIN
TYPE: ACUTE PAIN
TYPE: ACUTE PAIN;CHRONIC PAIN
TYPE: ACUTE PAIN;CHRONIC PAIN

## 2022-08-19 ASSESSMENT — PAIN SCALES - GENERAL
PAINLEVEL_OUTOF10: 0
PAINLEVEL_OUTOF10: 5
PAINLEVEL_OUTOF10: 4
PAINLEVEL_OUTOF10: 8
PAINLEVEL_OUTOF10: 4
PAINLEVEL_OUTOF10: 0
PAINLEVEL_OUTOF10: 6
PAINLEVEL_OUTOF10: 7
PAINLEVEL_OUTOF10: 8

## 2022-08-19 ASSESSMENT — PAIN DESCRIPTION - ONSET
ONSET: ON-GOING

## 2022-08-19 ASSESSMENT — PAIN DESCRIPTION - ORIENTATION
ORIENTATION: MID
ORIENTATION: RIGHT;LEFT;LOWER

## 2022-08-19 ASSESSMENT — PAIN DESCRIPTION - LOCATION
LOCATION: BACK;HEAD
LOCATION: ABDOMEN
LOCATION: BACK;HEAD
LOCATION: LEG;BACK

## 2022-08-19 ASSESSMENT — PAIN DESCRIPTION - DESCRIPTORS
DESCRIPTORS: ACHING;DISCOMFORT
DESCRIPTORS: ACHING;DISCOMFORT
DESCRIPTORS: CRAMPING;DISCOMFORT
DESCRIPTORS: CRAMPING;DISCOMFORT;ACHING

## 2022-08-19 ASSESSMENT — PAIN DESCRIPTION - FREQUENCY
FREQUENCY: CONTINUOUS

## 2022-08-19 ASSESSMENT — PAIN - FUNCTIONAL ASSESSMENT
PAIN_FUNCTIONAL_ASSESSMENT: ACTIVITIES ARE NOT PREVENTED

## 2022-08-19 NOTE — DISCHARGE INSTRUCTIONS
Patient instructions for CAM monitor: You will need to wear monitor for 2 weeks. Mail monitor back or return to office on following date. Remove date:  September 2,2022      [address]         Make sure to save box as you will place monitor back in postage paid box to return to company. After removing monitor stick it to template provided, place both your log and monitor in box and place in mailbox. If monitor comes off but has been in place at least 7 days place in box and mail back. If it comes off sooner than 7 days you will have to call office and return to have it replaced. Avoid excess sweating to maximize wear time. You are able to shower after 24 hours, however have majority of water hitting back and not directly on monitor. Do not submerge in bath. If you experience any symptoms while wearing monitor push button and record in booklet.       For questions about monitor call: Customer Service [phone]

## 2022-08-19 NOTE — PLAN OF CARE
Problem: Discharge Planning  Goal: Discharge to home or other facility with appropriate resources  Outcome: Progressing     Problem: Safety - Adult  Goal: Free from fall injury  Outcome: Progressing     Problem: ABCDS Injury Assessment  Goal: Absence of physical injury  Outcome: Progressing     Problem: Skin/Tissue Integrity  Goal: Absence of new skin breakdown  Description: 1. Monitor for areas of redness and/or skin breakdown  2. Assess vascular access sites hourly  3. Every 4-6 hours minimum:  Change oxygen saturation probe site  4. Every 4-6 hours:  If on nasal continuous positive airway pressure, respiratory therapy assess nares and determine need for appliance change or resting period.   Outcome: Progressing     Problem: Gastrointestinal - Adult  Goal: Maintains adequate nutritional intake  Outcome: Progressing     Problem: Pain  Goal: Verbalizes/displays adequate comfort level or baseline comfort level  Outcome: Progressing

## 2022-08-19 NOTE — PROGRESS NOTES
Physical Therapy    Daily Treatment Note      Discharge Recommendations:  Rubi Castro scored a 7/24 on the AM-PAC short mobility form. Current research shows that an AM-PAC score of 17 or less is typically not associated with a discharge to the patient's home setting. Based on the patient's AM-PAC score and their current functional mobility deficits, it is recommended that the patient have 3-5 sessions per week of Physical Therapy at d/c to increase the patient's independence. Please see assessment section for further patient specific details. Assessment:  Pt with good effort despite increased pain. Overall strength is poor. Pt needing david lift for out of bed today. Pt needing encouragement due to anxiety. Plan is for continued PT at SNF. Will follow. Equipment Needs:  Defer    Chart Reviewed: Yes   Restrictions/Precautions: Modified Diet, Up as Tolerated, Fall Risk (High fall risk, full liquid diet) Other position/activity restrictions: up wtih assist,  50% WB right hip   Additional Pertinent Hx: Adm 7/30 with weakness, nausea and abdominal pain. Recent. anterior approach CASS done 7/16,  mL, complicated procedure in the setting of patient's morbid obesity and proximal femur osteoporotic bone lesions and possibility of calcified crack which was treated with proximal femur cable. At SNF until 7/19  Discharged home with HHPT and 24 hr A from family. Transferredto ICU 8/2/22.  8/3  colonic decompression. Pt developed afib with RVR      Diagnosis: Weakness, nausea, abdominal pain   Treatment Diagnosis: Decreased functional mobility post admission for weakness/nausea      Subjective:  Pt found supine in bed. \"It's been so hard with this C-diff. \"  Pt reports being soiled and needing to be cleaned up. \"I am worn out. \"    Pain:  Generalized pain with mobility, not rated and RN is aware. Objective:    Bed mobility  Rolling:   Max A x2 (right <> left with pericare, rolled multiple times. Pt progressed to Max A x1 when playing david lift pad.)    Transfers  Bed <> chair:  Dependent/Total (using ceiling lift)    Ambulation  No    Exercises  Ankle Pumps x 15 BLE independently  LAQ x10 BLE independently on left, Max A on right  Comment:  generalized decreased knee ROM for LAQ. Neuro/balance  Sitting balance:  Fair (pt performed anterior weight shifting in recliner chair to assume unsupported sitting position in chair. Pt using UEs and Max A to sit upright, progressing to Mercedes/CGA. Pt sat unsupported with UEs on arms on chair and CGA from therapist for 1 min x2 trials)    Patient Education  Role of PT. Pt verbalized understanding. Safety Devices  Pt left with needs in reach, seated in chair with LEs elevated, alarm in place and RN aware. AM-PAC score  AM-PAC Inpatient Mobility Raw Score : 7  AM-PAC Inpatient T-Scale Score : 26.42  Mobility Inpatient CMS 0-100% Score: 92.36  Mobility Inpatient CMS G-Code Modifier : CM    Goals:  Time Frame for Short term goals: Discharge  Short term goal 1: roll with CG R/L. Ongoing   Short term goal 2: Supine to sit with CG. Ongoing   Short term goal 3: Sit to stand with mod x 2. MET 8/9 . Revised goal:  Pt will transfer sit to stand with min assist x 1  Short term goal 4: Bed to chair with mod x 2. Ongoing  Short term goal 5: Ambulate 20 ft with RW, 50% WB on right with mod x 2. Ongoing    Plan:  2-5x/week  Current Treatment Recommendations: Strengthening, ROM, Functional mobility training, Gait training, Safety education & training, Transfer training, Endurance training    Therapy Time    Individual  Concurrent  Group  Co-treatment    Time In  0845            Time Out  0938            Minutes  53              Timed Code Treatment Minutes:  53  Total Treatment Minutes:  53      If patient is discharged prior to next treatment, this note will serve as the discharge summary.     Raine Orellana, PT

## 2022-08-19 NOTE — PROGRESS NOTES
2 week CAM monitor # [id number] applied per order Marlo Farah CNP for AFib. Instructions given and questions answered. Bedside nurse aware.

## 2022-08-19 NOTE — PROGRESS NOTES
Office : [phone]     Fax :[phone]         Renal Progress Note    Subjective:   Admit Date: 7/30/2022     Interval History:   Still having diarrhea. Decreased PO intake. DIET ADULT DIET;  Dysphagia - Minced and Moist; 1200 ml  ADULT ORAL NUTRITION SUPPLEMENT; Lunch, Dinner; Standard High Calorie/High Protein Oral Supplement  Medications:   Scheduled Meds:   alteplase  1 mg IntraCATHeter Once    metoprolol tartrate  25 mg Oral BID    heparin (porcine)  5,000 Units SubCUTAneous 3 times per day    vancomycin  125 mg Oral 4 times per day    digoxin  125 mcg Oral Daily    pantoprazole  40 mg Oral BID AC    miconazole   Topical BID    melatonin  10 mg Oral Nightly    albuterol  2.5 mg Nebulization BID    lidocaine  1 patch TransDERmal Daily    sodium chloride (Inhalant)  4 mL Nebulization BID    sodium chloride flush  5-40 mL IntraVENous 2 times per day    fluticasone  2 spray Each Nostril Daily    pregabalin  50 mg Oral TID     Continuous Infusions:   IV infusion builder 75 mL/hr at 08/19/22 0605    sodium chloride      sodium chloride      sodium chloride      sodium chloride      dextrose      sodium chloride 5 mL/hr at 08/12/22 1154       Labs:  CBC:   Recent Labs     08/17/22  0335 08/18/22  0552 08/19/22  1532   WBC 9.1 9.9 9.7   HGB 7.8* 7.0* 7.7*   * 514* 565*       BMP:    Recent Labs     08/17/22  0335 08/18/22  0552   * 126*   K 4.4 4.3   CL 99 98*   CO2 18* 18*   BUN 20 25*   CREATININE 0.7 0.9   GLUCOSE 87 94       Ca/Mg/Phos:   Recent Labs     08/17/22  0335 08/17/22  0944 08/18/22  0552 08/19/22  1532   CALCIUM 7.4*  --  7.4*  -- MG 2.30  --  2.00 1.90   PHOS  --  3.0 3.3  --        Hepatic:   No results for input(s): AST, ALT, ALB, BILITOT, ALKPHOS in the last 72 hours. Troponin: No results for input(s): TROPONINI in the last 72 hours. BNP: No results for input(s): BNP in the last 72 hours. Lipids: No results for input(s): CHOL, TRIG, HDL, LDLCALC, LABVLDL in the last 72 hours. ABGs:   No results for input(s): PHART, PO2ART, SEY6PFM in the last 72 hours. INR:   No results for input(s): INR in the last 72 hours. UA:  No results for input(s): Sol Ruslan, GLUCOSEU, BILIRUBINUR, KETUA, SPECGRAV, BLOODU, PHUR, PROTEINU, UROBILINOGEN, NITRU, LEUKOCYTESUR, Bruno Loan in the last 72 hours. Urine Microscopic:   No results for input(s): LABCAST, BACTERIA, COMU, HYALCAST, WBCUA, RBCUA, EPIU in the last 72 hours. Urine Culture: No results for input(s): LABURIN in the last 72 hours. Urine Chemistry: No results for input(s): Patsie Number, PROTEINUR, NAUR in the last 72 hours. Objective:   Vitals: BP (!) 103/52   Pulse 78   Temp 97.5 °F (36.4 °C) (Axillary)   Resp 18   Ht 5' 2.01\" (1.575 m)   Wt 236 lb 8.9 oz (107.3 kg)   SpO2 92%   BMI 43.26 kg/m²    Wt Readings from Last 3 Encounters:   08/19/22 236 lb 8.9 oz (107.3 kg)   07/13/22 218 lb 9.6 oz (99.2 kg)   06/14/22 217 lb (98.4 kg)      24HR INTAKE/OUTPUT:    Intake/Output Summary (Last 24 hours) at 8/19/2022 1614  Last data filed at 8/19/2022 1607  Gross per 24 hour   Intake 480 ml   Output 1050 ml   Net -570 ml       Physical Exam  Constitutional:       Appearance: She is ill-appearing. She is not diaphoretic. Cardiovascular:      Rate and Rhythm: Normal rate. Pulmonary:      Effort: Pulmonary effort is normal.      Breath sounds: Rhonchi present. No wheezing. Abdominal:      General: There is distension. Palpations: Abdomen is soft. Tenderness: There is abdominal tenderness. There is no guarding.    Musculoskeletal:      Right lower leg: Edema present. Left lower leg: Edema present. Skin:     General: Skin is warm and dry. Neurological:      General: No focal deficit present. Mental Status: She is oriented to person, place, and time. Assessment and Plan:       IMAGING:  CT CHEST ABDOMEN PELVIS WO CONTRAST   Final Result      CHEST:      1. No evidence for hematoma   2. Decreased small pleural effusions and bibasilar atelectasis compared to 8/2/2022      ABDOMEN/PELVIS:      1. Right iliopsoas muscle small to medium-sized intramuscular acute hematoma . An accurate characterization of size of hematoma is not possible due to its infiltrative nature. 2. Right proximal thigh subcutaneous 3.8 x 3.7 cm water attenuation fluid collection   3. Nonspecific, nonobstructive bowel gas pattern      XR CHEST PORTABLE   Final Result      Limited inspiration. Accentuation of pulmonary interstitial markings, with minor basilar atelectasis. No other acute findings. MRI LUMBAR SPINE WO CONTRAST   Final Result   1. Moderate size intramuscular hematoma along the right iliopsoas muscle. This is new in comparison to abdominal CT 8/1/2022.   2. No central canal stenosis. 3. Moderate multilevel foraminal narrowing as described. 4. No acute osseous abnormality. XR HIP 2-3 VW W PELVIS RIGHT   Final Result   Impression:    1. Right total hip arthroplasty. The orthopedic hardware is intact without evidence of loosening. No significant change from prior CT examination. VL Extremity Venous Bilateral   Final Result      XR CHEST PORTABLE   Final Result      Mild bilateral airspace disease. XR ABDOMEN (KUB) (SINGLE AP VIEW)   Final Result   1. No evidence of free intraperitoneal air. 2. Indeterminate bowel gas pattern due to a paucity of small bowel gas. XR ABDOMEN (KUB) (SINGLE AP VIEW)   Final Result   Impression: Stable appearance of the abdomen.       XR CHEST PORTABLE   Final Result      Low lung volumes with central bronchovascular crowding. Atelectasis in the left lung base. Normal cardiomediastinal silhouette. Lines and tubes in standard position. VL Extremity Venous Bilateral   Final Result      XR ABDOMEN (KUB) (SINGLE AP VIEW)   Final Result   Impression: Stable appearance of the abdomen with gas distended loops of central small bowel again noted. CT CHEST WO CONTRAST   Final Result      1. Moderate right pleural effusion and small left effusion with basilar airspace opacity most consistent with atelectasis. Superimposed pneumonia be difficult to exclude. 2. Narrowing of the trachea with expiration compatible with treated bronchial malacia. 3. Nasogastric tube tip in the lower esophagus. 4. Persistent colonic distention. 5. Mild coronary artery calcification. XR ABDOMEN (KUB) (SINGLE AP VIEW)   Final Result      Frontal supine views demonstrate an unremarkable bowel gas pattern. No significant colonic stool is seen. Right hip arthroplasty noted. XR CHEST PORTABLE   Final Result      Prominence of the perihilar interstitial markings with mild peribronchial cuffing is favored to represent mild pulmonary edema. Low lung volumes bronchovascular crowding. Stable cardiomediastinal silhouette. Lines and tubes in standard position. Cholecystectomy clips are noted. CT ABDOMEN PELVIS WO CONTRAST Additional Contrast? Oral and Rectal   Final Result   1. Moderate colonic distention. Correlate for constipation. Fecal content within the ileum which may suggest stasis. There is no small bowel obstruction. 2. Subcutaneous fluid collection overlying the right total hip arthroplasty. CT HIP RIGHT WO CONTRAST   Final Result   1. Moderate colonic distention. Correlate for constipation. Fecal content within the ileum which may suggest stasis. There is no small bowel obstruction.    2. Subcutaneous fluid collection overlying the right total hip arthroplasty. XR CHEST PORTABLE   Final Result      1. Right IJ CVC tip in the cavoatrial junction. 2.  Bibasilar airspace disease suggesting atelectasis. Pneumonia is not excluded. XR ABDOMEN (KUB) (SINGLE AP VIEW)   Final Result      Dilated colonic and small bowel loops may suggest ileus but obstruction is not excluded. XR ABDOMEN (KUB) (SINGLE AP VIEW)   Final Result      No acute radiographic abnormality of the abdomen. Status post right hip arthroplasty with mildly displaced greater trochanteric fracture fragment, new or more pronounced compared to the prior study. Assessment/Plan     ARIANE   2. Sepsis  3. AGMA   4. Lactic Acidosis - resolved  4. Hyperkalemia  5. Hyponatremia  6. Bowel Obstruction  7. Hypertension  8. GI bleed  9. Anemia  10.  C diff       Plan:  - Cr worse  - BP low   - IVF with bicarb   - encourage solute intake   - Replete electrolytes as needed    - ECHO normal EF - IVC compressible   - Hypoxia likely sec to Aspiration pneumonitis   - ARIANE sec to ATN   - Monitor I/Os  - Avoid nephrotoxic agents    Recommend to dose adjust all medications  based on renal functions  Maintain SBP> 90 mmHg   Daily weights   AVOID NSAIDs  Avoid Nephrotoxins  Monitor Intake/Output  Call if significant decrease in urine output        Dolores Brock MD

## 2022-08-19 NOTE — DISCHARGE INSTR - COC
Continuity of Care Form    Patient Name: Shelia Marie   :  1950  MRN:  [id number]    Admit date:  2022  Discharge date:  22      Code Status Order: Full Code   Advance Directives:   Advance Care Flowsheet Documentation       Date/Time Healthcare Directive Type of Healthcare Directive Copy in [address] Agent's Name Healthcare Agent's Phone Number    22 1440 No, patient does not have an advance directive for healthcare treatment -- -- -- -- --            Admitting Physician:  Cristofer Castaneda MD  PCP: Rajesh Lancaster    Discharging Nurse: [facility] Unit/Room#: 6235/9137-62  Discharging Unit Phone Number: [phone]     Emergency Contact:   Extended Emergency Contact Information  Primary Emergency Contact: Shruthi Pandey  Home Phone: [phone]  Relation: Child    Past Surgical History:  Past Surgical History:   Procedure Laterality Date    APPENDECTOMY      CATARACT REMOVAL Bilateral 2015    CHOLECYSTECTOMY      COLONOSCOPY N/A 8/3/2022    COLONOSCOPY DIAGNOSTIC performed by Neal Belcher MD at [address] [place] [address] (CERVIX NOT REMOVED)  [address] [facility] [facility] [address] Right 2022    RIGHT TOTAL HIP ARTHROPLASTY/ MINIMALLY INVASIVE DIRECT ANTERIOR performed by Tigist Jules MD at [place] [address] 2022    EGD BIOPSY performed by Karyn Brown MD at [facility] ENDOSCOPY       Immunization History: There is no immunization history on file for this patient.     Active Problems:  Patient Active Problem List   Diagnosis Code    Primary osteoarthritis of right hip M16.11    Osteoarthritis of right hip M16.11    Osteoarthritis of right hip joint due to dysplasia M16.31    Status post left hip replacement Z96.642    Abdominal pain R10.9    ARIANE (acute kidney injury) ([facility] [address] N17.9    Electrolyte imbalance E87.8 Lactic acid acidosis E87.2    Leukocytosis D72.829    Ileus (HCC) K56.7    Status post total hip replacement, right Z96.641    Atrial fibrillation with RVR (HCC) I48.91    On continuous heparin infusion Z79.01    Benign essential HTN I10    Status post right hip replacement Z96.641    Acute hypoxemic respiratory failure (HCC) J96.01    Paroxysmal atrial fibrillation (HCC) I48.0    Hypotension I95.9       Isolation/Infection:   Isolation            C Diff Contact          Patient Infection Status       Infection Onset Added Last Indicated Last Indicated By Review Planned Expiration Resolved Resolved By    C-diff (Clostridium difficile) 08/13/22 08/15/22 08/14/22 C. difficile toxin Molecular 08/25/22 08/24/22      Resolved    C-diff Rule Out 08/13/22 08/18/22 08/13/22 C. difficile toxin Molecular (Ordered)   08/18/22 Rule-Out Test Resulted    C-diff Rule Out 08/14/22 08/14/22 08/14/22 C. difficile toxin Molecular (Ordered)   08/14/22 Rule-Out Test Resulted    C-diff Rule Out 08/13/22 08/13/22 08/13/22 Clostridium Difficile Toxin/Antigen (Ordered)   08/13/22 Rule-Out Test Resulted    C-diff Rule Out 08/02/22 08/02/22 08/02/22 Clostridium difficile toxin/antigen (Ordered)   08/03/22 Catie Clemens RN    Order dc'd    C-diff Rule Out 07/31/22 07/31/22 07/31/22 Clostridium difficile toxin/antigen (Ordered)   08/02/22 Catie Clemens RN            Nurse Assessment:  Last Vital Signs: BP (!) 103/52   Pulse 78   Temp 97.5 °F (36.4 °C) (Axillary)   Resp 18   Ht 5' 2.01\" (1.575 m)   Wt 236 lb 8.9 oz (107.3 kg)   SpO2 92%   BMI 43.26 kg/m²     Last documented pain score (0-10 scale): Pain Level: 0  Last Weight:   Wt Readings from Last 1 Encounters:   08/19/22 236 lb 8.9 oz (107.3 kg)     Mental Status:  oriented and alert    IV Access:  - None    Nursing Mobility/ADLs:  Walking   Dependent  Transfer  Dependent  Bathing  Dependent  Dressing  Dependent  Toileting  Dependent  Feeding  Assisted  Med Admin  Assisted  Med Delivery support    Rehab Therapies: Physical Therapy and Occupational Therapy  Weight Bearing Status/Restrictions: No weight bearing restrictions  Other Medical Equipment (for information only, NOT a DME order):  hospital bed  Other Treatments:     Patient's personal belongings (please select all that are sent with patient):  None    RN SIGNATURE:  Electronically signed by Vidal Grace RN on 8/23/22 at 4:42 PM EDT    CASE MANAGEMENT/SOCIAL WORK SECTION    Inpatient Status Date: 7/30/22    Readmission Risk Assessment Score:  Readmission Risk              Risk of Unplanned Readmission:  30           Discharging to Facility/ Agency   [facility] [address] [facility] [address]  zaida Isbell, [address]    Phone [phone]  Fax: [phone]        / signature: Electronically signed by Kalpana Hinton RN on 8/23/22 at 2:11 PM EDT    PHYSICIAN SECTION    Prognosis: Fair    Condition at Discharge: Stable    Rehab Potential (if transferring to Rehab): Fair    Recommended Labs or Other Treatments After Discharge: follow up with PCP as OP    Physician Certification: I certify the above information and transfer of Vidhya Contreras  is necessary for the continuing treatment of the diagnosis listed and that she requires [facility] for greater 30 days.      Update Admission H&P: No change in H&P    PHYSICIAN SIGNATURE:  Electronically signed by Michael Mckeon MD on 8/23/22 at 12:12 PM EDT

## 2022-08-19 NOTE — PLAN OF CARE
Problem: Safety - Adult  Goal: Free from fall injury  Outcome: Progressing  Flowsheets (Taken 8/16/2022 2123 by Kylie Jesus RN)  Free From Fall Injury:   Instruct family/caregiver on patient safety   Based on caregiver fall risk screen, instruct family/caregiver to ask for assistance with transferring infant if caregiver noted to have fall risk factors     Problem: Skin/Tissue Integrity  Goal: Absence of new skin breakdown  Description: 1. Monitor for areas of redness and/or skin breakdown  2. Assess vascular access sites hourly  3. Every 4-6 hours minimum:  Change oxygen saturation probe site  4. Every 4-6 hours:  If on nasal continuous positive airway pressure, respiratory therapy assess nares and determine need for appliance change or resting period.   Outcome: Progressing     Problem: Skin/Tissue Integrity - Adult  Goal: Incisions, wounds, or drain sites healing without S/S of infection  Outcome: Progressing  Flowsheets (Taken 8/19/2022 8439)  Incisions, Wounds, or Drain Sites Healing Without Sign and Symptoms of Infection:   ADMISSION and DAILY: Assess and document risk factors for pressure ulcer development   Implement wound care per orders

## 2022-08-19 NOTE — PROGRESS NOTES
Hospital Medicine Progress Note    PCP: Leighton Puente    Date of Admission: 7/30/2022    Chief Complaint:  Abdominal pain, transferred for ortho eval with Rt CASS site with fluid collection. History Of Present Illness:     74 y.o. female  - Hx of HTN, HLD, Gout, OA, recent Rt CASS, morbid obesity  - Presents with generalized weakness nausea vomiting and abdominal pain, she woke up with the symptoms night prior to presentation. Her last bowel movement was about 3 days prior. She was seen at [facility] (now [facility]), where she underwent CT imaging of the abdomen and pelvis which showed:    IMPRESSION:   1. There is a large amount of stool cecum through the sigmoid colon. Transition point is rectosigmoid but there is no obvious mass in the rectosigmoid region. No bowel thickening or peribowel stranding in the rectosigmoid region. 2. Likely, multiple renal cysts. 3. Partially organized fluid without obvious rim enhancement anterior to the THR. This may simply represent postoperative seroma but clinical correlation needed. The entire inferior extent of this is not covered on this exam.   4. Urinary bladder is distended with fluid    Labs were significant for WBC 22.8k, with elevated ANC, hgb 10.1, na131, k 5.0 BUN of 19 cr 1.1, Alk phos 139. With noted fluid around [facility] she was transferred [facility] for orthopedic surgeon Dr. Liset Main evaluation. \"        Interval HPI  Rapid response 8/13 AM. For hypotension: Was hypotensive to 67/41 during blood transfusion. , satting well on 4L NC. Patient awake and appropriately interactive, no acute distress, denies any symptoms other than episode of diarrhea before rapid called. Transfusion was stopped, gave 350cc bolus with prompt improvement in BP to 83'H systolic. Pt remained hemodynamically stable and denied any new symptoms during this rapid. \"    Appears this was likely sec to C diff infection: Resolving turgor normal.  No rashes or lesions. Neurologic:  grossly non-focal.  Capillary Refill: Brisk,< 3 seconds   Peripheral Pulses: +2 palpable, equal bilaterally       Labs:     Recent Labs     08/17/22  0335 08/18/22  0552   WBC 9.1 9.9   HGB 7.8* 7.0*   HCT 23.1* 20.5*   * 514*       Recent Labs     08/17/22  0335 08/17/22  0944 08/18/22  0552   *  --  126*   K 4.4  --  4.3   CL 99  --  98*   CO2 18*  --  18*   BUN 20  --  25*   CREATININE 0.7  --  0.9   CALCIUM 7.4*  --  7.4*   PHOS  --  3.0 3.3       No results for input(s): AST, ALT, BILIDIR, BILITOT, ALKPHOS in the last 72 hours. No results for input(s): INR in the last 72 hours. No results for input(s): [facility] in the last 72 hours. Radiology: Reviewed        ASSESSMENT/PLAN:    Severe sepsis: POA   - Leukocytosis with tachycardia; on admission; - Possibly from GI source with severe constipation, fecal impaction  -  Transferred to [facility] for evaluation of post-op hip fluid collection/possible infection (s/p THR 7/13): felt by ortho to be likely seroma,  normal post operative finding .  - Started on admission on broad spectrum abx. - Blood cultures x 2 NGTD. ID consulted: - Not currently on Abx   - Sepsis appears resolved   - New sepsis 8/13: sec to C diff: resolving/resolved. Parox Atrial Fibrillation   Afib with RVR  Echo normal EF, normal diastolic  Continue AVN agents as tolerated. Was placed on Heparin drip in the ICU:  held with drop in hgb, and blood loss anemia . [address] can possibly be resumed, If hgb stable in the next few days: Defer to Cardiology  Cardiology following  Transitioned to oral meds: continue Digoxin, Metop. - Keep Mg> 2; K 4 or above      Acute  hypoxemic respiratory failure  Possible fluid overload from recent surgery, and while NPO with intra-abdominal issue this admit  - Was Briefly on IV lasix/lasix drip but was stopped.  O2 requirement had improved  - Dopplers:  no DVT  - Resp failure resolved      Fluid overload  - Patient appears developed volume overload with worse respiratory status and AFRVR while hospitalized. - Echocardiogram was overall normal with normal filling pressures and was done during sinus   - Prob volume overload related to fluid mgt for bowel issues, low Albumin and Hb low   - Acute heart failure ruled out  - Was treated with IV diuretics. Now held. DC'd continuous fluids for now. Use PRN boluses  - Dly weights, I/O monitoring      Acute on Chronic Anemia  Hypotension  - Hypotension was probably sec to severe diarrhea   - Was concerned for ongoing hemorrhage with drop in hgb as well:    - MRI lumber showed iliopsoas hematoma: With dropping hgb again, re-imaged to verify if hematoma right ileo-psoas is expanding Report reviewed. Hgb sppears largely stable now post PRBC transfusions.   -  Monitor Hgb routinely      Acute severe diarrhea with sepsis sec to C diff infection  - Was on bowel meds for ileus. Stopped with diarrhea. Diarrhea h/e persisted, and with bump in WBCs and hypotension, Stool for C diff  positive  - On Rectal tube for profuse diarrhea : DC rectal tube  - Started on PO vancomycin 8/14/2022: plan 10-14 day txt      ARIANE   Mild hyponatremia likely sec to diarrhea  NAGMA: sec to diarrhea  Nephrology consulted  - Samsca given; NaHCO3  replacement  -Monitor BMP: ARIANE resolved      Bowel obstruction  Secondary to severe constipation  - CT of the [facility] system showed significant stool from cecum to the sigmoid colon and transition point at the rectosigmoid.   - GI, Surgery consulted, appreciate recs  - Possible severe constipation with fecal impaction possibly sec to narcotics: she is chronically on narcotics for chronic bck pain (tramadol and norco; and also in setting of recent hip surgery)  - Miralax and enema were unsuccessful. Underwent Adams decompression 8/3 with improvement but not resolution. Was treated with SMOG enemas BID, Relistor, disimpaction. Was placed on Movantic and miralax. Bowel functioning now, with diarrhea noted: Off bowel meds  - Obstruction resolved      Recent right hip surgery: POA  Hematoma right iliopsoas muscle: prob new, sec to recent surgery and anticoagulation for Afib   - She is s/p recent Right hip surgery  - Developed Right leg numbness and pain few days ago after working with PT: MRI done to eval for nerve impingement as she has hx of chronic back paint: shows illio-psoas hematoma. - Hg appears stable now: monitor routinely. - Dopplers done for LE edema: No DVT  - Ortho following      Acute Urinary retention  - Appears recently had E coli in urine, although CC was less than 100k. Was treated with cipro. Also appears on Keflex PTA.   - Urine on admission has some bacteria, and nitrite positive, but neg leuc esterase and no signif pyuria  - Iqbal catheter placed at [facility] for retention likely sec to severe constipation  - Iqbal removed 6/12: retention re-demonstrated, replaced. Chronic back pain, narcotic dependent: POA  - She is chronically on narcotics for chronic bck pain (tramadol and norco; and also in setting of recent hip surgery)  - Acute pain, likely radicular from iliopsoas hematoma  - Continue Lyrica      Essential Hypertension- Monitor Bps      Morbid obesity Body mass index is 41.01 kg/m². - Complicating assessment and treatment. Placing patient at risk for multiple co-morbidities as well as early death and contributing to the patient's presentation.  on weight loss when appropriate. DVT Prophylaxis: SCD for now. Resume chem prophylaxis if bleed ruled out/hgb stabel/ok with ortho. Diet: ADULT DIET; Dysphagia - Minced and Moist; 1200 ml  ADULT ORAL NUTRITION SUPPLEMENT; Lunch, Dinner; Standard High Calorie/High Protein Oral Supplement  Code Status: Full Code      Disposition: - Transferred from ICU. Recently at [facility] after hip ortho surgery: Will need SNF at discharge.     SNF with improvement of diarrhea.  Hopefully 1-2 days       Ayala Mercedes MD

## 2022-08-19 NOTE — CARE COORDINATION
When patient is medically ready, she will discharge to:   [facility] [address] [facility]  [address] [facility]  [address] [facility] [address], [address]  Phone [phone]  Fax: [phone]  Will need rapid COVID, ERIC and IMM.    Electronically signed by Karen Encarnacion RN on 8/19/2022 at 4:44 PM

## 2022-08-19 NOTE — PROGRESS NOTES
Occupational Therapy  Daily Treatment Note  Patient Name: Delfina Ashby  MRN: [id number]    Assessment: Pt participated in bed mobility, transfer to chair with david lift, and seated functional exercise. She continues to require 2 person assist for mobility. She is pleasant and cooperative, but needs encouragement to increase activity levels. Discharge Recommendations: Delfina Ashby scored a 8/24 on the AM-PAC ADL Inpatient form. Current research shows that an AM-PAC score of 17 or less is typically not associated with a discharge to the patient's home setting. Based on the patient's AM-PAC score and their current ADL deficits, it is recommended that the patient have 3-5 sessions per week of Occupational Therapy at d/c to increase the patient's independence. Please see assessment section for further patient specific details. Equipment Needs:  No    Chart Reviewed: Yes   Restrictions/Precautions: Modified Diet, Up as Tolerated, Fall Risk (High fall risk, full liquid diet) Other position/activity restrictions: up wtih assist,  50% WB right hip   Additional Pertinent Hx: Adm 7/30 with weakness, nausea and abdominal pain. Recent. anterior approach CASS done 7/16,  mL, complicated procedure in the setting of patient's morbid obesity and proximal femur osteoporotic bone lesions and possibility of calcified crack which was treated with proximal femur cable. At SNF until 7/19  Discharged home with HHPT and 24 hr A from family. Transferredto ICU 8/2/22.  8/3  colonic decompression. Pt developed afib with RVR    Diagnosis: abdominal pain  Treatment Diagnosis: impaired ADLs and mobility    Subjective: Pt in bed on arrival. Cooperative overall, but tearful at times due to pain, weakness, feeling generally unwell. Daughter present for most of session and assisted in encouraging pt.      Pain: Yes, various sites, primarily with movement then eases up, RN aware      Objective:    Cognition/Orientation: Decreased insight, needs cues for problem solving    Bed mobility   Rolling: Max assist x2 first time L and R; Max assist x1 second time L and R (Maintains side lying with rail with min assist    Functional Mobility   Bed to Chair Transfer: Dependent (via [address])  Sitting: Max assist (initially to transfer from supported to unsupported sitting in chair with use of armrests, improved to CGA on second trial; maintained upright posture SBA/CGA 1 min + 30 sec)    ADLs   Bathing: Dependent (performed by PCA during session; anticipate pt could participate in some UE tasks with encouragement)  UB dressing: Max assist (to change gown)  Toileting: Dependent (frequent bowel incontinence; full clean-up provided by therapy and PCA during session)    Activity Tolerance: Fair/Poor - limited by pain and discomfort but demonstrates potential to improve    Patient Education: Activity promotion, progress, goals - verb partial understanding    Safety Devices in Place: left in chair, reclined, alarm on, needs in reach, RN aware to use david lift for return to bed, flat sheet sling used safely to optimize pt comfort given recent CASS and pain from edema      Goals:  Short Term Goals  Time Frame for Short term goals: by d/c  Short Term Goal 1: pt will complete toileting w/ Mod A - Not met  Short Term Goal 2: pt will complete LE dressing w/ Mod A - Not met  Short Term Goal 3: pt will perform BUE HEP i'ly x10 reps AROM to all joints to reduce edema and improve strength for ADLs - Not met  Short Term Goal 4: Perform sit to stand to Conny Grippe mod assist - Not met  Short Term Goal 5: Sit unsupported for 15 minutes while participating in ADL - Not met         Plan:      Times per Week: 2-5   Times per Day: Daily    Therapy Time   Individual Concurrent Group Co-treatment   Time In 0845         Time Out 0938         Minutes 53          Timed Code Treatment Minutes: 53  Total Treatment Time: [address] [facility], OT

## 2022-08-19 NOTE — PROGRESS NOTES
General Surgery   Daily Progress Note  Patient: Tres Newman    CC: Abdominal pain with bowel obstruction    SUBJECTIVE:   No overnight events. HDS and afebrile. Remains without abdominal pain. Tolerating diet without N/V. Rectal tube was removed and R IJ CVC was removed yesterday. ROS:   A 14 point review of systems was conducted, significant findings as noted above. All other systems negative. OBJECTIVE:  PHYSICAL EXAM:    Vitals:    08/19/22 0001 08/19/22 0429 08/19/22 0600 08/19/22 0637   BP: (!) 103/57 97/64     Pulse: 74 81  76   Resp: 16 17     Temp: 97.8 °F (36.6 °C) 97.6 °F (36.4 °C)     TempSrc: Oral Oral     SpO2: 96% 96%     Weight:   236 lb 8.9 oz (107.3 kg)    Height:           General appearance: Resting in bed  HEENT: Trachea midline. Chest/Lungs: normal effort with no accessory muscle use on  RA  Cardiovascular: irregular rhythm   Abdomen: Obese, non-tender, no rebound, guarding, or rigidity. Rectal tube in place. Skin: warm and dry, no rashes  Extremities: bilateral LE edema, no cyanosis  Genitourinary: Iqbal with clear urine 800cc/24 hours. Stool output 300 cc/25 hours    LABS:   Recent Labs     08/17/22  0335 08/18/22  0552   WBC 9.1 9.9   HGB 7.8* 7.0*   HCT 23.1* 20.5*   MCV 92.2 92.6   * 514*        Recent Labs     08/17/22  0335 08/17/22  0944 08/18/22  0552   *  --  126*   K 4.4  --  4.3   CL 99  --  98*   CO2 18*  --  18*   PHOS  --  3.0 3.3   BUN 20  --  25*   CREATININE 0.7  --  0.9        No results for input(s): AST, ALT, ALB, BILIDIR, BILITOT, ALKPHOS in the last 72 hours. No results for input(s): LIPASE, AMYLASE in the last 72 hours. No results for input(s): PROT, INR, APTT in the last 72 hours. No results for input(s): CKTOTAL, CKMB, CKMBINDEX, TROPONINI in the last 72 hours.       ASSESSMENT & PLAN:   This is a 74 y.o. female with Hx of HTN, HLD, Tuberculosis (1981), gout, and OA s/p right CASS on 7/13/22 who was transferred to [facility] on 7/30 due to fluid associated with her right CASS on 7/13 and for abdominal pain. - Rectal tube removed yesterday. Continue po Vanc  - Iqbal per primary team  - R IJ CVC was removed yesterday. - Continue minced and moist with thin liquids per SLP. Pt has POC met goals per SPL  - Continue PT/OT  - Dispo: will need SNF at DC due to deconditioning. OT 8/24, PT 6/24. Management per primary. Edie Bernard DO, MSMEd  PGY1, General Surgery  08/19/22  6:52 AM  [employer]  Pager: [phone]    I have personally performed the medical history, physical exam and medical decision making and agree with all pertinent clinical information unless otherwise noted.      Calvin Lopez MD  Surgery Attending

## 2022-08-20 LAB
ALBUMIN SERPL-MCNC: 1.7 G/DL (ref 3.4–5)
ANION GAP SERPL CALCULATED.3IONS-SCNC: 10 MMOL/L (ref 3–16)
BUN BLDV-MCNC: 20 MG/DL (ref 7–20)
CALCIUM SERPL-MCNC: 7.4 MG/DL (ref 8.3–10.6)
CHLORIDE BLD-SCNC: 104 MMOL/L (ref 99–110)
CO2: 18 MMOL/L (ref 21–32)
CREAT SERPL-MCNC: 0.6 MG/DL (ref 0.6–1.2)
GFR AFRICAN AMERICAN: >60
GFR NON-AFRICAN AMERICAN: >60
GLUCOSE BLD-MCNC: 75 MG/DL (ref 70–99)
PHOSPHORUS: 3.4 MG/DL (ref 2.5–4.9)
POTASSIUM SERPL-SCNC: 4.9 MMOL/L (ref 3.5–5.1)
SODIUM BLD-SCNC: 132 MMOL/L (ref 136–145)

## 2022-08-20 PROCEDURE — 2580000003 HC RX 258

## 2022-08-20 PROCEDURE — 6360000002 HC RX W HCPCS: Performed by: INTERNAL MEDICINE

## 2022-08-20 PROCEDURE — 6370000000 HC RX 637 (ALT 250 FOR IP)

## 2022-08-20 PROCEDURE — 36415 COLL VENOUS BLD VENIPUNCTURE: CPT

## 2022-08-20 PROCEDURE — 6360000002 HC RX W HCPCS: Performed by: SURGERY

## 2022-08-20 PROCEDURE — 94669 MECHANICAL CHEST WALL OSCILL: CPT

## 2022-08-20 PROCEDURE — 99233 SBSQ HOSP IP/OBS HIGH 50: CPT | Performed by: INTERNAL MEDICINE

## 2022-08-20 PROCEDURE — 94640 AIRWAY INHALATION TREATMENT: CPT

## 2022-08-20 PROCEDURE — 6370000000 HC RX 637 (ALT 250 FOR IP): Performed by: SURGERY

## 2022-08-20 PROCEDURE — 6370000000 HC RX 637 (ALT 250 FOR IP): Performed by: INTERNAL MEDICINE

## 2022-08-20 PROCEDURE — 2060000000 HC ICU INTERMEDIATE R&B

## 2022-08-20 PROCEDURE — 80069 RENAL FUNCTION PANEL: CPT

## 2022-08-20 RX ADMIN — HEPARIN SODIUM 5000 UNITS: 5000 INJECTION INTRAVENOUS; SUBCUTANEOUS at 13:42

## 2022-08-20 RX ADMIN — SODIUM CHLORIDE, PRESERVATIVE FREE 10 ML: 5 INJECTION INTRAVENOUS at 08:09

## 2022-08-20 RX ADMIN — DIGOXIN 125 MCG: 125 TABLET ORAL at 08:08

## 2022-08-20 RX ADMIN — PANTOPRAZOLE SODIUM 40 MG: 40 TABLET, DELAYED RELEASE ORAL at 06:46

## 2022-08-20 RX ADMIN — HEPARIN SODIUM 5000 UNITS: 5000 INJECTION INTRAVENOUS; SUBCUTANEOUS at 06:46

## 2022-08-20 RX ADMIN — METOPROLOL TARTRATE 25 MG: 25 TABLET, FILM COATED ORAL at 22:07

## 2022-08-20 RX ADMIN — SODIUM CHLORIDE, PRESERVATIVE FREE 10 ML: 5 INJECTION INTRAVENOUS at 22:08

## 2022-08-20 RX ADMIN — Medication 15 ML: at 06:46

## 2022-08-20 RX ADMIN — TRAMADOL HYDROCHLORIDE 50 MG: 50 TABLET, COATED ORAL at 11:59

## 2022-08-20 RX ADMIN — MICONAZOLE NITRATE: 2 POWDER TOPICAL at 08:08

## 2022-08-20 RX ADMIN — MICONAZOLE NITRATE: 2 POWDER TOPICAL at 22:08

## 2022-08-20 RX ADMIN — FLUTICASONE PROPIONATE 2 SPRAY: 50 SPRAY, METERED NASAL at 08:08

## 2022-08-20 RX ADMIN — HEPARIN SODIUM 5000 UNITS: 5000 INJECTION INTRAVENOUS; SUBCUTANEOUS at 22:07

## 2022-08-20 RX ADMIN — SODIUM CHLORIDE 30 MG/ML INHALATION SOLUTION 4 ML: 30 SOLUTION INHALANT at 20:28

## 2022-08-20 RX ADMIN — Medication 125 MG: at 06:46

## 2022-08-20 RX ADMIN — SODIUM CHLORIDE 30 MG/ML INHALATION SOLUTION 4 ML: 30 SOLUTION INHALANT at 10:39

## 2022-08-20 RX ADMIN — PANTOPRAZOLE SODIUM 40 MG: 40 TABLET, DELAYED RELEASE ORAL at 17:20

## 2022-08-20 RX ADMIN — Medication 10 MG: at 22:07

## 2022-08-20 RX ADMIN — PREGABALIN 50 MG: 50 CAPSULE ORAL at 08:08

## 2022-08-20 RX ADMIN — ALBUTEROL SULFATE 2.5 MG: 2.5 SOLUTION RESPIRATORY (INHALATION) at 20:28

## 2022-08-20 RX ADMIN — Medication 125 MG: at 17:20

## 2022-08-20 RX ADMIN — Medication 125 MG: at 11:59

## 2022-08-20 RX ADMIN — PREGABALIN 50 MG: 50 CAPSULE ORAL at 22:07

## 2022-08-20 RX ADMIN — PREGABALIN 50 MG: 50 CAPSULE ORAL at 13:42

## 2022-08-20 RX ADMIN — ALBUTEROL SULFATE 2.5 MG: 2.5 SOLUTION RESPIRATORY (INHALATION) at 10:39

## 2022-08-20 ASSESSMENT — PAIN SCALES - GENERAL
PAINLEVEL_OUTOF10: 0
PAINLEVEL_OUTOF10: 4
PAINLEVEL_OUTOF10: 5
PAINLEVEL_OUTOF10: 9
PAINLEVEL_OUTOF10: 6
PAINLEVEL_OUTOF10: 0

## 2022-08-20 ASSESSMENT — PAIN DESCRIPTION - PAIN TYPE
TYPE: CHRONIC PAIN
TYPE: CHRONIC PAIN

## 2022-08-20 ASSESSMENT — PAIN DESCRIPTION - FREQUENCY
FREQUENCY: CONTINUOUS
FREQUENCY: CONTINUOUS

## 2022-08-20 ASSESSMENT — PAIN DESCRIPTION - DESCRIPTORS
DESCRIPTORS: NAGGING;THROBBING
DESCRIPTORS: NAGGING;THROBBING

## 2022-08-20 ASSESSMENT — PAIN DESCRIPTION - LOCATION
LOCATION: BACK

## 2022-08-20 ASSESSMENT — PAIN DESCRIPTION - ORIENTATION
ORIENTATION: LOWER
ORIENTATION: LOWER

## 2022-08-20 ASSESSMENT — PAIN DESCRIPTION - ONSET
ONSET: ON-GOING
ONSET: ON-GOING

## 2022-08-20 NOTE — PROGRESS NOTES
Hospital Medicine Progress Note    PCP: Helene Law    Date of Admission: 7/30/2022    Chief Complaint:  Abdominal pain, transferred for ortho eval with Rt CASS site with fluid collection. History Of Present Illness:     74 y.o. female  - Hx of HTN, HLD, Gout, OA, recent Rt CASS, morbid obesity  - Presents with generalized weakness nausea vomiting and abdominal pain, she woke up with the symptoms night prior to presentation. Her last bowel movement was about 3 days prior. Patient is a 26-year-old female with past medical history of hypertension hyperlipidemia GERD osteoarthritis who presents to the hospital for abdominal pain    Assessment  Sepsis present on admission  Paroxysmal atrial fibrillation  A. fib with RVR  Acute hypoxic respiratory failure  Fluid overload  Acute on chronic anemia  Acute severe diarrhea with sepsis due to C. difficile infection  Acute kidney injury  Mild hyponatremia  Severe constipation  Urinary retention  Morbid obesity    Plan  Continue vancomycin p.o. vancomycin for C. difficile  ID was consulted initially, patient is not currently on antibiotics  Monitor and replace electrolytes  Nephrology was consulted  Monitor hemoglobin  Ortho following  Monitor for improvement of diarrhea  Likely discharge to SNF after diarrhea improves    Interval HPI  Rapid response 8/13 AM. For hypotension: Was hypotensive to 67/41 during blood transfusion. , satting well on 4L NC. Patient awake and appropriately interactive, no acute distress, denies any symptoms other than episode of diarrhea before rapid called. Transfusion was stopped, gave 350cc bolus with prompt improvement in BP to 20'Y systolic. Pt remained hemodynamically stable and denied any new symptoms during this rapid. \"    Subjective -patient is seen and evaluated at the bedside, patient denies chest pain shortness of breath nausea, patient does not have any questions at time of my evaluation  Discussed with patient family at the bedside    Medications : Reviewed      Allergies:  Buspirone, Duloxetine, Fenofibrate, Gabapentin, Venlafaxine, Doxycycline, Statins, and Sulfa antibiotics      REVIEW OF SYSTEMS:   Pertinent positives as noted in the HPI. All other systems reviewed and negative. PHYSICAL EXAM PERFORMED:    BP (!) 132/58   Pulse (!) 102   Temp 98.7 °F (37.1 °C) (Axillary)   Resp 18   Ht 5' 2.01\" (1.575 m)   Wt 238 lb 15.7 oz (108.4 kg)   SpO2 93%   BMI 43.70 kg/m²     General appearance: holding conversations, awake alert, on room air  HEENT:  Normal cephalic, atraumatic without obvious deformity. Neck: Supple, with full range of motion. No jugular venous distention. Respiratory:  Normal respiratory effort. Clear to auscultation, bilaterally without Rales/Wheezes/Rhonchi. Cardiovascular:  Regular rate and rhythm with normal S1/S2 without murmurs, rubs or gallops. Abdomen: Not distended, soft, much less tender. BS heard  Musculoskeletal: Incision dressing C/D/I  Skin: Skin color, texture, turgor normal.  No rashes or lesions. Neurologic:  grossly non-focal.  Capillary Refill: Brisk,< 3 seconds   Peripheral Pulses: +2 palpable, equal bilaterally       Labs:     Recent Labs     08/18/22  0552 08/19/22  1532   WBC 9.9 9.7   HGB 7.0* 7.7*   HCT 20.5* 22.5*   * 565*       Recent Labs     08/18/22  0552 08/19/22  1532 08/20/22  0457   * 129* 132*   K 4.3 4.4 4.9   CL 98* 100 104   CO2 18* 19* 18*   BUN 25* 24* 20   CREATININE 0.9 0.7 0.6   CALCIUM 7.4* 7.6* 7.4*   PHOS 3.3  --  3.4       No results for input(s): AST, ALT, BILIDIR, BILITOT, ALKPHOS in the last 72 hours. No results for input(s): INR in the last 72 hours. No results for input(s): Assunta Arellano in the last 72 hours. Radiology: Reviewed    Diet: ADULT DIET;  Dysphagia - Minced and Moist; 1200 ml  ADULT ORAL NUTRITION SUPPLEMENT; Lunch, Dinner; Standard High Calorie/High Protein Oral Supplement  Code Status: Full Code    Disposition: - Transferred from ICU. Recently at [facility] after hip ortho surgery: Will need SNF at discharge.      Oliver Mast MD

## 2022-08-20 NOTE — PLAN OF CARE
Problem: Discharge Planning  Goal: Discharge to home or other facility with appropriate resources  Outcome: Progressing     Problem: Safety - Adult  Goal: Free from fall injury  8/19/2022 2236 by Maciej Posada RN  Outcome: Progressing  8/19/2022 1737 by Anant Burgos RN  Outcome: Progressing  Flowsheets (Taken 8/16/2022 2123 by Vijay Gregg RN)  Free From Fall Injury:   Instruct family/caregiver on patient safety   Based on caregiver fall risk screen, instruct family/caregiver to ask for assistance with transferring infant if caregiver noted to have fall risk factors     Problem: ABCDS Injury Assessment  Goal: Absence of physical injury  Outcome: Progressing     Problem: Skin/Tissue Integrity  Goal: Absence of new skin breakdown  Description: 1. Monitor for areas of redness and/or skin breakdown  2. Assess vascular access sites hourly  3. Every 4-6 hours minimum:  Change oxygen saturation probe site  4. Every 4-6 hours:  If on nasal continuous positive airway pressure, respiratory therapy assess nares and determine need for appliance change or resting period. 8/19/2022 2236 by Maciej Posada RN  Outcome: Progressing  8/19/2022 1737 by Anant Burgos RN  Outcome: Progressing     Problem: Skin/Tissue Integrity  Goal: Absence of new skin breakdown  Description: 1. Monitor for areas of redness and/or skin breakdown  2. Assess vascular access sites hourly  3. Every 4-6 hours minimum:  Change oxygen saturation probe site  4. Every 4-6 hours:  If on nasal continuous positive airway pressure, respiratory therapy assess nares and determine need for appliance change or resting period.   8/19/2022 2236 by Maciej Posada RN  Outcome: Progressing  8/19/2022 1737 by Anant Burgos RN  Outcome: Progressing     Problem: Genitourinary - Adult  Goal: Urinary catheter remains patent  Outcome: Progressing     Problem: Infection - Adult  Goal: Absence of infection at discharge  Outcome: Progressing     Problem: Pain  Goal: Verbalizes/displays adequate comfort level or baseline comfort level  Outcome: Progressing

## 2022-08-20 NOTE — PROGRESS NOTES
Office : [phone]     Fax :[phone]         Renal Progress Note    Subjective:   Admit Date: 7/30/2022     Interval History:   Still having diarrhea. Decreased PO intake. DIET ADULT DIET;  Dysphagia - Minced and Moist; 1200 ml  ADULT ORAL NUTRITION SUPPLEMENT; Lunch, Dinner; Standard High Calorie/High Protein Oral Supplement  Medications:   Scheduled Meds:   alteplase  1 mg IntraCATHeter Once    metoprolol tartrate  25 mg Oral BID    heparin (porcine)  5,000 Units SubCUTAneous 3 times per day    vancomycin  125 mg Oral 4 times per day    digoxin  125 mcg Oral Daily    pantoprazole  40 mg Oral BID AC    miconazole   Topical BID    melatonin  10 mg Oral Nightly    albuterol  2.5 mg Nebulization BID    lidocaine  1 patch TransDERmal Daily    sodium chloride (Inhalant)  4 mL Nebulization BID    sodium chloride flush  5-40 mL IntraVENous 2 times per day    fluticasone  2 spray Each Nostril Daily    pregabalin  50 mg Oral TID     Continuous Infusions:   IV infusion builder 45 mL/hr at 08/20/22 0752    sodium chloride      sodium chloride      sodium chloride      sodium chloride      dextrose      sodium chloride 5 mL/hr at 08/12/22 1154       Labs:  CBC:   Recent Labs     08/18/22  0552 08/19/22  1532   WBC 9.9 9.7   HGB 7.0* 7.7*   * 565*       BMP:    Recent Labs     08/18/22  0552 08/19/22  1532 08/20/22  0457   * 129* 132*   K 4.3 4.4 4.9   CL 98* 100 104   CO2 18* 19* 18*   BUN 25* 24* 20   CREATININE 0.9 0.7 0.6   GLUCOSE 94 98 75       Ca/Mg/Phos:   Recent Labs     08/18/22  0552 08/19/22  1532 08/20/22  0457   CALCIUM 7.4* 7.6* 7.4*   MG 2. 00 1.90  --    PHOS 3.3  --  3.4       Hepatic:   No results for input(s): AST, ALT, ALB, BILITOT, ALKPHOS in the last 72 hours. Troponin: No results for input(s): TROPONINI in the last 72 hours. BNP: No results for input(s): BNP in the last 72 hours. Lipids: No results for input(s): CHOL, TRIG, HDL, LDLCALC, LABVLDL in the last 72 hours. ABGs:   No results for input(s): PHART, PO2ART, AFT4XBK in the last 72 hours. INR:   No results for input(s): INR in the last 72 hours. UA:  No results for input(s): Jewelene Harm, GLUCOSEU, BILIRUBINUR, KETUA, SPECGRAV, BLOODU, PHUR, PROTEINU, UROBILINOGEN, NITRU, LEUKOCYTESUR, Jesus Lulas in the last 72 hours. Urine Microscopic:   No results for input(s): LABCAST, BACTERIA, COMU, HYALCAST, WBCUA, RBCUA, EPIU in the last 72 hours. Urine Culture: No results for input(s): LABURIN in the last 72 hours. Urine Chemistry: No results for input(s): Jojo High, PROTEINUR, NAUR in the last 72 hours. Objective:   Vitals: /73   Pulse (!) 102   Temp 98.5 °F (36.9 °C) (Axillary)   Resp 18   Ht 5' 2.01\" (1.575 m)   Wt 238 lb 15.7 oz (108.4 kg)   SpO2 93%   BMI 43.70 kg/m²    Wt Readings from Last 3 Encounters:   08/20/22 238 lb 15.7 oz (108.4 kg)   07/13/22 218 lb 9.6 oz (99.2 kg)   06/14/22 217 lb (98.4 kg)      24HR INTAKE/OUTPUT:    Intake/Output Summary (Last 24 hours) at 8/20/2022 1332  Last data filed at 8/20/2022 1156  Gross per 24 hour   Intake 1320 ml   Output 1050 ml   Net 270 ml       Physical Exam  Constitutional:       Appearance: She is ill-appearing. She is not diaphoretic. Cardiovascular:      Rate and Rhythm: Normal rate. Pulmonary:      Effort: Pulmonary effort is normal.      Breath sounds: Rhonchi present. No wheezing. Abdominal:      General: There is distension. Palpations: Abdomen is soft. Tenderness: There is abdominal tenderness. There is no guarding.    Musculoskeletal:      Right lower leg: Edema present. Left lower leg: Edema present. Skin:     General: Skin is warm and dry. Neurological:      General: No focal deficit present. Mental Status: She is oriented to person, place, and time. Assessment and Plan:       IMAGING:  CT CHEST ABDOMEN PELVIS WO CONTRAST   Final Result      CHEST:      1. No evidence for hematoma   2. Decreased small pleural effusions and bibasilar atelectasis compared to 8/2/2022      ABDOMEN/PELVIS:      1. Right iliopsoas muscle small to medium-sized intramuscular acute hematoma . An accurate characterization of size of hematoma is not possible due to its infiltrative nature. 2. Right proximal thigh subcutaneous 3.8 x 3.7 cm water attenuation fluid collection   3. Nonspecific, nonobstructive bowel gas pattern      XR CHEST PORTABLE   Final Result      Limited inspiration. Accentuation of pulmonary interstitial markings, with minor basilar atelectasis. No other acute findings. MRI LUMBAR SPINE WO CONTRAST   Final Result   1. Moderate size intramuscular hematoma along the right iliopsoas muscle. This is new in comparison to abdominal CT 8/1/2022.   2. No central canal stenosis. 3. Moderate multilevel foraminal narrowing as described. 4. No acute osseous abnormality. XR HIP 2-3 VW W PELVIS RIGHT   Final Result   Impression:    1. Right total hip arthroplasty. The orthopedic hardware is intact without evidence of loosening. No significant change from prior CT examination. VL Extremity Venous Bilateral   Final Result      XR CHEST PORTABLE   Final Result      Mild bilateral airspace disease. XR ABDOMEN (KUB) (SINGLE AP VIEW)   Final Result   1. No evidence of free intraperitoneal air. 2. Indeterminate bowel gas pattern due to a paucity of small bowel gas. XR ABDOMEN (KUB) (SINGLE AP VIEW)   Final Result   Impression: Stable appearance of the abdomen.       XR CHEST PORTABLE   Final Result      Low lung volumes with central bronchovascular crowding. Atelectasis in the left lung base. Normal cardiomediastinal silhouette. Lines and tubes in standard position. VL Extremity Venous Bilateral   Final Result      XR ABDOMEN (KUB) (SINGLE AP VIEW)   Final Result   Impression: Stable appearance of the abdomen with gas distended loops of central small bowel again noted. CT CHEST WO CONTRAST   Final Result      1. Moderate right pleural effusion and small left effusion with basilar airspace opacity most consistent with atelectasis. Superimposed pneumonia be difficult to exclude. 2. Narrowing of the trachea with expiration compatible with treated bronchial malacia. 3. Nasogastric tube tip in the lower esophagus. 4. Persistent colonic distention. 5. Mild coronary artery calcification. XR ABDOMEN (KUB) (SINGLE AP VIEW)   Final Result      Frontal supine views demonstrate an unremarkable bowel gas pattern. No significant colonic stool is seen. Right hip arthroplasty noted. XR CHEST PORTABLE   Final Result      Prominence of the perihilar interstitial markings with mild peribronchial cuffing is favored to represent mild pulmonary edema. Low lung volumes bronchovascular crowding. Stable cardiomediastinal silhouette. Lines and tubes in standard position. Cholecystectomy clips are noted. CT ABDOMEN PELVIS WO CONTRAST Additional Contrast? Oral and Rectal   Final Result   1. Moderate colonic distention. Correlate for constipation. Fecal content within the ileum which may suggest stasis. There is no small bowel obstruction. 2. Subcutaneous fluid collection overlying the right total hip arthroplasty. CT HIP RIGHT WO CONTRAST   Final Result   1. Moderate colonic distention. Correlate for constipation. Fecal content within the ileum which may suggest stasis. There is no small bowel obstruction. 2. Subcutaneous fluid collection overlying the right total hip arthroplasty. XR CHEST PORTABLE   Final Result      1. Right IJ CVC tip in the cavoatrial junction. 2.  Bibasilar airspace disease suggesting atelectasis. Pneumonia is not excluded. XR ABDOMEN (KUB) (SINGLE AP VIEW)   Final Result      Dilated colonic and small bowel loops may suggest ileus but obstruction is not excluded. XR ABDOMEN (KUB) (SINGLE AP VIEW)   Final Result      No acute radiographic abnormality of the abdomen. Status post right hip arthroplasty with mildly displaced greater trochanteric fracture fragment, new or more pronounced compared to the prior study. Assessment/Plan     ARIANE   2. Sepsis  3. AGMA   4. Lactic Acidosis - resolved  4. Hyperkalemia  5. Hyponatremia  6. Bowel Obstruction  7. Hypertension  8. GI bleed  9. Anemia  10.  C diff       Plan:  - Cr better   - BP better   - IVF with bicarb   - encourage solute intake   - Replete electrolytes as needed    - ECHO normal EF - IVC compressible   - Hypoxia likely sec to Aspiration pneumonitis   - ARIANE sec to ATN   - Monitor I/Os  - Avoid nephrotoxic agents    Recommend to dose adjust all medications  based on renal functions  Maintain SBP> 90 mmHg   Daily weights   AVOID NSAIDs  Avoid Nephrotoxins  Monitor Intake/Output  Call if significant decrease in urine output        Saul Balderas MD

## 2022-08-21 ENCOUNTER — APPOINTMENT (OUTPATIENT)
Dept: GENERAL RADIOLOGY | Age: 72
DRG: 853 | End: 2022-08-21
Attending: INTERNAL MEDICINE
Payer: MEDICARE

## 2022-08-21 LAB
ALBUMIN SERPL-MCNC: 1.6 G/DL (ref 3.4–5)
ANION GAP SERPL CALCULATED.3IONS-SCNC: 11 MMOL/L (ref 3–16)
BUN BLDV-MCNC: 12 MG/DL (ref 7–20)
CALCIUM SERPL-MCNC: 7.6 MG/DL (ref 8.3–10.6)
CHLORIDE BLD-SCNC: 105 MMOL/L (ref 99–110)
CO2: 20 MMOL/L (ref 21–32)
CREAT SERPL-MCNC: <0.5 MG/DL (ref 0.6–1.2)
GFR AFRICAN AMERICAN: >60
GFR NON-AFRICAN AMERICAN: >60
GLUCOSE BLD-MCNC: 82 MG/DL (ref 70–99)
PHOSPHORUS: 2.9 MG/DL (ref 2.5–4.9)
POTASSIUM SERPL-SCNC: 4.2 MMOL/L (ref 3.5–5.1)
SODIUM BLD-SCNC: 136 MMOL/L (ref 136–145)

## 2022-08-21 PROCEDURE — 94640 AIRWAY INHALATION TREATMENT: CPT

## 2022-08-21 PROCEDURE — 74018 RADEX ABDOMEN 1 VIEW: CPT

## 2022-08-21 PROCEDURE — 36415 COLL VENOUS BLD VENIPUNCTURE: CPT

## 2022-08-21 PROCEDURE — 6370000000 HC RX 637 (ALT 250 FOR IP): Performed by: INTERNAL MEDICINE

## 2022-08-21 PROCEDURE — 6370000000 HC RX 637 (ALT 250 FOR IP)

## 2022-08-21 PROCEDURE — 80069 RENAL FUNCTION PANEL: CPT

## 2022-08-21 PROCEDURE — 2580000003 HC RX 258

## 2022-08-21 PROCEDURE — 6360000002 HC RX W HCPCS: Performed by: INTERNAL MEDICINE

## 2022-08-21 PROCEDURE — 94669 MECHANICAL CHEST WALL OSCILL: CPT

## 2022-08-21 PROCEDURE — 6370000000 HC RX 637 (ALT 250 FOR IP): Performed by: SURGERY

## 2022-08-21 PROCEDURE — 99233 SBSQ HOSP IP/OBS HIGH 50: CPT | Performed by: INTERNAL MEDICINE

## 2022-08-21 PROCEDURE — 6360000002 HC RX W HCPCS: Performed by: SURGERY

## 2022-08-21 PROCEDURE — 2060000000 HC ICU INTERMEDIATE R&B

## 2022-08-21 PROCEDURE — 6360000002 HC RX W HCPCS

## 2022-08-21 RX ADMIN — TRAMADOL HYDROCHLORIDE 50 MG: 50 TABLET, COATED ORAL at 11:47

## 2022-08-21 RX ADMIN — METOPROLOL TARTRATE 25 MG: 25 TABLET, FILM COATED ORAL at 08:18

## 2022-08-21 RX ADMIN — FLUTICASONE PROPIONATE 2 SPRAY: 50 SPRAY, METERED NASAL at 08:18

## 2022-08-21 RX ADMIN — SODIUM CHLORIDE 30 MG/ML INHALATION SOLUTION 4 ML: 30 SOLUTION INHALANT at 20:30

## 2022-08-21 RX ADMIN — MORPHINE SULFATE 2 MG: 2 INJECTION, SOLUTION INTRAMUSCULAR; INTRAVENOUS at 20:52

## 2022-08-21 RX ADMIN — DIGOXIN 125 MCG: 125 TABLET ORAL at 08:18

## 2022-08-21 RX ADMIN — Medication 125 MG: at 00:18

## 2022-08-21 RX ADMIN — HEPARIN SODIUM 5000 UNITS: 5000 INJECTION INTRAVENOUS; SUBCUTANEOUS at 05:25

## 2022-08-21 RX ADMIN — SODIUM CHLORIDE, PRESERVATIVE FREE 10 ML: 5 INJECTION INTRAVENOUS at 08:19

## 2022-08-21 RX ADMIN — HEPARIN SODIUM 5000 UNITS: 5000 INJECTION INTRAVENOUS; SUBCUTANEOUS at 16:21

## 2022-08-21 RX ADMIN — PREGABALIN 50 MG: 50 CAPSULE ORAL at 20:50

## 2022-08-21 RX ADMIN — Medication 125 MG: at 16:21

## 2022-08-21 RX ADMIN — SODIUM CHLORIDE, PRESERVATIVE FREE 10 ML: 5 INJECTION INTRAVENOUS at 22:06

## 2022-08-21 RX ADMIN — PANTOPRAZOLE SODIUM 40 MG: 40 TABLET, DELAYED RELEASE ORAL at 05:25

## 2022-08-21 RX ADMIN — ALBUTEROL SULFATE 2.5 MG: 2.5 SOLUTION RESPIRATORY (INHALATION) at 20:29

## 2022-08-21 RX ADMIN — TRAMADOL HYDROCHLORIDE 50 MG: 50 TABLET, COATED ORAL at 16:21

## 2022-08-21 RX ADMIN — Medication 10 MG: at 20:50

## 2022-08-21 RX ADMIN — PANTOPRAZOLE SODIUM 40 MG: 40 TABLET, DELAYED RELEASE ORAL at 16:21

## 2022-08-21 RX ADMIN — PREGABALIN 50 MG: 50 CAPSULE ORAL at 08:18

## 2022-08-21 RX ADMIN — METOPROLOL TARTRATE 25 MG: 25 TABLET, FILM COATED ORAL at 20:50

## 2022-08-21 RX ADMIN — Medication 125 MG: at 05:25

## 2022-08-21 RX ADMIN — MICONAZOLE NITRATE: 2 POWDER TOPICAL at 22:05

## 2022-08-21 RX ADMIN — PREGABALIN 50 MG: 50 CAPSULE ORAL at 16:21

## 2022-08-21 RX ADMIN — Medication 125 MG: at 11:42

## 2022-08-21 RX ADMIN — HEPARIN SODIUM 5000 UNITS: 5000 INJECTION INTRAVENOUS; SUBCUTANEOUS at 20:50

## 2022-08-21 RX ADMIN — MICONAZOLE NITRATE: 2 POWDER TOPICAL at 08:18

## 2022-08-21 RX ADMIN — MORPHINE SULFATE 2 MG: 2 INJECTION, SOLUTION INTRAMUSCULAR; INTRAVENOUS at 00:42

## 2022-08-21 RX ADMIN — MORPHINE SULFATE 2 MG: 2 INJECTION, SOLUTION INTRAMUSCULAR; INTRAVENOUS at 05:25

## 2022-08-21 ASSESSMENT — PAIN DESCRIPTION - LOCATION
LOCATION: BUTTOCKS;BACK
LOCATION: BUTTOCKS;BACK
LOCATION: BACK;BUTTOCKS
LOCATION: BACK
LOCATION: BUTTOCKS;BACK
LOCATION: BACK
LOCATION: BACK;BUTTOCKS
LOCATION: BACK;BUTTOCKS

## 2022-08-21 ASSESSMENT — PAIN DESCRIPTION - DESCRIPTORS
DESCRIPTORS: ACHING
DESCRIPTORS: ACHING;THROBBING
DESCRIPTORS: ACHING
DESCRIPTORS: GNAWING
DESCRIPTORS: ACHING
DESCRIPTORS: THROBBING
DESCRIPTORS: THROBBING;ACHING
DESCRIPTORS: ACHING;THROBBING

## 2022-08-21 ASSESSMENT — PAIN SCALES - GENERAL
PAINLEVEL_OUTOF10: 8
PAINLEVEL_OUTOF10: 8
PAINLEVEL_OUTOF10: 6
PAINLEVEL_OUTOF10: 8
PAINLEVEL_OUTOF10: 0
PAINLEVEL_OUTOF10: 6
PAINLEVEL_OUTOF10: 0
PAINLEVEL_OUTOF10: 8
PAINLEVEL_OUTOF10: 8
PAINLEVEL_OUTOF10: 0
PAINLEVEL_OUTOF10: 0
PAINLEVEL_OUTOF10: 8
PAINLEVEL_OUTOF10: 0

## 2022-08-21 ASSESSMENT — PAIN DESCRIPTION - ONSET
ONSET: ON-GOING

## 2022-08-21 ASSESSMENT — PAIN SCALES - WONG BAKER
WONGBAKER_NUMERICALRESPONSE: 0

## 2022-08-21 ASSESSMENT — PAIN DESCRIPTION - PAIN TYPE
TYPE: CHRONIC PAIN

## 2022-08-21 ASSESSMENT — PAIN DESCRIPTION - FREQUENCY
FREQUENCY: CONTINUOUS

## 2022-08-21 ASSESSMENT — PAIN DESCRIPTION - ORIENTATION: ORIENTATION: LOWER;MID

## 2022-08-21 NOTE — PROGRESS NOTES
Office : [phone]     Fax :[phone]         Renal Progress Note    Subjective:   Admit Date: 7/30/2022     Interval History:   Still having diarrhea. Decreased PO intake. Cr stable       DIET ADULT ORAL NUTRITION SUPPLEMENT; Lunch, Dinner; Standard High Calorie/High Protein Oral Supplement  ADULT DIET;  Regular; 1200 ml  Medications:   Scheduled Meds:   alteplase  1 mg IntraCATHeter Once    metoprolol tartrate  25 mg Oral BID    heparin (porcine)  5,000 Units SubCUTAneous 3 times per day    vancomycin  125 mg Oral 4 times per day    digoxin  125 mcg Oral Daily    pantoprazole  40 mg Oral BID AC    miconazole   Topical BID    melatonin  10 mg Oral Nightly    albuterol  2.5 mg Nebulization BID    lidocaine  1 patch TransDERmal Daily    sodium chloride (Inhalant)  4 mL Nebulization BID    sodium chloride flush  5-40 mL IntraVENous 2 times per day    fluticasone  2 spray Each Nostril Daily    pregabalin  50 mg Oral TID     Continuous Infusions:   IV infusion builder 45 mL/hr at 08/20/22 0752    sodium chloride      sodium chloride      sodium chloride      sodium chloride      dextrose      sodium chloride 5 mL/hr at 08/12/22 1154       Labs:  CBC:   Recent Labs     08/19/22  1532   WBC 9.7   HGB 7.7*   *       BMP:    Recent Labs     08/19/22  1532 08/20/22  0457 08/21/22  0444   * 132* 136   K 4.4 4.9 4.2    104 105   CO2 19* 18* 20*   BUN 24* 20 12   CREATININE 0.7 0.6 <0.5*   GLUCOSE 98 75 82       Ca/Mg/Phos:   Recent Labs     08/19/22  1532 08/20/22  0457 08/21/22  0444   CALCIUM 7.6* 7.4* 7.6*   MG 1.90  --   --    PHOS  --  3.4 2.9 Hepatic:   No results for input(s): AST, ALT, ALB, BILITOT, ALKPHOS in the last 72 hours. Troponin: No results for input(s): TROPONINI in the last 72 hours. BNP: No results for input(s): BNP in the last 72 hours. Lipids: No results for input(s): CHOL, TRIG, HDL, LDLCALC, LABVLDL in the last 72 hours. ABGs:   No results for input(s): PHART, PO2ART, JKM3IDD in the last 72 hours. INR:   No results for input(s): INR in the last 72 hours. UA:  No results for input(s): Cecelia Pereira, GLUCOSEU, BILIRUBINUR, KETUA, SPECGRAV, BLOODU, PHUR, PROTEINU, UROBILINOGEN, NITRU, LEUKOCYTESUR, Rockney Malott in the last 72 hours. Urine Microscopic:   No results for input(s): LABCAST, BACTERIA, COMU, HYALCAST, WBCUA, RBCUA, EPIU in the last 72 hours. Urine Culture: No results for input(s): LABURIN in the last 72 hours. Urine Chemistry: No results for input(s): Sharda Pipes, PROTEINUR, NAUR in the last 72 hours. Objective:   Vitals: BP (!) 140/72   Pulse 96   Temp 99.3 °F (37.4 °C) (Axillary)   Resp 18   Ht 5' 2.01\" (1.575 m)   Wt 238 lb 8.6 oz (108.2 kg)   SpO2 93%   BMI 43.62 kg/m²    Wt Readings from Last 3 Encounters:   08/21/22 238 lb 8.6 oz (108.2 kg)   07/13/22 218 lb 9.6 oz (99.2 kg)   06/14/22 217 lb (98.4 kg)      24HR INTAKE/OUTPUT:    Intake/Output Summary (Last 24 hours) at 8/21/2022 1413  Last data filed at 8/21/2022 1337  Gross per 24 hour   Intake 880 ml   Output 1400 ml   Net -520 ml       Physical Exam  Constitutional:       Appearance: She is ill-appearing. She is not diaphoretic. Cardiovascular:      Rate and Rhythm: Normal rate. Pulmonary:      Effort: Pulmonary effort is normal.      Breath sounds: Rhonchi present. No wheezing. Abdominal:      General: There is distension. Palpations: Abdomen is soft. Tenderness: There is abdominal tenderness. There is no guarding. Musculoskeletal:      Right lower leg: Edema present.       Left lower leg: Edema present. Skin:     General: Skin is warm and dry. Neurological:      General: No focal deficit present. Mental Status: She is oriented to person, place, and time. Assessment and Plan:       IMAGING:  CT CHEST ABDOMEN PELVIS WO CONTRAST   Final Result      CHEST:      1. No evidence for hematoma   2. Decreased small pleural effusions and bibasilar atelectasis compared to 8/2/2022      ABDOMEN/PELVIS:      1. Right iliopsoas muscle small to medium-sized intramuscular acute hematoma . An accurate characterization of size of hematoma is not possible due to its infiltrative nature. 2. Right proximal thigh subcutaneous 3.8 x 3.7 cm water attenuation fluid collection   3. Nonspecific, nonobstructive bowel gas pattern      XR CHEST PORTABLE   Final Result      Limited inspiration. Accentuation of pulmonary interstitial markings, with minor basilar atelectasis. No other acute findings. MRI LUMBAR SPINE WO CONTRAST   Final Result   1. Moderate size intramuscular hematoma along the right iliopsoas muscle. This is new in comparison to abdominal CT 8/1/2022.   2. No central canal stenosis. 3. Moderate multilevel foraminal narrowing as described. 4. No acute osseous abnormality. XR HIP 2-3 VW W PELVIS RIGHT   Final Result   Impression:    1. Right total hip arthroplasty. The orthopedic hardware is intact without evidence of loosening. No significant change from prior CT examination. VL Extremity Venous Bilateral   Final Result      XR CHEST PORTABLE   Final Result      Mild bilateral airspace disease. XR ABDOMEN (KUB) (SINGLE AP VIEW)   Final Result   1. No evidence of free intraperitoneal air. 2. Indeterminate bowel gas pattern due to a paucity of small bowel gas. XR ABDOMEN (KUB) (SINGLE AP VIEW)   Final Result   Impression: Stable appearance of the abdomen. XR CHEST PORTABLE   Final Result      Low lung volumes with central bronchovascular crowding. Atelectasis in the left lung base. Normal cardiomediastinal silhouette. Lines and tubes in standard position. VL Extremity Venous Bilateral   Final Result      XR ABDOMEN (KUB) (SINGLE AP VIEW)   Final Result   Impression: Stable appearance of the abdomen with gas distended loops of central small bowel again noted. CT CHEST WO CONTRAST   Final Result      1. Moderate right pleural effusion and small left effusion with basilar airspace opacity most consistent with atelectasis. Superimposed pneumonia be difficult to exclude. 2. Narrowing of the trachea with expiration compatible with treated bronchial malacia. 3. Nasogastric tube tip in the lower esophagus. 4. Persistent colonic distention. 5. Mild coronary artery calcification. XR ABDOMEN (KUB) (SINGLE AP VIEW)   Final Result      Frontal supine views demonstrate an unremarkable bowel gas pattern. No significant colonic stool is seen. Right hip arthroplasty noted. XR CHEST PORTABLE   Final Result      Prominence of the perihilar interstitial markings with mild peribronchial cuffing is favored to represent mild pulmonary edema. Low lung volumes bronchovascular crowding. Stable cardiomediastinal silhouette. Lines and tubes in standard position. Cholecystectomy clips are noted. CT ABDOMEN PELVIS WO CONTRAST Additional Contrast? Oral and Rectal   Final Result   1. Moderate colonic distention. Correlate for constipation. Fecal content within the ileum which may suggest stasis. There is no small bowel obstruction. 2. Subcutaneous fluid collection overlying the right total hip arthroplasty. CT HIP RIGHT WO CONTRAST   Final Result   1. Moderate colonic distention. Correlate for constipation. Fecal content within the ileum which may suggest stasis. There is no small bowel obstruction. 2. Subcutaneous fluid collection overlying the right total hip arthroplasty.          XR CHEST PORTABLE   Final Result      1. Right IJ CVC tip in the cavoatrial junction. 2.  Bibasilar airspace disease suggesting atelectasis. Pneumonia is not excluded. XR ABDOMEN (KUB) (SINGLE AP VIEW)   Final Result      Dilated colonic and small bowel loops may suggest ileus but obstruction is not excluded. XR ABDOMEN (KUB) (SINGLE AP VIEW)   Final Result      No acute radiographic abnormality of the abdomen. Status post right hip arthroplasty with mildly displaced greater trochanteric fracture fragment, new or more pronounced compared to the prior study. Assessment/Plan     ARIANE   2. Sepsis  3. AGMA   4. Lactic Acidosis - resolved  4. Hyperkalemia  5. Hyponatremia  6. Bowel Obstruction  7. Hypertension  8. GI bleed  9. Anemia  10.  C diff       Plan:  - Cr better   - BP better   - IVF with bicarb - dec rate   - encourage solute intake   - Replete electrolytes as needed    - ECHO normal EF - IVC compressible   - Hypoxia likely sec to Aspiration pneumonitis   - ARIANE sec to ATN   - Monitor I/Os  - Avoid nephrotoxic agents    Recommend to dose adjust all medications  based on renal functions  Maintain SBP> 90 mmHg   Daily weights   AVOID NSAIDs  Avoid Nephrotoxins  Monitor Intake/Output  Call if significant decrease in urine output        Kadie Rivera MD

## 2022-08-21 NOTE — PROGRESS NOTES
Hospital Medicine Progress Note    PCP: Helene Law    Date of Admission: 7/30/2022    Chief Complaint:  Abdominal pain, transferred for ortho eval with Rt CASS site with fluid collection. History Of Present Illness:     74 y.o. female - Hx of HTN, HLD, Gout, OA, recent Rt CASS, morbid obesity  - Presents with generalized weakness nausea vomiting and abdominal pain, she woke up with the symptoms night prior to presentation. Her last bowel movement was about 3 days prior. Patient is a 17-year-old female with past medical history of hypertension hyperlipidemia GERD osteoarthritis who presents to the hospital for abdominal pain    Assessment  Sepsis present on admission  Paroxysmal atrial fibrillation  A. fib with RVR  Acute hypoxic respiratory failure  Fluid overload  Acute on chronic anemia  Acute severe diarrhea with sepsis due to C. difficile infection  Acute kidney injury  Mild hyponatremia  Severe constipation  Urinary retention  Morbid obesity    Plan  Continue vancomycin p.o. vancomycin for C. difficile  ID was consulted initially, patient is not currently on antibiotics  Cr is stable, Dec IV fluid  Monitor and replace electrolytes  Nephrology was consulted  Monitor hemoglobin  Ortho following  Monitor for improvement of diarrhea  Likely discharge to SNF after diarrhea improves    Interval HPI  Rapid response 8/13 AM. For hypotension: Was hypotensive to 67/41 during blood transfusion. , satting well on 4L NC. Patient awake and appropriately interactive, no acute distress, denies any symptoms other than episode of diarrhea before rapid called. Transfusion was stopped, gave 350cc bolus with prompt improvement in BP to 38'S systolic. Pt remained hemodynamically stable and denied any new symptoms during this rapid. \"    Subjective -patient is seen and evaluated at the bedside, discussed with patient  as well, patient denies chest pain shortness of breath nausea, complains of abdominal pain, complains of Diarrhea    Medications : Reviewed      Allergies:  Buspirone, Duloxetine, Fenofibrate, Gabapentin, Venlafaxine, Doxycycline, Statins, and Sulfa antibiotics      REVIEW OF SYSTEMS:   Pertinent positives as noted in the HPI. All other systems reviewed and negative. PHYSICAL EXAM PERFORMED:    /66   Pulse (!) 116   Temp 98.7 °F (37.1 °C) (Axillary)   Resp 18   Ht 5' 2.01\" (1.575 m)   Wt 238 lb 8.6 oz (108.2 kg)   SpO2 94%   BMI 43.62 kg/m²     General appearance: holding conversations, awake alert, on room air, having diet  HEENT:  Normal cephalic, atraumatic without obvious deformity. Neck: Supple, with full range of motion. No jugular venous distention. Respiratory:  Normal respiratory effort. Clear to auscultation, bilaterally without Rales/Wheezes/Rhonchi. Cardiovascular:  Regular rate and rhythm with normal S1/S2 without murmurs, rubs or gallops. Abdomen: Not distended, soft, much less tender. BS heard  Musculoskeletal: Incision dressing C/D/I  Skin: Skin color, texture, turgor normal.  No rashes or lesions. Neurologic:  grossly non-focal.  Capillary Refill: Brisk,< 3 seconds   Peripheral Pulses: +2 palpable, equal bilaterally     Labs:     Recent Labs     08/19/22  1532   WBC 9.7   HGB 7.7*   HCT 22.5*   *       Recent Labs     08/19/22  1532 08/20/22  0457 08/21/22  0444   * 132* 136   K 4.4 4.9 4.2    104 105   CO2 19* 18* 20*   BUN 24* 20 12   CREATININE 0.7 0.6 <0.5*   CALCIUM 7.6* 7.4* 7.6*   PHOS  --  3.4 2.9       No results for input(s): AST, ALT, BILIDIR, BILITOT, ALKPHOS in the last 72 hours. No results for input(s): INR in the last 72 hours. No results for input(s): Bridger Shorts in the last 72 hours. Radiology: Reviewed    Diet: ADULT ORAL NUTRITION SUPPLEMENT; Lunch, Dinner; Standard High Calorie/High Protein Oral Supplement  ADULT DIET;  Regular; 1200 ml  Code Status: Full Code    Disposition: -  dc 1 -2 days,

## 2022-08-21 NOTE — PLAN OF CARE
Problem: Discharge Planning  Goal: Discharge to home or other facility with appropriate resources  Outcome: Progressing  Flowsheets  Taken 8/21/2022 0556 by Trent Espinoza RN  Discharge to home or other facility with appropriate resources: Identify barriers to discharge with patient and caregiver  Taken 8/21/2022 0406 by Trent Espinoza RN  Discharge to home or other facility with appropriate resources: Identify barriers to discharge with patient and caregiver  Taken 8/21/2022 0220 by Trent Espinoza RN  Discharge to home or other facility with appropriate resources: Identify barriers to discharge with patient and caregiver  Taken 8/21/2022 0010 by Trent Espinoza RN  Discharge to home or other facility with appropriate resources: Identify barriers to discharge with patient and caregiver  Taken 8/20/2022 2200 by Trent Espinoza RN  Discharge to home or other facility with appropriate resources: Identify barriers to discharge with patient and caregiver     Problem: Skin/Tissue Integrity  Goal: Absence of new skin breakdown  Description: 1. Monitor for areas of redness and/or skin breakdown  2. Assess vascular access sites hourly  3. Every 4-6 hours minimum:  Change oxygen saturation probe site  4. Every 4-6 hours:  If on nasal continuous positive airway pressure, respiratory therapy assess nares and determine need for appliance change or resting period.   Outcome: Progressing     Problem: Skin/Tissue Integrity - Adult  Goal: Incisions, wounds, or drain sites healing without S/S of infection  Outcome: Progressing  Flowsheets  Taken 8/21/2022 0556 by Trent Espinoza RN  Incisions, Wounds, or Drain Sites Healing Without Sign and Symptoms of Infection: ADMISSION and DAILY: Assess and document risk factors for pressure ulcer development  Taken 8/21/2022 0406 by Trent Espinoza RN  Incisions, Wounds, or Drain Sites Healing Without Sign and Symptoms of Infection: ADMISSION and DAILY: Assess and document risk factors for pressure ulcer development  Taken 8/21/2022 0220 by Kumar Taveras RN  Incisions, Wounds, or Drain Sites Healing Without Sign and Symptoms of Infection: ADMISSION and DAILY: Assess and document risk factors for pressure ulcer development  Taken 8/21/2022 0010 by Kumar Taveras RN  Incisions, Wounds, or Drain Sites Healing Without Sign and Symptoms of Infection: ADMISSION and DAILY: Assess and document risk factors for pressure ulcer development  Taken 8/20/2022 2236 by Kumar Taveras RN  Incisions, Wounds, or Drain Sites Healing Without Sign and Symptoms of Infection: ADMISSION and DAILY: Assess and document risk factors for pressure ulcer development  Taken 8/20/2022 2200 by Kumar Taveras RN  Incisions, Wounds, or Drain Sites Healing Without Sign and Symptoms of Infection: ADMISSION and DAILY: Assess and document risk factors for pressure ulcer development  Goal: Oral mucous membranes remain intact  Recent Flowsheet Documentation  Taken 8/21/2022 0556 by Kumar Taveras RN  Oral Mucous Membranes Remain Intact: Assess oral mucosa and hygiene practices  Taken 8/21/2022 0406 by Kumar Taveras RN  Oral Mucous Membranes Remain Intact: Assess oral mucosa and hygiene practices  Taken 8/21/2022 0220 by Kumar Taveras RN  Oral Mucous Membranes Remain Intact: Assess oral mucosa and hygiene practices  Taken 8/21/2022 0010 by Kumar Taveras RN  Oral Mucous Membranes Remain Intact: Assess oral mucosa and hygiene practices  Taken 8/20/2022 2236 by Kumar Taveras RN  Oral Mucous Membranes Remain Intact: Assess oral mucosa and hygiene practices  Taken 8/20/2022 2200 by Kumar Taveras RN  Oral Mucous Membranes Remain Intact: Assess oral mucosa and hygiene practices

## 2022-08-22 LAB
ALBUMIN SERPL-MCNC: 1.7 G/DL (ref 3.4–5)
ANION GAP SERPL CALCULATED.3IONS-SCNC: 6 MMOL/L (ref 3–16)
BUN BLDV-MCNC: 8 MG/DL (ref 7–20)
CALCIUM SERPL-MCNC: 7.4 MG/DL (ref 8.3–10.6)
CHLORIDE BLD-SCNC: 106 MMOL/L (ref 99–110)
CO2: 24 MMOL/L (ref 21–32)
CREAT SERPL-MCNC: <0.5 MG/DL (ref 0.6–1.2)
GFR AFRICAN AMERICAN: >60
GFR NON-AFRICAN AMERICAN: >60
GLUCOSE BLD-MCNC: 86 MG/DL (ref 70–99)
PHOSPHORUS: 3.1 MG/DL (ref 2.5–4.9)
POTASSIUM SERPL-SCNC: 4.3 MMOL/L (ref 3.5–5.1)
SODIUM BLD-SCNC: 136 MMOL/L (ref 136–145)

## 2022-08-22 PROCEDURE — 2060000000 HC ICU INTERMEDIATE R&B

## 2022-08-22 PROCEDURE — 97530 THERAPEUTIC ACTIVITIES: CPT

## 2022-08-22 PROCEDURE — 51702 INSERT TEMP BLADDER CATH: CPT

## 2022-08-22 PROCEDURE — 80069 RENAL FUNCTION PANEL: CPT

## 2022-08-22 PROCEDURE — 6370000000 HC RX 637 (ALT 250 FOR IP): Performed by: SURGERY

## 2022-08-22 PROCEDURE — 6360000002 HC RX W HCPCS

## 2022-08-22 PROCEDURE — 6360000002 HC RX W HCPCS: Performed by: INTERNAL MEDICINE

## 2022-08-22 PROCEDURE — 6370000000 HC RX 637 (ALT 250 FOR IP): Performed by: INTERNAL MEDICINE

## 2022-08-22 PROCEDURE — 97535 SELF CARE MNGMENT TRAINING: CPT

## 2022-08-22 PROCEDURE — 36415 COLL VENOUS BLD VENIPUNCTURE: CPT

## 2022-08-22 PROCEDURE — 6360000002 HC RX W HCPCS: Performed by: SURGERY

## 2022-08-22 PROCEDURE — 2580000003 HC RX 258

## 2022-08-22 PROCEDURE — 6370000000 HC RX 637 (ALT 250 FOR IP)

## 2022-08-22 PROCEDURE — 94640 AIRWAY INHALATION TREATMENT: CPT

## 2022-08-22 PROCEDURE — 97110 THERAPEUTIC EXERCISES: CPT

## 2022-08-22 PROCEDURE — 99233 SBSQ HOSP IP/OBS HIGH 50: CPT | Performed by: INTERNAL MEDICINE

## 2022-08-22 PROCEDURE — 94669 MECHANICAL CHEST WALL OSCILL: CPT

## 2022-08-22 RX ORDER — LOPERAMIDE HYDROCHLORIDE 2 MG/1
2 CAPSULE ORAL 2 TIMES DAILY PRN
Status: DISCONTINUED | OUTPATIENT
Start: 2022-08-22 | End: 2022-08-22

## 2022-08-22 RX ADMIN — METOPROLOL TARTRATE 25 MG: 25 TABLET, FILM COATED ORAL at 20:55

## 2022-08-22 RX ADMIN — ALBUTEROL SULFATE 2.5 MG: 2.5 SOLUTION RESPIRATORY (INHALATION) at 11:21

## 2022-08-22 RX ADMIN — Medication 125 MG: at 18:30

## 2022-08-22 RX ADMIN — FLUTICASONE PROPIONATE 2 SPRAY: 50 SPRAY, METERED NASAL at 09:58

## 2022-08-22 RX ADMIN — LOPERAMIDE HYDROCHLORIDE 2 MG: 2 CAPSULE ORAL at 18:30

## 2022-08-22 RX ADMIN — HEPARIN SODIUM 5000 UNITS: 5000 INJECTION INTRAVENOUS; SUBCUTANEOUS at 23:40

## 2022-08-22 RX ADMIN — TRAMADOL HYDROCHLORIDE 50 MG: 50 TABLET, COATED ORAL at 16:59

## 2022-08-22 RX ADMIN — Medication 10 MG: at 20:55

## 2022-08-22 RX ADMIN — MORPHINE SULFATE 2 MG: 2 INJECTION, SOLUTION INTRAMUSCULAR; INTRAVENOUS at 06:09

## 2022-08-22 RX ADMIN — MORPHINE SULFATE 2 MG: 2 INJECTION, SOLUTION INTRAMUSCULAR; INTRAVENOUS at 10:29

## 2022-08-22 RX ADMIN — PREGABALIN 50 MG: 50 CAPSULE ORAL at 09:55

## 2022-08-22 RX ADMIN — DIGOXIN 125 MCG: 125 TABLET ORAL at 09:55

## 2022-08-22 RX ADMIN — SODIUM CHLORIDE 30 MG/ML INHALATION SOLUTION 4 ML: 30 SOLUTION INHALANT at 20:34

## 2022-08-22 RX ADMIN — HEPARIN SODIUM 5000 UNITS: 5000 INJECTION INTRAVENOUS; SUBCUTANEOUS at 16:59

## 2022-08-22 RX ADMIN — PANTOPRAZOLE SODIUM 40 MG: 40 TABLET, DELAYED RELEASE ORAL at 06:08

## 2022-08-22 RX ADMIN — MORPHINE SULFATE 2 MG: 2 INJECTION, SOLUTION INTRAMUSCULAR; INTRAVENOUS at 23:49

## 2022-08-22 RX ADMIN — ALBUTEROL SULFATE 2.5 MG: 2.5 SOLUTION RESPIRATORY (INHALATION) at 20:34

## 2022-08-22 RX ADMIN — MICONAZOLE NITRATE: 2 POWDER TOPICAL at 22:26

## 2022-08-22 RX ADMIN — PANTOPRAZOLE SODIUM 40 MG: 40 TABLET, DELAYED RELEASE ORAL at 16:59

## 2022-08-22 RX ADMIN — Medication 125 MG: at 12:25

## 2022-08-22 RX ADMIN — SODIUM CHLORIDE 30 MG/ML INHALATION SOLUTION 4 ML: 30 SOLUTION INHALANT at 11:21

## 2022-08-22 RX ADMIN — Medication 125 MG: at 23:40

## 2022-08-22 RX ADMIN — Medication 125 MG: at 00:49

## 2022-08-22 RX ADMIN — METOPROLOL TARTRATE 25 MG: 25 TABLET, FILM COATED ORAL at 09:55

## 2022-08-22 RX ADMIN — MICONAZOLE NITRATE: 2 POWDER TOPICAL at 09:57

## 2022-08-22 RX ADMIN — SODIUM CHLORIDE, PRESERVATIVE FREE 10 ML: 5 INJECTION INTRAVENOUS at 20:55

## 2022-08-22 RX ADMIN — PREGABALIN 50 MG: 50 CAPSULE ORAL at 16:59

## 2022-08-22 RX ADMIN — MORPHINE SULFATE 2 MG: 2 INJECTION, SOLUTION INTRAMUSCULAR; INTRAVENOUS at 18:28

## 2022-08-22 RX ADMIN — HEPARIN SODIUM 5000 UNITS: 5000 INJECTION INTRAVENOUS; SUBCUTANEOUS at 06:09

## 2022-08-22 RX ADMIN — PREGABALIN 50 MG: 50 CAPSULE ORAL at 23:40

## 2022-08-22 RX ADMIN — Medication 125 MG: at 06:09

## 2022-08-22 ASSESSMENT — PAIN DESCRIPTION - LOCATION
LOCATION: BACK;BUTTOCKS
LOCATION: BACK;BUTTOCKS
LOCATION: BUTTOCKS;BACK
LOCATION: BACK;BUTTOCKS
LOCATION: LEG
LOCATION: LEG
LOCATION: BACK

## 2022-08-22 ASSESSMENT — PAIN DESCRIPTION - DESCRIPTORS
DESCRIPTORS: ACHING
DESCRIPTORS: ACHING
DESCRIPTORS: DISCOMFORT;SHARP
DESCRIPTORS: ACHING;DISCOMFORT
DESCRIPTORS: ACHING;BURNING
DESCRIPTORS: BURNING;SHARP
DESCRIPTORS: ACHING;THROBBING

## 2022-08-22 ASSESSMENT — PAIN SCALES - GENERAL
PAINLEVEL_OUTOF10: 0
PAINLEVEL_OUTOF10: 8
PAINLEVEL_OUTOF10: 8
PAINLEVEL_OUTOF10: 4
PAINLEVEL_OUTOF10: 9
PAINLEVEL_OUTOF10: 10
PAINLEVEL_OUTOF10: 0
PAINLEVEL_OUTOF10: 6
PAINLEVEL_OUTOF10: 0
PAINLEVEL_OUTOF10: 6
PAINLEVEL_OUTOF10: 8
PAINLEVEL_OUTOF10: 0

## 2022-08-22 ASSESSMENT — PAIN DESCRIPTION - FREQUENCY
FREQUENCY: CONTINUOUS

## 2022-08-22 ASSESSMENT — PAIN - FUNCTIONAL ASSESSMENT
PAIN_FUNCTIONAL_ASSESSMENT: PREVENTS OR INTERFERES SOME ACTIVE ACTIVITIES AND ADLS
PAIN_FUNCTIONAL_ASSESSMENT: PREVENTS OR INTERFERES SOME ACTIVE ACTIVITIES AND ADLS
PAIN_FUNCTIONAL_ASSESSMENT: ACTIVITIES ARE NOT PREVENTED
PAIN_FUNCTIONAL_ASSESSMENT: PREVENTS OR INTERFERES SOME ACTIVE ACTIVITIES AND ADLS
PAIN_FUNCTIONAL_ASSESSMENT: ACTIVITIES ARE NOT PREVENTED
PAIN_FUNCTIONAL_ASSESSMENT: PREVENTS OR INTERFERES WITH MANY ACTIVE NOT PASSIVE ACTIVITIES

## 2022-08-22 ASSESSMENT — PAIN DESCRIPTION - ORIENTATION
ORIENTATION: MID
ORIENTATION: MID
ORIENTATION: RIGHT
ORIENTATION: RIGHT
ORIENTATION: MID

## 2022-08-22 ASSESSMENT — PAIN DESCRIPTION - ONSET
ONSET: ON-GOING

## 2022-08-22 ASSESSMENT — PAIN DESCRIPTION - PAIN TYPE
TYPE: CHRONIC PAIN

## 2022-08-22 NOTE — PROGRESS NOTES
Physical Therapy  [address]    Name: Kaylen Jane  : 1950  MRN: [id number]  Date of Service: 2022    Discharge Recommendations:  Kaylen Jane scored a 7/24 on the AM-PAC short mobility form. Current research shows that an AM-PAC score of 17 or less is typically not associated with a discharge to the patient's home setting. Based on the patient's AM-PAC score and their current functional mobility deficits, it is recommended that the patient have 3-5 sessions per week of Physical Therapy at d/c to increase the patient's independence. Please see assessment section for further patient specific details. If patient discharges prior to next session this note will serve as a discharge summary. Please see below for the latest assessment towards goals. DME - determined by next LOC         Patient Diagnosis(es): The primary encounter diagnosis was Right lower quadrant abdominal pain. A diagnosis of Melena was also pertinent to this visit. Past Medical History:  has a past medical history of Back pain, Hyperlipidemia, Hypertension, Tuberculosis, and Wears dentures. Past Surgical History:  has a past surgical history that includes Appendectomy (); Tonsillectomy (); Ectopic pregnancy surgery (); Partial hysterectomy (); Cholecystectomy (); Cataract removal (Bilateral, ); Total hip arthroplasty (Right, 2022); Colonoscopy (N/A, 8/3/2022); and Upper gastrointestinal endoscopy (N/A, 2022). Assessment   Assessment: Pt has progressed to max x 1 for rolling left and right, still dependent with skylift for bed to chair, however more comfortable today with bed to chair transfer and able to sit with feet on floor. Recommend continued IP therapies to maximize mobility  Activity Tolerance  Activity Tolerance: Patient limited by endurance; Patient limited by pain; Patient limited by fatigue  Activity Tolerance Comments: Pt was limited this date by pain and generalized weakness     Plan   Plan  Plan:  (2-5)  Current Treatment Recommendations: Strengthening, ROM, Functional mobility training, Gait training, Safety education & training, Transfer training, Endurance training  Safety Devices  Type of Devices: Call light within reach, Nurse notified, Left in chair, Chair alarm in place  Restraints  Restraints Initially in Place: No     Restrictions  Restrictions/Precautions  Restrictions/Precautions: Modified Diet, Up as Tolerated, Fall Risk (High fall risk, full liquid diet)  Position Activity Restriction  Other position/activity restrictions: up wtih assist,  50% WB right hip     Subjective   Subjective  Subjective: Pt in bed upon PT entry, agreeable to working with PT, notes back continues to be an issue and bottom is raw from cdiff         Social/Functional History  Social/Functional History  Lives With: Spouse  [address] [facility] [address] [facility]: One level  Home Access: Ramped entrance (through garage)  Bathroom Shower/Tub: Walk-in shower (small lip to enter, glass doors)  Bathroom Toilet: Handicap height (+ toilet riser w/ rails)  Bathroom Equipment: Grab bars in shower, Toilet raiser, Hand-held shower  Home Equipment: Prakash Else, [place], Walker, 4 wheeled, [employer], [address] (transport chair)  Receives Help From: Family, Other (comment) (skilled RN 1x weekly, daughter assists \"as often as she needs me\" w/ heavy household mgmt)  ADL Assistance: Independent (prior to hip sx)  Homemaking Assistance: Independent (shares w/ )  Ambulation Assistance: Independent (prior to hip sx, w/ AD)  Transfer Assistance: Independent  Active : Yes  Leisure & Hobbies:  \"Keeping everything up\"  IADL Comments: Shared w/ , daughter and granddaugher assist PRN w/ household mgmt  Additional Comments: Pt d/c'd to SNF from recent hip sx on 7/16      Objective   Heart Rate: 83  Heart Rate Source: Monitor  BP: 121/73  BP Location: Left lower arm  BP Method: Automatic  Patient Position: Semi fowlers  MAP (Calculated): 89  Resp: 17  SpO2: 95 %  O2 Device: None (Room air)     Pt positioned for cleaning stool, frequent episodes of liquid stool 2/2 cdiff . Pt cleaned, zinc paste applied posteriorly, interdry to groin area. Bed mobility  Rolling to Left: Maximum assistance (with side rail)  Rolling to Right: Maximum assistance (with side rail)  Transfers  Bed to Chair: Dependent/Total (skylift)     Balance  Comments: Max x 1 to lead forward in the chair 30 sec x 2  Exercises - faq left and ankle pumps  2 x 5,  FAQ right with min assist x 5, ap 2 x 5  Positioned for comfort, needs in reach     AM-PAC Score  AM-PAC Inpatient Mobility Raw Score : 7 (08/22/22 1546)  AM-PAC Inpatient T-Scale Score : 26.42 (08/22/22 1546)  Mobility Inpatient CMS 0-100% Score: 92.36 (08/22/22 1546)  Mobility Inpatient CMS G-Code Modifier : CM (08/22/22 1546)   Goals  Short Term Goals  Time Frame for Short term goals: Discharge  ongoing 8/22  Short term goal 1: roll with CG R/L. Ongoing  Short term goal 2: Supine to sit with CG. Ongoing  Short term goal 3: Sit to stand with mod x 2. MET 8/9 . Revised goal:  Pt will transfer sit to stand with min assist x 1  Short term goal 4: Bed to chair with mod x 2. Ongoing  Short term goal 5: Ambulate 20 ft with RW, 50% WB on right with mod x 2.   Ongoing  Patient Goals   Patient goals : not specifically stated       Education role of PT, safety, bed mobility, transfers       Therapy Time   Individual Concurrent Group Co-treatment   Time In 1450         Time Out 1543         Minutes 53               Timed Code Treatment Minutes:  53    Total Treatment Minutes:  [address] [facility] [id number]

## 2022-08-22 NOTE — PROGRESS NOTES
BILITOT, ALKPHOS in the last 72 hours. Troponin: No results for input(s): TROPONINI in the last 72 hours. BNP: No results for input(s): BNP in the last 72 hours. Lipids: No results for input(s): CHOL, TRIG, HDL, LDLCALC, LABVLDL in the last 72 hours. ABGs:   No results for input(s): PHART, PO2ART, IIG2DBB in the last 72 hours. INR:   No results for input(s): INR in the last 72 hours. UA:  No results for input(s): Janace Carrington, GLUCOSEU, BILIRUBINUR, KETUA, SPECGRAV, BLOODU, PHUR, PROTEINU, UROBILINOGEN, NITRU, LEUKOCYTESUR, Radha Comment in the last 72 hours. Urine Microscopic:   No results for input(s): LABCAST, BACTERIA, COMU, HYALCAST, WBCUA, RBCUA, EPIU in the last 72 hours. Urine Culture: No results for input(s): LABURIN in the last 72 hours. Urine Chemistry: No results for input(s): Lucas Clos, PROTEINUR, NAUR in the last 72 hours. Objective:   Vitals: /73   Pulse 83   Temp 98.4 °F (36.9 °C) (Oral)   Resp 17   Ht 5' 2.01\" (1.575 m)   Wt 238 lb 8.6 oz (108.2 kg)   SpO2 95%   BMI 43.62 kg/m²    Wt Readings from Last 3 Encounters:   08/22/22 238 lb 8.6 oz (108.2 kg)   07/13/22 218 lb 9.6 oz (99.2 kg)   06/14/22 217 lb (98.4 kg)      24HR INTAKE/OUTPUT:    Intake/Output Summary (Last 24 hours) at 8/22/2022 1532  Last data filed at 8/22/2022 0641  Gross per 24 hour   Intake 300 ml   Output 1000 ml   Net -700 ml       Physical Exam  Constitutional:       Appearance: She is ill-appearing. She is not diaphoretic. Cardiovascular:      Rate and Rhythm: Normal rate. Pulmonary:      Effort: Pulmonary effort is normal.      Breath sounds: Rhonchi present. No wheezing. Abdominal:      General: There is distension. Palpations: Abdomen is soft. Tenderness: There is abdominal tenderness. There is no guarding. Musculoskeletal:      Right lower leg: Edema present. Left lower leg: Edema present. Skin:     General: Skin is warm and dry.    Neurological: General: No focal deficit present. Mental Status: She is oriented to person, place, and time. Assessment and Plan:       IMAGING:  XR ABDOMEN (KUB) (SINGLE AP VIEW)   Final Result      Nonspecific, nonobstructive bowel gas pattern is stable compared to 8/14/2022      CT CHEST ABDOMEN PELVIS WO CONTRAST   Final Result      CHEST:      1. No evidence for hematoma   2. Decreased small pleural effusions and bibasilar atelectasis compared to 8/2/2022      ABDOMEN/PELVIS:      1. Right iliopsoas muscle small to medium-sized intramuscular acute hematoma . An accurate characterization of size of hematoma is not possible due to its infiltrative nature. 2. Right proximal thigh subcutaneous 3.8 x 3.7 cm water attenuation fluid collection   3. Nonspecific, nonobstructive bowel gas pattern      XR CHEST PORTABLE   Final Result      Limited inspiration. Accentuation of pulmonary interstitial markings, with minor basilar atelectasis. No other acute findings. MRI LUMBAR SPINE WO CONTRAST   Final Result   1. Moderate size intramuscular hematoma along the right iliopsoas muscle. This is new in comparison to abdominal CT 8/1/2022.   2. No central canal stenosis. 3. Moderate multilevel foraminal narrowing as described. 4. No acute osseous abnormality. XR HIP 2-3 VW W PELVIS RIGHT   Final Result   Impression:    1. Right total hip arthroplasty. The orthopedic hardware is intact without evidence of loosening. No significant change from prior CT examination. VL Extremity Venous Bilateral   Final Result      XR CHEST PORTABLE   Final Result      Mild bilateral airspace disease. XR ABDOMEN (KUB) (SINGLE AP VIEW)   Final Result   1. No evidence of free intraperitoneal air. 2. Indeterminate bowel gas pattern due to a paucity of small bowel gas. XR ABDOMEN (KUB) (SINGLE AP VIEW)   Final Result   Impression: Stable appearance of the abdomen.       XR CHEST PORTABLE   Final Result      Low lung volumes with central bronchovascular crowding. Atelectasis in the left lung base. Normal cardiomediastinal silhouette. Lines and tubes in standard position. VL Extremity Venous Bilateral   Final Result      XR ABDOMEN (KUB) (SINGLE AP VIEW)   Final Result   Impression: Stable appearance of the abdomen with gas distended loops of central small bowel again noted. CT CHEST WO CONTRAST   Final Result      1. Moderate right pleural effusion and small left effusion with basilar airspace opacity most consistent with atelectasis. Superimposed pneumonia be difficult to exclude. 2. Narrowing of the trachea with expiration compatible with treated bronchial malacia. 3. Nasogastric tube tip in the lower esophagus. 4. Persistent colonic distention. 5. Mild coronary artery calcification. XR ABDOMEN (KUB) (SINGLE AP VIEW)   Final Result      Frontal supine views demonstrate an unremarkable bowel gas pattern. No significant colonic stool is seen. Right hip arthroplasty noted. XR CHEST PORTABLE   Final Result      Prominence of the perihilar interstitial markings with mild peribronchial cuffing is favored to represent mild pulmonary edema. Low lung volumes bronchovascular crowding. Stable cardiomediastinal silhouette. Lines and tubes in standard position. Cholecystectomy clips are noted. CT ABDOMEN PELVIS WO CONTRAST Additional Contrast? Oral and Rectal   Final Result   1. Moderate colonic distention. Correlate for constipation. Fecal content within the ileum which may suggest stasis. There is no small bowel obstruction. 2. Subcutaneous fluid collection overlying the right total hip arthroplasty. CT HIP RIGHT WO CONTRAST   Final Result   1. Moderate colonic distention. Correlate for constipation. Fecal content within the ileum which may suggest stasis. There is no small bowel obstruction.    2. Subcutaneous fluid collection overlying the right total hip arthroplasty. XR CHEST PORTABLE   Final Result      1. Right IJ CVC tip in the cavoatrial junction. 2.  Bibasilar airspace disease suggesting atelectasis. Pneumonia is not excluded. XR ABDOMEN (KUB) (SINGLE AP VIEW)   Final Result      Dilated colonic and small bowel loops may suggest ileus but obstruction is not excluded. XR ABDOMEN (KUB) (SINGLE AP VIEW)   Final Result      No acute radiographic abnormality of the abdomen. Status post right hip arthroplasty with mildly displaced greater trochanteric fracture fragment, new or more pronounced compared to the prior study. Assessment/Plan     ARIANE   2. Sepsis  3. AGMA   4. Lactic Acidosis - resolved  4. Hyperkalemia  5. Hyponatremia  6. Bowel Obstruction  7. Hypertension  8. GI bleed  9. Anemia  10.  C diff       Plan:  - Cr better   - BP better   - IVF with bicarb - dc now  - encourage solute intake   - Replete electrolytes as needed    - ECHO normal EF - IVC compressible   - Hypoxia likely sec to Aspiration pneumonitis   - ARIANE sec to ATN   - Monitor I/Os  - Avoid nephrotoxic agents    Recommend to dose adjust all medications  based on renal functions  Maintain SBP> 90 mmHg   Daily weights   AVOID NSAIDs  Avoid Nephrotoxins  Monitor Intake/Output  Call if significant decrease in urine output        Benoit Bartholomew MD

## 2022-08-22 NOTE — CARE COORDINATION
Case Management Assessment           Daily Note                 Date/ Time of Note: 8/22/2022 3:51 PM         Note completed by: Scooter Brcok RN    Patient Name: Gina Salmeron  YOB: 1950    Diagnosis:Abdominal pain [R10.9]  Patient Admission Status: Inpatient    Date of Admission:7/30/2022  8:43 PM Length of Stay: 23 GLOS: GMLOS: 4.8    Current Plan of Care: oral vanc, + c-diff, diarrhea slowing   ________________________________________________________________________________________  PT AM-PAC: 7 / 24 per last evaluation on: 08.18.2022    OT AM-PAC: 8 / 24 per last evaluation on: 08.19.2022    DME Needs for discharge: deferred  ________________________________________________________________________________________  Discharge Plan: SNF: [facility]    Tentative discharge date: 08/23/22     Current barriers to discharge: medical stability    Referrals completed: Not Applicable    Resources/ information provided: Not indicated at this time  ________________________________________________________________________________________  Case Management Notes: [facility] continues to follow for DC planning and needs. Patient improving and patient planning for DC tentatively 08/23/22 to SNF at the [facility] (f- [phone]). CM has faxed copy of specialty bed orders noted from Friday to admissions at SNF to have ordered prior to admission to SNF. Patient will need medical transport for DC. CM spoke with daughter who is aware of tenative DC 08/23/22 . Connor Hernandez and her family were provided with choice of provider; she and her family are in agreement with the discharge plan.     Care Transition Patient: Daily Brock RN  [facility]  Case Management Department  Ph: [phone]  Fax: [phone]

## 2022-08-22 NOTE — PROGRESS NOTES
Hospital Medicine Progress Note    PCP: Leandra Burton    Date of Admission: 7/30/2022    Chief Complaint:  Abdominal pain, transferred for ortho eval with Rt CASS site with fluid collection. History Of Present Illness:     74 y.o. female - Hx of HTN, HLD, Gout, OA, recent Rt CASS, morbid obesity  - Presents with generalized weakness nausea vomiting and abdominal pain, she woke up with the symptoms night prior to presentation. Her last bowel movement was about 3 days prior. Patient is a 77-year-old female with past medical history of hypertension hyperlipidemia GERD osteoarthritis who presents to the hospital for abdominal pain    Assessment  Sepsis present on admission  Paroxysmal atrial fibrillation  A. fib with RVR  Acute hypoxic respiratory failure  Fluid overload  Acute on chronic anemia  Acute severe diarrhea with sepsis due to C. difficile infection  Acute kidney injury  Mild hyponatremia  Severe constipation  Urinary retention  Morbid obesity    Plan  Continue vancomycin p.o. vancomycin for C. difficile  ID was consulted initially, patient is not currently on antibiotics  Cr is stable, Dec IV fluid  Monitor and replace electrolytes  Nephrology was consulted  Monitor hemoglobin  Ortho following  Ok to start imodium  Monitor for improvement of diarrhea  Likely discharge to SNF after diarrhea improves    Interval HPI  Rapid response 8/13 AM. For hypotension: Was hypotensive to 67/41 during blood transfusion. , satting well on 4L NC. Patient awake and appropriately interactive, no acute distress, denies any symptoms other than episode of diarrhea before rapid called. Transfusion was stopped, gave 350cc bolus with prompt improvement in BP to 37'W systolic. Pt remained hemodynamically stable and denied any new symptoms during this rapid. \"    Subjective -patient is seen and evaluated at the bedside, mentions she still has runny diarrhea, has abd pain  Medications : Reviewed      Allergies: Buspirone, Duloxetine, Fenofibrate, Gabapentin, Venlafaxine, Doxycycline, Statins, and Sulfa antibiotics      REVIEW OF SYSTEMS:   Pertinent positives as noted in the HPI. All other systems reviewed and negative. PHYSICAL EXAM PERFORMED:    BP (!) 104/48   Pulse 93   Temp 98.9 °F (37.2 °C) (Oral)   Resp 19   Ht 5' 2.01\" (1.575 m)   Wt 238 lb 8.6 oz (108.2 kg)   SpO2 94%   BMI 43.62 kg/m²     General appearance:appears uncomfortable due to diarrhea  HEENT:  Normal cephalic, atraumatic without obvious deformity. Neck: Supple, with full range of motion. No jugular venous distention. Respiratory:  Normal respiratory effort. Clear to auscultation, bilaterally without Rales/Wheezes/Rhonchi. Cardiovascular:  Regular rate and rhythm with normal S1/S2 without murmurs, rubs or gallops. Abdomen: Not distended, soft, much less tender. BS heard  Musculoskeletal: Incision dressing C/D/I  Skin: Skin color, texture, turgor normal.  No rashes or lesions. Neurologic:  grossly non-focal.  Capillary Refill: Brisk,< 3 seconds   Peripheral Pulses: +2 palpable, equal bilaterally     Labs:     No results for input(s): WBC, HGB, HCT, PLT in the last 72 hours. Recent Labs     08/20/22  0457 08/21/22  0444 08/22/22  0528   * 136 136   K 4.9 4.2 4.3    105 106   CO2 18* 20* 24   BUN 20 12 8   CREATININE 0.6 <0.5* <0.5*   CALCIUM 7.4* 7.6* 7.4*   PHOS 3.4 2.9 3.1       No results for input(s): AST, ALT, BILIDIR, BILITOT, ALKPHOS in the last 72 hours. No results for input(s): INR in the last 72 hours. No results for input(s): Sepideh Kaska in the last 72 hours. Radiology: Reviewed    Diet: ADULT ORAL NUTRITION SUPPLEMENT; Lunch, Dinner; Standard High Calorie/High Protein Oral Supplement  ADULT DIET; Regular; 1200 ml  Code Status: Full Code    Disposition: -  await diarrhea improvement, recent hip ortho surgery: Will need SNF at discharge.   Started on Lo Moncada MD

## 2022-08-22 NOTE — PLAN OF CARE
Problem: Skin/Tissue Integrity - Adult  Goal: Incisions, wounds, or drain sites healing without S/S of infection  Outcome: Progressing  Flowsheets  Taken 8/22/2022 0215  Incisions, Wounds, or Drain Sites Healing Without Sign and Symptoms of Infection: ADMISSION and DAILY: Assess and document risk factors for pressure ulcer development  Taken 8/22/2022 0007  Incisions, Wounds, or Drain Sites Healing Without Sign and Symptoms of Infection: ADMISSION and DAILY: Assess and document risk factors for pressure ulcer development  Taken 8/21/2022 2230  Incisions, Wounds, or Drain Sites Healing Without Sign and Symptoms of Infection: ADMISSION and DAILY: Assess and document risk factors for pressure ulcer development  Taken 8/21/2022 2218  Incisions, Wounds, or Drain Sites Healing Without Sign and Symptoms of Infection: ADMISSION and DAILY: Assess and document risk factors for pressure ulcer development  Taken 8/21/2022 2030  Incisions, Wounds, or Drain Sites Healing Without Sign and Symptoms of Infection: ADMISSION and DAILY: Assess and document risk factors for pressure ulcer development     Problem: Musculoskeletal - Adult  Goal: Maintain proper alignment of affected body part  Outcome: Progressing  Flowsheets  Taken 8/22/2022 0215  Maintain proper alignment of affected body part: Support and protect limb and body alignment per provider's orders  Taken 8/22/2022 0007  Maintain proper alignment of affected body part: Support and protect limb and body alignment per provider's orders  Taken 8/21/2022 2230  Maintain proper alignment of affected body part: Support and protect limb and body alignment per provider's orders  Taken 8/21/2022 2030  Maintain proper alignment of affected body part: Support and protect limb and body alignment per provider's orders

## 2022-08-22 NOTE — PROGRESS NOTES
Occupational Therapy  Daily Treatment Note  Patient Name: Luzma Whitman  MRN: [id number]    Assessment: Pt is tolerating transfers to chair well with use of david lift. Pt shows potential to improve functionally, but is limited by pain and frequent bowel incontinence. By the time pericare is completed and pt is transferred out of bed she is too fatigued to attempt standing. She needs to spend more time out of bed outside of therapy sessions so that therapy can be focused on progressing mobility and ADLs. Will continue per POC. Discharge Recommendations: Luzma Whitman scored a 10/24 on the AM-PAC ADL Inpatient form. Current research shows that an AM-PAC score of 17 or less is typically not associated with a discharge to the patient's home setting. Based on the patient's AM-PAC score and their current ADL deficits, it is recommended that the patient have 3-5 sessions per week of Occupational Therapy at d/c to increase the patient's independence. Please see assessment section for further patient specific details. Equipment Needs:  No    Chart Reviewed: Yes   Restrictions/Precautions: Modified Diet, Up as Tolerated, Fall Risk (High fall risk, full liquid diet) Other position/activity restrictions: up wtih assist,  50% WB right hip   Additional Pertinent Hx: Adm 7/30 with weakness, nausea and abdominal pain. Recent. anterior approach CASS done 7/16,  mL, complicated procedure in the setting of patient's morbid obesity and proximal femur osteoporotic bone lesions and possibility of calcified crack which was treated with proximal femur cable. At SNF until 7/19  Discharged home with HHPT and 24 hr A from family. Transferredto ICU 8/2/22.  8/3  colonic decompression. Pt developed afib with RVR    Diagnosis: abdominal pain  Treatment Diagnosis: impaired ADLs and mobility    Subjective: Pt in bed on entry. \"I don't feel like doing therapy today. \" Pt quick to turn down therapy but very easily encouraged to participate. \"I know I need to. \"     Pain: Yes, various sites, most limited by pain to low back and bottom today, especially with pericare, RN aware    Objective:    Cognition/Orientation: WFL during session    Bed mobility   Rolling: Max assist (to R and L, maintaining side lying position ~5 minutes on each side with CGA to min assist and bed rail)    Functional Mobility   Bed to Chair Transfer: Dependent (via david lift, tolerated well)  Sitting: Independent (supported in chair); Max assist (to transfer into unsupported sitting both with armrests and hand held assist, maintained unsupported upright sitting 2 x 30 sec)    ADLs   Feeding: Mod I, set up (beverage management sitting up in chair)  Grooming: OT assisted to brush hair while sitting up in chair  Toileting: Dependent (continues to struggle with frequent bowel incontinence, full clean up completed during session including application of zinc paste to full britany-area and Interdry to groin folds)    Exercises  Finger flex/ext: 10 reps  Isometrics: maintaining side lying position with bedrail, maintaining upright sitting position in chair    Activity Tolerance:  Tolerated session well overall with exception of occasionally increased pain, needs reinforcement of nursing staff to increase activity levels outside of therapy sessions    Patient Education: Activity promotion, role of therapy, progress - verb understanding    Safety Devices in Place: left in chair, un-reclined, alarm on, needs in reach, RN aware of pt position and method for return to bed (david lift)    Goals:  Short Term Goals  Time Frame for Short term goals: by d/c  Short Term Goal 1: pt will complete toileting w/ Mod A - Not met  Short Term Goal 2: pt will complete LE dressing w/ Mod A - Not met  Short Term Goal 3: pt will perform BUE HEP i'ly x10 reps AROM to all joints to reduce edema and improve strength for ADLs - Not met  Short Term Goal 4: Perform sit to stand to [employer] mod assist - Not met  Short Term Goal 5: Sit unsupported for 15 minutes while participating in ADL - Not met         Plan:      Times per Week: 2-5   Times per Day: Daily    If patient is discharged prior to next treatment, this note will serve as the discharge summary.     Therapy Time   Individual Concurrent Group Co-treatment   Time In 1450         Time Out 1543         Minutes 53            Timed Code Treatment Minutes: 53  Total Treatment Time: 270 [facility],

## 2022-08-23 VITALS
BODY MASS INDEX: 43.9 KG/M2 | OXYGEN SATURATION: 97 % | DIASTOLIC BLOOD PRESSURE: 79 MMHG | WEIGHT: 238.54 LBS | HEIGHT: 62 IN | HEART RATE: 86 BPM | TEMPERATURE: 97.7 F | SYSTOLIC BLOOD PRESSURE: 141 MMHG | RESPIRATION RATE: 18 BRPM

## 2022-08-23 LAB
HCT VFR BLD CALC: 23.5 % (ref 36–48)
HEMOGLOBIN: 7.8 G/DL (ref 12–16)
MCH RBC QN AUTO: 32.8 PG (ref 26–34)
MCHC RBC AUTO-ENTMCNC: 33.2 G/DL (ref 31–36)
MCV RBC AUTO: 98.8 FL (ref 80–100)
PDW BLD-RTO: 15.3 % (ref 12.4–15.4)
PLATELET # BLD: 507 K/UL (ref 135–450)
PMV BLD AUTO: 7.5 FL (ref 5–10.5)
RBC # BLD: 2.38 M/UL (ref 4–5.2)
WBC # BLD: 11.5 K/UL (ref 4–11)

## 2022-08-23 PROCEDURE — 94640 AIRWAY INHALATION TREATMENT: CPT

## 2022-08-23 PROCEDURE — 94669 MECHANICAL CHEST WALL OSCILL: CPT

## 2022-08-23 PROCEDURE — 6360000002 HC RX W HCPCS: Performed by: INTERNAL MEDICINE

## 2022-08-23 PROCEDURE — 6370000000 HC RX 637 (ALT 250 FOR IP): Performed by: INTERNAL MEDICINE

## 2022-08-23 PROCEDURE — 36415 COLL VENOUS BLD VENIPUNCTURE: CPT

## 2022-08-23 PROCEDURE — 2580000003 HC RX 258

## 2022-08-23 PROCEDURE — 85027 COMPLETE CBC AUTOMATED: CPT

## 2022-08-23 PROCEDURE — 99233 SBSQ HOSP IP/OBS HIGH 50: CPT | Performed by: INTERNAL MEDICINE

## 2022-08-23 PROCEDURE — 6360000002 HC RX W HCPCS: Performed by: SURGERY

## 2022-08-23 PROCEDURE — 6370000000 HC RX 637 (ALT 250 FOR IP): Performed by: SURGERY

## 2022-08-23 PROCEDURE — 51702 INSERT TEMP BLADDER CATH: CPT

## 2022-08-23 PROCEDURE — 6360000002 HC RX W HCPCS

## 2022-08-23 PROCEDURE — 6370000000 HC RX 637 (ALT 250 FOR IP)

## 2022-08-23 RX ORDER — PANTOPRAZOLE SODIUM 40 MG/1
40 TABLET, DELAYED RELEASE ORAL
Qty: 30 TABLET | Refills: 3 | Status: SHIPPED | OUTPATIENT
Start: 2022-08-23

## 2022-08-23 RX ORDER — DIGOXIN 125 MCG
125 TABLET ORAL DAILY
Qty: 30 TABLET | Refills: 3 | Status: SHIPPED | OUTPATIENT
Start: 2022-08-24

## 2022-08-23 RX ADMIN — HEPARIN SODIUM 5000 UNITS: 5000 INJECTION INTRAVENOUS; SUBCUTANEOUS at 15:06

## 2022-08-23 RX ADMIN — METHOCARBAMOL 500 MG: 500 TABLET ORAL at 15:06

## 2022-08-23 RX ADMIN — FLUTICASONE PROPIONATE 2 SPRAY: 50 SPRAY, METERED NASAL at 08:43

## 2022-08-23 RX ADMIN — TRAMADOL HYDROCHLORIDE 50 MG: 50 TABLET, COATED ORAL at 11:39

## 2022-08-23 RX ADMIN — ALBUTEROL SULFATE 2.5 MG: 2.5 SOLUTION RESPIRATORY (INHALATION) at 08:58

## 2022-08-23 RX ADMIN — SODIUM CHLORIDE, PRESERVATIVE FREE 10 ML: 5 INJECTION INTRAVENOUS at 08:38

## 2022-08-23 RX ADMIN — MORPHINE SULFATE 2 MG: 2 INJECTION, SOLUTION INTRAMUSCULAR; INTRAVENOUS at 08:36

## 2022-08-23 RX ADMIN — PANTOPRAZOLE SODIUM 40 MG: 40 TABLET, DELAYED RELEASE ORAL at 15:06

## 2022-08-23 RX ADMIN — PREGABALIN 50 MG: 50 CAPSULE ORAL at 15:06

## 2022-08-23 RX ADMIN — HEPARIN SODIUM 5000 UNITS: 5000 INJECTION INTRAVENOUS; SUBCUTANEOUS at 05:28

## 2022-08-23 RX ADMIN — METOPROLOL TARTRATE 25 MG: 25 TABLET, FILM COATED ORAL at 08:38

## 2022-08-23 RX ADMIN — DIGOXIN 125 MCG: 125 TABLET ORAL at 08:38

## 2022-08-23 RX ADMIN — Medication 125 MG: at 18:00

## 2022-08-23 RX ADMIN — Medication 125 MG: at 11:32

## 2022-08-23 RX ADMIN — PANTOPRAZOLE SODIUM 40 MG: 40 TABLET, DELAYED RELEASE ORAL at 05:29

## 2022-08-23 RX ADMIN — SODIUM CHLORIDE 30 MG/ML INHALATION SOLUTION 4 ML: 30 SOLUTION INHALANT at 08:58

## 2022-08-23 RX ADMIN — MICONAZOLE NITRATE: 2 POWDER TOPICAL at 08:42

## 2022-08-23 RX ADMIN — MORPHINE SULFATE 2 MG: 2 INJECTION, SOLUTION INTRAMUSCULAR; INTRAVENOUS at 17:13

## 2022-08-23 RX ADMIN — Medication 125 MG: at 05:28

## 2022-08-23 RX ADMIN — PREGABALIN 50 MG: 50 CAPSULE ORAL at 08:38

## 2022-08-23 ASSESSMENT — PAIN DESCRIPTION - LOCATION
LOCATION: BACK

## 2022-08-23 ASSESSMENT — PAIN - FUNCTIONAL ASSESSMENT
PAIN_FUNCTIONAL_ASSESSMENT: ACTIVITIES ARE NOT PREVENTED
PAIN_FUNCTIONAL_ASSESSMENT: PREVENTS OR INTERFERES SOME ACTIVE ACTIVITIES AND ADLS
PAIN_FUNCTIONAL_ASSESSMENT: ACTIVITIES ARE NOT PREVENTED
PAIN_FUNCTIONAL_ASSESSMENT: PREVENTS OR INTERFERES SOME ACTIVE ACTIVITIES AND ADLS
PAIN_FUNCTIONAL_ASSESSMENT: ACTIVITIES ARE NOT PREVENTED

## 2022-08-23 ASSESSMENT — PAIN SCALES - GENERAL
PAINLEVEL_OUTOF10: 5
PAINLEVEL_OUTOF10: 5
PAINLEVEL_OUTOF10: 6
PAINLEVEL_OUTOF10: 6
PAINLEVEL_OUTOF10: 7
PAINLEVEL_OUTOF10: 10

## 2022-08-23 ASSESSMENT — PAIN DESCRIPTION - FREQUENCY
FREQUENCY: CONTINUOUS

## 2022-08-23 ASSESSMENT — PAIN DESCRIPTION - PAIN TYPE
TYPE: CHRONIC PAIN

## 2022-08-23 ASSESSMENT — PAIN DESCRIPTION - DESCRIPTORS
DESCRIPTORS: ACHING;SHARP;SPASM
DESCRIPTORS: ACHING;DISCOMFORT
DESCRIPTORS: ACHING;DISCOMFORT
DESCRIPTORS: ACHING
DESCRIPTORS: ACHING

## 2022-08-23 ASSESSMENT — PAIN DESCRIPTION - ONSET
ONSET: ON-GOING

## 2022-08-23 ASSESSMENT — PAIN DESCRIPTION - ORIENTATION
ORIENTATION: LOWER;MID
ORIENTATION: LOWER;MID
ORIENTATION: MID
ORIENTATION: MID
ORIENTATION: LOWER;MID

## 2022-08-23 NOTE — CARE COORDINATION
Case Management Assessment            Discharge Note                    Date / Time of Note: 8/23/2022 1:55 PM                  Discharge Note Completed by: Nisreen Taveras RN    Patient Name: Agatha Malik   YOB: 1950  Diagnosis: Abdominal pain [R10.9]   Date / Time: 7/30/2022  8:43 PM    Current PCP: Jeff patient: No    Hospitalization in the last 30 days: Yes    Advance Directives:  Code Status: Full Code  [place] DNR form completed and on chart: Not Indicated    Financial:  Payor: MEDICARE / Plan: MEDICARE PART A AND B / Product Type: *No Product type* /      Pharmacy:    [facility], [address] [place] [address] [address] [facility] [address]  [address]  Phone: [phone] Fax: [phone]    [facility] [id number] [facility] [phone] Gustabo Guardado [phone]  [address] [address]  Phone: [phone] Fax: [phone]      Assistance purchasing medications?: Potential Assistance Purchasing Medications: No  Assistance provided by Case Management: None at this time    Does patient want to participate in local refill/ meds to beds program?: No    Meds To Beds General Rules:  1. Can ONLY be done Monday- Friday between 8:30am-5pm  2. Prescription(s) must be in pharmacy by 3pm to be filled same day  3. Copy of patient's insurance/ prescription drug card and patient face sheet must be sent along with the prescription(s)  4. Cost of Rx cannot be added to hospital bill. If financial assistance is needed, please contact unit  or ;  or  CANNOT provide pharmacy voucher for patients co-pays  5.  Patients can then  the prescription on their way out of the hospital at discharge, or pharmacy can deliver to the bedside if staff is available. (payment due at time of pick-up or delivery - cash, check, or card accepted) Able to afford home medications/ co-pay costs: Yes    ADLS:  Current PT AM-PAC Score: 7 /24  Current OT AM-PAC Score: 10 /24      DISCHARGE Disposition: [facility] (SNF): [facility]  Phone: [phone] Fax: [phone]    LOC at discharge: Skilled  GILBERTO Completed: Yes    Notification completed in HENS/PAS?:  ID : [id number]    IMM Completed:   Yes, Case management has presented and reviewed IMM letter #2 to the patient and/or family/ POA. Patient and/or family/POA verbalized understanding of their medicare rights and appeal process if needed. Patient and/or family/POA has signed, initialed and placed today's date (8/23/22) and time (1300) on IMM letter #2 on the the appropriate lines. Patient and/or family/POA, copy of letter offered and they are aware that this original copy of IMM letter #2 is available prior to discharge from the paper chart on the unit. Electronic documentation has been entered into epic for IMM letter #2 and original paper copy has been added to the paper chart at the nurses station. Transportation:  Transportation PLAN for discharge: EMS transportation   Mode of Transport: Ambulance stretcher - BLS  Reason for medical transport: Bed confined: Meets the following criteria 1) unable to get out of bed without assistance or ambulate, 2) unable to safely sit up in a wheelchair, 3) unable to maintain erect seating position in a chair for time needed for transport  Name of [address]: [facility]  Time of Transport: 18:00pm    Transport form completed: Yes  Additional CM Notes: Pt to discharge to [facility] at 18:00pm, daughter, nurse, facility notified of discharge time.      The Plan for Transition of Care is related to the following treatment goals of Abdominal pain [R10.9]    The Patient and/or patient representative Amy Hanna and her family were provided with a choice of provider and agrees with the discharge plan Yes    Freedom of choice list was provided with basic dialogue that supports the patient's individualized plan of care/goals and shares the quality data associated with the providers.  Yes    Care Transitions patient: No    Kathye Fothergill, RN  [facility]  Case Management Department  Ph: [phone]  Fax: [phone]

## 2022-08-23 NOTE — PLAN OF CARE
Problem: Discharge Planning  Goal: Discharge to home or other facility with appropriate resources  8/22/2022 2134 by Jeremy Mays RN  Outcome: Progressing  8/22/2022 2003 by Lauren Braxton RN  Outcome: Progressing     Problem: Safety - Adult  Goal: Free from fall injury  8/22/2022 2003 by Lauren Braxton RN  Outcome: Progressing     Problem: ABCDS Injury Assessment  Goal: Absence of physical injury  8/22/2022 2134 by Jeremy Mays RN  Outcome: Progressing  8/22/2022 2003 by Lauren Braxton RN  Outcome: Progressing     Problem: Skin/Tissue Integrity  Goal: Absence of new skin breakdown  Description: 1. Monitor for areas of redness and/or skin breakdown  2. Assess vascular access sites hourly  3. Every 4-6 hours minimum:  Change oxygen saturation probe site  4. Every 4-6 hours:  If on nasal continuous positive airway pressure, respiratory therapy assess nares and determine need for appliance change or resting period.   8/22/2022 2134 by Jeremy Mays RN  Outcome: Progressing  8/22/2022 2003 by Lauren Braxton RN  Outcome: Progressing     Problem: Respiratory - Adult  Goal: Achieves optimal ventilation and oxygenation  8/22/2022 2003 by Lauren Braxton RN  Outcome: Progressing  Flowsheets  Taken 8/22/2022 1948 by Jeremy Mays RN  Achieves optimal ventilation and oxygenation:   Assess for changes in respiratory status   Assess for changes in mentation and behavior   Position to facilitate oxygenation and minimize respiratory effort  Taken 8/22/2022 0641 by Tressa Santizo RN  Achieves optimal ventilation and oxygenation: Assess for changes in respiratory status     Problem: Respiratory - Adult  Goal: Achieves optimal ventilation and oxygenation  8/22/2022 2003 by Lauren Braxton RN  Outcome: Progressing  Flowsheets  Taken 8/22/2022 1948 by Jeremy Mays RN  Achieves optimal ventilation and oxygenation:   Assess for changes in respiratory status   Assess for changes in mentation and behavior   Position to facilitate oxygenation and minimize respiratory effort  Taken 8/22/2022 0641 by Kenya Cote RN  Achieves optimal ventilation and oxygenation: Assess for changes in respiratory status     Problem: Skin/Tissue Integrity - Adult  Goal: Incisions, wounds, or drain sites healing without S/S of infection  8/22/2022 2134 by Agapito Goodell, RN  Outcome: Progressing  8/22/2022 2003 by Shagufta Kenyon RN  Outcome: Progressing     Problem: Musculoskeletal - Adult  Goal: Return mobility to safest level of function  8/22/2022 2134 by Agapito Goodell, RN  Outcome: Progressing  8/22/2022 2003 by Shagufta Kenyon RN  Outcome: Progressing     Problem: Gastrointestinal - Adult  Goal: Maintains adequate nutritional intake  8/22/2022 2003 by Shagufta Kenyon RN  Outcome: Progressing  Flowsheets (Taken 8/22/2022 0641 by Kenya Cote RN)  Maintains adequate nutritional intake: Monitor percentage of each meal consumed     Problem: Gastrointestinal - Adult  Goal: Maintains or returns to baseline bowel function  8/22/2022 2003 by Shagufta Kenyon RN  Outcome: Progressing  Flowsheets  Taken 8/22/2022 1948 by Agapito Goodell, RN  Maintains or returns to baseline bowel function:   Assess bowel function   Administer ordered medications as needed  Taken 8/22/2022 0641 by Kenya Cote RN  Maintains or returns to baseline bowel function: Assess bowel function     Problem: Infection - Adult  Goal: Absence of infection at discharge  8/22/2022 2134 by Agapito Goodell, RN  Outcome: Progressing  8/22/2022 2003 by Shagufta Kenyon RN  Outcome: Progressing     Problem: Cardiovascular - Adult  Goal: Maintains optimal cardiac output and hemodynamic stability  8/22/2022 2003 by Shagufta Kenyon RN  Outcome: Progressing  Flowsheets  Taken 8/22/2022 1948 by Agapito Goodell, RN  Maintains optimal cardiac output and hemodynamic stability: Monitor blood pressure and heart rate  Taken 8/22/2022 0641 by Kenya Cote RN  Maintains optimal cardiac output and hemodynamic stability: Monitor blood pressure and heart rate     Problem: Pain  Goal: Verbalizes/displays adequate comfort level or baseline comfort level  8/22/2022 2134 by Megan Pelletier RN  Outcome: Progressing  8/22/2022 2003 by Roberth Rodríguez RN  Outcome: Progressing

## 2022-08-23 NOTE — PROGRESS NOTES
Pt discharged to SNF via transport. Report called earlier today. IV removed.  Taken off tele and returned to [facility]    Electronically signed by Christiano Mackey RN on 8/23/2022 at 5:51 PM

## 2022-08-23 NOTE — DISCHARGE SUMMARY
Hospital Medicine Discharge Summary    Patient ID: Delfina Sender      Patient's PCP: Zachary Rutherford    Admit Date: 7/30/2022     Discharge Date:     Admitting Physician: Lili Morrison MD     Discharge Physician: Janes Sidhu MD     Discharge Diagnoses: Active Hospital Problems    Diagnosis Date Noted    Hypotension [I95.9] 08/13/2022     Priority: Medium    Paroxysmal atrial fibrillation ([address] [I48.0] 08/09/2022     Priority: Medium    Acute hypoxemic respiratory failure ([address] [J96.01] 08/08/2022     Priority: Medium    Atrial fibrillation with RVR ([address] [I48.91] 08/03/2022     Priority: Medium    On continuous heparin infusion [Z79.01] 08/03/2022     Priority: Medium    Benign essential HTN [I10] 08/03/2022     Priority: Medium    Status post right hip replacement [Z96.641] 08/03/2022     Priority: Medium    ARIANE (acute kidney injury) ([address] [N17.9] 08/02/2022     Priority: Medium    Electrolyte imbalance [E87.8] 08/02/2022     Priority: Medium    Lactic acid acidosis [E87.2] 08/02/2022     Priority: Medium    Leukocytosis [D72.829] 08/02/2022     Priority: Medium    Ileus ([address] [K56.7] 08/02/2022     Priority: Medium    Status post total hip replacement, right [Z96.641] 08/02/2022     Priority: Medium    Abdominal pain [R10.9] 07/30/2022     Priority: Medium       The patient was seen and examined on day of discharge and this discharge summary is in conjunction with any daily progress note from day of discharge. Condition at discharge - stable    Hospital Course: patient seen and evaluated on the day of discharge. Patient informed about following up with appointments. Patient verbalized understanding for follow-up appointments. The patient and / or the family were informed of the results of tests, a time was given to answer questions, a plan was proposed and they agreed with plan. Medical reconciliation performed. Patient discharged stable condition.     On the date of discharge, the patient reported feeling stable. The patient was found to not be in any acute distress, with vital signs within normal limits, and no new abnormalities on physical examination. Further, the patient expressed appropriate understanding of, and agreement with, the discharge recommendations, medications, and plan. 74 y.o. female - Hx of HTN, HLD, Gout, OA, recent Rt CASS, morbid obesity  - Presents with generalized weakness nausea vomiting and abdominal pain, she woke up with the symptoms night prior to presentation. Her last bowel movement was about 3 days prior. Patient is a 49-year-old female with past medical history of hypertension hyperlipidemia GERD osteoarthritis who presents to the hospital for abdominal pain     Assessment  Sepsis present on admission  Paroxysmal atrial fibrillation  A. fib with RVR  Acute hypoxic respiratory failure  Fluid overload  Acute on chronic anemia  Acute severe diarrhea with sepsis due to C. difficile infection  Acute kidney injury  Mild hyponatremia  Severe constipation  Urinary retention  Morbid obesity     Plan  Continue vancomycin p.o. vancomycin for C. difficile  ID was consulted initially, patient is not currently on IV antibiotics  Cr is stable, Dec IV fluid  Monitor and replace electrolytes  Nephrology was consulted  Monitor hemoglobin  Ortho following  Ok to start imodium  Monitor for improvement of diarrhea  Likely discharge to SNF after diarrhea improves     Interval HPI  Rapid response 8/13 AM. For hypotension: Was hypotensive to 67/41 during blood transfusion. , satting well on 4L NC. Patient awake and appropriately interactive, no acute distress, denies any symptoms other than episode of diarrhea before rapid called. Transfusion was stopped, gave 350cc bolus with prompt improvement in BP to 96'S systolic. Pt remained hemodynamically stable and denied any new symptoms during this rapid. \"     Subjective -patient is seen and evaluated at the bedside    Patient is stable for discharge, the receiving facility to monitor diarrhea recommend CBC, BMP in 3 days, patient tolerating diet and recommended to stay hydrated, voiced undersatnding    Exam:     BP (!) 141/79   Pulse 86   Temp 97.7 °F (36.5 °C) (Oral)   Resp 18   Ht 5' 2.01\" (1.575 m)   Wt 238 lb 8.6 oz (108.2 kg)   SpO2 97%   BMI 43.62 kg/m²     General appearance: No apparent distress  HEENT:  Conjunctivae/corneas clear. Neck: Supple, No jugular venous distention. Respiratory:  Normal respiratory effort. Clear to auscultation, bilaterally without Rales/Wheezes/Rhonchi. Cardiovascular: Regular rate and rhythm with normal S1/S2 without murmurs, rubs or gallops. Abdomen: Soft, non-tender, non-distended, normal bowel sounds. Musculoskelatal: No clubbing, cyanosis or edema bilaterally. Skin: Skin color, texture, turgor normal.   Neurologic: no focal neurologic deficits. grossly non-focal.  Psychiatric: Alert and oriented, normal mood    Consults:     IP CONSULT TO PHARMACY  IP CONSULT TO GI  IP CONSULT TO GENERAL SURGERY  IP CONSULT TO NEPHROLOGY  IP CONSULT TO GENERAL SURGERY  IP CONSULT TO ORTHOPEDIC SURGERY  IP CONSULT TO INFECTIOUS DISEASES  IP CONSULT TO CARDIOLOGY  IP CONSULT TO CARDIOLOGY  IP CONSULT TO DIETITIAN  IP CONSULT TO PHARMACY  IP CONSULT TO GI  IP CONSULT TO CRITICAL CARE      Code Status:  Full Code    Activity: activity as tolerated    Labs:  For convenience and continuity at follow-up the following most recent labs are provided:      CBC:    Lab Results   Component Value Date/Time    WBC 11.5 08/23/2022 04:16 AM    HGB 7.8 08/23/2022 04:16 AM    HCT 23.5 08/23/2022 04:16 AM     08/23/2022 04:16 AM       Renal:    Lab Results   Component Value Date/Time     08/22/2022 05:28 AM    K 4.3 08/22/2022 05:28 AM    K 4.3 08/18/2022 05:52 AM     08/22/2022 05:28 AM    CO2 24 08/22/2022 05:28 AM    BUN 8 08/22/2022 05:28 AM    CREATININE <0.5 08/22/2022 05:28 AM CALCIUM 7.4 08/22/2022 05:28 AM    PHOS 3.1 08/22/2022 05:28 AM       Discharge Medications:     Current Discharge Medication List             Details   metoprolol tartrate (LOPRESSOR) 25 MG tablet Take 1 tablet by mouth 2 times daily for 14 days  Qty: 28 tablet, Refills: 0      digoxin (LANOXIN) 125 MCG tablet Take 1 tablet by mouth daily  Qty: 30 tablet, Refills: 3      pantoprazole (PROTONIX) 40 MG tablet Take 1 tablet by mouth 2 times daily (before meals)  Qty: 30 tablet, Refills: 3      vancomycin (VANCOCIN) 50 mg/mL oral solution Take 2.5 mLs by mouth every 8 hours for 5 days  Qty: 37.5 mL, Refills: 0                Details   HYDROcodone-acetaminophen (NORCO) 5-325 MG per tablet Take 1 tablet by mouth every 6 hours as needed for Pain. allopurinol (ZYLOPRIM) 100 MG tablet Take 100 mg by mouth in the morning. traMADol (ULTRAM) 50 MG tablet Take 50 mg by mouth every 6 hours as needed for Pain.      naloxone 4 MG/0.1ML LIQD nasal spray 1 spray by Nasal route as needed for Opioid Reversal  Qty: 1 each, Refills: 5      aspirin EC 81 MG EC tablet Take 1 tablet by mouth 2 times daily  Qty: 60 tablet, Refills: 0      ondansetron (ZOFRAN) 4 MG tablet Take 1 tablet by mouth 3 times daily as needed for Nausea or Vomiting  Qty: 15 tablet, Refills: 0      albuterol sulfate  (90 Base) MCG/ACT inhaler Inhale 2 puffs into the lungs every 6 hours as needed for Wheezing or Shortness of Breath      furosemide (LASIX) 20 MG tablet Take 20 mg by mouth daily      lisinopril (PRINIVIL;ZESTRIL) 5 MG tablet Take 5 mg by mouth in the morning and at bedtime       methocarbamol (ROBAXIN) 500 MG tablet Take 500 mg by mouth 3 times daily as needed      pregabalin (LYRICA) 50 MG capsule Take 50 mg by mouth 3 times daily.        fluticasone (FLONASE) 50 MCG/ACT nasal spray 2 sprays by Nasal route daily as needed for Allergies or Rhinitis             Time Spent on discharge is more than 30 mints in the examination, evaluation, counseling and review of medications and discharge plan. Signed:    Adri Dejesus MD   8/23/2022      Thank you [address] for the opportunity to be involved in this patient's care. If you have any questions or concerns please feel free to contact me at [phone].

## 2022-08-23 NOTE — PROGRESS NOTES
Report called to [facility]. All questions answered.     Electronically signed by Abbie Ramos RN on 8/23/2022 at 3:30 PM

## 2022-08-23 NOTE — PLAN OF CARE
Problem: Safety - Adult  Goal: Free from fall injury  Outcome: Progressing  Bed alarm in place, non skid footwear, side rails upx2, educated pt on fall precautions, room cleared of clutter, call light within reach.

## 2022-08-23 NOTE — PROGRESS NOTES
Office : [phone]     Fax :[phone]         Renal Progress Note    Subjective:   Admit Date: 7/30/2022     Interval History:   Decreased PO intake. Cr stable       DIET ADULT ORAL NUTRITION SUPPLEMENT; Lunch, Dinner; Standard High Calorie/High Protein Oral Supplement  ADULT DIET; Regular; 1200 ml  Medications:   Scheduled Meds:   alteplase  1 mg IntraCATHeter Once    metoprolol tartrate  25 mg Oral BID    heparin (porcine)  5,000 Units SubCUTAneous 3 times per day    vancomycin  125 mg Oral 4 times per day    digoxin  125 mcg Oral Daily    pantoprazole  40 mg Oral BID AC    miconazole   Topical BID    melatonin  10 mg Oral Nightly    albuterol  2.5 mg Nebulization BID    lidocaine  1 patch TransDERmal Daily    sodium chloride (Inhalant)  4 mL Nebulization BID    sodium chloride flush  5-40 mL IntraVENous 2 times per day    fluticasone  2 spray Each Nostril Daily    pregabalin  50 mg Oral TID     Continuous Infusions:   sodium chloride      sodium chloride      sodium chloride      sodium chloride      dextrose      sodium chloride 5 mL/hr at 08/12/22 1154       Labs:  CBC:   Recent Labs     08/23/22  0416   WBC 11.5*   HGB 7.8*   *       BMP:    Recent Labs     08/21/22  0444 08/22/22  0528    136   K 4.2 4.3    106   CO2 20* 24   BUN 12 8   CREATININE <0.5* <0.5*   GLUCOSE 82 86       Ca/Mg/Phos:   Recent Labs     08/21/22  0444 08/22/22  0528   CALCIUM 7.6* 7.4*   PHOS 2.9 3.1       Hepatic:   No results for input(s): AST, ALT, ALB, BILITOT, ALKPHOS in the last 72 hours.     Troponin: No results for input(s): TROPONINI in the last 72 hours.  BNP: No results for input(s): BNP in the last 72 hours. Lipids: No results for input(s): CHOL, TRIG, HDL, LDLCALC, LABVLDL in the last 72 hours. ABGs:   No results for input(s): PHART, PO2ART, POX9KCQ in the last 72 hours. INR:   No results for input(s): INR in the last 72 hours. UA:  No results for input(s): Janace Carrington, GLUCOSEU, BILIRUBINUR, KETUA, SPECGRAV, BLOODU, PHUR, PROTEINU, UROBILINOGEN, NITRU, LEUKOCYTESUR, Radha Comment in the last 72 hours. Urine Microscopic:   No results for input(s): LABCAST, BACTERIA, COMU, HYALCAST, WBCUA, RBCUA, EPIU in the last 72 hours. Urine Culture: No results for input(s): LABURIN in the last 72 hours. Urine Chemistry: No results for input(s): Lucas Clos, PROTEINUR, NAUR in the last 72 hours. Objective:   Vitals: BP (!) 177/79   Pulse 83   Temp 98.5 °F (36.9 °C) (Oral)   Resp 14   Ht 5' 2.01\" (1.575 m)   Wt 238 lb 8.6 oz (108.2 kg)   SpO2 93%   BMI 43.62 kg/m²    Wt Readings from Last 3 Encounters:   08/22/22 238 lb 8.6 oz (108.2 kg)   07/13/22 218 lb 9.6 oz (99.2 kg)   06/14/22 217 lb (98.4 kg)      24HR INTAKE/OUTPUT:    Intake/Output Summary (Last 24 hours) at 8/23/2022 2133  Last data filed at 8/23/2022 0701  Gross per 24 hour   Intake 720 ml   Output 800 ml   Net -80 ml       Physical Exam  Constitutional:       Appearance: She is ill-appearing. She is not diaphoretic. Cardiovascular:      Rate and Rhythm: Normal rate. Pulmonary:      Effort: Pulmonary effort is normal.      Breath sounds: Rhonchi present. No wheezing. Abdominal:      General: There is distension. Palpations: Abdomen is soft. Tenderness: There is abdominal tenderness. There is no guarding. Musculoskeletal:      Right lower leg: Edema present. Left lower leg: Edema present. Skin:     General: Skin is warm and dry. Neurological:      General: No focal deficit present. Mental Status: She is oriented to person, place, and time. Normal cardiomediastinal silhouette. Lines and tubes in standard position. VL Extremity Venous Bilateral   Final Result      XR ABDOMEN (KUB) (SINGLE AP VIEW)   Final Result   Impression: Stable appearance of the abdomen with gas distended loops of central small bowel again noted. CT CHEST WO CONTRAST   Final Result      1. Moderate right pleural effusion and small left effusion with basilar airspace opacity most consistent with atelectasis. Superimposed pneumonia be difficult to exclude. 2. Narrowing of the trachea with expiration compatible with treated bronchial malacia. 3. Nasogastric tube tip in the lower esophagus. 4. Persistent colonic distention. 5. Mild coronary artery calcification. XR ABDOMEN (KUB) (SINGLE AP VIEW)   Final Result      Frontal supine views demonstrate an unremarkable bowel gas pattern. No significant colonic stool is seen. Right hip arthroplasty noted. XR CHEST PORTABLE   Final Result      Prominence of the perihilar interstitial markings with mild peribronchial cuffing is favored to represent mild pulmonary edema. Low lung volumes bronchovascular crowding. Stable cardiomediastinal silhouette. Lines and tubes in standard position. Cholecystectomy clips are noted. CT ABDOMEN PELVIS WO CONTRAST Additional Contrast? Oral and Rectal   Final Result   1. Moderate colonic distention. Correlate for constipation. Fecal content within the ileum which may suggest stasis. There is no small bowel obstruction. 2. Subcutaneous fluid collection overlying the right total hip arthroplasty. CT HIP RIGHT WO CONTRAST   Final Result   1. Moderate colonic distention. Correlate for constipation. Fecal content within the ileum which may suggest stasis. There is no small bowel obstruction. 2. Subcutaneous fluid collection overlying the right total hip arthroplasty. XR CHEST PORTABLE   Final Result      1.   Right IJ CVC tip in the cavoatrial junction. 2.  Bibasilar airspace disease suggesting atelectasis. Pneumonia is not excluded. XR ABDOMEN (KUB) (SINGLE AP VIEW)   Final Result      Dilated colonic and small bowel loops may suggest ileus but obstruction is not excluded. XR ABDOMEN (KUB) (SINGLE AP VIEW)   Final Result      No acute radiographic abnormality of the abdomen. Status post right hip arthroplasty with mildly displaced greater trochanteric fracture fragment, new or more pronounced compared to the prior study. Assessment/Plan     ARIANE   2. Sepsis  3. AGMA   4. Lactic Acidosis - resolved  4. Hyperkalemia  5. Hyponatremia  6. Bowel Obstruction  7. Hypertension  8. GI bleed  9. Anemia  10.  C diff       Plan:  - Cr better   - BP better   - dc IVF   - encourage solute intake   - Replete electrolytes as needed    - ECHO normal EF - IVC compressible   - Hypoxia likely sec to Aspiration pneumonitis   - ARIANE sec to ATN   - Monitor I/Os  - Avoid nephrotoxic agents    Recommend to dose adjust all medications  based on renal functions  Maintain SBP> 90 mmHg   Daily weights   AVOID NSAIDs  Avoid Nephrotoxins  Monitor Intake/Output  Call if significant decrease in urine output        Joe Pemberton MD

## 2022-09-07 ENCOUNTER — CARE COORDINATION (OUTPATIENT)
Dept: CASE MANAGEMENT | Age: 72
End: 2022-09-07

## 2022-09-07 NOTE — CARE COORDINATION
Called [facility] [place] for patient updates. Spoke with MSW that states patient is not doing well, and family is present. Will continue to follow.   Dottie Wade BSN  Care Transition Nurse for [facility] bundle  [phone]

## 2022-09-14 ENCOUNTER — CARE COORDINATION (OUTPATIENT)
Dept: CASE MANAGEMENT | Age: 72
End: 2022-09-14

## 2022-09-14 NOTE — CARE COORDINATION
Called [facility] [place] for patient updates. Instructed that patient passed away on 9/8/22.   Manuela Mario BSN  Care Transition Nurse for ortho bundle  [phone]

## 2022-09-24 PROBLEM — K56.609 COLON OBSTRUCTION (HCC): Status: ACTIVE | Noted: 2022-09-24

## 2023-11-02 NOTE — PROGRESS NOTES
Office : [phone]     Fax :[phone]         Renal Progress Note    Subjective:   Admit Date: 7/30/2022     Interval History:   NAONE. Patient appears more alert today, reporting abdominal bloating and back pain. On 13L HFNC. HPI   Kaylen Jane is a 74 y.o. female w/PMH HTN, HLD, gout, OA, and recent right CASS who presented w/generalized weakness, N/V, constipation, and abdominal pain.        DIET Diet NPO  Medications:   Scheduled Meds:   SMOG Enema  330 mL Rectal BID    metoclopramide  5 mg IntraVENous Q6H    pantoprazole  40 mg IntraVENous BID    sodium chloride (Inhalant)  4 mL Nebulization BID    albuterol  2.5 mg Nebulization TID    polyethylene glycol  17 g Per NG tube TID    bisacodyl  10 mg Rectal Once    lidocaine   Topical Once    sodium chloride flush  5-40 mL IntraVENous 2 times per day    fluticasone  2 spray Each Nostril Daily    [Held by provider] pregabalin  50 mg Oral TID     Continuous Infusions:   dextrose 50 mL/hr at 08/04/22 1308    furosemide (LASIX) 1mg/ml infusion 10 mg/hr (08/05/22 0210)    amiodarone 0.5 mg/min (08/05/22 0623)    heparin (PORCINE) Infusion 15 Units/kg/hr (08/05/22 0623)    dextrose      sodium chloride         Labs:  CBC:   Recent Labs     08/04/22  0338 08/04/22  1539 08/05/22  0534   WBC 13.1* 14.1* 15.7*   HGB 8.0* 7.9* 7.3*    222 162       BMP:    Recent Labs     08/04/22  0338 08/04/22  1539 08/05/22  0534   * 128* 129*   K 5.6* 5.0 4.9   CL 96* 91* 93*   CO2 18* 19* 21   BUN 83* 87* 90*   CREATININE 2.8* 3.0* 3.2*   GLUCOSE 78 107* 141*       Ca/Mg/Phos:   Recent Labs     08/04/22 0338 08/04/22  1539 08/05/22  0534   CALCIUM 8.3 8.5 7.6*   MG 3.10* 3.20* 2.90*   PHOS 9.0* 8.5* 8.1*       Hepatic:   No results for input(s): AST, ALT, ALB, BILITOT, ALKPHOS in the last 72 hours. Troponin: No results for input(s): TROPONINI in the last 72 hours. BNP: No results for input(s): BNP in the last 72 hours. Lipids: No results for input(s): CHOL, TRIG, HDL, LDLCALC, LABVLDL in the last 72 hours. ABGs:   Recent Labs     08/05/22  0250   PHART 7.330*   PO2ART 32.7*   [id number] 40.7       INR:   Recent Labs     08/02/22  1830 08/04/22  1855   INR 1.18* 1.15*       UA:  No results for input(s): [place] Rochert, GLUCOSEU, BILIRUBINUR, KETUA, SPECGRAV, BLOODU, PHUR, PROTEINU, UROBILINOGEN, NITRU, LEUKOCYTESUR, [place] Overcast in the last 72 hours. Urine Microscopic:   No results for input(s): LABCAST, BACTERIA, COMU, HYALCAST, WBCUA, RBCUA, EPIU in the last 72 hours. Urine Culture: No results for input(s): LABURIN in the last 72 hours. Urine Chemistry: No results for input(s): [place], PROTEINUR, NAUR in the last 72 hours. Objective:   Vitals: BP (!) 87/43   Pulse 92   Temp 97.8 °F (36.6 °C) (Oral)   Resp 15   Ht 5' 2\" (1.575 m)   Wt 246 lb 14.6 oz (112 kg)   SpO2 92%   BMI 45.16 kg/m²    Wt Readings from Last 3 Encounters:   08/04/22 246 lb 14.6 oz (112 kg)   07/13/22 218 lb 9.6 oz (99.2 kg)   06/14/22 217 lb (98.4 kg)      24HR INTAKE/OUTPUT:    Intake/Output Summary (Last 24 hours) at 8/5/2022 0837  Last data filed at 8/5/2022 1617  Gross per 24 hour   Intake 1512.68 ml   Output 1150 ml   Net 362.68 ml       Physical Exam  Constitutional:       Appearance: She is ill-appearing. She is not diaphoretic. Cardiovascular:      Rate and Rhythm: Normal rate. Pulmonary:      Effort: Pulmonary effort is normal.      Breath sounds: Rhonchi present. No wheezing. Abdominal:      General: There is distension. Palpations: Abdomen is soft. Tenderness:  There is abdominal tenderness. There is no guarding. Musculoskeletal:      Right lower leg: Edema present. Left lower leg: Edema present. Skin:     General: Skin is warm and dry. Neurological:      General: No focal deficit present. Mental Status: She is oriented to person, place, and time. Assessment and Plan:       IMAGING:  XR ABDOMEN (KUB) (SINGLE AP VIEW)   Final Result   Impression: Stable appearance of the abdomen. XR CHEST PORTABLE   Final Result      Low lung volumes with central bronchovascular crowding. Atelectasis in the left lung base. Normal cardiomediastinal silhouette. Lines and tubes in standard position. VL Extremity Venous Bilateral         XR ABDOMEN (KUB) (SINGLE AP VIEW)   Final Result   Impression: Stable appearance of the abdomen with gas distended loops of central small bowel again noted. CT CHEST WO CONTRAST   Final Result      1. Moderate right pleural effusion and small left effusion with basilar airspace opacity most consistent with atelectasis. Superimposed pneumonia be difficult to exclude. 2. Narrowing of the trachea with expiration compatible with treated bronchial malacia. 3. Nasogastric tube tip in the lower esophagus. 4. Persistent colonic distention. 5. Mild coronary artery calcification. XR ABDOMEN (KUB) (SINGLE AP VIEW)   Final Result      Frontal supine views demonstrate an unremarkable bowel gas pattern. No significant colonic stool is seen. Right hip arthroplasty noted. XR CHEST PORTABLE   Final Result      Prominence of the perihilar interstitial markings with mild peribronchial cuffing is favored to represent mild pulmonary edema. Low lung volumes bronchovascular crowding. Stable cardiomediastinal silhouette. Lines and tubes in standard position. Cholecystectomy clips are noted. CT ABDOMEN PELVIS WO CONTRAST Additional Contrast? Oral and Rectal   Final Result   1. Moderate colonic distention. Correlate for constipation. Fecal content within the ileum which may suggest stasis. There is no small bowel obstruction. 2. Subcutaneous fluid collection overlying the right total hip arthroplasty. CT HIP RIGHT WO CONTRAST   Final Result   1. Moderate colonic distention. Correlate for constipation. Fecal content within the ileum which may suggest stasis. There is no small bowel obstruction. 2. Subcutaneous fluid collection overlying the right total hip arthroplasty. XR CHEST PORTABLE   Final Result      1. Right IJ CVC tip in the cavoatrial junction. 2.  Bibasilar airspace disease suggesting atelectasis. Pneumonia is not excluded. XR ABDOMEN (KUB) (SINGLE AP VIEW)   Final Result      Dilated colonic and small bowel loops may suggest ileus but obstruction is not excluded. XR ABDOMEN (KUB) (SINGLE AP VIEW)   Final Result      No acute radiographic abnormality of the abdomen. Status post right hip arthroplasty with mildly displaced greater trochanteric fracture fragment, new or more pronounced compared to the prior study. Assessment/Plan     ARIANE   2. Sepsis  3. AGMA   4. Lactic Acidosis - resolved  4. Hyperkalemia  5. Hyponatremia  6. Bowel Obstruction  7. Hypertension      Plan:  - Diuresis per cardiology team  - ECHO normnal EF - IVC compressible   - Hypoxia likely sec to Aspiration pneumonitis   - ARIANE sec to ATN   - Monitor I/Os  - Avoid nephrotoxic agents      Recommend to dose adjust all medications  based on renal functions  Maintain SBP> 90 mmHg   Daily weights   AVOID NSAIDs  Avoid Nephrotoxins  Monitor Intake/Output  Call if significant decrease in urine output        Kisha Landin MD  PGY-2      I have seen the patient independently from the resident . I discussed the care with the resident. I personally reviewed the HPI, PH, FH, SH, ROS and medications.  I repeated pertinent portions of the examination and reviewed the relevant imaging and laboratory data. I agree with the findings, assessment and plan as documented, with the following addendum:      Angelito Rice MD no

## (undated) DEVICE — GAMMEX® NON-LATEX SIZE 8, STERILE NEOPRENE POWDER-FREE SURGICAL GLOVE: Brand: GAMMEX

## (undated) DEVICE — HOLDER SCALP PLAS G STD

## (undated) DEVICE — SYRINGE IRRIG 60ML SFT PLIABLE BLB EZ TO GRP 1 HND USE W/

## (undated) DEVICE — 3M™ STERI-DRAPE™ INSTRUMENT POUCH 1018: Brand: STERI-DRAPE™

## (undated) DEVICE — DRESSING W4XL8IN ISLANDTHERABOND 3D

## (undated) DEVICE — NEEDLE SPNL 20GA L3.5IN YEL HUB S STL REG WALL FIT STYL W/

## (undated) DEVICE — ANTERIOR TOTAL HIP: Brand: MEDLINE INDUSTRIES, INC.

## (undated) DEVICE — BIOLOX® DELTA, CERAMIC FEMORAL HEAD, M, Ø 28/0, TAPER 12/14
Type: IMPLANTABLE DEVICE | Site: HIP | Status: NON-FUNCTIONAL
Brand: BIOLOX® DELTA
Removed: 2022-07-13

## (undated) DEVICE — SOLUTION IV IRRIG WATER 1000ML POUR BRL 2F7114

## (undated) DEVICE — MARKER SURG SKIN UTIL BLK REG TIP NONSMEARING W/ 6IN RUL

## (undated) DEVICE — FORCEPS BX L240CM JAW DIA2.8MM L CAP W/ NDL MIC MESH TOOTH

## (undated) DEVICE — GLOVE ORTHO 7 1/2   MSG9475

## (undated) DEVICE — SOLUTION IV 250ML 0.9% SOD CHL PH 5 INJ USP VIAFLX PLAS

## (undated) DEVICE — COTTON UNDERCAST PADDING,CRIMPED FINISH: Brand: WEBRIL

## (undated) DEVICE — DRAPE,HIP,W/POUCHES,STERILE: Brand: MEDLINE

## (undated) DEVICE — 3M™ STERI-DRAPE™ INCISE DRAPE 1050 (60CM X 45CM): Brand: STERI-DRAPE™

## (undated) DEVICE — 3M™ IOBAN™ 2 ANTIMICROBIAL INCISE DRAPE 6640EZ: Brand: IOBAN™ 2

## (undated) DEVICE — SYRINGE MED 30ML STD CLR PLAS LUERLOCK TIP N CTRL DISP

## (undated) DEVICE — SUTURE PERMAHAND SZ 0 L30IN NONABSORBABLE BLK SILK BRAID A306H

## (undated) DEVICE — SUTURE MCRYL SZ 4-0 L27IN ABSRB UD L19MM PS-2 1/2 CIR PRIM Y426H

## (undated) DEVICE — UNDERGLOVE SURG SZ 8 BLU LTX FREE SYN POLYISOPRENE POLYMER

## (undated) DEVICE — CHLORAPREP 26ML ORANGE

## (undated) DEVICE — COVER MAYO STAND XL STRL DISP

## (undated) DEVICE — GOWN,SIRUS,POLYRNF,BRTHSLV,XL,30/CS: Brand: MEDLINE

## (undated) DEVICE — 1010 S-DRAPE TOWEL DRAPE 10/BX: Brand: STERI-DRAPE™

## (undated) DEVICE — SOLUTION IRRIG 1000ML 09% SOD CHL USP PIC PLAS CONTAINER

## (undated) DEVICE — TOWEL,STOP FLAG GOLD N-W: Brand: MEDLINE

## (undated) DEVICE — SUTURE STRATAFIX SPRL SZ 1 L14IN ABSRB VLT L48CM CTX 1/2 SXPD2B405

## (undated) DEVICE — CANNULA SAMP CO2 AD GRN 7FT CO2 AND 7FT O2 TBNG UNIV CONN

## (undated) DEVICE — GLOVE ORANGE PI 7 1/2   MSG9075

## (undated) DEVICE — SOLUTION IV 1000ML 0.9% SOD CHL

## (undated) DEVICE — SUTURE VCRL SZ 2-0 L18IN ABSRB UD CT-1 L36MM 1/2 CIR J839D

## (undated) DEVICE — 3M™ TEGADERM™ TRANSPARENT FILM DRESSING FRAME STYLE, 1628, 6 IN X 8 IN (15 CM X 20 CM), 10/CT 8CT/CASE: Brand: 3M™ TEGADERM™

## (undated) DEVICE — SUTURE ETHBND EXCEL SZ 2 L30IN NONABSORBABLE GRN L40MM V-37 MX69G

## (undated) DEVICE — BIPOLAR SEALER 23-112-1 AQM 6.0: Brand: AQUAMANTYS ®

## (undated) DEVICE — DUAL CUT SAGITTAL BLADE

## (undated) DEVICE — SOLUTION IRRIG 3000ML 0.9% SOD CHL USP UROMATIC PLAS CONT